# Patient Record
Sex: MALE | Race: WHITE | NOT HISPANIC OR LATINO | Employment: FULL TIME | URBAN - METROPOLITAN AREA
[De-identification: names, ages, dates, MRNs, and addresses within clinical notes are randomized per-mention and may not be internally consistent; named-entity substitution may affect disease eponyms.]

---

## 2018-04-25 NOTE — PATIENT INSTRUCTIONS
Preventive Health Recommendations  Male Ages 50   64    Yearly exam:             See your health care provider every year in order to  o   Review health changes.   o   Discuss preventive care.    o   Review your medicines if your doctor has prescribed any.     Have a cholesterol test every 5 years, or more frequently if you are at risk for high cholesterol/heart disease.     Have a diabetes test (fasting glucose) every three years. If you are at risk for diabetes, you should have this test more often.     Have a colonoscopy at age 50, or have a yearly FIT test (stool test). These exams will check for colon cancer.      Talk with your health care provider about whether or not a prostate cancer screening test (PSA) is right for you.    You should be tested each year for STDs (sexually transmitted diseases), if you re at risk.     Shots: Get a flu shot each year. Get a tetanus shot every 10 years.     Nutrition:    Eat at least 5 servings of fruits and vegetables daily.     Eat whole-grain bread, whole-wheat pasta and brown rice instead of white grains and rice.     Talk to your provider about Calcium and Vitamin D.     Lifestyle    Exercise for at least 150 minutes a week (30 minutes a day, 5 days a week). This will help you control your weight and prevent disease.     Limit alcohol to one drink per day.     No smoking.     Wear sunscreen to prevent skin cancer.     See your dentist every six months for an exam and cleaning.     See your eye doctor every 1 to 2 years.              Heartburn/GERD   What is heartburn?   Heartburn refers to the symptoms you feel when acids in your stomach flow back into the esophagus. (The esophagus is the tube that carries food from your throat to your stomach.) This backward movement of stomach acid is called reflux. The acid can burn and irritate the esophagus, throat, and vocal cords.   Heartburn is a common problem. Despite its name, it has nothing to do with the heart.   When you  have heartburn often, you may have a condition called gastroesophageal reflux disease, or GERD.   How does it occur?   At the bottom of the esophagus there is a ring of muscle called a sphincter. It acts like a valve. When you swallow food, the sphincter opens to let the food pass into the stomach. The ring then closes to keep the stomach contents from going back into the esophagus. If the sphincter is weak or too relaxed, stomach acid and food flow backward into the esophagus. Because the esophagus does not have the protective lining that the stomach has, the acid causes pain.   The sphincter muscle sometimes does not work properly if:   You are overweight.   You are pregnant.   You have a hiatal hernia (a condition in which part of the stomach protrudes through the diaphragm into the chest).   You eat too much.   You lie down soon after eating.   You wear tight clothes that push on your stomach.   Foods that may make heartburn worse are:   foods high in fat   sugar   chocolate   peppermint   onions   citrus foods such as orange juice   tomato-based foods   spicy foods   coffee and other drinks with caffeine, such as tea and samuel   alcohol.   Heartburn can also be made worse by:   taking certain medicines, such as aspirin   smoking cigarettes.   Anyone can have an attack of heartburn from overeating or eating foods that are high in acid. Most of the time heartburn is mild and lasts for a short time. There is usually not a problem when heartburn occurs just once in a while. You should see your healthcare provider if:   You have heartburn nearly every day for 2 weeks.   The heartburn comes back when the antacid wears off.   Heartburn wakes you up at night.   What are the symptoms?   The main symptom of heartburn is a burning pain in the lower chest, usually close to the bottom of the breastbone. Other symptoms you may have are:   acid or sour taste in your mouth   belching   a feeling of bloating or fullness in the  stomach.   These symptoms tend to happen after very large meals and especially with activity such as bending or lifting after meals. The symptoms may be made worse by lying down or by wearing tight clothing.   Heartburn is very common during the last few months of pregnancy. The weight of the baby pushes on the stomach and can cause the sphincter to relax and let acid to flow back into the esophagus.   How is it diagnosed?   Usually heartburn can be diagnosed from your medical history.   If there is any question about the diagnosis, you may have the following tests to check for ulcers or other problems that might cause your symptoms:   barium swallow X-ray study of the esophagus   complete upper GI (gastrointestinal) barium X-ray study of the esophagus, stomach, and upper intestine   endoscopy, a procedure in which a thin flexible tube with a tiny camera is placed in your mouth and down into your stomach so your provider can see your esophagus and stomach.   How is it treated?   To help reduce the symptoms of heartburn you can:   Try not to put a lot of pressure on the sphincter muscle. Eating light meals and wearing loose clothing will help.   Lose weight if you are overweight.   Take nonprescription antacids (tablets or liquid) after meals and at bedtime.   Raise the head of your bed or use more than one pillow so your head is higher than your stomach. This may allow gravity to help keep food from backing up.   If you find that certain foods or drinks seem to cause your symptoms or make them worse, avoid those foods.   If the simple measures described above do not relieve the symptoms, your healthcare provider may prescribe medicine. The prescription medicines help reduce stomach acid. They also help stomach emptying. A very few people who are not helped with medicines may need surgery.   Get emergency care if the following symptoms occur with the heartburn and do not go away within 15 minutes of treatment for  heartburn: shortness of breath; sweating; light-headedness, weakness; or jaw, arm, back, or chest pain.   How long will the effects last?   Heartburn symptoms are usually relieved by treatment in just a few hours or less. If you are having heartburn every day, starting treatment will usually relieve the symptoms in a few days. However, the symptoms may come back from time to time, especially if you gain weight.   Heartburn can sometimes make asthma worse. If you have asthma, preventing or controlling heartburn may help control your asthma symptoms.   How can I help prevent heartburn?   The best prevention is to:   Keep a healthy weight. Lose weight if you are overweight.   Sleep with your head elevated at least 4 to 6 inches. (It's usually most comfortable to put the head of your bed on blocks.)   It may also help if you:   Wait an hour or longer after eating before you lie down. If you have to lie down after a meal, lie on your left side. Keep your head and shoulders slightly higher than the rest of your body. It's best to not eat for 2 to 3 hours before you go to bed.   Eat smaller, more frequent meals.   Avoid wearing tight clothing or belts.   Don't smoke. Smoking relaxes the sphincter leading to your stomach.   Avoid foods and other things that seem to cause heartburn or make it worse.   Developed by [employer].   Published by [employer].   Last modified: 2009-01-14   Last reviewed: 2008-12-02

## 2018-04-25 NOTE — PROGRESS NOTES
"  SUBJECTIVE:   CC: Per Etienne is an 52 year old male who presents for preventative health visit.     Healthy Habits:    Do you get at least three servings of calcium containing foods daily (dairy, green leafy vegetables, etc.)? {YES/NO, DAIRY INTAKE:033341::\"yes\"}    Amount of exercise or daily activities, outside of work: {AMOUNT EXERCISE:794022}    Problems taking medications regularly {Yes /No default:279022::\"No\"}    Medication side effects: {Yes /No default.:228529::\"No\"}    Have you had an eye exam in the past two years? {YESNOBLANK:296348}    Do you see a dentist twice per year? {YESNOBLANK:997443}    Do you have sleep apnea, excessive snoring or daytime drowsiness?{YESNOBLANK:022849}  {Outside tests to abstract? :478263}     {additional problems to add (Optional):536961}    Today's PHQ-2 Score:   PHQ-2 ( 1999 [employer]) 2/11/2016 2/26/2015   Q1: Little interest or pleasure in doing things 0 0   Q2: Feeling down, depressed or hopeless 0 0   PHQ-2 Score 0 0     {PHQ-2 LOOK IN ASSESSMENTS :028377}  Abuse: Current or Past(Physical, Sexual or Emotional)- {YES/NO/NA:031149}  Do you feel safe in your environment - {YES/NO/NA:100848}    Social History   Substance Use Topics     Smoking status: Former Smoker     Quit date: 1/2/2012     Smokeless tobacco: Never Used     Alcohol use Yes      If you drink alcohol do you typically have >3 drinks per day or >7 drinks per week? {ETOH :128232}                      Last PSA: No results found for: PSA    Reviewed orders with patient. Reviewed health maintenance and updated orders accordingly - {Yes/No:393421::\"Yes\"}  {Chronicprobdata (Optional):013420}    Reviewed and updated as needed this visit by clinical staff         Reviewed and updated as needed this visit by Provider        {HISTORY OPTIONS (Optional):834199}    ROS:  {MALE ROS-adult preventive care package:136936::\"C: NEGATIVE for fever, chills, change in weight\",\"I: NEGATIVE for worrisome rashes, moles or " "lesions\",\"E: NEGATIVE for vision changes or irritation\",\"ENT: NEGATIVE for ear, mouth and throat problems\",\"R: NEGATIVE for significant cough or SOB\",\"CV: NEGATIVE for chest pain, palpitations or peripheral edema\",\"GI: NEGATIVE for nausea, abdominal pain, heartburn, or change in bowel habits\",\" male: negative for dysuria, hematuria, decreased urinary stream, erectile dysfunction, urethral discharge\",\"M: NEGATIVE for significant arthralgias or myalgia\",\"N: NEGATIVE for weakness, dizziness or paresthesias\",\"P: NEGATIVE for changes in mood or affect\"}    OBJECTIVE:   There were no vitals taken for this visit.  EXAM:  {Exam Choices:901504}    ASSESSMENT/PLAN:   {Diag Picklist:318646}    COUNSELING:  {MALE COUNSELING MESSAGES:279780::\"Reviewed preventive health counseling, as reflected in patient instructions\"}    {BP Counseling- Complete if BP >= 120/80  (Optional):807550}   reports that he quit smoking about 6 years ago. He has never used smokeless tobacco.  {Tobacco Cessation -- Complete if patient is a smoker (Optional):737559}  Estimated body mass index is 27.62 kg/(m^2) as calculated from the following:    Height as of 2/11/16: 5' 9\" (1.753 m).    Weight as of 2/11/16: 187 lb (84.8 kg).   {Weight Management Plan (ACO) Complete if BMI is abnormal-  Ages 18-64  BMI >24.9.  Age 65+ with BMI <23 or >30 (Optional):409655}    Counseling Resources:  ATP IV Guidelines  Pooled Cohorts Equation Calculator  FRAX Risk Assessment  ICSI Preventive Guidelines  Dietary Guidelines for Americans, 2010  USDA's MyPlate  ASA Prophylaxis  Lung CA Screening    Kwaku Sanchez PA-C  [facility]  "

## 2018-04-26 ENCOUNTER — OFFICE VISIT (OUTPATIENT)
Dept: FAMILY MEDICINE | Facility: CLINIC | Age: 53
End: 2018-04-26
Payer: COMMERCIAL

## 2018-04-26 VITALS
WEIGHT: 187 LBS | RESPIRATION RATE: 16 BRPM | HEART RATE: 86 BPM | OXYGEN SATURATION: 97 % | TEMPERATURE: 97.6 F | SYSTOLIC BLOOD PRESSURE: 134 MMHG | HEIGHT: 69 IN | DIASTOLIC BLOOD PRESSURE: 85 MMHG | BODY MASS INDEX: 27.7 KG/M2

## 2018-04-26 DIAGNOSIS — K21.9 GASTROESOPHAGEAL REFLUX DISEASE, ESOPHAGITIS PRESENCE NOT SPECIFIED: ICD-10-CM

## 2018-04-26 DIAGNOSIS — R07.89 ATYPICAL CHEST PAIN: ICD-10-CM

## 2018-04-26 DIAGNOSIS — Z00.00 ROUTINE GENERAL MEDICAL EXAMINATION AT A HEALTH CARE FACILITY: Primary | ICD-10-CM

## 2018-04-26 DIAGNOSIS — Z11.59 NEED FOR HEPATITIS C SCREENING TEST: ICD-10-CM

## 2018-04-26 DIAGNOSIS — Z12.11 SCREEN FOR COLON CANCER: ICD-10-CM

## 2018-04-26 PROCEDURE — 93000 ELECTROCARDIOGRAM COMPLETE: CPT | Performed by: PHYSICIAN ASSISTANT

## 2018-04-26 PROCEDURE — 99213 OFFICE O/P EST LOW 20 MIN: CPT | Mod: 25 | Performed by: PHYSICIAN ASSISTANT

## 2018-04-26 PROCEDURE — 99396 PREV VISIT EST AGE 40-64: CPT | Performed by: PHYSICIAN ASSISTANT

## 2018-04-26 NOTE — MR AVS SNAPSHOT
After Visit Summary   4/26/2018    Per Etienne    MRN: [id number]           Patient Information     Date Of Birth          1965        Visit Information        Provider Department      4/26/2018 3:10 PM Kwaku Sanchez PA-C [facility]        Today's Diagnoses     Routine general medical examination at a health care facility    -  1    Screen for colon cancer        Need for hepatitis C screening test        Gastroesophageal reflux disease, esophagitis presence not specified        Atypical chest pain          Care Instructions      Preventive Health Recommendations  Male Ages 50 - 64    Yearly exam:             See your health care provider every year in order to  o   Review health changes.   o   Discuss preventive care.    o   Review your medicines if your doctor has prescribed any.     Have a cholesterol test every 5 years, or more frequently if you are at risk for high cholesterol/heart disease.     Have a diabetes test (fasting glucose) every three years. If you are at risk for diabetes, you should have this test more often.     Have a colonoscopy at age 50, or have a yearly FIT test (stool test). These exams will check for colon cancer.      Talk with your health care provider about whether or not a prostate cancer screening test (PSA) is right for you.    You should be tested each year for STDs (sexually transmitted diseases), if you re at risk.     Shots: Get a flu shot each year. Get a tetanus shot every 10 years.     Nutrition:    Eat at least 5 servings of fruits and vegetables daily.     Eat whole-grain bread, whole-wheat pasta and brown rice instead of white grains and rice.     Talk to your provider about Calcium and Vitamin D.     Lifestyle    Exercise for at least 150 minutes a week (30 minutes a day, 5 days a week). This will help you control your weight and prevent disease.     Limit alcohol to one drink per day.     No smoking.     Wear sunscreen to  prevent skin cancer.     See your dentist every six months for an exam and cleaning.     See your eye doctor every 1 to 2 years.              Heartburn/GERD   What is heartburn?   Heartburn refers to the symptoms you feel when acids in your stomach flow back into the esophagus. (The esophagus is the tube that carries food from your throat to your stomach.) This backward movement of stomach acid is called reflux. The acid can burn and irritate the esophagus, throat, and vocal cords.   Heartburn is a common problem. Despite its name, it has nothing to do with the heart.   When you have heartburn often, you may have a condition called gastroesophageal reflux disease, or GERD.   How does it occur?   At the bottom of the esophagus there is a ring of muscle called a sphincter. It acts like a valve. When you swallow food, the sphincter opens to let the food pass into the stomach. The ring then closes to keep the stomach contents from going back into the esophagus. If the sphincter is weak or too relaxed, stomach acid and food flow backward into the esophagus. Because the esophagus does not have the protective lining that the stomach has, the acid causes pain.   The sphincter muscle sometimes does not work properly if:   You are overweight.   You are pregnant.   You have a hiatal hernia (a condition in which part of the stomach protrudes through the diaphragm into the chest).   You eat too much.   You lie down soon after eating.   You wear tight clothes that push on your stomach.   Foods that may make heartburn worse are:   foods high in fat   sugar   chocolate   peppermint   onions   citrus foods such as orange juice   tomato-based foods   spicy foods   coffee and other drinks with caffeine, such as tea and samuel   alcohol.   Heartburn can also be made worse by:   taking certain medicines, such as aspirin   smoking cigarettes.   Anyone can have an attack of heartburn from overeating or eating foods that are high in acid.  Most of the time heartburn is mild and lasts for a short time. There is usually not a problem when heartburn occurs just once in a while. You should see your healthcare provider if:   You have heartburn nearly every day for 2 weeks.   The heartburn comes back when the antacid wears off.   Heartburn wakes you up at night.   What are the symptoms?   The main symptom of heartburn is a burning pain in the lower chest, usually close to the bottom of the breastbone. Other symptoms you may have are:   acid or sour taste in your mouth   belching   a feeling of bloating or fullness in the stomach.   These symptoms tend to happen after very large meals and especially with activity such as bending or lifting after meals. The symptoms may be made worse by lying down or by wearing tight clothing.   Heartburn is very common during the last few months of pregnancy. The weight of the baby pushes on the stomach and can cause the sphincter to relax and let acid to flow back into the esophagus.   How is it diagnosed?   Usually heartburn can be diagnosed from your medical history.   If there is any question about the diagnosis, you may have the following tests to check for ulcers or other problems that might cause your symptoms:   barium swallow X-ray study of the esophagus   complete upper GI (gastrointestinal) barium X-ray study of the esophagus, stomach, and upper intestine   endoscopy, a procedure in which a thin flexible tube with a tiny camera is placed in your mouth and down into your stomach so your provider can see your esophagus and stomach.   How is it treated?   To help reduce the symptoms of heartburn you can:   Try not to put a lot of pressure on the sphincter muscle. Eating light meals and wearing loose clothing will help.   Lose weight if you are overweight.   Take nonprescription antacids (tablets or liquid) after meals and at bedtime.   Raise the head of your bed or use more than one pillow so your head is higher than  your stomach. This may allow gravity to help keep food from backing up.   If you find that certain foods or drinks seem to cause your symptoms or make them worse, avoid those foods.   If the simple measures described above do not relieve the symptoms, your healthcare provider may prescribe medicine. The prescription medicines help reduce stomach acid. They also help stomach emptying. A very few people who are not helped with medicines may need surgery.   Get emergency care if the following symptoms occur with the heartburn and do not go away within 15 minutes of treatment for heartburn: shortness of breath; sweating; light-headedness, weakness; or jaw, arm, back, or chest pain.   How long will the effects last?   Heartburn symptoms are usually relieved by treatment in just a few hours or less. If you are having heartburn every day, starting treatment will usually relieve the symptoms in a few days. However, the symptoms may come back from time to time, especially if you gain weight.   Heartburn can sometimes make asthma worse. If you have asthma, preventing or controlling heartburn may help control your asthma symptoms.   How can I help prevent heartburn?   The best prevention is to:   Keep a healthy weight. Lose weight if you are overweight.   Sleep with your head elevated at least 4 to 6 inches. (It's usually most comfortable to put the head of your bed on blocks.)   It may also help if you:   Wait an hour or longer after eating before you lie down. If you have to lie down after a meal, lie on your left side. Keep your head and shoulders slightly higher than the rest of your body. It's best to not eat for 2 to 3 hours before you go to bed.   Eat smaller, more frequent meals.   Avoid wearing tight clothing or belts.   Don't smoke. Smoking relaxes the sphincter leading to your stomach.   Avoid foods and other things that seem to cause heartburn or make it worse.   Developed by [employer].   Published by  "[employer].   Last modified: 2009-01-14   Last reviewed: 2008-12-02             Follow-ups after your visit        Future tests that were ordered for you today     Open Future Orders        Priority Expected Expires Ordered    Hepatitis C Screen Reflex to HCV RNA Quant and Genotype Routine  7/26/2018 4/26/2018    Lipid panel reflex to direct LDL Fasting Routine  7/26/2018 4/26/2018    Comprehensive metabolic panel Routine  7/26/2018 4/26/2018    EKG 12-lead complete w/read - Clinics Routine  7/26/2018 4/26/2018    Exercise Stress Echocardiogram Routine  7/26/2018 4/26/2018    CBC with platelets differential Routine  7/26/2018 4/26/2018    TSH with free T4 reflex Routine  7/26/2018 4/26/2018            Who to contact     If you have questions or need follow up information about today's clinic visit or your schedule please contact [facility] directly at [phone].  Normal or non-critical lab and imaging results will be communicated to you by MyChart, letter or phone within 4 business days after the clinic has received the results. If you do not hear from us within 7 days, please contact the clinic through erentohart or phone. If you have a critical or abnormal lab result, we will notify you by phone as soon as possible.  Submit refill requests through Co[employer] or call your pharmacy and they will forward the refill request to us. Please allow 3 business days for your refill to be completed.          Additional Information About Your Visit        CoSchedule Information     CoSchedule lets you send messages to your doctor, view your test results, renew your prescriptions, schedule appointments and more. To sign up, go to [email or web address] . Click on \"Log in\" on the left side of the screen, which will take you to the Welcome page. Then click on \"Sign up Now\" on the right side of the page.     You will be asked to enter the access code listed below, as well as some personal information. Please follow the " "directions to create your username and password.     Your access code is: [email or web address]  Expires: 2018  3:44 PM     Your access code will  in 90 days. If you need help or a new code, please call your [facility] clinic or [phone].        Care EveryWhere ID     This is your Care EveryWhere ID. This could be used by other organizations to access your [facility] medical records  [id number]        Your Vitals Were     Pulse Temperature Respirations Height Pulse Oximetry BMI (Body Mass Index)    86 97.6  F (36.4  C) (Oral) 16 5' 8.5\" (1.74 m) 97% 28.02 kg/m2       Blood Pressure from Last 3 Encounters:   18 134/85   16 (!) 133/92   05/06/15 (!) 148/101    Weight from Last 3 Encounters:   18 187 lb (84.8 kg)   16 187 lb (84.8 kg)   05/11/15 189 lb 3.2 oz (85.8 kg)               Primary Care Provider Office Phone # Fax #    Per Chen [phone] [address] [facility] [address]        Equal Access to Services     ZAID COX : Hadii doug ku hadasho Soomaali, waaxda luqadaha, qaybta kaalmada adeegyada, waxay ap posey . So [facility] [phone].    ATENCIÓN: Si habla español, tiene a pennington disposición servicios gratuitos de asistencia lingüística. Llchristiano al [phone].    We comply with applicable federal civil rights laws and [place] laws. We do not discriminate on the basis of race, color, national origin, age, disability, sex, sexual orientation, or gender identity.            Thank you!     Thank you for choosing [facility]  for your care. Our goal is always to provide you with excellent care. Hearing back from our patients is one way we can continue to improve our services. Please take a few minutes to complete the written survey that you may receive in the mail after your visit with us. Thank you!             Your Updated Medication List - Protect others around you: Learn how to safely use, store and throw away " your medicines at www.disposemymeds.org.      Notice  As of 4/26/2018  3:44 PM    You have not been prescribed any medications.

## 2018-04-26 NOTE — PROGRESS NOTES
SUBJECTIVE:   CC: Per Etienne is an 52 year old male who presents for preventative health visit.     Physical   Annual:     Getting at least 3 servings of Calcium per day::  NO    Bi-annual eye exam::  Yes    Dental care twice a year::  Yes    Sleep apnea or symptoms of sleep apnea::  None    Diet::  Regular (no restrictions)    Frequency of exercise::  None    Taking medications regularly::  Yes    Medication side effects::  None    Additional concerns today::  YES            Mid chest pain, gotten worse recently. Takes otc product from [facility] (unsure of name) for heartburn that does work for him but if he does not take it sx's return and are strong .  History of GERD symptoms and antacids does help but cont chest pain.   No exertional symptoms. Active at work with no symptoms.   Never had colonoscopy or endoscopy done in the past.   History of chest pain 2012 with neg cardiac workup. See EPIC.    Today's PHQ-2 Score:   PHQ-2 ( 1999 [employer]) 4/26/2018   Q1: Little interest or pleasure in doing things 0   Q2: Feeling down, depressed or hopeless 0   PHQ-2 Score 0   Q1: Little interest or pleasure in doing things Not at all   Q2: Feeling down, depressed or hopeless Not at all   PHQ-2 Score 0       Abuse: Current or Past(Physical, Sexual or Emotional)- No  Do you feel safe in your environment - Yes    Social History   Substance Use Topics     Smoking status: Former Smoker     Quit date: 1/2/2012     Smokeless tobacco: Never Used     Alcohol use Yes     Alcohol Use 4/26/2018   If you drink alcohol do you typically have greater than 3 drinks per day OR greater than 7 drinks per week? Yes   AUDIT SCORE  8     Last PSA: No results found for: PSA    Reviewed orders with patient. Reviewed health maintenance and updated orders accordingly - Yes  Labs reviewed in EPIC  BP Readings from Last 3 Encounters:   04/26/18 134/85   02/11/16 (!) 133/92   05/06/15 (!) 148/101    Wt Readings from Last 3 Encounters:   04/26/18 187  "lb (84.8 kg)   02/11/16 187 lb (84.8 kg)   05/11/15 189 lb 3.2 oz (85.8 kg)                  Patient Active Problem List   Diagnosis     CARDIOVASCULAR SCREENING; LDL GOAL LESS THAN 160     Left varicocele     Vitamin D deficiency     Kidney stone     Past Surgical History:   Procedure Laterality Date     ORTHOPEDIC SURGERY  1985    femur newton placed       Social History   Substance Use Topics     Smoking status: Former Smoker     Quit date: 1/2/2012     Smokeless tobacco: Never Used     Alcohol use Yes     Family History   Problem Relation Age of Onset     Asthma Father      CANCER Maternal Grandmother 92     throat      Asthma Sister          No current outpatient prescriptions on file.     No Known Allergies    Reviewed and updated as needed this visit by clinical staff  Tobacco  Allergies  Meds  Med Hx  Surg Hx  Fam Hx  Soc Hx        Reviewed and updated as needed this visit by Provider          Past Medical History:   Diagnosis Date     NO ACTIVE PROBLEMS       Past Surgical History:   Procedure Laterality Date     ORTHOPEDIC SURGERY  1985    femur newton placed       Review of Systems  C: NEGATIVE for fever, chills, change in weight  I: NEGATIVE for worrisome rashes, moles or lesions  E: NEGATIVE for vision changes or irritation  ENT: NEGATIVE for ear, mouth and throat problems  R: NEGATIVE for significant cough or SOB  CV: NEGATIVE for chest pain, palpitations or peripheral edema  GI: NEGATIVE for nausea, abdominal pain, heartburn, or change in bowel habits   male: negative for dysuria, hematuria, decreased urinary stream, erectile dysfunction, urethral discharge  M: NEGATIVE for significant arthralgias or myalgia  N: NEGATIVE for weakness, dizziness or paresthesias  P: NEGATIVE for changes in mood or affect    OBJECTIVE:   /85  Pulse 86  Temp 97.6  F (36.4  C) (Oral)  Resp 16  Ht 5' 8.5\" (1.74 m)  Wt 187 lb (84.8 kg)  SpO2 97%  BMI 28.02 kg/m2    Physical Exam  GENERAL: healthy, alert and no " distress  EYES: Eyes grossly normal to inspection, PERRL and conjunctivae and sclerae normal  HENT: ear canals and TM's normal, nose and mouth without ulcers or lesions  NECK: no adenopathy, no asymmetry, masses, or scars and thyroid normal to palpation  RESP: lungs clear to auscultation - no rales, rhonchi or wheezes  CV: regular rate and rhythm, normal S1 S2, no S3 or S4, no murmur, click or rub, no peripheral edema and peripheral pulses strong  ABDOMEN: soft, nontender, no hepatosplenomegaly, no masses and bowel sounds normal   (male): normal male genitalia without lesions or urethral discharge, no hernia  MS: no gross musculoskeletal defects noted, no edema  SKIN: no suspicious lesions or rashes  NEURO: Normal strength and tone, mentation intact and speech normal  PSYCH: mentation appears normal, affect normal/bright    EKG: NRS.   Future fasting labs pending.   ASSESSMENT/PLAN:       ICD-10-CM    1. Routine general medical examination at a health care facility Z00.00 Hepatitis C Screen Reflex to HCV RNA Quant and Genotype     Lipid panel reflex to direct LDL Fasting     Comprehensive metabolic panel   2. Atypical chest pain R07.89 EKG 12-lead complete w/read - Clinics     Exercise Stress Echocardiogram     CBC with platelets differential     TSH with free T4 reflex     EKG 12-lead complete w/read - Clinics     GASTROENTEROLOGY ADULT REF PROCEDURE ONLY     OFFICE/OUTPT VISIT,EST,LEVL III   3. Gastroesophageal reflux disease, esophagitis presence not specified K21.9 GASTROENTEROLOGY ADULT REF PROCEDURE ONLY     OFFICE/OUTPT VISIT,EST,LEVL III   4. Screen for colon cancer Z12.11 GASTROENTEROLOGY ADULT REF PROCEDURE ONLY   5. Need for hepatitis C screening test Z11.59      1. Work on Healthy diet and exercise. Getting heart rate elevated for 30 mins most days of week.  2. Stress echo pending. warning signs discussed. Follow up  With ED if increase symptoms.   3. Get endoscopy and colonoscopy  Follow up  Dependant  "on results and labs.     COUNSELING:   Reviewed preventive health counseling, as reflected in patient instructions       Regular exercise       Healthy diet/nutrition    BP Screening:   Last 3 BP Readings:    BP Readings from Last 3 Encounters:   04/26/18 134/85   02/11/16 (!) 133/92   05/06/15 (!) 148/101       The following was recommended to the patient:  Re-screen BP within a year and recommended lifestyle modifications     reports that he quit smoking about 6 years ago. He has never used smokeless tobacco.    Estimated body mass index is 28.02 kg/(m^2) as calculated from the following:    Height as of this encounter: 5' 8.5\" (1.74 m).    Weight as of this encounter: 187 lb (84.8 kg).   Weight management plan: Discussed healthy diet and exercise guidelines and patient will follow up in 12 months in clinic to re-evaluate.    Counseling Resources:  ATP IV Guidelines  Pooled Cohorts Equation Calculator  FRAX Risk Assessment  ICSI Preventive Guidelines  Dietary Guidelines for Americans, 2010  USDA's MyPlate  ASA Prophylaxis  Lung CA Screening    Kwaku Sanchez PA-C  [facility]  "

## 2018-04-26 NOTE — NURSING NOTE
"Chief Complaint   Patient presents with     Physical     Health Maintenance     PHQ2       Initial /85  Pulse 86  Temp 97.6  F (36.4  C) (Oral)  Resp 16  Ht 5' 8.5\" (1.74 m)  Wt 187 lb (84.8 kg)  SpO2 97%  BMI 28.02 kg/m2 Estimated body mass index is 28.02 kg/(m^2) as calculated from the following:    Height as of this encounter: 5' 8.5\" (1.74 m).    Weight as of this encounter: 187 lb (84.8 kg).  Medication Reconciliation: complete  Lisseth Ruiz CMA    "

## 2018-05-05 DIAGNOSIS — Z00.00 ROUTINE GENERAL MEDICAL EXAMINATION AT A HEALTH CARE FACILITY: ICD-10-CM

## 2018-05-05 DIAGNOSIS — R73.01 IMPAIRED FASTING GLUCOSE: Primary | ICD-10-CM

## 2018-05-05 DIAGNOSIS — R07.89 ATYPICAL CHEST PAIN: ICD-10-CM

## 2018-05-05 LAB
BASOPHILS # BLD AUTO: 0.1 10E9/L (ref 0–0.2)
BASOPHILS NFR BLD AUTO: 0.7 %
DIFFERENTIAL METHOD BLD: NORMAL
EOSINOPHIL # BLD AUTO: 0.3 10E9/L (ref 0–0.7)
EOSINOPHIL NFR BLD AUTO: 3.5 %
ERYTHROCYTE [DISTWIDTH] IN BLOOD BY AUTOMATED COUNT: 12.2 % (ref 10–15)
HCT VFR BLD AUTO: 46.3 % (ref 40–53)
HGB BLD-MCNC: 15.9 G/DL (ref 13.3–17.7)
LYMPHOCYTES # BLD AUTO: 1.9 10E9/L (ref 0.8–5.3)
LYMPHOCYTES NFR BLD AUTO: 26 %
MCH RBC QN AUTO: 31.1 PG (ref 26.5–33)
MCHC RBC AUTO-ENTMCNC: 34.3 G/DL (ref 31.5–36.5)
MCV RBC AUTO: 91 FL (ref 78–100)
MONOCYTES # BLD AUTO: 0.8 10E9/L (ref 0–1.3)
MONOCYTES NFR BLD AUTO: 11 %
NEUTROPHILS # BLD AUTO: 4.3 10E9/L (ref 1.6–8.3)
NEUTROPHILS NFR BLD AUTO: 58.8 %
PLATELET # BLD AUTO: 346 10E9/L (ref 150–450)
RBC # BLD AUTO: 5.11 10E12/L (ref 4.4–5.9)
WBC # BLD AUTO: 7.4 10E9/L (ref 4–11)

## 2018-05-05 PROCEDURE — 80061 LIPID PANEL: CPT | Performed by: PHYSICIAN ASSISTANT

## 2018-05-05 PROCEDURE — 36415 COLL VENOUS BLD VENIPUNCTURE: CPT | Performed by: PHYSICIAN ASSISTANT

## 2018-05-05 PROCEDURE — 86803 HEPATITIS C AB TEST: CPT | Performed by: PHYSICIAN ASSISTANT

## 2018-05-05 PROCEDURE — 84443 ASSAY THYROID STIM HORMONE: CPT | Performed by: PHYSICIAN ASSISTANT

## 2018-05-05 PROCEDURE — 85025 COMPLETE CBC W/AUTO DIFF WBC: CPT | Performed by: PHYSICIAN ASSISTANT

## 2018-05-05 PROCEDURE — 80053 COMPREHEN METABOLIC PANEL: CPT | Performed by: PHYSICIAN ASSISTANT

## 2018-05-06 LAB — HCV AB SERPL QL IA: NONREACTIVE

## 2018-05-07 LAB
ALBUMIN SERPL-MCNC: 3.4 G/DL (ref 3.4–5)
ALP SERPL-CCNC: 82 U/L (ref 40–150)
ALT SERPL W P-5'-P-CCNC: 30 U/L (ref 0–70)
ANION GAP SERPL CALCULATED.3IONS-SCNC: 9 MMOL/L (ref 3–14)
AST SERPL W P-5'-P-CCNC: 18 U/L (ref 0–45)
BILIRUB SERPL-MCNC: 0.5 MG/DL (ref 0.2–1.3)
BUN SERPL-MCNC: 20 MG/DL (ref 7–30)
CALCIUM SERPL-MCNC: 8.7 MG/DL (ref 8.5–10.1)
CHLORIDE SERPL-SCNC: 101 MMOL/L (ref 94–109)
CHOLEST SERPL-MCNC: 174 MG/DL
CO2 SERPL-SCNC: 27 MMOL/L (ref 20–32)
CREAT SERPL-MCNC: 1.05 MG/DL (ref 0.66–1.25)
GFR SERPL CREATININE-BSD FRML MDRD: 74 ML/MIN/1.7M2
GLUCOSE SERPL-MCNC: 121 MG/DL (ref 70–99)
HDLC SERPL-MCNC: 52 MG/DL
LDLC SERPL CALC-MCNC: 110 MG/DL
NONHDLC SERPL-MCNC: 122 MG/DL
POTASSIUM SERPL-SCNC: 4.6 MMOL/L (ref 3.4–5.3)
PROT SERPL-MCNC: 7.2 G/DL (ref 6.8–8.8)
SODIUM SERPL-SCNC: 137 MMOL/L (ref 133–144)
TRIGL SERPL-MCNC: 62 MG/DL
TSH SERPL DL<=0.005 MIU/L-ACNC: 1.75 MU/L (ref 0.4–4)

## 2018-05-09 ENCOUNTER — TELEPHONE (OUTPATIENT)
Dept: FAMILY MEDICINE | Facility: CLINIC | Age: 53
End: 2018-05-09

## 2018-05-09 NOTE — TELEPHONE ENCOUNTER
Notes Recorded by Rosa Hung RN on 5/9/2018 at 1:43 PM  Left message on answering machine for patient to call back. Jocelynn ACOSTA, [phone]    ------    Notes Recorded by Kwaku Sanchez PA-C on 5/8/2018 at 7:21 AM  RN please call patient with abnormal results.  Elevated blood sugers.   Need fasting labs again.     Kwaku Sanchez PA-C

## 2018-05-10 ENCOUNTER — RADIANT APPOINTMENT (OUTPATIENT)
Dept: CARDIOLOGY | Facility: CLINIC | Age: 53
End: 2018-05-10
Attending: PHYSICIAN ASSISTANT
Payer: COMMERCIAL

## 2018-05-10 DIAGNOSIS — R73.01 IMPAIRED FASTING GLUCOSE: ICD-10-CM

## 2018-05-10 DIAGNOSIS — R07.89 ATYPICAL CHEST PAIN: ICD-10-CM

## 2018-05-10 LAB
GLUCOSE BLD-MCNC: 108 MG/DL (ref 70–99)
HBA1C MFR BLD: 5.7 % (ref 0–5.6)

## 2018-05-10 PROCEDURE — 93018 CV STRESS TEST I&R ONLY: CPT | Performed by: INTERNAL MEDICINE

## 2018-05-10 PROCEDURE — 83036 HEMOGLOBIN GLYCOSYLATED A1C: CPT | Performed by: PHYSICIAN ASSISTANT

## 2018-05-10 PROCEDURE — 93350 STRESS TTE ONLY: CPT | Mod: TC | Performed by: INTERNAL MEDICINE

## 2018-05-10 PROCEDURE — 93350 STRESS TTE ONLY: CPT | Mod: 26 | Performed by: INTERNAL MEDICINE

## 2018-05-10 PROCEDURE — 82947 ASSAY GLUCOSE BLOOD QUANT: CPT | Performed by: PHYSICIAN ASSISTANT

## 2018-05-10 PROCEDURE — 40000264 ECHO STRESS TEST WITH DEFINITY: Performed by: INTERNAL MEDICINE

## 2018-05-10 PROCEDURE — 93321 DOPPLER ECHO F-UP/LMTD STD: CPT | Mod: 26 | Performed by: INTERNAL MEDICINE

## 2018-05-10 PROCEDURE — 36415 COLL VENOUS BLD VENIPUNCTURE: CPT | Performed by: PHYSICIAN ASSISTANT

## 2018-05-10 PROCEDURE — 93325 DOPPLER ECHO COLOR FLOW MAPG: CPT | Mod: TC | Performed by: INTERNAL MEDICINE

## 2018-05-10 PROCEDURE — 93321 DOPPLER ECHO F-UP/LMTD STD: CPT | Mod: TC | Performed by: INTERNAL MEDICINE

## 2018-05-10 PROCEDURE — 93325 DOPPLER ECHO COLOR FLOW MAPG: CPT | Mod: 26 | Performed by: INTERNAL MEDICINE

## 2018-05-10 PROCEDURE — 93016 CV STRESS TEST SUPVJ ONLY: CPT | Performed by: INTERNAL MEDICINE

## 2018-05-10 PROCEDURE — 93017 CV STRESS TEST TRACING ONLY: CPT | Performed by: INTERNAL MEDICINE

## 2018-05-10 PROCEDURE — 93352 ADMIN ECG CONTRAST AGENT: CPT | Performed by: INTERNAL MEDICINE

## 2018-05-10 RX ADMIN — Medication 5 ML: at 15:15

## 2018-06-19 ENCOUNTER — OFFICE VISIT (OUTPATIENT)
Dept: URGENT CARE | Facility: URGENT CARE | Age: 53
End: 2018-06-19
Payer: COMMERCIAL

## 2018-06-19 VITALS
TEMPERATURE: 97.3 F | DIASTOLIC BLOOD PRESSURE: 91 MMHG | OXYGEN SATURATION: 99 % | BODY MASS INDEX: 27.84 KG/M2 | SYSTOLIC BLOOD PRESSURE: 147 MMHG | HEART RATE: 55 BPM | WEIGHT: 185.8 LBS

## 2018-06-19 DIAGNOSIS — M67.972 ACHILLES TENDON DISORDER, LEFT: ICD-10-CM

## 2018-06-19 DIAGNOSIS — M79.89 PAIN AND SWELLING OF LEFT LOWER EXTREMITY: Primary | ICD-10-CM

## 2018-06-19 DIAGNOSIS — M79.605 PAIN AND SWELLING OF LEFT LOWER EXTREMITY: Primary | ICD-10-CM

## 2018-06-19 PROCEDURE — 99214 OFFICE O/P EST MOD 30 MIN: CPT | Performed by: PHYSICIAN ASSISTANT

## 2018-06-19 NOTE — PROGRESS NOTES
S: 51 yo male here for left leg injury. 1 week ago on Tuesday, June 12 he was helping to push his son's car up a ramp so they could take it to the shop when he felt an acute pop and pain in the left calf muscle.  It remains swollen and painful.  He now notes swelling and ecchymosis in the foot.  He has also noted some tenderness over the insertion site of the Achilles tendon. No fever. No paresthesias.     No SHORTNESS OF BREATH or cough.    No CP    Past Medical History:   Diagnosis Date     NO ACTIVE PROBLEMS      History   Smoking Status     Former Smoker     Quit date: 1/2/2012   Smokeless Tobacco     Never Used       ROS:  GEN no fevers  SKIN no erythema  Musculoskeletal:  See HPI.  Resp-  No SHORTNESS OF BREATH  ENT: no cough  Cardiac- no chest pain    OBJECTIVE:  Blood pressure (!) 147/91, pulse 55, temperature 97.3  F (36.3  C), temperature source Tympanic, weight 185 lb 12.8 oz (84.3 kg), SpO2 99 %.  Patient is alert and NAD.  EYES: conjunctiva clear  Calf Exam (left):  Inspection:significant swelling and bruising of the entire calf. Left foot also swollen and bruised.  Palpation: Calf is tender with extreme tautness with fluid of the entire lower leg circumferentially.  Foot is NT. Likely foot is bruised and swollen due to gravity of injury higher up. Tender over the achilles insertion site at the ankle. In prone position I squeeze each calf muscle and there is only minimal toe down pointing on the left compared to the right.  Cap refill intact.    Palpable posterior tibialis pulse  Neurovascularly Intact Distally.       ASSESSMENT:    ICD-10-CM    1. Pain and swelling of left lower extremity M79.605     M79.89    2. Achilles tendon disorder, left M67.972            PLAN:I worry about blood clot with the amount of swelling and firmness. Also worry about possibility of compartment syndrome and achilles tendon rupture. To ER for further evaluation. ? US, ? MRI      Rosa Barrios PA-C

## 2018-06-19 NOTE — MR AVS SNAPSHOT
After Visit Summary   6/19/2018    Per Etienne    MRN: [id number]           Patient Information     Date Of Birth          1965        Visit Information        Provider Department      6/19/2018 6:00 PM Rosa Barrios PA-C [facility]        Today's Diagnoses     Pain and swelling of left lower extremity    -  1    Achilles tendon disorder, left           Follow-ups after your visit        Who to contact     If you have questions or need follow up information about today's clinic visit or your schedule please contact [facility] directly at [phone].  Normal or non-critical lab and imaging results will be communicated to you by MyChart, letter or phone within 4 business days after the clinic has received the results. If you do not hear from us within 7 days, please contact the clinic through MyChart or phone. If you have a critical or abnormal lab result, we will notify you by phone as soon as possible.  Submit refill requests through [employer] or call your pharmacy and they will forward the refill request to us. Please allow 3 business days for your refill to be completed.          Additional Information About Your Visit        Care EveryWhere ID     This is your Care EveryWhere ID. This could be used by other organizations to access your [facility] medical records  [id number]        Your Vitals Were     Pulse Temperature Pulse Oximetry BMI (Body Mass Index)          55 97.3  F (36.3  C) (Tympanic) 99% 27.84 kg/m2         Blood Pressure from Last 3 Encounters:   06/19/18 (!) 147/91   04/26/18 134/85   02/11/16 (!) 133/92    Weight from Last 3 Encounters:   06/19/18 185 lb 12.8 oz (84.3 kg)   04/26/18 187 lb (84.8 kg)   02/11/16 187 lb (84.8 kg)              Today, you had the following     No orders found for display       Primary Care Provider Office Phone # Fax #    Per Chen [phone] [address] [facility]  [id number]        Equal Access to Services     [facility] : Hadii doug Zuniga, kenna perdomo, louis celeste. So [facility] [phone].    ATENCIÓN: Si habla español, tiene a pennington disposición servicios gratuitos de asistencia lingüística. Llame al [phone].    We comply with applicable federal civil rights laws and [place] laws. We do not discriminate on the basis of race, color, national origin, age, disability, sex, sexual orientation, or gender identity.            Thank you!     Thank you for choosing [facility]  [facility]. Our goal is always to provide you with excellent care. Hearing back from our patients is one way we can continue to improve our services. Please take a few minutes to complete the written survey that you may receive in the mail after your visit with us. Thank you!             Your Updated Medication List - Protect others around you: Learn how to safely use, store and throw away your medicines at www.disposemymeds.org.      Notice  As of 6/19/2018  6:33 PM    You have not been prescribed any medications.

## 2018-06-20 ENCOUNTER — TELEPHONE (OUTPATIENT)
Dept: FAMILY MEDICINE | Facility: CLINIC | Age: 53
End: 2018-06-20

## 2018-06-20 NOTE — TELEPHONE ENCOUNTER
Per Kwaku Sanchez PA-C:  Will need to be seen.  Jocelynn Hung RN    I called and spoke with patient.  He states that he didn't go to the ED because he believes it's the same thing he did a couple years ago.  ( saw Dr. Per Chen 2/11/16) He states is able to walk on the leg.  Reports that he was told at that time to ice, elevate, and wrap.  He does not believe he has a DVT or a ruptured achilles tendon.  He was made aware per Kwaku Sanchez PA-C he will need to be seen.  Patient states is a  for [place].  Can't clear his schedule for appointment this week.  He will be seen next week.  States awareness that if is DVT can be life threatening.  He states will go to ED if symptoms worsen at all be otherwise will continue icing, wrapping, elevating when able.  Will keep appointment Kwaku Sanchez PA-C next week.  He expects it to be better by then and not needing appointment.  Kwaku Sanchez PA-C aware.  Jocelynn Hung RN

## 2018-06-20 NOTE — TELEPHONE ENCOUNTER
Patient states that he was in urgent care last night and it was suggested that he go to the ER and have an MRI for a possible blood clot but he did not go and wonders if you will put an order in for an MRI and he will have it done at [place].  Please call if done to schedule.    Thank you.

## 2018-06-21 ENCOUNTER — OFFICE VISIT (OUTPATIENT)
Dept: FAMILY MEDICINE | Facility: CLINIC | Age: 53
End: 2018-06-21
Payer: COMMERCIAL

## 2018-06-21 VITALS
WEIGHT: 185 LBS | TEMPERATURE: 97.1 F | DIASTOLIC BLOOD PRESSURE: 81 MMHG | OXYGEN SATURATION: 98 % | SYSTOLIC BLOOD PRESSURE: 139 MMHG | RESPIRATION RATE: 16 BRPM | BODY MASS INDEX: 27.72 KG/M2 | HEART RATE: 89 BPM

## 2018-06-21 DIAGNOSIS — M79.662 PAIN OF LEFT CALF: Primary | ICD-10-CM

## 2018-06-21 PROCEDURE — 99213 OFFICE O/P EST LOW 20 MIN: CPT | Performed by: PHYSICIAN ASSISTANT

## 2018-06-21 ASSESSMENT — PAIN SCALES - GENERAL: PAINLEVEL: SEVERE PAIN (6)

## 2018-06-21 NOTE — NURSING NOTE
"Chief Complaint   Patient presents with     Musculoskeletal Problem       Initial /81  Pulse 89  Temp 97.1  F (36.2  C) (Oral)  Resp 16  Wt 185 lb (83.9 kg)  SpO2 98%  BMI 27.72 kg/m2 Estimated body mass index is 27.72 kg/(m^2) as calculated from the following:    Height as of 4/26/18: 5' 8.5\" (1.74 m).    Weight as of this encounter: 185 lb (83.9 kg).  Medication Reconciliation: complete  Lisseth Ruiz CMA    "

## 2018-06-21 NOTE — PATIENT INSTRUCTIONS
"               Calf Strain            What is a calf strain?   A strain is a stretch or tear of a muscle or tendon. People commonly call such an injury a \"pulled\" muscle. A calf strain is an injury to the muscles and tendons in the back of your leg below your knee.  How does it occur?   A strain of your calf muscles can occur during a physical activity where you push off forcefully from your toes. It may occur in running, jumping, or lunging.  What are the symptoms?   A calf muscle strain may cause immediate pain in the back of your lower leg. You may hear or feel a pop or a snap.  You may get the feeling that someone has hit you in the back of the leg. It is hard to rise up on your toes. Your calf may be swollen and bruised.  How is it diagnosed?   Your healthcare provider will examine your lower leg. Your calf muscles will be tender.  How is it treated?   To treat this condition:  Put an ice pack, gel pack, or package of frozen vegetables, wrapped in a cloth on the area every 3 to 4 hours, for up to 20 minutes at a time.   You could also do ice massage. To do this, first freeze water in a Styrofoam cup, then peel the top of the cup away to expose the ice. Hold the bottom of the cup and rub the ice over the calf for 5 to 10 minutes. Do this 3 to 5 times a day for the first 2 days.   Raise your leg on a pillow when you sit or lie down.   Use an elastic bandage around your calf as directed by your provider.   Use crutches, if it is too painful to walk.   Take an anti-inflammatory medicine such as ibuprofen, or other medicine as directed by your provider. Nonsteroidal anti-inflammatory medicines (NSAIDs) may cause stomach bleeding and other problems. These risks increase with age. Read the label and take as directed. Unless recommended by your healthcare provider, do not take for more than 10 days.   You may have physical therapy, which may include treatment of the muscle tissue by a therapist using ultrasound or " muscle stimulation.   Your healthcare provider or therapist may tape the injured muscles while they are healing to help you to return to athletic activities.   Follow your provider s instructions for doing exercises to help you recover.   After you recover from your acute injury, use moist heat for 10 to 15 minutes at a time before you do warm-up and stretching exercises. Do not use heat if you have swelling.   While you are recovering from your injury, you will need to change your sport or activity to one that does not make your condition worse. For example, you may need to swim instead of run.  How long will the effects last?  The length of recovery depends on many factors such as your age, health, and if you have had a previous calf injury. Recovery time also depends on the severity of the injury. A mild calf strain may recover within a few weeks, whereas a severe injury may take 6 weeks or longer to recover. You need to stop doing the activities that cause pain until the muscle has healed. If you continue doing activities that cause pain, your symptoms will return and it will take longer to recover.  When can I return to my normal activities?  Everyone recovers from an injury at a different rate. Return to your activities depends on how soon your calf recovers, not by how many days or weeks it has been since your injury has occurred. In general, the longer you have symptoms before you start treatment, the longer it will take to get better. The goal of rehabilitation is to return you to your normal activities as soon as is safely possible. If you return too soon you may worsen your injury.  You may safely return to your activities when, starting from the top of the list and progressing to the end, each of the following is true:  You have full range of motion in the injured leg compared to the uninjured leg.   You have full strength of the injured leg compared to the uninjured leg.   You can walk straight ahead  without pain or limping.  How can calf strains be prevented?   Calf strains are best prevented by warming up properly and doing calf-stretching exercises before your activity. This is especially important if you are doing jumping or sprinting sports.  Calf Strain Exercises           You can begin gently stretching your calf muscle using the towel stretch right away. Make sure you feel only a gentle pull and not a sharp pain in your calf while you are doing this stretch.  Towel stretch: Sit on a hard surface with your injured leg stretched out in front of you. Loop a towel around your toes and the ball of your foot and pull the towel toward your body keeping your leg straight. Hold this position for 15 to 30 seconds and then relax. Repeat 3 times.  After you can do the towel stretch easily, you can start the standing calf stretch.  Standing calf stretch: Stand facing a wall with your hands on the wall at about eye level. Keep your injured leg back with your heel on the floor. Keep the other leg forward with the knee bent. Turn your back foot slightly inward (as if you were pigeon-toed). Slowly lean into the wall until you feel a stretch in the back of your calf. Hold the stretch for 15 to 30 seconds. Return to the starting position. Repeat 3 times. Do this exercise several times each day.  After a couple days of stretching, you can begin strengthening your calf and lower leg muscles using elastic tubing as described in the next exercise.  Resisted ankle plantar flexion: Sit with your injured leg stretched out in front of you. Loop the tubing around the ball of your foot. Hold the ends of the tubing with both hands. Gently press the ball of your foot down and point your toes, stretching the tubing. Return to the starting position. Do 2 sets of 15.  You may do the last 4 exercises when you can stand on your toes without pain.  Heel raise: Balance yourself while standing behind a chair or counter. Using the chair or  "counter as a support to help you, raise your body up onto your toes and hold for 5 seconds. Then slowly lower yourself down without holding onto the support. (It's OK to keep holding onto the support if you need to.) When this exercise becomes less painful, try lowering yourself down on the injured leg only. Repeat 10 times. Do 2 sets of 15. Rest 30 seconds between sets.You can challenge yourself by standing on just your injured leg as you do this exercise.   Single leg balance: Stand without any support and try to balance on your injured leg. Keep standing on the one leg for 30 seconds. Repeat 3 times. Begin with your eyes open and then try to do the exercise with your eyes closed. When you have mastered this, try doing the exercise standing on a pillow.   Nose touch: Stand on one leg facing a wall. Stand 4 inches from the wall. Keep your body and leg straight. Slowly lean forward, trying to touch your nose to the wall. Make sure you do not bend forward at your waist. Do 2 sets of 15.   Wall jump: Face a wall and place a piece of masking tape about 2 feet above your head. Jump up with your arms above your head and try to touch the piece of tape. Make sure you do a \"spring\" type of motion and try to land softly on your feet. As the exercise gets easier, jump on just your injured leg. Do 2 sets of 15.Another good exercise is hopping. You can start at one end of the room and try to hop as high as you can across the room on one foot. Jumping rope is also a good exercise.  Published by [employer].  This content is reviewed periodically and is subject to change as new health information becomes available. The information is intended to inform and educate and is not a replacement for medical evaluation, advice, diagnosis or treatment by a healthcare professional.  Written by Zoila Alvarez, MS, PT, and Leida Lo PT, [facility], [facility], for [employer].    2011 [employer] and/or its affiliates. All rights reserved.             "          Published by [employer].  This content is reviewed periodically and is subject to change as new health information becomes available. The information is intended to inform and educate and is not a replacement for medical evaluation, advice, diagnosis or treatment by a healthcare professional.  Written by Mario Cowan MD for [employer].    2011 [facility] and/or its affiliates. All rights reserved.

## 2018-06-21 NOTE — PROGRESS NOTES
SUBJECTIVE:   Per Etienne is a 52 year old male seen here today for his left lower leg   What happened: pt was pushing a car and felt a pop      Onset: 9 days ago    Description:   Character: aching, was hard to walk on leg this morning - gets better through the day     Intensity: moderate/severe    Progression of Symptoms: same    Accompanying Signs & Symptoms:  Other symptoms: swelling, bruising   History:   Previous similar pain: YES      Precipitating factors:   Trauma or overuse: YES    Alleviating factors:  Improved by: advil       Therapies Tried and outcome: advil helps some   Pt was seen in UC and was recommended to go to ER but pt left and didn't feel it was necessary     States over all it is looking and feeling much better.     ROS:  Constitutional, eye, ENT, skin, respiratory, cardiac, and GI are normal except as otherwise noted.    PFSH:  Past Medical, social, family histories, medications, and allergies were reviewed and updated.     OBJECTIVE:  /81  Pulse 89  Temp 97.1  F (36.2  C) (Oral)  Resp 16  Wt 185 lb (83.9 kg)  SpO2 98%  BMI 27.72 kg/m2  General:  Per is awake, alert, and cooperative.  NAD.  Lower Leg Exam: Inspection; swelling, ecchymosis, no deformity  Palpation: Tender: gastroc soleus muscle  Strength: gastrocsoleus: 5/5  Neg treasure test , positive jason test of calf.   Achilles is intact.   Neurovascularly Intact Distally.       ASSESSMENT:     ICD-10-CM    1. Pain of left calf M79.662 US Lower Extremity Venous Duplex Left       PLAN:   Suspect calf strain  Will get U.S for clearance of any DVT   ice or cold packs 20 minutes every 2-3 hrs as needed to relieve pain and swelling, for the first 2 days. Then can apply heat 20 minutes every 2-3 hrs (avoid sleeping on heating pad) there after as needed.   If you can tolerate, start non-steroidal anti-inflammatory medication like: Ibuprofen 600-800 mg three times daily or Aleve 2 tablets of over the counter strength  twice a day as needed.   Tylenol can help with pain also.    Active range of motion exercises encouraged  Activity modification trying to avoid activities that cause you pain.   Follow up depend on U.S of calf.     Kwaku Sanchez PA-C

## 2018-06-21 NOTE — MR AVS SNAPSHOT
"              After Visit Summary   6/21/2018    Per Etienne    MRN: [id number]           Patient Information     Date Of Birth          1965        Visit Information        Provider Department      6/21/2018 4:10 PM Kwaku Sanchez PA-C [facility]        Today's Diagnoses     Pain of left calf    -  1      Care Instructions                   Calf Strain            What is a calf strain?   A strain is a stretch or tear of a muscle or tendon. People commonly call such an injury a \"pulled\" muscle. A calf strain is an injury to the muscles and tendons in the back of your leg below your knee.  How does it occur?   A strain of your calf muscles can occur during a physical activity where you push off forcefully from your toes. It may occur in running, jumping, or lunging.  What are the symptoms?   A calf muscle strain may cause immediate pain in the back of your lower leg. You may hear or feel a pop or a snap.  You may get the feeling that someone has hit you in the back of the leg. It is hard to rise up on your toes. Your calf may be swollen and bruised.  How is it diagnosed?   Your healthcare provider will examine your lower leg. Your calf muscles will be tender.  How is it treated?   To treat this condition:  Put an ice pack, gel pack, or package of frozen vegetables, wrapped in a cloth on the area every 3 to 4 hours, for up to 20 minutes at a time.   You could also do ice massage. To do this, first freeze water in a Styrofoam cup, then peel the top of the cup away to expose the ice. Hold the bottom of the cup and rub the ice over the calf for 5 to 10 minutes. Do this 3 to 5 times a day for the first 2 days.   Raise your leg on a pillow when you sit or lie down.   Use an elastic bandage around your calf as directed by your provider.   Use crutches, if it is too painful to walk.   Take an anti-inflammatory medicine such as ibuprofen, or other medicine as directed by your provider. " Nonsteroidal anti-inflammatory medicines (NSAIDs) may cause stomach bleeding and other problems. These risks increase with age. Read the label and take as directed. Unless recommended by your healthcare provider, do not take for more than 10 days.   You may have physical therapy, which may include treatment of the muscle tissue by a therapist using ultrasound or muscle stimulation.   Your healthcare provider or therapist may tape the injured muscles while they are healing to help you to return to athletic activities.   Follow your provider s instructions for doing exercises to help you recover.   After you recover from your acute injury, use moist heat for 10 to 15 minutes at a time before you do warm-up and stretching exercises. Do not use heat if you have swelling.   While you are recovering from your injury, you will need to change your sport or activity to one that does not make your condition worse. For example, you may need to swim instead of run.  How long will the effects last?  The length of recovery depends on many factors such as your age, health, and if you have had a previous calf injury. Recovery time also depends on the severity of the injury. A mild calf strain may recover within a few weeks, whereas a severe injury may take 6 weeks or longer to recover. You need to stop doing the activities that cause pain until the muscle has healed. If you continue doing activities that cause pain, your symptoms will return and it will take longer to recover.  When can I return to my normal activities?  Everyone recovers from an injury at a different rate. Return to your activities depends on how soon your calf recovers, not by how many days or weeks it has been since your injury has occurred. In general, the longer you have symptoms before you start treatment, the longer it will take to get better. The goal of rehabilitation is to return you to your normal activities as soon as is safely possible. If you return  too soon you may worsen your injury.  You may safely return to your activities when, starting from the top of the list and progressing to the end, each of the following is true:  You have full range of motion in the injured leg compared to the uninjured leg.   You have full strength of the injured leg compared to the uninjured leg.   You can walk straight ahead without pain or limping.  How can calf strains be prevented?   Calf strains are best prevented by warming up properly and doing calf-stretching exercises before your activity. This is especially important if you are doing jumping or sprinting sports.  Calf Strain Exercises           You can begin gently stretching your calf muscle using the towel stretch right away. Make sure you feel only a gentle pull and not a sharp pain in your calf while you are doing this stretch.  Towel stretch: Sit on a hard surface with your injured leg stretched out in front of you. Loop a towel around your toes and the ball of your foot and pull the towel toward your body keeping your leg straight. Hold this position for 15 to 30 seconds and then relax. Repeat 3 times.  After you can do the towel stretch easily, you can start the standing calf stretch.  Standing calf stretch: Stand facing a wall with your hands on the wall at about eye level. Keep your injured leg back with your heel on the floor. Keep the other leg forward with the knee bent. Turn your back foot slightly inward (as if you were pigeon-toed). Slowly lean into the wall until you feel a stretch in the back of your calf. Hold the stretch for 15 to 30 seconds. Return to the starting position. Repeat 3 times. Do this exercise several times each day.  After a couple days of stretching, you can begin strengthening your calf and lower leg muscles using elastic tubing as described in the next exercise.  Resisted ankle plantar flexion: Sit with your injured leg stretched out in front of you. Loop the tubing around the ball of  "your foot. Hold the ends of the tubing with both hands. Gently press the ball of your foot down and point your toes, stretching the tubing. Return to the starting position. Do 2 sets of 15.  You may do the last 4 exercises when you can stand on your toes without pain.  Heel raise: Balance yourself while standing behind a chair or counter. Using the chair or counter as a support to help you, raise your body up onto your toes and hold for 5 seconds. Then slowly lower yourself down without holding onto the support. (It's OK to keep holding onto the support if you need to.) When this exercise becomes less painful, try lowering yourself down on the injured leg only. Repeat 10 times. Do 2 sets of 15. Rest 30 seconds between sets.You can challenge yourself by standing on just your injured leg as you do this exercise.   Single leg balance: Stand without any support and try to balance on your injured leg. Keep standing on the one leg for 30 seconds. Repeat 3 times. Begin with your eyes open and then try to do the exercise with your eyes closed. When you have mastered this, try doing the exercise standing on a pillow.   Nose touch: Stand on one leg facing a wall. Stand 4 inches from the wall. Keep your body and leg straight. Slowly lean forward, trying to touch your nose to the wall. Make sure you do not bend forward at your waist. Do 2 sets of 15.   Wall jump: Face a wall and place a piece of masking tape about 2 feet above your head. Jump up with your arms above your head and try to touch the piece of tape. Make sure you do a \"spring\" type of motion and try to land softly on your feet. As the exercise gets easier, jump on just your injured leg. Do 2 sets of 15.Another good exercise is hopping. You can start at one end of the room and try to hop as high as you can across the room on one foot. Jumping rope is also a good exercise.  Published by [employer].  This content is reviewed periodically and is subject to change as " new health information becomes available. The information is intended to inform and educate and is not a replacement for medical evaluation, advice, diagnosis or treatment by a healthcare professional.  Written by Zoila Alvarez, MS, PT, and Leida Lo PT, [facility], [facility], for [employer].    2011 [facility] and/or its affiliates. All rights reserved.                      Published by [employer].  This content is reviewed periodically and is subject to change as new health information becomes available. The information is intended to inform and educate and is not a replacement for medical evaluation, advice, diagnosis or treatment by a healthcare professional.  Written by Mario Cowan MD for [employer].    2011 [facility] and/or its affiliates. All rights reserved.                       Follow-ups after your visit        Future tests that were ordered for you today     Open Future Orders        Priority Expected Expires Ordered    US Lower Extremity Venous Duplex Left Routine  6/21/2019 6/21/2018            Who to contact     If you have questions or need follow up information about today's clinic visit or your schedule please contact [facility] directly at [phone].  Normal or non-critical lab and imaging results will be communicated to you by MyChart, letter or phone within 4 business days after the clinic has received the results. If you do not hear from us within 7 days, please contact the clinic through MyChart or phone. If you have a critical or abnormal lab result, we will notify you by phone as soon as possible.  Submit refill requests through [employer] or call your pharmacy and they will forward the refill request to us. Please allow 3 business days for your refill to be completed.          Additional Information About Your Visit        Care EveryWhere ID     This is your Care EveryWhere ID. This could be used by other organizations to access your [place] medical records  [id number]         Your Vitals Were     Pulse Temperature Respirations Pulse Oximetry BMI (Body Mass Index)       89 97.1  F (36.2  C) (Oral) 16 98% 27.72 kg/m2        Blood Pressure from Last 3 Encounters:   06/21/18 139/81   06/19/18 (!) 147/91   04/26/18 134/85    Weight from Last 3 Encounters:   06/21/18 185 lb (83.9 kg)   06/19/18 185 lb 12.8 oz (84.3 kg)   04/26/18 187 lb (84.8 kg)               Primary Care Provider Office Phone # Fax #    Per Chen [phone]       [address] [facility] [address]        Equal Access to Services     ZAID COX : Ezequiel hillo Somarina, waaxda luqadaha, qaybta kaalmada adeegyada, louis posey . [facility] [phone].    ATENCIÓN: Si habla español, tiene a pennington disposición servicios gratuitos de asistencia lingüística. Llame al [phone].    We comply with applicable federal civil rights laws and [place] laws. We do not discriminate on the basis of race, color, national origin, age, disability, sex, sexual orientation, or gender identity.            Thank you!     Thank you for choosing [facility]  for your care. Our goal is always to provide you with excellent care. Hearing back from our patients is one way we can continue to improve our services. Please take a few minutes to complete the written survey that you may receive in the mail after your visit with us. Thank you!             Your Updated Medication List - Protect others around you: Learn how to safely use, store and throw away your medicines at www.disposemymeds.org.      Notice  As of 6/21/2018  4:21 PM    You have not been prescribed any medications.

## 2018-08-06 ENCOUNTER — TELEPHONE (OUTPATIENT)
Dept: FAMILY MEDICINE | Facility: CLINIC | Age: 53
End: 2018-08-06

## 2018-08-06 NOTE — LETTER
Per Etienne  [address] [facility] [address]          August 6, 2018          Dear Per Etienne      Our records indicate that you have not scheduled for a(n)Colonoscopy which is recommended by your health care team. Monitoring and managing your preventative and chronic health conditions are very important to us.       If you have received your health care elsewhere, please provide us with that information so it can be documented in your chart.    Please call [phone] or message us through your [employer] account to schedule an appointment or provide information for your chart.     We look forward to seeing you and working with you on your health care needs.     Sincerely,   Kwaku Sanchez PA-C/DEWAYNE CMA          *If you have already scheduled an appointment, please disregard this reminder

## 2018-08-06 NOTE — TELEPHONE ENCOUNTER
Panel Management Review      Patient has the following on his problem list: None      Composite cancer screening  Chart review shows that this patient is due/due soon for the following Colonoscopy  Summary:    Patient is due/failing the following:   COLONOSCOPY    Action needed:   Patient needs referral/order: colonoscopy    Type of outreach:    Sent letter.    Questions for provider review:    None                                                                                                                                    NAKUL Burden       Chart routed to none .

## 2018-12-28 ENCOUNTER — OFFICE VISIT (OUTPATIENT)
Dept: FAMILY MEDICINE | Facility: CLINIC | Age: 53
End: 2018-12-28
Payer: COMMERCIAL

## 2018-12-28 VITALS
DIASTOLIC BLOOD PRESSURE: 80 MMHG | TEMPERATURE: 98.3 F | SYSTOLIC BLOOD PRESSURE: 134 MMHG | HEART RATE: 70 BPM | OXYGEN SATURATION: 96 % | HEIGHT: 69 IN | RESPIRATION RATE: 17 BRPM | BODY MASS INDEX: 27.4 KG/M2 | WEIGHT: 185 LBS

## 2018-12-28 DIAGNOSIS — N52.9 ERECTILE DYSFUNCTION, UNSPECIFIED ERECTILE DYSFUNCTION TYPE: Primary | ICD-10-CM

## 2018-12-28 DIAGNOSIS — L30.9 ECZEMA, UNSPECIFIED TYPE: ICD-10-CM

## 2018-12-28 PROCEDURE — 99214 OFFICE O/P EST MOD 30 MIN: CPT | Performed by: PHYSICIAN ASSISTANT

## 2018-12-28 RX ORDER — SILDENAFIL CITRATE 20 MG/1
20-100 TABLET ORAL DAILY PRN
Qty: 90 TABLET | Refills: 0 | Status: SHIPPED | OUTPATIENT
Start: 2018-12-28 | End: 2019-10-10

## 2018-12-28 RX ORDER — TRIAMCINOLONE ACETONIDE 1 MG/G
CREAM TOPICAL 2 TIMES DAILY
Qty: 60 G | Refills: 1 | Status: SHIPPED | OUTPATIENT
Start: 2018-12-28 | End: 2019-10-10

## 2018-12-28 ASSESSMENT — MIFFLIN-ST. JEOR: SCORE: 1669.53

## 2018-12-28 NOTE — PATIENT INSTRUCTIONS
Atopic Dermatitis (Eczema)   Description  Eczema is a general term for a group of long-term skin disorders. Eczema can affect the whole body but is most commonly found in specific areas such as the hands, feet, elbows, and knees. Another name for eczema is dermatitis.   Symptoms  Symptoms of eczema may include: dry, itchy, flaky skin and rashes on the face, inside the elbows, behind the knees, and on the hands and feet.   Scratching the skin can cause:  redness   swelling   cracking   clear fluid leaking from the skin   crusting   thick skin   Causes  The most common type of eczema is called atopic dermatitis. It can run in families or occur for no known reason. Eczema can also be caused by an allergic reaction to certain foods, clothing, lotions, soaps, plants, or even sweat. People with atopic dermatitis seem to have very sensitive immune systems that are more likely to react to irritants and allergens. If you have asthma or hay fever, you may get eczema more often. Eczema is not contagious.  What You Should Do At Home (Follow-up Care)   Put cream or ointment on your skin. Use fragrance-free moisturizing cream or ointment, rather than water-based lotion, after bathing and many times during the day.   Do not bathe too often. If you can get by with bathing every other day it may help. Use a mild cleansing bar such as Dove , Oil of Olay , or Basis . Do not use hot water, and do not soak in the tub. Both can dry your skin, making it itch more.   Use antihistamines. Antihistamine pills like diphenhydramine (Benadryl ) may help you itch less.   Use steroid creams. Steroid creams or ointments that contain 1% hydrocortisone can help your rash and itching. You should not use the cream more often than directed by your healthcare provider.   If you were given a prescription, take or apply the medicine exactly as prescribed. If you do not think it is helping, call your healthcare provider. Do not increase  how much or how often you use or take it without talking to your healthcare provider first.   Reduce dust mites by dusting and vacuuming often. House dust, house dust mites, molds, pollen, and animal dander from pets are all known to aggravate eczema. Vacuum carpets, curtains, and bedding at least once per week. Wash your bedding at least once per week at a high temperature with mild detergent.   Use high efficiency air filters. You can buy filters for your furnace that help decrease dust and allergens in the air.   Avoid bubble bath products, scented or deodorant soaps, and scented shower gels because they can cause skin sensitization and excessive drying of the skin.   If the air in your home is dry, a cool-mist humidifier can moisten the air and help prevent dry skin. Be sure to clean your humidifier often so that bacteria and mold can t grow.   Please keep all medicines out of the reach of children.   What You Can Do To Stay Healthy   Keep your skin well moisturized.   Avoid irritating substances.   Try to avoid or limit stressful situations.   Care Alerts  Call Your Healthcare Provider Right Away Or Return To The Emergency Department If:  The area that has been itching has open areas that are red and warm to touch and have drainage coming from them.   You start to have pus or other fluid coming from the irritated area.   You have a fever higher than 101.5  F (38.6  C) orally.   You start to have chills, nausea, vomiting, or muscle aches.   You have a question about whether your eczema needs to be treated.   You have any symptoms that worry you.   Ok for over the counter : Eucerin cream, Lubriderm, Aveeno, Cetaphil lotions

## 2018-12-28 NOTE — PROGRESS NOTES
SUBJECTIVE:   Per Etienne is a 53 year old male who presents to clinic today for the following health issues:      Rash       Duration: months     Description (location/character/radiation): red raised and patchy on right leg    Intensity:  mild    Accompanying signs and symptoms: itches all the time     History (similar episodes/previous evaluation): None    Precipitating or alleviating factors[place]     Therapies tried and outcome: cream from the neighbor   No new contacts.     Also months of ED. Getting worse. Loss of function note desire.     Problem list and histories reviewed & adjusted, as indicated.  Additional history: as documented    Patient Active Problem List   Diagnosis     CARDIOVASCULAR SCREENING; LDL GOAL LESS THAN 160     Left varicocele     Vitamin D deficiency     Kidney stone     Past Surgical History:   Procedure Laterality Date     ORTHOPEDIC SURGERY      femur newton placed       Social History     Tobacco Use     Smoking status: Former Smoker     Last attempt to quit: 2012     Years since quittin.9     Smokeless tobacco: Never Used   Substance Use Topics     Alcohol use: Yes     Family History   Problem Relation Age of Onset     Asthma Father      Prostate Cancer Father      Cancer Maternal Grandmother 92        throat      Asthma Sister          Current Outpatient Medications   Medication Sig Dispense Refill     sildenafil (REVATIO) 20 MG tablet Take 1-5 tablets ( mg) by mouth daily as needed (.) Take 1 hr prior to sexual activity. 90 tablet 0     triamcinolone (KENALOG) 0.1 % external cream Apply topically 2 times daily 60 g 1     No Known Allergies    Reviewed and updated as needed this visit by clinical staff  Tobacco  Allergies  Meds  Med Hx  Surg Hx  Fam Hx  Soc Hx      Reviewed and updated as needed this visit by Provider         ROS:  Constitutional, HEENT, cardiovascular, pulmonary, gi and gu systems are negative, except as otherwise  "noted.    OBJECTIVE:     /80   Pulse 70   Temp 98.3  F (36.8  C) (Oral)   Resp 17   Ht 1.753 m (5' 9\")   Wt 83.9 kg (185 lb)   SpO2 96%   BMI 27.32 kg/m    Body mass index is 27.32 kg/m .  GENERAL: healthy, alert and no distress  SKIN: right lateral lower leg with about 7cm dry erythmatous patch. No discharge. No signs of infection.  PSYCH: mentation appears normal, affect normal/bright    Diagnostic Test Results:  none     ASSESSMENT/PLAN:         ICD-10-CM    1. Erectile dysfunction, unspecified erectile dysfunction type N52.9 sildenafil (REVATIO) 20 MG tablet   2. Eczema, unspecified type L30.9 triamcinolone (KENALOG) 0.1 % external cream   1. Ok for sildenafil. warning signs discussed. side effects discussed   2. Lotion twice a day. Start triamciniolone twice a day. warning signs discussed. side effects discussed  Recheck 1-2 months as needed .    Kwaku Sanchez PA-C  [facility]    "

## 2019-09-23 ENCOUNTER — TELEPHONE (OUTPATIENT)
Dept: FAMILY MEDICINE | Facility: CLINIC | Age: 54
End: 2019-09-23

## 2019-10-09 NOTE — PROGRESS NOTES
Subjective   CC: Right Flank Pain    HPI: Per Etienne is a 54 year old male who presents to clinic today for right flank pain that onset two months ago. He describes it as a sharp pain that radiates into his lower stomach and groin. He rates it as a 5 on good days and 7-8 on bad days. He also states that he has experienced several painful ejaculations. The color of his urine has been dark and malodorous but without pain. He has not tried any therapy for relief. He was seen May of 2018, where he was found to have slightly high sugars. He does not take any medications currently.     Patient Active Problem List   Diagnosis     CARDIOVASCULAR SCREENING; LDL GOAL LESS THAN 160     Left varicocele     Vitamin D deficiency     Kidney stone     Past Surgical History:   Procedure Laterality Date     ORTHOPEDIC SURGERY  1985    femur newton placed       Social History     Tobacco Use     Smoking status: Former Smoker     Last attempt to quit: 2012     Years since quittin.7     Smokeless tobacco: Never Used   Substance Use Topics     Alcohol use: Yes     Family History   Problem Relation Age of Onset     Asthma Father      Prostate Cancer Father 74     Cancer Maternal Grandmother 92        throat      Asthma Sister          Reviewed and updated as needed this visit by Provider  Tobacco  Allergies  Meds  Problems  Med Hx  Surg Hx  Fam Hx         Review of Systems   ROS COMP:   CONSTITUTIONAL: NEGATIVE for fever, chills, change in weight  INTEGUMENTARY/SKIN: NEGATIVE for worrisome rashes, moles or lesions  EYES: NEGATIVE for vision changes or irritation  ENT/MOUTH: NEGATIVE for ear, mouth and throat problems  RESP: NEGATIVE for significant cough or SOB  CV: NEGATIVE for chest pain, palpitations or peripheral edema  GI: POSITIVE for abdomen/flank pain. NEGATIVE for nausea, abdominal pain, heartburn, or change in bowel habits  : POSITIVE for painful ejaculation  MUSCULOSKELETAL: NEGATIVE for significant  "arthralgias or myalgia  NEURO: NEGATIVE for weakness, dizziness or paresthesias  PSYCHIATRIC: NEGATIVE for changes in mood or affect      Objective    BP (!) 143/88   Pulse 76   Temp 98  F (36.7  C) (Oral)   Resp 18   Ht 1.753 m (5' 9\")   Wt 84.4 kg (186 lb)   SpO2 98%   BMI 27.47 kg/m    Body mass index is 27.47 kg/m .  Physical Exam   GENERAL: healthy, alert and no distress  EYES: Eyes grossly normal to inspection, PERRL and conjunctivae and sclerae normal  HENT: ear canals and TM's normal, nose and mouth without ulcers or lesions  NECK: no adenopathy, no asymmetry, masses, or scars and thyroid normal to palpation  RESP: lungs clear to auscultation - no rales, rhonchi or wheezes  CV: regular rate and rhythm, normal S1 S2, no S3 or S4, no murmur, click or rub, no peripheral edema and peripheral pulses strong  ABDOMEN: RLQ tenderness with palpation and pain with palpation of right flank area, no hepatosplenomegaly, no masses and bowel sounds normal  MS: no gross musculoskeletal defects noted, no edema  : Normal male genitlia with no lesions or discharge. No inguinal hernias upon exam.   SKIN: no suspicious lesions or rashes  NEURO: Normal strength and tone, mentation intact and speech normal  PSYCH: mentation appears normal, affect normal/bright    Labs:   UA reviewed with patient - essentially normal with no signs of UTI  CBC shows elevated WBCs and neutrophils, indicative of an infection. The CBC and other labs ordered will be communicated to the patient.           ICD-10-CM    1. Prostatitis, unspecified prostatitis type N41.9 sulfamethoxazole-trimethoprim (BACTRIM DS/SEPTRA DS) 800-160 MG tablet     DISCONTINUED: sulfamethoxazole-trimethoprim (BACTRIM DS/SEPTRA DS) 800-160 MG tablet     DISCONTINUED: sulfamethoxazole-trimethoprim (BACTRIM DS/SEPTRA DS) 800-160 MG tablet     DISCONTINUED: sulfamethoxazole-trimethoprim (BACTRIM DS/SEPTRA DS) 800-160 MG tablet   2. Flank pain R10.9 *UA reflex to " Microscopic and Culture ([place] and [facility] (except [place] and [place])     CBC with platelets and differential     PSA, total and free     Basic metabolic panel     UROLOGY ADULT REFERRAL     sulfamethoxazole-trimethoprim (BACTRIM DS/SEPTRA DS) 800-160 MG tablet     DISCONTINUED: sulfamethoxazole-trimethoprim (BACTRIM DS/SEPTRA DS) 800-160 MG tablet     DISCONTINUED: sulfamethoxazole-trimethoprim (BACTRIM DS/SEPTRA DS) 800-160 MG tablet     DISCONTINUED: sulfamethoxazole-trimethoprim (BACTRIM DS/SEPTRA DS) 800-160 MG tablet   3. Hyperglycemia R73.9 Hemoglobin A1c     1-2. UA Negative. CBC showed increased WBCs and neutrophils. Due to CBC results and symptoms of testicular, groin, and flank pain along with painful ejaculations. Likely prostatitis. Bactrim prescribed BID x 30 days. Patient to follow-up if symptoms do not resolve upon completion of antibiotics.     3. Past A1C 5.7% so rechecked due to diabetes concern. New A1C is 5.4% - no concern for diabetes. Results will be communicated to patient by provider or nurse.     DAYA Salamanca PA-C

## 2019-10-10 ENCOUNTER — OFFICE VISIT (OUTPATIENT)
Dept: FAMILY MEDICINE | Facility: CLINIC | Age: 54
End: 2019-10-10
Payer: COMMERCIAL

## 2019-10-10 VITALS
WEIGHT: 186 LBS | HEIGHT: 69 IN | DIASTOLIC BLOOD PRESSURE: 88 MMHG | TEMPERATURE: 98 F | RESPIRATION RATE: 18 BRPM | HEART RATE: 76 BPM | SYSTOLIC BLOOD PRESSURE: 143 MMHG | OXYGEN SATURATION: 98 % | BODY MASS INDEX: 27.55 KG/M2

## 2019-10-10 DIAGNOSIS — R10.9 FLANK PAIN: ICD-10-CM

## 2019-10-10 DIAGNOSIS — N41.9 PROSTATITIS, UNSPECIFIED PROSTATITIS TYPE: Primary | ICD-10-CM

## 2019-10-10 DIAGNOSIS — R73.9 HYPERGLYCEMIA: ICD-10-CM

## 2019-10-10 LAB
ALBUMIN UR-MCNC: NEGATIVE MG/DL
ANION GAP SERPL CALCULATED.3IONS-SCNC: 6 MMOL/L (ref 3–14)
APPEARANCE UR: CLEAR
BASOPHILS # BLD AUTO: 0.1 10E9/L (ref 0–0.2)
BASOPHILS NFR BLD AUTO: 0.5 %
BILIRUB UR QL STRIP: NEGATIVE
BUN SERPL-MCNC: 18 MG/DL (ref 7–30)
CALCIUM SERPL-MCNC: 9.4 MG/DL (ref 8.5–10.1)
CHLORIDE SERPL-SCNC: 105 MMOL/L (ref 94–109)
CO2 SERPL-SCNC: 29 MMOL/L (ref 20–32)
COLOR UR AUTO: YELLOW
CREAT SERPL-MCNC: 1.12 MG/DL (ref 0.66–1.25)
DIFFERENTIAL METHOD BLD: ABNORMAL
EOSINOPHIL # BLD AUTO: 0.2 10E9/L (ref 0–0.7)
EOSINOPHIL NFR BLD AUTO: 1 %
ERYTHROCYTE [DISTWIDTH] IN BLOOD BY AUTOMATED COUNT: 12.7 % (ref 10–15)
GFR SERPL CREATININE-BSD FRML MDRD: 73 ML/MIN/{1.73_M2}
GLUCOSE SERPL-MCNC: 114 MG/DL (ref 70–99)
GLUCOSE UR STRIP-MCNC: NEGATIVE MG/DL
HBA1C MFR BLD: 5.4 % (ref 0–5.6)
HCT VFR BLD AUTO: 46.6 % (ref 40–53)
HGB BLD-MCNC: 15.8 G/DL (ref 13.3–17.7)
HGB UR QL STRIP: NEGATIVE
KETONES UR STRIP-MCNC: NEGATIVE MG/DL
LEUKOCYTE ESTERASE UR QL STRIP: NEGATIVE
LYMPHOCYTES # BLD AUTO: 2.1 10E9/L (ref 0.8–5.3)
LYMPHOCYTES NFR BLD AUTO: 13.7 %
MCH RBC QN AUTO: 30.9 PG (ref 26.5–33)
MCHC RBC AUTO-ENTMCNC: 33.9 G/DL (ref 31.5–36.5)
MCV RBC AUTO: 91 FL (ref 78–100)
MONOCYTES # BLD AUTO: 1.2 10E9/L (ref 0–1.3)
MONOCYTES NFR BLD AUTO: 7.5 %
NEUTROPHILS # BLD AUTO: 12 10E9/L (ref 1.6–8.3)
NEUTROPHILS NFR BLD AUTO: 77.3 %
NITRATE UR QL: NEGATIVE
PH UR STRIP: 7.5 PH (ref 5–7)
PLATELET # BLD AUTO: 265 10E9/L (ref 150–450)
POTASSIUM SERPL-SCNC: 4.2 MMOL/L (ref 3.4–5.3)
RBC # BLD AUTO: 5.12 10E12/L (ref 4.4–5.9)
SODIUM SERPL-SCNC: 140 MMOL/L (ref 133–144)
SOURCE: ABNORMAL
SP GR UR STRIP: 1.01 (ref 1–1.03)
UROBILINOGEN UR STRIP-ACNC: 2 EU/DL (ref 0.2–1)
WBC # BLD AUTO: 15.5 10E9/L (ref 4–11)

## 2019-10-10 PROCEDURE — 80048 BASIC METABOLIC PNL TOTAL CA: CPT | Performed by: PHYSICIAN ASSISTANT

## 2019-10-10 PROCEDURE — 99213 OFFICE O/P EST LOW 20 MIN: CPT | Performed by: PHYSICIAN ASSISTANT

## 2019-10-10 PROCEDURE — 84153 ASSAY OF PSA TOTAL: CPT | Mod: 90 | Performed by: PHYSICIAN ASSISTANT

## 2019-10-10 PROCEDURE — 85025 COMPLETE CBC W/AUTO DIFF WBC: CPT | Performed by: PHYSICIAN ASSISTANT

## 2019-10-10 PROCEDURE — 99000 SPECIMEN HANDLING OFFICE-LAB: CPT | Performed by: PHYSICIAN ASSISTANT

## 2019-10-10 PROCEDURE — 81003 URINALYSIS AUTO W/O SCOPE: CPT | Performed by: PHYSICIAN ASSISTANT

## 2019-10-10 PROCEDURE — 36415 COLL VENOUS BLD VENIPUNCTURE: CPT | Performed by: PHYSICIAN ASSISTANT

## 2019-10-10 PROCEDURE — 83036 HEMOGLOBIN GLYCOSYLATED A1C: CPT | Performed by: PHYSICIAN ASSISTANT

## 2019-10-10 PROCEDURE — 84154 ASSAY OF PSA FREE: CPT | Mod: 90 | Performed by: PHYSICIAN ASSISTANT

## 2019-10-10 RX ORDER — SULFAMETHOXAZOLE/TRIMETHOPRIM 800-160 MG
1 TABLET ORAL 2 TIMES DAILY
Qty: 60 TABLET | Refills: 0 | Status: SHIPPED | OUTPATIENT
Start: 2019-10-10 | End: 2020-01-20

## 2019-10-10 RX ORDER — SULFAMETHOXAZOLE/TRIMETHOPRIM 800-160 MG
1 TABLET ORAL 2 TIMES DAILY
Qty: 60 TABLET | Refills: 0 | Status: SHIPPED | OUTPATIENT
Start: 2019-10-10 | End: 2019-10-10

## 2019-10-10 ASSESSMENT — MIFFLIN-ST. JEOR: SCORE: 1669.07

## 2019-10-10 ASSESSMENT — PAIN SCALES - GENERAL: PAINLEVEL: NO PAIN (0)

## 2019-10-10 NOTE — NURSING NOTE
"Chief Complaint   Patient presents with     Abdominal Pain     right sided kidney pain x 2 months - discolored urine - ordor        Initial BP (!) 143/88   Pulse 76   Temp 98  F (36.7  C) (Oral)   Resp 18   Ht 1.753 m (5' 9\")   Wt 84.4 kg (186 lb)   SpO2 98%   BMI 27.47 kg/m   Estimated body mass index is 27.47 kg/m  as calculated from the following:    Height as of this encounter: 1.753 m (5' 9\").    Weight as of this encounter: 84.4 kg (186 lb).  Medication Reconciliation: complete  Haven Manzanares M.A.    "

## 2019-10-12 LAB
PSA FREE MFR SERPL: 7 %
PSA FREE SERPL-MCNC: 8.5 NG/ML
PSA SERPL-MCNC: 124.3 NG/ML (ref 0–4)

## 2019-11-11 ENCOUNTER — OFFICE VISIT (OUTPATIENT)
Dept: FAMILY MEDICINE | Facility: CLINIC | Age: 54
End: 2019-11-11
Payer: COMMERCIAL

## 2019-11-11 VITALS
HEART RATE: 70 BPM | DIASTOLIC BLOOD PRESSURE: 81 MMHG | WEIGHT: 184.8 LBS | SYSTOLIC BLOOD PRESSURE: 132 MMHG | TEMPERATURE: 98.1 F | BODY MASS INDEX: 27.29 KG/M2

## 2019-11-11 DIAGNOSIS — N41.9 PROSTATITIS, UNSPECIFIED PROSTATITIS TYPE: Primary | ICD-10-CM

## 2019-11-11 PROCEDURE — 99213 OFFICE O/P EST LOW 20 MIN: CPT | Performed by: FAMILY MEDICINE

## 2019-11-11 RX ORDER — LEVOFLOXACIN 500 MG/1
500 TABLET, FILM COATED ORAL DAILY
Qty: 14 TABLET | Refills: 0 | Status: SHIPPED | OUTPATIENT
Start: 2019-11-11 | End: 2020-01-20

## 2019-11-11 ASSESSMENT — ANXIETY QUESTIONNAIRES

## 2019-11-11 ASSESSMENT — PATIENT HEALTH QUESTIONNAIRE - PHQ9
SUM OF ALL RESPONSES TO PHQ QUESTIONS 1-9: 0
5. POOR APPETITE OR OVEREATING: NOT AT ALL

## 2019-11-11 NOTE — PROGRESS NOTES
Subjective:  Per is a 54 year old male who presents today for follow-up on a recent clinic visit  at [place]. The patient went to clinic for symptoms of flank pain and dysuria.   He was diagnosed with prostatitis. The patient was treated with TMP/SMX. He was asked to follow up today specifically to check up on his symptom(s). . At this time the patient reports a little improvement of the original symptoms.    He reports that he is still not urinating right  He still has the pain in the side/right flank area.     He still has painful ejactulation   The smell of the urine is better.   The flow is still slow.   He reports that his urine is a deep yellow color         He started having chest pain a few weeks ago. This was in the central chest  It comes and goes.   He was sitting and watching TV last night and the symptom(s) came on.     He did vomit on Sunday am.     Occasionally during the daytime he experiences heart burn sensation in the central chest.     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   He denies any exertional chest pain       He denies ever being told that he had an enlarged prostate      Current Outpatient Medications:      sulfamethoxazole-trimethoprim (BACTRIM DS/SEPTRA DS) 800-160 MG tablet, Take 1 tablet by mouth 2 times daily (Patient not taking: Reported on 11/11/2019), Disp: 60 tablet, Rfl: 0    Past Medical History:   Diagnosis Date     NO ACTIVE PROBLEMS        OBJECTIVE:  /81   Pulse 70   Temp 98.1  F (36.7  C) (Oral)   Wt 83.8 kg (184 lb 12.8 oz)   BMI 27.29 kg/m    GENERAL APPEARANCE: healthy, alert and no distress  EYES: EOMI,  PERRL  HENT: ear canals and TM's normal and nose and mouth without ulcers or lesions  NECK: no adenopathy, no asymmetry, masses, or scars and thyroid normal to palpation  RESP: lungs clear to auscultation - no rales, rhonchi or wheezes  CV: regular rates and rhythm, normal S1 S2, no S3 or S4 and no murmur, click or rub -  ABDOMEN:   soft, nontender, no HSM or masses and bowel sounds normal  ABDOMEN: tender suprapubic area  Rectal exam: prostate symmetric w/o nodularity, no masses palpated and prostate 3+ and tender  GU_male: testicles normal without atrophy or masses or tenderness    No results found for any visits on 11/11/19.     No results found for any visits on 11/11/19.        ASSESSMENT:54 year old  54 year old male who recently was had a clinic visit for prostatitis which appear to have improved only minimally      Plan: At this time we will start the patient on levaquin. I do want him to follow up with urology in 2 weeks.     Per  voiced understanding to informations discussed today.

## 2019-11-11 NOTE — PATIENT INSTRUCTIONS
Your provider has referred you to: [place]: [facility] [phone]   [email or web address]

## 2019-11-12 ASSESSMENT — ANXIETY QUESTIONNAIRES: GAD7 TOTAL SCORE: 0

## 2019-12-09 NOTE — LETTER
September 23, 2019      Per Etienne  [address] [address] [address]        Dear [facility] Patient,    Your provider reviewed your medical record and found that you are due for colorectal cancer screening. Having regular screenings helps detect cancer early.  In the past, you have completed a FIT test (Fecal Immunochemical Test) for your colorectal cancer screening. This test looks for blood in your stool and is done once a year.   You have a few options available, the FIT testing you have done in the past or the following below:  A Cologuard test may be completed at home. Cologuard uses a DNA marker in your stool to detect colon cancer and some precancerous polyps. This test is to be completed every three years.    Another option for colorectal cancer screening is a colonoscopy test. A colonoscopy is a procedure that is performed to see the inside of the and rectum by using a long, thin, and flexible tube with a tiny video camera and light at the end. This test is done every 10 years if it is normal.    Please choose one of these options. If you would like to have one of these tests ordered, please call us at [phone] or send us a message via [employer].    If you have had a colorectal cancer screening done outside of [place] please let us know so we can update your medical record.    Please let us know if you have any questions.    Sincerely,  [facility], [place]                  
WDL

## 2019-12-13 ENCOUNTER — OFFICE VISIT (OUTPATIENT)
Dept: UROLOGY | Facility: CLINIC | Age: 54
End: 2019-12-13
Payer: COMMERCIAL

## 2019-12-13 VITALS — DIASTOLIC BLOOD PRESSURE: 74 MMHG | SYSTOLIC BLOOD PRESSURE: 134 MMHG

## 2019-12-13 DIAGNOSIS — M54.50 LOW BACK PAIN, UNSPECIFIED BACK PAIN LATERALITY, UNSPECIFIED CHRONICITY, UNSPECIFIED WHETHER SCIATICA PRESENT: ICD-10-CM

## 2019-12-13 DIAGNOSIS — R97.20 ELEVATED PSA: Primary | ICD-10-CM

## 2019-12-13 LAB
ALBUMIN UR-MCNC: NEGATIVE MG/DL
APPEARANCE UR: CLEAR
BILIRUB UR QL STRIP: NEGATIVE
COLOR UR AUTO: YELLOW
GLUCOSE UR STRIP-MCNC: NEGATIVE MG/DL
HGB UR QL STRIP: NEGATIVE
KETONES UR STRIP-MCNC: NEGATIVE MG/DL
LEUKOCYTE ESTERASE UR QL STRIP: NEGATIVE
NITRATE UR QL: NEGATIVE
PH UR STRIP: 5.5 PH (ref 5–7)
SOURCE: NORMAL
SP GR UR STRIP: >1.03 (ref 1–1.03)
UROBILINOGEN UR STRIP-ACNC: 0.2 EU/DL (ref 0.2–1)

## 2019-12-13 PROCEDURE — 81003 URINALYSIS AUTO W/O SCOPE: CPT | Performed by: UROLOGY

## 2019-12-13 PROCEDURE — 51798 US URINE CAPACITY MEASURE: CPT | Performed by: UROLOGY

## 2019-12-13 PROCEDURE — 87077 CULTURE AEROBIC IDENTIFY: CPT | Performed by: UROLOGY

## 2019-12-13 PROCEDURE — 87070 CULTURE OTHR SPECIMN AEROBIC: CPT | Performed by: UROLOGY

## 2019-12-13 PROCEDURE — 99203 OFFICE O/P NEW LOW 30 MIN: CPT | Mod: 25 | Performed by: UROLOGY

## 2019-12-13 PROCEDURE — 87186 SC STD MICRODIL/AGAR DIL: CPT | Performed by: UROLOGY

## 2019-12-13 NOTE — PATIENT INSTRUCTIONS
Please call [facility] to schedule a CT scan and bone scan. They can be done at the same appointment for your convenience. [phone].   You may call our office to arrange follow up once your tests are scheduled/completed, [phone].     Please be well hydrated for the bone scan. Do not eat anything 3 hours prior to the CT scan.

## 2019-12-13 NOTE — PROGRESS NOTES
S: Per Etienne is a pleasant  54 year old male who was requested to be seen by  Per Chen for a consult with regard to patient's elevated PSA.  His recent PSA was found to be 124.  His previous PSA was never done previously.  Patient complains of slow urinary stream.    His AUA Symptom Score:  8.  He was placed on antibiotics recently for possible prostatitis.  His main complaint is some back pain with pressure in the suprapubic area.  He has no dysuria or hematuria.  He has no weight loss.  Current Outpatient Medications   Medication Sig Dispense Refill     levofloxacin (LEVAQUIN) 500 MG tablet Take 1 tablet (500 mg) by mouth daily 14 tablet 0     sulfamethoxazole-trimethoprim (BACTRIM DS/SEPTRA DS) 800-160 MG tablet Take 1 tablet by mouth 2 times daily (Patient not taking: Reported on 2019) 60 tablet 0      No Known Allergies   Past Medical History:   Diagnosis Date     NO ACTIVE PROBLEMS      Past Surgical History:   Procedure Laterality Date     ORTHOPEDIC SURGERY  1985    femur newton placed      Family History   Problem Relation Age of Onset     Asthma Father      Prostate Cancer Father 74     Cancer Maternal Grandmother 92        throat      Asthma Sister      He does have a family history of prostate cancer.  Social History     Socioeconomic History     Marital status:      Spouse name: Not on file     Number of children: Not on file     Years of education: Not on file     Highest education level: Not on file   Occupational History     Not on file   Social Needs     Financial resource strain: Not on file     Food insecurity:     Worry: Not on file     Inability: Not on file     Transportation needs:     Medical: Not on file     Non-medical: Not on file   Tobacco Use     Smoking status: Former Smoker     Last attempt to quit: 2012     Years since quittin.9     Smokeless tobacco: Never Used   Substance and Sexual Activity     Alcohol use: Yes     Drug use: No     Sexual  activity: Yes     Partners: Female   Lifestyle     Physical activity:     Days per week: Not on file     Minutes per session: Not on file     Stress: Not on file   Relationships     Social connections:     Talks on phone: Not on file     Gets together: Not on file     Attends Zoroastrianism service: Not on file     Active member of club or organization: Not on file     Attends meetings of clubs or organizations: Not on file     Relationship status: Not on file     Intimate partner violence:     Fear of current or ex partner: Not on file     Emotionally abused: Not on file     Physically abused: Not on file     Forced sexual activity: Not on file   Other Topics Concern     Parent/sibling w/ CABG, MI or angioplasty before 65F 55M? Not Asked   Social History Narrative     Not on file        REVIEW OF SYSTEMS  =================  C: NEGATIVE for fever, chills, change in weight  I: NEGATIVE for worrisome rashes, moles or lesions  E/M: NEGATIVE for ear, mouth and throat problems  R: NEGATIVE for significant cough or SHORTNESS OF BREATH  CV:  NEGATIVE for chest pain, palpitations or peripheral edema  GI: NEGATIVE for nausea, abdominal pain, heartburn, or change in bowel habits  NEURO: NEGATIVE  PSYCH: NEGATIVE    Physical Exam:  HR 80, RR 16.   Patient is pleasant, in no acute distress, good general condition.  Lung: no evidence of respiratory distress    Abdomen: Soft, nondistended, non tender. No masses. No rebound or guarding.   Exam: Penis no discharge.  Testes without any masses.  No scrotal skin lesion.  Prostate very large abnormal nodule.  Skin: Warm and dry.  No redness.  Musculaskeletal: moving all extremities.  No weakness.  Neuro non focal  Psych normal mood and affect    Assessment/Plan:   (R97.20) Elevated PSA  (primary encounter diagnosis)  Comment: Bladder scan with minimal residual urine.  UA neg  Plan: 54-year-old gentleman with elevated PSA of 124.  Patient has prostate cancer most likely.  I will schedule  patient for a transrectal ultrasound biopsy of his prostate.  Risks of bleeding infection discussed at length.  Since his PSA is quite high, I will schedule patient for early metastatic work-up.  His back pain may be due to cancer spreading to the bony area.

## 2019-12-13 NOTE — PROGRESS NOTES
Bladder Scan performed. 21 maximum residual urine detected after 3 scans. MD hyacinth Hernandez RN

## 2019-12-16 LAB
BACTERIA SPEC CULT: ABNORMAL
BACTERIA SPEC CULT: ABNORMAL
Lab: ABNORMAL
SPECIMEN SOURCE: ABNORMAL

## 2019-12-16 RX ORDER — CIPROFLOXACIN 500 MG/1
500 TABLET, FILM COATED ORAL 2 TIMES DAILY
Qty: 6 TABLET | Refills: 0 | Status: SHIPPED | OUTPATIENT
Start: 2019-12-16 | End: 2020-01-20

## 2019-12-16 RX ORDER — CEPHALEXIN 500 MG/1
500 CAPSULE ORAL 3 TIMES DAILY
Qty: 9 CAPSULE | Refills: 0 | Status: SHIPPED | OUTPATIENT
Start: 2019-12-16 | End: 2020-01-20

## 2020-01-03 ENCOUNTER — TELEPHONE (OUTPATIENT)
Dept: UROLOGY | Facility: CLINIC | Age: 55
End: 2020-01-03

## 2020-01-03 NOTE — TELEPHONE ENCOUNTER
Called and spoke to patient.   Patient states that he is nervous and is requesting a medication to calm him down.   Dr. Colmenares states that these mediations can actually make you feel worse.  Patient agrees and will keep scheduled appointment.   Loida Hernandez RN

## 2020-01-03 NOTE — TELEPHONE ENCOUNTER
Reason for call:  Medication   If this is a refill request, has the caller requested the refill from the pharmacy already? No  Will the patient be using a [facility]? No  Name of the pharmacy and phone number for the current request: [facility] [id number] [place], [place] - [address]  [phone]    Name of the medication requested: unknown    Other request: Patient states he is having a biopsy done on 1- and would like a call back to discuss getting a medication for his anxiety prior to the procedure. Thank you.    Phone number to reach patient:  Cell number on file:    Telephone Information:   Mobile [phone]       Best Time:  any    Can we leave a detailed message on this number?  YES

## 2020-01-08 ENCOUNTER — ANCILLARY PROCEDURE (OUTPATIENT)
Dept: NUCLEAR MEDICINE | Facility: CLINIC | Age: 55
End: 2020-01-08
Attending: UROLOGY
Payer: COMMERCIAL

## 2020-01-08 ENCOUNTER — ANCILLARY PROCEDURE (OUTPATIENT)
Dept: CT IMAGING | Facility: CLINIC | Age: 55
End: 2020-01-08
Attending: UROLOGY
Payer: COMMERCIAL

## 2020-01-08 DIAGNOSIS — R97.20 ELEVATED PSA: ICD-10-CM

## 2020-01-08 PROCEDURE — A9503 TC99M MEDRONATE: HCPCS | Performed by: UROLOGY

## 2020-01-08 PROCEDURE — 78306 BONE IMAGING WHOLE BODY: CPT

## 2020-01-08 PROCEDURE — 74177 CT ABD & PELVIS W/CONTRAST: CPT | Performed by: RADIOLOGY

## 2020-01-08 RX ORDER — TC 99M MEDRONATE 20 MG/10ML
20-30 INJECTION, POWDER, LYOPHILIZED, FOR SOLUTION INTRAVENOUS ONCE
Status: COMPLETED | OUTPATIENT
Start: 2020-01-08 | End: 2020-01-08

## 2020-01-08 RX ORDER — IOPAMIDOL 755 MG/ML
112 INJECTION, SOLUTION INTRAVASCULAR ONCE
Status: COMPLETED | OUTPATIENT
Start: 2020-01-08 | End: 2020-01-08

## 2020-01-08 RX ADMIN — TC 99M MEDRONATE 22.7 MCI.: 20 INJECTION, POWDER, LYOPHILIZED, FOR SOLUTION INTRAVENOUS at 11:00

## 2020-01-08 RX ADMIN — IOPAMIDOL 112 ML: 755 INJECTION, SOLUTION INTRAVASCULAR at 11:45

## 2020-01-10 ENCOUNTER — OFFICE VISIT (OUTPATIENT)
Dept: UROLOGY | Facility: CLINIC | Age: 55
End: 2020-01-10
Payer: COMMERCIAL

## 2020-01-10 ENCOUNTER — ANCILLARY PROCEDURE (OUTPATIENT)
Dept: ULTRASOUND IMAGING | Facility: CLINIC | Age: 55
End: 2020-01-10
Payer: COMMERCIAL

## 2020-01-10 DIAGNOSIS — R97.20 ELEVATED PSA: Primary | ICD-10-CM

## 2020-01-10 DIAGNOSIS — R97.20 ELEVATED PSA: ICD-10-CM

## 2020-01-10 PROCEDURE — 55700 ZZHC BIOPSY PROSTATE NEEDLE/PUNCH: CPT | Performed by: UROLOGY

## 2020-01-10 PROCEDURE — 88344 IMHCHEM/IMCYTCHM EA MLT ANTB: CPT | Performed by: UROLOGY

## 2020-01-10 PROCEDURE — 76942 ECHO GUIDE FOR BIOPSY: CPT | Performed by: UROLOGY

## 2020-01-10 PROCEDURE — 96372 THER/PROPH/DIAG INJ SC/IM: CPT | Mod: 59 | Performed by: UROLOGY

## 2020-01-10 PROCEDURE — 76872 US TRANSRECTAL: CPT | Performed by: UROLOGY

## 2020-01-10 PROCEDURE — 88305 TISSUE EXAM BY PATHOLOGIST: CPT | Performed by: UROLOGY

## 2020-01-10 RX ORDER — GENTAMICIN 40 MG/ML
80 INJECTION, SOLUTION INTRAMUSCULAR; INTRAVENOUS ONCE
Status: COMPLETED | OUTPATIENT
Start: 2020-01-10 | End: 2020-01-10

## 2020-01-10 RX ADMIN — GENTAMICIN 80 MG: 40 INJECTION, SOLUTION INTRAMUSCULAR; INTRAVENOUS at 08:37

## 2020-01-10 NOTE — PROGRESS NOTES
The following medication was given:     MEDICATION: gentamicin 80mg/2ml  ROUTE: IM  SITE: Ventrogluteal - Right  DOSE: 80mg  LOT #: [id number]  :  [employer]ogo  EXPIRATION DATE:  09/01/20  NDC#: 51905-234-31  Clinic Administered Medication Documentation    MEDICATION LIST:   Injectable Medication Documentation    Patient was given Gentamicin Sulfate . Prior to medication administration, verified patients identity using patient s name and date of birth. Please see MAR and medication order for additional information. Patient instructed to remain in clinic for 15 minutes and report any adverse reaction to staff immediately .      Was entire vial of medication used? Yes  Vial/Syringe: Single dose vial  Expiration Date:  09/01/20  Was this medication supplied by the patient? No     Margie Camagro RN Specialty Triage 1/10/2020 8:39 AM

## 2020-01-14 LAB — COPATH REPORT: NORMAL

## 2020-01-20 ENCOUNTER — OFFICE VISIT (OUTPATIENT)
Dept: UROLOGY | Facility: CLINIC | Age: 55
End: 2020-01-20
Payer: COMMERCIAL

## 2020-01-20 ENCOUNTER — TELEPHONE (OUTPATIENT)
Dept: UROLOGY | Facility: CLINIC | Age: 55
End: 2020-01-20

## 2020-01-20 VITALS — DIASTOLIC BLOOD PRESSURE: 103 MMHG | SYSTOLIC BLOOD PRESSURE: 160 MMHG | HEART RATE: 92 BPM

## 2020-01-20 DIAGNOSIS — R03.0 ELEVATED BP WITHOUT DIAGNOSIS OF HYPERTENSION: ICD-10-CM

## 2020-01-20 DIAGNOSIS — C61 PROSTATE CANCER (H): Primary | ICD-10-CM

## 2020-01-20 PROCEDURE — 99214 OFFICE O/P EST MOD 30 MIN: CPT | Performed by: UROLOGY

## 2020-01-20 NOTE — PROGRESS NOTES
Chief Complaint   Patient presents with     KOBI Albarado Jr HORTENCIA Etienne is a 54 year old male who presents today for follow up of   Chief Complaint   Patient presents with     RECHECK    f/u after recent biopsy for elevated psa.  He is without any complaints.    No current outpatient medications on file.     No Known Allergies   Past Medical History:   Diagnosis Date     NO ACTIVE PROBLEMS      Past Surgical History:   Procedure Laterality Date     ORTHOPEDIC SURGERY  1985    femur newton placed     Family History   Problem Relation Age of Onset     Asthma Father      Prostate Cancer Father 74     Cancer Maternal Grandmother 92        throat      Asthma Sister      Social History     Socioeconomic History     Marital status:      Spouse name: None     Number of children: None     Years of education: None     Highest education level: None   Occupational History     None   Social Needs     Financial resource strain: None     Food insecurity:     Worry: None     Inability: None     Transportation needs:     Medical: None     Non-medical: None   Tobacco Use     Smoking status: Former Smoker     Last attempt to quit: 2012     Years since quittin.0     Smokeless tobacco: Never Used   Substance and Sexual Activity     Alcohol use: Yes     Drug use: No     Sexual activity: Yes     Partners: Female   Lifestyle     Physical activity:     Days per week: None     Minutes per session: None     Stress: None   Relationships     Social connections:     Talks on phone: None     Gets together: None     Attends [employer] service: None     Active member of club or organization: None     Attends meetings of clubs or organizations: None     Relationship status: None     Intimate partner violence:     Fear of current or ex partner: None     Emotionally abused: None     Physically abused: None     Forced sexual activity: None   Other Topics Concern     Parent/sibling w/ CABG, MI or angioplasty before 65F 55M? Not Asked    Social History Narrative     None       REVIEW OF SYSTEMS  =================  C: NEGATIVE for fever, chills, change in weight  I: NEGATIVE for worrisome rashes, moles or lesions  E/M: NEGATIVE for ear, mouth and throat problems  R: NEGATIVE for significant cough or SHORTNESS OF BREATH  CV:  NEGATIVE for chest pain, palpitations or peripheral edema  GI: NEGATIVE for nausea, abdominal pain, heartburn, or change in bowel habits  NEURO: NEGATIVE numbness/weakness  : see HPI  PSYCH: NEGATIVE depression/anxiety  LYmph: no new enlarged lymph nodes  Ortho: no new trauma/movements    Physical Exam:  BP (!) 160/103   Pulse 92    Patient is pleasant, in no acute distress, good general condition.  Lung: no evidence of respiratory distress    Abdomen: Soft, nondistended, non tender. No masses. No rebound or guarding.   Exam: normal male  Skin: Warm and dry.  No redness.  Psych: normal mood and affect  Neuro: alert and oriented  Musculaskeletal: moving all extremities    CT ABDOMEN AND PELVIS WITH CONTRAST   1/8/2020 11:55 AM      HISTORY: Elevated PSA greater than 100. Elevated PSA.     TECHNIQUE:  CT abdomen and pelvis with 112 mL Isovue 370 IV. Radiation  dose for this scan was reduced using automated exposure control,  adjustment of the mA and/or kV according to patient size, or iterative  reconstruction technique.     COMPARISON: None.     FINDINGS:   Dependent subsegmental atelectasis seen in the lung bases. Ill-defined  groundglass opacity in the left lower lobe measuring 1.2 cm (series 2,  image 5).     Mild, diffuse fatty infiltration seen. Subcentimeter hypodensity in  the right hepatic lobe is too small to characterize. No intrahepatic  or extrahepatic biliary duct dilatation is present. Gallbladder is  unremarkable.     The spleen, adrenal glands and pancreas are unremarkable. Kidneys  enhance symmetrically. No hydronephrosis is seen.     Stomach is moderately distended with ingested contents and air.  Small  and large bowel loops are nondilated. Scattered colonic diverticulosis  without convincing evidence of diverticulitis. Appendix is normal.     Scattered vascular calcifications seen in the abdominal aorta and  iliac branches. IVC is of normal course and caliber. No  retroperitoneal, mesenteric or pelvic lymphadenopathy seen.     Urinary bladder is moderately distended. Prostate gland measures 4.0  cm in transverse diameter with dystrophic calcifications.     No suspicious osseous lesions are seen. Mild multilevel degenerative  changes seen in the spine. Intramedullary newton seen within the left  femur.                                                                      IMPRESSION:  1.  Ill-defined groundglass opacity in the left lower lobe measuring  1.2 cm. Findings are nonspecific and may represent focal  inflammation/infection. Alternatively findings could also represent  focal area of subsegmental atelectasis. Per Fleischner criteria,  follow-up CT in 6-12 months is recommended to confirm persistence.  2.  Mild diffuse fatty infiltration of the liver.  3.  Dystrophic calcifications in the prostate gland.     SOY COUCH MD  Copath Report Patient Name: DAGOBERTO MIRANDA JR   MR#: [id number]   Specimen #:    Collected: 1/10/2020   Received: 1/13/2020   Reported: 1/14/2020 13:13   Ordering Phy(s): NAN LAUREN     For improved result formatting, select 'View Enhanced Report Format' under    Linked Documents section.     SPECIMEN(S):   A: Prostate needle biopsy, tissues left   B: Prostate needle biopsy, tisues right     FINAL DIAGNOSIS:   A. Prostate, left, ultrasound-guided needle core biopsy   - Prostatic adenocarcinoma        - Robyn score: 7 (4+3); Grade group 3 (% of pattern 4: 80%)        - Number of cores involved: all seven tissue fragments/cores        - Total surface area involved: 95%        - Perineural invasion: Not definitely identified in the planes   examined        -  Intraductal carcinoma: Not identified.        - Angiolymphatic invasion: Not identified in the planes examined.     B. Prostate, right, ultrasound-guided needle core biopsy   - Prostatic adenocarcinoma        - [place] score: 7 (3+4); Grade group 2 (% of pattern 4: 30%)        - Number of cores involved: all six fragments/cores        - Total surface area involved: 95%        - Perineural invasion: Present        - Intraductal carcinoma: Not identified.        - Angiolymphatic invasion: Not identified.     Electronically signed out by:        Assessment/Plan:   (C61) Prostate cancer (H)  (primary encounter diagnosis)  Comment:   Plan:    Patient is a 54  year old MALE with history of newly diagnosed moderately risk prostate cancer (group 2-3 stage T2-3) with psa of 124. This is a patient with newly diagnosed prostate cancer.    Surprisingly his metastatic work-up are negative.      I discussed several management options for prostate cancer with the patient.  The approach is discussed with her active surveillance, radical prostatectomy, radiation therapy in the form of external beam.  The risks and benefits of each of these approaches were explained in detail.  The risks discussed included risk of incontinence and impotence with surgical therapy.  There is also the immediate perioperative risk of wound infection blood transfusion and rectal injury.  With radiation therapy was discussed where cystitis, proctitis, hematuria, hematochezia.  There is also similar risks of erectile dysfunction with radiation therapy which reduce risk of incontinence.    I also discussed hormonal therapy with him.  With regard to surgical options, I discussed robotic versus open prostatectomy.  Pros and cons discussed.    I will schedule patient for radical prostatectomy.  25 minutes spent with patient today face-to-face consultation with treatments.    Elevated bp:  Per to follow up with Primary Care provider regarding elevated blood  pressure.

## 2020-01-20 NOTE — PATIENT INSTRUCTIONS
Surgery Preparation    You will receive a phone call from the [facility] within 1-2 days. If you do not receive a call within 2 days, contact [phone] to schedule the procedure. You can write the location/date/time of surgery in the space provided below for your records.    Location of Surgery:                                          Date of Surgery:                                                 Time of Arrival:                                                   Time of Surgery:                                                   Please arrange an appointment with a primary care provider for a pre-op physical. This is a mandatory appointment that needs to be completed within the two weeks prior to your surgery date. This gives clearance for you to receive anesthesia during your procedure. If you have had a pre-op physical within 30 days of your surgery date, you may not need another one. Check with your provider.    If you are having a Same Day Surgery, you must have a  to and from the procedure. Someone needs to stay with you post-operatively for 12 hours.     Do not eat or drink any solids, milk products, or pulpy juices after midnight the night before your surgery. You may have clear liquids up to 8 hours prior to the surgery. This would include water, soda, coffee (without cream), tea, broth, and jello.    Please stop all over the counter blood thinners such as Aspirin, Advil, Aleve, Ibuprofen, Motrin, and Excedrin one week prior to surgery. Please discontinue prescription blood thinners 5-7 days prior to surgery, per your primary physician's orders.     Please check with your insurance company to confirm that your procedure is covered, and that the facility is in-network.     Call the Urology Department @ [phone] if you have questions about your surgery, or if you need to reschedule your procedure.     Patient Education     Prostate Cancer: Radical Prostatectomy     The prostate,  seminal vesicles, and a portion of the urethra are removed.   Radical prostatectomy is surgery to remove the entire prostate. It may be done if tests show that the cancer is confined to the prostate. Your surgeon will give you detailed instructions on getting ready for surgery. After surgery, you ll be told how to care for yourself at home as you recover. Be sure to ask any questions you have about the procedure and recovery.  Risks and possible complications  All surgeries have risks. The risks of this surgery include:    Blood clots    Excess bleeding    Hole (perforation) in the bowel    Infection    Loss of bladder control (incontinence)    Lung infection (pneumonia)    Trouble getting or keeping an erection (erectile dysfunction)    Trouble urinating  Getting ready for your surgery     The urethra is reattached to the bladder. A catheter is inserted to drain urine while you heal. A balloon holds the catheter in place.     Follow all instructions from your healthcare team. In addition:    Tell your healthcare provider about all medicines you take. This includes herbs and other supplements. It also includes any blood thinners, such as warfarin, clopidogrel, or daily aspirin. You may need to stop taking some or all of them before the surgery.    You may be told to use a laxative, enemas, or both before the surgery. This is to empty the colon and rectum of stool. Follow the instructions you are given.    Don t eat or drink after midnight the night before surgery.  How the surgery is done  The surgery may be done through several small incisions in the abdomen. This is called laparoscopy.  In some cases, the surgery may be performed through a larger incision in the abdomen. This is called the retropubic approach. Or surgery may be done through an incision behind the scrotum. This is called the perineal approach.  During the surgery:    The surgeon may remove and check the lymph nodes near the prostate. This is to see  if cancer has spread. If the cancer has spread, the surgeon may decide not to remove the prostate.    The prostate, seminal vesicles, and a portion of urethra will then be removed.    Nerve-sparing methods may be used to try to preserve erectile function.  After surgery  You will have a catheter in place to drain urine from your bladder. Urine will flow through the catheter into a sterile bag. The urine may be bloody or cloudy at first. You may go home in 1 to 3 days.  Recovering at home  You ll be given medicines to control pain. The catheter will be left in place when you go home. You ll be given instructions on how to manage it.  Follow-up care  The catheter and stitches will be removed at a follow-up visit. This is often 1 to 2 weeks after surgery. Bladder control often takes a few weeks to several months to return. Improvement can continue for up to a year.  Call your healthcare provider right away if you have any of the following:    Chills    Fever of 100.4 F (38 C) or higher, or as directed by your healthcare provider    Fluid leaking from your incision    Redness or pain in the incision that gets worse    Swelling of your leg or ankle    Trouble urinating after the catheter has been removed    Urine not draining from the catheter  Date Last Reviewed: 5/1/2017 2000-2019 [employer]. [address] [place] [address], [place] [address]. All rights reserved. This information is not intended as a substitute for professional medical care. Always follow your healthcare professional's instructions.

## 2020-01-20 NOTE — TELEPHONE ENCOUNTER
Dr Colmenares requesting surgical assistance from Dr Owusu or Dr Allred. Waiting for response from staff message sent 1-20 3:00pm to both doctors. No call to patient at this time.

## 2020-01-21 NOTE — TELEPHONE ENCOUNTER
Type of surgery: radical retropubic prostatectomy and bilateral pelvic lymph node dissection  CPT 37320, 77045  Prostate cancer (H) [C61]  - Primary   Location of surgery: [facility]  Date and time of surgery: 2-24-20  12:30PM  Surgeon: Dr Colmenares  Pre-Op Appt Date: 2-13-20  Post-Op Appt Date: 3-9-20   Packet sent out: Yes  Pre-cert/Authorization completed: Per [employer]:   Transaction Type  Inpatient Authorization    Certification/Reference Number  [id number]    Status  Approved    Date: 01/21/2020    Thank you,   Natalia Villa   Referral Department  [phone]

## 2020-01-30 ENCOUNTER — TELEPHONE (OUTPATIENT)
Dept: UROLOGY | Facility: CLINIC | Age: 55
End: 2020-01-30

## 2020-01-30 NOTE — TELEPHONE ENCOUNTER
Reason for call:  Form   Our goal is to have forms completed within 72 hours, however some forms may require a visit or additional information.     Who is the form from? Patient  Where did the form come from? form was faxed in  What clinic location was the form placed at? [facility]   Where was the form placed? seen if recieved   What number is listed as a contact on the form? [phone]    Phone call message - patient request for a letter, form or note: [facility]     Date needed: as soon as possible  Please call the patient when completed  Has the patient signed a consent form for release of information? Not Applicable    Additional comments: Patient calling to see if received [facility] papers, please call to advise.    Type of letter, form or note: [facility]    Phone number to reach patient:  Home number on file [phone] (home)    Best Time:  Any     Can we leave a detailed message on this number?  YES

## 2020-01-30 NOTE — TELEPHONE ENCOUNTER
Called and spoke to patient to confirm received paperwork.   [facility] paperwork complete will fax as requested 1/31/2020.  Patient agrees with this plan.   Loida Hernandez RN

## 2020-02-11 NOTE — PROGRESS NOTES
[facility]  [address] [facility] [id number] [phone]  Dept: [phone]    PRE-OP EVALUATION:  Today's date: 2020    Per BURNETT Lowell (: 1965) presents for pre-operative evaluation assessment as requested by Dr. Colmenares.  He requires evaluation and anesthesia risk assessment prior to undergoing surgery/procedure for treatment of Prostate cancer .    Fax number for surgical facility:   Primary Physician: Per Chen  Type of Anesthesia Anticipated: to be determined    Patient has a Health Care Directive or Living Will:  NO    Preop Questions 2020   Who is doing your surgery? Dr Colmenares   What are you having done? Prostate cancer/Prostate removal   Date of Surgery/Procedure: 2020   Facility or Hospital where procedure/surgery will be performed: [facility]   1.  Do you have a history of Heart attack, stroke, stent, coronary bypass surgery, or other heart surgery? No   2.  Do you ever have any pain or discomfort in your chest? No   3.  Do you have a history of  Heart Failure? No   4.   Are you troubled by shortness of breath when:  walking on a level surface, or up a slight hill, or at night? No   5.  Do you currently have a cold, bronchitis or other respiratory infection? No   6.  Do you have a cough, shortness of breath, or wheezing? No   7.  Do you sometimes get pains in the calves of your legs when you walk? No   8. Do you or anyone in your family have previous history of blood clots? No   9.  Do you or does anyone in your family have a serious bleeding problem such as prolonged bleeding following surgeries or cuts? No   10. Have you ever had problems with anemia or been told to take iron pills? No   11. Have you had any abnormal blood loss such as black, tarry or bloody stools? No   12. Have you ever had a blood transfusion? No   13. Have you or any of your relatives ever had problems with anesthesia? No   14. Do you have sleep apnea, excessive  snoring or daytime drowsiness? No   15. Do you have any prosthetic heart valves? No   16. Do you have prosthetic joints? No         HPI:     HPI related to upcoming procedure: the patient had an abnormal PSA and had a prostate biopsy   He has had multiple scans   There is no cancer outside of the prostate          MEDICAL HISTORY:     Patient Active Problem List    Diagnosis Date Noted     Kidney stone 2013     Priority: Medium     Vitamin D deficiency 05/15/2013     Priority: Medium     Problem list name updated by automated process. Provider to review       CARDIOVASCULAR SCREENING; LDL GOAL LESS THAN 160 2012     Priority: Medium     Left varicocele 2012     Priority: Medium      Past Medical History:   Diagnosis Date     NO ACTIVE PROBLEMS      Past Surgical History:   Procedure Laterality Date     ORTHOPEDIC SURGERY  1985    femur newton placed     No current outpatient medications on file.     OTC products: Tylenol    No Known Allergies   Latex Allergy: NO    Social History     Tobacco Use     Smoking status: Former Smoker     Last attempt to quit: 2012     Years since quittin.1     Smokeless tobacco: Never Used   Substance Use Topics     Alcohol use: Yes     History   Drug Use No       REVIEW OF SYSTEMS:   CONSTITUTIONAL: NEGATIVE for fever, chills, change in weight  INTEGUMENTARY/SKIN: NEGATIVE for worrisome rashes, moles or lesions  EYES: NEGATIVE for vision changes or irritation  ENT/MOUTH: NEGATIVE for ear, mouth and throat problems  RESP: NEGATIVE for significant cough or SOB  BREAST: NEGATIVE for masses, tenderness or discharge  CV: NEGATIVE for chest pain, palpitations or peripheral edema  GI: NEGATIVE for nausea, abdominal pain, heartburn, or change in bowel habits  : NEGATIVE for frequency, dysuria, or hematuria  MUSCULOSKELETAL: NEGATIVE for significant arthralgias or myalgia  NEURO: NEGATIVE for weakness, dizziness or paresthesias  ENDOCRINE: NEGATIVE for temperature  "intolerance, skin/hair changes  HEME: NEGATIVE for bleeding problems  PSYCHIATRIC: NEGATIVE for changes in mood or affect    EXAM:   /76   Pulse 73   Temp 97.7  F (36.5  C) (Oral)   Resp 20   Ht 1.765 m (5' 9.5\")   Wt 83.9 kg (185 lb)   SpO2 96%   BMI 26.93 kg/m      GENERAL APPEARANCE: healthy, alert and no distress     EYES: EOMI,  PERRL     HENT: ear canals and TM's normal and nose and mouth without ulcers or lesions     NECK: no adenopathy, no asymmetry, masses, or scars and thyroid normal to palpation     RESP: lungs clear to auscultation - no rales, rhonchi or wheezes     CV: regular rates and rhythm, normal S1 S2, no S3 or S4 and no murmur, click or rub     ABDOMEN:  soft, nontender, no HSM or masses and bowel sounds normal     MS: extremities normal- no gross deformities noted, no evidence of inflammation in joints, FROM in all extremities.     SKIN: no suspicious lesions or rashes     NEURO: Normal strength and tone, sensory exam grossly normal, mentation intact and speech normal     PSYCH: mentation appears normal. and affect normal/bright     LYMPHATICS: No cervical adenopathy    DIAGNOSTICS:   EKG: sinus bradycardia, normal axis, normal intervals, no acute ST/T changes c/w ischemia, no LVH by voltage criteria, unchanged from previous tracings    Recent Labs   Lab Test 10/10/19  1717 05/10/18  1106 05/05/18  0949   HGB 15.8  --  15.9     --  346     --  137   POTASSIUM 4.2  --  4.6   CR 1.12  --  1.05   A1C 5.4 5.7*  --         IMPRESSION:   Reason for surgery/procedure: prostate cancer    The proposed surgical procedure is considered INTERMEDIATE risk.    REVISED CARDIAC RISK INDEX  The patient has the following serious cardiovascular risks for perioperative complications such as (MI, PE, VFib and 3  AV Block):  No serious cardiac risks  INTERPRETATION: 0 risks: Class I (very low risk - 0.4% complication rate)    The patient has the following additional risks for perioperative " complications:  No identified additional risks      ICD-10-CM    1. Preop general physical exam Z01.818 EKG 12-lead complete w/read - Clinics       RECOMMENDATIONS:         --Patient is on no chronic medications    APPROVAL GIVEN to proceed with proposed procedure, without further diagnostic evaluation       Signed Electronically by: Per Chen MD    Copy of this evaluation report is provided to requesting physician.    [facility] Preop Guidelines    Revised Cardiac Risk Index

## 2020-02-13 ENCOUNTER — OFFICE VISIT (OUTPATIENT)
Dept: FAMILY MEDICINE | Facility: CLINIC | Age: 55
End: 2020-02-13
Payer: COMMERCIAL

## 2020-02-13 VITALS
HEART RATE: 73 BPM | SYSTOLIC BLOOD PRESSURE: 127 MMHG | RESPIRATION RATE: 20 BRPM | WEIGHT: 185 LBS | HEIGHT: 70 IN | BODY MASS INDEX: 26.48 KG/M2 | TEMPERATURE: 97.7 F | DIASTOLIC BLOOD PRESSURE: 76 MMHG | OXYGEN SATURATION: 96 %

## 2020-02-13 DIAGNOSIS — Z01.818 PREOP GENERAL PHYSICAL EXAM: Primary | ICD-10-CM

## 2020-02-13 PROCEDURE — 93000 ELECTROCARDIOGRAM COMPLETE: CPT | Performed by: FAMILY MEDICINE

## 2020-02-13 PROCEDURE — 99214 OFFICE O/P EST MOD 30 MIN: CPT | Performed by: FAMILY MEDICINE

## 2020-02-13 ASSESSMENT — PAIN SCALES - GENERAL: PAINLEVEL: NO PAIN (0)

## 2020-02-13 ASSESSMENT — MIFFLIN-ST. JEOR: SCORE: 1672.46

## 2020-02-13 NOTE — NURSING NOTE
"Chief Complaint   Patient presents with     Pre-Op Exam     Health Maintenance     order pended, ADV Dir, PHQ2, Physical       Initial BP (!) 157/84   Pulse 73   Temp 97.7  F (36.5  C) (Oral)   Resp 20   Ht 1.765 m (5' 9.5\")   Wt 83.9 kg (185 lb)   SpO2 96%   BMI 26.93 kg/m   Estimated body mass index is 26.93 kg/m  as calculated from the following:    Height as of this encounter: 1.765 m (5' 9.5\").    Weight as of this encounter: 83.9 kg (185 lb).  Medication Reconciliation: complete  Kianna Briggs, NAKUL  "

## 2020-02-19 ENCOUNTER — TELEPHONE (OUTPATIENT)
Dept: UROLOGY | Facility: CLINIC | Age: 55
End: 2020-02-19

## 2020-02-19 DIAGNOSIS — C61 PROSTATE CANCER (H): Primary | ICD-10-CM

## 2020-02-19 NOTE — TELEPHONE ENCOUNTER
Reason for call:  Other   Patient called regarding (reason for call): prescription  Additional comments:  Patient calling to find out what medication he needs prior to his surgery on Monday. 2-    Phone number to reach patient:  Home number on file [phone] (home)    Best Time:  Any     Can we leave a detailed message on this number?  YES    Travel screening: Not Applicable

## 2020-02-26 ENCOUNTER — TRANSFERRED RECORDS (OUTPATIENT)
Dept: HEALTH INFORMATION MANAGEMENT | Facility: CLINIC | Age: 55
End: 2020-02-26

## 2020-02-26 LAB
CREAT SERPL-MCNC: 1.13 MG/DL (ref 0.7–1.3)
GFR SERPL CREATININE-BSD FRML MDRD: >60 ML/MIN
GLUCOSE SERPL-MCNC: 137 MG/DL (ref 70–110)
POTASSIUM SERPL-SCNC: 4.1 MMOL/L (ref 3.5–5.1)

## 2020-02-28 ENCOUNTER — OFFICE VISIT (OUTPATIENT)
Dept: UROLOGY | Facility: CLINIC | Age: 55
End: 2020-02-28
Payer: COMMERCIAL

## 2020-02-28 VITALS — HEART RATE: 56 BPM | DIASTOLIC BLOOD PRESSURE: 85 MMHG | OXYGEN SATURATION: 97 % | SYSTOLIC BLOOD PRESSURE: 136 MMHG

## 2020-02-28 DIAGNOSIS — C61 PROSTATE CANCER (H): Primary | ICD-10-CM

## 2020-02-28 PROCEDURE — 99024 POSTOP FOLLOW-UP VISIT: CPT | Performed by: UROLOGY

## 2020-02-28 RX ORDER — DOCUSATE SODIUM 100 MG/1
100 CAPSULE, LIQUID FILLED ORAL
COMMUNITY
Start: 2020-02-26 | End: 2020-05-11

## 2020-02-28 RX ORDER — CIPROFLOXACIN 250 MG/1
250 TABLET, FILM COATED ORAL
COMMUNITY
Start: 2020-02-26 | End: 2020-05-11

## 2020-02-28 RX ORDER — OXYCODONE HYDROCHLORIDE 5 MG/1
5 TABLET ORAL
COMMUNITY
Start: 2020-02-26 | End: 2020-05-11

## 2020-02-28 NOTE — PROGRESS NOTES
No chief complaint on file.      Per Etienne is a 54 year old male who presents today for follow up of No chief complaint on file.   f/u post rrp.  He has minimal ENRIKE output.    Current Outpatient Medications   Medication Sig Dispense Refill     ciprofloxacin (CIPRO) 250 MG tablet Take 250 mg by mouth       docusate sodium (COLACE) 100 MG capsule Take 100 mg by mouth       oxyCODONE (ROXICODONE) 5 MG tablet Take 5 mg by mouth       No Known Allergies   Past Medical History:   Diagnosis Date     NO ACTIVE PROBLEMS      Past Surgical History:   Procedure Laterality Date     ORTHOPEDIC SURGERY  1985    femur newton placed     Family History   Problem Relation Age of Onset     Asthma Father      Prostate Cancer Father 74     Cancer Maternal Grandmother 92        throat      Asthma Sister      Social History     Socioeconomic History     Marital status:      Spouse name: None     Number of children: None     Years of education: None     Highest education level: None   Occupational History     None   Social Needs     Financial resource strain: None     Food insecurity:     Worry: None     Inability: None     Transportation needs:     Medical: None     Non-medical: None   Tobacco Use     Smoking status: Former Smoker     Last attempt to quit: 2012     Years since quittin.1     Smokeless tobacco: Never Used   Substance and Sexual Activity     Alcohol use: Yes     Drug use: No     Sexual activity: Yes     Partners: Female   Lifestyle     Physical activity:     Days per week: None     Minutes per session: None     Stress: None   Relationships     Social connections:     Talks on phone: None     Gets together: None     Attends [employer] service: None     Active member of club or organization: None     Attends meetings of clubs or organizations: None     Relationship status: None     Intimate partner violence:     Fear of current or ex partner: None     Emotionally abused: None     Physically abused: None      Forced sexual activity: None   Other Topics Concern     Parent/sibling w/ CABG, MI or angioplasty before 65F 55M? Not Asked   Social History Narrative     None       REVIEW OF SYSTEMS  =================  C: NEGATIVE for fever, chills, change in weight  I: NEGATIVE for worrisome rashes, moles or lesions  E/M: NEGATIVE for ear, mouth and throat problems  R: NEGATIVE for significant cough or SHORTNESS OF BREATH  CV:  NEGATIVE for chest pain, palpitations or peripheral edema  GI: NEGATIVE for nausea, abdominal pain, heartburn, or change in bowel habits  NEURO: NEGATIVE numbness/weakness  : see HPI  PSYCH: NEGATIVE depression/anxiety  LYmph: no new enlarged lymph nodes  Ortho: no new trauma/movements    Physical Exam:  /85 (BP Location: Right arm, Patient Position: Chair, Cuff Size: Adult Large)   Pulse 56   SpO2 97%    Patient is pleasant, in no acute distress, good general condition.  Lung: no evidence of respiratory distress    Abdomen: Soft, nondistended, non tender. No masses. No rebound or guarding.   Exam: incision c/d/i  Skin: Warm and dry.  No redness.  Psych: normal mood and affect  Neuro: alert and oriented  Musculaskeletal: moving all extremities  Case Report Surgical Pathology                                Case: [id number]                                   Authorizing Provider:  Angel Colmenares MD       Collected:           02/24/2020 01:03 PM           Ordering Location:     [facility]       Received:            02/24/2020 05:08 PM                                  Operating Room                                                               Pathologist:           Anthony Balion MD                                                         Specimens:   A) - Prostate, Radical Resection                                                                    B) - Lymph Nodes, Right Pelvic Dissection                                                            C) - Lymph Nodes, Left Pelvic  Dissection                                                Final Diagnosis    A. Prostate, radical prostatectomy-Prostatic adenocarcinoma  B. Lymph nodes, right pelvic, dissection-Multiple lymph nodes negative for metastatic carcinoma (0/2)  C. Lymph nodes, left pelvic, dissection-Lymph node negative for metastatic carcinoma (0/1)     Procedure:  Radical prostatectomy  Prostate Size:  33 g, 4.2 x 3.9 x 3.0 cm  Histologic Type:  Acinar adenocarcinoma  Histologic Grade          Grade group and Trion score:  Grade group 3 (Trion score 4+3=7)         Percentage of pattern 4 in Trion Score 7:  70%  Intraductal Carcinoma:  not identified  Tumor Quantitation:  estimated 99% of prostate involved     Extraprostatic Extension: present, focal; location(s) Left superior posterior  Urinary Bladder Neck Invasion:  Involves bladder neck base, suspicious for bladder neck invasion  Seminal Vesicle Invasion:  present, bilaterally  Lymphovascular Invasion:  not identified  Perineural Invasion:  present  Margins:  involved by invasive carcinoma, non-limited (>10 mm); location(s) left and right apex, left and right bladder base, left superior posterior prostate margin, left seminal vesicle margin   Treatment Effect:  no known presurgical therapy     Regional Lymph Nodes:  0/3  (number involved/examined)  Tumor block(s) for molecular/send out tests:  A5  Pathologic Stage Classification (AJCC 8th ed):  pT3b  N0            Clinical Information    prostate cancer   Gross Description    A. Prostate: Is a 33 g, radical prostatectomy specimen with the following dimensions 3.0 cm (superior to inferior) 3.9 cm (right-left) 4.2 cm (anterior to posterior). The specimen is inked as follows: right-yellow, left-blue, posterior-black. The apex is markedly roughened, the posterior margin has a free flap on the left inferior aspect. The apex and base margin are shaved and sectioned perpendicularly. The specimen is serially sectioned from the  posterior aspect from inferior to superior to reveal a diffusely firm pink-tan cut surface from apex to base and the bilateral seminal vesicles.  Representative sections are submitted as follows:  A1 - right apex margin  A2 - left apex margin  A3 - right bladder base margin  A4 - left bladder base margin  A5 - right inferior prostate (anterior and posterior)  A6 - left inferior prostate (anterior and posterior)  A7 - right mid posterior prostate  A8 - left mid posterior prostate  A9 - right superior posterior prostate  A10 - left superior posterior prostate  A11 - right prostatic gland to seminal vesicles  A12 - left prostatic gland to seminal vesicles     B. Right pelvic lymph nodes: Is a 4.3 x 1.7 x 0.8 cm aggregate of fibroadipose tissue with two lymph nodes which are entirely submitted as follows:  B1-one lymph node  B2-one lymph node bisected     C. Left pelvic lymph nodes: Is a 2.3 x 1.5 x 0.6 cm aggregate of adipose tissue with three lymph nodes which are entirely submitted in cassette C1.    (CM)   Microscopic Description    Sections show prostatic adenocarcinoma extensively involving the prostate, seminal vesicles and multiple margins.      The bilateral apex margin is extensively involved. Given the roughened apex grossly, this raises the possibility that the prostate was partially transected. Clinical correlation is required.    [facility]       Assessment/Plan:   (C61) Prostate cancer (H)  (primary encounter diagnosis)  Comment: racheal removed today.  Path discussed  Plan: see in one week for catheter removal

## 2020-03-02 ENCOUNTER — TELEPHONE (OUTPATIENT)
Dept: UROLOGY | Facility: CLINIC | Age: 55
End: 2020-03-02

## 2020-03-02 ENCOUNTER — OFFICE VISIT (OUTPATIENT)
Dept: UROLOGY | Facility: CLINIC | Age: 55
End: 2020-03-02
Payer: COMMERCIAL

## 2020-03-02 ENCOUNTER — OFFICE VISIT (OUTPATIENT)
Dept: FAMILY MEDICINE | Facility: CLINIC | Age: 55
End: 2020-03-02
Payer: COMMERCIAL

## 2020-03-02 VITALS
OXYGEN SATURATION: 96 % | DIASTOLIC BLOOD PRESSURE: 83 MMHG | HEART RATE: 100 BPM | TEMPERATURE: 97 F | SYSTOLIC BLOOD PRESSURE: 144 MMHG

## 2020-03-02 VITALS
RESPIRATION RATE: 16 BRPM | DIASTOLIC BLOOD PRESSURE: 68 MMHG | WEIGHT: 181.2 LBS | HEART RATE: 108 BPM | TEMPERATURE: 98.2 F | BODY MASS INDEX: 26.37 KG/M2 | SYSTOLIC BLOOD PRESSURE: 138 MMHG

## 2020-03-02 DIAGNOSIS — C61 PROSTATE CANCER (H): Primary | ICD-10-CM

## 2020-03-02 DIAGNOSIS — B99.9 INFECTION: ICD-10-CM

## 2020-03-02 DIAGNOSIS — M10.9 ACUTE GOUT INVOLVING TOE OF LEFT FOOT, UNSPECIFIED CAUSE: Primary | ICD-10-CM

## 2020-03-02 DIAGNOSIS — M10.9 ACUTE GOUT INVOLVING TOE OF LEFT FOOT, UNSPECIFIED CAUSE: ICD-10-CM

## 2020-03-02 DIAGNOSIS — L08.9 LOCAL INFECTION OF SKIN AND SUBCUTANEOUS TISSUE: ICD-10-CM

## 2020-03-02 DIAGNOSIS — R03.0 ELEVATED BP WITHOUT DIAGNOSIS OF HYPERTENSION: ICD-10-CM

## 2020-03-02 LAB
BASOPHILS # BLD AUTO: 0.1 10E9/L (ref 0–0.2)
BASOPHILS NFR BLD AUTO: 0.5 %
DIFFERENTIAL METHOD BLD: ABNORMAL
EOSINOPHIL # BLD AUTO: 0.3 10E9/L (ref 0–0.7)
EOSINOPHIL NFR BLD AUTO: 2.6 %
ERYTHROCYTE [DISTWIDTH] IN BLOOD BY AUTOMATED COUNT: 12.3 % (ref 10–15)
HCT VFR BLD AUTO: 33.8 % (ref 40–53)
HGB BLD-MCNC: 11.2 G/DL (ref 13.3–17.7)
LYMPHOCYTES # BLD AUTO: 1.4 10E9/L (ref 0.8–5.3)
LYMPHOCYTES NFR BLD AUTO: 14.8 %
MCH RBC QN AUTO: 30.6 PG (ref 26.5–33)
MCHC RBC AUTO-ENTMCNC: 33.1 G/DL (ref 31.5–36.5)
MCV RBC AUTO: 92 FL (ref 78–100)
MONOCYTES # BLD AUTO: 1 10E9/L (ref 0–1.3)
MONOCYTES NFR BLD AUTO: 10.9 %
NEUTROPHILS # BLD AUTO: 6.8 10E9/L (ref 1.6–8.3)
NEUTROPHILS NFR BLD AUTO: 71.2 %
PLATELET # BLD AUTO: 467 10E9/L (ref 150–450)
RBC # BLD AUTO: 3.66 10E12/L (ref 4.4–5.9)
WBC # BLD AUTO: 9.5 10E9/L (ref 4–11)

## 2020-03-02 PROCEDURE — 85025 COMPLETE CBC W/AUTO DIFF WBC: CPT | Performed by: UROLOGY

## 2020-03-02 PROCEDURE — 36415 COLL VENOUS BLD VENIPUNCTURE: CPT | Performed by: UROLOGY

## 2020-03-02 PROCEDURE — 99213 OFFICE O/P EST LOW 20 MIN: CPT | Mod: 24 | Performed by: UROLOGY

## 2020-03-02 PROCEDURE — 99214 OFFICE O/P EST MOD 30 MIN: CPT | Performed by: PHYSICIAN ASSISTANT

## 2020-03-02 RX ORDER — CEPHALEXIN 500 MG/1
500 CAPSULE ORAL 2 TIMES DAILY
Qty: 20 CAPSULE | Refills: 0 | Status: SHIPPED | OUTPATIENT
Start: 2020-03-02 | End: 2020-05-11

## 2020-03-02 RX ORDER — HYDROCODONE BITARTRATE AND ACETAMINOPHEN 5; 300 MG/1; MG/1
1 TABLET ORAL EVERY 4 HOURS PRN
Qty: 10 TABLET | Refills: 0 | Status: SHIPPED | OUTPATIENT
Start: 2020-03-02 | End: 2020-05-11

## 2020-03-02 RX ORDER — INDOMETHACIN 50 MG/1
50 CAPSULE ORAL 2 TIMES DAILY WITH MEALS
Qty: 30 CAPSULE | Refills: 0 | Status: SHIPPED | OUTPATIENT
Start: 2020-03-02 | End: 2020-05-11

## 2020-03-02 RX ORDER — COLCHICINE 0.6 MG/1
TABLET ORAL
Qty: 3 TABLET | Refills: 0 | Status: SHIPPED | OUTPATIENT
Start: 2020-03-02 | End: 2020-05-11

## 2020-03-02 NOTE — PROGRESS NOTES
Chief Complaint   Patient presents with     JASWANTECK       Per [place] HORTENCIA Etienne is a 54 year old male who presents today for follow up of   Chief Complaint   Patient presents with     RECHECK   Patient complains of left foot pain for a couple of days without any fever nausea or vomiting.    Current Outpatient Medications   Medication Sig Dispense Refill     HYDROcodone-Acetaminophen (VICODIN) 5-300 MG TABS Take 1 tablet by mouth every 4 hours as needed (pain) 10 tablet 0     ciprofloxacin (CIPRO) 250 MG tablet Take 250 mg by mouth       docusate sodium (COLACE) 100 MG capsule Take 100 mg by mouth       oxyCODONE (ROXICODONE) 5 MG tablet Take 5 mg by mouth       No Known Allergies   Past Medical History:   Diagnosis Date     NO ACTIVE PROBLEMS      Past Surgical History:   Procedure Laterality Date     ORTHOPEDIC SURGERY      femur newton placed     Family History   Problem Relation Age of Onset     Asthma Father      Prostate Cancer Father 74     Cancer Maternal Grandmother 92        throat      Asthma Sister      Social History     Socioeconomic History     Marital status:      Spouse name: None     Number of children: None     Years of education: None     Highest education level: None   Occupational History     None   Social Needs     Financial resource strain: None     Food insecurity:     Worry: None     Inability: None     Transportation needs:     Medical: None     Non-medical: None   Tobacco Use     Smoking status: Former Smoker     Last attempt to quit: 2012     Years since quittin.1     Smokeless tobacco: Never Used   Substance and Sexual Activity     Alcohol use: Yes     Drug use: No     Sexual activity: Yes     Partners: Female   Lifestyle     Physical activity:     Days per week: None     Minutes per session: None     Stress: None   Relationships     Social connections:     Talks on phone: None     Gets together: None     Attends [employer] service: None     Active member of club or  organization: None     Attends meetings of clubs or organizations: None     Relationship status: None     Intimate partner violence:     Fear of current or ex partner: None     Emotionally abused: None     Physically abused: None     Forced sexual activity: None   Other Topics Concern     Parent/sibling w/ CABG, MI or angioplasty before 65F 55M? Not Asked   Social History Narrative     None       REVIEW OF SYSTEMS  =================  C: NEGATIVE for fever, chills, change in weight  I: NEGATIVE for worrisome rashes, moles or lesions  E/M: NEGATIVE for ear, mouth and throat problems  R: NEGATIVE for significant cough or SHORTNESS OF BREATH  CV:  NEGATIVE for chest pain, palpitations or peripheral edema  GI: NEGATIVE for nausea, abdominal pain, heartburn, or change in bowel habits  NEURO: NEGATIVE numbness/weakness  : see HPI  PSYCH: NEGATIVE depression/anxiety  LYmph: no new enlarged lymph nodes  Ortho: no new trauma/movements    Physical Exam:  BP (!) 144/83 (BP Location: Right arm, Patient Position: Chair, Cuff Size: Adult Regular)   Pulse 100   Temp 97  F (36.1  C)   SpO2 96%    Patient is pleasant, in no acute distress, good general condition.  Lung: no evidence of respiratory distress    Abdomen: Soft, nondistended, non tender. No masses. No rebound or guarding.   Exam: Left toe with gout finding i.e. tender left big toe.  No obvious evidence of cellulitis.  Skin: Warm and dry.  No redness.  Psych: normal mood and affect  Neuro: alert and oriented  Musculaskeletal: moving all extremities    Assessment/Plan:   (C61) Prostate cancer (H)  (primary encounter diagnosis)  Comment:    Plan: CBC with platelets differential,         HYDROcodone-Acetaminophen (VICODIN) 5-300 MG         TABS             (M10.9) Acute gout involving toe of left foot, unspecified cause  Comment: CBC normal  Plan: See primary care MD for evaluation and treatment.    Elevated bp: Per to follow up with Primary Care provider regarding  elevated blood pressure.

## 2020-03-02 NOTE — TELEPHONE ENCOUNTER
Patient called reporting swelling and pain in left  foot.   Patient states that pain started yesterday and has gotten progressively worse. Patient describes as constant.   Area is swollen and hot to the touch.   Huddled with Dr. Colmenares, patient to come to clinic asap.   Patient agrees.   Loida Hernandez RN

## 2020-03-02 NOTE — PROGRESS NOTES
Subjective     Per Etienne is a 54 year old male who presents to clinic today for the following health issues:    HPI   Gout/ single inflamed joint   Onset: 19 night woke up with pain    Description:   Location: foot - left  Joint Swelling: YES  Redness: YES  Pain: YES- 10/10 at night, 6/10 during the day    Intensity: severe    Progression of Symptoms:  intermittent    Accompanying Signs & Symptoms:  Fevers: no - waking up sweating    History:   Trauma to the area: no   Previous history of gout: no    Recent illness:  YES- recent surgery 1 week ago    Precipitating factors:   Diet-rich in purine: no  Alcohol use: no   Diuretic use: no   Therapies Tried and outcome: taking oxycodone from surgery and that is not helping    Patient coming from urology f/u. Urologist did cbc which had normal wbc. They were concerned for ? Cellulitis versus out.   Had prostate removed a week ago due to cancer. Patient still recovering from that.   Pain medications arent' even helping the toe pain.   No fever. Toe feels hot to touch.   Wife is with today.   No known history of gout. Had kidney stone years ago but none since then.             Patient Active Problem List   Diagnosis     CARDIOVASCULAR SCREENING; LDL GOAL LESS THAN 160     Left varicocele     Vitamin D deficiency     Kidney stone     Past Surgical History:   Procedure Laterality Date     ORTHOPEDIC SURGERY      femur newton placed       Social History     Tobacco Use     Smoking status: Former Smoker     Last attempt to quit: 2012     Years since quittin.1     Smokeless tobacco: Never Used   Substance Use Topics     Alcohol use: Yes     Family History   Problem Relation Age of Onset     Asthma Father      Prostate Cancer Father 74     Cancer Maternal Grandmother 92        throat      Asthma Sister          Current Outpatient Medications   Medication Sig Dispense Refill     cephALEXin (KEFLEX) 500 MG capsule Take 1 capsule (500 mg) by mouth 2  times daily for 10 days With food. 20 capsule 0     ciprofloxacin (CIPRO) 250 MG tablet Take 250 mg by mouth       colchicine (COLCYRS) 0.6 MG tablet Take 1 tablet today. Take 1 tablet tomorrow am and 1 tablet in the pm then stop. 3 tablet 0     docusate sodium (COLACE) 100 MG capsule Take 100 mg by mouth       indomethacin (INDOCIN) 50 MG capsule Take 1 capsule (50 mg) by mouth 2 times daily (with meals) Take until pain is gone in foot 30 capsule 0     oxyCODONE (ROXICODONE) 5 MG tablet Take 5 mg by mouth       HYDROcodone-Acetaminophen (VICODIN) 5-300 MG TABS Take 1 tablet by mouth every 4 hours as needed (pain) 10 tablet 0     No Known Allergies      Reviewed and updated as needed this visit by Provider         Review of Systems   ROS COMP: Constitutional, HEENT, cardiovascular, pulmonary, GI, , musculoskeletal, neuro, skin, endocrine and psych systems are negative, except as otherwise noted.      Objective    /68   Pulse 108   Temp 98.2  F (36.8  C) (Oral)   Resp 16   Wt 82.2 kg (181 lb 3.2 oz)   BMI 26.37 kg/m    Body mass index is 26.37 kg/m .  Physical Exam   GENERAL: alert and no distress  RESP: lungs clear to auscultation - no rales, rhonchi or wheezes  CV: regular rate and rhythm, normal S1 S2, no S3 or S4, no murmur, click or rub, no peripheral edema and peripheral pulses strong  MS: {:left great toe swollen and very tender. Erythematous and warm to touch. The erythema goes all the way towards his other metacarpal-phalangeal joint areas and top of foot, he is not  Tender here. Urologist already christin a line where the erythema ends in a straight line/well defined border.   NEURO: Normal strength and tone, mentation intact and speech normal  PSYCH: mentation appears normal, affect normal/bright    Lab Results   Component Value Date    WBC 9.5 03/02/2020     Lab Results   Component Value Date    RBC 3.66 03/02/2020     Lab Results   Component Value Date    HGB 11.2 03/02/2020     Lab Results    Component Value Date    HCT 33.8 03/02/2020     No components found for: MCT  Lab Results   Component Value Date    MCV 92 03/02/2020     Lab Results   Component Value Date    MCH 30.6 03/02/2020     Lab Results   Component Value Date    MCHC 33.1 03/02/2020     Lab Results   Component Value Date    RDW 12.3 03/02/2020     Lab Results   Component Value Date     03/02/2020             Assessment & Plan     1. Acute gout involving toe of left foot, unspecified cause  Even with normal WBC with the redness location I still have some concern about ? Cellulitis  I will try to add on uric acid level to cbc lab in [facility], if can't be done he should have this done in a few weeks with his PCP per wife they are going to schedule this anyway due to ? Blood sugars in the past  Would use prednisone but he is ALREADY ON CIPROFLOXACIN for s/p prostate surgery. Too risky with tenonitis/rupture. Therefore used keflex and indocin and colchicine. Instructions given.   See below    - indomethacin (INDOCIN) 50 MG capsule; Take 1 capsule (50 mg) by mouth 2 times daily (with meals) Take until pain is gone in foot  Dispense: 30 capsule; Refill: 0  - colchicine (COLCYRS) 0.6 MG tablet; Take 1 tablet today. Take 1 tablet tomorrow am and 1 tablet in the pm then stop.  Dispense: 3 tablet; Refill: 0  - Uric acid  - cephALEXin (KEFLEX) 500 MG capsule; Take 1 capsule (500 mg) by mouth 2 times daily for 10 days With food.  Dispense: 20 capsule; Refill: 0    2. Infection      3. Local infection of skin and subcutaneous tissue  See below  - cephALEXin (KEFLEX) 500 MG capsule; Take 1 capsule (500 mg) by mouth 2 times daily for 10 days With food.  Dispense: 20 capsule; Refill: 0      Patient Instructions   To emergency room with fevers or worsening symptoms or if redness continues to spread    Take gout medications as prescribed    Drink plenty of fluids    Follow up with your primary care provider in 2-3 weeks sooner if  possible            Return in about 3 days (around 3/5/2020) for if not improving.    Natalia Preciado PA-C  [facility]

## 2020-03-02 NOTE — PATIENT INSTRUCTIONS
To emergency room with fevers or worsening symptoms or if redness continues to spread    Take gout medications as prescribed    Drink plenty of fluids    Follow up with your primary care provider in 2-3 weeks sooner if possible

## 2020-03-09 ENCOUNTER — OFFICE VISIT (OUTPATIENT)
Dept: UROLOGY | Facility: CLINIC | Age: 55
End: 2020-03-09
Payer: COMMERCIAL

## 2020-03-09 DIAGNOSIS — C61 PROSTATE CANCER (H): Primary | ICD-10-CM

## 2020-03-09 PROCEDURE — 99024 POSTOP FOLLOW-UP VISIT: CPT | Performed by: UROLOGY

## 2020-03-09 NOTE — PATIENT INSTRUCTIONS
Patient Education     Kegel Exercises  Kegel exercises don t need special clothing or equipment. They re easy to learn and simple to do. And if you do them right, no one can tell you re doing them, so they can be done almost anywhere. Your healthcare provider, nurse, or physical therapist can answer any questions you have and help you get started.    A weak pelvic floor  The pelvic floor muscles may weaken due to aging, pregnancy and vaginal childbirth, injury, surgery, chronic cough, or lack of exercise. If the pelvic floor is weak, your bladder and other pelvic organs may sag out of place. The urethra may also open too easily and allow urine to leak out. Kegel exercises can help you strengthen your pelvic floor muscles. Then they can better support the pelvic organs and control urine flow.  How Kegel exercises are done  Try each of the Kegel exercises described below. When you re doing them, try not to move your leg, buttock, or stomach muscles:    Contract as if you were stopping your urine stream. But do it when you re not urinating.    Tighten your rectum as if trying not to pass gas. Contract your anus, but don t move your buttocks.    You may place a finger or 2 in the vagina and squeeze your finger with your vagina to learn which muscles to tighten.  Try to hold each Kegel for a slow count to 5. You probably won t be able to hold them for that long at first. But keep practicing. It will get easier as your pelvic floor gets stronger. Eventually, special weights that you place in your vagina may be recommended to help make your Kegels even more effective. Visit your healthcare provider if you have difficulties doing Kegel exercises.  Helpful hints  Here are some tips to follow:    Do your Kegels as often as you can. The more you do them, the faster you ll feel the results.    Pick an activity you do often as a reminder. For instance, do your Kegels every time you sit down.    Tighten your pelvic floor before  you sneeze, get up from a chair, cough, laugh, or lift. This protects your pelvic floor from injury and can help prevent urine leakage.   Date Last Reviewed: 10/1/2017    7724-5893 The [employer]. [address] [place] [address], [place] [address]. All rights reserved. This information is not intended as a substitute for professional medical care. Always follow your healthcare professional's instructions.  Please call our office if you have questions or need to report any information, [phone].  Please follow up with Dr. Colmenares in  1 month. You may stop at the  to arrange a lab appointment and follow up appointment one week later for results.           Patient Education     Gout Diet  Gout is a painful condition caused by an excess of uric acid, a waste product made by the body. Uric acid forms crystals that collect in the joints. The immune response to these crystals brings on symptoms of joint pain and swelling. This is called a gout attack. Often, medications and diet changes are combined to manage gout. Below are some guidelines for changing your diet to help you manage gout and prevent attacks. Your healthcare provider will help you determine the best eating plan for you.  Eating to manage gout  Weight loss for those who are overweight may help reduce gout attacks.  Eat less of these foods  Eating too many foods containing purines may raise the levels of uric acid in your body. This raises your risk for a gout attack. Try to limit these foods and drinks:    Alcohol, such as beer and red wine. You may be told to avoid alcohol completely.    Soft drinks that contain sugar or high fructose corn syrup    Certain fish, including anchovies, sardines, fish eggs, and herring    Shellfish    Certain meats, such as red meat, hot dogs, luncheon meats, and turkey    Organ meats, such as liver, kidneys, and sweetbreads    Legumes, such as dried beans and peas    Other high fat foods such as gravy, whole milk,  and high fat cheeses    Vegetables such as asparagus, cauliflower, spinach, and mushrooms used to be thought to contribute to an increased risk for a gout attack, but recent studies show that high purine vegetables don't increase the risk for a gout attack.  Eat more of these foods  Other foods may be helpful for people with gout. Add some of these foods to your diet:    Cherries contain chemicals that may lower uric acid.    Omega fatty acids. These are found in some fatty fish such as salmon, certain oils (flax, olive, or nut), and nuts themselves. Omega fatty acids may help prevent inflammation due to gout.    Dairy products that are low-fat or fat-free, such as cheese and yogurt    Complex carbohydrate foods, including whole grains, brown rice, oats, and beans    Coffee, in moderation    Water, approximately 64 ounces per day  Follow-up care  Follow up with your healthcare provider as advised.  When to seek medical advice  Call your healthcare provider right away if any of these occur:    Return of gout symptoms, usually at night:    Severe pain, swelling, and heat in a joint, especially the base of the big toe    Affected joint is hard to move    Skin of the affected joint is purple or red    Fever of 100.4 F (38 C) or higher    Pain that doesn't get better even with prescribed medicine   Date Last Reviewed: 6/1/2018 2000-2019 [email or web address]. [address] [place] [address], [place] [address]. All rights reserved. This information is not intended as a substitute for professional medical care. Always follow your healthcare professional's instructions.

## 2020-03-09 NOTE — PROGRESS NOTES
No chief complaint on file.      Per Etienne is a 54 year old male who presents today for follow up of No chief complaint on file.   f/u post RRP.  He is doing well without any complaints.  Recently treated for gout which is also better.    Current Outpatient Medications   Medication Sig Dispense Refill     cephALEXin (KEFLEX) 500 MG capsule Take 1 capsule (500 mg) by mouth 2 times daily for 10 days With food. 20 capsule 0     ciprofloxacin (CIPRO) 250 MG tablet Take 250 mg by mouth       colchicine (COLCYRS) 0.6 MG tablet Take 1 tablet today. Take 1 tablet tomorrow am and 1 tablet in the pm then stop. 3 tablet 0     docusate sodium (COLACE) 100 MG capsule Take 100 mg by mouth       HYDROcodone-Acetaminophen (VICODIN) 5-300 MG TABS Take 1 tablet by mouth every 4 hours as needed (pain) 10 tablet 0     indomethacin (INDOCIN) 50 MG capsule Take 1 capsule (50 mg) by mouth 2 times daily (with meals) Take until pain is gone in foot 30 capsule 0     oxyCODONE (ROXICODONE) 5 MG tablet Take 5 mg by mouth       No Known Allergies   Past Medical History:   Diagnosis Date     NO ACTIVE PROBLEMS      Past Surgical History:   Procedure Laterality Date     ORTHOPEDIC SURGERY  1985    femur newton placed     Family History   Problem Relation Age of Onset     Asthma Father      Prostate Cancer Father 74     Cancer Maternal Grandmother 92        throat      Asthma Sister      Social History     Socioeconomic History     Marital status:      Spouse name: Not on file     Number of children: Not on file     Years of education: Not on file     Highest education level: Not on file   Occupational History     Not on file   Social Needs     Financial resource strain: Not on file     Food insecurity     Worry: Not on file     Inability: Not on file     Transportation needs     Medical: Not on file     Non-medical: Not on file   Tobacco Use     Smoking status: Former Smoker     Last attempt to quit: 1/2/2012     Years since  quittin.1     Smokeless tobacco: Never Used   Substance and Sexual Activity     Alcohol use: Yes     Drug use: No     Sexual activity: Yes     Partners: Female   Lifestyle     Physical activity     Days per week: Not on file     Minutes per session: Not on file     Stress: Not on file   Relationships     Social connections     Talks on phone: Not on file     Gets together: Not on file     Attends Sikh service: Not on file     Active member of club or organization: Not on file     Attends meetings of clubs or organizations: Not on file     Relationship status: Not on file     Intimate partner violence     Fear of current or ex partner: Not on file     Emotionally abused: Not on file     Physically abused: Not on file     Forced sexual activity: Not on file   Other Topics Concern     Parent/sibling w/ CABG, MI or angioplasty before 65F 55M? Not Asked   Social History Narrative     Not on file       REVIEW OF SYSTEMS  =================  C: NEGATIVE for fever, chills, change in weight  I: NEGATIVE for worrisome rashes, moles or lesions  E/M: NEGATIVE for ear, mouth and throat problems  R: NEGATIVE for significant cough or SHORTNESS OF BREATH  CV:  NEGATIVE for chest pain, palpitations or peripheral edema  GI: NEGATIVE for nausea, abdominal pain, heartburn, or change in bowel habits  NEURO: NEGATIVE numbness/weakness  : see HPI  PSYCH: NEGATIVE depression/anxiety  LYmph: no new enlarged lymph nodes  Ortho: no new trauma/movements    Physical Exam:  There were no vitals taken for this visit.   Patient is pleasant, in no acute distress, good general condition.  Lung: no evidence of respiratory distress    Abdomen: Soft, nondistended, non tender. No masses. No rebound or guarding.   Exam: incision c/d/i  Skin: Warm and dry.  No redness.  Psych: normal mood and affect  Neuro: alert and oriented  Musculaskeletal: moving all extremities    Assessment/Plan:   (C61) Prostate cancer (H)  (primary encounter  diagnosis)  Comment: gallo out today  Plan: see in one month with psa.

## 2020-03-15 ENCOUNTER — HEALTH MAINTENANCE LETTER (OUTPATIENT)
Age: 55
End: 2020-03-15

## 2020-04-06 ENCOUNTER — TELEPHONE (OUTPATIENT)
Dept: FAMILY MEDICINE | Facility: CLINIC | Age: 55
End: 2020-04-06

## 2020-04-06 NOTE — TELEPHONE ENCOUNTER
Patient has history of gout, seen 3/2 for gout, meds did help  Flared up again over this past weekend, hurts to walk  Left foot, big toes and bottom of foot as well  No redness, pain only.   Hurt to put shoe on this morning    To provider to advise    Gardenia GALON, RN, CPN

## 2020-04-06 NOTE — TELEPHONE ENCOUNTER
Reason for call:  Patient reporting a symptom    Symptom or request: gout    Duration (how long have symptoms been present): 2 days    Have you been treated for this before? Yes    Additional comments: patient is wondering if pcp could send in a script for this to pharmacy    Phone Number patient can be reached at:  Home number on file [phone] (home)    Best Time:  any    Can we leave a detailed message on this number:  YES    Call taken on 4/6/2020 at 8:10 AM by Zenaida Toney

## 2020-04-10 ENCOUNTER — OFFICE VISIT (OUTPATIENT)
Dept: FAMILY MEDICINE | Facility: CLINIC | Age: 55
End: 2020-04-10
Payer: COMMERCIAL

## 2020-04-10 ENCOUNTER — TELEPHONE (OUTPATIENT)
Dept: UROLOGY | Facility: CLINIC | Age: 55
End: 2020-04-10

## 2020-04-10 VITALS
SYSTOLIC BLOOD PRESSURE: 150 MMHG | OXYGEN SATURATION: 99 % | DIASTOLIC BLOOD PRESSURE: 88 MMHG | RESPIRATION RATE: 18 BRPM | HEART RATE: 78 BPM | TEMPERATURE: 97.3 F | BODY MASS INDEX: 27.36 KG/M2 | WEIGHT: 188 LBS

## 2020-04-10 DIAGNOSIS — M10.9 ACUTE GOUT INVOLVING TOE OF LEFT FOOT, UNSPECIFIED CAUSE: Primary | ICD-10-CM

## 2020-04-10 DIAGNOSIS — C61 PROSTATE CANCER (H): ICD-10-CM

## 2020-04-10 LAB
PSA SERPL-MCNC: 27.7 UG/L (ref 0–4)
URATE SERPL-MCNC: 6.3 MG/DL (ref 3.5–7.2)

## 2020-04-10 PROCEDURE — 84153 ASSAY OF PSA TOTAL: CPT | Performed by: UROLOGY

## 2020-04-10 PROCEDURE — 84550 ASSAY OF BLOOD/URIC ACID: CPT | Performed by: FAMILY MEDICINE

## 2020-04-10 PROCEDURE — 36415 COLL VENOUS BLD VENIPUNCTURE: CPT | Performed by: FAMILY MEDICINE

## 2020-04-10 PROCEDURE — 99213 OFFICE O/P EST LOW 20 MIN: CPT | Performed by: FAMILY MEDICINE

## 2020-04-10 RX ORDER — PREDNISONE 10 MG/1
TABLET ORAL
Qty: 24 TABLET | Refills: 0 | Status: SHIPPED | OUTPATIENT
Start: 2020-04-10 | End: 2020-06-02

## 2020-04-10 NOTE — PROGRESS NOTES
SUBJECTIVE:  Per Etienne is a 54 year old male who complain(s) of pain in his left foot.   He reports that it started to bother him 5-6 day(s) ago.   He denies any injury  It first occurred 5 days after his prostate cancer surgery     He saw our PA and she said it was gout and he was treated with indomethacin and colchicine   It took about 1 weeks and it was better and then was gone    Last weekend it started up again.  The left  big toe became swollen and red     He denies denies any fevers, chills, nausea or vomiting       Past Medical, social, family histories, medications, and allergies reviewed and updated    OBJECTIVE:  Blood pressure (!) 150/88, pulse 78, temperature 97.3  F (36.3  C), temperature source Tympanic, resp. rate 18, weight 85.3 kg (188 lb), SpO2 99 %.  Appearance: in no apparent distress and well developed and well nourished.  Foot/ankle exam: soft tissue swelling and tenderness at the 1st MTP.  Moderate warmth  No lymphangitis no lymphadenopathy in the knee or groin      X-ray: not indicated.    ASSESSMENT:  Gout of the MTP    PLAN:  Prednisone taper    Uric acid level today as he was getting blood for Dr Colmenares anyway

## 2020-04-10 NOTE — TELEPHONE ENCOUNTER
Left message for patient to call [phone] to convert 4/17 to VIDEO or PHONE  Cheyenne Soliz, CMA

## 2020-04-13 DIAGNOSIS — M10.9 ACUTE GOUT OF FOOT, UNSPECIFIED CAUSE, UNSPECIFIED LATERALITY: Primary | ICD-10-CM

## 2020-04-17 ENCOUNTER — VIRTUAL VISIT (OUTPATIENT)
Dept: UROLOGY | Facility: CLINIC | Age: 55
End: 2020-04-17
Payer: COMMERCIAL

## 2020-04-17 DIAGNOSIS — C61 PROSTATE CANCER (H): Primary | ICD-10-CM

## 2020-04-17 PROCEDURE — 99024 POSTOP FOLLOW-UP VISIT: CPT | Performed by: UROLOGY

## 2020-04-17 RX ORDER — ACETAMINOPHEN 325 MG/1
325-650 TABLET ORAL EVERY 6 HOURS PRN
COMMUNITY
End: 2020-07-23

## 2020-04-17 NOTE — PROGRESS NOTES
"Per Etienne is a 54 year old male who is being evaluated via a billable telephone visit.      The patient has been notified of following:     \"This telephone visit will be conducted via a call between you and your physician/provider. We have found that certain health care needs can be provided without the need for a physical exam.  This service lets us provide the care you need with a short phone conversation.  If a prescription is necessary we can send it directly to your pharmacy.  If lab work is needed we can place an order for that and you can then stop by our lab to have the test done at a later time.    Telephone visits are billed at different rates depending on your insurance coverage. During this emergency period, for some insurers they may be billed the same as an in-person visit.  Please reach out to your insurance provider with any questions.    If during the course of the call the physician/provider feels a telephone visit is not appropriate, you will not be charged for this service.\"    Patient has given verbal consent for Telephone visit?  Yes    How would you like to obtain your AVS? Evelio Watkins     Per Etienne is a 54 year old male who presents to clinic today for the following health issues:    Recent RRP for T3b group 3 prostate cancer with high psa.  No mets seen on LNs.  He is doing well since surgery except for gout.  Recent psa has gone down from   124.3 to 27.7.            Reviewed and updated as needed this visit by Provider         Review of Systems   ROS COMP: gout       Objective   Reported vitals:  There were no vitals taken for this visit.   healthy, alert and no distress  PSYCH: Alert and oriented times 3; coherent speech, normal   rate and volume, able to articulate logical thoughts, able   to abstract reason, no tangential thoughts, no hallucinations   or delusions  His affect is normal  RESP: No cough, no audible wheezing, able to talk in full " sentences  Remainder of exam unable to be completed due to telephone visits    Diagnostic Test Results:  Labs reviewed in Epic        Assessment/Plan:  1. Prostate cancer (H)      Will plan to start on hormonal therapy next week and salvage radiation in a couple of months.            Phone call duration:  5 minutes    Angel Colmenares MD

## 2020-04-20 ENCOUNTER — TELEPHONE (OUTPATIENT)
Dept: UROLOGY | Facility: CLINIC | Age: 55
End: 2020-04-20

## 2020-04-20 NOTE — TELEPHONE ENCOUNTER
Reason for call:  Other   Patient called regarding (reason for call): call back  Additional comments: patient is calling because he has some questions about the injection he is going to be getting. Please call back     Phone number to reach patient:  Home number on file [phone] (home)    Best Time:  any    Can we leave a detailed message on this number?  YES    Travel screening: Not Applicable

## 2020-04-21 ENCOUNTER — ALLIED HEALTH/NURSE VISIT (OUTPATIENT)
Dept: UROLOGY | Facility: CLINIC | Age: 55
End: 2020-04-21
Payer: COMMERCIAL

## 2020-04-21 DIAGNOSIS — C61 PROSTATE CANCER (H): Primary | ICD-10-CM

## 2020-04-21 PROCEDURE — 99207 ZZC NO CHARGE NURSE ONLY: CPT

## 2020-04-21 PROCEDURE — 96402 CHEMO HORMON ANTINEOPL SQ/IM: CPT | Performed by: UROLOGY

## 2020-04-21 NOTE — PROGRESS NOTES
Clinic Administered Medication Documentation      Injectable Medication Documentation    Patient was given Eligard 45mg . Prior to medication administration, verified patients identity using patient s name and date of birth. Please see MAR and medication order for additional information. Patient instructed to remain in clinic for 15 minutes, report any adverse reaction to staff immediately  and stay in clinic after the injection but patient declined.      Was entire vial of medication used? Yes  Vial/Syringe: Syringe  Expiration Date:  11/2021  Was this medication supplied by the patient? No      The following medication was given:     MEDICATION: Eligard 45 mg  ROUTE: SQ  SITE: RLQ - Abd.  DOSE: 45 mg   LOT #: [id number]  :  [place]  EXPIRATION DATE:  11/2021  NDC#: 48272-705-25    Loida Hernandez RN

## 2020-04-21 NOTE — PATIENT INSTRUCTIONS
Patient Education     Leuprolide depot injection  Brand Names: Eligard, Lupron Depot, Lupron Depot-Ped  What is this medicine?  LEUPROLIDE (loo PROE lide) is a man-made protein that acts like a natural hormone in the body. It decreases testosterone in men and decreases estrogen in women. In men, this medicine is used to treat advanced prostate cancer. In women, some forms of this medicine may be used to treat endometriosis, uterine fibroids, or other female hormone-related problems.  How should I use this medicine?  This medicine is for injection into a muscle or for injection under the skin. It is given by a health care professional in a hospital or clinic setting. The specific product will determine how it will be given to you. Make sure you understand which product you receive and how often you will receive it.  Talk to your pediatrician regarding the use of this medicine in children. Special care may be needed.  What side effects may I notice from receiving this medicine?  Side effects that you should report to your doctor or health care professional as soon as possible:    allergic reactions like skin rash, itching or hives, swelling of the face, lips, or tongue    breathing problems    chest pain    depression or memory disorders    pain in your legs or groin    pain at site where injected or implanted    seizures    severe headache    swelling of the feet and legs    suicidal thoughts or other mood changes    visual changes    vomiting  Side effects that usually do not require medical attention (report to your doctor or health care professional if they continue or are bothersome):    breast swelling or tenderness    decrease in sex drive or performance    diarrhea    hot flashes    loss of appetite    muscle, joint, or bone pains    nausea    redness or irritation at site where injected or implanted    skin problems or acne  What may interact with this medicine?  Do not take this medicine with any of the  following medications:    raza  This medicine may also interact with the following medications:    herbal or dietary supplements, like black cohosh or DHEA    female hormones, like estrogens or progestins and birth control pills, patches, rings, or injections    male hormones, like testosterone  What if I miss a dose?  It is important not to miss a dose. Call your doctor or health care professional if you are unable to keep an appointment.  Depot injections: Depot injections are given either once-monthly, every 12 weeks, every 16 weeks, or every 24 weeks depending on the product you are prescribed. The product you are prescribed will be based on if you are male or female, and your condition. Make sure you understand your product and dosing.  Where should I keep my medicine?  This drug is given in a hospital or clinic and will not be stored at home.  What should I tell my health care provider before I take this medicine?  They need to know if you have any of these conditions:    diabetes    heart disease or previous heart attack    high blood pressure    high cholesterol    mental illness    osteoporosis    pain or difficulty passing urine    seizures    spinal cord metastasis    stroke    suicidal thoughts, plans, or attempt; a previous suicide attempt by you or a family member    tobacco smoker    unusual vaginal bleeding (women)    an unusual or allergic reaction to leuprolide, benzyl alcohol, other medicines, foods, dyes, or preservatives    pregnant or trying to get pregnant    breast-feeding  What should I watch for while using this medicine?  Visit your doctor or health care professional for regular checks on your progress. During the first weeks of treatment, your symptoms may get worse, but then will improve as you continue your treatment. You may get hot flashes, increased bone pain, increased difficulty passing urine, or an aggravation of nerve symptoms. Discuss these effects with your doctor or  health care professional, some of them may improve with continued use of this medicine.  Female patients may experience a menstrual cycle or spotting during the first months of therapy with this medicine. If this continues, contact your doctor or health care professional.  NOTE:This sheet is a summary. It may not cover all possible information. If you have questions about this medicine, talk to your doctor, pharmacist, or health care provider. Copyright  2019 [employer]

## 2020-05-05 ENCOUNTER — TELEPHONE (OUTPATIENT)
Dept: FAMILY MEDICINE | Facility: CLINIC | Age: 55
End: 2020-05-05

## 2020-05-05 DIAGNOSIS — C61 PROSTATE CANCER (H): Primary | ICD-10-CM

## 2020-05-05 NOTE — TELEPHONE ENCOUNTER
Reason for call:  Other   Patient called regarding (reason for call): Question on radiation procedure  Additional comments: Patient would like more information on upcoming radiation treatments. Where can this be scheduled? He would like it close to his home if possible. Please call.     Phone number to reach patient:  Home number on file [phone] (home)    Best Time:  any    Can we leave a detailed message on this number?  YES    Travel screening: Not Applicable

## 2020-05-06 NOTE — TELEPHONE ENCOUNTER
Called and spoke to patient.   Patient given information referral for radiation oncology.   Loida Hernandez RN

## 2020-05-11 ENCOUNTER — PATIENT OUTREACH (OUTPATIENT)
Dept: RADIATION ONCOLOGY | Facility: CLINIC | Age: 55
End: 2020-05-11

## 2020-05-11 ENCOUNTER — VIRTUAL VISIT (OUTPATIENT)
Dept: RADIATION ONCOLOGY | Facility: CLINIC | Age: 55
End: 2020-05-11
Attending: UROLOGY
Payer: COMMERCIAL

## 2020-05-11 DIAGNOSIS — C61 PROSTATE CANCER (H): Primary | ICD-10-CM

## 2020-05-11 PROCEDURE — 99205 OFFICE O/P NEW HI 60 MIN: CPT | Mod: 95 | Performed by: RADIOLOGY

## 2020-05-11 ASSESSMENT — PAIN SCALES - GENERAL: PAINLEVEL: MILD PAIN (3)

## 2020-05-11 NOTE — PROGRESS NOTES
"Per Etienne is a 54 year old is being evaluated via a billable telephone visit. The patient has been notified of the following:      \"We have found that certain health care needs can be provided without the need for a face to face visit.  This service lets us provide the care you need with a phone conversation.  I will have full access to your [place] medical record during this entire phone call.   I will be taking notes for your medical record. Since this is like an office visit, we will bill your insurance company for this service.  There are potential benefits and risks of telephone visits (e.g. limits to patient confidentiality) that differ from in-person visits.?  Confidentiality still applies for telephone services, and nobody will record the visit.  It is important to be in a quiet, private space that is free of distractions (including cell phone or other devices) during the visit.??If during the course of the call I believe a telephone visit is not appropriate, you will not be charged for this service\"    The patient's Past Medical History, Social History, Family History and Medication List has been reviewed and is on file.     Consent has been obtained for this service by care team member: Yes       ADDITIONAL PHYSICIAN NOTES: : Please see below     IDENTIFICATION: Mr. Etienne is a 54 year old gentleman diagnosed with prostate cancer status post RRP and lymph node sampling in Feburary 2020 revealing with [id number] with + margins/ROBERTO/PNI/SVI now referred for salvage radiotherapy.       HISTORY OF PRESENT ILLNESS:  Per Etienne was originally referred to Dr. Colmenares in December 2019 with elevated PSA of 124.  He had not had a PSA completed previously.  Per the notes AUA symptom score was 8 at the time.  Review of systems was otherwise notable for for back pain with pressure in the suprapubic area.  He was placed on antibiotics for possible prostatitis. On January 8, 2020 abdomen pelvis CT showed " groundglass opacity in the left lower lobe,  dystrophic calcification of the prostate gland, and a fatty liver.  Same-day bone scan was negative. On January 10, 2010 prostate biopsy showed on the left Garrison score 4+3 involving 7 cores and on the right Robyn score 3+4 involving 6 cores positive PNI.    On February 24, 2020, Dr. Colmenares took him to the OR for RRP and lymph node sampling.  Pathology revealed acinar adenocarcinoma Robyn 4+3 with focal ROBERTO, bladder neck base invasion, positive SVI, positive PNI, and multiple positive margins.  There was no LVSI and 0 out of 3 lymph nodes removed were involved with tumor.  This gave him a pathologic stage of pT3bN0.    Subsequently on April 21, 2020 he received an Eligard injection and then was referred to me for salvage radiotherapy.  Currently he has minimal incontinence that does not require a pad.  He was doing some heavy lifting over the past weekend and developed a little bit of tolerable abdominal pain near prior incision.  Denies any skin changes associated with the site. Otherwise he has significant erectile dysfunction since the surgery and but denies any other GI or  complaints.     His most relevant PSA values are the following:    PSA   Date Value Ref Range Status   04/10/2020 27.70 (H) 0 - 4 ug/L Final     Comment:     Assay Method:  Chemiluminescence using Siemens Vista analyzer        REVIEW OF SYSTEMS: A 10-point review of systems was obtained. Pertinent findings are noted in the HPI and are otherwise unremarkable.      RADIATION THERAPY HISTORY:  none     PACEMAKER/CTD/IBD: Denies    Past Medical History:   Diagnosis Date     NO ACTIVE PROBLEMS        Past Surgical History:   Procedure Laterality Date     ORTHOPEDIC SURGERY  1985    femur newton placed       Family History   Problem Relation Age of Onset     Asthma Father      Prostate Cancer Father 74     Cancer Maternal Grandmother 92        throat      Asthma Sister        Social History     Tobacco  Use     Smoking status: Former Smoker     Last attempt to quit: 2012     Years since quittin.3     Smokeless tobacco: Never Used   Substance Use Topics     Alcohol use: Yes       Current Outpatient Medications   Medication     acetaminophen (TYLENOL) 325 MG tablet     ciprofloxacin (CIPRO) 250 MG tablet     colchicine (COLCYRS) 0.6 MG tablet     docusate sodium (COLACE) 100 MG capsule     HYDROcodone-Acetaminophen (VICODIN) 5-300 MG TABS     indomethacin (INDOCIN) 50 MG capsule     leuprolide (ELIGARD) 45 MG kit     oxyCODONE (ROXICODONE) 5 MG tablet     predniSONE (DELTASONE) 10 MG tablet     No current facility-administered medications for this visit.        No Known Allergies    PHYSICAL EXAM:  deferred     ECOG PERFORMANCE STATUS: 1     All pertinent laboratory, imaging, and pathology findings have been reviewed.      IMPRESSION AND RECOMMENDATIONS: Mr. Etienne is a 54 year old gentleman diagnosed with prostate cancer status post RRP and lymph node sampling in 2020 revealing with [id number] with + margins/ROBERTO/PNI/SVI now referred for salvage radiotherapy.  Prior bone scan and abdomen/pelvis CT were negative.    His current symptoms include subtle incontinence and complete erectile dysfunction.  Otherwise no other GI or  symptoms. He was recently started on Eligard. I agree with the recommendation to completed a course of adjuvant RT for optimal local control given his high risk features seen on path. The treatment will be delivered over 7-8 weeks targeting area of recurrence.      I briefly discussed the rationale, logistics, and side effects associated with radiotherapy.  Ultimately I think the risks are outweighed by the benefit of treatment. He was given the opportunity to ask questions, which were answered to his satisfaction. I plan to have him back in clinic after completing a prostate MRI.  We can re-review the information above, have him sign consent and complete a CT simulation at that  time.     Problem list to be addressed in the following manner:     1. Complete prostate MRI now  2. ADT: Started on 4/21/2020 through Dr. Trans office.  3. Return in 6 weeks for CT simulation/treatment planning scan.     There was ample time for questions and all were answered to the patient and wife's satisfaction.  Thank you for allowing me to participate in the care of this pleasant patient.  If you have any questions please do not hesitate to contact our office.    Phone call duration: 21 minutes     Sincerely,  Krista Norwood MD

## 2020-05-11 NOTE — PROGRESS NOTES
"Per Etienne is a 54 year old male who is being evaluated via a billable telephone visit.      The patient has been notified of following:     \"This telephone visit will be conducted via a call between you and your physician/provider. We have found that certain health care needs can be provided without the need for a physical exam.  This service lets us provide the care you need with a short phone conversation.  If a prescription is necessary we can send it directly to your pharmacy.  If lab work is needed we can place an order for that and you can then stop by our lab to have the test done at a later time.    Telephone visits are billed at different rates depending on your insurance coverage. During this emergency period, for some insurers they may be billed the same as an in-person visit.  Please reach out to your insurance provider with any questions.    If during the course of the call the physician/provider feels a telephone visit is not appropriate, you will not be charged for this service.\"    Patient has given verbal consent for Telephone visit?  Yes    What phone number would you like to be contacted at? [phone]    How would you like to obtain your AVS? Evelio      INITIAL PATIENT ASSESSMENT      Diagnosis: Prostate cancer    Prior radiation therapy: None    Prior chemotherapy: None    Prior hormonal therapy: Patient received Eligard 45 mg injection on 4/21/2020 as ordered by Dr. Colmenares.    Pain Eval:  Current history of pain associated with this visit:   Intensity: 3/10  Current: dull and aching  Location: right shoulder, abdomen and low back.  Treatment: Tylenol po as needed    Psychosocial  Living arrangements: Lives at home in [place]  Fall Risk: independent  [facility] Falls Risk Screening Completed: Yes Result: Negative   referral needs: Not needed    Advanced Directive: No  Implantable Cardiac Device: No  Authorization To Share Protected Health Information: NO      Reproductive " note: Not recorded for male patient.      Review of Systems     Constitutional: Positive for chills. Negative for diaphoresis, fever, malaise/fatigue and weight loss.        Patient reports intermittent chills, denies hot flashes.   HENT: Negative.    Eyes: Negative.    Respiratory: Positive for cough. Negative for hemoptysis, sputum production, shortness of breath and wheezing.         Patient reports intermittent cough at baseline.   Cardiovascular: Negative.    Gastrointestinal: Positive for abdominal pain. Negative for blood in stool, constipation, diarrhea, heartburn, melena, nausea and vomiting.        Patient reports recent intermittent abdominal pain.   Genitourinary: Positive for frequency. Negative for dysuria, flank pain, hematuria and urgency.   Musculoskeletal: Positive for back pain and joint pain. Negative for falls, myalgias and neck pain.   Skin: Negative.    Neurological: Positive for tingling and headaches. Negative for dizziness, tremors, sensory change, speech change, focal weakness, seizures, loss of consciousness and weakness.        Patient reports frequent headaches, relief with Tylenol po as needed.  Patient reports numbness of left foot due to gout.   Endo/Heme/Allergies: Negative.    Psychiatric/Behavioral: Negative.      Nurse telephone time: Level 5:  over 15 min nurse telephone time.    Reema Powell RN BSN OCN

## 2020-05-11 NOTE — PATIENT INSTRUCTIONS
Patrick Thompson,    It was nice to talk with you today during your telephone visit.  Dr. Norwood has recommended a MRI pelvis to be done at the [facility].  The scheduling number for this appointment is [phone].    Following completion of the MRI, Dr. Norwood will contact you with results and next steps in treatment.    Please feel free to contact me with any questions or concerns.    Thank you!    Reema REN  [phone]

## 2020-05-11 NOTE — TELEPHONE ENCOUNTER
Dr. Norwood has ordered a MRI Prostate and Axumin PET scan for patient.  This RN contacted patient and reviewed that both of these imaging exams will need to be completed at the [facility].  Patient reports hesitancy with traveling to this location, however, verbalized understanding and agreeable with recommendations.  Informed patient that this RN would contact PET scheduling to start authorization process, once approved, imaging department will contact patient to schedule.  Patient confirms he will then contact this RN to assist in hopeful coordination of Axumin PET and MRI prostate same day.  Patient had no questions at this time.    This RN contact [facility] at [phone] to start authorization process for Axumin PET scan.  No answer, voice message left with patient information and order information.  Request to return call to this RN at [phone] to confirm.    Reema Powell, RN BSN OCN

## 2020-05-13 ENCOUNTER — PATIENT OUTREACH (OUTPATIENT)
Dept: RADIATION ONCOLOGY | Facility: CLINIC | Age: 55
End: 2020-05-13

## 2020-05-13 NOTE — TELEPHONE ENCOUNTER
This RN contacted patient to review upcoming imaging appointments that have been scheduled.  Patient scheduled for MRI prostate on Wednesday, 5/20/2020 at 0800 (0730 arrival) and Axumin PET scan on Wednesday, 5/20/2020 at 1000 (arrival 0930).  Patient verbalized understanding that these are appointments are at two separate locations and addresses reviewed with patient.  [facility] and will confirm appointment details on [employer].  Patient also scheduled for new patient consultation with Dr. Norwood on Monday, 6/1/2020 at 0730 with CT simulation following at 0845.  Bladder prep instructions for CT Sim will be sent to patient through [employer].  Patient verbalized understanding of all information and had no questions at this time.  Patient reports he will review appointment information in [employer] and contact this RN with questions or concerns.    Dr. Norwood updated regarding scheduled appointments via [employer] in-basket.    Reema Powell RN BSN OCN

## 2020-05-20 ENCOUNTER — HOSPITAL ENCOUNTER (OUTPATIENT)
Dept: MRI IMAGING | Facility: CLINIC | Age: 55
Discharge: HOME | End: 2020-05-20
Attending: RADIOLOGY | Admitting: RADIOLOGY
Payer: COMMERCIAL

## 2020-05-20 ENCOUNTER — ANCILLARY PROCEDURE (OUTPATIENT)
Dept: PET IMAGING | Facility: CLINIC | Age: 55
End: 2020-05-20
Attending: RADIOLOGY
Payer: COMMERCIAL

## 2020-05-20 DIAGNOSIS — C61 PROSTATE CANCER (H): ICD-10-CM

## 2020-05-20 PROCEDURE — A9585 GADOBUTROL INJECTION: HCPCS | Performed by: RADIOLOGY

## 2020-05-20 PROCEDURE — 25500064 ZZH RX 255 OP 636: Performed by: RADIOLOGY

## 2020-05-20 PROCEDURE — 72197 MRI PELVIS W/O & W/DYE: CPT

## 2020-05-20 RX ORDER — GADOBUTROL 604.72 MG/ML
10 INJECTION INTRAVENOUS ONCE
Status: COMPLETED | OUTPATIENT
Start: 2020-05-20 | End: 2020-05-20

## 2020-05-20 RX ADMIN — GADOBUTROL 9 ML: 604.72 INJECTION INTRAVENOUS at 08:03

## 2020-05-21 ENCOUNTER — TELEPHONE (OUTPATIENT)
Dept: RADIATION ONCOLOGY | Facility: CLINIC | Age: 55
End: 2020-05-21

## 2020-05-21 DIAGNOSIS — C61 PROSTATE CANCER (H): Primary | ICD-10-CM

## 2020-05-21 NOTE — TELEPHONE ENCOUNTER
I spoke with Mr. Etienne today and relayed his most recent imaging results which show residual disease within the prostatic tumor bed as well as metastatic disease within the lymph nodes and bones.  I recommend that we cancel his salvage radiotherapy and that he be seen by medical oncology to discuss systemic treatment.  I have already discussed his case with Dr. Garcia who is happy to see the patient.  Patient is aware that he will be referred to Dr. Garcia and he will be scheduled to see him shortly.  In the interim we have canceled his CT simulation scan that was scheduled for our department.  I do recommend that he continue to have his colonoscopy which is scheduled for tomorrow given his age and risk factors.  Patient verbalized understanding.  There was ample time for questions and all answered to the patient's satisfaction.  If you have any questions please not hesitate to contact our office.    Krista Norwood MD

## 2020-05-22 ENCOUNTER — TRANSFERRED RECORDS (OUTPATIENT)
Dept: HEALTH INFORMATION MANAGEMENT | Facility: CLINIC | Age: 55
End: 2020-05-22

## 2020-05-31 NOTE — PROGRESS NOTES
"Per Etienne is a 54 year old male who is being evaluated via a billable video visit.      The patient has been notified of following:     \"This video visit will be conducted via a call between you and your physician/provider. We have found that certain health care needs can be provided without the need for an in-person physical exam.  This service lets us provide the care you need with a video conversation.  If a prescription is necessary we can send it directly to your pharmacy.  If lab work is needed we can place an order for that and you can then stop by our lab to have the test done at a later time.    Video visits are billed at different rates depending on your insurance coverage.  Please reach out to your insurance provider with any questions.    If during the course of the call the physician/provider feels a video visit is not appropriate, you will not be charged for this service.\"    Patient has given verbal consent for Video visit? Yes      Video-Visit Details    Type of service:  Video Visit    Video visit time: 23 min  Originating Location (pt. Location):home    Distant Location (provider location):  [facility]     Platform used for Video Visit:Started with Portable Internet, not connecting for the patient, switched to Myla  Na Astudillo MD, MD        Oncology Consultation:  Date on this visit: 6/2/2020    Per Etienne  is referred by Dr.Brenda Krista Norwodo for an oncology consultation. He requires evaluation for new diagnosis of metastatic hormone sensitive prostate cancer.    Primary care physician: Per Chen   Urologist: Dr. Angel Colmenares    History Of Present Illness:  Mr. Etienne is a 54 year old male who presents with new diagnosis of metastatic hormone sensitive prostate cancer.  He presented to his PCP in December 2019 with slow urinary stream.  PSA was checked and was found to be 124.  He had no previous PSA available for comparison. On January 8, 2020 abdomen " pelvis CT showed groundglass opacity in the left lower lobe,  dystrophic calcification of the prostate gland, and a fatty liver.  Same-day bone scan was negative. On January 10, 2010 prostate biopsy showed on the left Tylerton score 4+3 involving 7 cores and on the right Robyn score 3+4 involving 6 cores, with positive perineural invasion.  He proceeded to undergo RRP by Dr. Colmenares on February 24, 2020,   Pathology revealed acinar adenocarcinoma Tylerton 4+3 with focal ROBERTO, bladder neck base invasion, positive SVI, positive PNI, and multiple positive margins.  There was no LVSI and 0 out of 3 lymph nodes removed were involved with tumor; pT3bN0.  He received Eligard on 4/20/2020.    He met with Dr. Norwood and she ordered MRI prostate on 5/21/2020 which showed:  1. There is 14 x 9 mm T2 intermediate structure in the prostatectomy  bed at the left aspect of urinary bladder neck with restricted  diffusion in the DWI images and early enhancement in the contrast  enhanced images. This is concerning for locally recurrent/residual  prostatic cancer.     2. There is a T2 hypointense structure in the in the area of removed  right seminal vesicle measuring 13 x 11 mm  demonstrating restricted  diffusion in the DWI images. This may represent a recurrent/residual  tumor.     3. There are 2 bone metastases in the left pubic inferior ramus and  inferior aspect of left pubic body as described above.     4. There is a 7 mm suspicious lymph nodes in the right obturator area  corresponding to to FACBC avid lymph node on the same date PET/CT  F-18 fluciclovine PET/CT on 5/20/2020 showed:  1.  2 foci of increased uptake in the left ischium, with underlying  sclerotic lesion, and symphysis pubis, these changes are indicative of  active prostate cancer metastasis.  2. Low-level increased uptake in 3 lymphnodes along the right external  and internal iliac nodes, the most suspicious of which is the deep  obturator.  3. Low-level increased  uptake in the prostatectomy bed slightly to the  left side of the urethra, if clinically necessary, endoscopy would  would be necessary for confirmation of this being metastasis.  We have discussed MRI findings and PET/CT findings in detail. I have reviewed PET/CT images.   PSA was down to 27.7 on 4/21/2020. On April 21, 2020 he received an Eligard injection.  Currently he has minimal incontinence that does not require a pad. His new complaint is lower back pain radiating down his left leg which he has been experiencing for the last couple of days after he chopped some wood. He is seeing Dr. Chen tomorrow for further evaluation of this pain. He has minimal hot flashes at night. His father was diagnosed with prostate cancer, at the age of 75. Paternal GM had throat cancer at the age of 94, nonsmoker.    In addition, a complete 12 point  review of systems is negative.      Past Medical/Surgical History:  Past Medical History:   Diagnosis Date     Gout      Prostate cancer (H) 01/10/2020     Past Surgical History:   Procedure Laterality Date     BIOPSY PROSTATE TRANSRECTAL  01/10/2020    Dr. Colmenares     LITHOTRIPSY  age 30s     MICROSCOPIC KNEE SURGERY Right age 30s     ORTHOPEDIC SURGERY Left 1985    Alan placed in Left Femur     PROSTATECTOMY RETROPUBIC RADICAL  02/24/2020    Dr. Colmenares     Allergies:  Allergies as of 06/02/2020     (No Known Allergies)     Current Medications:  Current Outpatient Medications   Medication Sig Dispense Refill     acetaminophen (TYLENOL) 325 MG tablet Take 325-650 mg by mouth every 6 hours as needed for mild pain       leuprolide (ELIGARD) 45 MG kit Inject 45 mg Subcutaneous every 6 months               Family History:  Family History   Problem Relation Age of Onset     Asthma Father      Prostate Cancer Father 73     Cancer Maternal Grandmother 92        throat      Asthma Sister      Social History:  Social History     Socioeconomic History     Marital status:    Occupational  History     Not on file   Social Needs     Food insecurity     Transportation needs   Tobacco Use     Smoking status: Former Smoker     Packs/day: 1.00     Years: 30.00     Pack years: 30.00     Types: Cigarettes, Cigars     Last attempt to quit: 2012     Years since quittin.4     Smokeless tobacco: Never Used   Substance and Sexual Activity     Alcohol use: Yes     Comment: 4-6 drinks per day - patient reports beer or vodka drinks     Drug use: No     Sexual activity: Yes     Partners: Female   Lifestyle     Physical Exam:  Wt Readings from Last 5 Encounters:   04/10/20 85.3 kg (188 lb)   20 82.2 kg (181 lb 3.2 oz)   20 83.9 kg (185 lb)   19 83.8 kg (184 lb 12.8 oz)   10/10/19 84.4 kg (186 lb)     Constitutional: alert and in no distress  Eyes: No redness or discharge  Respiratory: No cough or labored breathing.  Musculoskeletal: Full range of motion in extremities.  Skin: no visible skin lesions or discoloration  Neurological: No tremors.  Psychiatric: Mentation appears normal and affect is normal as well.  Alert and oriented x3.  The rest the comprehensive physical examination is deferred due to public health emergency video visit restrictions.  Laboratory/Imaging Studies  Labs reviewed and documented in the EMR.    ASSESSMENT/PLAN:  Jay is a very pleasant 54 year old man with metastatic (to the bones) prostate cancer. He is s/p Eligard on 2020. Jay has low volume metastatic disease, with residual disease locoregionally and bone metastasis (two lesions- in left ischium and symphysis pubis), asymptomatic.  We have discussed diagnosis, prognosis and treatment options for metastatic prostate cancer. Unfortunately, his disease is not curable and all treatment is palliative and I have relayed that to the patient.   I have emailed patient information on treatment of metastatic prostate cancer as well.   Since it has been <6 months, actually only 6 weeks since the patient started on  Eligard, we can treat his disease as hormone sensitive metastatic disease. Given recent data from the TITAN RCT, and given apalutamide's low toxicity profile, I favor adding Apalutamide 240 mg PO daily to the androgen deprivation regimen. In the TITAN trial, 525 patients were assigned to receive apalutamide plus ADT and 527 to receive placebo plus ADT.  A total of 16.4% of the patients had undergone prostatectomy; 62.7% had high-volume disease, and 37.3% had low-volume disease.With a median of 22.7 months of follow-up, the percentage of patients with radiographic progression-free survival at 24 months was 68.2% in the apalutamide group and 47.5% in the placebo group (hazard ratio for radiographic progression or death, 0.48; 95% confidence interval [CI], 0.39 to 0.60; P<0.001). Overall survival at 24 months was also greater with apalutamide than with placebo (82.4% in the apalutamide group vs. 73.5% in the placebo group; hazard ratio for death, 0.67; 95% CI, 0.51 to 0.89; P?=?0.005). The frequency of grade 3 or 4 adverse events was 42.2% in the apalutamide group and 40.8% in the placebo group; rash was more common in the apalutamide group.  We have discussed the potential side effects of apalutamide and I emailed information about the drug to the patient. He is agreeable to proceed. We'll obtain baseline serum total testosterone level, repeat PSA, obtain baseline CMP, CBCd, TSH and lipid profile before he starts on Apalutamide.   I have also communicated with Dr. Garcia regarding patient's situation.  We'll plan to see the patient back one month after starting on Apalutamide.  At that time we'll also plan to initiate Xgeva q 3 months for prevention of skeletal related events given low volume metastatic disease to the bones.  It was my pleasure to meet Bill.  At the end of our visit patient verbalized understanding and concurred with the plan.

## 2020-06-02 ENCOUNTER — VIRTUAL VISIT (OUTPATIENT)
Dept: ONCOLOGY | Facility: CLINIC | Age: 55
End: 2020-06-02
Attending: RADIOLOGY
Payer: COMMERCIAL

## 2020-06-02 DIAGNOSIS — Z79.899 HIGH RISK MEDICATION USE: ICD-10-CM

## 2020-06-02 DIAGNOSIS — C79.51 PROSTATE CANCER METASTATIC TO BONE (H): ICD-10-CM

## 2020-06-02 DIAGNOSIS — C61 PROSTATE CANCER (H): Primary | ICD-10-CM

## 2020-06-02 DIAGNOSIS — Z51.81 ENCOUNTER FOR THERAPEUTIC DRUG MONITORING: ICD-10-CM

## 2020-06-02 DIAGNOSIS — C79.51 BONE METASTASIS: ICD-10-CM

## 2020-06-02 DIAGNOSIS — C61 PROSTATE CANCER METASTATIC TO BONE (H): ICD-10-CM

## 2020-06-02 PROCEDURE — 99204 OFFICE O/P NEW MOD 45 MIN: CPT | Mod: 95 | Performed by: INTERNAL MEDICINE

## 2020-06-02 NOTE — LETTER
"    6/2/2020         RE: Per Etienne  [address] [address] [facility] [address]        Dear Colleague,    Thank you for referring your patient, Per Etienne, to the [facility]. Please see a copy of my visit note below.    Per Etienne is a 54 year old male who is being evaluated via a billable video visit.      The patient has been notified of following:     \"This video visit will be conducted via a call between you and your physician/provider. We have found that certain health care needs can be provided without the need for an in-person physical exam.  This service lets us provide the care you need with a video conversation.  If a prescription is necessary we can send it directly to your pharmacy.  If lab work is needed we can place an order for that and you can then stop by our lab to have the test done at a later time.    Video visits are billed at different rates depending on your insurance coverage.  Please reach out to your insurance provider with any questions.    If during the course of the call the physician/provider feels a video visit is not appropriate, you will not be charged for this service.\"    Patient has given verbal consent for Video visit? Yes      Video-Visit Details    Type of service:  Video Visit    Video visit time: 23 min  Originating Location (pt. Location):home    Distant Location (provider location):  [facility]     Platform used for Video Visit:Started with Lively, not connecting for the patient, switched to ONTRAPORT  Na Astudillo MD, MD        Oncology Consultation:  Date on this visit: 6/2/2020    Per Etienne  is referred by Dr.Brenda Krista Norwood for an oncology consultation. He requires evaluation for new diagnosis of metastatic hormone sensitive prostate cancer.    Primary care physician: Per Chen   Urologist: Dr. Angel Colmenares    History Of Present Illness:  Mr. Etienne is a 54 year old male who presents with " new diagnosis of metastatic hormone sensitive prostate cancer.  He presented to his PCP in December 2019 with slow urinary stream.  PSA was checked and was found to be 124.  He had no previous PSA available for comparison. On January 8, 2020 abdomen pelvis CT showed groundglass opacity in the left lower lobe,  dystrophic calcification of the prostate gland, and a fatty liver.  Same-day bone scan was negative. On January 10, 2010 prostate biopsy showed on the left Holstein score 4+3 involving 7 cores and on the right Holstein score 3+4 involving 6 cores, with positive perineural invasion.  He proceeded to undergo RRP by Dr. Colmenares on February 24, 2020,   Pathology revealed acinar adenocarcinoma Holstein 4+3 with focal ROBERTO, bladder neck base invasion, positive SVI, positive PNI, and multiple positive margins.  There was no LVSI and 0 out of 3 lymph nodes removed were involved with tumor; pT3bN0.  He received Eligard on 4/20/2020.    He met with Dr. Norwood and she ordered MRI prostate on 5/21/2020 which showed:  1. There is 14 x 9 mm T2 intermediate structure in the prostatectomy  bed at the left aspect of urinary bladder neck with restricted  diffusion in the DWI images and early enhancement in the contrast  enhanced images. This is concerning for locally recurrent/residual  prostatic cancer.     2. There is a T2 hypointense structure in the in the area of removed  right seminal vesicle measuring 13 x 11 mm  demonstrating restricted  diffusion in the DWI images. This may represent a recurrent/residual  tumor.     3. There are 2 bone metastases in the left pubic inferior ramus and  inferior aspect of left pubic body as described above.     4. There is a 7 mm suspicious lymph nodes in the right obturator area  corresponding to to FACBC avid lymph node on the same date PET/CT  F-18 fluciclovine PET/CT on 5/20/2020 showed:  1.  2 foci of increased uptake in the left ischium, with underlying  sclerotic lesion, and symphysis  pubis, these changes are indicative of  active prostate cancer metastasis.  2. Low-level increased uptake in 3 lymphnodes along the right external  and internal iliac nodes, the most suspicious of which is the deep  obturator.  3. Low-level increased uptake in the prostatectomy bed slightly to the  left side of the urethra, if clinically necessary, endoscopy would  would be necessary for confirmation of this being metastasis.  We have discussed MRI findings and PET/CT findings in detail. I have reviewed PET/CT images.   PSA was down to 27.7 on 4/21/2020. On April 21, 2020 he received an Eligard injection.  Currently he has minimal incontinence that does not require a pad. His new complaint is lower back pain radiating down his left leg which he has been experiencing for the last couple of days after he chopped some wood. He is seeing Dr. Chen tomorrow for further evaluation of this pain. He has minimal hot flashes at night. His father was diagnosed with prostate cancer, at the age of 75. Paternal GM had throat cancer at the age of 94, nonsmoker.    In addition, a complete 12 point  review of systems is negative.      Past Medical/Surgical History:  Past Medical History:   Diagnosis Date     Gout      Prostate cancer (H) 01/10/2020     Past Surgical History:   Procedure Laterality Date     BIOPSY PROSTATE TRANSRECTAL  01/10/2020    Dr. Colmenares     LITHOTRIPSY  age 30s     MICROSCOPIC KNEE SURGERY Right age 30s     ORTHOPEDIC SURGERY Left 1985    Alan placed in Left Femur     PROSTATECTOMY RETROPUBIC RADICAL  02/24/2020    Dr. Colmenares     Allergies:  Allergies as of 06/02/2020     (No Known Allergies)     Current Medications:  Current Outpatient Medications   Medication Sig Dispense Refill     acetaminophen (TYLENOL) 325 MG tablet Take 325-650 mg by mouth every 6 hours as needed for mild pain       leuprolide (ELIGARD) 45 MG kit Inject 45 mg Subcutaneous every 6 months               Family History:  Family History    Problem Relation Age of Onset     Asthma Father      Prostate Cancer Father 73     Cancer Maternal Grandmother 92        throat      Asthma Sister      Social History:  Social History     Socioeconomic History     Marital status:    Occupational History     Not on file   Social Needs     Food insecurity     Transportation needs   Tobacco Use     Smoking status: Former Smoker     Packs/day: 1.00     Years: 30.00     Pack years: 30.00     Types: Cigarettes, Cigars     Last attempt to quit: 2012     Years since quittin.4     Smokeless tobacco: Never Used   Substance and Sexual Activity     Alcohol use: Yes     Comment: 4-6 drinks per day - patient reports beer or vodka drinks     Drug use: No     Sexual activity: Yes     Partners: Female   Lifestyle     Physical Exam:  Wt Readings from Last 5 Encounters:   04/10/20 85.3 kg (188 lb)   20 82.2 kg (181 lb 3.2 oz)   20 83.9 kg (185 lb)   19 83.8 kg (184 lb 12.8 oz)   10/10/19 84.4 kg (186 lb)     Constitutional: alert and in no distress  Eyes: No redness or discharge  Respiratory: No cough or labored breathing.  Musculoskeletal: Full range of motion in extremities.  Skin: no visible skin lesions or discoloration  Neurological: No tremors.  Psychiatric: Mentation appears normal and affect is normal as well.  Alert and oriented x3.  The rest the comprehensive physical examination is deferred due to public health emergency video visit restrictions.  Laboratory/Imaging Studies  Labs reviewed and documented in the EMR.    ASSESSMENT/PLAN:  Jay is a very pleasant 54 year old man with metastatic (to the bones) prostate cancer. He is s/p Eligard on 2020. Jay has low volume metastatic disease, with residual disease locoregionally and bone metastasis (two lesions- in left ischium and symphysis pubis), asymptomatic.  We have discussed diagnosis, prognosis and treatment options for metastatic prostate cancer. Unfortunately, his disease is not  curable and all treatment is palliative and I have relayed that to the patient.   I have emailed patient information on treatment of metastatic prostate cancer as well.   Since it has been <6 months, actually only 6 weeks since the patient started on Eligard, we can treat his disease as hormone sensitive metastatic disease. Given recent data from the TITAN RCT, and given apalutamide's low toxicity profile, I favor adding Apalutamide 240 mg PO daily to the androgen deprivation regimen. In the TITAN trial, 525 patients were assigned to receive apalutamide plus ADT and 527 to receive placebo plus ADT.  A total of 16.4% of the patients had undergone prostatectomy; 62.7% had high-volume disease, and 37.3% had low-volume disease.With a median of 22.7 months of follow-up, the percentage of patients with radiographic progression-free survival at 24 months was 68.2% in the apalutamide group and 47.5% in the placebo group (hazard ratio for radiographic progression or death, 0.48; 95% confidence interval [CI], 0.39 to 0.60; P<0.001). Overall survival at 24 months was also greater with apalutamide than with placebo (82.4% in the apalutamide group vs. 73.5% in the placebo group; hazard ratio for death, 0.67; 95% CI, 0.51 to 0.89; P?=?0.005). The frequency of grade 3 or 4 adverse events was 42.2% in the apalutamide group and 40.8% in the placebo group; rash was more common in the apalutamide group.  We have discussed the potential side effects of apalutamide and I emailed information about the drug to the patient. He is agreeable to proceed. We'll obtain baseline serum total testosterone level, repeat PSA, obtain baseline CMP, CBCd, TSH and lipid profile before he starts on Apalutamide.   I have also communicated with Dr. Garcia regarding patient's situation.  We'll plan to see the patient back one month after starting on Apalutamide.  At that time we'll also plan to initiate Xgeva q 3 months for prevention of skeletal related  events given low volume metastatic disease to the bones.  It was my pleasure to meet Bill.  At the end of our visit patient verbalized understanding and concurred with the plan.                Again, thank you for allowing me to participate in the care of your patient.        Sincerely,        Na Astudillo MD, MD

## 2020-06-02 NOTE — LETTER
"    6/2/2020         RE: Per Etienne  [address] [address]        Dear Colleague,    Thank you for referring your patient, Per Etienne, to the [facility]. Please see a copy of my visit note below.    Per Etienne is a 54 year old male who is being evaluated via a billable video visit.      The patient has been notified of following:     \"This video visit will be conducted via a call between you and your physician/provider. We have found that certain health care needs can be provided without the need for an in-person physical exam.  This service lets us provide the care you need with a video conversation.  If a prescription is necessary we can send it directly to your pharmacy.  If lab work is needed we can place an order for that and you can then stop by our lab to have the test done at a later time.    Video visits are billed at different rates depending on your insurance coverage.  Please reach out to your insurance provider with any questions.    If during the course of the call the physician/provider feels a video visit is not appropriate, you will not be charged for this service.\"    Patient has given verbal consent for Video visit? Yes      Video-Visit Details    Type of service:  Video Visit    Video visit time: 23 min  Originating Location (pt. Location):home    Distant Location (provider location):  [facility]     Platform used for Video Visit:Started with CertusNet, not connecting for the patient, switched to Bookmycab  Na Astudillo MD, MD        Oncology Consultation:  Date on this visit: 6/2/2020    Per Etienne  is referred by Dr.Brenda Krista Norwood for an oncology consultation. He requires evaluation for new diagnosis of metastatic hormone sensitive prostate cancer.    Primary care physician: Per Chen   Urologist: Dr. Angel Colmenares    History Of Present Illness:  Mr. Etienne is a 54 year old male who presents with " new diagnosis of metastatic hormone sensitive prostate cancer.  He presented to his PCP in December 2019 with slow urinary stream.  PSA was checked and was found to be 124.  He had no previous PSA available for comparison. On January 8, 2020 abdomen pelvis CT showed groundglass opacity in the left lower lobe,  dystrophic calcification of the prostate gland, and a fatty liver.  Same-day bone scan was negative. On January 10, 2010 prostate biopsy showed on the left [place] score 4+3 involving 7 cores and on the right [place] score 3+4 involving 6 cores, with positive perineural invasion.  He proceeded to undergo RRP by Dr. Colmenares on February 24, 2020,   Pathology revealed acinar adenocarcinoma [place] 4+3 with focal ROBERTO, bladder neck base invasion, positive SVI, positive PNI, and multiple positive margins.  There was no LVSI and 0 out of 3 lymph nodes removed were involved with tumor; pT3bN0.  He received Eligard on 4/20/2020.    He met with Dr. Norwood and she ordered MRI prostate on 5/21/2020 which showed:  1. There is 14 x 9 mm T2 intermediate structure in the prostatectomy  bed at the left aspect of urinary bladder neck with restricted  diffusion in the DWI images and early enhancement in the contrast  enhanced images. This is concerning for locally recurrent/residual  prostatic cancer.     2. There is a T2 hypointense structure in the in the area of removed  right seminal vesicle measuring 13 x 11 mm  demonstrating restricted  diffusion in the DWI images. This may represent a recurrent/residual  tumor.     3. There are 2 bone metastases in the left pubic inferior ramus and  inferior aspect of left pubic body as described above.     4. There is a 7 mm suspicious lymph nodes in the right obturator area  corresponding to to FACBC avid lymph node on the same date PET/CT  F-18 fluciclovine PET/CT on 5/20/2020 showed:  1.  2 foci of increased uptake in the left ischium, with underlying  sclerotic lesion, and symphysis  pubis, these changes are indicative of  active prostate cancer metastasis.  2. Low-level increased uptake in 3 lymphnodes along the right external  and internal iliac nodes, the most suspicious of which is the deep  obturator.  3. Low-level increased uptake in the prostatectomy bed slightly to the  left side of the urethra, if clinically necessary, endoscopy would  would be necessary for confirmation of this being metastasis.  We have discussed MRI findings and PET/CT findings in detail. I have reviewed PET/CT images.   PSA was down to 27.7 on 4/21/2020. On April 21, 2020 he received an Eligard injection.  Currently he has minimal incontinence that does not require a pad. His new complaint is lower back pain radiating down his left leg which he has been experiencing for the last couple of days after he chopped some wood. He is seeing Dr. Chen tomorrow for further evaluation of this pain. He has minimal hot flashes at night. His father was diagnosed with prostate cancer, at the age of 75. Paternal GM had throat cancer at the age of 94, nonsmoker.    In addition, a complete 12 point  review of systems is negative.      Past Medical/Surgical History:  Past Medical History:   Diagnosis Date     Gout      Prostate cancer (H) 01/10/2020     Past Surgical History:   Procedure Laterality Date     BIOPSY PROSTATE TRANSRECTAL  01/10/2020    Dr. Colmenares     LITHOTRIPSY  age 30s     MICROSCOPIC KNEE SURGERY Right age 30s     ORTHOPEDIC SURGERY Left 1985    Alan placed in Left Femur     PROSTATECTOMY RETROPUBIC RADICAL  02/24/2020    Dr. Colmenares     Cancer History:   ***  Allergies:  Allergies as of 06/02/2020     (No Known Allergies)     Current Medications:  Current Outpatient Medications   Medication Sig Dispense Refill     acetaminophen (TYLENOL) 325 MG tablet Take 325-650 mg by mouth every 6 hours as needed for mild pain       leuprolide (ELIGARD) 45 MG kit Inject 45 mg Subcutaneous every 6 months               Family  History:  Family History   Problem Relation Age of Onset     Asthma Father      Prostate Cancer Father 73     Cancer Maternal Grandmother 92        throat      Asthma Sister      Social History:  Social History     Socioeconomic History     Marital status:    Occupational History     Not on file   Social Needs     Food insecurity     Transportation needs   Tobacco Use     Smoking status: Former Smoker     Packs/day: 1.00     Years: 30.00     Pack years: 30.00     Types: Cigarettes, Cigars     Last attempt to quit: 2012     Years since quittin.4     Smokeless tobacco: Never Used   Substance and Sexual Activity     Alcohol use: Yes     Comment: 4-6 drinks per day - patient reports beer or vodka drinks     Drug use: No     Sexual activity: Yes     Partners: Female   Lifestyle     Physical Exam:  Wt Readings from Last 5 Encounters:   04/10/20 85.3 kg (188 lb)   20 82.2 kg (181 lb 3.2 oz)   20 83.9 kg (185 lb)   19 83.8 kg (184 lb 12.8 oz)   10/10/19 84.4 kg (186 lb)     Constitutional: alert and in no distress  Eyes: No redness or discharge  Respiratory: No cough or labored breathing.  Musculoskeletal: Full range of motion in extremities.  Skin: no visible skin lesions or discoloration  Neurological: No tremors.  Psychiatric: Mentation appears normal and affect is normal as well.  Alert and oriented x3.  The rest the comprehensive physical examination is deferred due to public health emergency video visit restrictions.  Laboratory/Imaging Studies  Labs reviewed and documented in the EMR.    ASSESSMENT/PLAN:  Jay is a very pleasant 54 year old man with metastatic (to the bones) prostate cancer. He is s/p Eligard on 2020. aJy has low volume metastatic disease, with residual disease locoregionally and bone metastasis (two lesions- in left ischium and symphysis pubis), asymptomatic.  We have discussed diagnosis, prognosis and treatment options for metastatic prostate cancer.  Unfortunately, his disease is not curable and all treatment is palliative and I have relayed that to the patient.   I have emailed patient information on treatment of metastatic prostate cancer as well.   Since it has been <6 months, actually only 6 weeks since the patient started on Eligard, we can treat his disease as hormone sensitive metastatic disease. Given recent data from the TITAN RCT, and given apalutamide's low toxicity profile, I favor adding Apalutamide 240 mg PO daily to the androgen deprivation regimen. In the TITAN trial, 525 patients were assigned to receive apalutamide plus ADT and 527 to receive placebo plus ADT.  A total of 16.4% of the patients had undergone prostatectomy; 62.7% had high-volume disease, and 37.3% had low-volume disease.With a median of 22.7 months of follow-up, the percentage of patients with radiographic progression-free survival at 24 months was 68.2% in the apalutamide group and 47.5% in the placebo group (hazard ratio for radiographic progression or death, 0.48; 95% confidence interval [CI], 0.39 to 0.60; P<0.001). Overall survival at 24 months was also greater with apalutamide than with placebo (82.4% in the apalutamide group vs. 73.5% in the placebo group; hazard ratio for death, 0.67; 95% CI, 0.51 to 0.89; P?=?0.005). The frequency of grade 3 or 4 adverse events was 42.2% in the apalutamide group and 40.8% in the placebo group; rash was more common in the apalutamide group.  We have discussed the potential side effects of apalutamide and I emailed information about the drug to the patient. He is agreeable to proceed. We'll obtain baseline serum total testosterone level, repeat PSA, obtain baseline CMP, CBCd, TSH and lipid profile before he starts on Apalutamide.   I have also communicated with Dr. Garcia regarding patient's situation.  We'll plan to see the patient back one month after starting on Apalutamide.  At that time we'll also plan to initiate Xgeva q 3 months for  prevention of skeletal related events given low volume metastatic disease to the bones.  It was my pleasure to meet Bill.  At the end of our visit patient verbalized understanding and concurred with the plan.                Again, thank you for allowing me to participate in the care of your patient.        Sincerely,        Na Astudillo MD, MD

## 2020-06-02 NOTE — NURSING NOTE
"Per Etienne is a 54 year old male who is being evaluated via a billable video visit.      The patient has been notified of following:     \"This video visit will be conducted via a call between you and your physician/provider. We have found that certain health care needs can be provided without the need for an in-person physical exam.  This service lets us provide the care you need with a video conversation.  If a prescription is necessary we can send it directly to your pharmacy.  If lab work is needed we can place an order for that and you can then stop by our lab to have the test done at a later time.    Video visits are billed at different rates depending on your insurance coverage.  Please reach out to your insurance provider with any questions.    If during the course of the call the physician/provider feels a video visit is not appropriate, you will not be charged for this service.\"    Patient has given verbal consent for Video visit? Yes    How would you like to obtain your AVS? Evelio    Patient would like the video invitation sent by: Text to cell phone: [phone]    Will anyone else be joining your video visit? No        Video-Visit Details    Type of service:  Video Visit    Originating Location (pt. Location): Home    Distant Location (provider location):  [facility]     Platform used for Video Visit: Lorena     Checklist Positives   Pain - back, left leg and abdomen - rated as an 8 -   takes advil or tylenol - it takes all his    strength to walk around a job site   Nausea and vomiting - occasionally - has gotten   better since he had his prostate removed   Fevers and chills - he thinks he gets fevers at night   but does not take his temperature.  He   stated that he he will wake up and be   freezing and then his wife will put a bunch of   blankets on him to warm him up and then a   little while later will wake up drenched in   sweat - has gotten better since prostate "   Removal   Slight weakness   Numbness in balls of feet and toes - has had gout   Small red spots on left forearm    He states that he hopes Dr. Astudillo is the doctor to help him get healthy again.  He has to much life left.    Viky Miller, CMA

## 2020-06-03 ENCOUNTER — DOCUMENTATION ONLY (OUTPATIENT)
Dept: PHARMACY | Facility: CLINIC | Age: 55
End: 2020-06-03

## 2020-06-03 ENCOUNTER — OFFICE VISIT (OUTPATIENT)
Dept: FAMILY MEDICINE | Facility: CLINIC | Age: 55
End: 2020-06-03
Payer: COMMERCIAL

## 2020-06-03 VITALS
SYSTOLIC BLOOD PRESSURE: 160 MMHG | HEART RATE: 76 BPM | BODY MASS INDEX: 27.07 KG/M2 | WEIGHT: 186 LBS | OXYGEN SATURATION: 99 % | TEMPERATURE: 98.1 F | DIASTOLIC BLOOD PRESSURE: 90 MMHG

## 2020-06-03 DIAGNOSIS — C61 PROSTATE CANCER METASTATIC TO BONE (H): ICD-10-CM

## 2020-06-03 DIAGNOSIS — C79.51 PROSTATE CANCER METASTATIC TO BONE (H): ICD-10-CM

## 2020-06-03 DIAGNOSIS — Z79.899 HIGH RISK MEDICATION USE: ICD-10-CM

## 2020-06-03 DIAGNOSIS — C61 PROSTATE CANCER (H): ICD-10-CM

## 2020-06-03 DIAGNOSIS — M54.42 ACUTE LEFT-SIDED LOW BACK PAIN WITH LEFT-SIDED SCIATICA: Primary | ICD-10-CM

## 2020-06-03 DIAGNOSIS — Z51.81 ENCOUNTER FOR THERAPEUTIC DRUG MONITORING: ICD-10-CM

## 2020-06-03 PROCEDURE — G0103 PSA SCREENING: HCPCS | Performed by: INTERNAL MEDICINE

## 2020-06-03 PROCEDURE — 84443 ASSAY THYROID STIM HORMONE: CPT | Performed by: INTERNAL MEDICINE

## 2020-06-03 PROCEDURE — 36415 COLL VENOUS BLD VENIPUNCTURE: CPT | Performed by: INTERNAL MEDICINE

## 2020-06-03 PROCEDURE — 80061 LIPID PANEL: CPT | Performed by: INTERNAL MEDICINE

## 2020-06-03 PROCEDURE — 80053 COMPREHEN METABOLIC PANEL: CPT | Performed by: INTERNAL MEDICINE

## 2020-06-03 PROCEDURE — 99214 OFFICE O/P EST MOD 30 MIN: CPT | Performed by: FAMILY MEDICINE

## 2020-06-03 RX ORDER — HYDROCODONE BITARTRATE AND ACETAMINOPHEN 5; 325 MG/1; MG/1
1 TABLET ORAL EVERY 6 HOURS PRN
Qty: 18 TABLET | Refills: 0 | Status: SHIPPED | OUTPATIENT
Start: 2020-06-03 | End: 2020-06-11

## 2020-06-03 ASSESSMENT — PAIN SCALES - GENERAL: PAINLEVEL: EXTREME PAIN (8)

## 2020-06-03 NOTE — PROGRESS NOTES
"Oral Chemotherapy Monitoring Program  [place]    Primary Oncologist: Dr. FIELD  Primary Oncology Clinic:   Cancer Diagnosis: Prostate CA    Drug: Apalutamide 240mg daily  Start Date: ASAP  Expected duration of therapy: Until disease progression or unacceptable toxicity    Drug Interaction Assessment: None    Subjective/Objective:  Per Etienne is a 54 year old male contacted by phone for an initial visit for oral chemotherapy education.      No flowsheet data found.    Vitals:  BP:   BP Readings from Last 1 Encounters:   04/10/20 (!) 150/88     Wt Readings from Last 1 Encounters:   04/10/20 85.3 kg (188 lb)     Estimated body surface area is 2.05 meters squared as calculated from the following:    Height as of 2/13/20: 1.765 m (5' 9.5\").    Weight as of 4/10/20: 85.3 kg (188 lb).      Labs:  Lab Results   Component Value Date     10/10/2019      Lab Results   Component Value Date    POTASSIUM 4.1 02/26/2020     Lab Results   Component Value Date    NATALIYA 9.4 10/10/2019     No results found for: MAG  No results found for: PHOS  Lab Results   Component Value Date    ALBUMIN 3.4 05/05/2018     Lab Results   Component Value Date    BUN 18 10/10/2019     Lab Results   Component Value Date    CR 1.13 02/26/2020       Lab Results   Component Value Date    AST 18 05/05/2018     Lab Results   Component Value Date    ALT 30 05/05/2018     Lab Results   Component Value Date    BILITOTAL 0.5 05/05/2018       Lab Results   Component Value Date    WBC 9.5 03/02/2020     Lab Results   Component Value Date    HGB 11.2 03/02/2020     Lab Results   Component Value Date     03/02/2020     Lab Results   Component Value Date    ANEU 6.8 03/02/2020         Assessment/Plan:  Patient is appropriate to start therapy.    Basic chemotherapy teaching was reviewed with the patient including indication, start date of therapy, dose, administration, adverse effects, missed doses, food and drug interactions, monitoring, side effect " management, office contact information, and safe handling. Written materials were provided and all questions answered.    Follow-Up:  1 week from start     Allen Crouch PharmD, BCPS, BCOP

## 2020-06-03 NOTE — PROGRESS NOTES
SUBJECTIVE:  Per Etienne is a 54 year old male who is seen for left back, hip and leg  pain.   Attributed to specific activity.  Mechanism of injury: he was chopping wood last week an he felt the pain while doing that   The onset of the pain was sudden.    The patient reports that these symptoms have been gradually worsening since that time. He reports that the pain is significant(ly) and he is not able to do much of anything physical.   Palliative factors for the pain include:  Nothing   Provacative factors include: any movement    The patient reports that the pain radiates into the left leg.  It radiates to the anterior thigh.    It radiates to the proximal posterior shin.    The patient is being treated for metastatic prostate cancer.   He is on a every 6 month(s) injection. He just had a PET scan showing metastatic disease.     The patient denies incontinence of urine or stool.    The patient reports no prior history of problems with his back.  The patients past medical, surgical, social and family histories were reviewed.  Social History     Socioeconomic History     Marital status:      Spouse name: None     Number of children: None     Years of education: None     Highest education level: None   Occupational History     None   Social Needs     Financial resource strain: None     Food insecurity     Worry: None     Inability: None     Transportation needs     Medical: None     Non-medical: None   Tobacco Use     Smoking status: Former Smoker     Packs/day: 1.00     Years: 30.00     Pack years: 30.00     Types: Cigarettes, Cigars     Last attempt to quit: 2012     Years since quittin.4     Smokeless tobacco: Never Used   Substance and Sexual Activity     Alcohol use: Yes     Comment: 4-6 drinks per day - patient reports beer or vodka drinks     Drug use: No     Sexual activity: Yes     Partners: Female   Lifestyle     Physical activity     Days per week: None     Minutes per session:  None     Stress: None   Relationships     Social connections     Talks on phone: None     Gets together: None     Attends Taoist service: None     Active member of club or organization: None     Attends meetings of clubs or organizations: None     Relationship status: None     Intimate partner violence     Fear of current or ex partner: None     Emotionally abused: None     Physically abused: None     Forced sexual activity: None   Other Topics Concern     Parent/sibling w/ CABG, MI or angioplasty before 65F 55M? Not Asked   Social History Narrative     None     Past Medical History:   Diagnosis Date     Gout      Prostate cancer (H) 01/10/2020     Current Outpatient Medications   Medication Sig Dispense Refill     acetaminophen (TYLENOL) 325 MG tablet Take 325-650 mg by mouth every 6 hours as needed for mild pain       leuprolide (ELIGARD) 45 MG kit Inject 45 mg Subcutaneous every 6 months                 Allergies as of 06/03/2020     (No Known Allergies)     Current Outpatient Medications   Medication     acetaminophen (TYLENOL) 325 MG tablet     leuprolide (ELIGARD) 45 MG kit     No current facility-administered medications for this visit.          Examination:  BP (!) 160/90   Pulse 76   Temp 98.1  F (36.7  C) (Tympanic)   Wt 84.4 kg (186 lb)   SpO2 99%   BMI 27.07 kg/m    General: healthy, alert and no distress.  Neuro exam of the lower extremities.   DTR's  Right knee 2+  Right ankle Abscent  Left knee 1+  Left ankleAbscent  Strength was +5 globally in the lower extremities.  Back examination: There was no tenderness to palpation of the upper lumbar vertebrae, lower lumbar vertabrae, right paraspinal muscles in the upper lumbar region, right paraspinal muscles in the lower lumbar region, left paraspinal muscles in the upper lumbar region, left paraspinal muscles in the lower lumbar region, right sacroiliac joint and left sacroliliac joint.  There was not CVA tenderness bilaterally.  Straight leg raise  test was negative on the right andequivocal on the left. It did induce some pain into the left anterior thigh but nothing past the knee.   There was decreased active range of motion in hip flexion bilaterally      [place] test: negative  (a positive test suggests piriformis syndrome, it is performed by placing the hip in extension and internal rotation, and then resisting external rotation. Pain or sciatic symptoms denotes a positive test [26].   Pace sign: negative    (Having the patient seated, the examiner applies resistance against abduction and external rotation. Pain and reproduction of symptoms marks a positive test)     Th pain radiated down the from of the thigh and tho the posterior upper calf      Fabers Test:  Negative bilateral(ly)         Orders Only on 04/10/2020   Component Date Value Ref Range Status     PSA 04/10/2020 27.70* 0 - 4 ug/L Final    Assay Method:  Chemiluminescence using Siemens Vista analyzer   Office Visit on 04/10/2020   Component Date Value Ref Range Status     Uric Acid 04/10/2020 6.3  3.5 - 7.2 mg/dL Final       Combined Report of: PET and CT on 5/20/2020 10:44 AM :     1. PET of the neck, chest, abdomen, and pelvis.  2. PET CT Fusion for Attenuation Correction and Anatomical  Localization:  Images were evaluated in axial, coronal, and sagittal  planes in bone, soft tissue, and lung windows.   3. 3D MIP and PET-CT fused images were processed on an independent  workstation and archived to PACS and reviewed by a radiologist.     Technique:     1. PET: The patient received 9.4 mCi of F-18-fluciclovine, body weight  was 85.3 kg. Images were evaluated in the axial, sagittal, and coronal  planes as well as the rotational whole body MIP. Images were acquired  from the Vertex to the Feet.     2. CT: Volumetric acquisition for attenuation correction and  anatomical localization.     INDICATION: 55yo male with prostate cancer s/p RRP in 2/2020 excised  with multiple positive margins.   Postop PSA is 27.  Please assess for  distant mets.; Prostate cancer (H)     ADDITIONAL INFORMATION OBTAINED FROM EMR: none     COMPARISON: Same day MRI     FINDINGS:      Bone marrow background activity:  6.08     HEAD/NECK:  There is no suspicious uptake in the neck.     CHEST:  There is no suspicious F-18-fluciclovine uptake in the chest.      ABDOMEN AND PELVIS:  Increased uptake in 3 lymphnodes along the right external and internal  iliac nodes with SUV max up to 3.62  Increased uptake in the prostatectomy bed, slightly on the left with  SUV max of 5.17.     BONES:   2 areas of increased activity in the left ischium with underlying  sclerotic lesion SUV max up to 6.44.                                                                      IMPRESSION: In this patient with history of prostatectomy status post  resection:  1.  2 foci of increased uptake in the left ischium, with underlying  sclerotic lesion, and symphysis pubis, these changes are indicative of  active prostate cancer metastasis.  2. Low-level increased uptake in 3 lymphnodes along the right external  and internal iliac nodes, the most suspicious of which is the deep  obturator.  3. Low-level increased uptake in the prostatectomy bed slightly to the  left side of the urethra, if clinically necessary, endoscopy would  would be necessary for confirmation of this being metastasis.     =====================================  Axumen's PETs ability to detect prostate cancer increases with PSA.    Axumen sensitivity for prostate-specific antigen values:   PSA <1.0, 38%  PSA 1-2, 65%  PSA >2, 78%  Source: Gabo et al. Prostate cancer?specific PET radiotracers: A  review on the clinical utility in recurrent disease.  Practical  Radiation Oncology 1/2018.       I have personally reviewed the examination and initial interpretation  and I agree with the findings.       ASSESSMENT/IMPRESSION:  DIFFERENTIAL DIAGNOSIS:  musculoskeletal low back pain with radiation    low back pain with L4 radicular symptoms  Back pain secondary to metastatic prostate cancer    PLAN:      We will get a lumbar MRI to better evaluate the patient's spine.      Pain medication was offered and the patient will take to help with his sleep

## 2020-06-04 ENCOUNTER — TELEPHONE (OUTPATIENT)
Dept: ONCOLOGY | Facility: CLINIC | Age: 55
End: 2020-06-04

## 2020-06-04 DIAGNOSIS — C61 PROSTATE CANCER (H): Primary | ICD-10-CM

## 2020-06-04 DIAGNOSIS — Z79.899 HIGH RISK MEDICATION USE: ICD-10-CM

## 2020-06-04 DIAGNOSIS — Z51.81 ENCOUNTER FOR THERAPEUTIC DRUG MONITORING: ICD-10-CM

## 2020-06-04 DIAGNOSIS — C61 PROSTATE CANCER (H): ICD-10-CM

## 2020-06-04 LAB
ALBUMIN SERPL-MCNC: 3.7 G/DL (ref 3.4–5)
ALP SERPL-CCNC: 86 U/L (ref 40–150)
ALT SERPL W P-5'-P-CCNC: 29 U/L (ref 0–70)
ANION GAP SERPL CALCULATED.3IONS-SCNC: 7 MMOL/L (ref 3–14)
AST SERPL W P-5'-P-CCNC: 18 U/L (ref 0–45)
BILIRUB SERPL-MCNC: 0.4 MG/DL (ref 0.2–1.3)
BUN SERPL-MCNC: 20 MG/DL (ref 7–30)
CALCIUM SERPL-MCNC: 9.2 MG/DL (ref 8.5–10.1)
CHLORIDE SERPL-SCNC: 106 MMOL/L (ref 94–109)
CHOLEST SERPL-MCNC: 200 MG/DL
CO2 SERPL-SCNC: 27 MMOL/L (ref 20–32)
CREAT SERPL-MCNC: 0.89 MG/DL (ref 0.66–1.25)
GFR SERPL CREATININE-BSD FRML MDRD: >90 ML/MIN/{1.73_M2}
GLUCOSE SERPL-MCNC: 104 MG/DL (ref 70–99)
HDLC SERPL-MCNC: 78 MG/DL
LDLC SERPL CALC-MCNC: 109 MG/DL
NONHDLC SERPL-MCNC: 122 MG/DL
POTASSIUM SERPL-SCNC: 4.5 MMOL/L (ref 3.4–5.3)
PROT SERPL-MCNC: 6.9 G/DL (ref 6.8–8.8)
PSA SERPL-ACNC: 12.9 UG/L (ref 0–4)
SODIUM SERPL-SCNC: 140 MMOL/L (ref 133–144)
TRIGL SERPL-MCNC: 67 MG/DL
TSH SERPL DL<=0.005 MIU/L-ACNC: 0.97 MU/L (ref 0.4–4)

## 2020-06-04 RX ORDER — PROCHLORPERAZINE MALEATE 10 MG
10 TABLET ORAL EVERY 6 HOURS PRN
Qty: 30 TABLET | Refills: 2 | Status: SHIPPED | OUTPATIENT
Start: 2020-06-04 | End: 2020-10-14

## 2020-06-04 NOTE — TELEPHONE ENCOUNTER
Prior Authorization Approval    Authorization Effective Date: 6/2/2020  Authorization Expiration Date: 6/2/2021  Medication: Erleada-APPROVED  Approved Dose/Quantity: 60mg  Reference #:     Insurance Company: [employer] - Phone [phone] Fax [phone]  Expected CoPay:       CoPay Card Available:      Foundation Assistance Needed:    Which Pharmacy is filling the prescription (Not needed for infusion/clinic administered): [facility] - [place], [place] - [address] [place] [address]  Pharmacy Notified:    Patient Notified:

## 2020-06-11 ENCOUNTER — TELEPHONE (OUTPATIENT)
Dept: FAMILY MEDICINE | Facility: CLINIC | Age: 55
End: 2020-06-11

## 2020-06-11 ENCOUNTER — ANCILLARY PROCEDURE (OUTPATIENT)
Dept: MRI IMAGING | Facility: CLINIC | Age: 55
End: 2020-06-11
Attending: FAMILY MEDICINE
Payer: COMMERCIAL

## 2020-06-11 DIAGNOSIS — C61 PROSTATE CANCER METASTATIC TO BONE (H): ICD-10-CM

## 2020-06-11 DIAGNOSIS — M54.42 ACUTE LEFT-SIDED LOW BACK PAIN WITH LEFT-SIDED SCIATICA: ICD-10-CM

## 2020-06-11 DIAGNOSIS — C79.51 PROSTATE CANCER METASTATIC TO BONE (H): ICD-10-CM

## 2020-06-11 DIAGNOSIS — M54.17 LUMBOSACRAL RADICULOPATHY AT L5: Primary | ICD-10-CM

## 2020-06-11 PROCEDURE — 72148 MRI LUMBAR SPINE W/O DYE: CPT | Mod: TC

## 2020-06-11 RX ORDER — HYDROCODONE BITARTRATE AND ACETAMINOPHEN 5; 325 MG/1; MG/1
1 TABLET ORAL EVERY 6 HOURS PRN
Qty: 18 TABLET | Refills: 0 | Status: SHIPPED | OUTPATIENT
Start: 2020-06-11 | End: 2020-06-24

## 2020-06-11 NOTE — TELEPHONE ENCOUNTER
Reason for Call:  Other prescription    Detailed comments: needs refill of hydrocodone.  [facility] [address] [facility] [place]    Phone Number Patient can be reached at: Cell number on file:    Telephone Information:   Mobile [phone]       Best Time: all    Can we leave a detailed message on this number? YES    Call taken on 6/11/2020 at 9:04 AM by Lisset Quinteros

## 2020-06-11 NOTE — TELEPHONE ENCOUNTER
Controlled Substance Refill Request for norco  Problem List Complete:  No     PROVIDER TO CONSIDER COMPLETION OF PROBLEM LIST AND OVERVIEW/CONTROLLED SUBSTANCE AGREEMENT    Last Written Prescription Date:  6/3/20  Last Fill Quantity: 18,   # refills: 0    THE MOST RECENT OFFICE VISIT MUST BE WITHIN THE PAST 3 MONTHS. AT LEAST ONE FACE TO FACE VISIT MUST OCCUR EVERY 6 MONTHS. ADDITIONAL VISITS CAN BE VIRTUAL.  (THIS STATEMENT SHOULD BE DELETED.)    Last Office Visit with [facility] primary care provider: 6/3/20    Future Office visit:   Next 5 appointments (look out 90 days)    Jul 07, 2020  2:00 PM CDT  [facility] Visit with Na [facility]studillo MD  [facility] [address] [place] [id number] [phone]          Controlled substance agreement:   Encounter-Level CSA:    There are no encounter-level csa.     Patient-Level CSA:    There are no patient-level csa.         Last Urine Drug Screen: No results found for: CDAUT, No results found for: COMDAT, No results found for: THC13, PCP13, COC13, MAMP13, OPI13, AMP13, BZO13, TCA13, MTD13, BAR13, OXY13, PPX13, BUP13     [email or web address]       checked in past 3 months?  Unable to access    Karen STACK, RN

## 2020-06-12 ENCOUNTER — TELEPHONE (OUTPATIENT)
Dept: PALLIATIVE MEDICINE | Facility: CLINIC | Age: 55
End: 2020-06-12

## 2020-06-12 NOTE — TELEPHONE ENCOUNTER
"Screening Questions for Radiology Injections:    Injection to be done at which interventional clinic site? [facility]    Instruct patient to arrive as directed prior to the scheduled appointment time:    [place] minutes before      Bre: 30 minutes before; if IV needed 1 hour before     Dr. Sun-no IV needed for Cervical LUIS; please instruct to arrive 30\" early    Procedure ordered by Per Chen MD     Procedure ordered? LESI     As a reminder, receiving steroids can decrease your body's ability to fight infection.   Would you still like to move forward with scheduling the injection?  Yes      Transforaminal Cervical LUIS - no pain provider currently performing    What insurance would patient like us to bill for this procedure? BCBS       Worker's comp or MVA (motor vehicle accident) -Any injection DO NOT SCHEDULE and route to Mary Padilla.      [employer] insurance - For SI joint injections, DO NOT SCHEDULE and route Mary Padilla.       [employer] - Any injection besides hip/shoulder/knee joint DO NOT SCHEDULE and route to Mayr Padilla. She will obtain PA and call pt back to schedule procedure or notify pt of denial.       [employer]-Route to [place] for review      **BCBS- ALL need to be routed to [place] for review if a PA is needed**      IF SCHEDULING IN [place] AND NEEDS A PA, IT IS OKAY TO SCHEDULE. [place] HANDLES THEIR OWN PA'S AFTER THE PATIENT IS SCHEDULED. PLEASE SCHEDULE AT LEAST 1 WEEK OUT SO A PA CAN BE OBTAINED.    Any chance of pregnancy? Not Applicable   If YES, do NOT schedule and route to RN Dalton    Is an  needed? No     Patient has a drive home? (mandatory) YES: informed     Is patient taking any blood thinners (i.e. plavix, coumadin, jantoven, warfarin, heparin, pradaxa or dabigatran, etc)? No   If hold needed, do NOT schedule, route to RN pool     Is patient taking any aspirin products (includes Excedrin and Fiorinal)? No     If more than " 325mg/day, OK to schedule; Instruct pt to decrease to less than 325 mg for 7 days AND route to RN pool    For CERVICAL procedures, hold all aspirin products for 6 days.     Tell pt that if aspirin product is not held for 6 days, the procedure WILL BE cancelled.      Does the patient have a bleeding or clotting disorder? No     If YES, okay to schedule AND route to RN nurse pool    For any patients with platelet count <100, must be forwarded to provider    Is patient diabetic?  No  If YES, instruct them to bring their glucometer.    Does patient have an active infection or treated for one within the past week? No     Is patient currently taking any antibiotics?  No     For patients on chronic, preventative, or prophylactic antibiotics, procedures may be scheduled.     For patients on antibiotics for active or recent infection:antibiotic course must have been completed for 4 days    Is patient currently taking any steroid medications? (i.e. Prednisone, Medrol)  No     For patients on steroid medications, course must have been completed for 4 days    Is patient actively being treated for cancer or immunocompromised? Yes - Prostate cancer   If YES, do NOT schedule and route to RN pool     Are you able to get on and off an exam table with minimal or no assistance? Yes  If NO, do NOT schedule and route to RN pool    Are you able to roll over and lay on your stomach with minimal or no assistance? Yes  If NO, do NOT schedule and route to RN pool     Any allergies to contrast dye, iodine, shellfish, or numbing and steroid medications? No  If YES, route to RN pool AND add allergy information to appointment notes    Allergies: Patient has no known allergies.      Has the patient had a flu shot or any other vaccinations within 7 days before or after the procedure.  No     Does patient have an MRI/CT?  YES: 2020  Check Procedure Scheduling Grid to see if required.      Was the MRI done within the last 3 years?  Yes    If yes,  where was the MRI done i.e.[facility], [facility], [facility], [facility] etc?       If no, do not schedule and route to RN pool    If MRI was not done at [facility], [facility] or [facility] do NOT schedule and route to RN pool.      If pt has an imaging disc, the injection MAY be scheduled but pt has to bring disc to appt.     If they show up without the disc the injection cannot be done    Procedure Specific Instructions:      If celiac plexus block, informed patient NPO for 6 hours and that it is okay to take medications with sips of water, especially blood pressure medications  Not Applicable         If this is for a cervical procedure, informed patient that aspirin needs to be held for 6 days.   Not Applicable      If IV needed:    Do not schedule procedures requiring IV placement in the first appointment of the day or first appointment after lunch. Do NOT schedule at 0745, 0815 or 1245.     Instructed pt to arrive 30 minutes early for IV start if required. (Check Procedure Scheduling Grid)  Not Applicable    Reminders:      If you are started on any steroids or antibiotics between now and your appointment, you must contact us because the procedure may need to be cancelled.  No      For all procedures except radiofrequency ablations (RFAs) and spinal cord stimulator (SCS) trials, informed patient:    IV sedation is not provided for this procedure.  If you feel that an oral anti-anxiety medication is needed, you can discuss this further with your referring provider or primary care provider.  The Pain Clinic provider will discuss specifics of what the procedure includes at your appointment.  Most procedures last 10-20 minutes.  We use numbing medications to help with any discomfort during the procedure.  Not Applicable      For patients 85 or older we recommend having an adult stay w/ them for the remainder of the day.       Does the patient have any questions?  NO  Marilynn Nguyen  [facility]

## 2020-06-15 ENCOUNTER — TELEPHONE (OUTPATIENT)
Dept: FAMILY MEDICINE | Facility: CLINIC | Age: 55
End: 2020-06-15

## 2020-06-15 DIAGNOSIS — M10.9 ACUTE GOUT INVOLVING TOE OF LEFT FOOT, UNSPECIFIED CAUSE: ICD-10-CM

## 2020-06-15 RX ORDER — COLCHICINE 0.6 MG/1
TABLET ORAL
Qty: 3 TABLET | Refills: 0 | Status: SHIPPED | OUTPATIENT
Start: 2020-06-15 | End: 2020-07-23

## 2020-06-15 NOTE — TELEPHONE ENCOUNTER
Routed to pt's oncologist, Na Astudillo MD. Is it safe for pt to have a lumbar epidural steroid injection?    Shefali Álvarez, RN-BSN  [facility]

## 2020-06-15 NOTE — TELEPHONE ENCOUNTER
Pt notified of provider message as written.  Pt verbalized good understanding.  Susana Jackson BSN, RN

## 2020-06-15 NOTE — TELEPHONE ENCOUNTER
I called in a prescription(s) for the colchicine. If he is not better with that he should be seen.  Per Chen MD

## 2020-06-15 NOTE — TELEPHONE ENCOUNTER
Patient has been on colchicine and indomethacin in the past but they are no longer on his medication list.     Routing to provider to advise.  Karen Marc BSN, RN

## 2020-06-15 NOTE — TELEPHONE ENCOUNTER
Reason for Call:  Medication or medication refill:    Do you use a [facility]?  Name of the pharmacy and phone number for the current request:  [facility] [id number] [place] [facility] - [place], [place] - [address]  [phone]    Name of the medication requested: For Gout flare up per pt, did not know name of medication.    Other request:     Can we leave a detailed message on this number? YES    Phone number patient can be reached at: Home number on file [phone] (home)    Best Time:     Call taken on 6/15/2020 at 8:31 AM by Madina Mccarthy

## 2020-06-15 NOTE — TELEPHONE ENCOUNTER
Left message on answering machine for patient to call back to [phone].  Susana Jackson BSN, RN

## 2020-06-17 NOTE — TELEPHONE ENCOUNTER
Routed to scheduling coordinators to call pt to schedule a lumbar epidural steroid injection:    Na Astudillo MD  You; Pain Nurse 32 minutes ago (10:40 AM)       It looks like he does not have metastatic disease to lumbar spine on his PET or MRI, so from oncology perspective no objection to LUIS to lumbar spine for back pain. Thank you, Dr. Astudillo    Message text

## 2020-06-23 ENCOUNTER — ANCILLARY PROCEDURE (OUTPATIENT)
Dept: RADIOLOGY | Facility: CLINIC | Age: 55
End: 2020-06-23
Attending: PAIN MEDICINE
Payer: COMMERCIAL

## 2020-06-23 ENCOUNTER — E-VISIT (OUTPATIENT)
Dept: FAMILY MEDICINE | Facility: CLINIC | Age: 55
End: 2020-06-23
Payer: COMMERCIAL

## 2020-06-23 ENCOUNTER — RADIOLOGY INJECTION OFFICE VISIT (OUTPATIENT)
Dept: PALLIATIVE MEDICINE | Facility: CLINIC | Age: 55
End: 2020-06-23
Payer: COMMERCIAL

## 2020-06-23 VITALS
DIASTOLIC BLOOD PRESSURE: 114 MMHG | RESPIRATION RATE: 16 BRPM | HEART RATE: 79 BPM | SYSTOLIC BLOOD PRESSURE: 173 MMHG | OXYGEN SATURATION: 99 %

## 2020-06-23 DIAGNOSIS — C79.51 PROSTATE CANCER METASTATIC TO BONE (H): ICD-10-CM

## 2020-06-23 DIAGNOSIS — M54.42 ACUTE LEFT-SIDED LOW BACK PAIN WITH LEFT-SIDED SCIATICA: ICD-10-CM

## 2020-06-23 DIAGNOSIS — M54.16 LUMBAR RADICULOPATHY: ICD-10-CM

## 2020-06-23 DIAGNOSIS — C61 PROSTATE CANCER METASTATIC TO BONE (H): ICD-10-CM

## 2020-06-23 DIAGNOSIS — M54.16 LUMBAR RADICULOPATHY: Primary | ICD-10-CM

## 2020-06-23 PROCEDURE — 99421 OL DIG E/M SVC 5-10 MIN: CPT | Performed by: FAMILY MEDICINE

## 2020-06-23 PROCEDURE — 64484 NJX AA&/STRD TFRM EPI L/S EA: CPT | Mod: LT | Performed by: PAIN MEDICINE

## 2020-06-23 PROCEDURE — 64483 NJX AA&/STRD TFRM EPI L/S 1: CPT | Mod: LT | Performed by: PAIN MEDICINE

## 2020-06-23 ASSESSMENT — PAIN SCALES - GENERAL: PAINLEVEL: SEVERE PAIN (6)

## 2020-06-23 NOTE — NURSING NOTE
Discharge Information    IV Discontiued Time:  NA    Amount of Fluid Infused:  NA    Discharge Criteria = When patient returns to baseline or as per MD order    Consciousness:  Pt is fully awake    Circulation:  BP +/- 20% of pre-procedure level    Respiration:  Patient is able to breathe deeply    O2 Sat:  Patient is able to maintain O2 Sat >92% on room air    Activity:  Moves 4 extremities on command    Ambulation:  Patient is able to stand and walk or stand and pivot into wheelchair    Dressing:  Clean/dry or No Dressing    Notes:   Discharge instructions and AVS given to patient    Patient meets criteria for discharge?  YES    Admitted to PCU?  No    Responsible adult present to accompany patient home?  Yes    Signature/Title:    Trav Guerra RN  RN Care Coordinator  [facility]

## 2020-06-23 NOTE — NURSING NOTE
Pre-procedure Intake    Have you been fasting? NA    If yes, for how long? NA    Are you taking a prescribed blood thinner such as coumadin, Plavix, Xarelto?    No    If yes, when did you take your last dose? NA    Do you take aspirin?  No    If cervical procedure, have you held aspirin for 6 days?   NA    Do you have any allergies to contrast dye, iodine, steroid and/or numbing medications?  NO    Are you currently taking antibiotics or have an active infection?  NO    Have you had a fever/elevated temperature within the past week? NO    Are you currently taking oral steroids? NO    Do you have a ? Yes       Are you pregnant or breastfeeding?  Not Applicable    Are the vital signs normal?  Yes      Stephania Orozco CMA ([facility])

## 2020-06-23 NOTE — PROGRESS NOTES
Pre procedure Diagnosis: lumbar radiculopathy, lumbar degenerative disc disease   Post procedure Diagnosis: Same  Procedure performed: lumbar transforaminal epidural steroid injection at left l4-5, L5-S1, fluoroscopically guided, contrast controlled  Anesthesia: none  Complications: none  Operators: Issac Tapia MD and Dr Christin MD (fellow)    Indications:   Per Etienne is a 54 year old male.  They have a history of left-sided low back pain radiating to his lower extremity.  Exam shows negative slump and they have tried conservative treatment including meds.    MRI   L1-L2: Mild disc height loss. Disc bulge, asymmetric to the left.  Normal facet joints. No right foraminal stenosis. Mild left foraminal  stenosis. No spinal canal stenosis.       L2-L3: Mild disc height loss. Disc bulge, asymmetric to the left.  Normal facet joints. No right foraminal stenosis. Mild left foraminal  stenosis. No spinal canal stenosis.       L3-L4: Moderate disc height loss involving the right aspect of the  disc. Disc bulge with bilateral lateral/foraminal components. Mild  bilateral facet arthropathy. Mild right foraminal stenosis. No left  foraminal stenosis. No spinal canal stenosis.     L4-L5: Mild disc height loss. Disc bulge with superimposed left  central disc extrusion extending superiorly within the slightly  widened space. The base of the extrusion contacts and slightly  displaces the traversing left L5 nerve root within the lateral recess  (series 5 image 23). Mild bilateral facet arthropathy. No right  foraminal stenosis. Severe left foraminal stenosis. Mild spinal canal  stenosis.       L5-S1: Mild disc height loss. Small central annular fissure. Mild  bilateral facet arthropathy. Mild bilateral foraminal stenosis. No  spinal canal stenosis.     Options/alternatives, benefits and risks were discussed with the patient including bleeding, infection, tissue trauma, numbness, weakness, paralysis, spinal cord  injury, radiation exposure, headache and reaction to medications. Questions were answered to his satisfaction and he agrees to proceed. Voluntary informed consent was obtained and signed.     Vitals were reviewed: Yes  BP (!) 154/98   Pulse 80   Allergies were reviewed:  Yes   Medications were reviewed:  Yes   Pre-procedure pain score: 8/10    Procedure:  After getting informed consent, patient was brought into the procedure suite and was placed in a prone position on the procedure table.   A Pause for the Cause was performed.  Patient was prepped and draped in sterile fashion.     After identifying the left L4-5, L5-S1 neuroforamen, the C-arm was rotated to a left lateral oblique angle.  A total of 4ml of Lidocaine 1% was used to anesthetize the skin and the needle track at a skin entry site coaxial with the fluoroscopy beam, and overriding the superior aspect of the neuroforamen.  A 22 gauge 3.5 inch spinal needle was advanced under intermittent fluoroscopy until it entered the foramen superiorly.    The position was then inspected from anteroposterior and lateral views, and the needle adjusted appropriately.  A total of 2ml of Omnipaque-300 was injected, confirming appropriate position, with spread into the nerve root sheath and the epidural space, with no intravascular uptake. 8ml was wasted    Then, after repeated negative aspiration, a combination of Decadron 10 mg, 0.25% bupivacaine 2 ml, diluted with 3ml of normal saline was injected.     Hemostasis was achieved, the area was cleaned, and bandaids were placed when appropriate.  The patient tolerated the procedure well, and was taken to the recovery room.    Images were saved to PACS.    Post-procedure pain score: 4/10  Follow-up includes:   -f/u phone call in one week  -f/u with referring provider    Issac Tapia MD  [facility]

## 2020-06-23 NOTE — PATIENT INSTRUCTIONS
[facility]   Procedure Discharge Instructions    Today you saw:    Dr. Issac Tapia      You had an:  Lumbar Epidural steroid injection       Medications used:  Lidocaine   Bupivacaine   Dexamethasone Omnipaque             If you were holding your blood thinning medication, please restart taking it: N/A    Be cautious when walking. Numbness and/or weakness in the lower extremities may occur for up to 6-8 hours after the procedure due to effect of the local anesthetic    Do not drive for 6 hours. The effect of the local anesthetic could slow your reflexes.     You may resume your regular activities after 24 hours    Avoid strenuous activity for the first 24 hours    You may shower, however avoid swimming, tub baths or hot tubs for 24 hours following your procedure    You may have a mild to moderate increase in pain for several days following the injection.    It may take up to 14 days for the steroid medication to start working although you may feel the effect as early as a few days after the procedure.       You may use ice packs for 10-15 minutes, 3 to 4 times a day at the injection site for comfort    Do not use heat to painful areas for 6 to 8 hours. This will give the local anesthetic time to wear off and prevent you from accidentally burning your skin.     Unless you have been directed to avoid the use of anti-inflammatory medications (NSAIDS), you may use medications such as ibuprofen, Aleve or Tylenol for pain control if needed.     Possible side effects of steroids that you may experience include flushing, elevated blood pressure, increased appetite, mild headaches and restlessness.  All of these symptoms will get better with time.    If you experience any of the following, call the Pain Clinic during work hours (Mon-Friday 8-4:30 pm) at [phone] or the Provider Line after hours at [phone]:  -Fever over 100 degree F  -Swelling, bleeding, redness, drainage, warmth at  the injection site  -Progressive weakness or numbness in your legs  -Loss of bowel or bladder function  -Unusual new onset of pain that is not improving

## 2020-06-23 NOTE — ORAL ONC MGMT
"Oral Chemotherapy Monitoring Program  Patient Follow Up    Primary Oncologist: Dr. FIELD  Primary Oncology Clinic:   Cancer Diagnosis: Prostate CA    Therapy History:  Erleade 240mg daily Started 6/12/20    Subjective/Objective:  Per Etienne is a 54 year old male contacted by phone for a follow-up visit for oral chemotherapy.    ORAL CHEMOTHERAPY 6/23/2020   Drug Name (No Data)   Current Dosage (No Data)   Current Schedule Daily   Cycle Details Continuous   Start Date of Last Cycle 6/12/2020   Planned next cycle start date 7/12/2020   Doses missed in last 2 weeks 0   Adherence Assessment Adherent   Adverse Effects No AE identified during assessment   Home BPs all BPs<140/90   Any new drug interactions? No   Is the dose as ordered appropriate for the patient? Yes   Is the patient currently in pain? No   Has the patient been assessed within the past 6 months for depression? Yes   Has the patient missed any days of school, work, or other routine activity? No       Last PHQ-2 Score on record:   PHQ-2 ( 1999 [employer]) 4/10/2020 10/10/2019   Q1: Little interest or pleasure in doing things 0 0   Q2: Feeling down, depressed or hopeless 0 0   PHQ-2 Score 0 0   Q1: Little interest or pleasure in doing things - -   Q2: Feeling down, depressed or hopeless - -   PHQ-2 Score - -       Patient does not report depression symptoms.      Vitals:  BP:   BP Readings from Last 1 Encounters:   06/23/20 (!) 173/114     Wt Readings from Last 1 Encounters:   06/03/20 84.4 kg (186 lb)     Estimated body surface area is 2.03 meters squared as calculated from the following:    Height as of 2/13/20: 1.765 m (5' 9.5\").    Weight as of 6/3/20: 84.4 kg (186 lb).    Labs:  Lab Results   Component Value Date     06/03/2020      Lab Results   Component Value Date    POTASSIUM 4.5 06/03/2020     Lab Results   Component Value Date    NATALIYA 9.2 06/03/2020     Lab Results   Component Value Date    ALBUMIN 3.7 06/03/2020     No results found for: " MAG  No results found for: PHOS  Lab Results   Component Value Date    BUN 20 06/03/2020     Lab Results   Component Value Date    CR 0.89 06/03/2020       Lab Results   Component Value Date    AST 18 06/03/2020     Lab Results   Component Value Date    ALT 29 06/03/2020     Lab Results   Component Value Date    BILITOTAL 0.4 06/03/2020       Lab Results   Component Value Date    WBC 9.5 03/02/2020     Lab Results   Component Value Date    HGB 11.2 03/02/2020     Lab Results   Component Value Date     03/02/2020     Lab Results   Component Value Date    ANEU 6.8 03/02/2020         Assessment & Plan:  I spoke with patient today. He reports doing well on therapy. No issues or concerns. No missed doses. He is compliant with therapy. He will be in clinic 7/17 for labs and follow up     Follow-Up:  In 4 weeks    Refill Due:  By 7/12    Allen Crouch, PharmD, BCPS, BCOP

## 2020-06-24 RX ORDER — HYDROCODONE BITARTRATE AND ACETAMINOPHEN 5; 325 MG/1; MG/1
1 TABLET ORAL EVERY 6 HOURS PRN
Qty: 30 TABLET | Refills: 0 | Status: SHIPPED | OUTPATIENT
Start: 2020-06-24 | End: 2020-07-23

## 2020-06-29 DIAGNOSIS — C61 PROSTATE CANCER (H): Primary | ICD-10-CM

## 2020-07-07 NOTE — PROGRESS NOTES
Oncology Follow-up:  Date on this visit: Jul 17, 2020    Primary care physician: Per Chen   Urologist: Dr. Angel Colmenares     Metastatic hormone sensitive prostate cancer    Oncologic History:  Metastatic hormone sensitive prostate cancer  He presented to his PCP in December 2019 with slow urinary stream.  PSA was checked and was found to be 124.  He had no previous PSA available for comparison. On January 8, 2020 abdomen pelvis CT showed groundglass opacity in the left lower lobe,  dystrophic calcification of the prostate gland, and a fatty liver.  Same-day bone scan was negative. On January 10, 2010 prostate biopsy showed on the left Robyn score 4+3 involving 7 cores and on the right Robyn score 3+4 involving 6 cores, with positive perineural invasion.  He proceeded to undergo RRP by Dr. Colmenares on February 24, 2020.  Pathology revealed acinar adenocarcinoma Robyn 4+3 with focal ROBERTO, bladder neck base invasion, positive SVI, positive PNI, and multiple positive margins.  There was no LVSI and 0 out of 3 lymph nodes removed were involved with tumor; pT3bN0.  He received Eligard on 4/20/2020.  MRI prostate on 5/21/2020 which showed a 14 x 9 mm T2 intermediate structure in the prostatectomy bed at the left aspect of urinary bladder neck with restricted diffusion in the DWI images and early enhancement in the contrast  enhanced images. This was concerning for locally recurrent/residual  prostatic cancer.  There was a T2 hypointense structure in the in the area of removed  right seminal vesicle measuring 13 x 11 mm  demonstrating restricted  diffusion in the DWI images. This may represent a recurrent/residual  tumor. There were 2 bone metastases in the left pubic inferior ramus and  inferior aspect of left pubic body.   There is a 7 mm suspicious lymph nodes in the right obturator area  corresponding to to FACBC avid lymph node on the same date PET/CT  F-18 fluciclovine PET/CT on 5/20/2020 showed:  1.  2 foci  of increased uptake in the left ischium, with underlying  sclerotic lesion, and symphysis pubis, these changes are indicative of  active prostate cancer metastasis.  2. Low-level increased uptake in 3 lymphnodes along the right external  and internal iliac nodes, the most suspicious of which is the deep  obturator.  3. Low-level increased uptake in the prostatectomy bed slightly to the  left side of the urethra, if clinically necessary, endoscopy would  would be necessary for confirmation of this being metastasis.  We have discussed MRI findings and PET/CT findings in detail. I have reviewed PET/CT images.   PSA was down to 27.7 on 4/21/2020. On April 21, 2020 he received an Eligard injection.    He started Apalutamide on 6/12/2020.    History Of Present Illness:  Mr. Etienne is a 54 year old male who presents with new diagnosis of metastatic hormone sensitive prostate cancer.  On April 21, 2020 he received an Eligard injection.    He started Apalutamide on 6/12/2020.  He has been feeling well overall except he feels fatigued about an hour or 2 after he takes apalutamide.  He also states he has some blurry vision right after he takes apalutamide daily.  He is going to be seen at an eye clinic next week.  Vision changes are not common side effect of apalutamide.  He is present with his wife today.   He has minimal hot flashes at night.  He has no other health related complaints.  Weight has been stable in the last 6 months at around 184-186 pounds.   In addition, a complete 12 point  review of systems is negative.      Past Medical/Surgical History:  Past Medical History:   Diagnosis Date     Gout      Prostate cancer (H) 01/10/2020     Past Surgical History:   Procedure Laterality Date     BIOPSY PROSTATE TRANSRECTAL  01/10/2020    Dr. Colmenares     LITHOTRIPSY  age 30s     MICROSCOPIC KNEE SURGERY Right age 30s     ORTHOPEDIC SURGERY Left 1985    Alan placed in Left Femur     PROSTATECTOMY RETROPUBIC RADICAL  02/24/2020     Dr. Colmenares     Allergies:  Allergies as of 2020     (No Known Allergies)     Current Medications:  Current Outpatient Medications   Medication Sig Dispense Refill     acetaminophen (TYLENOL) 325 MG tablet Take 325-650 mg by mouth every 6 hours as needed for mild pain       leuprolide (ELIGARD) 45 MG kit Inject 45 mg Subcutaneous every 6 months              His father was diagnosed with prostate cancer, at the age of 75. Paternal GM had throat cancer at the age of 94, nonsmoker.     Family History:  Family History   Problem Relation Age of Onset     Asthma Father      Prostate Cancer Father 73     Cancer Maternal Grandmother 92        throat      Asthma Sister      Social History:  Social History     Socioeconomic History     Marital status:    Occupational History     Not on file   Social Needs     Food insecurity     Transportation needs   Tobacco Use     Smoking status: Former Smoker     Packs/day: 1.00     Years: 30.00     Pack years: 30.00     Types: Cigarettes, Cigars     Last attempt to quit: 2012     Years since quittin.4     Smokeless tobacco: Never Used   Substance and Sexual Activity     Alcohol use: Yes     Comment: 4-6 drinks per day - patient reports beer or vodka drinks     Drug use: No     Sexual activity: Yes     Partners: Female   Lifestyle     Physical Exam:  BP (!) 154/92 (BP Location: Right arm)   Pulse 67   Temp 97.3  F (36.3  C) (Oral)   Resp 16   Wt 82.5 kg (181 lb 12.8 oz)   SpO2 99%   BMI 26.46 kg/m      Wt Readings from Last 5 Encounters:   20 84.4 kg (186 lb)   04/10/20 85.3 kg (188 lb)   20 82.2 kg (181 lb 3.2 oz)   20 83.9 kg (185 lb)   19 83.8 kg (184 lb 12.8 oz)     Constitutional: Alert, healthy, and in no distress.   ENT: Eyes bright , No mouth sores.   Neck: Supple, No adenopathy.  Cardiac: Heart rate and rhythm is regular today and strong without murmur  Respiratory: Breathing easy. Lung sounds clear to auscultation  Abdomen: Soft,  non-tender, BS normal. No masses or organomegaly.   MS: Muscle tone normal, extremities normal with no edema.   Skin: No suspicious lesions or rashes  Neuro: Sensory grossly WNL, gait normal.   Lymph: Normal ant/post cervical, axillary, supraclavicular nodes  Psych: Mentation appears normal and affect normal          Laboratory/Imaging Studies  Orders Only on 07/17/2020   Component Date Value Ref Range Status     WBC 07/17/2020 5.6  4.0 - 11.0 10e9/L Final     RBC Count 07/17/2020 4.91  4.4 - 5.9 10e12/L Final     Hemoglobin 07/17/2020 14.2  13.3 - 17.7 g/dL Final     Hematocrit 07/17/2020 41.9  40.0 - 53.0 % Final     MCV 07/17/2020 85  78 - 100 fl Final     MCH 07/17/2020 28.9  26.5 - 33.0 pg Final     MCHC 07/17/2020 33.9  31.5 - 36.5 g/dL Final     RDW 07/17/2020 14.6  10.0 - 15.0 % Final     Platelet Count 07/17/2020 228  150 - 450 10e9/L Final     Diff Method 07/17/2020 Automated Method   Final     % Neutrophils 07/17/2020 66.8  % Final     % Lymphocytes 07/17/2020 20.1  % Final     % Monocytes 07/17/2020 9.1  % Final     % Eosinophils 07/17/2020 2.7  % Final     % Basophils 07/17/2020 0.9  % Final     % Immature Granulocytes 07/17/2020 0.4  % Final     Absolute Neutrophil 07/17/2020 3.7  1.6 - 8.3 10e9/L Final     Absolute Lymphocytes 07/17/2020 1.1  0.8 - 5.3 10e9/L Final     Absolute Monocytes 07/17/2020 0.5  0.0 - 1.3 10e9/L Final     Absolute Eosinophils 07/17/2020 0.2  0.0 - 0.7 10e9/L Final     Absolute Basophils 07/17/2020 0.1  0.0 - 0.2 10e9/L Final     Abs Immature Granulocytes 07/17/2020 0.0  0 - 0.4 10e9/L Final     Sodium 07/17/2020 138  133 - 144 mmol/L Final     Potassium 07/17/2020 4.8  3.4 - 5.3 mmol/L Final     Chloride 07/17/2020 105  94 - 109 mmol/L Final     Carbon Dioxide 07/17/2020 29  20 - 32 mmol/L Final     Anion Gap 07/17/2020 4  3 - 14 mmol/L Final     Glucose 07/17/2020 110* 70 - 99 mg/dL Final    Non Fasting     Urea Nitrogen 07/17/2020 16  7 - 30 mg/dL Final     Creatinine  07/17/2020 1.10  0.66 - 1.25 mg/dL Final     GFR Estimate 07/17/2020 75  >60 mL/min/[1.73_m2] Final    Comment: Non  GFR Calc  Starting 12/18/2018, serum creatinine based estimated GFR (eGFR) will be   calculated using the Chronic Kidney Disease Epidemiology Collaboration   (CKD-EPI) equation.       GFR Estimate If Black 07/17/2020 87  >60 mL/min/[1.73_m2] Final    Comment:  GFR Calc  Starting 12/18/2018, serum creatinine based estimated GFR (eGFR) will be   calculated using the Chronic Kidney Disease Epidemiology Collaboration   (CKD-EPI) equation.       Calcium 07/17/2020 9.2  8.5 - 10.1 mg/dL Final     Bilirubin Total 07/17/2020 0.4  0.2 - 1.3 mg/dL Final     Albumin 07/17/2020 3.8  3.4 - 5.0 g/dL Final     Protein Total 07/17/2020 7.1  6.8 - 8.8 g/dL Final     Alkaline Phosphatase 07/17/2020 80  40 - 150 U/L Final     ALT 07/17/2020 27  0 - 70 U/L Final     AST 07/17/2020 14  0 - 45 U/L Final     Uric Acid 07/17/2020 5.9  3.5 - 7.2 mg/dL Final   Results for LAURA MIRANDA JR (MRN [id number]) as of 7/17/2020 10:36   Ref. Range 4/10/2020 15:32 6/3/2020 11:49   PSA Latest Ref Range: 0 - 4 ug/L 27.70 (H) 12.90 (H)         ASSESSMENT/PLAN:  Laura is a very pleasant 54 year old man with metastatic (to the bones) prostate cancer. He is s/p Eligard on 4/21/2020. Laura has low volume hormone sensitive metastatic disease, with residual disease locoregionally and bone metastasis (two lesions- in left ischium and symphysis pubis), asymptomatic.  He received Eligard on 4/21/2020 and started on Apalutamide on 6/12/2020.  Fatigue 26-39%,  1. Metastatic prostate cancer- f/up on PSA from today.  Continue apalutamide and will switch over to Lupron, next dose due with next visit in October, every 3 months.  We will follow CBC differential, CMP, total testosterone level and PSA every 3 months.    2. Bone metastasis- Start  Xgeva q 3 months for prevention of skeletal related events given low volume  metastatic disease to the bones. First dose today.    3. Lung cancer screening- 30 pack year smoker quit in 2012. Last PET/CT in May 2020. We'll paln annual low dose chest CT in May 2021.    4. Drug monitoring- TSH monitoring q 3-4 months. TSH normal at baseline of 0.97. Next with next visit.     All of the patient's and his wife's questions were answered.  At the end of our visit patient verbalized understanding and concurred with the plan.          Addendum:  PSA 2.0 on 7/17/2020.  Component      Latest Ref Rng & Units 7/17/2020   Testosterone Total      240 - 950 ng/dL 444   Testosterone level still elevated after receiving Eligard in April. D/w pharmD- we'll proceed with additional dose of Lupron at this time if covered by insurance.

## 2020-07-17 ENCOUNTER — ONCOLOGY VISIT (OUTPATIENT)
Dept: ONCOLOGY | Facility: CLINIC | Age: 55
End: 2020-07-17
Attending: INTERNAL MEDICINE
Payer: COMMERCIAL

## 2020-07-17 ENCOUNTER — DOCUMENTATION ONLY (OUTPATIENT)
Dept: ONCOLOGY | Facility: CLINIC | Age: 55
End: 2020-07-17

## 2020-07-17 ENCOUNTER — INFUSION THERAPY VISIT (OUTPATIENT)
Dept: INFUSION THERAPY | Facility: CLINIC | Age: 55
End: 2020-07-17
Attending: INTERNAL MEDICINE
Payer: COMMERCIAL

## 2020-07-17 VITALS
TEMPERATURE: 97.3 F | SYSTOLIC BLOOD PRESSURE: 154 MMHG | WEIGHT: 181.88 LBS | DIASTOLIC BLOOD PRESSURE: 92 MMHG | BODY MASS INDEX: 26.47 KG/M2 | OXYGEN SATURATION: 99 % | HEART RATE: 67 BPM | RESPIRATION RATE: 16 BRPM

## 2020-07-17 VITALS
HEART RATE: 67 BPM | DIASTOLIC BLOOD PRESSURE: 92 MMHG | WEIGHT: 181.8 LBS | TEMPERATURE: 97.3 F | SYSTOLIC BLOOD PRESSURE: 154 MMHG | BODY MASS INDEX: 26.46 KG/M2 | OXYGEN SATURATION: 99 % | RESPIRATION RATE: 16 BRPM

## 2020-07-17 DIAGNOSIS — C61 PROSTATE CANCER (H): ICD-10-CM

## 2020-07-17 DIAGNOSIS — C61 PROSTATE CANCER METASTATIC TO BONE (H): ICD-10-CM

## 2020-07-17 DIAGNOSIS — C79.51 BONE METASTASIS: ICD-10-CM

## 2020-07-17 DIAGNOSIS — C79.51 BONE METASTASIS: Primary | ICD-10-CM

## 2020-07-17 DIAGNOSIS — C61 PROSTATE CANCER (H): Primary | ICD-10-CM

## 2020-07-17 DIAGNOSIS — Z51.81 ENCOUNTER FOR THERAPEUTIC DRUG MONITORING: ICD-10-CM

## 2020-07-17 DIAGNOSIS — C79.51 PROSTATE CANCER METASTATIC TO BONE (H): ICD-10-CM

## 2020-07-17 DIAGNOSIS — M10.9 ACUTE GOUT OF FOOT, UNSPECIFIED CAUSE, UNSPECIFIED LATERALITY: ICD-10-CM

## 2020-07-17 DIAGNOSIS — Z79.899 HIGH RISK MEDICATION USE: ICD-10-CM

## 2020-07-17 LAB
ALBUMIN SERPL-MCNC: 3.8 G/DL (ref 3.4–5)
ALP SERPL-CCNC: 80 U/L (ref 40–150)
ALT SERPL W P-5'-P-CCNC: 27 U/L (ref 0–70)
ANION GAP SERPL CALCULATED.3IONS-SCNC: 4 MMOL/L (ref 3–14)
AST SERPL W P-5'-P-CCNC: 14 U/L (ref 0–45)
BASOPHILS # BLD AUTO: 0.1 10E9/L (ref 0–0.2)
BASOPHILS NFR BLD AUTO: 0.9 %
BILIRUB SERPL-MCNC: 0.4 MG/DL (ref 0.2–1.3)
BUN SERPL-MCNC: 16 MG/DL (ref 7–30)
CALCIUM SERPL-MCNC: 9.2 MG/DL (ref 8.5–10.1)
CHLORIDE SERPL-SCNC: 105 MMOL/L (ref 94–109)
CO2 SERPL-SCNC: 29 MMOL/L (ref 20–32)
CREAT SERPL-MCNC: 1.1 MG/DL (ref 0.66–1.25)
DIFFERENTIAL METHOD BLD: NORMAL
EOSINOPHIL # BLD AUTO: 0.2 10E9/L (ref 0–0.7)
EOSINOPHIL NFR BLD AUTO: 2.7 %
ERYTHROCYTE [DISTWIDTH] IN BLOOD BY AUTOMATED COUNT: 14.6 % (ref 10–15)
GFR SERPL CREATININE-BSD FRML MDRD: 75 ML/MIN/{1.73_M2}
GLUCOSE SERPL-MCNC: 110 MG/DL (ref 70–99)
HCT VFR BLD AUTO: 41.9 % (ref 40–53)
HGB BLD-MCNC: 14.2 G/DL (ref 13.3–17.7)
IMM GRANULOCYTES # BLD: 0 10E9/L (ref 0–0.4)
IMM GRANULOCYTES NFR BLD: 0.4 %
LYMPHOCYTES # BLD AUTO: 1.1 10E9/L (ref 0.8–5.3)
LYMPHOCYTES NFR BLD AUTO: 20.1 %
MCH RBC QN AUTO: 28.9 PG (ref 26.5–33)
MCHC RBC AUTO-ENTMCNC: 33.9 G/DL (ref 31.5–36.5)
MCV RBC AUTO: 85 FL (ref 78–100)
MONOCYTES # BLD AUTO: 0.5 10E9/L (ref 0–1.3)
MONOCYTES NFR BLD AUTO: 9.1 %
NEUTROPHILS # BLD AUTO: 3.7 10E9/L (ref 1.6–8.3)
NEUTROPHILS NFR BLD AUTO: 66.8 %
PLATELET # BLD AUTO: 228 10E9/L (ref 150–450)
POTASSIUM SERPL-SCNC: 4.8 MMOL/L (ref 3.4–5.3)
PROT SERPL-MCNC: 7.1 G/DL (ref 6.8–8.8)
PSA SERPL-MCNC: 2 UG/L (ref 0–4)
RBC # BLD AUTO: 4.91 10E12/L (ref 4.4–5.9)
SODIUM SERPL-SCNC: 138 MMOL/L (ref 133–144)
URATE SERPL-MCNC: 5.9 MG/DL (ref 3.5–7.2)
WBC # BLD AUTO: 5.6 10E9/L (ref 4–11)

## 2020-07-17 PROCEDURE — 96372 THER/PROPH/DIAG INJ SC/IM: CPT | Performed by: INTERNAL MEDICINE

## 2020-07-17 PROCEDURE — 84403 ASSAY OF TOTAL TESTOSTERONE: CPT | Performed by: INTERNAL MEDICINE

## 2020-07-17 PROCEDURE — 80053 COMPREHEN METABOLIC PANEL: CPT | Performed by: INTERNAL MEDICINE

## 2020-07-17 PROCEDURE — 99207 ZZC NO CHARGE NURSE ONLY: CPT

## 2020-07-17 PROCEDURE — 84550 ASSAY OF BLOOD/URIC ACID: CPT | Performed by: FAMILY MEDICINE

## 2020-07-17 PROCEDURE — 99214 OFFICE O/P EST MOD 30 MIN: CPT | Mod: 25 | Performed by: INTERNAL MEDICINE

## 2020-07-17 PROCEDURE — 85025 COMPLETE CBC W/AUTO DIFF WBC: CPT | Performed by: INTERNAL MEDICINE

## 2020-07-17 PROCEDURE — 36415 COLL VENOUS BLD VENIPUNCTURE: CPT | Performed by: INTERNAL MEDICINE

## 2020-07-17 PROCEDURE — 84153 ASSAY OF PSA TOTAL: CPT | Performed by: INTERNAL MEDICINE

## 2020-07-17 ASSESSMENT — PAIN SCALES - GENERAL
PAINLEVEL: NO PAIN (0)
PAINLEVEL: NO PAIN (0)

## 2020-07-17 NOTE — LETTER
7/17/2020         RE: Per Etienne  [address] [facility] [id number]        Dear Colleague,    Thank you for referring your patient, Per Etienne, to the [facility]. Please see a copy of my visit note below.    Oncology Follow-up:  Date on this visit: Jul 17, 2020    Primary care physician: Per hCen   Urologist: Dr. Anegl Colmenares     Metastatic hormone sensitive prostate cancer    Oncologic History:  Metastatic hormone sensitive prostate cancer  He presented to his PCP in December 2019 with slow urinary stream.  PSA was checked and was found to be 124.  He had no previous PSA available for comparison. On January 8, 2020 abdomen pelvis CT showed groundglass opacity in the left lower lobe,  dystrophic calcification of the prostate gland, and a fatty liver.  Same-day bone scan was negative. On January 10, 2010 prostate biopsy showed on the left [place] score 4+3 involving 7 cores and on the right [place] score 3+4 involving 6 cores, with positive perineural invasion.  He proceeded to undergo RRP by Dr. Colmenares on February 24, 2020.  Pathology revealed acinar adenocarcinoma Robyn 4+3 with focal ROBERTO, bladder neck base invasion, positive SVI, positive PNI, and multiple positive margins.  There was no LVSI and 0 out of 3 lymph nodes removed were involved with tumor; pT3bN0.  He received Eligard on 4/20/2020.  MRI prostate on 5/21/2020 which showed a 14 x 9 mm T2 intermediate structure in the prostatectomy bed at the left aspect of urinary bladder neck with restricted diffusion in the DWI images and early enhancement in the contrast  enhanced images. This was concerning for locally recurrent/residual  prostatic cancer.  There was a T2 hypointense structure in the in the area of removed  right seminal vesicle measuring 13 x 11 mm  demonstrating restricted  diffusion in the DWI images. This may represent a recurrent/residual  tumor. There were 2 bone metastases in the left  pubic inferior ramus and  inferior aspect of left pubic body.   There is a 7 mm suspicious lymph nodes in the right obturator area  corresponding to to FACBC avid lymph node on the same date PET/CT  F-18 fluciclovine PET/CT on 5/20/2020 showed:  1.  2 foci of increased uptake in the left ischium, with underlying  sclerotic lesion, and symphysis pubis, these changes are indicative of  active prostate cancer metastasis.  2. Low-level increased uptake in 3 lymphnodes along the right external  and internal iliac nodes, the most suspicious of which is the deep  obturator.  3. Low-level increased uptake in the prostatectomy bed slightly to the  left side of the urethra, if clinically necessary, endoscopy would  would be necessary for confirmation of this being metastasis.  We have discussed MRI findings and PET/CT findings in detail. I have reviewed PET/CT images.   PSA was down to 27.7 on 4/21/2020. On April 21, 2020 he received an Eligard injection.    He started Apalutamide on 6/12/2020.    History Of Present Illness:  Mr. Etienne is a 54 year old male who presents with new diagnosis of metastatic hormone sensitive prostate cancer.  On April 21, 2020 he received an Eligard injection.    He started Apalutamide on 6/12/2020.  He has been feeling well overall except he feels fatigued about an hour or 2 after he takes apalutamide.  He also states he has some blurry vision right after he takes apalutamide daily.  He is going to be seen at an eye clinic next week.  Vision changes are not common side effect of apalutamide.  He is present with his wife today.   He has minimal hot flashes at night.  He has no other health related complaints.  Weight has been stable in the last 6 months at around 184-186 pounds.   In addition, a complete 12 point  review of systems is negative.      Past Medical/Surgical History:  Past Medical History:   Diagnosis Date     Gout      Prostate cancer (H) 01/10/2020     Past Surgical History:    Procedure Laterality Date     BIOPSY PROSTATE TRANSRECTAL  01/10/2020    Dr. Colmenares     LITHOTRIPSY  age 30s     MICROSCOPIC KNEE SURGERY Right age 30s     ORTHOPEDIC SURGERY Left 1985    Alan placed in Left Femur     PROSTATECTOMY RETROPUBIC RADICAL  2020    Dr. Colmenares     Allergies:  Allergies as of 2020     (No Known Allergies)     Current Medications:  Current Outpatient Medications   Medication Sig Dispense Refill     acetaminophen (TYLENOL) 325 MG tablet Take 325-650 mg by mouth every 6 hours as needed for mild pain       leuprolide (ELIGARD) 45 MG kit Inject 45 mg Subcutaneous every 6 months              His father was diagnosed with prostate cancer, at the age of 75. Paternal GM had throat cancer at the age of 94, nonsmoker.     Family History:  Family History   Problem Relation Age of Onset     Asthma Father      Prostate Cancer Father 73     Cancer Maternal Grandmother 92        throat      Asthma Sister      Social History:  Social History     Socioeconomic History     Marital status:    Occupational History     Not on file   Social Needs     Food insecurity     Transportation needs   Tobacco Use     Smoking status: Former Smoker     Packs/day: 1.00     Years: 30.00     Pack years: 30.00     Types: Cigarettes, Cigars     Last attempt to quit: 2012     Years since quittin.4     Smokeless tobacco: Never Used   Substance and Sexual Activity     Alcohol use: Yes     Comment: 4-6 drinks per day - patient reports beer or vodka drinks     Drug use: No     Sexual activity: Yes     Partners: Female   Lifestyle     Physical Exam:  BP (!) 154/92 (BP Location: Right arm)   Pulse 67   Temp 97.3  F (36.3  C) (Oral)   Resp 16   Wt 82.5 kg (181 lb 12.8 oz)   SpO2 99%   BMI 26.46 kg/m      Wt Readings from Last 5 Encounters:   20 84.4 kg (186 lb)   04/10/20 85.3 kg (188 lb)   20 82.2 kg (181 lb 3.2 oz)   20 83.9 kg (185 lb)   19 83.8 kg (184 lb 12.8 oz)      Constitutional: Alert, healthy, and in no distress.   ENT: Eyes bright , No mouth sores.   Neck: Supple, No adenopathy.  Cardiac: Heart rate and rhythm is regular today and strong without murmur  Respiratory: Breathing easy. Lung sounds clear to auscultation  Abdomen: Soft, non-tender, BS normal. No masses or organomegaly.   MS: Muscle tone normal, extremities normal with no edema.   Skin: No suspicious lesions or rashes  Neuro: Sensory grossly WNL, gait normal.   Lymph: Normal ant/post cervical, axillary, supraclavicular nodes  Psych: Mentation appears normal and affect normal          Laboratory/Imaging Studies  Orders Only on 07/17/2020   Component Date Value Ref Range Status     WBC 07/17/2020 5.6  4.0 - 11.0 10e9/L Final     RBC Count 07/17/2020 4.91  4.4 - 5.9 10e12/L Final     Hemoglobin 07/17/2020 14.2  13.3 - 17.7 g/dL Final     Hematocrit 07/17/2020 41.9  40.0 - 53.0 % Final     MCV 07/17/2020 85  78 - 100 fl Final     MCH 07/17/2020 28.9  26.5 - 33.0 pg Final     MCHC 07/17/2020 33.9  31.5 - 36.5 g/dL Final     RDW 07/17/2020 14.6  10.0 - 15.0 % Final     Platelet Count 07/17/2020 228  150 - 450 10e9/L Final     Diff Method 07/17/2020 Automated Method   Final     % Neutrophils 07/17/2020 66.8  % Final     % Lymphocytes 07/17/2020 20.1  % Final     % Monocytes 07/17/2020 9.1  % Final     % Eosinophils 07/17/2020 2.7  % Final     % Basophils 07/17/2020 0.9  % Final     % Immature Granulocytes 07/17/2020 0.4  % Final     Absolute Neutrophil 07/17/2020 3.7  1.6 - 8.3 10e9/L Final     Absolute Lymphocytes 07/17/2020 1.1  0.8 - 5.3 10e9/L Final     Absolute Monocytes 07/17/2020 0.5  0.0 - 1.3 10e9/L Final     Absolute Eosinophils 07/17/2020 0.2  0.0 - 0.7 10e9/L Final     Absolute Basophils 07/17/2020 0.1  0.0 - 0.2 10e9/L Final     Abs Immature Granulocytes 07/17/2020 0.0  0 - 0.4 10e9/L Final     Sodium 07/17/2020 138  133 - 144 mmol/L Final     Potassium 07/17/2020 4.8  3.4 - 5.3 mmol/L Final      Chloride 07/17/2020 105  94 - 109 mmol/L Final     Carbon Dioxide 07/17/2020 29  20 - 32 mmol/L Final     Anion Gap 07/17/2020 4  3 - 14 mmol/L Final     Glucose 07/17/2020 110* 70 - 99 mg/dL Final    Non Fasting     Urea Nitrogen 07/17/2020 16  7 - 30 mg/dL Final     Creatinine 07/17/2020 1.10  0.66 - 1.25 mg/dL Final     GFR Estimate 07/17/2020 75  >60 mL/min/[1.73_m2] Final    Comment: Non  GFR Calc  Starting 12/18/2018, serum creatinine based estimated GFR (eGFR) will be   calculated using the Chronic Kidney Disease Epidemiology Collaboration   (CKD-EPI) equation.       GFR Estimate If Black 07/17/2020 87  >60 mL/min/[1.73_m2] Final    Comment:  GFR Calc  Starting 12/18/2018, serum creatinine based estimated GFR (eGFR) will be   calculated using the Chronic Kidney Disease Epidemiology Collaboration   (CKD-EPI) equation.       Calcium 07/17/2020 9.2  8.5 - 10.1 mg/dL Final     Bilirubin Total 07/17/2020 0.4  0.2 - 1.3 mg/dL Final     Albumin 07/17/2020 3.8  3.4 - 5.0 g/dL Final     Protein Total 07/17/2020 7.1  6.8 - 8.8 g/dL Final     Alkaline Phosphatase 07/17/2020 80  40 - 150 U/L Final     ALT 07/17/2020 27  0 - 70 U/L Final     AST 07/17/2020 14  0 - 45 U/L Final     Uric Acid 07/17/2020 5.9  3.5 - 7.2 mg/dL Final   Results for LAURA MIRANDA JR BURNETT (MRN [id number]) as of 7/17/2020 10:36   Ref. Range 4/10/2020 15:32 6/3/2020 11:49   PSA Latest Ref Range: 0 - 4 ug/L 27.70 (H) 12.90 (H)         ASSESSMENT/PLAN:  Laura is a very pleasant 54 year old man with metastatic (to the bones) prostate cancer. He is s/p Eligard on 4/21/2020. Laura has low volume hormone sensitive metastatic disease, with residual disease locoregionally and bone metastasis (two lesions- in left ischium and symphysis pubis), asymptomatic.  He received Eligard on 4/21/2020 and started on Apalutamide on 6/12/2020.  Fatigue 26-39%,  1. Metastatic prostate cancer- f/up on PSA from today.  Continue apalutamide and will  switch over to Lupron, next dose due with next visit in October, every 3 months.  We will follow CBC differential, CMP, total testosterone level and PSA every 3 months.    2. Bone metastasis- Start  Xgeva q 3 months for prevention of skeletal related events given low volume metastatic disease to the bones. First dose today.    3. Lung cancer screening- 30 pack year smoker quit in 2012. Last PET/CT in May 2020. We'll paln annual low dose chest CT in May 2021.    4. Drug monitoring- TSH monitoring q 3-4 months. TSH normal at baseline of 0.97. Next with next visit.     All of the patient's and his wife's questions were answered.  At the end of our visit patient verbalized understanding and concurred with the plan.          Addendum:  PSA 2.0 on 7/17/2020.      Again, thank you for allowing me to participate in the care of your patient.        Sincerely,        Na Astudillo MD, MD

## 2020-07-17 NOTE — PROGRESS NOTES
Infusion Nursing Note:  Pre Etienne presents today for Xgeva injection.    Patient seen by provider today: Yes:    present during visit today: Not Applicable.    Note: Xgeva information printed and given to patient today.    Intravenous Access:  No Intravenous access/labs at this visit.    Treatment Conditions:  Lab Results   Component Value Date     07/17/2020                   Lab Results   Component Value Date    POTASSIUM 4.8 07/17/2020           No results found for: MAG         Lab Results   Component Value Date    CR 1.10 07/17/2020                   Lab Results   Component Value Date    NATALIYA 9.2 07/17/2020                Lab Results   Component Value Date    BILITOTAL 0.4 07/17/2020           Lab Results   Component Value Date    ALBUMIN 3.8 07/17/2020                    Lab Results   Component Value Date    ALT 27 07/17/2020           Lab Results   Component Value Date    AST 14 07/17/2020       Results reviewed, labs MET treatment parameters, ok to proceed with treatment.      Post Infusion Assessment:  Patient tolerated injection without incident.  Site patent and intact, free from redness, edema or discomfort.       Discharge Plan:   Patient discharged in stable condition accompanied by: wife.  Departure Mode: Ambulatory.    Brittany Brown LPN

## 2020-07-17 NOTE — PROGRESS NOTES
Oral Chemotherapy Monitoring Program  Lab Follow Up    Patient currently on apalutamide therapy for prostate cancer.    Reviewed lab results from 7/17/20.    No concerning abnormalities.    Assessment & Plan:  Continue current therapy.     Questions answered to patient's satisfaction.    Follow-Up:  One month with labs    Gareth Hanna PharmD  July 17, 2020

## 2020-07-17 NOTE — NURSING NOTE
"Oncology Rooming Note    July 17, 2020 10:29 AM   Per Etienne is a 54 year old male who presents for:    Chief Complaint   Patient presents with     Oncology Clinic Visit     Follow up     Initial Vitals: BP (!) 154/92 (BP Location: Right arm)   Pulse 67   Temp 97.3  F (36.3  C) (Oral)   Resp 16   Wt 82.5 kg (181 lb 12.8 oz)   SpO2 99%   BMI 26.46 kg/m   Estimated body mass index is 26.46 kg/m  as calculated from the following:    Height as of 2/13/20: 1.765 m (5' 9.5\").    Weight as of this encounter: 82.5 kg (181 lb 12.8 oz). Body surface area is 2.01 meters squared.  No Pain (0) Comment: Data Unavailable   No LMP for male patient.  Allergies reviewed: Yes  Medications reviewed: Yes    Medications: Medication refills not needed today.  Pharmacy name entered into [employer]: [id number] [place], [place] - [address]      April PEDRO Brown              "

## 2020-07-21 LAB — TESTOST SERPL-MCNC: 444 NG/DL (ref 240–950)

## 2020-07-23 ENCOUNTER — PATIENT OUTREACH (OUTPATIENT)
Dept: ONCOLOGY | Facility: CLINIC | Age: 55
End: 2020-07-23

## 2020-07-23 ENCOUNTER — INFUSION THERAPY VISIT (OUTPATIENT)
Dept: INFUSION THERAPY | Facility: CLINIC | Age: 55
End: 2020-07-23
Payer: COMMERCIAL

## 2020-07-23 ENCOUNTER — OFFICE VISIT (OUTPATIENT)
Dept: FAMILY MEDICINE | Facility: CLINIC | Age: 55
End: 2020-07-23
Payer: COMMERCIAL

## 2020-07-23 VITALS
TEMPERATURE: 98 F | HEART RATE: 80 BPM | SYSTOLIC BLOOD PRESSURE: 142 MMHG | OXYGEN SATURATION: 98 % | WEIGHT: 182 LBS | BODY MASS INDEX: 26.49 KG/M2 | DIASTOLIC BLOOD PRESSURE: 88 MMHG

## 2020-07-23 VITALS — SYSTOLIC BLOOD PRESSURE: 132 MMHG | HEART RATE: 90 BPM | OXYGEN SATURATION: 98 % | DIASTOLIC BLOOD PRESSURE: 91 MMHG

## 2020-07-23 DIAGNOSIS — C79.51 PROSTATE CANCER METASTATIC TO BONE (H): Primary | ICD-10-CM

## 2020-07-23 DIAGNOSIS — M54.42 ACUTE LEFT-SIDED LOW BACK PAIN WITH LEFT-SIDED SCIATICA: ICD-10-CM

## 2020-07-23 DIAGNOSIS — C61 PROSTATE CANCER METASTATIC TO BONE (H): ICD-10-CM

## 2020-07-23 DIAGNOSIS — C61 PROSTATE CANCER METASTATIC TO BONE (H): Primary | ICD-10-CM

## 2020-07-23 DIAGNOSIS — C79.51 BONE METASTASIS: ICD-10-CM

## 2020-07-23 DIAGNOSIS — C79.51 PROSTATE CANCER METASTATIC TO BONE (H): ICD-10-CM

## 2020-07-23 PROCEDURE — 99214 OFFICE O/P EST MOD 30 MIN: CPT | Performed by: FAMILY MEDICINE

## 2020-07-23 PROCEDURE — 99207 ZZC NO CHARGE NURSE ONLY: CPT

## 2020-07-23 PROCEDURE — 96402 CHEMO HORMON ANTINEOPL SQ/IM: CPT | Performed by: NURSE PRACTITIONER

## 2020-07-23 RX ORDER — HYDROCODONE BITARTRATE AND ACETAMINOPHEN 5; 325 MG/1; MG/1
TABLET ORAL
Qty: 45 TABLET | Refills: 0 | Status: SHIPPED | OUTPATIENT
Start: 2020-07-23 | End: 2020-08-25

## 2020-07-23 ASSESSMENT — PAIN SCALES - GENERAL: PAINLEVEL: MILD PAIN (3)

## 2020-07-23 NOTE — PROGRESS NOTES
SUBJECTIVE  HPI:Per Etienne is a 55 year old male who presents for follow up evaluation of his  back pain.The patient was last seen for this problem 6 weeks ago by myself.    . The patient was diagnosed with    IMPRESSION:    1. Left central disc extrusion at L4-L5.  2. Severe foraminal stenosis on the left and L4-L5.       He had an epidural steroid injection on 6-20  He noticed some pain reduction in 4-5 day(s) and it slowly got better from there.  He reports that the pain in the back is down 70% .   The pain down the leg is almost totally gone.     He reports that he usually takes 1/2 a tablet of pain medication in the am, another half at lunch and another half at bedtime       Allergies as of 07/23/2020     (No Known Allergies)     Current Outpatient Medications   Medication     apalutamide (ERLEADA) 60 MG tablet     HYDROcodone-acetaminophen (NORCO) 5-325 MG tablet     leuprolide (ELIGARD) 45 MG kit     prochlorperazine (COMPAZINE) 10 MG tablet     No current facility-administered medications for this visit.          Examination:  BP (!) 142/88   Pulse 80   Temp 98  F (36.7  C) (Tympanic)   Wt 82.6 kg (182 lb)   SpO2 98%   BMI 26.49 kg/m    General: healthy, alert and no distress.  Neuro exam of the lower extremities.         MRI LUMBAR SPINE WITHOUT CONTRAST June 11, 2020 1:36 PM      HISTORY: Back pain, cancer or infection suspected. Acute left-sided  low back pain with left-sided sciatica. Prostate cancer metastatic to  bone (H).      TECHNIQUE: Multiplanar multisequence MRI of the lumbar spine without  contrast.     COMPARISON: None.      FINDINGS: Alignment is significant for levoconvex curvature. Bone  marrow demonstrates mild edema and fatty endplate change surrounding  the right aspect of L3-L4 disc. Conus medullaris and cauda equina are  unremarkable. Conus medullaris terminates at the level of the T12-L1  disc. Paraspinal soft tissues demonstrate T2 hyperintense right-sided  renal lesions  which are incompletely characterized, statistically  likely benign.     Segmental Analysis:      T12-L1: Disc height maintained. No herniation. Normal facet joints. No  foraminal or spinal canal stenosis.      L1-L2: Mild disc height loss. Disc bulge, asymmetric to the left.  Normal facet joints. No right foraminal stenosis. Mild left foraminal  stenosis. No spinal canal stenosis.       L2-L3: Mild disc height loss. Disc bulge, asymmetric to the left.  Normal facet joints. No right foraminal stenosis. Mild left foraminal  stenosis. No spinal canal stenosis.       L3-L4: Moderate disc height loss involving the right aspect of the  disc. Disc bulge with bilateral lateral/foraminal components. Mild  bilateral facet arthropathy. Mild right foraminal stenosis. No left  foraminal stenosis. No spinal canal stenosis.     L4-L5: Mild disc height loss. Disc bulge with superimposed left  central disc extrusion extending superiorly within the slightly  widened space. The base of the extrusion contacts and slightly  displaces the traversing left L5 nerve root within the lateral recess  (series 5 image 23). Mild bilateral facet arthropathy. No right  foraminal stenosis. Severe left foraminal stenosis. Mild spinal canal  stenosis.       L5-S1: Mild disc height loss. Small central annular fissure. Mild  bilateral facet arthropathy. Mild bilateral foraminal stenosis. No  spinal canal stenosis.                                                                      IMPRESSION:    1. Left central disc extrusion at L4-L5.  2. Severe foraminal stenosis on the left and L4-L5.     CAROLINA ALLEN MD    ASSESSMENT/IMPRESSION:  L5 radiculopathy secondary to L4-5   He is s/p epidural steroid injection     PLAN:    I explained the pathophysiology of the foraminal stenosis and the therapeutic effect of the epidural steroid injection using a spine mode;  The patient and I discussed that if the pain in manageable at this point we can continue(s) to  use the hydrocodone in small doses as needed and try to wean down over time.  He is dealing with metastatic prostate cancer at this time and has a lot to deal with there.   We discussed that if needed the epidural steroid injection can be repeated and I will leave it up to him to contact me.   He was given 45 tabs of the hydrocodone which should last at least a month(s).   He can call for a refill(s) if needed.    I recommended that he return to clinic for an appointment in the next few month(s)  for his yearly complete physical exam and we will get all of his preventative medical needs taken care of at that time. I also recommended that he have a laboratory appointment 1 week prior to that to do his fasting laboratory work.

## 2020-07-23 NOTE — PATIENT INSTRUCTIONS
I recommended that the patient obtain over the counter nasal saline gel. Put a small amount on a Q-tip and gently apply to the nares every night before sleeping and during the day 3 times a day and as needed when feeling dry.

## 2020-07-23 NOTE — PROGRESS NOTES
Call placed to patient to communicate Dr. Astudillo is changing his prostate cancer treatment to Lupron as his testosterone level is still high. Patient would like to come in today for the Lupron injection.

## 2020-07-23 NOTE — PROGRESS NOTES
Infusion Nursing Note:  Per Etienne presents today for   Chief Complaint   Patient presents with     Allied Health Visit     Grant     .    Patient seen by provider today: No   present during visit today: Not Applicable.    Note: Patient assessed by Chanel Puentes RN prior to tx.    Intravenous Access:  No Intravenous access/labs at this visit.    Treatment Conditions:  Not Applicable.      Post Infusion Assessment:  Patient tolerated injection without incident.  Site patent and intact, free from redness, edema or discomfort.       Discharge Plan:   Patient will call for next appointment.   Patient discharged in stable condition accompanied by: self.    Viky Miller CMA

## 2020-07-30 DIAGNOSIS — C61 PROSTATE CANCER (H): Primary | ICD-10-CM

## 2020-08-25 ENCOUNTER — MYC REFILL (OUTPATIENT)
Dept: FAMILY MEDICINE | Facility: CLINIC | Age: 55
End: 2020-08-25

## 2020-08-25 DIAGNOSIS — C79.51 PROSTATE CANCER METASTATIC TO BONE (H): ICD-10-CM

## 2020-08-25 DIAGNOSIS — M54.42 ACUTE LEFT-SIDED LOW BACK PAIN WITH LEFT-SIDED SCIATICA: ICD-10-CM

## 2020-08-25 DIAGNOSIS — C61 PROSTATE CANCER METASTATIC TO BONE (H): ICD-10-CM

## 2020-08-26 DIAGNOSIS — C61 PROSTATE CANCER (H): Primary | ICD-10-CM

## 2020-08-26 RX ORDER — HYDROCODONE BITARTRATE AND ACETAMINOPHEN 5; 325 MG/1; MG/1
TABLET ORAL
Qty: 45 TABLET | Refills: 0 | Status: SHIPPED | OUTPATIENT
Start: 2020-08-26 | End: 2020-09-22

## 2020-08-26 NOTE — TELEPHONE ENCOUNTER
Controlled Substance Refill Request for norco  Problem List Complete:  No     Last Written Prescription Date:  7/23/20  Last Fill Quantity: 45,   # refills: 0    Last Office Visit with [facility] primary care provider: 7/23/20    Future Office visit:   Next 5 appointments (look out 90 days)    Oct 30, 2020 11:00 AM CDT  Return Visit with Na Astudillo MD  [facility] [address] [place] [id number] [phone]          Controlled substance agreement:   Encounter-Level CSA:    There are no encounter-level csa.     Patient-Level CSA:    There are no patient-level csa.         Last Urine Drug Screen: No results found for: CDAUT, No results found for: COMDAT, No results found for: THC13, PCP13, COC13, MAMP13, OPI13, AMP13, BZO13, TCA13, MTD13, BAR13, OXY13, PPX13, BUP13     Processing:  Rx to be electronically transmitted to pharmacy by provider      [email or web address]       checked in past 3 months?  Yes 8/26/20   Susana GALON, RN

## 2020-09-15 ENCOUNTER — TELEPHONE (OUTPATIENT)
Dept: FAMILY MEDICINE | Facility: CLINIC | Age: 55
End: 2020-09-15

## 2020-09-15 DIAGNOSIS — E55.9 VITAMIN D DEFICIENCY: ICD-10-CM

## 2020-09-15 DIAGNOSIS — Z13.6 CARDIOVASCULAR SCREENING; LDL GOAL LESS THAN 160: ICD-10-CM

## 2020-09-15 DIAGNOSIS — C61 PROSTATE CANCER (H): ICD-10-CM

## 2020-09-15 DIAGNOSIS — Z00.00 ROUTINE GENERAL MEDICAL EXAMINATION AT A HEALTH CARE FACILITY: Primary | ICD-10-CM

## 2020-09-15 NOTE — TELEPHONE ENCOUNTER
Patient scheduled ERYN for 10/14/20. Would like his annual labs ordered to be done at his oncology visit on 10/30/20.     Please review lab orders, sign and close encounter. Justa Arce TC/Pt Rep

## 2020-09-22 ENCOUNTER — MYC REFILL (OUTPATIENT)
Dept: FAMILY MEDICINE | Facility: CLINIC | Age: 55
End: 2020-09-22

## 2020-09-22 ENCOUNTER — MYC MEDICAL ADVICE (OUTPATIENT)
Dept: FAMILY MEDICINE | Facility: CLINIC | Age: 55
End: 2020-09-22

## 2020-09-22 DIAGNOSIS — M54.42 ACUTE LEFT-SIDED LOW BACK PAIN WITH LEFT-SIDED SCIATICA: ICD-10-CM

## 2020-09-22 DIAGNOSIS — C61 PROSTATE CANCER METASTATIC TO BONE (H): ICD-10-CM

## 2020-09-22 DIAGNOSIS — C79.51 PROSTATE CANCER METASTATIC TO BONE (H): ICD-10-CM

## 2020-09-22 RX ORDER — HYDROCODONE BITARTRATE AND ACETAMINOPHEN 5; 325 MG/1; MG/1
TABLET ORAL
Qty: 45 TABLET | Refills: 0 | Status: SHIPPED | OUTPATIENT
Start: 2020-09-22 | End: 2020-10-26

## 2020-09-22 RX ORDER — HYDROCODONE BITARTRATE AND ACETAMINOPHEN 5; 325 MG/1; MG/1
TABLET ORAL
Qty: 45 TABLET | Refills: 0 | Status: CANCELLED | OUTPATIENT
Start: 2020-09-22

## 2020-09-24 DIAGNOSIS — C61 PROSTATE CANCER (H): Primary | ICD-10-CM

## 2020-10-01 ENCOUNTER — OFFICE VISIT (OUTPATIENT)
Dept: FAMILY MEDICINE | Facility: CLINIC | Age: 55
End: 2020-10-01
Payer: COMMERCIAL

## 2020-10-01 VITALS
WEIGHT: 186 LBS | HEART RATE: 66 BPM | TEMPERATURE: 96.4 F | BODY MASS INDEX: 27.07 KG/M2 | OXYGEN SATURATION: 100 % | SYSTOLIC BLOOD PRESSURE: 136 MMHG | DIASTOLIC BLOOD PRESSURE: 78 MMHG

## 2020-10-01 DIAGNOSIS — M54.50 ACUTE BILATERAL LOW BACK PAIN WITHOUT SCIATICA: Primary | ICD-10-CM

## 2020-10-01 PROCEDURE — 99213 OFFICE O/P EST LOW 20 MIN: CPT | Performed by: FAMILY MEDICINE

## 2020-10-01 ASSESSMENT — PAIN SCALES - GENERAL: PAINLEVEL: SEVERE PAIN (6)

## 2020-10-01 NOTE — PROGRESS NOTES
SUBJECTIVE  HPI:Per Etienne is a 55 year old male who presents for follow up evaluation of his  back pain.  He is still being treated for the prostate cancer.    He has low back pain in the am   He usually takes 1/2 of a pain pill in the am.     He had the epidural steroid injection on 6-20.    The leg pain is gone    Last Sunday when he was gardening he on his knees for a while  When he stood up and could not straighten up because of low back pain   It is somewhat better now    It is sore and tight especially in the am     He denies side effects from the pain medication     Erleda gives him diarrhea and the pain med brings him back to normal         From his 7-23-20 note  . The patient was diagnosed with    IMPRESSION:    1. Left central disc extrusion at L4-L5.  2. Severe foraminal stenosis on the left and L4-L5.  He had an epidural steroid injection on 6-20  He noticed some pain reduction in 4-5 day(s) and it slowly got better from there.  He reports that the pain in the back is down 70% .   The pain down the leg is almost totally gone.   He reports that he usually takes 1/2 a tablet of pain medication in the am, another half at lunch and another half at bedtime       Allergies as of 10/01/2020     (No Known Allergies)     Current Outpatient Medications   Medication     apalutamide (ERLEADA) 60 MG tablet     HYDROcodone-acetaminophen (NORCO) 5-325 MG tablet     leuprolide (ELIGARD) 45 MG kit     prochlorperazine (COMPAZINE) 10 MG tablet     No current facility-administered medications for this visit.          Examination:  /78   Pulse 66   Temp 96.4  F (35.8  C) (Tympanic)   Wt 84.4 kg (186 lb)   SpO2 100%   BMI 27.07 kg/m    General: healthy, alert and no distress.  Neuro exam of the lower extremities.   DTR's  Right knee 1+  Right ankle 1+  Left knee 1+  Left ankle1+  Strength was +5 globally in the lower extremities.  Back examination: There was tenderness to palpation of the right paraspinal  muscles in the lower lumbar region and left paraspinal muscles in the lower lumbar region.  There was not CVA tenderness bilaterally.  Straight leg raise test was negative bilaterally.  There was decreased active range of motion in hip flexion bilaterally      ASSESSMENT/IMPRESSION:  musculoskeletal low back pain    PLAN:    I discussed that an epidural steroid injection will likely not be helpful at this point  The patient and I discussed the absolute importance of continuing to do range of motion exercises and strengthening exercises despite the immediate discomfort that it may cause.We discussed the use of non steroidal anti-inflammatory drugs and muscle relaxants to help them in this goal. Heat therapy in the morning to improve range of motion was emphasized. The patient was advised only to use ice at the end of the day for pain relief if needed.  The patient was referred to physical therapy for evaluation and treatment.  We will continue with the current medication(s).   He has no history of overuse or abuse.        follow up as needed

## 2020-10-14 ENCOUNTER — OFFICE VISIT (OUTPATIENT)
Dept: FAMILY MEDICINE | Facility: CLINIC | Age: 55
End: 2020-10-14
Payer: COMMERCIAL

## 2020-10-14 VITALS
OXYGEN SATURATION: 99 % | TEMPERATURE: 97.2 F | HEIGHT: 70 IN | SYSTOLIC BLOOD PRESSURE: 135 MMHG | HEART RATE: 92 BPM | WEIGHT: 188 LBS | DIASTOLIC BLOOD PRESSURE: 84 MMHG | BODY MASS INDEX: 26.92 KG/M2

## 2020-10-14 DIAGNOSIS — Z23 NEED FOR PROPHYLACTIC VACCINATION AND INOCULATION AGAINST INFLUENZA: ICD-10-CM

## 2020-10-14 DIAGNOSIS — Z29.9 PREVENTIVE MEASURE: ICD-10-CM

## 2020-10-14 DIAGNOSIS — Z00.00 ROUTINE GENERAL MEDICAL EXAMINATION AT A HEALTH CARE FACILITY: Primary | ICD-10-CM

## 2020-10-14 PROCEDURE — 99396 PREV VISIT EST AGE 40-64: CPT | Mod: 25 | Performed by: FAMILY MEDICINE

## 2020-10-14 PROCEDURE — 90682 RIV4 VACC RECOMBINANT DNA IM: CPT | Performed by: FAMILY MEDICINE

## 2020-10-14 PROCEDURE — 90471 IMMUNIZATION ADMIN: CPT | Performed by: FAMILY MEDICINE

## 2020-10-14 RX ORDER — ASPIRIN 81 MG/1
81 TABLET, CHEWABLE ORAL DAILY
COMMUNITY
Start: 2020-10-14 | End: 2024-09-05

## 2020-10-14 ASSESSMENT — ENCOUNTER SYMPTOMS
HEMATURIA: 0
NERVOUS/ANXIOUS: 0
CONSTIPATION: 0
MYALGIAS: 0
ABDOMINAL PAIN: 0
CHILLS: 0
COUGH: 0
WEAKNESS: 0
SHORTNESS OF BREATH: 0
HEARTBURN: 1
DIZZINESS: 0
HEMATOCHEZIA: 0
FREQUENCY: 0
DIARRHEA: 0
ARTHRALGIAS: 1
DYSURIA: 0
FEVER: 0
HEADACHES: 1
SORE THROAT: 0
NAUSEA: 0
PALPITATIONS: 0
EYE PAIN: 0
JOINT SWELLING: 0
PARESTHESIAS: 0

## 2020-10-14 ASSESSMENT — MIFFLIN-ST. JEOR: SCORE: 1681.07

## 2020-10-14 NOTE — PROGRESS NOTES
SUBJECTIVE:   CC: Per Etienne is an 55 year old male who presents for preventative health visit.     {Split Bill scripting  The purpose of this visit is to discuss your medical history and prevent health problems before you are sick. You may be responsible for a co-pay, coinsurance, or deductible if your visit today includes services such as checking on a sore throat, having an x-ray or lab test, or treating and evaluating a new or existing condition :970229}  Patient has been advised of split billing requirements and indicates understanding: {Yes and No:536988}  Healthy Habits:     Getting at least 3 servings of Calcium per day:  Yes    Bi-annual eye exam:  Yes    Dental care twice a year:  Yes    Sleep apnea or symptoms of sleep apnea:  None    Diet:  Regular (no restrictions)    Frequency of exercise:  None    Taking medications regularly:  Yes    PHQ-2 Total Score: 0    Additional concerns today:  No    {Add if <65 person on Medicare  - Required Questions (Optional):458389}  {Outside tests to abstract? :306208}    {additional problems to add (Optional):072712}    Today's PHQ-2 Score:   PHQ-2 (  [employer]) 10/14/2020   Q1: Little interest or pleasure in doing things 0   Q2: Feeling down, depressed or hopeless 0   PHQ-2 Score 0   Q1: Little interest or pleasure in doing things Not at all   Q2: Feeling down, depressed or hopeless Not at all   PHQ-2 Score 0       Abuse: Current or Past(Physical, Sexual or Emotional)- { :932258}  Do you feel safe in your environment? { :831654}        Social History     Tobacco Use     Smoking status: Former Smoker     Packs/day: 1.00     Years: 30.00     Pack years: 30.00     Types: Cigarettes, Cigars     Quit date: 2012     Years since quittin.7     Smokeless tobacco: Never Used   Substance Use Topics     Alcohol use: Yes     Comment: 4-6 drinks per day - patient reports beer or vodka drinks     {Rooming Staff- Complete this question if Prescreen response is not  "shown below for today's visit. If you drink alcohol do you typically have >3 drinks per day or >7 drinks per week? (Optional):895475}    Alcohol Use 10/14/2020   Prescreen: >3 drinks/day or >7 drinks/week? Yes   AUDIT SCORE  8   {add AUDIT responses (Optional) (A score of 7 for adult men is an indication of hazardous drinking; a score of 8 or more is an indication of an alcohol use disorder.  A score of 7 or more for adult women is an indication of hazardous drinking or an alchohol use disorder):954423}    Last PSA:   PSA   Date Value Ref Range Status   07/17/2020 2.00 0 - 4 ug/L Final     Comment:     Assay Method:  Chemiluminescence using Siemens Vista analyzer       Reviewed orders with patient. Reviewed health maintenance and updated orders accordingly - { :215994::\"Yes\"}  {Chronicprobdata (optional):307330}    Reviewed and updated as needed this visit by clinical staff                 Reviewed and updated as needed this visit by Provider                {HISTORY OPTIONS (Optional):788483}    Review of Systems   Constitutional: Negative for chills and fever.   HENT: Negative for congestion, ear pain, hearing loss and sore throat.    Eyes: Negative for pain and visual disturbance.   Respiratory: Negative for cough and shortness of breath.    Cardiovascular: Negative for chest pain, palpitations and peripheral edema.   Gastrointestinal: Positive for heartburn. Negative for abdominal pain, constipation, diarrhea, hematochezia and nausea.   Genitourinary: Positive for impotence. Negative for discharge, dysuria, frequency, genital sores, hematuria and urgency.   Musculoskeletal: Positive for arthralgias. Negative for joint swelling and myalgias.   Skin: Negative for rash.   Neurological: Positive for headaches. Negative for dizziness, weakness and paresthesias.   Psychiatric/Behavioral: Negative for mood changes. The patient is not nervous/anxious.      {MALE ROS (Optional):266630::\"CONSTITUTIONAL: NEGATIVE for fever, " "chills, change in weight\",\"INTEGUMENTARY/SKIN: NEGATIVE for worrisome rashes, moles or lesions\",\"EYES: NEGATIVE for vision changes or irritation\",\"ENT: NEGATIVE for ear, mouth and throat problems\",\"RESP: NEGATIVE for significant cough or SOB\",\"CV: NEGATIVE for chest pain, palpitations or peripheral edema\",\"GI: NEGATIVE for nausea, abdominal pain, heartburn, or change in bowel habits\",\" male: negative for dysuria, hematuria, decreased urinary stream, erectile dysfunction, urethral discharge\",\"MUSCULOSKELETAL: NEGATIVE for significant arthralgias or myalgia\",\"NEURO: NEGATIVE for weakness, dizziness or paresthesias\",\"PSYCHIATRIC: NEGATIVE for changes in mood or affect\"}    OBJECTIVE:   There were no vitals taken for this visit.    Physical Exam  {Exam Choices (Optional):704400}    {Diagnostic Test Results (Optional):854108::\"Diagnostic Test Results:\",\"Labs reviewed in Epic\"}    ASSESSMENT/PLAN:   {Diag Picklist:773655}    Patient has been advised of split billing requirements and indicates understanding: {YES / NO:631958::\"Yes\"}  COUNSELING:   {MALE COUNSELING MESSAGES:851839::\"Reviewed preventive health counseling, as reflected in patient instructions\"}    Estimated body mass index is 27.07 kg/m  as calculated from the following:    Height as of 2/13/20: 1.765 m (5' 9.5\").    Weight as of 10/1/20: 84.4 kg (186 lb).     {Weight Management Plan (ACO) Complete if BMI is abnormal-  Ages 18-64  BMI >24.9.  Age 65+ with BMI <23 or >30 (Optional):725721}    He reports that he quit smoking about 8 years ago. His smoking use included cigarettes and cigars. He has a 30.00 pack-year smoking history. He has never used smokeless tobacco.      Counseling Resources:  ATP IV Guidelines  Pooled Cohorts Equation Calculator  FRAX Risk Assessment  ICSI Preventive Guidelines  Dietary Guidelines for Americans, 2010  USDA's MyPlate  ASA Prophylaxis  Lung CA Screening    Per Chen MD  [facility]  "

## 2020-10-14 NOTE — PATIENT INSTRUCTIONS
Preventive Health Recommendations  Male Ages 50 - 64    Yearly exam:             See your health care provider every year in order to  o   Review health changes.   o   Discuss preventive care.    o   Review your medicines if your doctor has prescribed any.     Have a cholesterol test every 5 years, or more frequently if you are at risk for high cholesterol/heart disease.     Have a diabetes test (fasting glucose) every three years. If you are at risk for diabetes, you should have this test more often.     Have a colonoscopy at age 50, or have a yearly FIT test (stool test). These exams will check for colon cancer.      Talk with your health care provider about whether or not a prostate cancer screening test (PSA) is right for you.    You should be tested each year for STDs (sexually transmitted diseases), if you re at risk.     Shots: Get a flu shot each year. Get a tetanus shot every 10 years.     Nutrition:    Eat at least 5 servings of fruits and vegetables daily.     Eat whole-grain bread, whole-wheat pasta and brown rice instead of white grains and rice.     Get adequate Calcium and Vitamin D.     Lifestyle    Exercise for at least 150 minutes a week (30 minutes a day, 5 days a week). This will help you control your weight and prevent disease.     Limit alcohol to one drink per day.     No smoking.     Wear sunscreen to prevent skin cancer.     See your dentist every six months for an exam and cleaning.     See your eye doctor every 1 to 2 years.      Patient Education     Preventing Osteoporosis: Meeting Your Calcium Needs    Your body needs calcium to build and repair bones. But it can't make calcium on its own. That's why it's important to eat calcium-rich foods. Some foods are naturally rich in calcium. Others have calcium added (fortified). It's best to get calcium from the foods you eat. But if you can't get enough, you may want to take calcium supplements. To meet your daily calcium needs, try the  foods listed below.  Dairy Fish & beans Other sources   Source   Calcium (mg) per serving   Source   Calcium (mg) per serving   Source   Calcium (mg) per serving   Low-fat yogurt, plain   415 mg/8 oz.   Sardines, Atlantic, canned, with bones   351 mg/3 oz.   Oatmeal, instant, fortified   215 mg/1 cup   Nonfat milk   302 mg/1 cup   [place], sockeye, canned, with bones   239 mg/3 oz.   Tofu made with calcium sulfate   204 mg/3 oz.   Low-fat milk   297 mg/1 cup   Soybeans, fresh, boiled   131 mg/1/2 cup   Collards   179 mg/1/2 cup   Swiss cheese   272 mg/1 oz.   White beans, cooked   81 mg/1/2 cup   English muffin, whole wheat   175 mg/1 muffin   Cheddar cheese   205 mg/1 oz.   Navy beans, cooked   79 mg/1/2 cup   Kale   90 mg/1/2 cup   Ice cream strawberry   79 mg/1/2 cup           Orange, navel   56 mg/1 medium   Note: Calcium levels may vary depending on brand and size.  Daily calcium needs  14 to 18 years old: 1,300 mg  19 to 30 years old: 1,000 mg  31 to 50 years old: 1,000 mg  51 to 70 years old, women: 1,200 mg  51 to 70 years old, men: 1,000 mg  Pregnant or nursin to 18 years old: 1,300 mg, 19 to 50 years old: 1,000 mg  Older than 70 (women and men): 1,200 mg   Date Last Reviewed: 2018-2019 [employer]. [address] [place] [address]. All rights reserved. This information is not intended as a substitute for professional medical care. Always follow your healthcare professional's instructions.           Patient Education     Controlling High Blood Pressure  High blood pressure (hypertension) is often called the silent killer. This is because many people who have it, don t know it. High blood pressure can raise your risk of heart attack, stroke, heart disease, and heart failure. Controlling your blood pressure can decrease your risk of these problems. Know your blood pressure and remember to check it regularly. Doing so can save your life.  Blood pressure measurements are  given as 2 numbers. Systolic blood pressure is the upper number. This is the pressure when the heart contracts. Diastolic blood pressure is the lower number. This is the pressure when the heart relaxes between beats.  Blood pressure is categorized as normal, elevated, or stage 1 or stage 2 high blood pressure:    Normal blood pressure is systolic of less than 120 and diastolic of less than 80 (120/80)    Elevated blood pressure is systolic of 120 to 129 and diastolic less than 80    Stage 1 high blood pressure is systolic is 130 to 139 or diastolic between 80 to 89    Stage 2 high blood pressure is when systolic is 140 or higher or the diastolic is 90 or higher  Here are some things you can do to help control your blood pressure.    Choose heart-healthy foods    Select low-salt, low-fat foods. Limit sodium intake to 2,400 mg per day or the amount suggested by your healthcare provider.    Limit canned, dried, cured, packaged, and fast foods. These can contain a lot of salt.    Eat 8 to 10 servings of fruits and vegetables every day.    Choose lean meats, fish, or chicken.    Eat whole-grain pasta, brown rice, and beans.    Eat 2 to 3 servings of low-fat or fat-free dairy products.    Ask your doctor about the DASH eating plan. This plan helps reduce blood pressure.    When you go to a restaurant, ask that your meal be prepared with no added salt.    Maintain a healthy weight    Ask your healthcare provider how many calories to eat a day. Then stick to that number.    Ask your healthcare provider what weight range is healthiest for you. If you are overweight, a weight loss of only 3% to 5% of your body weight can help lower blood pressure. Generally, a good weight loss goal is to lose 10% of your body weight in a year.    Limit snacks and sweets.    Get regular exercise.    Get up and get active    Choose activities you enjoy. Find ones you can do with friends or family. This includes bicycling, dancing, walking, and  jogging.    Park farther away from building entrances.    Use stairs instead of the elevator.    When you can, walk or bike instead of driving.    [place]on leaves, garden, or do household repairs.    Be active at a moderate to vigorous level of physical activity for at least 40 minutes for a minimum of 3 to 4 days a week.     Manage stress    Make time to relax and enjoy life. Find time to laugh.    Communicate your concerns with your loved ones and your healthcare provider.    Visit with family and friends, and keep up with hobbies.    Limit alcohol and quit smoking    Men should have no more than 2 drinks per day.    Women should have no more than 1 drink per day.    Talk with your healthcare provider about quitting smoking. Smoking significantly increases your risk for heart disease and stroke. Ask your healthcare provider about community smoking cessation programs and other options.    Medicines  If lifestyle changes aren t enough, your healthcare provider may prescribe high blood pressure medicine. Take all medicines as prescribed. If you have any questions about your medicines, ask your healthcare provider before stopping or changing them.  Date Last Reviewed: 4/27/2016 2000-2019 The [employer]. [address] [place] [address], [place] [address]. All rights reserved. This information is not intended as a substitute for professional medical care. Always follow your healthcare professional's instructions.

## 2020-10-14 NOTE — PROGRESS NOTES
SUBJECTIVE:   CC: Per Etienne is an 55 year old male who presents for preventative health visit.       Patient has been advised of split billing requirements and indicates understanding: Yes  Healthy Habits:     Getting at least 3 servings of Calcium per day:  Yes    Bi-annual eye exam:  Yes    Dental care twice a year:  Yes    Sleep apnea or symptoms of sleep apnea:  None    Diet:  Regular (no restrictions)    Frequency of exercise:  None    Taking medications regularly:  Yes    PHQ-2 Total Score: 0    Additional concerns today:  No  Imm/Inj  Associated symptoms include arthralgias and headaches. Pertinent negatives include no abdominal pain, chest pain, chills, congestion, coughing, fever, joint swelling, myalgias, nausea, rash, sore throat or weakness.               Today's PHQ-2 Score:   PHQ-2 (  [employer]) 10/14/2020   Q1: Little interest or pleasure in doing things 0   Q2: Feeling down, depressed or hopeless 0   PHQ-2 Score 0   Q1: Little interest or pleasure in doing things Not at all   Q2: Feeling down, depressed or hopeless Not at all   PHQ-2 Score 0       Abuse: Current or Past(Physical, Sexual or Emotional)- No  Do you feel safe in your environment? Yes        Social History     Tobacco Use     Smoking status: Former Smoker     Packs/day: 1.00     Years: 30.00     Pack years: 30.00     Types: Cigarettes, Cigars     Quit date: 2012     Years since quittin.7     Smokeless tobacco: Never Used   Substance Use Topics     Alcohol use: Yes     Comment: 4-6 drinks per day - patient reports beer or vodka drinks         Alcohol Use 10/14/2020   Prescreen: >3 drinks/day or >7 drinks/week? Yes   AUDIT SCORE  8       Last PSA:   PSA   Date Value Ref Range Status   2020 2.00 0 - 4 ug/L Final     Comment:     Assay Method:  Chemiluminescence using Siemens Vista analyzer       Reviewed orders with patient. Reviewed health maintenance and updated orders accordingly -       Reviewed and updated as  "needed this visit by clinical staff  Tobacco  Allergies  Meds   Med Hx  Surg Hx  Fam Hx  Soc Hx        Reviewed and updated as needed this visit by Provider  Tobacco                   Review of Systems   Constitutional: Negative for chills and fever.   HENT: Negative for congestion, ear pain, hearing loss and sore throat.    Eyes: Negative for pain and visual disturbance.   Respiratory: Negative for cough and shortness of breath.    Cardiovascular: Negative for chest pain, palpitations and peripheral edema.   Gastrointestinal: Positive for heartburn. Negative for abdominal pain, constipation, diarrhea, hematochezia and nausea.   Genitourinary: Positive for impotence. Negative for discharge, dysuria, frequency, genital sores, hematuria and urgency.   Musculoskeletal: Positive for arthralgias. Negative for joint swelling and myalgias.   Skin: Negative for rash.   Neurological: Positive for headaches. Negative for dizziness, weakness and paresthesias.   Psychiatric/Behavioral: Negative for mood changes. The patient is not nervous/anxious.          OBJECTIVE:   /84   Pulse 92   Temp 97.2  F (36.2  C) (Tympanic)   Ht 1.765 m (5' 9.5\")   Wt 85.3 kg (188 lb)   SpO2 99%   BMI 27.36 kg/m      Physical Exam          ASSESSMENT/PLAN:       ICD-10-CM    1. Routine general medical examination at a health care facility  Z00.00    2. Need for prophylactic vaccination and inoculation against influenza  Z23    3. Preventive measure  Z29.9 aspirin (ASA) 81 MG chewable tablet       Patient has been advised of split billing requirements and indicates understanding: Yes  COUNSELING:   Reviewed preventive health counseling, as reflected in patient instructions       Regular exercise       Healthy diet/nutrition       Vision screening       Immunizations    Vaccinated for: Influenza             Aspirin Prophylaxsis       Family planning       Consider Hep C screening for patients born between 1945 and 1965       HIV " "screeninx in teen years, 1x in adult years, and at intervals if high risk       Colon cancer screening       Osteoporosis Prevention/Bone Health    Estimated body mass index is 27.36 kg/m  as calculated from the following:    Height as of this encounter: 1.765 m (5' 9.5\").    Weight as of this encounter: 85.3 kg (188 lb).     Weight management plan: Discussed healthy diet and exercise guidelines    He reports that he quit smoking about 8 years ago. His smoking use included cigarettes and cigars. He has a 30.00 pack-year smoking history. He has never used smokeless tobacco.      Counseling Resources:  ATP IV Guidelines  Pooled Cohorts Equation Calculator  FRAX Risk Assessment  ICSI Preventive Guidelines  Dietary Guidelines for Americans,   [employer]'s MyPlate  ASA Prophylaxis  Lung CA Screening    Per Chen MD  [facility]  --------------------------------------------------------------------------------------------------------------------------------------  SUBJECTIVE:  Per Etienne is a 55 year old male who presents to the clinic today for a routine physical exam.    The patient's last physical was many  years ago.     Cholesterol   Date Value Ref Range Status   2020 200 (H) <200 mg/dL Final     Comment:     Desirable:       <200 mg/dl   2018 174 <200 mg/dL Final     HDL Cholesterol   Date Value Ref Range Status   2020 78 >39 mg/dL Final   2018 52 >39 mg/dL Final     LDL Cholesterol Calculated   Date Value Ref Range Status   2020 109 (H) <100 mg/dL Final     Comment:     Above desirable:  100-129 mg/dl  Borderline High:  130-159 mg/dL  High:             160-189 mg/dL  Very high:       >189 mg/dl     2018 110 (H) <100 mg/dL Final     Comment:     Above desirable:  100-129 mg/dl  Borderline High:  130-159 mg/dL  High:             160-189 mg/dL  Very high:       >189 mg/dl       Triglycerides   Date Value Ref Range Status   2020 67 <150 " mg/dL Final   05/05/2018 62 <150 mg/dL Final     Comment:     Fasting specimen     Cholesterol/HDL Ratio   Date Value Ref Range Status   08/30/2012 4.1 0.0 - 5.0 Final     The patient's last fasting lipid panel was done 3 months ago and the results are listed above.        The 10-year ASCVD risk score (Laura PINZON Jr., et al., 2013) is: 4.6%    Values used to calculate the score:      Age: 55 years      Sex: Male      Is Non- : No      Diabetic: No      Tobacco smoker: No      Systolic Blood Pressure: 135 mmHg      Is BP treated: No      HDL Cholesterol: 78 mg/dL      Total Cholesterol: 200 mg/dL            The patient reports that he has never been treated for high blood pressure.    The patient reports that he does not take a daily aspirin.    Lab Results   Component Value Date    HCVAB Nonreactive 05/05/2018     The patient reports that he has been screened for Hepatitis C    (Screen all baby boomers once per CDC-- the generation born from 1945 through 1965 and per USPTF screen age 19 to 79 especially younger people who have used IV drugs)  He would not like to have an Hepatitis C test today    No results found for: HIAGAB  The patient reports that he has not been screened for HIV   (Screen all 15 to 64 years old)  He would not like to have an HIV test today      Immunization History   Administered Date(s) Administered     TDAP Vaccine (Adacel) 09/05/2012     The patient's believes that his last tetanus shot was given 8 year(s) ago.   The patient believes that he has not had a Shingrix in the past  The patient believes that he has not had a PPSV23 in the past.  The patient believes that he has not had a PCV13 in the past.  The patient believes that he has not had a seasonal flu vaccination this fall or winter.  The patient would like to have a Influenza      No results found for this or any previous visit.]   The patient reports a family history of colon cancer.  The patient reports that he  has had a colonoscopy. His  last colonoscopy was in 2020 and he  report that is was abnormal. The patient was told to have this repeated in 5 years.    He had a vasectomy in the near future.  The patient denies a family history of diabetes.  The patient reports a personal history of prostate cancer.   The patient reports that he eats or drinks 2-3 servings of dairy products per day. He does not take a calcium supplement at all.  The patient reports that he has dental appointments approximately every 4 months.  The patient reports that he  has an eye examination approximately every 2 year(s).    Do you currently smoke? No, quit 9 years ago  How many years have you smoked? 37    How many packs per day did you smoke on average? 1 ppd  (if more than 30 pack year history and the patient is age 55-80 consider ordering an annual low dose radiation lung CT to screen for cancer)  (Do not order if patient has quit more than 15 years ago or has a health condition that limits life expectancy or could not tolerate curative lung surgery)  Are you interested having a lung CT to screen for lung cancer? No: he had a PET scan     If the patient has smoked more that 100 cigarettes, has the patient had an imaging study (US or CT) for an AAA between the ages of 65 and 75? N/A            Patient Active Problem List   Diagnosis     CARDIOVASCULAR SCREENING; LDL GOAL LESS THAN 160     Left varicocele     Vitamin D deficiency     Kidney stone     Prostate cancer (H)     High risk medication use     Prostate cancer metastatic to bone (H)     Bone metastasis (H)       Past Surgical History:   Procedure Laterality Date     BIOPSY PROSTATE TRANSRECTAL  01/10/2020    Dr. Colmenares     LITHOTRIPSY  age 30s     MICROSCOPIC KNEE SURGERY Right age 30s     ORTHOPEDIC SURGERY Left 1985    Alan placed in Left Femur     PROSTATECTOMY RETROPUBIC RADICAL  02/24/2020    Dr. Colmenares       Family History   Problem Relation Age of Onset     Asthma Father      Prostate  Cancer Father 73     Cancer Maternal Grandmother 92        throat      Asthma Sister        Social History     Socioeconomic History     Marital status:      Spouse name: Not on file     Number of children: Not on file     Years of education: Not on file     Highest education level: Not on file   Occupational History     Not on file   Social Needs     Financial resource strain: Not on file     Food insecurity     Worry: Not on file     Inability: Not on file     Transportation needs     Medical: Not on file     Non-medical: Not on file   Tobacco Use     Smoking status: Former Smoker     Packs/day: 1.00     Years: 30.00     Pack years: 30.00     Types: Cigarettes, Cigars     Quit date: 2012     Years since quittin.7     Smokeless tobacco: Never Used   Substance and Sexual Activity     Alcohol use: Yes     Comment: 4-6 drinks per day - patient reports beer or vodka drinks     Drug use: No     Sexual activity: Yes     Partners: Female   Lifestyle     Physical activity     Days per week: Not on file     Minutes per session: Not on file     Stress: Not on file   Relationships     Social connections     Talks on phone: Not on file     Gets together: Not on file     Attends Christianity service: Not on file     Active member of club or organization: Not on file     Attends meetings of clubs or organizations: Not on file     Relationship status: Not on file     Intimate partner violence     Fear of current or ex partner: Not on file     Emotionally abused: Not on file     Physically abused: Not on file     Forced sexual activity: Not on file   Other Topics Concern     Parent/sibling w/ CABG, MI or angioplasty before 65F 55M? Not Asked   Social History Narrative     Not on file       Current Outpatient Medications   Medication Sig Dispense Refill     apalutamide (ERLEADA) 60 MG tablet Take 4 tablets (240 mg) by mouth daily 120 tablet 0     HYDROcodone-acetaminophen (NORCO) 5-325 MG tablet Take 1/2 a tablet three  "times a day as needed 45 tablet 0     leuprolide (ELIGARD) 45 MG kit Inject 45 mg Subcutaneous every 6 months           PHYSICAL EXAMINATION:  Blood pressure 135/84, pulse 92, temperature 97.2  F (36.2  C), temperature source Tympanic, height 1.765 m (5' 9.5\"), weight 85.3 kg (188 lb), SpO2 99 %.  General appearance - healthy, alert and no distress  Skin - Skin color, texture, turgor normal. No rashes or lesions.  Head - Normocephalic. No masses, lesions, tenderness or abnormalities  Eyes - conjunctivae/corneas clear. PERRL, EOM's intact. Fundi benign  Ears - External ears normal. Canals clear. TM's normal.  Nose/Sinuses - Nares normal. Septum midline. Mucosa normal. No drainage or sinus tenderness.  Oropharynx - Lips, mucosa, and tongue normal. Teeth and gums normal.  Neck - Neck supple. No adenopathy. Thyroid symmetric, normal size,  Lungs - Percussion normal. Good diaphragmatic excursion. Lungs clear  Heart - PMI normal. No lifts, heaves, or thrills. RRR. No murmurs, clicks gallops or rub  Abdomen - Abdomen soft, non-tender. BS normal. No masses, organomegaly  Extremities - Extremities normal. No deformities, edema, or skin discoloration.  Musculoskeletal - Spine ROM normal. Muscular strength intact.  Peripheral pulses - radial=4/4, femoral=4/4, popliteal=4/4, dorsalis pedis=4/4,  Neuro - Gait normal. Reflexes normal and symmetric. Sensation grossly WNL.  Genitalia - Penis normal. No urethral discharge. Scrotum normal to palpation. No hernia.  Rectal - Rectal  Deferred as he recently had a total prostatectomy    Comprehensive metabolic panel  Order: [id number]  Status:  Final result   Visible to patient:  Yes (MyChart) Dx:  Prostate cancer (H)    Ref Range & Units 2mo ago 4mo ago    Sodium 133 - 144 mmol/L 138  140     Potassium 3.4 - 5.3 mmol/L 4.8  4.5     Chloride 94 - 109 mmol/L 105  106     Carbon Dioxide 20 - 32 mmol/L 29  27 VC, CM     Anion Gap 3 - 14 mmol/L 4  7     Glucose 70 - 99 mg/dL 110High   104High "     Comment: Non Fasting    Urea Nitrogen 7 - 30 mg/dL 16  20     Creatinine 0.66 - 1.25 mg/dL 1.10  0.89     GFR Estimate >60 mL/min/ 75  >90 CM    Comment: Non  GFR Calc   Starting 12/18/2018, serum creatinine based estimated GFR (eGFR) will be   calculated using the Chronic Kidney Disease Epidemiology Collaboration   (CKD-EPI) equation.     GFR Estimate If Black >60 mL/min/ 87  >90 CM    Comment:  GFR Calc   Starting 12/18/2018, serum creatinine based estimated GFR (eGFR) will be   calculated using the Chronic Kidney Disease Epidemiology Collaboration   (CKD-EPI) equation.     Calcium 8.5 - 10.1 mg/dL 9.2  9.2     Bilirubin Total 0.2 - 1.3 mg/dL 0.4  0.4     Albumin 3.4 - 5.0 g/dL 3.8  3.7     Protein Total 6.8 - 8.8 g/dL 7.1  6.9     Alkaline Phosphatase 40 - 150 U/L 80  86     ALT 0 - 70 U/L 27  29     AST 0 - 45 U/L 14  18    Resulting Agency  [facility]                 ASSESSMENT:    ICD-10-CM    1. Routine general medical examination at a health care facility  Z00.00    2. Need for prophylactic vaccination and inoculation against influenza  Z23 INFLUENZA QUAD, RECOMBINANT, P-FREE (RIV4) (FLUBLOCK) [71034]       Well-Adult Physical Exam.  Health Maintenance Due   Topic Date Due     Pneumococcal Vaccine: Pediatrics (0 to 5 Years) and At-Risk Patients (6 to 64 Years) (1 of 3 - PCV13) 07/19/1970     ZOSTER IMMUNIZATION (1 of 2) 07/19/2014     INFLUENZA VACCINE (1) 09/01/2020     Health Maintenance   Topic Date Due     Pneumococcal Vaccine: Pediatrics (0 to 5 Years) and At-Risk Patients (6 to 64 Years) (1 of 3 - PCV13) 07/19/1970     ZOSTER IMMUNIZATION (1 of 2) 07/19/2014     INFLUENZA VACCINE (1) 09/01/2020     PREVENTIVE CARE VISIT  10/14/2021     DTAP/TDAP/TD IMMUNIZATION (2 - Td) 09/05/2022     COLORECTAL CANCER SCREENING  05/22/2025     ADVANCE CARE PLANNING  10/01/2025     HEPATITIS C SCREENING  Completed     HIV SCREENING  Completed     PHQ-2  Completed     IPV IMMUNIZATION   Aged Out     MENINGITIS IMMUNIZATION  Aged Out     HEPATITIS B IMMUNIZATION  Aged Out         HEALTH CARE MAINTENENCE: The recommended screening tests and vaccinatons for this patient have been discussed as above.  The appropriate tests and vaccinations  have been ordered or declined by the patient. Please see the orders in EPIC.The patient specifically declines: Shingrix and PPSV23    Immunization Status:  up to date and documented except for influenza and Shingrix and PPSV23    Patient Active Problem List   Diagnosis     CARDIOVASCULAR SCREENING; LDL GOAL LESS THAN 160     Left varicocele     Vitamin D deficiency     Kidney stone     Prostate cancer (H)     High risk medication use     Prostate cancer metastatic to bone (H)     Bone metastasis (H)        ATP III Guidelines  ICSI Preventive Guidelines    PLAN:   I recommended to take a daily aspirin (81 to 325 mg)  HIV testing was discussed but the pt declined  Shingrix recommended  Flu shot recommended  Discussed calcium intake, vitamins and supplements. Recommended 1000 mg of calcium daily  Sunscreen use was recommended especially in the area of tatoos  Refills on chronic medication given  Recommended dental exams every 6 months  Recommended eye exam every 1-2 years  Follow up in 1 year for the next preventative medical visit      Body mass index is 27.36 kg/m .

## 2020-10-22 DIAGNOSIS — C61 PROSTATE CANCER (H): Primary | ICD-10-CM

## 2020-10-26 ENCOUNTER — MYC REFILL (OUTPATIENT)
Dept: FAMILY MEDICINE | Facility: CLINIC | Age: 55
End: 2020-10-26

## 2020-10-26 ENCOUNTER — MYC MEDICAL ADVICE (OUTPATIENT)
Dept: FAMILY MEDICINE | Facility: CLINIC | Age: 55
End: 2020-10-26

## 2020-10-26 DIAGNOSIS — C61 PROSTATE CANCER METASTATIC TO BONE (H): ICD-10-CM

## 2020-10-26 DIAGNOSIS — M54.42 ACUTE LEFT-SIDED LOW BACK PAIN WITH LEFT-SIDED SCIATICA: ICD-10-CM

## 2020-10-26 DIAGNOSIS — C79.51 PROSTATE CANCER METASTATIC TO BONE (H): ICD-10-CM

## 2020-10-26 RX ORDER — HYDROCODONE BITARTRATE AND ACETAMINOPHEN 5; 325 MG/1; MG/1
TABLET ORAL
Qty: 45 TABLET | Refills: 0 | Status: SHIPPED | OUTPATIENT
Start: 2020-10-26 | End: 2020-11-23

## 2020-10-26 RX ORDER — HYDROCODONE BITARTRATE AND ACETAMINOPHEN 5; 325 MG/1; MG/1
TABLET ORAL
Qty: 45 TABLET | Refills: 0 | Status: CANCELLED | OUTPATIENT
Start: 2020-10-26

## 2020-10-26 NOTE — TELEPHONE ENCOUNTER
Controlled Substance Refill Request for norco  Problem List Complete:  No     PROVIDER TO CONSIDER COMPLETION OF PROBLEM LIST AND OVERVIEW/CONTROLLED SUBSTANCE AGREEMENT    Last Written Prescription Date:  9/22/20  Last Fill Quantity: 45,   # refills: 0    THE MOST RECENT OFFICE VISIT MUST BE WITHIN THE PAST 3 MONTHS. AT LEAST ONE FACE TO FACE VISIT MUST OCCUR EVERY 6 MONTHS. ADDITIONAL VISITS CAN BE VIRTUAL.  (THIS STATEMENT SHOULD BE DELETED.)    Last Office Visit with [facility] primary care provider: 10/14/20    Future Office visit:   Next 5 appointments (look out 90 days)    Oct 30, 2020 11:00 AM  Return Visit with Na Astudillo MD  [facility] ([facility]) [address] [place] [id number] [phone]          Controlled substance agreement:   Encounter-Level CSA:    There are no encounter-level csa.     Patient-Level CSA:    There are no patient-level csa.         Last Urine Drug Screen: No results found for: CDAUT, No results found for: COMDAT, No results found for: THC13, PCP13, COC13, MAMP13, OPI13, AMP13, BZO13, TCA13, MTD13, BAR13, OXY13, PPX13, BUP13        [email or web address]       checked in past 3 months?  Yes 10/26/20 no concerns     Karen GALON, RN

## 2020-10-30 ENCOUNTER — ANCILLARY PROCEDURE (OUTPATIENT)
Dept: GENERAL RADIOLOGY | Facility: CLINIC | Age: 55
End: 2020-10-30
Attending: INTERNAL MEDICINE
Payer: COMMERCIAL

## 2020-10-30 ENCOUNTER — INFUSION THERAPY VISIT (OUTPATIENT)
Dept: INFUSION THERAPY | Facility: CLINIC | Age: 55
End: 2020-10-30
Payer: COMMERCIAL

## 2020-10-30 ENCOUNTER — ONCOLOGY VISIT (OUTPATIENT)
Dept: ONCOLOGY | Facility: CLINIC | Age: 55
End: 2020-10-30
Payer: COMMERCIAL

## 2020-10-30 VITALS
RESPIRATION RATE: 16 BRPM | BODY MASS INDEX: 27.85 KG/M2 | OXYGEN SATURATION: 99 % | TEMPERATURE: 98 F | SYSTOLIC BLOOD PRESSURE: 142 MMHG | DIASTOLIC BLOOD PRESSURE: 82 MMHG | HEART RATE: 79 BPM | HEIGHT: 69 IN | WEIGHT: 188.05 LBS

## 2020-10-30 VITALS
RESPIRATION RATE: 16 BRPM | WEIGHT: 188.1 LBS | DIASTOLIC BLOOD PRESSURE: 82 MMHG | HEART RATE: 79 BPM | OXYGEN SATURATION: 99 % | BODY MASS INDEX: 27.86 KG/M2 | HEIGHT: 69 IN | TEMPERATURE: 98 F | SYSTOLIC BLOOD PRESSURE: 142 MMHG

## 2020-10-30 DIAGNOSIS — C79.51 PROSTATE CANCER METASTATIC TO BONE (H): ICD-10-CM

## 2020-10-30 DIAGNOSIS — C79.51 BONE METASTASIS: ICD-10-CM

## 2020-10-30 DIAGNOSIS — R05.9 COUGH: Primary | ICD-10-CM

## 2020-10-30 DIAGNOSIS — C61 PROSTATE CANCER (H): ICD-10-CM

## 2020-10-30 DIAGNOSIS — C61 PROSTATE CANCER METASTATIC TO BONE (H): ICD-10-CM

## 2020-10-30 DIAGNOSIS — R05.9 COUGH: ICD-10-CM

## 2020-10-30 DIAGNOSIS — C61 PROSTATE CANCER METASTATIC TO BONE (H): Primary | ICD-10-CM

## 2020-10-30 DIAGNOSIS — C79.51 PROSTATE CANCER METASTATIC TO BONE (H): Primary | ICD-10-CM

## 2020-10-30 LAB
ALBUMIN SERPL-MCNC: 3.3 G/DL (ref 3.4–5)
ALP SERPL-CCNC: 71 U/L (ref 40–150)
ALT SERPL W P-5'-P-CCNC: 25 U/L (ref 0–70)
ANION GAP SERPL CALCULATED.3IONS-SCNC: 3 MMOL/L (ref 3–14)
AST SERPL W P-5'-P-CCNC: 18 U/L (ref 0–45)
BASOPHILS # BLD AUTO: 0.1 10E9/L (ref 0–0.2)
BASOPHILS NFR BLD AUTO: 0.5 %
BILIRUB SERPL-MCNC: 0.4 MG/DL (ref 0.2–1.3)
BUN SERPL-MCNC: 18 MG/DL (ref 7–30)
CALCIUM SERPL-MCNC: 8.7 MG/DL (ref 8.5–10.1)
CHLORIDE SERPL-SCNC: 107 MMOL/L (ref 94–109)
CO2 SERPL-SCNC: 29 MMOL/L (ref 20–32)
CREAT SERPL-MCNC: 0.94 MG/DL (ref 0.66–1.25)
DIFFERENTIAL METHOD BLD: ABNORMAL
EOSINOPHIL # BLD AUTO: 0.2 10E9/L (ref 0–0.7)
EOSINOPHIL NFR BLD AUTO: 1.3 %
ERYTHROCYTE [DISTWIDTH] IN BLOOD BY AUTOMATED COUNT: 11.5 % (ref 10–15)
GFR SERPL CREATININE-BSD FRML MDRD: >90 ML/MIN/{1.73_M2}
GLUCOSE SERPL-MCNC: 115 MG/DL (ref 70–99)
HCT VFR BLD AUTO: 39.4 % (ref 40–53)
HGB BLD-MCNC: 13.5 G/DL (ref 13.3–17.7)
IMM GRANULOCYTES # BLD: 0 10E9/L (ref 0–0.4)
IMM GRANULOCYTES NFR BLD: 0.4 %
LABORATORY COMMENT REPORT: NORMAL
LYMPHOCYTES # BLD AUTO: 1.2 10E9/L (ref 0.8–5.3)
LYMPHOCYTES NFR BLD AUTO: 10.3 %
MCH RBC QN AUTO: 30.8 PG (ref 26.5–33)
MCHC RBC AUTO-ENTMCNC: 34.3 G/DL (ref 31.5–36.5)
MCV RBC AUTO: 90 FL (ref 78–100)
MONOCYTES # BLD AUTO: 0.8 10E9/L (ref 0–1.3)
MONOCYTES NFR BLD AUTO: 6.7 %
NEUTROPHILS # BLD AUTO: 9.1 10E9/L (ref 1.6–8.3)
NEUTROPHILS NFR BLD AUTO: 80.8 %
PLATELET # BLD AUTO: 254 10E9/L (ref 150–450)
POTASSIUM SERPL-SCNC: 4.8 MMOL/L (ref 3.4–5.3)
PROT SERPL-MCNC: 6.8 G/DL (ref 6.8–8.8)
PSA SERPL-MCNC: 0.13 UG/L (ref 0–4)
RBC # BLD AUTO: 4.38 10E12/L (ref 4.4–5.9)
SARS-COV-2 RNA SPEC QL NAA+PROBE: NEGATIVE
SARS-COV-2 RNA SPEC QL NAA+PROBE: NORMAL
SODIUM SERPL-SCNC: 139 MMOL/L (ref 133–144)
SPECIMEN SOURCE: NORMAL
SPECIMEN SOURCE: NORMAL
TSH SERPL DL<=0.005 MIU/L-ACNC: 1.97 MU/L (ref 0.4–4)
WBC # BLD AUTO: 11.3 10E9/L (ref 4–11)

## 2020-10-30 PROCEDURE — 96402 CHEMO HORMON ANTINEOPL SQ/IM: CPT | Performed by: INTERNAL MEDICINE

## 2020-10-30 PROCEDURE — 36415 COLL VENOUS BLD VENIPUNCTURE: CPT | Performed by: INTERNAL MEDICINE

## 2020-10-30 PROCEDURE — 84403 ASSAY OF TOTAL TESTOSTERONE: CPT | Mod: 90 | Performed by: INTERNAL MEDICINE

## 2020-10-30 PROCEDURE — 99000 SPECIMEN HANDLING OFFICE-LAB: CPT | Performed by: INTERNAL MEDICINE

## 2020-10-30 PROCEDURE — 84153 ASSAY OF PSA TOTAL: CPT | Performed by: INTERNAL MEDICINE

## 2020-10-30 PROCEDURE — 71046 X-RAY EXAM CHEST 2 VIEWS: CPT | Performed by: RADIOLOGY

## 2020-10-30 PROCEDURE — U0003 INFECTIOUS AGENT DETECTION BY NUCLEIC ACID (DNA OR RNA); SEVERE ACUTE RESPIRATORY SYNDROME CORONAVIRUS 2 (SARS-COV-2) (CORONAVIRUS DISEASE [COVID-19]), AMPLIFIED PROBE TECHNIQUE, MAKING USE OF HIGH THROUGHPUT TECHNOLOGIES AS DESCRIBED BY CMS-2020-01-R: HCPCS | Performed by: INTERNAL MEDICINE

## 2020-10-30 PROCEDURE — 80050 GENERAL HEALTH PANEL: CPT | Performed by: INTERNAL MEDICINE

## 2020-10-30 PROCEDURE — 99215 OFFICE O/P EST HI 40 MIN: CPT | Mod: 25 | Performed by: INTERNAL MEDICINE

## 2020-10-30 PROCEDURE — 99207 PR NO CHARGE NURSE ONLY: CPT

## 2020-10-30 PROCEDURE — 96372 THER/PROPH/DIAG INJ SC/IM: CPT | Mod: 59 | Performed by: INTERNAL MEDICINE

## 2020-10-30 RX ORDER — DENOSUMAB 120 MG/1.7ML
120 INJECTION SUBCUTANEOUS
Qty: 1.7 ML | Refills: 0 | COMMUNITY
Start: 2020-10-30

## 2020-10-30 ASSESSMENT — PAIN SCALES - GENERAL
PAINLEVEL: NO PAIN (0)
PAINLEVEL: NO PAIN (0)

## 2020-10-30 ASSESSMENT — MIFFLIN-ST. JEOR
SCORE: 1681.12
SCORE: 1681.34

## 2020-10-30 NOTE — PROGRESS NOTES
Infusion Nursing Note:  Per Etienne presents today for Leuprolide & Xgeva injections.    Patient seen by provider today: Yes: Dr. Astudillo   present during visit today: Not Applicable.    Note: N/A.    Intravenous Access:  No Intravenous access/labs at this visit.    Treatment Conditions:  Lab Results   Component Value Date     10/30/2020                   Lab Results   Component Value Date    POTASSIUM 4.8 10/30/2020           No results found for: MAG         Lab Results   Component Value Date    CR 0.94 10/30/2020                   Lab Results   Component Value Date    NATALIYA 8.7 10/30/2020                Lab Results   Component Value Date    BILITOTAL 0.4 10/30/2020           Lab Results   Component Value Date    ALBUMIN 3.3 10/30/2020                    Lab Results   Component Value Date    ALT 25 10/30/2020           Lab Results   Component Value Date    AST 18 10/30/2020       Results reviewed, labs MET treatment parameters, ok to proceed with treatment.      Post Infusion Assessment:  Patient tolerated injection without incident.  Site patent and intact, free from redness, edema or discomfort.       Discharge Plan:   Patient discharged in stable condition accompanied by: wife.  Departure Mode: Ambulatory.    Brittany Brown LPN

## 2020-10-30 NOTE — NURSING NOTE
"Oncology Rooming Note    October 30, 2020 10:59 AM   Per Etienne is a 55 year old male who presents for:    Chief Complaint   Patient presents with     Oncology Clinic Visit     3 month follow up     Initial Vitals: BP (!) 142/82 (BP Location: Right arm)   Pulse 79   Temp 98  F (36.7  C) (Oral)   Resp 16   Ht 1.765 m (5' 9.49\")   Wt 85.3 kg (188 lb 1.6 oz)   SpO2 99%   BMI 27.39 kg/m   Estimated body mass index is 27.39 kg/m  as calculated from the following:    Height as of this encounter: 1.765 m (5' 9.49\").    Weight as of this encounter: 85.3 kg (188 lb 1.6 oz). Body surface area is 2.05 meters squared.  No Pain (0) Comment: Data Unavailable   No LMP for male patient.  Allergies reviewed: Yes  Medications reviewed: Yes    Medications: Medication refills not needed today.  Pharmacy name entered into [employer]: [id number] [place], [place] - [address]      April PEDRO Brown              "

## 2020-10-30 NOTE — PROGRESS NOTES
Oncology Follow-up:  Date on this visit: Oct 30, 2020    Primary care physician: Per Chen   Urologist: Dr. Angel Colmenares     Metastatic hormone sensitive prostate cancer    Oncologic History:  Metastatic hormone sensitive prostate cancer  He presented to his PCP in December 2019 with slow urinary stream.  PSA was checked and was found to be 124. On January 8, 2020 abdomen pelvis CT showed groundglass opacity in the left lower lobe,  dystrophic calcification of the prostate gland, and a fatty liver.  Same-day bone scan was negative. On January 10, 2010 prostate biopsy showed on the left Robyn score 4+3 involving 7 cores and on the right Robyn score 3+4 involving 6 cores, with positive perineural invasion.  He proceeded to undergo RRP by Dr. Colmenares on February 24, 2020.  Pathology revealed acinar adenocarcinoma [place] 4+3 with focal ROBERTO, bladder neck base invasion, positive SVI, positive PNI, and multiple positive margins.  There was no LVSI and 0 out of 3 lymph nodes removed were involved with tumor; pT3bN0.  He received Eligard on 4/20/2020.  MRI prostate on 5/21/2020 which showed a 14 x 9 mm T2 intermediate structure in the prostatectomy bed at the left aspect of urinary bladder neck with restricted diffusion in the DWI images and early enhancement in the contrast  enhanced images. This was concerning for locally recurrent/residual  prostatic cancer.  There was a T2 hypointense structure in the in the area of removed  right seminal vesicle measuring 13 x 11 mm  demonstrating restricted  diffusion in the DWI images. This may represent a recurrent/residual  tumor. There were 2 bone metastases in the left pubic inferior ramus and  inferior aspect of left pubic body.   There is a 7 mm suspicious lymph nodes in the right obturator area  corresponding to to FACBC avid lymph node on the same date PET/CT  F-18 fluciclovine PET/CT on 5/20/2020 showed:  1.  2 foci of increased uptake in the left ischium, with  underlying  sclerotic lesion, and symphysis pubis, these changes are indicative of  active prostate cancer metastasis.  2. Low-level increased uptake in 3 lymphnodes along the right external  and internal iliac nodes, the most suspicious of which is the deep  obturator.  3. Low-level increased uptake in the prostatectomy bed slightly to the  left side of the urethra, if clinically necessary, endoscopy would  would be necessary for confirmation of this being metastasis.  PSA was down to 27.7 on 4/21/2020. On April 21, 2020 he received an Eligard injection.    He started Apalutamide on 6/12/2020.    History Of Present Illness:  Mr. Etienne is a 55 year old male who presents with  metastatic hormone sensitive prostate cancer diagnosed in 01/2020, s/p RRP at that time but later found to have residual/recurrent disease in the pelvis and L inferior pubic body and pubic ramus metastasis.  On April 21, 2020 he received an Eligard injection.    However, by July 2020 his testosterone level was not suppressed sufficiently and he received a Lupron injection at that time.  He started Apalutamide on 6/12/2020 and has been tolerating it well..  Last week, about 10 days ago he had an episode of shaking chills that lasted for couple of hours and 1 day ago he had an episode of nonproductive cough.  His cough has resolved since.  He has had no fevers.  He denies any diarrhea.  He denies any problems with urination.  He denies any exposure to sick contacts. He is present with his wife today.  He is a  and goes into work during  COVID-19 outbreak, but there are no people there essentially when he goes in.  He has no other health related complaints.  He has gained about 4 pounds in the last 3 months.  He has had chronic lower back issues.  He has had a recent epidural injection and is on Norco as needed.  In addition, a complete 12 point  review of systems is negative.      Past Medical/Surgical History:  Past Medical  "History:   Diagnosis Date     Gout      Prostate cancer (H) 01/10/2020     Past Surgical History:   Procedure Laterality Date     BIOPSY PROSTATE TRANSRECTAL  01/10/2020    Dr. Colmenares     LITHOTRIPSY  age 30s     MICROSCOPIC KNEE SURGERY Right age 30s     ORTHOPEDIC SURGERY Left 1985    Alan placed in Left Femur     PROSTATECTOMY RETROPUBIC RADICAL  2020    Dr. Colmenares     Allergies:  Allergies as of 10/30/2020     (No Known Allergies)     Current Medications:  Current Outpatient Medications   Medication Sig Dispense Refill     acetaminophen (TYLENOL) 325 MG tablet Take 325-650 mg by mouth every 6 hours as needed for mild pain       leuprolide (ELIGARD) 45 MG kit Inject 45 mg Subcutaneous every 6 months                Family History:    His father was diagnosed with prostate cancer, at the age of 75. Paternal GM had throat cancer at the age of 94, nonsmoker.     Social History:  Social History     Socioeconomic History     Marital status:    Occupational History     Not on file   Social Needs     Food insecurity     Transportation needs   Tobacco Use     Smoking status: Former Smoker     Packs/day: 1.00     Years: 30.00     Pack years: 30.00     Types: Cigarettes, Cigars     Last attempt to quit: 2012     Years since quittin.4     Smokeless tobacco: Never Used   Substance and Sexual Activity     Alcohol use: Yes     Comment: 4-6 drinks per day - patient reports beer or vodka drinks     Physical Exam:    BP (!) 142/82 (BP Location: Right arm)   Pulse 79   Temp 98  F (36.7  C) (Oral)   Resp 16   Ht 1.765 m (5' 9.49\")   Wt 85.3 kg (188 lb 1.6 oz)   SpO2 99%   BMI 27.39 kg/m      Wt Readings from Last 5 Encounters:   10/14/20 85.3 kg (188 lb)   10/01/20 84.4 kg (186 lb)   20 82.6 kg (182 lb)   20 82.5 kg (181 lb 14.1 oz)   20 82.5 kg (181 lb 12.8 oz)       GENERAL APPEARANCE: healthy, alert and no distress  HEENT: PEERLA, no neck asymmetry or masses  Lymphatics: No cervical, " supraclavicular, axillary lymphadenopathy bilaterally    CV: S1S2 reg no murmur  Respiratory: CTA b/l  GI: soft,  BS+, NT, ND  Extremities: no edema.    SKIN: no suspicious lesions or rashes    PSYCHIATRIC: mentation appears normal and affect normal  Neuro: gait intact. axox3            Laboratory/Imaging Studies  Component      Latest Ref Rng & Units 10/30/2020   WBC      4.0 - 11.0 10e9/L 11.3 (H)   RBC Count      4.4 - 5.9 10e12/L 4.38 (L)   Hemoglobin      13.3 - 17.7 g/dL 13.5   Hematocrit      40.0 - 53.0 % 39.4 (L)   MCV      78 - 100 fl 90   MCH      26.5 - 33.0 pg 30.8   MCHC      31.5 - 36.5 g/dL 34.3   RDW      10.0 - 15.0 % 11.5   Platelet Count      150 - 450 10e9/L 254   Diff Method       Automated Method   % Neutrophils      % 80.8   % Lymphocytes      % 10.3   % Monocytes      % 6.7   % Eosinophils      % 1.3   % Basophils      % 0.5   % Immature Granulocytes      % 0.4   Absolute Neutrophil      1.6 - 8.3 10e9/L 9.1 (H)   Absolute Lymphocytes      0.8 - 5.3 10e9/L 1.2   Absolute Monocytes      0.0 - 1.3 10e9/L 0.8   Absolute Eosinophils      0.0 - 0.7 10e9/L 0.2   Absolute Basophils      0.0 - 0.2 10e9/L 0.1   Abs Immature Granulocytes      0 - 0.4 10e9/L 0.0   Sodium      133 - 144 mmol/L 139   Potassium      3.4 - 5.3 mmol/L 4.8   Chloride      94 - 109 mmol/L 107   Carbon Dioxide      20 - 32 mmol/L 29   Anion Gap      3 - 14 mmol/L 3   Glucose      70 - 99 mg/dL 115 (H)   Urea Nitrogen      7 - 30 mg/dL 18   Creatinine      0.66 - 1.25 mg/dL 0.94   GFR Estimate      >60 mL/min/1.73:m2 >90   GFR Estimate If Black      >60 mL/min/1.73:m2 >90   Calcium      8.5 - 10.1 mg/dL 8.7   Bilirubin Total      0.2 - 1.3 mg/dL 0.4   Albumin      3.4 - 5.0 g/dL 3.3 (L)   Protein Total      6.8 - 8.8 g/dL 6.8   Alkaline Phosphatase      40 - 150 U/L 71   ALT      0 - 70 U/L 25   AST      0 - 45 U/L 18   PSA      0 - 4 ug/L 0.13   TSH      0.40 - 4.00 mU/L 1.97         ASSESSMENT/PLAN:  Jay is a very pleasant  55 year old man with metastatic (to the bones) prostate cancer. He is s/p Eligard on 4/21/2020. Jay has low volume hormone sensitive metastatic disease, with residual disease locoregionally and bone metastasis (two lesions- in left ischium and symphysis pubis), asymptomatic.  He received Eligard on 4/21/2020 and started on Apalutamide on 6/12/2020.  Fatigue 26-39%,  1. Metastatic prostate cancer-PSA decreased to 0.13.  Follow-up on serum testosterone level from today.  Continue apalutamide and Lupron every 3 months.  We will follow CBC differential, CMP, total testosterone level and PSA every 3 months.    2. Bone metastasis-continue Xgeva q 3 months for prevention of skeletal related events given low volume metastatic disease to the bones.     3.  1 episode of cough 1 day ago and chills 10 days ago, afebrile but with slightly elevated white count secondary to mild neutrophilia.  My clinical suspicion for COVID-19 infection is low but we will proceed with coronavirus 19 PCR testing.  Also obtain a chest x-ray.We'll inform the patient of the results and recommendations and  f/up plan based on the results.     4. Lung cancer screening- 30 pack year smoker quit in 2012. Last PET/CT in May 2020.  Chest x-ray today as above.  We'll paln annual low dose chest CT in May 2021.    5. Drug monitoring- TSH monitoring q 3-4 months. TSH normal at baseline and today. Next with next visit.     All of the patient's and his wife's questions were answered.  At the end of our visit patient verbalized understanding and concurred with the plan.    Addendum:  CXR- no acute airspace disease. Images reviewed  COVID-19 PCR negative. Patient to report new s/o infection. F/up in 3 months as above.

## 2020-10-30 NOTE — LETTER
10/30/2020         RE: Per Etienne  [id number] [facility] [address]        Dear Colleague,    Thank you for referring your patient, Per Etienne, to the [facility]. Please see a copy of my visit note below.    Oncology Follow-up:  Date on this visit: Oct 30, 2020    Primary care physician: Per Chen   Urologist: Dr. Angel Colmenares     Metastatic hormone sensitive prostate cancer    Oncologic History:  Metastatic hormone sensitive prostate cancer  He presented to his PCP in December 2019 with slow urinary stream.  PSA was checked and was found to be 124. On January 8, 2020 abdomen pelvis CT showed groundglass opacity in the left lower lobe,  dystrophic calcification of the prostate gland, and a fatty liver.  Same-day bone scan was negative. On January 10, 2010 prostate biopsy showed on the left Robyn score 4+3 involving 7 cores and on the right Robyn score 3+4 involving 6 cores, with positive perineural invasion.  He proceeded to undergo RRP by Dr. Colmenares on February 24, 2020.  Pathology revealed acinar adenocarcinoma [place] 4+3 with focal ROBERTO, bladder neck base invasion, positive SVI, positive PNI, and multiple positive margins.  There was no LVSI and 0 out of 3 lymph nodes removed were involved with tumor; pT3bN0.  He received Eligard on 4/20/2020.  MRI prostate on 5/21/2020 which showed a 14 x 9 mm T2 intermediate structure in the prostatectomy bed at the left aspect of urinary bladder neck with restricted diffusion in the DWI images and early enhancement in the contrast  enhanced images. This was concerning for locally recurrent/residual  prostatic cancer.  There was a T2 hypointense structure in the in the area of removed  right seminal vesicle measuring 13 x 11 mm  demonstrating restricted  diffusion in the DWI images. This may represent a recurrent/residual  tumor. There were 2 bone metastases in the left pubic inferior ramus and  inferior  aspect of left pubic body.   There is a 7 mm suspicious lymph nodes in the right obturator area  corresponding to to FACBC avid lymph node on the same date PET/CT  F-18 fluciclovine PET/CT on 5/20/2020 showed:  1.  2 foci of increased uptake in the left ischium, with underlying  sclerotic lesion, and symphysis pubis, these changes are indicative of  active prostate cancer metastasis.  2. Low-level increased uptake in 3 lymphnodes along the right external  and internal iliac nodes, the most suspicious of which is the deep  obturator.  3. Low-level increased uptake in the prostatectomy bed slightly to the  left side of the urethra, if clinically necessary, endoscopy would  would be necessary for confirmation of this being metastasis.  PSA was down to 27.7 on 4/21/2020. On April 21, 2020 he received an Eligard injection.    He started Apalutamide on 6/12/2020.    History Of Present Illness:  Mr. Etienne is a 55 year old male who presents with  metastatic hormone sensitive prostate cancer diagnosed in 01/2020, s/p RRP at that time but later found to have residual/recurrent disease in the pelvis and L inferior pubic body and pubic ramus metastasis.  On April 21, 2020 he received an Eligard injection.    However, by July 2020 his testosterone level was not suppressed sufficiently and he received a Lupron injection at that time.  He started Apalutamide on 6/12/2020 and has been tolerating it well..  Last week, about 10 days ago he had an episode of shaking chills that lasted for couple of hours and 1 day ago he had an episode of nonproductive cough.  His cough has resolved since.  He has had no fevers.  He denies any diarrhea.  He denies any problems with urination.  He denies any exposure to sick contacts. He is present with his wife today.  He is a  and goes into work during  COVID-19 outbreak, but there are no people there essentially when he goes in.  He has no other health related complaints.  He has  "gained about 4 pounds in the last 3 months.  He has had chronic lower back issues.  He has had a recent epidural injection and is on Norco as needed.  In addition, a complete 12 point  review of systems is negative.      Past Medical/Surgical History:  Past Medical History:   Diagnosis Date     Gout      Prostate cancer (H) 01/10/2020     Past Surgical History:   Procedure Laterality Date     BIOPSY PROSTATE TRANSRECTAL  01/10/2020    Dr. Colmenares     LITHOTRIPSY  age 30s     MICROSCOPIC KNEE SURGERY Right age 30s     ORTHOPEDIC SURGERY Left 1985    Alan placed in Left Femur     PROSTATECTOMY RETROPUBIC RADICAL  2020    Dr. Colmenares     Allergies:  Allergies as of 10/30/2020     (No Known Allergies)     Current Medications:  Current Outpatient Medications   Medication Sig Dispense Refill     acetaminophen (TYLENOL) 325 MG tablet Take 325-650 mg by mouth every 6 hours as needed for mild pain       leuprolide (ELIGARD) 45 MG kit Inject 45 mg Subcutaneous every 6 months                Family History:    His father was diagnosed with prostate cancer, at the age of 75. Paternal GM had throat cancer at the age of 94, nonsmoker.     Social History:  Social History     Socioeconomic History     Marital status:    Occupational History     Not on file   Social Needs     Food insecurity     Transportation needs   Tobacco Use     Smoking status: Former Smoker     Packs/day: 1.00     Years: 30.00     Pack years: 30.00     Types: Cigarettes, Cigars     Last attempt to quit: 2012     Years since quittin.4     Smokeless tobacco: Never Used   Substance and Sexual Activity     Alcohol use: Yes     Comment: 4-6 drinks per day - patient reports beer or vodka drinks     Physical Exam:    BP (!) 142/82 (BP Location: Right arm)   Pulse 79   Temp 98  F (36.7  C) (Oral)   Resp 16   Ht 1.765 m (5' 9.49\")   Wt 85.3 kg (188 lb 1.6 oz)   SpO2 99%   BMI 27.39 kg/m      Wt Readings from Last 5 Encounters:   10/14/20 85.3 kg " (188 lb)   10/01/20 84.4 kg (186 lb)   07/23/20 82.6 kg (182 lb)   07/17/20 82.5 kg (181 lb 14.1 oz)   07/17/20 82.5 kg (181 lb 12.8 oz)       GENERAL APPEARANCE: healthy, alert and no distress  HEENT: PEERLA, no neck asymmetry or masses  Lymphatics: No cervical, supraclavicular, axillary lymphadenopathy bilaterally    CV: S1S2 reg no murmur  Respiratory: CTA b/l  GI: soft,  BS+, NT, ND  Extremities: no edema.    SKIN: no suspicious lesions or rashes    PSYCHIATRIC: mentation appears normal and affect normal  Neuro: gait intact. axox3            Laboratory/Imaging Studies  Component      Latest Ref Rng & Units 10/30/2020   WBC      4.0 - 11.0 10e9/L 11.3 (H)   RBC Count      4.4 - 5.9 10e12/L 4.38 (L)   Hemoglobin      13.3 - 17.7 g/dL 13.5   Hematocrit      40.0 - 53.0 % 39.4 (L)   MCV      78 - 100 fl 90   MCH      26.5 - 33.0 pg 30.8   MCHC      31.5 - 36.5 g/dL 34.3   RDW      10.0 - 15.0 % 11.5   Platelet Count      150 - 450 10e9/L 254   Diff Method       Automated Method   % Neutrophils      % 80.8   % Lymphocytes      % 10.3   % Monocytes      % 6.7   % Eosinophils      % 1.3   % Basophils      % 0.5   % Immature Granulocytes      % 0.4   Absolute Neutrophil      1.6 - 8.3 10e9/L 9.1 (H)   Absolute Lymphocytes      0.8 - 5.3 10e9/L 1.2   Absolute Monocytes      0.0 - 1.3 10e9/L 0.8   Absolute Eosinophils      0.0 - 0.7 10e9/L 0.2   Absolute Basophils      0.0 - 0.2 10e9/L 0.1   Abs Immature Granulocytes      0 - 0.4 10e9/L 0.0   Sodium      133 - 144 mmol/L 139   Potassium      3.4 - 5.3 mmol/L 4.8   Chloride      94 - 109 mmol/L 107   Carbon Dioxide      20 - 32 mmol/L 29   Anion Gap      3 - 14 mmol/L 3   Glucose      70 - 99 mg/dL 115 (H)   Urea Nitrogen      7 - 30 mg/dL 18   Creatinine      0.66 - 1.25 mg/dL 0.94   GFR Estimate      >60 mL/min/1.73:m2 >90   GFR Estimate If Black      >60 mL/min/1.73:m2 >90   Calcium      8.5 - 10.1 mg/dL 8.7   Bilirubin Total      0.2 - 1.3 mg/dL 0.4   Albumin      3.4  - 5.0 g/dL 3.3 (L)   Protein Total      6.8 - 8.8 g/dL 6.8   Alkaline Phosphatase      40 - 150 U/L 71   ALT      0 - 70 U/L 25   AST      0 - 45 U/L 18   PSA      0 - 4 ug/L 0.13   TSH      0.40 - 4.00 mU/L 1.97         ASSESSMENT/PLAN:  Jay is a very pleasant 55 year old man with metastatic (to the bones) prostate cancer. He is s/p Eligard on 4/21/2020. Jay has low volume hormone sensitive metastatic disease, with residual disease locoregionally and bone metastasis (two lesions- in left ischium and symphysis pubis), asymptomatic.  He received Eligard on 4/21/2020 and started on Apalutamide on 6/12/2020.  Fatigue 26-39%,  1. Metastatic prostate cancer-PSA decreased to 0.13.  Follow-up on serum testosterone level from today.  Continue apalutamide and Lupron every 3 months.  We will follow CBC differential, CMP, total testosterone level and PSA every 3 months.    2. Bone metastasis-continue Xgeva q 3 months for prevention of skeletal related events given low volume metastatic disease to the bones.     3.  1 episode of cough 1 day ago and chills 10 days ago, afebrile but with slightly elevated white count secondary to mild neutrophilia.  My clinical suspicion for COVID-19 infection is low but we will proceed with coronavirus 19 PCR testing.  Also obtain a chest x-ray.We'll inform the patient of the results and recommendations and  f/up plan based on the results.     4. Lung cancer screening- 30 pack year smoker quit in 2012. Last PET/CT in May 2020.  Chest x-ray today as above.  We'll paln annual low dose chest CT in May 2021.    5. Drug monitoring- TSH monitoring q 3-4 months. TSH normal at baseline and today. Next with next visit.     All of the patient's and his wife's questions were answered.  At the end of our visit patient verbalized understanding and concurred with the plan.            Again, thank you for allowing me to participate in the care of your patient.        Sincerely,        Na Astudillo MD,  MD

## 2020-11-03 LAB — TESTOST SERPL-MCNC: 11 NG/DL (ref 240–950)

## 2020-11-20 DIAGNOSIS — C61 PROSTATE CANCER (H): Primary | ICD-10-CM

## 2020-11-23 ENCOUNTER — MYC REFILL (OUTPATIENT)
Dept: FAMILY MEDICINE | Facility: CLINIC | Age: 55
End: 2020-11-23

## 2020-11-23 DIAGNOSIS — C61 PROSTATE CANCER METASTATIC TO BONE (H): ICD-10-CM

## 2020-11-23 DIAGNOSIS — C79.51 PROSTATE CANCER METASTATIC TO BONE (H): ICD-10-CM

## 2020-11-23 DIAGNOSIS — M54.42 ACUTE LEFT-SIDED LOW BACK PAIN WITH LEFT-SIDED SCIATICA: ICD-10-CM

## 2020-11-23 RX ORDER — HYDROCODONE BITARTRATE AND ACETAMINOPHEN 5; 325 MG/1; MG/1
TABLET ORAL
Qty: 45 TABLET | Refills: 0 | Status: SHIPPED | OUTPATIENT
Start: 2020-11-23 | End: 2020-12-24

## 2020-11-23 NOTE — TELEPHONE ENCOUNTER
Controlled Substance Refill Request for norco  Problem List Complete:  No      PROVIDER TO CONSIDER COMPLETION OF PROBLEM LIST AND OVERVIEW/CONTROLLED SUBSTANCE AGREEMENT     Last Written Prescription Date:  10/26/20  Last Fill Quantity: 45,   # refills: 0     THE MOST RECENT OFFICE VISIT MUST BE WITHIN THE PAST 3 MONTHS. AT LEAST ONE FACE TO FACE VISIT MUST OCCUR EVERY 6 MONTHS. ADDITIONAL VISITS CAN BE VIRTUAL.  (THIS STATEMENT SHOULD BE DELETED.)     [place] Office Visit with [facility] [place]: 10/14/20     Future Office visit:       Next 5 appointments (look out 90 days)    Oct 30, 2020 11:00 AM  Return Visit with Na Astudillo MD  [facility] ([facility]) [address] [place] [id number] [phone]             Controlled substance agreement:   Encounter-Level CSA:    There are no encounter-level csa.      Patient-Level CSA:    There are no patient-level csa.      Last Urine Drug Screen: No results found for: CDAUT, No results found for: COMDAT, No results found for: THC13, PCP13, COC13, MAMP13, OPI13, AMP13, BZO13, TCA13, MTD13, BAR13, OXY13, PPX13, BUP13      [email or web address]      checked in past 3 months?  Yes 10/26/20 no concerns      Karen GALON, RN

## 2020-12-17 ENCOUNTER — OFFICE VISIT (OUTPATIENT)
Dept: FAMILY MEDICINE | Facility: CLINIC | Age: 55
End: 2020-12-17
Payer: COMMERCIAL

## 2020-12-17 VITALS
BODY MASS INDEX: 27.67 KG/M2 | OXYGEN SATURATION: 100 % | HEART RATE: 83 BPM | DIASTOLIC BLOOD PRESSURE: 87 MMHG | SYSTOLIC BLOOD PRESSURE: 150 MMHG | WEIGHT: 190 LBS | TEMPERATURE: 97.5 F

## 2020-12-17 DIAGNOSIS — H93.13 TINNITUS, BILATERAL: Primary | ICD-10-CM

## 2020-12-17 DIAGNOSIS — H90.3 BILATERAL SENSORINEURAL HEARING LOSS: ICD-10-CM

## 2020-12-17 PROCEDURE — 99213 OFFICE O/P EST LOW 20 MIN: CPT | Performed by: FAMILY MEDICINE

## 2020-12-17 ASSESSMENT — PAIN SCALES - GENERAL: PAINLEVEL: NO PAIN (0)

## 2020-12-17 NOTE — Clinical Note
Hi, Jay has had some rather acute onset of tinnitus and hearing loss. Do we need to worry about his medication being a factor here? The patient does have an appointment(s) with you on January 29th,

## 2020-12-17 NOTE — PROGRESS NOTES
SUBJECTIVE:  Per Etienne is a 55 year old male who presents because he hears a high pitch buzzing in both ears for about a month(s) now.  He describes it like he is sitting below power lines and hears the constant buzzing.     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      Past Medical History:   Diagnosis Date     Gout      Prostate cancer (H) 01/10/2020     Current Outpatient Medications   Medication Sig Dispense Refill     apalutamide (ERLEADA) 60 MG tablet Take 4 tablets (240 mg) by mouth daily 120 tablet 0     aspirin (ASA) 81 MG chewable tablet Take 1 tablet (81 mg) by mouth daily       denosumab (XGEVA) 120 MG/1.7ML SOLN injection Inject 1.7 mLs (120 mg) Subcutaneous every 3 months 1.7 mL 0     HYDROcodone-acetaminophen (NORCO) 5-325 MG tablet Take 1/2 a tablet three times a day as needed 45 tablet 0     leuprolide (ELIGARD) 45 MG kit Inject 45 mg Subcutaneous every 6 months       Social History     Tobacco Use     Smoking status: Former Smoker     Packs/day: 1.00     Years: 30.00     Pack years: 30.00     Types: Cigarettes, Cigars     Quit date: 2012     Years since quittin.9     Smokeless tobacco: Never Used   Substance Use Topics     Alcohol use: Yes     Comment: 4-6 drinks per day - patient reports beer or vodka drinks       OBJECTIVE:  BP (!) 150/87   Pulse 83   Temp 97.5  F (36.4  C) (Tympanic)   Wt 86.2 kg (190 lb)   SpO2 100%   BMI 27.67 kg/m     EXAM:  The right TM is normal: no effusions, no erythema, and normal landmarks     The right auditory canal is normal and without drainage, edema or erythema    The left TM is normal: no effusions, no erythema, and normal landmarks  The left auditory canal is normal and without drainage, edema or  erythema    Oropharynx exam is normal: no lesions, erythema, adenopathy or exudate.  GENERAL: no acute distress  EYES: EOMI,  PERRL, conjunctiva clear  NECK: supple, non-tender to palpation, no adenopathy noted  RESP: lungs clear to auscultation - no rales, rhonchi or wheezes  CV: regular rates and rhythm, normal S1 S2, no murmur noted  SKIN: no suspicious lesions or rashes           HEARING FREQUENCY    Right Ear:      1000 Hz RESPONSE- on Level: 40 db (Conditioning sound)   1000 Hz: RESPONSE- on Level: tone not heard   2000 Hz: RESPONSE- on Level: tone not heard   4000 Hz: RESPONSE- on Level: tone not heard    Left Ear:      4000 Hz: RESPONSE- on Level: tone not heard   2000 Hz: RESPONSE- on Level: tone not heard   1000 Hz: RESPONSE- on Level: tone not heard  1000HZ: Response on level 40db tone heard conditioning sound    500 Hz: RESPONSE- on Level: tone not heard    Right Ear:    500 Hz: RESPONSE- on Level: tone not heard        ASSESSMENT:  Bilateral(ly) tinnitus secondary to hearing loss.   This is rather acute and makes me concerned that it might be an effect of his chemotherapy.     PLAN:  The patient was   An educational reference regarding this subject was printed from its[employer]apper and given to the patient.    He is not interested in hearing aides at this time.    I am going to contact his oncology doctor to see if his medication may be a factor her. The patient has an appointment(s) with her in January.

## 2020-12-17 NOTE — NURSING NOTE
"Chief Complaint   Patient presents with     Ear Problem     ringing in the ears x 1 month, both     Health Maintenance       Initial BP (!) 150/87   Pulse 83   Temp 97.5  F (36.4  C) (Tympanic)   Wt 86.2 kg (190 lb)   SpO2 100%   BMI 27.67 kg/m   Estimated body mass index is 27.67 kg/m  as calculated from the following:    Height as of 10/30/20: 1.765 m (5' 9.49\").    Weight as of this encounter: 86.2 kg (190 lb).  Medication Reconciliation: complete  Kianna Briggs CMA  "

## 2020-12-17 NOTE — PATIENT INSTRUCTIONS
"  Patient Education     Tinnitus Management Program  Tinnitus is a ringing, buzzing, or other sound in your head. About 50 million people in the [place] have tinnitus at some point in their life.   The Tinnitus Management Program offers testing, teaching, counseling and sound therapy for tinnitus. Our goal is to help you better understand your tinnitus and manage its effects.  Who is this program for?  The Tinnitus Management Program is for people whose tinnitus bothers them and lasts 3 to 6 months or longer.  How does it work?  We will set up a meeting for you with a specialist who treats hearing problems, called an audiologist (mb-zzk-LMD-tete-ji). This meeting is called an \"initial consultation.\"  Before this meeting you need:    The results of a recent hearing test    An OK called a \"medical clearance\" from an Ear, Nose and Throat doctor (ENT)  You will also need to fill in these 2 forms:  1. Tinnitus History Questionnaire  2. Tinnitus Handicap Inventory (THI)  What happens at the initial consultation?  Your first meeting will be a 90-minute appointment. Please feel free to bring a family member or friend. Many insurance plans cover this appointment, including Medicare.  The audiologist will:    Do more tinnitus testing, if needed.    Review your tinnitus history and questionnaire.    Talk with you about testing and treatment options.    Set up a tinnitus management program for you.    Answer your questions.  What happens in the program?  We will create a program just for you. Tinnitus management programs may include:    Tinnitus Retraining Therapy    Sound Therapy    Cognitive Behavioral Therapy    Health Psychology  Tinnitus retraining therapy  Retraining is detailed teaching to help you better understand where tinnitus comes from and how your body may react. We train you to hear your tinnitus as neutral, or \"white\" noise.  Sound therapy  Sound therapy may include an iPod, table-top sound device, Sound Pillow, or " even using different hearing aids together.  Cognitive behavioral therapy  We will talk about ways to help you notice and change how you think about your tinnitus.   Health psychology  This is a specialty that helps people cope with the stress and worry of health problems.  Is there any follow-up?  Yes. You will have at least 1 follow up session. That way we can track your progress, give you more training, and even change your plan if needed.  Managing your tinnitus won't happen overnight. But we will strive to give you training and skills to help you cope. That way, you can focus on the important things in your life.  Will my insurance pay for the program?  Check with your insurance company. The charge code for the Tinnitus Program is 82391.  How do I get started?  To set up your first meeting with audiologist Anna Solo, please call:  [facility] [phone]  For informational purposes only. Not to replace the advice of your health care provider. Copyright   2016 [employer]. All rights reserved. getbetter! 712331 - 09/16.  For informational purposes only. Not to replace the advice of your health care provider.  Copyright   2018 [employer]. All rights reserved.           Patient Education     Tinnitus Management Program  Tinnitus is a ringing, buzzing, or other sound in your head. About 50 million people in the [place] have tinnitus at some point in their life.   The Tinnitus Management Program offers testing, teaching, counseling and sound therapy for tinnitus. Our goal is to help you better understand your tinnitus and manage its effects.  Who is this program for?  The Tinnitus Management Program is for people whose tinnitus bothers them and lasts 3 to 6 months or longer.  How does it work?  We will set up a meeting for you with a specialist who treats hearing problems, called an audiologist (dw-hdp-EDN-tete-jist). This meeting is called an  "\"initial consultation.\"  Before this meeting you need:    The results of a recent hearing test    An OK called a \"medical clearance\" from an Ear, Nose and Throat doctor (ENT)  You will also need to fill in these 2 forms:  3. Tinnitus History Questionnaire  4. Tinnitus Handicap Inventory (THI)  What happens at the initial consultation?  Your first meeting will be a 90-minute appointment. Please feel free to bring a family member or friend. Many insurance plans cover this appointment, including Medicare.  The audiologist will:    Do more tinnitus testing, if needed.    Review your tinnitus history and questionnaire.    Talk with you about testing and treatment options.    Set up a tinnitus management program for you.    Answer your questions.  What happens in the program?  We will create a program just for you. Tinnitus management programs may include:    Tinnitus Retraining Therapy    Sound Therapy    Cognitive Behavioral Therapy    Health Psychology  Tinnitus retraining therapy  Retraining is detailed teaching to help you better understand where tinnitus comes from and how your body may react. We train you to hear your tinnitus as neutral, or \"white\" noise.  Sound therapy  Sound therapy may include an iPod, table-top sound device, Sound Pillow, or even using different hearing aids together.  Cognitive behavioral therapy  We will talk about ways to help you notice and change how you think about your tinnitus.   Health psychology  This is a specialty that helps people cope with the stress and worry of health problems.  Is there any follow-up?  Yes. You will have at least 1 follow up session. That way we can track your progress, give you more training, and even change your plan if needed.  Managing your tinnitus won't happen overnight. But we will strive to give you training and skills to help you cope. That way, you can focus on the important things in your life.  Will my insurance pay for the program?  Check with " your insurance company. The charge code for the Tinnitus Program is 47195.  How do I get started?  To set up your first meeting with audiologist Anna Solo, please call:  [facility] [phone]  For informational purposes only. Not to replace the advice of your health care provider. Copyright   2016 [employer]. All rights reserved. [employer] [id number] - 09/16.  For informational purposes only. Not to replace the advice of your health care provider.  Copyright   2018 [employer]. All rights reserved.           Patient Education     Tinnitus (Ringing in the Ears)     Treatment may include maskers and hearing aids.   Tinnitus is the term for a noise in your ear not caused by an outside sound. The noise might be a ringing, clicking, hiss, or roar. It can vary in pitch. It may be soft or very loud. For some people, this is a minor problem. But for others, the noise can make it hard to hear, work, and even sleep. When tinnitus can't be cured, treatments may help.   What causes tinnitus?  Loud noises, hearing loss, and earwax can cause tinnitus. So can certain medicines. Large amounts of aspirin or caffeine are sometimes to blame. In many cases, the exact cause of tinnitus is not known.   How is tinnitus treated?  Finding and removing the cause is the best way to treat tinnitus. So your healthcare provider may refer you to an ear, nose, and throat doctor (ENT or otolaryngologist). Your hearing may also be checked by a hearing specialist (audiologist). If you have hearing loss, wearing a hearing aid may help your tinnitus. When the cause can't be found, the tinnitus itself may be treated. Some of the treatments are listed below. Your healthcare provider can tell you more about them:     Maskers. These are small devices that look like hearing aids. They have a pleasant sound that helps cover up the ringing in your ears. Hearing aids and maskers are  sometimes used together.    Medicines that treat anxiety and depression. These may ease tinnitus in some people.    Hypnosis or relaxation therapy. This may help head noise seem less severe.    Tinnitus retraining therapy. This combines counseling and maskers. Both can help take your mind off the tinnitus.  To learn more    American Speech-Hearing-Language Association 006-024-3499 www.rufino.org    American Tinnitus Association 722-572-4499 www.jazzmine.org    National [place] on Deafness and other Communication Disorders 800-344-3558 www.nidcd.nih.gov/    Ricardo last reviewed this educational content on 9/1/2019 2000-2020 [employer]. [address] [place] [address], [place] [address]. All rights reserved. This information is not intended as a substitute for professional medical care. Always follow your healthcare professional's instructions.

## 2020-12-20 DIAGNOSIS — H93.13 TINNITUS, BILATERAL: ICD-10-CM

## 2020-12-20 DIAGNOSIS — H90.3 BILATERAL SENSORINEURAL HEARING LOSS: Primary | ICD-10-CM

## 2020-12-21 DIAGNOSIS — C61 PROSTATE CANCER (H): Primary | ICD-10-CM

## 2020-12-24 ENCOUNTER — MYC REFILL (OUTPATIENT)
Dept: FAMILY MEDICINE | Facility: CLINIC | Age: 55
End: 2020-12-24

## 2020-12-24 DIAGNOSIS — C79.51 PROSTATE CANCER METASTATIC TO BONE (H): ICD-10-CM

## 2020-12-24 DIAGNOSIS — C61 PROSTATE CANCER METASTATIC TO BONE (H): ICD-10-CM

## 2020-12-24 DIAGNOSIS — M54.42 ACUTE LEFT-SIDED LOW BACK PAIN WITH LEFT-SIDED SCIATICA: ICD-10-CM

## 2020-12-24 RX ORDER — HYDROCODONE BITARTRATE AND ACETAMINOPHEN 5; 325 MG/1; MG/1
TABLET ORAL
Qty: 45 TABLET | Refills: 0 | Status: SHIPPED | OUTPATIENT
Start: 2020-12-24 | End: 2021-01-28

## 2020-12-24 NOTE — TELEPHONE ENCOUNTER
Controlled Substance Refill Request for Norco  Problem List Complete:  No     PROVIDER TO CONSIDER COMPLETION OF PROBLEM LIST AND OVERVIEW/CONTROLLED SUBSTANCE AGREEMENT    Last Written Prescription Date:  11/23/20  Last Fill Quantity: 45,   # refills: 0    Last Office Visit with [place] primary care provider: 12/17/20    Future Office visit:   Next 5 appointments (look out 90 days)    Jan 05, 2021  9:00 AM  Return Visit with TEREZA Jin CNP  [facility]) [address] [place] [id number]  [phone]   Jan 29, 2021 11:30 AM  Return Visit with Na Astudillo MD  [facility]) [address] [place] [id number] [phone]          Controlled substance agreement:   Encounter-Level CSA:    There are no encounter-level csa.     Patient-Level CSA:    There are no patient-level csa.         Last Urine Drug Screen: No results found for: CDAUT, No results found for: COMDAT, No results found for: THC13, PCP13, COC13, MAMP13, OPI13, AMP13, BZO13, TCA13, MTD13, BAR13, OXY13, PPX13, BUP13        [email or web address]       checked in past 3 months?  Yes 12/24/20, no concerns       Gardenia GALON, RN, CPN

## 2020-12-31 ENCOUNTER — OFFICE VISIT (OUTPATIENT)
Dept: OTOLARYNGOLOGY | Facility: CLINIC | Age: 55
End: 2020-12-31
Payer: COMMERCIAL

## 2020-12-31 ENCOUNTER — OFFICE VISIT (OUTPATIENT)
Dept: AUDIOLOGY | Facility: CLINIC | Age: 55
End: 2020-12-31
Payer: COMMERCIAL

## 2020-12-31 VITALS
BODY MASS INDEX: 27.4 KG/M2 | HEART RATE: 74 BPM | OXYGEN SATURATION: 99 % | WEIGHT: 185 LBS | DIASTOLIC BLOOD PRESSURE: 86 MMHG | SYSTOLIC BLOOD PRESSURE: 162 MMHG | HEIGHT: 69 IN

## 2020-12-31 DIAGNOSIS — H90.3 SENSORINEURAL HEARING LOSS (SNHL) OF BOTH EARS: ICD-10-CM

## 2020-12-31 DIAGNOSIS — H93.13 TINNITUS OF BOTH EARS: ICD-10-CM

## 2020-12-31 DIAGNOSIS — H90.3 SENSORINEURAL HEARING LOSS, BILATERAL: Primary | ICD-10-CM

## 2020-12-31 DIAGNOSIS — H93.13 TINNITUS, BILATERAL: Primary | ICD-10-CM

## 2020-12-31 PROCEDURE — 99203 OFFICE O/P NEW LOW 30 MIN: CPT | Performed by: OTOLARYNGOLOGY

## 2020-12-31 PROCEDURE — 92550 TYMPANOMETRY & REFLEX THRESH: CPT | Performed by: AUDIOLOGIST

## 2020-12-31 PROCEDURE — 92557 COMPREHENSIVE HEARING TEST: CPT | Performed by: AUDIOLOGIST

## 2020-12-31 PROCEDURE — 99207 PR NO CHARGE LOS: CPT | Performed by: AUDIOLOGIST

## 2020-12-31 ASSESSMENT — MIFFLIN-ST. JEOR: SCORE: 1659.53

## 2020-12-31 NOTE — PROGRESS NOTES
AUDIOLOGY REPORT:    Patient was referred from ENT by Dr. Shields for audiology evaluation. The patient reports constant bilateral tinnitus that started around two months ago and is bothersome and distracting. He is unsure if he has had any changes in hearing, but notes that the tinnitus seems to interfere with understanding conversations. 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 The patient reports that he does not have ear pain, pressure, or any history of ear problems or ear surgery.    Testing:    Otoscopy:   Otoscopic exam indicates ears are clear of cerumen bilaterally     Tympanograms:    RIGHT: normal eardrum mobility     LEFT:   normal eardrum mobility    Reflexes (reported by stimulus ear):  RIGHT: Ipsilateral is present at normal levels  RIGHT: Contralateral is present at normal levels  LEFT:   Ipsilateral is present at normal levels  LEFT:   Contralateral is present at normal levels    Thresholds:   Pure Tone Thresholds assessed using conventional audiometry with good reliability from 250-8000 Hz bilaterally using insert earphones and circumaural headphones     RIGHT:  normal hearing sensitivity through 2000 Hz sloping to mild to moderately severe sensorineural hearing loss    LEFT:    normal hearing sensitivity through 1000 Hz sloping to mild to moderately severe sensorineural hearing loss    Speech Reception Threshold:    RIGHT: 20 dB HL    LEFT:   20 dB HL  Results are in agreement with pure tone average.     Word Recognition Score:     RIGHT: 96% at 65 dB HL using NU-6 recorded word list.    LEFT:   100% at 65 dB HL using NU-6 recorded word list.    Discussed results with the patient.     Patient was returned to ENT for follow up.     Skylar Gonzalez, CCC-A  Licensed Audiologist #[id number]  12/31/2020

## 2020-12-31 NOTE — PATIENT INSTRUCTIONS
General Scheduling Information  To schedule your CT/MRI scan, please contact [place] at [phone]   [address] [place] [address]    To schedule your Surgery, please contact our Specialty Schedulers at [phone]    ENT Clinic Locations Clinic Hours Telephone Number     [facility]  [address] [place] [address]   Tuesday:       8:00am -- 4:00pm    Wednesday:  8:00am - 4:00pm   To schedule an appointment with   Dr. Shields,   please contact our   Specialty Scheduling Department at:     [phone]       [facility]  [address] [place] [address]   Friday:          8:00am - 4:00pm         Urgent Care Locations Clinic Hours Telephone Numbers     [facility]  [address] [place] [address]     Monday-Friday:     11:00pm - 9:00pm    Saturday-Sunday:  9:00am - 5:00pm   [phone]     [facility]  [address] [place] [address]     Monday-Friday:      5:00pm - 9:00pm     Saturday-Sunday:  9:00am - 5:00pm   [phone]

## 2020-12-31 NOTE — LETTER
12/31/2020         RE: Per Etienne  [address] [facility] [id number]        Dear Colleague,    Thank you for referring your patient, Per Etienne, to the [facility]. Please see a copy of my visit note below.    Chief Complaint - tinnitus    History of Present Illness - Per Etienne is a 55 year old male who presents to me today with tinnitus in both ears.  It has been present and noticeable for approximately 2 months.  It was sudden in onset.  The patient has noticed increased difficulty hearing. There is no history of chronic ear disease or ear surgery. With regards to recreational, , and work-related noise exposure he has a lot in the past, not as much now. No family history of hearing loss, except dad. The patient denies otorrhea and otalgia.    Past Medical History -   Patient Active Problem List   Diagnosis     CARDIOVASCULAR SCREENING; LDL GOAL LESS THAN 160     Left varicocele     Vitamin D deficiency     Kidney stone     Prostate cancer (H)     High risk medication use     Prostate cancer metastatic to bone (H)     Bone metastasis (H)       Current Medications -   Current Outpatient Medications:      apalutamide (ERLEADA) 60 MG tablet, Take 4 tablets (240 mg) by mouth daily, Disp: 120 tablet, Rfl: 0     aspirin (ASA) 81 MG chewable tablet, Take 1 tablet (81 mg) by mouth daily, Disp: , Rfl:      denosumab (XGEVA) 120 MG/1.7ML SOLN injection, Inject 1.7 mLs (120 mg) Subcutaneous every 3 months, Disp: 1.7 mL, Rfl: 0     HYDROcodone-acetaminophen (NORCO) 5-325 MG tablet, Take 1/2 a tablet three times a day as needed, Disp: 45 tablet, Rfl: 0     leuprolide (ELIGARD) 45 MG kit, Inject 45 mg Subcutaneous every 6 months, Disp: , Rfl:     Allergies - No Known Allergies    Social History -   Social History     Socioeconomic History     Marital status:      Spouse name: Not on file     Number of children: Not on file     Years of education: Not on  "file     Highest education level: Not on file   Occupational History     Not on file   Social Needs     Financial resource strain: Not on file     Food insecurity     Worry: Not on file     Inability: Not on file     Transportation needs     Medical: Not on file     Non-medical: Not on file   Tobacco Use     Smoking status: Former Smoker     Packs/day: 1.00     Years: 30.00     Pack years: 30.00     Types: Cigarettes, Cigars     Quit date: 2012     Years since quittin.0     Smokeless tobacco: Never Used   Substance and Sexual Activity     Alcohol use: Yes     Comment: 4-6 drinks per day - patient reports beer or vodka drinks     Drug use: No     Sexual activity: Yes     Partners: Female   Lifestyle     Physical activity     Days per week: Not on file     Minutes per session: Not on file     Stress: Not on file   Relationships     Social connections     Talks on phone: Not on file     Gets together: Not on file     Attends Bahai service: Not on file     Active member of club or organization: Not on file     Attends meetings of clubs or organizations: Not on file     Relationship status: Not on file     Intimate partner violence     Fear of current or ex partner: Not on file     Emotionally abused: Not on file     Physically abused: Not on file     Forced sexual activity: Not on file   Other Topics Concern     Parent/sibling w/ CABG, MI or angioplasty before 65F 55M? Not Asked   Social History Narrative     Not on file       Family History -   Family History   Problem Relation Age of Onset     Asthma Father      Prostate Cancer Father 73     Cancer Maternal Grandmother 92        throat      Asthma Sister        Review of Systems - As per HPI and PMHx, otherwise 7 system review is negative.    Physical Exam  BP (!) 162/86   Pulse 74   Ht 1.753 m (5' 9\")   Wt 83.9 kg (185 lb)   SpO2 99%   BMI 27.32 kg/m    General - The patient is in no distress.  Alert and oriented to person and place, answers " questions and cooperates with examination appropriately.   Voice and Breathing - The patient was breathing comfortably without the use of accessory muscles. There was no wheezing, stridor, or stertor.  The patients voice was clear and strong.  Ears - The auricles are normal. The tympanic membranes are normal in appearance, bony landmarks are intact.  No retraction, perforation, or masses.  No fluid or purulence was seen in the external canal or the middle ear. No evidence of infection of the middle ear or external canal, cerumen was normal in appearance.  Eyes - Extraocular movements intact.  Sclera were not icteric or injected.  Neck - Soft, non-tender. Palpation of the occipital, submental, submandibular, internal jugular chain, and supraclavicular nodes did not demonstrate any abnormal lymph nodes or masses. Parotid glands had no masses. Palpation of the thyroid was soft and smooth, with no nodules or goiter appreciated.  The trachea was mobile and midline.  Neurological - Cranial nerves 2 through 12 were grossly intact. House-Brackmann grade 1 out of 6 bilaterally.       Audiologic Studies - An audiogram and tympanogram were performed today as part of the evaluation and personally reviewed. The tympanogram shows a normal Type A curve, with normal canal volume and middle ear pressure.  There is no sign of eustachian tube dysfunction or middle ear effusion.  The audiogram showed a significant down-sloping mid to high frequency sensorineural hearing loss. There was no evidence of conductive hearing loss or significant asymmetry.      Assessment and Plan - Per Etienne is a 55 year old male who presents to me today with tinnitus and hearing loss. This is most consistent with early onset presbycusis. I can find no evidence of serious CNS disorders or other complicating factors that could be causing this. We spent the remainder of today's visit on education. We discussed hearing protection in noisy environments.  He is significantly distressed by this. Therefore I recommend tinnitus retraining therapy. Referrals given.       Adolph Shields MD  Otolaryngology  [facility]          Again, thank you for allowing me to participate in the care of your patient.        Sincerely,        Adolph Shields MD

## 2020-12-31 NOTE — PROGRESS NOTES
Chief Complaint - tinnitus    History of Present Illness - Per Etienne is a 55 year old male who presents to me today with tinnitus in both ears.  It has been present and noticeable for approximately 2 months.  It was sudden in onset.  The patient has noticed increased difficulty hearing. There is no history of chronic ear disease or ear surgery. With regards to recreational, , and work-related noise exposure he has a lot in the past, not as much now. No family history of hearing loss, except dad. The patient denies otorrhea and otalgia.    Past Medical History -   Patient Active Problem List   Diagnosis     CARDIOVASCULAR SCREENING; LDL GOAL LESS THAN 160     Left varicocele     Vitamin D deficiency     Kidney stone     Prostate cancer (H)     High risk medication use     Prostate cancer metastatic to bone (H)     Bone metastasis (H)       Current Medications -   Current Outpatient Medications:      apalutamide (ERLEADA) 60 MG tablet, Take 4 tablets (240 mg) by mouth daily, Disp: 120 tablet, Rfl: 0     aspirin (ASA) 81 MG chewable tablet, Take 1 tablet (81 mg) by mouth daily, Disp: , Rfl:      denosumab (XGEVA) 120 MG/1.7ML SOLN injection, Inject 1.7 mLs (120 mg) Subcutaneous every 3 months, Disp: 1.7 mL, Rfl: 0     HYDROcodone-acetaminophen (NORCO) 5-325 MG tablet, Take 1/2 a tablet three times a day as needed, Disp: 45 tablet, Rfl: 0     leuprolide (ELIGARD) 45 MG kit, Inject 45 mg Subcutaneous every 6 months, Disp: , Rfl:     Allergies - No Known Allergies    Social History -   Social History     Socioeconomic History     Marital status:      Spouse name: Not on file     Number of children: Not on file     Years of education: Not on file     Highest education level: Not on file   Occupational History     Not on file   Social Needs     Financial resource strain: Not on file     Food insecurity     Worry: Not on file     Inability: Not on file     Transportation needs     Medical: Not on file  "    Non-medical: Not on file   Tobacco Use     Smoking status: Former Smoker     Packs/day: 1.00     Years: 30.00     Pack years: 30.00     Types: Cigarettes, Cigars     Quit date: 2012     Years since quittin.0     Smokeless tobacco: Never Used   Substance and Sexual Activity     Alcohol use: Yes     Comment: 4-6 drinks per day - patient reports beer or vodka drinks     Drug use: No     Sexual activity: Yes     Partners: Female   Lifestyle     Physical activity     Days per week: Not on file     Minutes per session: Not on file     Stress: Not on file   Relationships     Social connections     Talks on phone: Not on file     Gets together: Not on file     Attends Congregation service: Not on file     Active member of club or organization: Not on file     Attends meetings of clubs or organizations: Not on file     Relationship status: Not on file     Intimate partner violence     Fear of current or ex partner: Not on file     Emotionally abused: Not on file     Physically abused: Not on file     Forced sexual activity: Not on file   Other Topics Concern     Parent/sibling w/ CABG, MI or angioplasty before 65F 55M? Not Asked   Social History Narrative     Not on file       Family History -   Family History   Problem Relation Age of Onset     Asthma Father      Prostate Cancer Father 73     Cancer Maternal Grandmother 92        throat      Asthma Sister        Review of Systems - As per HPI and PMHx, otherwise 7 system review is negative.    Physical Exam  BP (!) 162/86   Pulse 74   Ht 1.753 m (5' 9\")   Wt 83.9 kg (185 lb)   SpO2 99%   BMI 27.32 kg/m    General - The patient is in no distress.  Alert and oriented to person and place, answers questions and cooperates with examination appropriately.   Voice and Breathing - The patient was breathing comfortably without the use of accessory muscles. There was no wheezing, stridor, or stertor.  The patients voice was clear and strong.  Ears - The auricles are " normal. The tympanic membranes are normal in appearance, bony landmarks are intact.  No retraction, perforation, or masses.  No fluid or purulence was seen in the external canal or the middle ear. No evidence of infection of the middle ear or external canal, cerumen was normal in appearance.  Eyes - Extraocular movements intact.  Sclera were not icteric or injected.  Neck - Soft, non-tender. Palpation of the occipital, submental, submandibular, internal jugular chain, and supraclavicular nodes did not demonstrate any abnormal lymph nodes or masses. Parotid glands had no masses. Palpation of the thyroid was soft and smooth, with no nodules or goiter appreciated.  The trachea was mobile and midline.  Neurological - Cranial nerves 2 through 12 were grossly intact. House-Brackmann grade 1 out of 6 bilaterally.       Audiologic Studies - An audiogram and tympanogram were performed today as part of the evaluation and personally reviewed. The tympanogram shows a normal Type A curve, with normal canal volume and middle ear pressure.  There is no sign of eustachian tube dysfunction or middle ear effusion.  The audiogram showed a significant down-sloping mid to high frequency sensorineural hearing loss. There was no evidence of conductive hearing loss or significant asymmetry.      Assessment and Plan - Per Etienne is a 55 year old male who presents to me today with tinnitus and hearing loss. This is most consistent with early onset presbycusis. I can find no evidence of serious CNS disorders or other complicating factors that could be causing this. We spent the remainder of today's visit on education. We discussed hearing protection in noisy environments. He is significantly distressed by this. Therefore I recommend tinnitus retraining therapy. Referrals given.       Adolph Shields MD  Otolaryngology  [facility]

## 2021-01-04 NOTE — PROGRESS NOTES
Oncology Follow Up Visit: January 5, 2021    Oncologist: Dr Na Astudillo  PCP: Per Chen    Diagnosis: Metastatic hormone sensitive prostate cancer  Per Etienne is a 56 yo male who presented to his PCP in December 2019 with slow urinary stream.  PSA = 124. On 1/8/2020 CT abdomen pelvis showed groundglass opacity in the left lower lobe,  dystrophic calcification of the prostate gland, and a fatty liver.  Same-day bone scan was negative. On January 10, 2010 prostate biopsy showed on the left Carbon score 4+3 involving 7 cores and on the right Carbon score 3+4 involving 6 cores, with positive perineural invasion.  He proceeded to undergo RRP by Dr. Colmenares on 2/24/2020.  Pathology revealed acinar adenocarcinoma Carbon 4+3 with focal ROBERTO, bladder neck base invasion, positive SVI, positive PNI, and multiple positive margins.  There was no LVSI and 0 out of 3 lymph nodes removed were involved with tumor; pT3bN0.  Treatment:   4/20/2020 received Eligard.     5/21/2020 MRI of the Prostate= 14 x 9 mm T2 intermediate structure in the prostatectomy bed at the left aspect of urinary bladder neck with restricted diffusion in the DWI images and early enhancement in the contrast  enhanced images. This was concerning for locally recurrent/residual prostatic cancer.  There was a T2 hypointense structure in the in the area of removed right seminal vesicle measuring 13 x 11 mm  demonstrating restricted  diffusion in the DWI images. This may represent a recurrent/residual tumor. There were 2 bone metastases in the left pubic inferior ramus and inferior aspect of left pubic body.There is a 7 mm suspicious lymph nodes in the right obturator area  corresponding to to FACBC avid lymph node on the same date PET/CT  5/20/2020 PET/CT showed:  1.  2 foci of increased uptake in the left ischium, with underlying  sclerotic lesion, and symphysis pubis, these changes are indicative of  active prostate cancer metastasis.  2.  "Low-level increased uptake in 3 lymphnodes along the right external  and internal iliac nodes, the most suspicious of which is the deep  obturator.  3. Low-level increased uptake in the prostatectomy bed slightly to the  left side of the urethra, if clinically necessary, endoscopy would  would be necessary for confirmation of this being metastasis.  PSA was down to 27.7 on 4/21/2020.   4/20/2020 he received an Eligard injection.    6/12/2020 started Apalutamide on 6/12/2020.    Interval History: Mr. Etienne is seen today in follow-up to his use of apalutamide and need for Eligard injection.  Patient reports he is diligently taking his apalutamide and is seeing increased hot flashes and some occasional red lesions on his arms as his only side effects to the plan.  He denies any pain nausea shortness of breath fevers or illness weight loss or weakness.  Continues as his job as a  with good energy.  Has some occasional tingling to his toes but this is not new.  He also reports some new pain to the right lateral to posterior area suggestive of the pelvic rim.  Rest of comprehensive and complete ROS is reviewed and is negative.   Past Medical History:   Diagnosis Date     Gout      Prostate cancer (H) 01/10/2020     Current Outpatient Medications   Medication     apalutamide (ERLEADA) 60 MG tablet     aspirin (ASA) 81 MG chewable tablet     denosumab (XGEVA) 120 MG/1.7ML SOLN injection     HYDROcodone-acetaminophen (NORCO) 5-325 MG tablet     leuprolide (ELIGARD) 45 MG kit     No current facility-administered medications for this visit.      No Known Allergies    Physical Exam:BP (!) 153/90 (BP Location: Right arm, Patient Position: Chair, Cuff Size: Adult Large)   Pulse 87   Temp 98.4  F (36.9  C) (Oral)   Ht 1.765 m (5' 9.49\")   Wt 84.8 kg (187 lb)   SpO2 98%   BMI 27.23 kg/m    Constitutional: Alert, healthy, and in no distress.   ENT: Eyes bright , No mouth sores  Neck: Supple, No " adenopathy.  Cardiac: Heart rate and rhythm is regular and strong without murmur  Respiratory: Breathing easy. Lung sounds clear to auscultation  GI: Abdomen is soft, non-tender, BS normal. No masses or organomegaly  MS: Muscle tone normal, extremities normal with no edema.   Skin: No suspicious lesions or rashes  Neuro: Sensory grossly WNL, gait normal.   Lymph: Normal ant/post cervical, axillary, supraclavicular nodes  Psych: Mentation appears normal and affect normal/bright and with good conversation    Laboratory Results: No labs today    Assessment and Plan:   Metastatic hormone sensitive prostate cancer with bone metastasis-patient to continue with apalutamide 240 mg daily p.o. and should receive Lupron and xgeva today-Next to be in 3 months.  He appears to be tolerating all of this well with known hot flashes as a side effect to the drugs which he is tolerating for now.  PSA and testosterone were normal last visit in October.  He will return in 1 month for review of apalutamide use with labs and bone scan.  Right lateral intermittent ache-may be related to the right pelvic limb and suggest that he has not had a bone scan now at a years time so we will repeat the bone scan prior to his  next visit  Lung cancer screening- has 30 pack year smoking history-  quit in 2012. Last PET/CT in May 2020.    We'll paln annual low dose chest CT in May 2021.  COVID-19 precautions-Reviewed precautions for prevention of transmission with washing hands often for 20 seconds with soap and water or hand , avoiding anyone appearing ill,  crowds or common public places, prevent touching face and help with prevention of transmission.   If developing symptoms of concern they should call to clinic before coming to clinic for directions for evaluation and treatment.   This was a 25 min visit with > 50% in counseling and coordinating care including education and management of concerns.    Ania Jensen,CNP

## 2021-01-05 ENCOUNTER — ONCOLOGY VISIT (OUTPATIENT)
Dept: ONCOLOGY | Facility: CLINIC | Age: 56
End: 2021-01-05
Payer: COMMERCIAL

## 2021-01-05 ENCOUNTER — TELEPHONE (OUTPATIENT)
Dept: OTOLARYNGOLOGY | Facility: CLINIC | Age: 56
End: 2021-01-05

## 2021-01-05 VITALS
HEIGHT: 69 IN | BODY MASS INDEX: 27.7 KG/M2 | OXYGEN SATURATION: 98 % | DIASTOLIC BLOOD PRESSURE: 90 MMHG | HEART RATE: 87 BPM | SYSTOLIC BLOOD PRESSURE: 153 MMHG | WEIGHT: 187 LBS | TEMPERATURE: 98.4 F

## 2021-01-05 DIAGNOSIS — Z79.899 HIGH RISK MEDICATION USE: ICD-10-CM

## 2021-01-05 DIAGNOSIS — C79.51 PROSTATE CANCER METASTATIC TO BONE (H): Primary | ICD-10-CM

## 2021-01-05 DIAGNOSIS — C79.51 BONE METASTASIS: ICD-10-CM

## 2021-01-05 DIAGNOSIS — C61 PROSTATE CANCER METASTATIC TO BONE (H): Primary | ICD-10-CM

## 2021-01-05 PROCEDURE — 99214 OFFICE O/P EST MOD 30 MIN: CPT | Performed by: NURSE PRACTITIONER

## 2021-01-05 ASSESSMENT — MIFFLIN-ST. JEOR: SCORE: 1676.38

## 2021-01-05 ASSESSMENT — PAIN SCALES - GENERAL: PAINLEVEL: NO PAIN (0)

## 2021-01-05 NOTE — TELEPHONE ENCOUNTER
LVM for pt to schedule next available in clinic tinnitus evaluation (TIN) per referral from Dr. Shields.    Left call center number for scheduling.

## 2021-01-05 NOTE — LETTER
1/5/2021         RE: Per Etienne  [address] [address] [id number]        Dear Colleague,    Thank you for referring your patient, Per Etienne, to the [facility]. Please see a copy of my visit note below.    Oncology Follow Up Visit: January 5, 2021    Oncologist: Dr Na Astudillo  PCP: Per Chen    Diagnosis: Metastatic hormone sensitive prostate cancer  Per Etienne is a 54 yo male who presented to his PCP in December 2019 with slow urinary stream.  PSA = 124. On 1/8/2020 CT abdomen pelvis showed groundglass opacity in the left lower lobe,  dystrophic calcification of the prostate gland, and a fatty liver.  Same-day bone scan was negative. On January 10, 2010 prostate biopsy showed on the left [place] score 4+3 involving 7 cores and on the right Robyn score 3+4 involving 6 cores, with positive perineural invasion.  He proceeded to undergo RRP by Dr. Colmenares on 2/24/2020.  Pathology revealed acinar adenocarcinoma [place] 4+3 with focal ROBERTO, bladder neck base invasion, positive SVI, positive PNI, and multiple positive margins.  There was no LVSI and 0 out of 3 lymph nodes removed were involved with tumor; pT3bN0.  Treatment:   4/20/2020 received Eligard.     5/21/2020 MRI of the Prostate= 14 x 9 mm T2 intermediate structure in the prostatectomy bed at the left aspect of urinary bladder neck with restricted diffusion in the DWI images and early enhancement in the contrast  enhanced images. This was concerning for locally recurrent/residual prostatic cancer.  There was a T2 hypointense structure in the in the area of removed right seminal vesicle measuring 13 x 11 mm  demonstrating restricted  diffusion in the DWI images. This may represent a recurrent/residual tumor. There were 2 bone metastases in the left pubic inferior ramus and inferior aspect of left pubic body.There is a 7 mm suspicious lymph nodes in the right obturator  area  corresponding to to FACBC avid lymph node on the same date PET/CT  5/20/2020 PET/CT showed:  1.  2 foci of increased uptake in the left ischium, with underlying  sclerotic lesion, and symphysis pubis, these changes are indicative of  active prostate cancer metastasis.  2. Low-level increased uptake in 3 lymphnodes along the right external  and internal iliac nodes, the most suspicious of which is the deep  obturator.  3. Low-level increased uptake in the prostatectomy bed slightly to the  left side of the urethra, if clinically necessary, endoscopy would  would be necessary for confirmation of this being metastasis.  PSA was down to 27.7 on 4/21/2020.   4/20/2020 he received an Eligard injection.    6/12/2020 started Apalutamide on 6/12/2020.    Interval History: Mr. Etienne is seen today in follow-up to his use of apalutamide and need for Eligard injection.  Patient reports he is diligently taking his apalutamide and is seeing increased hot flashes and some occasional red lesions on his arms as his only side effects to the plan.  He denies any pain nausea shortness of breath fevers or illness weight loss or weakness.  Continues as his job as a  with good energy.  Has some occasional tingling to his toes but this is not new.  He also reports some new pain to the right lateral to posterior area suggestive of the pelvic rim.  Rest of comprehensive and complete ROS is reviewed and is negative.   Past Medical History:   Diagnosis Date     Gout      Prostate cancer (H) 01/10/2020     Current Outpatient Medications   Medication     apalutamide (ERLEADA) 60 MG tablet     aspirin (ASA) 81 MG chewable tablet     denosumab (XGEVA) 120 MG/1.7ML SOLN injection     HYDROcodone-acetaminophen (NORCO) 5-325 MG tablet     leuprolide (ELIGARD) 45 MG kit     No current facility-administered medications for this visit.      No Known Allergies    Physical Exam:BP (!) 153/90 (BP Location: Right arm, Patient  "Position: Chair, Cuff Size: Adult Large)   Pulse 87   Temp 98.4  F (36.9  C) (Oral)   Ht 1.765 m (5' 9.49\")   Wt 84.8 kg (187 lb)   SpO2 98%   BMI 27.23 kg/m    Constitutional: Alert, healthy, and in no distress.   ENT: Eyes bright , No mouth sores  Neck: Supple, No adenopathy.  Cardiac: Heart rate and rhythm is regular and strong without murmur  Respiratory: Breathing easy. Lung sounds clear to auscultation  GI: Abdomen is soft, non-tender, BS normal. No masses or organomegaly  MS: Muscle tone normal, extremities normal with no edema.   Skin: No suspicious lesions or rashes  Neuro: Sensory grossly WNL, gait normal.   Lymph: Normal ant/post cervical, axillary, supraclavicular nodes  Psych: Mentation appears normal and affect normal/bright and with good conversation    Laboratory Results: No labs today    Assessment and Plan:   Metastatic hormone sensitive prostate cancer with bone metastasis-patient to continue with apalutamide 240 mg daily p.o. and should receive Lupron and xgeva today-Next to be in 3 months.  He appears to be tolerating all of this well with known hot flashes as a side effect to the drugs which he is tolerating for now.  PSA and testosterone were normal last visit in October.  He will return in 1 month for review of apalutamide use with labs and bone scan.  Right lateral intermittent ache-may be related to the right pelvic limb and suggest that he has not had a bone scan now at a years time so we will repeat the bone scan prior to his  next visit  Lung cancer screening- has 30 pack year smoking history-  quit in 2012. Last PET/CT in May 2020.    We'll paln annual low dose chest CT in May 2021.  COVID-19 precautions-Reviewed precautions for prevention of transmission with washing hands often for 20 seconds with soap and water or hand , avoiding anyone appearing ill,  crowds or common public places, prevent touching face and help with prevention of transmission.   If developing " symptoms of concern they should call to clinic before coming to clinic for directions for evaluation and treatment.   This was a 25 min visit with > 50% in counseling and coordinating care including education and management of concerns.    Ania Jensen CNP        Again, thank you for allowing me to participate in the care of your patient.        Sincerely,        Ania Jensen, FRAN, APRN CNP

## 2021-01-05 NOTE — NURSING NOTE
"Oncology Rooming Note    January 5, 2021 8:59 AM   Per Etienne is a 55 year old male who presents for:    Chief Complaint   Patient presents with     Oncology Clinic Visit     follow up     Initial Vitals: BP (!) 153/90 (BP Location: Right arm, Patient Position: Chair, Cuff Size: Adult Large)   Pulse 87   Temp 98.4  F (36.9  C) (Oral)   Ht 1.765 m (5' 9.49\")   Wt 84.8 kg (187 lb)   SpO2 98%   BMI 27.23 kg/m   Estimated body mass index is 27.23 kg/m  as calculated from the following:    Height as of this encounter: 1.765 m (5' 9.49\").    Weight as of this encounter: 84.8 kg (187 lb). Body surface area is 2.04 meters squared.  No Pain (0) Comment: Data Unavailable   No LMP for male patient.  Allergies reviewed: Yes  Medications reviewed: Yes    Medications: Medication refills not needed today.  Pharmacy name entered into [facility]: [id number] [place], [place] - [address] [facility]    Clinical concerns: NO Ania was notified.      Viky Miller CMA              "

## 2021-01-19 DIAGNOSIS — C61 PROSTATE CANCER (H): Primary | ICD-10-CM

## 2021-01-22 ENCOUNTER — ANCILLARY PROCEDURE (OUTPATIENT)
Dept: NUCLEAR MEDICINE | Facility: CLINIC | Age: 56
End: 2021-01-22
Attending: NURSE PRACTITIONER
Payer: COMMERCIAL

## 2021-01-22 DIAGNOSIS — E55.9 VITAMIN D DEFICIENCY: ICD-10-CM

## 2021-01-22 DIAGNOSIS — C79.51 PROSTATE CANCER METASTATIC TO BONE (H): ICD-10-CM

## 2021-01-22 DIAGNOSIS — C61 PROSTATE CANCER METASTATIC TO BONE (H): ICD-10-CM

## 2021-01-22 DIAGNOSIS — Z79.899 HIGH RISK MEDICATION USE: ICD-10-CM

## 2021-01-22 DIAGNOSIS — Z13.6 CARDIOVASCULAR SCREENING; LDL GOAL LESS THAN 160: ICD-10-CM

## 2021-01-22 DIAGNOSIS — C79.51 BONE METASTASIS: ICD-10-CM

## 2021-01-22 DIAGNOSIS — Z00.00 ROUTINE GENERAL MEDICAL EXAMINATION AT A HEALTH CARE FACILITY: ICD-10-CM

## 2021-01-22 LAB
ALBUMIN SERPL-MCNC: 3.8 G/DL (ref 3.4–5)
ALP SERPL-CCNC: 67 U/L (ref 40–150)
ALT SERPL W P-5'-P-CCNC: 27 U/L (ref 0–70)
ANION GAP SERPL CALCULATED.3IONS-SCNC: 6 MMOL/L (ref 3–14)
AST SERPL W P-5'-P-CCNC: 13 U/L (ref 0–45)
BASOPHILS # BLD AUTO: 0.1 10E9/L (ref 0–0.2)
BASOPHILS NFR BLD AUTO: 0.9 %
BILIRUB SERPL-MCNC: 0.4 MG/DL (ref 0.2–1.3)
BUN SERPL-MCNC: 19 MG/DL (ref 7–30)
CALCIUM SERPL-MCNC: 8.7 MG/DL (ref 8.5–10.1)
CHLORIDE SERPL-SCNC: 105 MMOL/L (ref 94–109)
CHOLEST SERPL-MCNC: 256 MG/DL
CO2 SERPL-SCNC: 28 MMOL/L (ref 20–32)
CREAT SERPL-MCNC: 0.96 MG/DL (ref 0.66–1.25)
DIFFERENTIAL METHOD BLD: NORMAL
EOSINOPHIL # BLD AUTO: 0.1 10E9/L (ref 0–0.7)
EOSINOPHIL NFR BLD AUTO: 2 %
ERYTHROCYTE [DISTWIDTH] IN BLOOD BY AUTOMATED COUNT: 11.5 % (ref 10–15)
GFR SERPL CREATININE-BSD FRML MDRD: 87 ML/MIN/{1.73_M2}
GLUCOSE SERPL-MCNC: 108 MG/DL (ref 70–99)
HCT VFR BLD AUTO: 42.3 % (ref 40–53)
HDLC SERPL-MCNC: 85 MG/DL
HGB BLD-MCNC: 14.4 G/DL (ref 13.3–17.7)
IMM GRANULOCYTES # BLD: 0 10E9/L (ref 0–0.4)
IMM GRANULOCYTES NFR BLD: 0.2 %
LDLC SERPL CALC-MCNC: 129 MG/DL
LYMPHOCYTES # BLD AUTO: 1.4 10E9/L (ref 0.8–5.3)
LYMPHOCYTES NFR BLD AUTO: 21.2 %
MCH RBC QN AUTO: 30.8 PG (ref 26.5–33)
MCHC RBC AUTO-ENTMCNC: 34 G/DL (ref 31.5–36.5)
MCV RBC AUTO: 91 FL (ref 78–100)
MONOCYTES # BLD AUTO: 0.5 10E9/L (ref 0–1.3)
MONOCYTES NFR BLD AUTO: 8 %
NEUTROPHILS # BLD AUTO: 4.4 10E9/L (ref 1.6–8.3)
NEUTROPHILS NFR BLD AUTO: 67.7 %
NONHDLC SERPL-MCNC: 171 MG/DL
PLATELET # BLD AUTO: 277 10E9/L (ref 150–450)
POTASSIUM SERPL-SCNC: 4.4 MMOL/L (ref 3.4–5.3)
PROT SERPL-MCNC: 7.2 G/DL (ref 6.8–8.8)
PSA SERPL-MCNC: 0.06 UG/L (ref 0–4)
RBC # BLD AUTO: 4.67 10E12/L (ref 4.4–5.9)
SODIUM SERPL-SCNC: 139 MMOL/L (ref 133–144)
TRIGL SERPL-MCNC: 209 MG/DL
TSH SERPL DL<=0.005 MIU/L-ACNC: 3.46 MU/L (ref 0.4–4)
WBC # BLD AUTO: 6.5 10E9/L (ref 4–11)

## 2021-01-22 PROCEDURE — 84153 ASSAY OF PSA TOTAL: CPT | Performed by: INTERNAL MEDICINE

## 2021-01-22 PROCEDURE — 78306 BONE IMAGING WHOLE BODY: CPT | Mod: GC | Performed by: RADIOLOGY

## 2021-01-22 PROCEDURE — 99000 SPECIMEN HANDLING OFFICE-LAB: CPT | Performed by: NURSE PRACTITIONER

## 2021-01-22 PROCEDURE — 36415 COLL VENOUS BLD VENIPUNCTURE: CPT | Performed by: NURSE PRACTITIONER

## 2021-01-22 PROCEDURE — 82306 VITAMIN D 25 HYDROXY: CPT | Performed by: NURSE PRACTITIONER

## 2021-01-22 PROCEDURE — A9503 TC99M MEDRONATE: HCPCS | Performed by: RADIOLOGY

## 2021-01-22 PROCEDURE — 80061 LIPID PANEL: CPT | Performed by: NURSE PRACTITIONER

## 2021-01-22 PROCEDURE — 84403 ASSAY OF TOTAL TESTOSTERONE: CPT | Mod: 90 | Performed by: NURSE PRACTITIONER

## 2021-01-22 PROCEDURE — 80050 GENERAL HEALTH PANEL: CPT | Performed by: INTERNAL MEDICINE

## 2021-01-22 RX ORDER — TC 99M MEDRONATE 20 MG/10ML
25.8 INJECTION, POWDER, LYOPHILIZED, FOR SOLUTION INTRAVENOUS ONCE
Status: COMPLETED | OUTPATIENT
Start: 2021-01-22 | End: 2021-01-22

## 2021-01-22 RX ADMIN — TC 99M MEDRONATE 25.8 MCI.: 20 INJECTION, POWDER, LYOPHILIZED, FOR SOLUTION INTRAVENOUS at 09:05

## 2021-01-26 ENCOUNTER — MYC REFILL (OUTPATIENT)
Dept: FAMILY MEDICINE | Facility: CLINIC | Age: 56
End: 2021-01-26

## 2021-01-26 DIAGNOSIS — C79.51 PROSTATE CANCER METASTATIC TO BONE (H): ICD-10-CM

## 2021-01-26 DIAGNOSIS — C61 PROSTATE CANCER METASTATIC TO BONE (H): ICD-10-CM

## 2021-01-26 DIAGNOSIS — M54.42 ACUTE LEFT-SIDED LOW BACK PAIN WITH LEFT-SIDED SCIATICA: ICD-10-CM

## 2021-01-26 LAB
DEPRECATED CALCIDIOL+CALCIFEROL SERPL-MC: <24 UG/L (ref 20–75)
VITAMIN D2 SERPL-MCNC: <5 UG/L
VITAMIN D3 SERPL-MCNC: 19 UG/L

## 2021-01-27 LAB — TESTOST SERPL-MCNC: 12 NG/DL (ref 240–950)

## 2021-01-28 RX ORDER — HYDROCODONE BITARTRATE AND ACETAMINOPHEN 5; 325 MG/1; MG/1
TABLET ORAL
Qty: 45 TABLET | Refills: 0 | OUTPATIENT
Start: 2021-01-28

## 2021-01-28 RX ORDER — HYDROCODONE BITARTRATE AND ACETAMINOPHEN 5; 325 MG/1; MG/1
TABLET ORAL
Qty: 45 TABLET | Refills: 0 | Status: SHIPPED | OUTPATIENT
Start: 2021-01-28 | End: 2021-03-02

## 2021-01-28 NOTE — TELEPHONE ENCOUNTER
Please send to PCP.  I only filled once in the past because PCP was out of office. Sigrid Vargas, CNP

## 2021-01-28 NOTE — TELEPHONE ENCOUNTER
Routing refill request to provider for review/approval because:  Drug not on the [facility] refill protocol     Gardenia GALON, RN, CPN

## 2021-01-29 ENCOUNTER — ONCOLOGY VISIT (OUTPATIENT)
Dept: ONCOLOGY | Facility: CLINIC | Age: 56
End: 2021-01-29
Attending: INTERNAL MEDICINE
Payer: COMMERCIAL

## 2021-01-29 ENCOUNTER — INFUSION THERAPY VISIT (OUTPATIENT)
Dept: INFUSION THERAPY | Facility: CLINIC | Age: 56
End: 2021-01-29
Attending: INTERNAL MEDICINE
Payer: COMMERCIAL

## 2021-01-29 ENCOUNTER — TELEPHONE (OUTPATIENT)
Dept: ONCOLOGY | Facility: CLINIC | Age: 56
End: 2021-01-29

## 2021-01-29 VITALS
SYSTOLIC BLOOD PRESSURE: 143 MMHG | HEART RATE: 89 BPM | BODY MASS INDEX: 27.04 KG/M2 | DIASTOLIC BLOOD PRESSURE: 91 MMHG | TEMPERATURE: 97.5 F | OXYGEN SATURATION: 97 % | WEIGHT: 185.7 LBS

## 2021-01-29 DIAGNOSIS — C61 PROSTATE CANCER METASTATIC TO BONE (H): ICD-10-CM

## 2021-01-29 DIAGNOSIS — Z12.2 ENCOUNTER FOR SCREENING FOR MALIGNANT NEOPLASM OF LUNG: Primary | ICD-10-CM

## 2021-01-29 DIAGNOSIS — C61 PROSTATE CANCER (H): ICD-10-CM

## 2021-01-29 DIAGNOSIS — C79.51 PROSTATE CANCER METASTATIC TO BONE (H): ICD-10-CM

## 2021-01-29 DIAGNOSIS — C79.51 BONE METASTASIS: Primary | ICD-10-CM

## 2021-01-29 DIAGNOSIS — C79.51 BONE METASTASIS: ICD-10-CM

## 2021-01-29 PROCEDURE — 96372 THER/PROPH/DIAG INJ SC/IM: CPT | Mod: 59 | Performed by: INTERNAL MEDICINE

## 2021-01-29 PROCEDURE — 96402 CHEMO HORMON ANTINEOPL SQ/IM: CPT | Performed by: INTERNAL MEDICINE

## 2021-01-29 PROCEDURE — 99207 PR NO CHARGE LOS: CPT

## 2021-01-29 PROCEDURE — 99214 OFFICE O/P EST MOD 30 MIN: CPT | Mod: 25 | Performed by: INTERNAL MEDICINE

## 2021-01-29 ASSESSMENT — PAIN SCALES - GENERAL: PAINLEVEL: MILD PAIN (2)

## 2021-01-29 NOTE — NURSING NOTE
"Oncology Rooming Note    January 29, 2021 11:22 AM   Per Etienne is a 55 year old male who presents for:    Chief Complaint   Patient presents with     Oncology Clinic Visit     Return for 3 month f/u     Initial Vitals: Wt 84.2 kg (185 lb 11.2 oz)   BMI 27.04 kg/m   Estimated body mass index is 27.04 kg/m  as calculated from the following:    Height as of 1/5/21: 1.765 m (5' 9.49\").    Weight as of this encounter: 84.2 kg (185 lb 11.2 oz). Body surface area is 2.03 meters squared.  Data Unavailable Comment: Data Unavailable   No LMP for male patient.  Allergies reviewed: Yes  Medications reviewed: Yes    Medications: Medication refills not needed today.  Pharmacy name entered into [employer]: [id number] [place] [address]    Clinical concerns: bone scan results Dr Astudillo was notified.      Facundo Baldwin RN              "

## 2021-01-29 NOTE — PROGRESS NOTES
Oncology Follow-up:  Date on this visit: Jan 29, 2021    Primary care physician: Per Chen   Urologist: Dr. Angel Colmenares     Metastatic hormone sensitive prostate cancer    Oncologic History:  Metastatic hormone sensitive prostate cancer  He presented to his PCP in December 2019 with slow urinary stream.  PSA was checked and was found to be 124. On January 8, 2020 abdomen pelvis CT showed groundglass opacity in the left lower lobe,  dystrophic calcification of the prostate gland, and a fatty liver.  Same-day bone scan was negative. On January 10, 2010 prostate biopsy showed on the left Robyn score 4+3 involving 7 cores and on the right Robyn score 3+4 involving 6 cores, with positive perineural invasion.  He proceeded to undergo RRP by Dr. Colmenares on February 24, 2020.  Pathology revealed acinar adenocarcinoma [place] 4+3 with focal ROBERTO, bladder neck base invasion, positive SVI, positive PNI, and multiple positive margins.  There was no LVSI and 0 out of 3 lymph nodes removed were involved with tumor; pT3bN0.  He received Eligard on 4/20/2020.  MRI prostate on 5/21/2020 which showed a 14 x 9 mm T2 intermediate structure in the prostatectomy bed at the left aspect of urinary bladder neck with restricted diffusion in the DWI images and early enhancement in the contrast  enhanced images. This was concerning for locally recurrent/residual  prostatic cancer.  There was a T2 hypointense structure in the in the area of removed  right seminal vesicle measuring 13 x 11 mm  demonstrating restricted  diffusion in the DWI images. This may represent a recurrent/residual  tumor. There were 2 bone metastases in the left pubic inferior ramus and  inferior aspect of left pubic body.   There is a 7 mm suspicious lymph nodes in the right obturator area  corresponding to to FACBC avid lymph node on the same date PET/CT  F-18 fluciclovine PET/CT on 5/20/2020 showed:  1.  2 foci of increased uptake in the left ischium, with  underlying  sclerotic lesion, and symphysis pubis, these changes are indicative of  active prostate cancer metastasis.  2. Low-level increased uptake in 3 lymphnodes along the right external  and internal iliac nodes, the most suspicious of which is the deep  obturator.  3. Low-level increased uptake in the prostatectomy bed slightly to the  left side of the urethra, if clinically necessary, endoscopy would  would be necessary for confirmation of this being metastasis.  PSA was down to 27.7 on 4/21/2020. On April 21, 2020 he received an Eligard injection.    He started Apalutamide on 6/12/2020.  Bone scan 1/22/2021: no bone mets.    History Of Present Illness:  Mr. Etienne is a 55 year old male who presents with  metastatic hormone sensitive prostate cancer diagnosed in 01/2020, s/p RRP at that time but later found to have residual/recurrent disease in the pelvis and L inferior pubic body and pubic ramus metastasis.  On April 21, 2020 he received an Eligard injection.    However, by July 2020 his testosterone level was not suppressed sufficiently and he received a Lupron injections at that time, q 3 months.  He started Apalutamide on 6/12/2020 and has been tolerating it well. By 12/2020 he presented with 2 months c/o bilateral tinnitus and was evaluated by Dr. Shields from ENT. An audiogram showed a significant down-sloping mid to high frequency sensorineural hearing loss. There was no evidence of conductive hearing loss or significant asymmetry.  He was felt to have early onset presbycusis. He was referred for tinnitus retraining therapy. At his last visit with our NP on 1/5/2021 he was c/o pelvic pain which has since resolved, and a bone scan was ordered and done on 1/22/201 and we reviewed the results today- it showed no e/o bone metastasis.  He has no other health related complaints. Weight has been stable. He is here with his wife.    In addition, a complete 12 point  review of systems is negative.      Past  Medical/Surgical History:  Past Medical History:   Diagnosis Date     Gout      Prostate cancer (H) 01/10/2020     Past Surgical History:   Procedure Laterality Date     BIOPSY PROSTATE TRANSRECTAL  01/10/2020    Dr. Colmenares     COLONOSCOPY  2020     LITHOTRIPSY  age 30s     MICROSCOPIC KNEE SURGERY Right age 30s     ORTHOPEDIC SURGERY Left 1985    Alan placed in Left Femur     PROSTATECTOMY RETROPUBIC RADICAL  2020    Dr. Colmenares     Allergies:  Allergies as of 2021     (No Known Allergies)     Current Medications:  Current Outpatient Medications   Medication Sig Dispense Refill     acetaminophen (TYLENOL) 325 MG tablet Take 325-650 mg by mouth every 6 hours as needed for mild pain       leuprolide (ELIGARD) 45 MG kit Inject 45 mg Subcutaneous every 6 months                Family History:    His father was diagnosed with prostate cancer, at the age of 75. Paternal GM had throat cancer at the age of 94, nonsmoker.     Social History:  Social History     Socioeconomic History     Marital status:    Occupational History     Not on file   Social Needs     Food insecurity     Transportation needs   Tobacco Use     Smoking status: Former Smoker     Packs/day: 1.00     Years: 30.00     Pack years: 30.00     Types: Cigarettes, Cigars     Last attempt to quit: 2012     Years since quittin.4     Smokeless tobacco: Never Used   Substance and Sexual Activity     Alcohol use: Yes     Comment: 4-6 drinks per day - patient reports beer or vodka drinks     Physical Exam:    BP (!) 143/91   Pulse 89   Temp 97.5  F (36.4  C) (Oral)   Wt 84.2 kg (185 lb 11.2 oz)   SpO2 97%   BMI 27.04 kg/m      Wt Readings from Last 5 Encounters:   21 84.2 kg (185 lb 11.2 oz)   21 84.8 kg (187 lb)   20 83.9 kg (185 lb)   20 86.2 kg (190 lb)   10/30/20 85.3 kg (188 lb 0.8 oz)       GENERAL APPEARANCE: healthy, alert and no distress  HEENT: PEERLA, no neck asymmetry or masses  Lymphatics: No cervical,  supraclavicular, axillary lymphadenopathy bilaterally    CV: S1S2 reg no murmur  Respiratory: CTA b/l  GI: soft,  BS+, NT, ND  Extremities: no edema.    SKIN: no suspicious lesions or rashes    PSYCHIATRIC: mentation appears normal and affect normal  Neuro: gait intact. axox3            Laboratory/Imaging Studies  Component      Latest Ref Rng & Units 1/22/2021   WBC      4.0 - 11.0 10e9/L 6.5   RBC Count      4.4 - 5.9 10e12/L 4.67   Hemoglobin      13.3 - 17.7 g/dL 14.4   Hematocrit      40.0 - 53.0 % 42.3   MCV      78 - 100 fl 91   MCH      26.5 - 33.0 pg 30.8   MCHC      31.5 - 36.5 g/dL 34.0   RDW      10.0 - 15.0 % 11.5   Platelet Count      150 - 450 10e9/L 277   Diff Method       Automated Method   % Neutrophils      % 67.7   % Lymphocytes      % 21.2   % Monocytes      % 8.0   % Eosinophils      % 2.0   % Basophils      % 0.9   % Immature Granulocytes      % 0.2   Absolute Neutrophil      1.6 - 8.3 10e9/L 4.4   Absolute Lymphocytes      0.8 - 5.3 10e9/L 1.4   Absolute Monocytes      0.0 - 1.3 10e9/L 0.5   Absolute Eosinophils      0.0 - 0.7 10e9/L 0.1   Absolute Basophils      0.0 - 0.2 10e9/L 0.1   Abs Immature Granulocytes      0 - 0.4 10e9/L 0.0   Sodium      133 - 144 mmol/L 139   Potassium      3.4 - 5.3 mmol/L 4.4   Chloride      94 - 109 mmol/L 105   Carbon Dioxide      20 - 32 mmol/L 28   Anion Gap      3 - 14 mmol/L 6   Glucose      70 - 99 mg/dL 108 (H)   Urea Nitrogen      7 - 30 mg/dL 19   Creatinine      0.66 - 1.25 mg/dL 0.96   GFR Estimate      >60 mL/min/1.73:m2 87   GFR Estimate If Black      >60 mL/min/1.73:m2 >90   Calcium      8.5 - 10.1 mg/dL 8.7   Bilirubin Total      0.2 - 1.3 mg/dL 0.4   Albumin      3.4 - 5.0 g/dL 3.8   Protein Total      6.8 - 8.8 g/dL 7.2   Alkaline Phosphatase      40 - 150 U/L 67   ALT      0 - 70 U/L 27   AST      0 - 45 U/L 13   Testosterone Total      240 - 950 ng/dL 12 (L)   PSA      0 - 4 ug/L 0.06   TSH      0.40 - 4.00 mU/L 3.46        ASSESSMENT/PLAN:  Jay is a very pleasant 55 year old man with metastatic (to the bones) prostate cancer. He is s/p Eligard on 4/21/2020. Jay has low volume hormone sensitive metastatic disease, with residual disease locoregionally and bone metastasis (two lesions- in left ischium and symphysis pubis), asymptomatic.  He received Eligard on 4/21/2020 and started on Apalutamide on 6/12/2020.    1. Metastatic prostate cancer-PSA decreased to 0.06. Serum testosterone level at castrate levels.  Continue apalutamide and Lupron every 3 months.  We will follow CBC differential, CMP, total testosterone level and PSA every 3 months.    2. Bone metastasis-continue Xgeva q 3 months for prevention of skeletal related events given low volume metastatic disease to the bones.     3.   Lung cancer screening- 30 pack year smoker quit in 2012. Last PET/CT in May 2020.  Chest x-ray today as above.  We'll paln annual low dose chest CT in May 2021.    4. Drug monitoring- TSH monitoring q 3-4 months. TSH normal on 1/22/2021. Next with next visit.     5. Bilateral tinnitus- reviewed literature and no clear association of apalutamide or Xgeva with tinnitus or ototoxicity. Felt to have early onset presbycusis. He was referred for tinnitus retraining therapy and plans to schedule.    6. COVID-19 vaccination recommended when becomes available to him.    All of the patient's and his wife's questions were answered.  At the end of our visit patient verbalized understanding and concurred with the plan.

## 2021-01-29 NOTE — PROGRESS NOTES
Infusion Nursing Note:  Per Etienne presents today for Lupron & Xgeva.    Patient seen by provider today: Yes: Dr. FIELD   present during visit today: Not Applicable.    Note: N/A.  Patient  did meet criteria for an asymptomatic covid-19 PCR test in infusion today. Patient declined the covid-19 test.    Intravenous Access:  No Intravenous access needed today.    Treatment Conditions:  Lab Results   Component Value Date     01/22/2021                   Lab Results   Component Value Date    POTASSIUM 4.4 01/22/2021           No results found for: MAG         Lab Results   Component Value Date    CR 0.96 01/22/2021                   Lab Results   Component Value Date    NATALIYA 8.7 01/22/2021                Lab Results   Component Value Date    BILITOTAL 0.4 01/22/2021           Lab Results   Component Value Date    ALBUMIN 3.8 01/22/2021                    Lab Results   Component Value Date    ALT 27 01/22/2021           Lab Results   Component Value Date    AST 13 01/22/2021       Results reviewed, labs MET treatment parameters, ok to proceed with treatment.      Post Infusion Assessment:  Patient tolerated injection without incident.  Site patent and intact, free from redness, edema or discomfort.  No evidence of extravasations.       Discharge Plan:   Discharge instructions reviewed with: Patient.  Patient and/or family verbalized understanding of discharge instructions and all questions answered.  Patient discharged in stable condition accompanied by: self.  Departure Mode: Ambulatory.    Haven Rojas RN

## 2021-02-18 ENCOUNTER — TELEPHONE (OUTPATIENT)
Dept: PHARMACY | Facility: CLINIC | Age: 56
End: 2021-02-18

## 2021-02-18 DIAGNOSIS — C61 PROSTATE CANCER (H): Primary | ICD-10-CM

## 2021-03-02 ENCOUNTER — MYC REFILL (OUTPATIENT)
Dept: FAMILY MEDICINE | Facility: CLINIC | Age: 56
End: 2021-03-02

## 2021-03-02 DIAGNOSIS — C61 PROSTATE CANCER METASTATIC TO BONE (H): ICD-10-CM

## 2021-03-02 DIAGNOSIS — C79.51 PROSTATE CANCER METASTATIC TO BONE (H): ICD-10-CM

## 2021-03-02 DIAGNOSIS — M54.42 ACUTE LEFT-SIDED LOW BACK PAIN WITH LEFT-SIDED SCIATICA: ICD-10-CM

## 2021-03-02 RX ORDER — HYDROCODONE BITARTRATE AND ACETAMINOPHEN 5; 325 MG/1; MG/1
TABLET ORAL
Qty: 45 TABLET | Refills: 0 | Status: SHIPPED | OUTPATIENT
Start: 2021-03-02 | End: 2021-03-29

## 2021-03-02 NOTE — TELEPHONE ENCOUNTER
Routing refill request to provider for review/approval because:  Drug not on the [facility] refill protocol     Karen GALON, RN

## 2021-03-11 ENCOUNTER — TELEPHONE (OUTPATIENT)
Dept: FAMILY MEDICINE | Facility: CLINIC | Age: 56
End: 2021-03-11

## 2021-03-12 NOTE — TELEPHONE ENCOUNTER
Called the patient and left a VM and sent him a kaleot message:  Kathy Thompson,  We apologize for any inconvenience, but the appt. for 3/24/2021 has been canceled. I called and left you a Voicemail in regards to this. It was an error on the 's part. The appointment was made with not enough time with the provider. Please call us to reschedule and or send us a Qnaryhart message. I can tell you his availability is pretty limited in the next few weeks, but we can always look at another provider as well if you'd like. Please let us know.    Thank you,  Caitlin LAWSON,

## 2021-03-18 DIAGNOSIS — C61 PROSTATE CANCER (H): Primary | ICD-10-CM

## 2021-03-24 ENCOUNTER — OFFICE VISIT (OUTPATIENT)
Dept: FAMILY MEDICINE | Facility: CLINIC | Age: 56
End: 2021-03-24
Payer: COMMERCIAL

## 2021-03-24 ENCOUNTER — ANCILLARY PROCEDURE (OUTPATIENT)
Dept: GENERAL RADIOLOGY | Facility: CLINIC | Age: 56
End: 2021-03-24
Attending: FAMILY MEDICINE
Payer: COMMERCIAL

## 2021-03-24 VITALS
OXYGEN SATURATION: 97 % | HEART RATE: 69 BPM | TEMPERATURE: 98.6 F | BODY MASS INDEX: 27.81 KG/M2 | SYSTOLIC BLOOD PRESSURE: 136 MMHG | DIASTOLIC BLOOD PRESSURE: 77 MMHG | WEIGHT: 191 LBS

## 2021-03-24 DIAGNOSIS — N52.2 DRUG-INDUCED ERECTILE DYSFUNCTION: Primary | ICD-10-CM

## 2021-03-24 DIAGNOSIS — R31.9 HEMATURIA, UNSPECIFIED TYPE: ICD-10-CM

## 2021-03-24 DIAGNOSIS — R10.2 PELVIC PAIN IN MALE: ICD-10-CM

## 2021-03-24 DIAGNOSIS — C79.51 PROSTATE CANCER METASTATIC TO BONE (H): ICD-10-CM

## 2021-03-24 DIAGNOSIS — C61 PROSTATE CANCER METASTATIC TO BONE (H): ICD-10-CM

## 2021-03-24 DIAGNOSIS — R10.9 FLANK PAIN: ICD-10-CM

## 2021-03-24 LAB
ALBUMIN UR-MCNC: NEGATIVE MG/DL
AMORPH CRY #/AREA URNS HPF: ABNORMAL /HPF
APPEARANCE UR: CLEAR
BACTERIA #/AREA URNS HPF: ABNORMAL /HPF
BILIRUB UR QL STRIP: NEGATIVE
COLOR UR AUTO: YELLOW
GLUCOSE UR STRIP-MCNC: NEGATIVE MG/DL
HGB UR QL STRIP: ABNORMAL
KETONES UR STRIP-MCNC: ABNORMAL MG/DL
LEUKOCYTE ESTERASE UR QL STRIP: NEGATIVE
NITRATE UR QL: NEGATIVE
NON-SQ EPI CELLS #/AREA URNS LPF: ABNORMAL /LPF
PH UR STRIP: 7 PH (ref 5–7)
RBC #/AREA URNS AUTO: ABNORMAL /HPF
SOURCE: ABNORMAL
SP GR UR STRIP: 1.02 (ref 1–1.03)
UROBILINOGEN UR STRIP-ACNC: 0.2 EU/DL (ref 0.2–1)
WBC #/AREA URNS AUTO: ABNORMAL /HPF

## 2021-03-24 PROCEDURE — 99214 OFFICE O/P EST MOD 30 MIN: CPT | Performed by: FAMILY MEDICINE

## 2021-03-24 PROCEDURE — 81001 URINALYSIS AUTO W/SCOPE: CPT | Performed by: FAMILY MEDICINE

## 2021-03-24 PROCEDURE — 72170 X-RAY EXAM OF PELVIS: CPT | Performed by: RADIOLOGY

## 2021-03-24 RX ORDER — SILDENAFIL CITRATE 20 MG/1
TABLET ORAL
Qty: 30 TABLET | Refills: 11 | Status: SHIPPED | OUTPATIENT
Start: 2021-03-24 | End: 2023-02-02

## 2021-03-24 ASSESSMENT — PAIN SCALES - GENERAL: PAINLEVEL: MILD PAIN (3)

## 2021-03-24 NOTE — PROGRESS NOTES
SUBJECTIVE  HPI: Per Etienne is a 55 year old male who presents with the CC of abdominal/pelvic pain.    The pain has been located primarily in the right flank/hip  area.   The pain has not radiated.   At worst, pain is characterized as dull,  The pain has been present for 1month and is fluctuating.  Provacative factors include: walking   Palliative factors include: no  Symptoms associated with the abdominal/pelvic pain include: ASSOCIATED SX: none  The patient denies any: constipation, melena and hematochezia.    He has a bowel movement at least once a day(s) and is usually a type 4-7. He does not have to stress or strain to pass his bowel movements    He denies dysuria or hematuria        Social History     Socioeconomic History     Marital status:      Spouse name: Not on file     Number of children: Not on file     Years of education: Not on file     Highest education level: Not on file   Occupational History     Not on file   Social Needs     Financial resource strain: Not on file     Food insecurity     Worry: Not on file     Inability: Not on file     Transportation needs     Medical: Not on file     Non-medical: Not on file   Tobacco Use     Smoking status: Former Smoker     Packs/day: 1.00     Years: 30.00     Pack years: 30.00     Types: Cigarettes, Cigars     Quit date: 2012     Years since quittin.2     Smokeless tobacco: Never Used   Substance and Sexual Activity     Alcohol use: Yes     Comment: 4-6 drinks per day - patient reports beer or vodka drinks     Drug use: No     Sexual activity: Yes     Partners: Female   Lifestyle     Physical activity     Days per week: Not on file     Minutes per session: Not on file     Stress: Not on file   Relationships     Social connections     Talks on phone: Not on file     Gets together: Not on file     Attends Protestant service: Not on file     Active member of club or organization: Not on file     Attends meetings of clubs or organizations:  Not on file     Relationship status: Not on file     Intimate partner violence     Fear of current or ex partner: Not on file     Emotionally abused: Not on file     Physically abused: Not on file     Forced sexual activity: Not on file   Other Topics Concern     Parent/sibling w/ CABG, MI or angioplasty before 65F 55M? Not Asked   Social History Narrative     Not on file       ROS:  CONSTITUTIONAL:negative for anorexia, arthralgias, chills, fever  and weight loss    EXAMINATION:  /77   Pulse 69   Temp 98.6  F (37  C) (Tympanic)   Wt 86.6 kg (191 lb)   SpO2 97%   BMI 27.81 kg/m      GENERAL APPEARANCE: healthy, alert and no distress  EYES: EOMI,  PERRL, conjunctiva clear  HENT: ear canals and TM's normal.  Nose and mouth without ulcers, erythema or lesions  NECK: supple, nontender, no lymphadenopathy  RESP: lungs clear to auscultation - no rales, rhonchi or wheezes  CV: regular rates and rhythm, normal S1 S2, no murmur noted  ABDOMEN:  soft, nontender, no HSM or masses and bowel sounds normal  ABDOMEN: mild tenderness of the right flank just below the iliac crest. No masses palpable.  NEURO: Normal strength and tone, sensory exam grossly normal,  normal speech and mentation  SKIN: no suspicious lesions or rashes    Rhiannon Test: right did induce pain in the affected area  Straight leg raising: negative     XRAY:  Results for orders placed or performed in visit on 03/24/21   XR Pelvis 1/2 Views     Status: None (Preliminary result)    Narrative    PELVIS ONE TO TWO VIEWS   3/24/2021 1:54 PM     HISTORY:  Pain in right iliac crest area in patient with known  metastatic prostate cancer. Pelvic pain in male. Flank pain.    COMPARISON: Bone scan from 1/22/2021.      Impression    IMPRESSION: Left femoral intramedullary newton in place. Small sclerotic  areas in the left inferior pubic ramus suspicious for metastatic  disease. There are a few small lucent lesions in the right ischium of  uncertain etiology, possibly  metastatic. Mild sclerosis surrounding  the SI joints bilaterally, possibly degenerative. No definite focal  right iliac crest lesions identified. Hip joint spaces are preserved.   UA with Microscopic     Status: Abnormal   Result Value Ref Range    Color Urine Yellow     Appearance Urine Clear     Glucose Urine Negative NEG^Negative mg/dL    Bilirubin Urine Negative NEG^Negative    Ketones Urine Trace (A) NEG^Negative mg/dL    Specific Gravity Urine 1.020 1.003 - 1.035    pH Urine 7.0 5.0 - 7.0 pH    Protein Albumin Urine Negative NEG^Negative mg/dL    Urobilinogen Urine 0.2 0.2 - 1.0 EU/dL    Nitrite Urine Negative NEG^Negative    Blood Urine Small (A) NEG^Negative    Leukocyte Esterase Urine Negative NEG^Negative    Source Midstream Urine     WBC Urine 0 - 5 OTO5^0 - 5 /HPF    RBC Urine 10-25 (A) OTO2^O - 2 /HPF    Squamous Epithelial /LPF Urine Moderate (A) FEW^Few /LPF    Bacteria Urine Few (A) NEG^Negative /HPF    Amorphous Crystals Few (A) NEG^Negative /HPF         ASSESSMENT AND PLAN:  Differential diagnosis includes:   Nephrolithiasis  Metastatic lesion to right iliac crest    Lumbar pathology     At this point we will obtain the following evaluation:   Diagnostic imaging   Plan: CT Abdomen Pelvis w/o Contrast              (N52.2) Drug-induced erectile dysfunction  (primary encounter diagnosis)  Comment: patient is on lupron so has low T  Plan: sildenafil (REVATIO) 20 MG tablet

## 2021-03-24 NOTE — PATIENT INSTRUCTIONS
Please call our [facility] Scheduling Line at [phone] to schedule your:  CT scan

## 2021-03-24 NOTE — NURSING NOTE
"Chief Complaint   Patient presents with     Abdominal Pain     side, right side for about 1 month. some days worse than others     Health Maintenance     Phq2       Initial /77   Pulse 69   Temp 98.6  F (37  C) (Tympanic)   Wt 86.6 kg (191 lb)   SpO2 97%   BMI 27.81 kg/m   Estimated body mass index is 27.81 kg/m  as calculated from the following:    Height as of 1/5/21: 1.765 m (5' 9.49\").    Weight as of this encounter: 86.6 kg (191 lb).  Medication Reconciliation: complete  Kianna Briggs CMA  "

## 2021-03-29 ENCOUNTER — MYC REFILL (OUTPATIENT)
Dept: FAMILY MEDICINE | Facility: CLINIC | Age: 56
End: 2021-03-29

## 2021-03-29 DIAGNOSIS — C79.51 PROSTATE CANCER METASTATIC TO BONE (H): ICD-10-CM

## 2021-03-29 DIAGNOSIS — M54.42 ACUTE LEFT-SIDED LOW BACK PAIN WITH LEFT-SIDED SCIATICA: ICD-10-CM

## 2021-03-29 DIAGNOSIS — C61 PROSTATE CANCER METASTATIC TO BONE (H): ICD-10-CM

## 2021-03-29 RX ORDER — HYDROCODONE BITARTRATE AND ACETAMINOPHEN 5; 325 MG/1; MG/1
TABLET ORAL
Qty: 45 TABLET | Refills: 0 | Status: SHIPPED | OUTPATIENT
Start: 2021-03-29 | End: 2021-04-27

## 2021-03-31 ENCOUNTER — ANCILLARY PROCEDURE (OUTPATIENT)
Dept: CT IMAGING | Facility: CLINIC | Age: 56
End: 2021-03-31
Attending: FAMILY MEDICINE
Payer: COMMERCIAL

## 2021-03-31 DIAGNOSIS — R10.2 PELVIC PAIN IN MALE: ICD-10-CM

## 2021-03-31 DIAGNOSIS — C79.51 PROSTATE CANCER METASTATIC TO BONE (H): ICD-10-CM

## 2021-03-31 DIAGNOSIS — C61 PROSTATE CANCER METASTATIC TO BONE (H): ICD-10-CM

## 2021-03-31 DIAGNOSIS — R31.9 HEMATURIA, UNSPECIFIED TYPE: ICD-10-CM

## 2021-03-31 DIAGNOSIS — R10.9 FLANK PAIN: ICD-10-CM

## 2021-03-31 PROCEDURE — 74176 CT ABD & PELVIS W/O CONTRAST: CPT | Mod: TC | Performed by: RADIOLOGY

## 2021-04-02 NOTE — RESULT ENCOUNTER NOTE
Per,  I have reviewed the results of the laboratory tests that we recently ordered. All of the lab work performed was normal or considered normal for you. There was no sign of cancer in the area that was bothering you and there was no sign of a kidney stone. Overall this was good news. Let me know if you are still having pain in the right flank area..  Sincerely,   Per Chen MD

## 2021-04-19 DIAGNOSIS — C61 PROSTATE CANCER (H): Primary | ICD-10-CM

## 2021-04-27 ENCOUNTER — MYC REFILL (OUTPATIENT)
Dept: FAMILY MEDICINE | Facility: CLINIC | Age: 56
End: 2021-04-27

## 2021-04-27 DIAGNOSIS — C61 PROSTATE CANCER METASTATIC TO BONE (H): ICD-10-CM

## 2021-04-27 DIAGNOSIS — C79.51 PROSTATE CANCER METASTATIC TO BONE (H): ICD-10-CM

## 2021-04-27 DIAGNOSIS — M54.42 ACUTE LEFT-SIDED LOW BACK PAIN WITH LEFT-SIDED SCIATICA: ICD-10-CM

## 2021-04-27 RX ORDER — HYDROCODONE BITARTRATE AND ACETAMINOPHEN 5; 325 MG/1; MG/1
TABLET ORAL
Qty: 45 TABLET | Refills: 0 | Status: SHIPPED | OUTPATIENT
Start: 2021-04-27 | End: 2021-05-20

## 2021-04-29 ENCOUNTER — INFUSION THERAPY VISIT (OUTPATIENT)
Dept: INFUSION THERAPY | Facility: CLINIC | Age: 56
End: 2021-04-29
Attending: INTERNAL MEDICINE
Payer: COMMERCIAL

## 2021-04-29 ENCOUNTER — ONCOLOGY VISIT (OUTPATIENT)
Dept: ONCOLOGY | Facility: CLINIC | Age: 56
End: 2021-04-29
Payer: COMMERCIAL

## 2021-04-29 VITALS
SYSTOLIC BLOOD PRESSURE: 137 MMHG | OXYGEN SATURATION: 97 % | WEIGHT: 187.2 LBS | RESPIRATION RATE: 18 BRPM | HEART RATE: 97 BPM | TEMPERATURE: 97.3 F | BODY MASS INDEX: 26.8 KG/M2 | DIASTOLIC BLOOD PRESSURE: 87 MMHG | HEIGHT: 70 IN

## 2021-04-29 DIAGNOSIS — C61 PROSTATE CANCER METASTATIC TO BONE (H): Primary | ICD-10-CM

## 2021-04-29 DIAGNOSIS — C79.51 PROSTATE CANCER METASTATIC TO BONE (H): ICD-10-CM

## 2021-04-29 DIAGNOSIS — M25.559 PAIN IN JOINT, PELVIC REGION AND THIGH, UNSPECIFIED LATERALITY: ICD-10-CM

## 2021-04-29 DIAGNOSIS — C61 PROSTATE CANCER METASTATIC TO BONE (H): ICD-10-CM

## 2021-04-29 DIAGNOSIS — R31.9 HEMATURIA, UNSPECIFIED TYPE: ICD-10-CM

## 2021-04-29 DIAGNOSIS — Z87.891 HISTORY OF TOBACCO USE: ICD-10-CM

## 2021-04-29 DIAGNOSIS — C79.51 BONE METASTASIS: ICD-10-CM

## 2021-04-29 DIAGNOSIS — C61 PROSTATE CANCER (H): ICD-10-CM

## 2021-04-29 DIAGNOSIS — C79.51 PROSTATE CANCER METASTATIC TO BONE (H): Primary | ICD-10-CM

## 2021-04-29 DIAGNOSIS — C79.51 BONE METASTASIS: Primary | ICD-10-CM

## 2021-04-29 LAB
ALBUMIN SERPL-MCNC: 3.5 G/DL (ref 3.4–5)
ALBUMIN UR-MCNC: 10 MG/DL
ALP SERPL-CCNC: 69 U/L (ref 40–150)
ALT SERPL W P-5'-P-CCNC: 22 U/L (ref 0–70)
ANION GAP SERPL CALCULATED.3IONS-SCNC: 6 MMOL/L (ref 3–14)
APPEARANCE UR: CLEAR
AST SERPL W P-5'-P-CCNC: 7 U/L (ref 0–45)
BASOPHILS # BLD AUTO: 0.1 10E9/L (ref 0–0.2)
BASOPHILS NFR BLD AUTO: 0.5 %
BILIRUB SERPL-MCNC: 0.4 MG/DL (ref 0.2–1.3)
BILIRUB UR QL STRIP: NEGATIVE
BUN SERPL-MCNC: 20 MG/DL (ref 7–30)
CALCIUM SERPL-MCNC: 9.4 MG/DL (ref 8.5–10.1)
CHLORIDE SERPL-SCNC: 106 MMOL/L (ref 94–109)
CO2 SERPL-SCNC: 30 MMOL/L (ref 20–32)
COLOR UR AUTO: YELLOW
CREAT SERPL-MCNC: 0.95 MG/DL (ref 0.66–1.25)
DIFFERENTIAL METHOD BLD: ABNORMAL
EOSINOPHIL # BLD AUTO: 0.1 10E9/L (ref 0–0.7)
EOSINOPHIL NFR BLD AUTO: 0.4 %
ERYTHROCYTE [DISTWIDTH] IN BLOOD BY AUTOMATED COUNT: 11.6 % (ref 10–15)
GFR SERPL CREATININE-BSD FRML MDRD: 88 ML/MIN/{1.73_M2}
GLUCOSE SERPL-MCNC: 103 MG/DL (ref 70–99)
GLUCOSE UR STRIP-MCNC: NEGATIVE MG/DL
HCT VFR BLD AUTO: 39.3 % (ref 40–53)
HGB BLD-MCNC: 13.2 G/DL (ref 13.3–17.7)
HGB UR QL STRIP: NEGATIVE
IMM GRANULOCYTES # BLD: 0.1 10E9/L (ref 0–0.4)
IMM GRANULOCYTES NFR BLD: 0.4 %
KETONES UR STRIP-MCNC: NEGATIVE MG/DL
LEUKOCYTE ESTERASE UR QL STRIP: NEGATIVE
LYMPHOCYTES # BLD AUTO: 1.4 10E9/L (ref 0.8–5.3)
LYMPHOCYTES NFR BLD AUTO: 9.8 %
MCH RBC QN AUTO: 30.7 PG (ref 26.5–33)
MCHC RBC AUTO-ENTMCNC: 33.6 G/DL (ref 31.5–36.5)
MCV RBC AUTO: 91 FL (ref 78–100)
MONOCYTES # BLD AUTO: 0.8 10E9/L (ref 0–1.3)
MONOCYTES NFR BLD AUTO: 5.6 %
MUCOUS THREADS #/AREA URNS LPF: PRESENT /LPF
NEUTROPHILS # BLD AUTO: 11.6 10E9/L (ref 1.6–8.3)
NEUTROPHILS NFR BLD AUTO: 83.3 %
NITRATE UR QL: NEGATIVE
PH UR STRIP: 5.5 PH (ref 5–7)
PLATELET # BLD AUTO: 260 10E9/L (ref 150–450)
POTASSIUM SERPL-SCNC: 3.9 MMOL/L (ref 3.4–5.3)
PROT SERPL-MCNC: 6.8 G/DL (ref 6.8–8.8)
PSA SERPL-MCNC: 0.04 UG/L (ref 0–4)
RBC # BLD AUTO: 4.3 10E12/L (ref 4.4–5.9)
RBC #/AREA URNS AUTO: ABNORMAL /HPF
SODIUM SERPL-SCNC: 142 MMOL/L (ref 133–144)
SOURCE: ABNORMAL
SP GR UR STRIP: 1.03 (ref 1–1.03)
TSH SERPL DL<=0.005 MIU/L-ACNC: 2.57 MU/L (ref 0.4–4)
UROBILINOGEN UR STRIP-MCNC: NORMAL MG/DL (ref 0–2)
WBC # BLD AUTO: 13.9 10E9/L (ref 4–11)
WBC #/AREA URNS AUTO: ABNORMAL /HPF

## 2021-04-29 PROCEDURE — 84403 ASSAY OF TOTAL TESTOSTERONE: CPT | Mod: 90 | Performed by: INTERNAL MEDICINE

## 2021-04-29 PROCEDURE — 36415 COLL VENOUS BLD VENIPUNCTURE: CPT | Performed by: INTERNAL MEDICINE

## 2021-04-29 PROCEDURE — 99214 OFFICE O/P EST MOD 30 MIN: CPT | Mod: 25 | Performed by: NURSE PRACTITIONER

## 2021-04-29 PROCEDURE — 81001 URINALYSIS AUTO W/SCOPE: CPT | Performed by: NURSE PRACTITIONER

## 2021-04-29 PROCEDURE — 84153 ASSAY OF PSA TOTAL: CPT | Performed by: INTERNAL MEDICINE

## 2021-04-29 PROCEDURE — 80050 GENERAL HEALTH PANEL: CPT | Performed by: INTERNAL MEDICINE

## 2021-04-29 PROCEDURE — 99207 PR NO CHARGE LOS: CPT

## 2021-04-29 PROCEDURE — 99000 SPECIMEN HANDLING OFFICE-LAB: CPT | Performed by: INTERNAL MEDICINE

## 2021-04-29 PROCEDURE — 96372 THER/PROPH/DIAG INJ SC/IM: CPT | Mod: 59 | Performed by: NURSE PRACTITIONER

## 2021-04-29 PROCEDURE — 96402 CHEMO HORMON ANTINEOPL SQ/IM: CPT | Performed by: NURSE PRACTITIONER

## 2021-04-29 ASSESSMENT — PAIN SCALES - GENERAL: PAINLEVEL: EXTREME PAIN (8)

## 2021-04-29 ASSESSMENT — MIFFLIN-ST. JEOR: SCORE: 1677.44

## 2021-04-29 NOTE — LETTER
4/29/2021         RE: Per Etienne  [id number] [facility] [id number]        Dear Colleague,    Thank you for referring your patient, Per Etienne, to the [facility]. Please see a copy of my visit note below.    Oncology Follow Up Visit: April 29, 2021     Oncologist: Dr Na Astudillo  PCP: Per Chen    Diagnosis: Metastatic hormone sensitive prostate cancer  Per Etienne is a 54 yo male who presented to his PCP in December 2019 with slow urinary stream.  PSA = 124. On 1/8/2020 CT abdomen pelvis showed groundglass opacity in the left lower lobe,  dystrophic calcification of the prostate gland, and a fatty liver.  Same-day bone scan was negative. On January 10, 2010 prostate biopsy showed on the left Robyn score 4+3 involving 7 cores and on the right Robyn score 3+4 involving 6 cores, with positive perineural invasion.  He proceeded to undergo RRP by Dr. Colmenares on 2/24/2020.  Pathology revealed acinar adenocarcinoma Robyn 4+3 with focal ROBERTO, bladder neck base invasion, positive SVI, positive PNI, and multiple positive margins.  There was no LVSI and 0 out of 3 lymph nodes removed were involved with tumor; pT3bN0.  Treatment:   4/20/2020 received Eligard.     5/21/2020 MRI of the Prostate= 14 x 9 mm T2 intermediate structure in the prostatectomy bed at the left aspect of urinary bladder neck with restricted diffusion in the DWI images and early enhancement in the contrast  enhanced images. This was concerning for locally recurrent/residual prostatic cancer.  There was a T2 hypointense structure in the in the area of removed right seminal vesicle measuring 13 x 11 mm  demonstrating restricted  diffusion in the DWI images. This may represent a recurrent/residual tumor. There were 2 bone metastases in the left pubic inferior ramus and inferior aspect of left pubic body.There is a 7 mm suspicious lymph nodes in the right obturator  area  corresponding to to FACBC avid lymph node on the same date PET/CT  5/20/2020 PET/CT showed:  1.  2 foci of increased uptake in the left ischium, with underlying  sclerotic lesion, and symphysis pubis, these changes are indicative of  active prostate cancer metastasis.  2. Low-level increased uptake in 3 lymphnodes along the right external  and internal iliac nodes, the most suspicious of which is the deep  obturator.  3. Low-level increased uptake in the prostatectomy bed slightly to the  left side of the urethra, if clinically necessary, endoscopy would  would be necessary for confirmation of this being metastasis.  PSA was down to 27.7 on 4/21/2020.   4/20/2020 he received an Eligard injection.    6/12/2020 started Apalutamide on 6/12/2020.    Interval History: Mr. Etienne is seen today with wife in follow-up to his use of apalutamide and need for Eligard injection.  Patient reports he is continuing to take his apalutamide.  He reports 4 weeks ago he had episode of severe pain to bilateral back , pelvis and then noted more pain to all joints with feeling of chest pressure noted but no SOB or weakness. He was seen by family practice and diagnosed with hematuria and had CT CAP completed without source of pain and changes noted. Walking causes more pain in joint and even to balls of feet and worse in morning. Also noting some increase in headaches but no vision changes. Has treated pain with Norco but is not getting better.   He is eating with one episode of vomiting 2 weeks ago. - no weight loss. 2.5 weeks ago episode of chills but no fever, loose stools. covid 19 testing was negative with PCP visit.   Rest of comprehensive and complete ROS is reviewed and is negative.   Past Medical History:   Diagnosis Date     Gout      Prostate cancer (H) 01/10/2020     Current Outpatient Medications   Medication     apalutamide (ERLEADA) 60 MG tablet     aspirin (ASA) 81 MG chewable tablet     denosumab (XGEVA) 120 MG/1.7ML  "SOLN injection     HYDROcodone-acetaminophen (NORCO) 5-325 MG tablet     leuprolide (ELIGARD) 45 MG kit     sildenafil (REVATIO) 20 MG tablet     No current facility-administered medications for this visit.      No Known Allergies    Physical Exam:/87 (BP Location: Right arm)   Pulse 97   Temp 97.3  F (36.3  C) (Oral)   Resp 18   Ht 1.765 m (5' 9.5\")   Wt 84.9 kg (187 lb 3.2 oz)   SpO2 97%   BMI 27.25 kg/m    Constitutional: Alert, healthy, and in no distress as seated but some struggle appearance of discomfort with getting out of chair.  ENT: Eyes bright, No mouth sores  Neck: Supple, No adenopathy.  Cardiac: Heart rate and rhythm is regular and strong without murmur  Respiratory: Breathing easy. Lung sounds clear to auscultation  GI: Abdomen is soft, non-tender, BS normal. No masses or organomegaly  MS: Muscle tone normal, extremities normal with no edema. Some pain with pressure to posterior rim of pelvis and swaying of the pelvis and possibly to low back.No swelling or trigger areas to joints.  Skin: No suspicious lesions or rashes  Neuro: Sensory grossly WNL, gait normal.   Lymph: Normal ant/post cervical, axillary, supraclavicular nodes  Psych: Mentation appears normal and affect normal/bright and with good conversation    Laboratory Results:   Results for orders placed or performed in visit on 04/29/21   UA reflex to Microscopic and Culture     Status: Abnormal    Specimen: Urine   Result Value Ref Range    Color Urine Yellow     Appearance Urine Clear     Glucose Urine Negative NEG^Negative mg/dL    Bilirubin Urine Negative NEG^Negative    Ketones Urine Negative NEG^Negative mg/dL    Specific Gravity Urine 1.029 1.003 - 1.035    Blood Urine Negative NEG^Negative    pH Urine 5.5 5.0 - 7.0 pH    Protein Albumin Urine 10 (A) NEG^Negative mg/dL    Urobilinogen mg/dL Normal 0.0 - 2.0 mg/dL    Nitrite Urine Negative NEG^Negative    Leukocyte Esterase Urine Negative NEG^Negative    Source Urine  "   Urine Microscopic     Status: Abnormal   Result Value Ref Range    WBC Urine 0 - 5 OTO5^0 - 5 /HPF    RBC Urine O - 2 OTO2^O - 2 /HPF    Mucous Urine Present (A) NEG^Negative /LPF   Results for orders placed or performed in visit on 04/29/21   TSH with free T4 reflex     Status: None   Result Value Ref Range    TSH 2.57 0.40 - 4.00 mU/L   CBC with platelets differential     Status: Abnormal   Result Value Ref Range    WBC 13.9 (H) 4.0 - 11.0 10e9/L    RBC Count 4.30 (L) 4.4 - 5.9 10e12/L    Hemoglobin 13.2 (L) 13.3 - 17.7 g/dL    Hematocrit 39.3 (L) 40.0 - 53.0 %    MCV 91 78 - 100 fl    MCH 30.7 26.5 - 33.0 pg    MCHC 33.6 31.5 - 36.5 g/dL    RDW 11.6 10.0 - 15.0 %    Platelet Count 260 150 - 450 10e9/L    % Neutrophils 83.3 %    % Lymphocytes 9.8 %    % Monocytes 5.6 %    % Eosinophils 0.4 %    % Basophils 0.5 %    % Immature Granulocytes 0.4 %    Absolute Neutrophil 11.6 (H) 1.6 - 8.3 10e9/L    Absolute Lymphocytes 1.4 0.8 - 5.3 10e9/L    Absolute Monocytes 0.8 0.0 - 1.3 10e9/L    Absolute Eosinophils 0.1 0.0 - 0.7 10e9/L    Absolute Basophils 0.1 0.0 - 0.2 10e9/L    Abs Immature Granulocytes 0.1 0 - 0.4 10e9/L    Diff Method Automated Method    Comprehensive metabolic panel     Status: Abnormal   Result Value Ref Range    Sodium 142 133 - 144 mmol/L    Potassium 3.9 3.4 - 5.3 mmol/L    Chloride 106 94 - 109 mmol/L    Carbon Dioxide 30 20 - 32 mmol/L    Anion Gap 6 3 - 14 mmol/L    Glucose 103 (H) 70 - 99 mg/dL    Urea Nitrogen 20 7 - 30 mg/dL    Creatinine 0.95 0.66 - 1.25 mg/dL    GFR Estimate 88 >60 mL/min/[1.73_m2]    GFR Estimate If Black >90 >60 mL/min/[1.73_m2]    Calcium 9.4 8.5 - 10.1 mg/dL    Bilirubin Total 0.4 0.2 - 1.3 mg/dL    Albumin 3.5 3.4 - 5.0 g/dL    Protein Total 6.8 6.8 - 8.8 g/dL    Alkaline Phosphatase 69 40 - 150 U/L    ALT 22 0 - 70 U/L    AST 7 0 - 45 U/L   PSA tumor marker     Status: None   Result Value Ref Range    PSA 0.04 0 - 4 ug/L     Assessment and Plan:   Recent history of  right lateral intermittent ache but Progression of pelvis and low back plus large joint aches for over 1 month-  PCP visit on 3/24 found hematuria and CT CAP was completed with no cause found. Reassurance given today that the PSA is low and with 1/2021 bone scan negative, recent CT without findings that it is much less likely to be cancer related. UA completed today showed resolution of hematuria and no infection. Since it was not a contrast scan completed 1 month previous and with continued pain, will order CT abd/pelvis with contrast to rule out any further issues.   The joint concerns are suspicious for rheumatological issues and may need to be referred if no cause found from imaging.   Metastatic hormone sensitive prostate cancer with bone metastasis-patient appears to be tolerating plan so will continue with apalutamide 240 mg daily p.o. and should receive Lupron and xgeva today-To be repeated every 3 months.   PSA =0.04 today.  He will return in 3 months with labs, visit and Lupron/xgeva administration.   Lung cancer screening- has 30 pack year smoking history-  quit in 2012. Last PET/CT in May 2020.    We'll plan annual low dose chest CT in july 2021 prior to next clinic visit.   COVID-19 precautions-  This was a 25 min visit with > 50% in counseling and coordinating care including education and management of concerns.    Ania Jensen CNP        Again, thank you for allowing me to participate in the care of your patient.        Sincerely,        Ania Jensen, FRAN, APRN CNP

## 2021-04-29 NOTE — PROGRESS NOTES
Infusion Nursing Note:  Per Etienne presents today for Lupron  & Xgeva.    Patient seen by provider today: Yes:  Ania Jensen NP   present during visit today: Not Applicable.    Note:     Patient declined the covid-19 test.    Intravenous Access:  No Intravenous access.    Treatment Conditions:  Lab Results   Component Value Date    HGB 13.2 04/29/2021     Lab Results   Component Value Date    WBC 13.9 04/29/2021      Lab Results   Component Value Date    ANEU 11.6 04/29/2021     Lab Results   Component Value Date     04/29/2021      Lab Results   Component Value Date     04/29/2021                   Lab Results   Component Value Date    POTASSIUM 3.9 04/29/2021           No results found for: MAG         Lab Results   Component Value Date    CR 0.95 04/29/2021                   Lab Results   Component Value Date    NATALIYA 9.4 04/29/2021                Lab Results   Component Value Date    BILITOTAL 0.4 04/29/2021           Lab Results   Component Value Date    ALBUMIN 3.5 04/29/2021                    Lab Results   Component Value Date    ALT 22 04/29/2021           Lab Results   Component Value Date    AST 7 04/29/2021       Results reviewed, labs MET treatment parameters, ok to proceed with treatment.      Post Infusion Assessment:  Patient tolerated injection without incident.  Site patent and intact, free from redness, edema or discomfort.  No evidence of extravasations.       Discharge Plan:   Discharge instructions reviewed with: Patient.  Patient and/or family verbalized understanding of discharge instructions and all questions answered.  Patient discharged in stable condition accompanied by: self  Departure Mode: Ambulatory.    Haven Rojas RN

## 2021-04-29 NOTE — NURSING NOTE
"Oncology Rooming Note    April 29, 2021 2:46 PM   Per Etienne is a 55 year old male who presents for:    Chief Complaint   Patient presents with     Oncology Clinic Visit     Follow up     Initial Vitals: /87 (BP Location: Right arm)   Pulse 97   Temp 97.3  F (36.3  C) (Oral)   Resp 18   Ht 1.765 m (5' 9.5\")   Wt 84.9 kg (187 lb 3.2 oz)   SpO2 97%   BMI 27.25 kg/m   Estimated body mass index is 27.25 kg/m  as calculated from the following:    Height as of this encounter: 1.765 m (5' 9.5\").    Weight as of this encounter: 84.9 kg (187 lb 3.2 oz). Body surface area is 2.04 meters squared.  Extreme Pain (8) Comment: Data Unavailable   No LMP for male patient.  Allergies reviewed: Yes  Medications reviewed: Yes    Medications: Medication refills not needed today.  Pharmacy name entered into [facility]: [id number] [place] [address]    Clinical concerns: Increased headaches, pain in the balls of his feet, cough, feels like there is pressure on his chest, purple markings on his legs, bilateral side pain, 2.5 weeks ago had multiple illness symptoms, COVID19 test negative.  Patient states he had these same symptoms prior to having his prostate surgery. Ania Jensen was notified.      Patsy Umanzor, [facility]            "

## 2021-04-29 NOTE — PROGRESS NOTES
Oncology Follow Up Visit: April 29, 2021     Oncologist: Dr Na Astudillo  PCP: Per Chen    Diagnosis: Metastatic hormone sensitive prostate cancer  Per Etienne is a 54 yo male who presented to his PCP in December 2019 with slow urinary stream.  PSA = 124. On 1/8/2020 CT abdomen pelvis showed groundglass opacity in the left lower lobe,  dystrophic calcification of the prostate gland, and a fatty liver.  Same-day bone scan was negative. On January 10, 2010 prostate biopsy showed on the left Gause score 4+3 involving 7 cores and on the right Gause score 3+4 involving 6 cores, with positive perineural invasion.  He proceeded to undergo RRP by Dr. Colmenares on 2/24/2020.  Pathology revealed acinar adenocarcinoma Gause 4+3 with focal ROBERTO, bladder neck base invasion, positive SVI, positive PNI, and multiple positive margins.  There was no LVSI and 0 out of 3 lymph nodes removed were involved with tumor; pT3bN0.  Treatment:   4/20/2020 received Eligard.     5/21/2020 MRI of the Prostate= 14 x 9 mm T2 intermediate structure in the prostatectomy bed at the left aspect of urinary bladder neck with restricted diffusion in the DWI images and early enhancement in the contrast  enhanced images. This was concerning for locally recurrent/residual prostatic cancer.  There was a T2 hypointense structure in the in the area of removed right seminal vesicle measuring 13 x 11 mm  demonstrating restricted  diffusion in the DWI images. This may represent a recurrent/residual tumor. There were 2 bone metastases in the left pubic inferior ramus and inferior aspect of left pubic body.There is a 7 mm suspicious lymph nodes in the right obturator area  corresponding to to FACBC avid lymph node on the same date PET/CT  5/20/2020 PET/CT showed:  1.  2 foci of increased uptake in the left ischium, with underlying  sclerotic lesion, and symphysis pubis, these changes are indicative of  active prostate cancer metastasis.  2.  Low-level increased uptake in 3 lymphnodes along the right external  and internal iliac nodes, the most suspicious of which is the deep  obturator.  3. Low-level increased uptake in the prostatectomy bed slightly to the  left side of the urethra, if clinically necessary, endoscopy would  would be necessary for confirmation of this being metastasis.  PSA was down to 27.7 on 4/21/2020.   4/20/2020 he received an Eligard injection.    6/12/2020 started Apalutamide on 6/12/2020.    Interval History: Mr. Etienne is seen today with wife in follow-up to his use of apalutamide and need for Eligard injection.  Patient reports he is continuing to take his apalutamide.  He reports 4 weeks ago he had episode of severe pain to bilateral back , pelvis and then noted more pain to all joints with feeling of chest pressure noted but no SOB or weakness. He was seen by family practice and diagnosed with hematuria and had CT CAP completed without source of pain and changes noted. Walking causes more pain in joint and even to balls of feet and worse in morning. Also noting some increase in headaches but no vision changes. Has treated pain with Norco but is not getting better.   He is eating with one episode of vomiting 2 weeks ago. - no weight loss. 2.5 weeks ago episode of chills but no fever, loose stools. covid 19 testing was negative with PCP visit.   Rest of comprehensive and complete ROS is reviewed and is negative.   Past Medical History:   Diagnosis Date     Gout      Prostate cancer (H) 01/10/2020     Current Outpatient Medications   Medication     apalutamide (ERLEADA) 60 MG tablet     aspirin (ASA) 81 MG chewable tablet     denosumab (XGEVA) 120 MG/1.7ML SOLN injection     HYDROcodone-acetaminophen (NORCO) 5-325 MG tablet     leuprolide (ELIGARD) 45 MG kit     sildenafil (REVATIO) 20 MG tablet     No current facility-administered medications for this visit.      No Known Allergies    Physical Exam:/87 (BP Location: Right  "arm)   Pulse 97   Temp 97.3  F (36.3  C) (Oral)   Resp 18   Ht 1.765 m (5' 9.5\")   Wt 84.9 kg (187 lb 3.2 oz)   SpO2 97%   BMI 27.25 kg/m    Constitutional: Alert, healthy, and in no distress as seated but some struggle appearance of discomfort with getting out of chair.  ENT: Eyes bright, No mouth sores  Neck: Supple, No adenopathy.  Cardiac: Heart rate and rhythm is regular and strong without murmur  Respiratory: Breathing easy. Lung sounds clear to auscultation  GI: Abdomen is soft, non-tender, BS normal. No masses or organomegaly  MS: Muscle tone normal, extremities normal with no edema. Some pain with pressure to posterior rim of pelvis and swaying of the pelvis and possibly to low back.No swelling or trigger areas to joints.  Skin: No suspicious lesions or rashes  Neuro: Sensory grossly WNL, gait normal.   Lymph: Normal ant/post cervical, axillary, supraclavicular nodes  Psych: Mentation appears normal and affect normal/bright and with good conversation    Laboratory Results:   Results for orders placed or performed in visit on 04/29/21   UA reflex to Microscopic and Culture     Status: Abnormal    Specimen: Urine   Result Value Ref Range    Color Urine Yellow     Appearance Urine Clear     Glucose Urine Negative NEG^Negative mg/dL    Bilirubin Urine Negative NEG^Negative    Ketones Urine Negative NEG^Negative mg/dL    Specific Gravity Urine 1.029 1.003 - 1.035    Blood Urine Negative NEG^Negative    pH Urine 5.5 5.0 - 7.0 pH    Protein Albumin Urine 10 (A) NEG^Negative mg/dL    Urobilinogen mg/dL Normal 0.0 - 2.0 mg/dL    Nitrite Urine Negative NEG^Negative    Leukocyte Esterase Urine Negative NEG^Negative    Source Urine    Urine Microscopic     Status: Abnormal   Result Value Ref Range    WBC Urine 0 - 5 OTO5^0 - 5 /HPF    RBC Urine O - 2 OTO2^O - 2 /HPF    Mucous Urine Present (A) NEG^Negative /LPF   Results for orders placed or performed in visit on 04/29/21   TSH with free T4 reflex     Status: " None   Result Value Ref Range    TSH 2.57 0.40 - 4.00 mU/L   CBC with platelets differential     Status: Abnormal   Result Value Ref Range    WBC 13.9 (H) 4.0 - 11.0 10e9/L    RBC Count 4.30 (L) 4.4 - 5.9 10e12/L    Hemoglobin 13.2 (L) 13.3 - 17.7 g/dL    Hematocrit 39.3 (L) 40.0 - 53.0 %    MCV 91 78 - 100 fl    MCH 30.7 26.5 - 33.0 pg    MCHC 33.6 31.5 - 36.5 g/dL    RDW 11.6 10.0 - 15.0 %    Platelet Count 260 150 - 450 10e9/L    % Neutrophils 83.3 %    % Lymphocytes 9.8 %    % Monocytes 5.6 %    % Eosinophils 0.4 %    % Basophils 0.5 %    % Immature Granulocytes 0.4 %    Absolute Neutrophil 11.6 (H) 1.6 - 8.3 10e9/L    Absolute Lymphocytes 1.4 0.8 - 5.3 10e9/L    Absolute Monocytes 0.8 0.0 - 1.3 10e9/L    Absolute Eosinophils 0.1 0.0 - 0.7 10e9/L    Absolute Basophils 0.1 0.0 - 0.2 10e9/L    Abs Immature Granulocytes 0.1 0 - 0.4 10e9/L    Diff Method Automated Method    Comprehensive metabolic panel     Status: Abnormal   Result Value Ref Range    Sodium 142 133 - 144 mmol/L    Potassium 3.9 3.4 - 5.3 mmol/L    Chloride 106 94 - 109 mmol/L    Carbon Dioxide 30 20 - 32 mmol/L    Anion Gap 6 3 - 14 mmol/L    Glucose 103 (H) 70 - 99 mg/dL    Urea Nitrogen 20 7 - 30 mg/dL    Creatinine 0.95 0.66 - 1.25 mg/dL    GFR Estimate 88 >60 mL/min/[1.73_m2]    GFR Estimate If Black >90 >60 mL/min/[1.73_m2]    Calcium 9.4 8.5 - 10.1 mg/dL    Bilirubin Total 0.4 0.2 - 1.3 mg/dL    Albumin 3.5 3.4 - 5.0 g/dL    Protein Total 6.8 6.8 - 8.8 g/dL    Alkaline Phosphatase 69 40 - 150 U/L    ALT 22 0 - 70 U/L    AST 7 0 - 45 U/L   PSA tumor marker     Status: None   Result Value Ref Range    PSA 0.04 0 - 4 ug/L     Assessment and Plan:   Recent history of right lateral intermittent ache but Progression of pelvis and low back plus large joint aches for over 1 month-  PCP visit on 3/24 found hematuria and CT CAP was completed with no cause found. Reassurance given today that the PSA is low and with 1/2021 bone scan negative, recent CT  without findings that it is much less likely to be cancer related. UA completed today showed resolution of hematuria and no infection. Since it was not a contrast scan completed 1 month previous and with continued pain, will order CT abd/pelvis with contrast to rule out any further issues.   The joint concerns are suspicious for rheumatological issues and may need to be referred if no cause found from imaging.   Metastatic hormone sensitive prostate cancer with bone metastasis-patient appears to be tolerating plan so will continue with apalutamide 240 mg daily p.o. and should receive Lupron and xgeva today-To be repeated every 3 months.   PSA =0.04 today.  He will return in 3 months with labs, visit and Lupron/xgeva administration.   Lung cancer screening- has 30 pack year smoking history-  quit in 2012. Last PET/CT in May 2020.    We'll plan annual low dose chest CT in july 2021 prior to next clinic visit.   COVID-19 precautions-  This was a 25 min visit with > 50% in counseling and coordinating care including education and management of concerns.    Ania Jensen,CNP

## 2021-05-03 LAB — TESTOST SERPL-MCNC: 6 NG/DL (ref 240–950)

## 2021-05-05 ENCOUNTER — TELEPHONE (OUTPATIENT)
Dept: ONCOLOGY | Facility: CLINIC | Age: 56
End: 2021-05-05

## 2021-05-05 NOTE — TELEPHONE ENCOUNTER
PA Initiation    Medication: Erleada 60mg- PA submitted  Insurance Company: [facility] [employer] - Phone [phone] Fax [phone]  Pharmacy Filling the Rx: [facility] - [place], [place] - [address]  Filling Pharmacy Phone:    Filling Pharmacy Fax:    Start Date: 5/5/2021

## 2021-05-07 NOTE — TELEPHONE ENCOUNTER
Prior Authorization Approval    Authorization Effective Date:  05/05/2021  Authorization Expiration Date:  05/05/2022  Medication: Erleada 60mg- PA submitted  Approved Dose/Quantity: 120/30  Reference #:     Insurance Company: [facility] [employer] - Phone [phone] Fax [phone]  Expected CoPay:  Should be $5 per month    CoPay Card Available:    yes  Foundation Assistance Needed:  Not needed  Which Pharmacy is filling the prescription (Not needed for infusion/clinic administered): [facility] - [place], [place] - [address]  Pharmacy Notified:  No rerewal  Patient Notified:  no

## 2021-05-10 ENCOUNTER — ANCILLARY PROCEDURE (OUTPATIENT)
Dept: CT IMAGING | Facility: CLINIC | Age: 56
End: 2021-05-10
Attending: NURSE PRACTITIONER
Payer: COMMERCIAL

## 2021-05-10 DIAGNOSIS — R31.9 HEMATURIA, UNSPECIFIED TYPE: ICD-10-CM

## 2021-05-10 DIAGNOSIS — M25.559 PAIN IN JOINT, PELVIC REGION AND THIGH, UNSPECIFIED LATERALITY: ICD-10-CM

## 2021-05-10 PROCEDURE — 74177 CT ABD & PELVIS W/CONTRAST: CPT | Mod: TC | Performed by: RADIOLOGY

## 2021-05-10 RX ORDER — IOPAMIDOL 755 MG/ML
500 INJECTION, SOLUTION INTRAVASCULAR ONCE
Status: COMPLETED | OUTPATIENT
Start: 2021-05-10 | End: 2021-05-10

## 2021-05-10 RX ADMIN — Medication 60 ML: at 15:36

## 2021-05-10 RX ADMIN — IOPAMIDOL 100 ML: 755 INJECTION, SOLUTION INTRAVASCULAR at 15:36

## 2021-05-19 ENCOUNTER — TELEPHONE (OUTPATIENT)
Dept: PHARMACY | Facility: CLINIC | Age: 56
End: 2021-05-19

## 2021-05-19 DIAGNOSIS — C61 PROSTATE CANCER (H): Primary | ICD-10-CM

## 2021-05-20 ENCOUNTER — MYC REFILL (OUTPATIENT)
Dept: FAMILY MEDICINE | Facility: CLINIC | Age: 56
End: 2021-05-20

## 2021-05-20 DIAGNOSIS — C61 PROSTATE CANCER METASTATIC TO BONE (H): ICD-10-CM

## 2021-05-20 DIAGNOSIS — C79.51 PROSTATE CANCER METASTATIC TO BONE (H): ICD-10-CM

## 2021-05-20 DIAGNOSIS — M54.42 ACUTE LEFT-SIDED LOW BACK PAIN WITH LEFT-SIDED SCIATICA: ICD-10-CM

## 2021-05-20 RX ORDER — HYDROCODONE BITARTRATE AND ACETAMINOPHEN 5; 325 MG/1; MG/1
TABLET ORAL
Qty: 45 TABLET | Refills: 0 | Status: SHIPPED | OUTPATIENT
Start: 2021-05-20 | End: 2021-05-26

## 2021-05-20 NOTE — TELEPHONE ENCOUNTER
Routing refill request to provider for review/approval because:  Drug not on the [facility] refill protocol     Gardenia GALON, RN

## 2021-05-25 ENCOUNTER — MYC MEDICAL ADVICE (OUTPATIENT)
Dept: FAMILY MEDICINE | Facility: CLINIC | Age: 56
End: 2021-05-25

## 2021-05-25 ENCOUNTER — MYC MEDICAL ADVICE (OUTPATIENT)
Dept: ONCOLOGY | Facility: CLINIC | Age: 56
End: 2021-05-25

## 2021-05-25 DIAGNOSIS — M54.42 ACUTE LEFT-SIDED LOW BACK PAIN WITH LEFT-SIDED SCIATICA: ICD-10-CM

## 2021-05-25 DIAGNOSIS — C61 PROSTATE CANCER METASTATIC TO BONE (H): ICD-10-CM

## 2021-05-25 DIAGNOSIS — C79.51 PROSTATE CANCER METASTATIC TO BONE (H): ICD-10-CM

## 2021-05-26 RX ORDER — HYDROCODONE BITARTRATE AND ACETAMINOPHEN 5; 325 MG/1; MG/1
TABLET ORAL
Qty: 60 TABLET | Refills: 0 | Status: SHIPPED | OUTPATIENT
Start: 2021-05-26 | End: 2021-06-24

## 2021-05-26 NOTE — TELEPHONE ENCOUNTER
TC to pt - left message about his ct scan. Directed him to look at result noted noted 5/23. Did mention that if he is still having chills or cough or any SOB we could treat for infection but reassured that there is no new disease on imaging. To call back if symptoms of concern for infection. SGlayneschpayal, CNP

## 2021-06-09 ENCOUNTER — TELEPHONE (OUTPATIENT)
Dept: PHARMACY | Facility: OTHER | Age: 56
End: 2021-06-09

## 2021-06-09 NOTE — TELEPHONE ENCOUNTER
PA Initiation    Medication: Erleada- PA pending   Insurance Company: [employer] - Phone [phone] Fax [phone]  Ref.# [id number]  Pharmacy Filling the Rx:    Filling Pharmacy Phone:    Filling Pharmacy Fax:    Start Date: 6/9/2021

## 2021-06-10 NOTE — TELEPHONE ENCOUNTER
Prior Authorization Not Needed per Insurance    Medication: Erleada- PA not needed  Insurance Company: [employer] - Phone [phone] Fax [phone]  Expected CoPay:      Pharmacy Filling the Rx:  Must fill with [facility] speciality pharmacy  Pharmacy Notified:    Patient Notified:

## 2021-06-15 ENCOUNTER — TELEPHONE (OUTPATIENT)
Dept: PHARMACY | Facility: CLINIC | Age: 56
End: 2021-06-15

## 2021-06-15 DIAGNOSIS — C61 PROSTATE CANCER (H): Primary | ICD-10-CM

## 2021-06-24 ENCOUNTER — MYC REFILL (OUTPATIENT)
Dept: FAMILY MEDICINE | Facility: CLINIC | Age: 56
End: 2021-06-24

## 2021-06-24 DIAGNOSIS — M54.42 ACUTE LEFT-SIDED LOW BACK PAIN WITH LEFT-SIDED SCIATICA: ICD-10-CM

## 2021-06-24 DIAGNOSIS — C61 PROSTATE CANCER METASTATIC TO BONE (H): ICD-10-CM

## 2021-06-24 DIAGNOSIS — C79.51 PROSTATE CANCER METASTATIC TO BONE (H): ICD-10-CM

## 2021-06-24 RX ORDER — HYDROCODONE BITARTRATE AND ACETAMINOPHEN 5; 325 MG/1; MG/1
TABLET ORAL
Qty: 60 TABLET | Refills: 0 | Status: SHIPPED | OUTPATIENT
Start: 2021-06-24 | End: 2021-07-21

## 2021-07-14 DIAGNOSIS — C61 PROSTATE CANCER (H): Primary | ICD-10-CM

## 2021-07-19 ENCOUNTER — ANCILLARY PROCEDURE (OUTPATIENT)
Dept: CT IMAGING | Facility: CLINIC | Age: 56
End: 2021-07-19
Attending: INTERNAL MEDICINE
Payer: COMMERCIAL

## 2021-07-19 DIAGNOSIS — Z12.2 ENCOUNTER FOR SCREENING FOR MALIGNANT NEOPLASM OF LUNG: ICD-10-CM

## 2021-07-19 PROCEDURE — 71250 CT THORAX DX C-: CPT | Mod: GC | Performed by: RADIOLOGY

## 2021-07-20 ENCOUNTER — VIRTUAL VISIT (OUTPATIENT)
Dept: ONCOLOGY | Facility: CLINIC | Age: 56
End: 2021-07-20
Attending: INTERNAL MEDICINE
Payer: COMMERCIAL

## 2021-07-20 VITALS — WEIGHT: 187 LBS | BODY MASS INDEX: 26.77 KG/M2 | HEIGHT: 70 IN

## 2021-07-20 DIAGNOSIS — J22 LOWER RESPIRATORY INFECTION: Primary | ICD-10-CM

## 2021-07-20 DIAGNOSIS — Z12.2 ENCOUNTER FOR SCREENING FOR MALIGNANT NEOPLASM OF LUNG: ICD-10-CM

## 2021-07-20 PROCEDURE — 99213 OFFICE O/P EST LOW 20 MIN: CPT | Mod: TEL | Performed by: INTERNAL MEDICINE

## 2021-07-20 RX ORDER — LEVOFLOXACIN 750 MG/1
750 TABLET, FILM COATED ORAL DAILY
Qty: 10 TABLET | Refills: 0 | Status: SHIPPED | OUTPATIENT
Start: 2021-07-20 | End: 2021-10-26

## 2021-07-20 ASSESSMENT — MIFFLIN-ST. JEOR: SCORE: 1671.54

## 2021-07-20 NOTE — NURSING NOTE
Jay is a 56 year old who is being evaluated via a billable video visit.      How would you like to obtain your AVS? MyChart  If the video visit is dropped, the invitation should be resent by: Text to cell phone: [phone]  Will anyone else be joining your video visit? Yes: Sophia. How would they like to receive their invitation? Text to cell phone: [phone]      Video-Visit Details    Type of service:  Video Visit      Originating Location (pt. Location): Home [place]  Distant Location (provider location):  [facility]     Platform used for Video Visit: Lorena     Concerns:  Review CT scan  Pain in his chest that wakes him up at night.  Feels like really bad heartburn.  Having chills at night along with vomiting.      Patsy Umanzor CMA

## 2021-07-20 NOTE — LETTER
"    7/20/2021         RE: Per Etienne  [address] [address] [facility] [address]        Dear Colleague,    Thank you for referring your patient, Per Etienne, to the [facility]. Please see a copy of my visit note below.    This patient  is being evaluated via a billable telephone visit.      The patient has been notified of following:     \"This telephone visit will be conducted via a call between you and your physician/provider. We have found that certain health care needs can be provided without the need for an in-person physical exam.  This service lets us provide the care you need with a video conversation.  If a prescription is necessary we can send it directly to your pharmacy.  If lab work is needed we can place an order for that and you can then stop by our lab to have the test done at a later time.    Telephone visits are billed at different rates depending on your insurance coverage.  Please reach out to your insurance provider with any questions.    If during the course of the call the physician/provider feels a video visit is not appropriate, you will not be charged for this service.\"    Patient has given verbal consent for Video visit? yes  Telephone Visit Details    Type of service:  telephone visit    Telephone visit duration: 14 min  Originating Location (pt. Location): home    Distant Location (provider location):  [facility]     Na Astudillo MD      Oncology Follow-up:  Date on this visit: Jul 20, 2021    Primary care physician: Per Chen   Urologist: Dr. Angel Colmenares     Metastatic hormone sensitive prostate cancer    Oncologic History:  Metastatic hormone sensitive prostate cancer  He presented to his PCP in December 2019 with slow urinary stream.  PSA was checked and was found to be 124. On January 8, 2020 abdomen pelvis CT showed groundglass opacity in the left lower lobe,  dystrophic calcification of the " prostate gland, and a fatty liver.  Same-day bone scan was negative. On January 10, 2010 prostate biopsy showed on the left Robyn score 4+3 involving 7 cores and on the right [place] score 3+4 involving 6 cores, with positive perineural invasion.  He proceeded to undergo RRP by Dr. Colmenares on February 24, 2020.  Pathology revealed acinar adenocarcinoma [place] 4+3 with focal ROBERTO, bladder neck base invasion, positive SVI, positive PNI, and multiple positive margins.  There was no LVSI and 0 out of 3 lymph nodes removed were involved with tumor; pT3bN0.  He received Eligard on 4/20/2020.  MRI prostate on 5/21/2020 which showed a 14 x 9 mm T2 intermediate structure in the prostatectomy bed at the left aspect of urinary bladder neck with restricted diffusion in the DWI images and early enhancement in the contrast  enhanced images. This was concerning for locally recurrent/residual  prostatic cancer.  There was a T2 hypointense structure in the in the area of removed  right seminal vesicle measuring 13 x 11 mm  demonstrating restricted  diffusion in the DWI images. This may represent a recurrent/residual  tumor. There were 2 bone metastases in the left pubic inferior ramus and  inferior aspect of left pubic body.   There is a 7 mm suspicious lymph nodes in the right obturator area  corresponding to to FACBC avid lymph node on the same date PET/CT  F-18 fluciclovine PET/CT on 5/20/2020 showed:  1.  2 foci of increased uptake in the left ischium, with underlying  sclerotic lesion, and symphysis pubis, these changes are indicative of  active prostate cancer metastasis.  2. Low-level increased uptake in 3 lymphnodes along the right external  and internal iliac nodes, the most suspicious of which is the deep  obturator.  3. Low-level increased uptake in the prostatectomy bed slightly to the  left side of the urethra, if clinically necessary, endoscopy would  would be necessary for confirmation of this being metastasis.  PSA  was down to 27.7 on 4/21/2020. On April 21, 2020 he received an Eligard injection.    He started Apalutamide on 6/12/2020.  Bone scan 1/22/2021: no bone mets.      2. Tinnitus b/l -  By 12/2020 he presented with 2 months c/o bilateral tinnitus and was evaluated by Dr. Shields from ENT. An audiogram showed a significant down-sloping mid to high frequency sensorineural hearing loss. There was no evidence of conductive hearing loss or significant asymmetry.  He was felt to have early onset presbycusis. He was referred for tinnitus retraining therapy.  Review of literature showed no clear association of apalutamide or Xgeva with tinnitus or ototoxicity. Felt to have early onset presbycusis.    History Of Present Illness:  Mr. Etienne is a 56 year old male who presents with  metastatic hormone sensitive prostate cancer diagnosed in 01/2020, s/p RRP at that time but later found to have residual/recurrent disease in the pelvis and L inferior pubic body and pubic ramus metastasis.  He is here for follow-up of abnormal findings on CT chest which she had on 7/19/2021.    I have discussed his CT chest findings with the radiologist and also I have reviewed his CT chest images.  He is noted to have patchy groundglass opacities in the left lower lobe and right middle lobe that are suspicious for inflammation/infection.  In the background of these patchy opacity that is a 13 mm solid nodule likely also infectious, follow-up was recommended.  There is a 3 mm solid nodule in the lateral right middle lobe.  Follow-up was recommended.  He has been waking up at night coughing.  He has not had fevers.  He has also had symptoms of heartburn with chest pain at night when he lies down which is relieved by taking Rolaids  He has been on androgen deprivation therapy since April 2020, currently on Lupron every 3 months.  He started Apalutamide on 6/12/2020 and has been tolerating it well.  He continues to have bilateral tinnitus in his ear  "sometimes worse sometimes better.  He has no other health related complaints.     In addition, a complete 12 point  review of systems is negative.      Past Medical/Surgical History:  Past Medical History:   Diagnosis Date     Gout      Prostate cancer (H) 01/10/2020     Past Surgical History:   Procedure Laterality Date     BIOPSY PROSTATE TRANSRECTAL  01/10/2020    Dr. Colmenares     COLONOSCOPY  2020     LITHOTRIPSY  age 30s     MICROSCOPIC KNEE SURGERY Right age 30s     ORTHOPEDIC SURGERY Left 1985    Alan placed in Left Femur     PROSTATECTOMY RETROPUBIC RADICAL  2020    Dr. Colmenares     Allergies:  Allergies as of 2021     (No Known Allergies)     Current Medications:  Current Outpatient Medications   Medication Sig Dispense Refill     acetaminophen (TYLENOL) 325 MG tablet Take 325-650 mg by mouth every 6 hours as needed for mild pain       leuprolide (ELIGARD) 45 MG kit Inject 45 mg Subcutaneous every 6 months                Family History:    His father was diagnosed with prostate cancer, at the age of 75. Paternal GM had throat cancer at the age of 94, nonsmoker.     Social History:  Social History     Socioeconomic History     Marital status:    Occupational History     Not on file   Social Needs     Food insecurity     Transportation needs   Tobacco Use     Smoking status: Former Smoker     Packs/day: 1.00     Years: 30.00     Pack years: 30.00     Types: Cigarettes, Cigars     Last attempt to quit: 2012     Years since quittin.4     Smokeless tobacco: Never Used   Substance and Sexual Activity     Alcohol use: Yes     Comment: 4-6 drinks per day - patient reports beer or vodka drinks     Physical Exam:    Ht 1.765 m (5' 9.5\")   Wt 84.8 kg (187 lb)   BMI 27.22 kg/m      Wt Readings from Last 5 Encounters:   21 84.8 kg (187 lb)   21 84.9 kg (187 lb 3.2 oz)   21 86.6 kg (191 lb)   21 84.2 kg (185 lb 11.2 oz)   21 84.8 kg (187 lb)     Physical " Exam:  Constitutional: alert and in no apparent distress  PSYCH: Alert and oriented times 3; coherent speech, normal   rate and volume, able to articulate logical thoughts, able   to abstract reason, no tangential thoughts, no hallucinations   or delusions  His affect is normal  RESP: No cough, no audible wheezing, able to talk in full sentences  Remainder of exam unable to be completed due to telephone visits  The rest the comprehensive physical examination is deferred due to public health emergency           Laboratory/Imaging Studies  CT chest reviewed as above.    ASSESSMENT/PLAN:  Jay is a very pleasant 56 year old man with metastatic (to the bones) prostate cancer. He is s/p Eligard on 4/21/2020. Jay has low volume hormone sensitive metastatic disease, with residual disease locoregionally and bone metastasis (two lesions- in left ischium and symphysis pubis), asymptomatic.  He received Eligard on 4/21/2020 and started on Apalutamide on 6/12/2020.    1.  Cough, CT chest findings of patchy groundglass opacities in the left lower lobe and right middle lobe and a solid 13 mm nodule-these have probably infectious changes since he is symptomatic and we will treat with levofloxacin 750 mg p.o. x10 days.  He is not allergic to any antibiotics.  Prescription was sent to his pharmacy.  There is a small chance of apalutamide being associated with interstitial lung disease and interstitial pneumonitis has been described in a few cases in postmarketing analysis of apalutamide.  I will discuss with Pharm.D.  If patient's cough resolves on levofloxacin, we will continue on apalutamide and repeat CT chest in 4 weeks.  If CT chest findings have worsened in 4 weeks, will have to hold apalutamide and have him see pulmonary team.      2. Metastatic prostate cancer-PSA decreased to 0.06. Serum testosterone level at castrate levels.  Continue apalutamide and Lupron every 3 months.  We will follow CBC differential, CMP, total  testosterone level and PSA every 3 months -I will plan to see him back as previously scheduled later this week for reevaluation of prostate cancer progression.    2. Bone metastasis-continue Xgeva q 3 months for prevention of skeletal related events given low volume metastatic disease to the bones.  He is due for injection on July 22.    3.   Lung cancer screening- 30 pack year smoker quit in 2012.  CT chest findings as above.  He will need short interval follow-up CT chest as above.    4. Drug monitoring- TSH monitoring q 3-4 months. TSH normal on 1/22/2021. Next with next visit.     All of the patient's and his wife's questions were answered.  At the end of our visit patient verbalized understanding and concurred with the plan.    P.s outside pharmacy called with warning about interaction about apalutamide and Levaquin with potential QT prolongation.  We will have the patient hold apalutamide for now while he takes Levaquin.  Will discuss with our Pharm.D. as well.      Again, thank you for allowing me to participate in the care of your patient.        Sincerely,        Na Astudillo MD, MD

## 2021-07-20 NOTE — PROGRESS NOTES
"This patient  is being evaluated via a billable telephone visit.      The patient has been notified of following:     \"This telephone visit will be conducted via a call between you and your physician/provider. We have found that certain health care needs can be provided without the need for an in-person physical exam.  This service lets us provide the care you need with a video conversation.  If a prescription is necessary we can send it directly to your pharmacy.  If lab work is needed we can place an order for that and you can then stop by our lab to have the test done at a later time.    Telephone visits are billed at different rates depending on your insurance coverage.  Please reach out to your insurance provider with any questions.    If during the course of the call the physician/provider feels a video visit is not appropriate, you will not be charged for this service.\"    Patient has given verbal consent for Video visit? yes  Telephone Visit Details    Type of service:  telephone visit    Telephone visit duration: 14 min  Originating Location (pt. Location): home    Distant Location (provider location):  [facility]     Na Astudillo MD      Oncology Follow-up:  Date on this visit: Jul 20, 2021    Primary care physician: Per Chen   Urologist: Dr. Angel Colmenares     Metastatic hormone sensitive prostate cancer    Oncologic History:  Metastatic hormone sensitive prostate cancer  He presented to his PCP in December 2019 with slow urinary stream.  PSA was checked and was found to be 124. On January 8, 2020 abdomen pelvis CT showed groundglass opacity in the left lower lobe,  dystrophic calcification of the prostate gland, and a fatty liver.  Same-day bone scan was negative. On January 10, 2010 prostate biopsy showed on the left Robyn score 4+3 involving 7 cores and on the right [place] score 3+4 involving 6 cores, with positive perineural invasion.  He proceeded to " undergo RRP by Dr. Colmenares on February 24, 2020.  Pathology revealed acinar adenocarcinoma [place] 4+3 with focal ROBERTO, bladder neck base invasion, positive SVI, positive PNI, and multiple positive margins.  There was no LVSI and 0 out of 3 lymph nodes removed were involved with tumor; pT3bN0.  He received Eligard on 4/20/2020.  MRI prostate on 5/21/2020 which showed a 14 x 9 mm T2 intermediate structure in the prostatectomy bed at the left aspect of urinary bladder neck with restricted diffusion in the DWI images and early enhancement in the contrast  enhanced images. This was concerning for locally recurrent/residual  prostatic cancer.  There was a T2 hypointense structure in the in the area of removed  right seminal vesicle measuring 13 x 11 mm  demonstrating restricted  diffusion in the DWI images. This may represent a recurrent/residual  tumor. There were 2 bone metastases in the left pubic inferior ramus and  inferior aspect of left pubic body.   There is a 7 mm suspicious lymph nodes in the right obturator area  corresponding to to FACBC avid lymph node on the same date PET/CT  F-18 fluciclovine PET/CT on 5/20/2020 showed:  1.  2 foci of increased uptake in the left ischium, with underlying  sclerotic lesion, and symphysis pubis, these changes are indicative of  active prostate cancer metastasis.  2. Low-level increased uptake in 3 lymphnodes along the right external  and internal iliac nodes, the most suspicious of which is the deep  obturator.  3. Low-level increased uptake in the prostatectomy bed slightly to the  left side of the urethra, if clinically necessary, endoscopy would  would be necessary for confirmation of this being metastasis.  PSA was down to 27.7 on 4/21/2020. On April 21, 2020 he received an Eligard injection.    He started Apalutamide on 6/12/2020.  Bone scan 1/22/2021: no bone mets.      2. Tinnitus b/l -  By 12/2020 he presented with 2 months c/o bilateral tinnitus and was evaluated by   Cornelius from ENT. An audiogram showed a significant down-sloping mid to high frequency sensorineural hearing loss. There was no evidence of conductive hearing loss or significant asymmetry.  He was felt to have early onset presbycusis. He was referred for tinnitus retraining therapy.  Review of literature showed no clear association of apalutamide or Xgeva with tinnitus or ototoxicity. Felt to have early onset presbycusis.    History Of Present Illness:  Mr. Etienne is a 56 year old male who presents with  metastatic hormone sensitive prostate cancer diagnosed in 01/2020, s/p RRP at that time but later found to have residual/recurrent disease in the pelvis and L inferior pubic body and pubic ramus metastasis.  He is here for follow-up of abnormal findings on CT chest which she had on 7/19/2021.    I have discussed his CT chest findings with the radiologist and also I have reviewed his CT chest images.  He is noted to have patchy groundglass opacities in the left lower lobe and right middle lobe that are suspicious for inflammation/infection.  In the background of these patchy opacity that is a 13 mm solid nodule likely also infectious, follow-up was recommended.  There is a 3 mm solid nodule in the lateral right middle lobe.  Follow-up was recommended.  He has been waking up at night coughing.  He has not had fevers.  He has also had symptoms of heartburn with chest pain at night when he lies down which is relieved by taking Rolaids  He has been on androgen deprivation therapy since April 2020, currently on Lupron every 3 months.  He started Apalutamide on 6/12/2020 and has been tolerating it well.  He continues to have bilateral tinnitus in his ear sometimes worse sometimes better.  He has no other health related complaints.     In addition, a complete 12 point  review of systems is negative.      Past Medical/Surgical History:  Past Medical History:   Diagnosis Date     Gout      Prostate cancer (H) 01/10/2020     Past  "Surgical History:   Procedure Laterality Date     BIOPSY PROSTATE TRANSRECTAL  01/10/2020    Dr. Colmenares     COLONOSCOPY  2020     LITHOTRIPSY  age 30s     MICROSCOPIC KNEE SURGERY Right age 30s     ORTHOPEDIC SURGERY Left 1985    Alan placed in Left Femur     PROSTATECTOMY RETROPUBIC RADICAL  2020    Dr. Colmenares     Allergies:  Allergies as of 2021     (No Known Allergies)     Current Medications:  Current Outpatient Medications   Medication Sig Dispense Refill     acetaminophen (TYLENOL) 325 MG tablet Take 325-650 mg by mouth every 6 hours as needed for mild pain       leuprolide (ELIGARD) 45 MG kit Inject 45 mg Subcutaneous every 6 months                Family History:    His father was diagnosed with prostate cancer, at the age of 75. Paternal GM had throat cancer at the age of 94, nonsmoker.     Social History:  Social History     Socioeconomic History     Marital status:    Occupational History     Not on file   Social Needs     Food insecurity     Transportation needs   Tobacco Use     Smoking status: Former Smoker     Packs/day: 1.00     Years: 30.00     Pack years: 30.00     Types: Cigarettes, Cigars     Last attempt to quit: 2012     Years since quittin.4     Smokeless tobacco: Never Used   Substance and Sexual Activity     Alcohol use: Yes     Comment: 4-6 drinks per day - patient reports beer or vodka drinks     Physical Exam:    Ht 1.765 m (5' 9.5\")   Wt 84.8 kg (187 lb)   BMI 27.22 kg/m      Wt Readings from Last 5 Encounters:   21 84.8 kg (187 lb)   21 84.9 kg (187 lb 3.2 oz)   21 86.6 kg (191 lb)   21 84.2 kg (185 lb 11.2 oz)   21 84.8 kg (187 lb)     Physical Exam:  Constitutional: alert and in no apparent distress  PSYCH: Alert and oriented times 3; coherent speech, normal   rate and volume, able to articulate logical thoughts, able   to abstract reason, no tangential thoughts, no hallucinations   or delusions  His affect is normal  RESP: No " cough, no audible wheezing, able to talk in full sentences  Remainder of exam unable to be completed due to telephone visits  The rest the comprehensive physical examination is deferred due to public health emergency           Laboratory/Imaging Studies  CT chest reviewed as above.    ASSESSMENT/PLAN:  Jay is a very pleasant 56 year old man with metastatic (to the bones) prostate cancer. He is s/p Eligard on 4/21/2020. Jay has low volume hormone sensitive metastatic disease, with residual disease locoregionally and bone metastasis (two lesions- in left ischium and symphysis pubis), asymptomatic.  He received Eligard on 4/21/2020 and started on Apalutamide on 6/12/2020.    1.  Cough, CT chest findings of patchy groundglass opacities in the left lower lobe and right middle lobe and a solid 13 mm nodule-these have probably infectious changes since he is symptomatic and we will treat with levofloxacin 750 mg p.o. x10 days.  He is not allergic to any antibiotics.  Prescription was sent to his pharmacy.  There is a small chance of apalutamide being associated with interstitial lung disease and interstitial pneumonitis has been described in a few cases in postmarketing analysis of apalutamide.  I will discuss with Pharm.D.  If patient's cough resolves on levofloxacin, we will continue on apalutamide and repeat CT chest in 4 weeks.  If CT chest findings have worsened in 4 weeks, will have to hold apalutamide and have him see pulmonary team.      2. Metastatic prostate cancer-PSA decreased to 0.06. Serum testosterone level at castrate levels.  Continue apalutamide and Lupron every 3 months.  We will follow CBC differential, CMP, total testosterone level and PSA every 3 months -I will plan to see him back as previously scheduled later this week for reevaluation of prostate cancer progression.    2. Bone metastasis-continue Xgeva q 3 months for prevention of skeletal related events given low volume metastatic disease to the  bones.  He is due for injection on July 22.    3.   Lung cancer screening- 30 pack year smoker quit in 2012.  CT chest findings as above.  He will need short interval follow-up CT chest as above.    4. Drug monitoring- TSH monitoring q 3-4 months. TSH normal on 1/22/2021. Next with next visit.     All of the patient's and his wife's questions were answered.  At the end of our visit patient verbalized understanding and concurred with the plan.    P.s outside pharmacy called with warning about interaction about apalutamide and Levaquin with potential QT prolongation.  We will have the patient hold apalutamide for now while he takes Levaquin.  Will discuss with our Pharm.D. as well.

## 2021-07-21 ENCOUNTER — MYC REFILL (OUTPATIENT)
Dept: FAMILY MEDICINE | Facility: CLINIC | Age: 56
End: 2021-07-21

## 2021-07-21 DIAGNOSIS — C61 PROSTATE CANCER METASTATIC TO BONE (H): ICD-10-CM

## 2021-07-21 DIAGNOSIS — C79.51 PROSTATE CANCER METASTATIC TO BONE (H): ICD-10-CM

## 2021-07-21 DIAGNOSIS — M54.42 ACUTE LEFT-SIDED LOW BACK PAIN WITH LEFT-SIDED SCIATICA: ICD-10-CM

## 2021-07-22 ENCOUNTER — ONCOLOGY VISIT (OUTPATIENT)
Dept: ONCOLOGY | Facility: CLINIC | Age: 56
End: 2021-07-22
Payer: COMMERCIAL

## 2021-07-22 ENCOUNTER — TELEPHONE (OUTPATIENT)
Dept: PHARMACY | Facility: CLINIC | Age: 56
End: 2021-07-22

## 2021-07-22 ENCOUNTER — INFUSION THERAPY VISIT (OUTPATIENT)
Dept: INFUSION THERAPY | Facility: CLINIC | Age: 56
End: 2021-07-22
Attending: INTERNAL MEDICINE
Payer: COMMERCIAL

## 2021-07-22 ENCOUNTER — LAB (OUTPATIENT)
Dept: LAB | Facility: CLINIC | Age: 56
End: 2021-07-22
Attending: INTERNAL MEDICINE
Payer: COMMERCIAL

## 2021-07-22 VITALS
HEART RATE: 65 BPM | TEMPERATURE: 97.9 F | OXYGEN SATURATION: 100 % | DIASTOLIC BLOOD PRESSURE: 87 MMHG | BODY MASS INDEX: 27.25 KG/M2 | WEIGHT: 184 LBS | SYSTOLIC BLOOD PRESSURE: 155 MMHG | HEIGHT: 69 IN

## 2021-07-22 DIAGNOSIS — Z79.899 HIGH RISK MEDICATION USE: Primary | ICD-10-CM

## 2021-07-22 DIAGNOSIS — G89.29 CHRONIC LOW BACK PAIN WITHOUT SCIATICA, UNSPECIFIED BACK PAIN LATERALITY: ICD-10-CM

## 2021-07-22 DIAGNOSIS — C79.51 PROSTATE CANCER METASTATIC TO BONE (H): ICD-10-CM

## 2021-07-22 DIAGNOSIS — M54.50 CHRONIC LOW BACK PAIN WITHOUT SCIATICA, UNSPECIFIED BACK PAIN LATERALITY: ICD-10-CM

## 2021-07-22 DIAGNOSIS — C61 PROSTATE CANCER METASTATIC TO BONE (H): ICD-10-CM

## 2021-07-22 DIAGNOSIS — R73.9 HYPERGLYCEMIA: Primary | ICD-10-CM

## 2021-07-22 DIAGNOSIS — C61 PROSTATE CANCER (H): ICD-10-CM

## 2021-07-22 DIAGNOSIS — C79.51 BONE METASTASIS: Primary | ICD-10-CM

## 2021-07-22 DIAGNOSIS — C79.51 BONE METASTASIS: ICD-10-CM

## 2021-07-22 DIAGNOSIS — R91.8 PULMONARY INFILTRATES: ICD-10-CM

## 2021-07-22 LAB
ALBUMIN SERPL-MCNC: 3.7 G/DL (ref 3.4–5)
ALP SERPL-CCNC: 69 U/L (ref 40–150)
ALT SERPL W P-5'-P-CCNC: 25 U/L (ref 0–70)
ANION GAP SERPL CALCULATED.3IONS-SCNC: 3 MMOL/L (ref 3–14)
AST SERPL W P-5'-P-CCNC: 8 U/L (ref 0–45)
BASOPHILS # BLD AUTO: 0.1 10E3/UL (ref 0–0.2)
BASOPHILS NFR BLD AUTO: 1 %
BILIRUB SERPL-MCNC: 0.4 MG/DL (ref 0.2–1.3)
BUN SERPL-MCNC: 20 MG/DL (ref 7–30)
CALCIUM SERPL-MCNC: 8.6 MG/DL (ref 8.5–10.1)
CHLORIDE BLD-SCNC: 107 MMOL/L (ref 94–109)
CO2 SERPL-SCNC: 28 MMOL/L (ref 20–32)
CREAT SERPL-MCNC: 0.9 MG/DL (ref 0.66–1.25)
EOSINOPHIL # BLD AUTO: 0.1 10E3/UL (ref 0–0.7)
EOSINOPHIL NFR BLD AUTO: 2 %
ERYTHROCYTE [DISTWIDTH] IN BLOOD BY AUTOMATED COUNT: 11.7 % (ref 10–15)
GFR SERPL CREATININE-BSD FRML MDRD: >90 ML/MIN/1.73M2
GLUCOSE BLD-MCNC: 118 MG/DL (ref 70–99)
HBA1C MFR BLD: 5.5 % (ref 0–5.6)
HCT VFR BLD AUTO: 41.9 % (ref 40–53)
HGB BLD-MCNC: 13.9 G/DL (ref 13.3–17.7)
IMM GRANULOCYTES # BLD: 0 10E3/UL
IMM GRANULOCYTES NFR BLD: 1 %
LYMPHOCYTES # BLD AUTO: 1.5 10E3/UL (ref 0.8–5.3)
LYMPHOCYTES NFR BLD AUTO: 23 %
MCH RBC QN AUTO: 30.7 PG (ref 26.5–33)
MCHC RBC AUTO-ENTMCNC: 33.2 G/DL (ref 31.5–36.5)
MCV RBC AUTO: 93 FL (ref 78–100)
MONOCYTES # BLD AUTO: 0.6 10E3/UL (ref 0–1.3)
MONOCYTES NFR BLD AUTO: 9 %
NEUTROPHILS # BLD AUTO: 4.1 10E3/UL (ref 1.6–8.3)
NEUTROPHILS NFR BLD AUTO: 64 %
NRBC # BLD AUTO: 0 10E3/UL
NRBC BLD AUTO-RTO: 0 /100
PLATELET # BLD AUTO: 270 10E3/UL (ref 150–450)
POTASSIUM BLD-SCNC: 4.7 MMOL/L (ref 3.4–5.3)
PROT SERPL-MCNC: 7.3 G/DL (ref 6.8–8.8)
PSA SERPL-MCNC: 0.03 UG/L (ref 0–4)
RBC # BLD AUTO: 4.53 10E6/UL (ref 4.4–5.9)
SODIUM SERPL-SCNC: 138 MMOL/L (ref 133–144)
TSH SERPL DL<=0.005 MIU/L-ACNC: 2.02 MU/L (ref 0.4–4)
WBC # BLD AUTO: 6.4 10E3/UL (ref 4–11)

## 2021-07-22 PROCEDURE — 84153 ASSAY OF PSA TOTAL: CPT

## 2021-07-22 PROCEDURE — 36415 COLL VENOUS BLD VENIPUNCTURE: CPT

## 2021-07-22 PROCEDURE — 99214 OFFICE O/P EST MOD 30 MIN: CPT | Mod: 25 | Performed by: INTERNAL MEDICINE

## 2021-07-22 PROCEDURE — 96372 THER/PROPH/DIAG INJ SC/IM: CPT | Mod: 59 | Performed by: NURSE PRACTITIONER

## 2021-07-22 PROCEDURE — 99207 PR NO CHARGE LOS: CPT

## 2021-07-22 PROCEDURE — 83036 HEMOGLOBIN GLYCOSYLATED A1C: CPT

## 2021-07-22 PROCEDURE — 80050 GENERAL HEALTH PANEL: CPT

## 2021-07-22 PROCEDURE — 84403 ASSAY OF TOTAL TESTOSTERONE: CPT

## 2021-07-22 PROCEDURE — 96402 CHEMO HORMON ANTINEOPL SQ/IM: CPT | Performed by: NURSE PRACTITIONER

## 2021-07-22 RX ORDER — HYDROCODONE BITARTRATE AND ACETAMINOPHEN 5; 325 MG/1; MG/1
TABLET ORAL
Qty: 60 TABLET | Refills: 0 | Status: SHIPPED | OUTPATIENT
Start: 2021-07-22 | End: 2021-08-21

## 2021-07-22 ASSESSMENT — PAIN SCALES - GENERAL: PAINLEVEL: SEVERE PAIN (7)

## 2021-07-22 ASSESSMENT — MIFFLIN-ST. JEOR: SCORE: 1650

## 2021-07-22 NOTE — PROGRESS NOTES
Oncology Follow-up:  Date on this visit: Jul 22, 2021    Primary care physician: Per Chen   Urologist: Dr. Angel Colmenares     Metastatic hormone sensitive prostate cancer    Oncologic History:  Metastatic hormone sensitive prostate cancer  He presented to his PCP in December 2019 with slow urinary stream.  PSA was checked and was found to be 124. On January 8, 2020 abdomen pelvis CT showed groundglass opacity in the left lower lobe,  dystrophic calcification of the prostate gland, and a fatty liver.  Same-day bone scan was negative. On January 10, 2010 prostate biopsy showed on the left Robyn score 4+3 involving 7 cores and on the right Robyn score 3+4 involving 6 cores, with positive perineural invasion.  He proceeded to undergo RRP by Dr. Colmenares on February 24, 2020.  Pathology revealed acinar adenocarcinoma [place] 4+3 with focal ROBERTO, bladder neck base invasion, positive SVI, positive PNI, and multiple positive margins.  There was no LVSI and 0 out of 3 lymph nodes removed were involved with tumor; pT3bN0.  He received Eligard on 4/20/2020.  MRI prostate on 5/21/2020 which showed a 14 x 9 mm T2 intermediate structure in the prostatectomy bed at the left aspect of urinary bladder neck with restricted diffusion in the DWI images and early enhancement in the contrast  enhanced images. This was concerning for locally recurrent/residual  prostatic cancer.  There was a T2 hypointense structure in the in the area of removed  right seminal vesicle measuring 13 x 11 mm  demonstrating restricted  diffusion in the DWI images. This may represent a recurrent/residual  tumor. There were 2 bone metastases in the left pubic inferior ramus and  inferior aspect of left pubic body.   There is a 7 mm suspicious lymph nodes in the right obturator area  corresponding to to FACBC avid lymph node on the same date PET/CT  F-18 fluciclovine PET/CT on 5/20/2020 showed:  1.  2 foci of increased uptake in the left ischium, with  underlying  sclerotic lesion, and symphysis pubis, these changes are indicative of  active prostate cancer metastasis.  2. Low-level increased uptake in 3 lymphnodes along the right external  and internal iliac nodes, the most suspicious of which is the deep  obturator.  3. Low-level increased uptake in the prostatectomy bed slightly to the  left side of the urethra, if clinically necessary, endoscopy would  would be necessary for confirmation of this being metastasis.  PSA was down to 27.7 on 4/21/2020. On April 21, 2020 he received an Eligard injection.    He started Apalutamide on 6/12/2020.  Bone scan 1/22/2021: no bone mets.      2. Tinnitus b/l -  By 12/2020 he presented with 2 months c/o bilateral tinnitus and was evaluated by Dr. Shields from ENT. An audiogram showed a significant down-sloping mid to high frequency sensorineural hearing loss. There was no evidence of conductive hearing loss or significant asymmetry.  He was felt to have early onset presbycusis. He was referred for tinnitus retraining therapy.  Review of literature showed no clear association of apalutamide or Xgeva with tinnitus or ototoxicity. Felt to have early onset presbycusis.    History Of Present Illness:  Mr. Etienne is a 56 year old male who presents with  metastatic hormone sensitive prostate cancer diagnosed in 01/2020, s/p RRP at that time but later found to have residual/recurrent disease in the pelvis and L inferior pubic body and pubic ramus metastasis.  He is here for follow-up of metastatic prostate cancer.   He has been on androgen deprivation therapy since April 2020, currently on Lupron every 3 months.  He started Apalutamide on 6/12/2020. He has remained on apalutamide.  He has not had any side effects attributable to apalutamide.  He has had night sweats for years which he experiences about once to twice per months which wake him up, and this has been the case for years, prior to him starting on apalutamide.  He has also had  some chest heaviness at night when he sleeps it wakes him up from sleep occasionally, and this has also been chronic.  He has just started on omeprazole a couple days ago and feels somewhat better and will see if omeprazole improves the symptoms.  He has had cough for the last several weeks, also at night, nonproductive.  He had a routine annual CT chest for purposes of lung cancer screening on July 19, 2021.  This study demonstrated patchy groundglass opacities in the left lower lobe and right middle lobe that were suspicious for inflammation/infection, and he was prescribed Levaquin and he is on his third day.  His cough has improved.He continues to have bilateral tinnitus in his ear sometimes worse sometimes better.  He also complains of chronic lower back pain and also right posterior iliac crest pain.  This has been present for several months.  He uses oxycodone as needed to control the pain.  He had a negative bone scan (for metastasis) in January 2021.  I have reviewed images from the bone scan today from January 2021 and I do not see any abnormality in the right iliac bone that would correlate the patient's symptoms.  I have also reviewed PET/CT from May 2020.  And he did not have metastatic disease to the right pelvis at that time.  He had a CT abdomen pelvis with contrast in May 2021 which showed no obvious pathology to account for his symptoms.  He also had negative CT abdomen and pelvis, ordered for the same reason by his PCP in March 2021.  He has no other health related complaints.  He is here with his wife.    In addition, a complete 12 point  review of systems is negative.      Past Medical/Surgical History:  Past Medical History:   Diagnosis Date     Gout      Prostate cancer (H) 01/10/2020     Past Surgical History:   Procedure Laterality Date     BIOPSY PROSTATE TRANSRECTAL  01/10/2020    Dr. Colmenares     COLONOSCOPY  5/2020     LITHOTRIPSY  age 30s     MICROSCOPIC KNEE SURGERY Right age 30s      "ORTHOPEDIC SURGERY Left 1985    Alan placed in Left Femur     PROSTATECTOMY RETROPUBIC RADICAL  2020    Dr. Colmenares     Allergies:  Allergies as of 2021     (No Known Allergies)     Current Medications:  Current Outpatient Medications   Medication Sig Dispense Refill     acetaminophen (TYLENOL) 325 MG tablet Take 325-650 mg by mouth every 6 hours as needed for mild pain       leuprolide (ELIGARD) 45 MG kit Inject 45 mg Subcutaneous every 6 months                Family History:    His father was diagnosed with prostate cancer, at the age of 75. Paternal GM had throat cancer at the age of 94, nonsmoker.     Social History:  Social History     Socioeconomic History     Marital status:    Occupational History     Not on file   Social Needs     Food insecurity     Transportation needs   Tobacco Use     Smoking status: Former Smoker     Packs/day: 1.00     Years: 30.00     Pack years: 30.00     Types: Cigarettes, Cigars     Last attempt to quit: 2012     Years since quittin.4     Smokeless tobacco: Never Used   Substance and Sexual Activity     Alcohol use: Yes     Comment: 4-6 drinks per day - patient reports beer or vodka drinks     Physical Exam:    BP (!) 155/87 (BP Location: Right arm, Patient Position: Chair, Cuff Size: Adult Regular)   Pulse 65   Temp 97.9  F (36.6  C) (Oral)   Ht 1.753 m (5' 9\")   Wt 83.5 kg (184 lb)   SpO2 100%   BMI 27.17 kg/m      Wt Readings from Last 5 Encounters:   21 83.5 kg (184 lb)   21 84.8 kg (187 lb)   21 84.9 kg (187 lb 3.2 oz)   21 86.6 kg (191 lb)   21 84.2 kg (185 lb 11.2 oz)         GENERAL APPEARANCE: healthy, alert and no distress  HEENT: PEERLA, no neck asymmetry or masses    CV: S1S2 reg no murmur  Respiratory: CTA b/l  Extremities: no edema.    SKIN: no suspicious lesions or rashes    PSYCHIATRIC: mentation appears normal and affect normal  Neuro: gait intact. axox3          Laboratory/Imaging Studies  PSA 0.03.  " Hemoglobin A1c 5.5%.  TSH within normal limits.  CBC differential and CMP within normal limits except fasting blood glucose elevated at 118 mg/dL.    ASSESSMENT/PLAN:  Jay is a very pleasant 56 year old man with metastatic (to the bones) prostate cancer. He is s/p Eligard on 4/21/2020. Jay has low volume hormone sensitive metastatic disease, with residual disease locoregionally and bone metastasis (two lesions- in left ischium and symphysis pubis), asymptomatic.  He received Eligard on 4/21/2020 and started on Apalutamide on 6/12/2020.    1.  Metastatic prostate cancer-PSA decreased to 0.03.  Follow-up on serum total testosterone levels..  Continue apalutamide and Lupron every 3 months.  We will continue to intensely monitor the patient per treatment protocol.  We will follow CBC differential, CMP, total testosterone level and PSA every 3 months.    2. Bone metastasis-continue Xgeva q 3 months for prevention of skeletal related events given low volume metastatic disease to the bones.  Next dose today.    3.  Pulmonary infiltrates as above-complete course of Levaquin for 10 days.  Okay to take apalutamide with Levaquin.  Discussed with Pharm.D.  Also, the risk of pneumonitis/ILD with apalutamide is less than 1% and it is unlikely that his CT chest findings are associated with apalutamide.  Follow-up on CT chest in 1 month. We'll inform the patient of the results and recommendations and  f/up plan based on the results.    If pulmonary infiltrates are resolved, plan to continue annual CT chest for lung cancer screening purposes.    4. Drug monitoring- TSH monitoring q 3-4 months. TSH normal today. Next with next visit.   5.  Hyperglycemia, fasting on labs today-hemoglobin A1c checked and normal.  6.  Lower back pain and right posterior iliac crest pain-will refer to sports medicine for further evaluation.  7.  Chest pain at night after he lies down that wakes him up from sleep and throughout the day but milder during  the day -continue Prilosec which he just started a couple days ago.  Patient to let us know if the symptoms do not improve in the next couple of weeks, as they sound like they are constant and GERD related, and at that time will refer to cardiology for further evaluation.  He declined cardiology consultation today.  All of the patient's and his wife's questions were answered.  At the end of our visit patient verbalized understanding and concurred with the plan.

## 2021-07-22 NOTE — PROGRESS NOTES
Infusion Nursing Note:  Per Etienne presents today for Lupron & Xgeva.    Patient seen by provider today: Yes: Dr FIELD   present during visit today: Not Applicable.     Note:N/A.      Intravenous Access:  No Intravenous access needed today.    Treatment Conditions:  Lab Results   Component Value Date    HGB 13.9 07/22/2021    HGB 13.2 04/29/2021     Lab Results   Component Value Date    WBC 6.4 07/22/2021    WBC 13.9 04/29/2021      Lab Results   Component Value Date    ANEU 11.6 04/29/2021     Lab Results   Component Value Date     07/22/2021     04/29/2021      Lab Results   Component Value Date     07/22/2021     04/29/2021                   Lab Results   Component Value Date    POTASSIUM 4.7 07/22/2021    POTASSIUM 3.9 04/29/2021           No results found for: MAG         Lab Results   Component Value Date    CR 0.90 07/22/2021    CR 0.95 04/29/2021                   Lab Results   Component Value Date    NATALIYA 8.6 07/22/2021    NATALIYA 9.4 04/29/2021                Lab Results   Component Value Date    BILITOTAL 0.4 07/22/2021    BILITOTAL 0.4 04/29/2021           Lab Results   Component Value Date    ALBUMIN 3.7 07/22/2021    ALBUMIN 3.5 04/29/2021                    Lab Results   Component Value Date    ALT 25 07/22/2021    ALT 22 04/29/2021           Lab Results   Component Value Date    AST 8 07/22/2021    AST 7 04/29/2021       Results reviewed, labs MET treatment parameters, ok to proceed with treatment.      Post Infusion Assessment:  Patient tolerated injection without incident.  Site patent and intact, free from redness, edema or discomfort.  No evidence of extravasations.       Discharge Plan:   Discharge instructions reviewed with: Patient.  Patient and/or family verbalized understanding of discharge instructions and all questions answered.  Patient discharged in stable condition accompanied by: self.  Departure Mode: Ambulatory.      Haven Rojas,  RN

## 2021-07-22 NOTE — NURSING NOTE
"Oncology Rooming Note    July 22, 2021 11:34 AM   Per Etienne is a 56 year old male who presents for:    Chief Complaint   Patient presents with     Oncology Clinic Visit     Prior to tx     Initial Vitals: BP (!) 155/87 (BP Location: Right arm, Patient Position: Chair, Cuff Size: Adult Regular)   Pulse 65   Temp 97.9  F (36.6  C) (Oral)   Ht 1.753 m (5' 9\")   Wt 83.5 kg (184 lb)   SpO2 100%   BMI 27.17 kg/m   Estimated body mass index is 27.17 kg/m  as calculated from the following:    Height as of this encounter: 1.753 m (5' 9\").    Weight as of this encounter: 83.5 kg (184 lb). Body surface area is 2.02 meters squared.  Severe Pain (7) Comment: Data Unavailable   No LMP for male patient.  Allergies reviewed: Yes  Medications reviewed: Yes    Medications: Medication refills not needed today.  Pharmacy name entered into [employer]: [id number] [place], [place] - [address]    Clinical concerns: Yes Dr. Astudillo was notified.      Viky Miller CMA              "

## 2021-07-22 NOTE — TELEPHONE ENCOUNTER
Routing refill request to provider for review/approval because:  Drug not on the [facility] refill protocol   Susana Jackson BSN, RN

## 2021-07-22 NOTE — LETTER
7/22/2021         RE: Per Etienne  [id number] [id number]        Dear Colleague,    Thank you for referring your patient, Per Etienne, to the [facility]. Please see a copy of my visit note below.    Oncology Follow-up:  Date on this visit: Jul 22, 2021    Primary care physician: Per Chen   Urologist: Dr. Angel Colmenares     Metastatic hormone sensitive prostate cancer    Oncologic History:  Metastatic hormone sensitive prostate cancer  He presented to his PCP in December 2019 with slow urinary stream.  PSA was checked and was found to be 124. On January 8, 2020 abdomen pelvis CT showed groundglass opacity in the left lower lobe,  dystrophic calcification of the prostate gland, and a fatty liver.  Same-day bone scan was negative. On January 10, 2010 prostate biopsy showed on the left Robyn score 4+3 involving 7 cores and on the right Robyn score 3+4 involving 6 cores, with positive perineural invasion.  He proceeded to undergo RRP by Dr. Colmenares on February 24, 2020.  Pathology revealed acinar adenocarcinoma [place] 4+3 with focal ROBERTO, bladder neck base invasion, positive SVI, positive PNI, and multiple positive margins.  There was no LVSI and 0 out of 3 lymph nodes removed were involved with tumor; pT3bN0.  He received Eligard on 4/20/2020.  MRI prostate on 5/21/2020 which showed a 14 x 9 mm T2 intermediate structure in the prostatectomy bed at the left aspect of urinary bladder neck with restricted diffusion in the DWI images and early enhancement in the contrast  enhanced images. This was concerning for locally recurrent/residual  prostatic cancer.  There was a T2 hypointense structure in the in the area of removed  right seminal vesicle measuring 13 x 11 mm  demonstrating restricted  diffusion in the DWI images. This may represent a recurrent/residual  tumor. There were 2 bone metastases in the left pubic inferior ramus and  inferior  aspect of left pubic body.   There is a 7 mm suspicious lymph nodes in the right obturator area  corresponding to to FACBC avid lymph node on the same date PET/CT  F-18 fluciclovine PET/CT on 5/20/2020 showed:  1.  2 foci of increased uptake in the left ischium, with underlying  sclerotic lesion, and symphysis pubis, these changes are indicative of  active prostate cancer metastasis.  2. Low-level increased uptake in 3 lymphnodes along the right external  and internal iliac nodes, the most suspicious of which is the deep  obturator.  3. Low-level increased uptake in the prostatectomy bed slightly to the  left side of the urethra, if clinically necessary, endoscopy would  would be necessary for confirmation of this being metastasis.  PSA was down to 27.7 on 4/21/2020. On April 21, 2020 he received an Eligard injection.    He started Apalutamide on 6/12/2020.  Bone scan 1/22/2021: no bone mets.      2. Tinnitus b/l -  By 12/2020 he presented with 2 months c/o bilateral tinnitus and was evaluated by Dr. Shields from ENT. An audiogram showed a significant down-sloping mid to high frequency sensorineural hearing loss. There was no evidence of conductive hearing loss or significant asymmetry.  He was felt to have early onset presbycusis. He was referred for tinnitus retraining therapy.  Review of literature showed no clear association of apalutamide or Xgeva with tinnitus or ototoxicity. Felt to have early onset presbycusis.    History Of Present Illness:  Mr. Etienne is a 56 year old male who presents with  metastatic hormone sensitive prostate cancer diagnosed in 01/2020, s/p RRP at that time but later found to have residual/recurrent disease in the pelvis and L inferior pubic body and pubic ramus metastasis.  He is here for follow-up of metastatic prostate cancer.   He has been on androgen deprivation therapy since April 2020, currently on Lupron every 3 months.  He started Apalutamide on 6/12/2020. He has remained on  apalutamide.  He has not had any side effects attributable to apalutamide.  He has had night sweats for years which he experiences about once to twice per months which wake him up, and this has been the case for years, prior to him starting on apalutamide.  He has also had some chest heaviness at night when he sleeps it wakes him up from sleep occasionally, and this has also been chronic.  He has just started on omeprazole a couple days ago and feels somewhat better and will see if omeprazole improves the symptoms.  He has had cough for the last several weeks, also at night, nonproductive.  He had a routine annual CT chest for purposes of lung cancer screening on July 19, 2021.  This study demonstrated patchy groundglass opacities in the left lower lobe and right middle lobe that were suspicious for inflammation/infection, and he was prescribed Levaquin and he is on his third day.  His cough has improved.He continues to have bilateral tinnitus in his ear sometimes worse sometimes better.  He also complains of chronic lower back pain and also right posterior iliac crest pain.  This has been present for several months.  He uses oxycodone as needed to control the pain.  He had a negative bone scan (for metastasis) in January 2021.  I have reviewed images from the bone scan today from January 2021 and I do not see any abnormality in the right iliac bone that would correlate the patient's symptoms.  I have also reviewed PET/CT from May 2020.  And he did not have metastatic disease to the right pelvis at that time.  He had a CT abdomen pelvis with contrast in May 2021 which showed no obvious pathology to account for his symptoms.  He also had negative CT abdomen and pelvis, ordered for the same reason by his PCP in March 2021.  He has no other health related complaints.  He is here with his wife.    In addition, a complete 12 point  review of systems is negative.      Past Medical/Surgical History:  Past Medical History:  "  Diagnosis Date     Gout      Prostate cancer (H) 01/10/2020     Past Surgical History:   Procedure Laterality Date     BIOPSY PROSTATE TRANSRECTAL  01/10/2020    Dr. Colmenares     COLONOSCOPY  2020     LITHOTRIPSY  age 30s     MICROSCOPIC KNEE SURGERY Right age 30s     ORTHOPEDIC SURGERY Left 1985    Alan placed in Left Femur     PROSTATECTOMY RETROPUBIC RADICAL  2020    Dr. Colmenares     Allergies:  Allergies as of 2021     (No Known Allergies)     Current Medications:  Current Outpatient Medications   Medication Sig Dispense Refill     acetaminophen (TYLENOL) 325 MG tablet Take 325-650 mg by mouth every 6 hours as needed for mild pain       leuprolide (ELIGARD) 45 MG kit Inject 45 mg Subcutaneous every 6 months                Family History:    His father was diagnosed with prostate cancer, at the age of 75. Paternal GM had throat cancer at the age of 94, nonsmoker.     Social History:  Social History     Socioeconomic History     Marital status:    Occupational History     Not on file   Social Needs     Food insecurity     Transportation needs   Tobacco Use     Smoking status: Former Smoker     Packs/day: 1.00     Years: 30.00     Pack years: 30.00     Types: Cigarettes, Cigars     Last attempt to quit: 2012     Years since quittin.4     Smokeless tobacco: Never Used   Substance and Sexual Activity     Alcohol use: Yes     Comment: 4-6 drinks per day - patient reports beer or vodka drinks     Physical Exam:    BP (!) 155/87 (BP Location: Right arm, Patient Position: Chair, Cuff Size: Adult Regular)   Pulse 65   Temp 97.9  F (36.6  C) (Oral)   Ht 1.753 m (5' 9\")   Wt 83.5 kg (184 lb)   SpO2 100%   BMI 27.17 kg/m      Wt Readings from Last 5 Encounters:   21 83.5 kg (184 lb)   21 84.8 kg (187 lb)   21 84.9 kg (187 lb 3.2 oz)   21 86.6 kg (191 lb)   21 84.2 kg (185 lb 11.2 oz)         GENERAL APPEARANCE: healthy, alert and no distress  HEENT: PEERLA, no neck " asymmetry or masses    CV: S1S2 reg no murmur  Respiratory: CTA b/l  Extremities: no edema.    SKIN: no suspicious lesions or rashes    PSYCHIATRIC: mentation appears normal and affect normal  Neuro: gait intact. axox3          Laboratory/Imaging Studies  PSA 0.03.  Hemoglobin A1c 5.5%.  TSH within normal limits.  CBC differential and CMP within normal limits except fasting blood glucose elevated at 118 mg/dL.    ASSESSMENT/PLAN:  Jay is a very pleasant 56 year old man with metastatic (to the bones) prostate cancer. He is s/p Eligard on 4/21/2020. Jay has low volume hormone sensitive metastatic disease, with residual disease locoregionally and bone metastasis (two lesions- in left ischium and symphysis pubis), asymptomatic.  He received Eligard on 4/21/2020 and started on Apalutamide on 6/12/2020.    1.  Metastatic prostate cancer-PSA decreased to 0.03.  Follow-up on serum total testosterone levels..  Continue apalutamide and Lupron every 3 months.  We will continue to intensely monitor the patient per treatment protocol.  We will follow CBC differential, CMP, total testosterone level and PSA every 3 months.    2. Bone metastasis-continue Xgeva q 3 months for prevention of skeletal related events given low volume metastatic disease to the bones.  Next dose today.    3.  Pulmonary infiltrates as above-complete course of Levaquin for 10 days.  Okay to take apalutamide with Levaquin.  Discussed with Pharm.D.  Also, the risk of pneumonitis/ILD with apalutamide is less than 1% and it is unlikely that his CT chest findings are associated with apalutamide.  Follow-up on CT chest in 1 month. We'll inform the patient of the results and recommendations and  f/up plan based on the results.    If pulmonary infiltrates are resolved, plan to continue annual CT chest for lung cancer screening purposes.    4. Drug monitoring- TSH monitoring q 3-4 months. TSH normal today. Next with next visit.   5.  Hyperglycemia, fasting on labs  today-hemoglobin A1c checked and normal.  6.  Lower back pain and right posterior iliac crest pain-will refer to sports medicine for further evaluation.  7.  Chest pain at night after he lies down that wakes him up from sleep and throughout the day but milder during the day -continue Prilosec which he just started a couple days ago.  Patient to let us know if the symptoms do not improve in the next couple of weeks, as they sound like they are constant and GERD related, and at that time will refer to cardiology for further evaluation.  He declined cardiology consultation today.  All of the patient's and his wife's questions were answered.  At the end of our visit patient verbalized understanding and concurred with the plan.          Again, thank you for allowing me to participate in the care of your patient.        Sincerely,        Na Astudillo MD, MD

## 2021-07-25 LAB — TESTOST SERPL-MCNC: 10 NG/DL (ref 240–950)

## 2021-08-13 DIAGNOSIS — C61 PROSTATE CANCER (H): Primary | ICD-10-CM

## 2021-08-21 ENCOUNTER — MYC REFILL (OUTPATIENT)
Dept: FAMILY MEDICINE | Facility: CLINIC | Age: 56
End: 2021-08-21

## 2021-08-21 DIAGNOSIS — C79.51 PROSTATE CANCER METASTATIC TO BONE (H): ICD-10-CM

## 2021-08-21 DIAGNOSIS — C61 PROSTATE CANCER METASTATIC TO BONE (H): ICD-10-CM

## 2021-08-21 DIAGNOSIS — M54.42 ACUTE LEFT-SIDED LOW BACK PAIN WITH LEFT-SIDED SCIATICA: ICD-10-CM

## 2021-08-23 ENCOUNTER — ANCILLARY PROCEDURE (OUTPATIENT)
Dept: CT IMAGING | Facility: CLINIC | Age: 56
End: 2021-08-23
Attending: FAMILY MEDICINE
Payer: COMMERCIAL

## 2021-08-23 ENCOUNTER — LAB (OUTPATIENT)
Dept: LAB | Facility: CLINIC | Age: 56
End: 2021-08-23
Payer: COMMERCIAL

## 2021-08-23 ENCOUNTER — DOCUMENTATION ONLY (OUTPATIENT)
Dept: ONCOLOGY | Facility: CLINIC | Age: 56
End: 2021-08-23

## 2021-08-23 ENCOUNTER — ANCILLARY PROCEDURE (OUTPATIENT)
Dept: CT IMAGING | Facility: CLINIC | Age: 56
End: 2021-08-23
Attending: INTERNAL MEDICINE
Payer: COMMERCIAL

## 2021-08-23 ENCOUNTER — OFFICE VISIT (OUTPATIENT)
Dept: ORTHOPEDICS | Facility: CLINIC | Age: 56
End: 2021-08-23
Attending: INTERNAL MEDICINE
Payer: COMMERCIAL

## 2021-08-23 VITALS — WEIGHT: 184 LBS | BODY MASS INDEX: 27.25 KG/M2 | HEIGHT: 69 IN

## 2021-08-23 DIAGNOSIS — G89.29 CHRONIC MIDLINE THORACIC BACK PAIN: ICD-10-CM

## 2021-08-23 DIAGNOSIS — G89.29 CHRONIC LOW BACK PAIN WITHOUT SCIATICA, UNSPECIFIED BACK PAIN LATERALITY: ICD-10-CM

## 2021-08-23 DIAGNOSIS — C61 PROSTATE CANCER (H): ICD-10-CM

## 2021-08-23 DIAGNOSIS — C61 PROSTATE CANCER (H): Primary | ICD-10-CM

## 2021-08-23 DIAGNOSIS — M54.50 CHRONIC LOW BACK PAIN WITHOUT SCIATICA, UNSPECIFIED BACK PAIN LATERALITY: ICD-10-CM

## 2021-08-23 DIAGNOSIS — M54.6 CHRONIC MIDLINE THORACIC BACK PAIN: ICD-10-CM

## 2021-08-23 DIAGNOSIS — G89.29 CHRONIC MIDLINE THORACIC BACK PAIN: Primary | ICD-10-CM

## 2021-08-23 DIAGNOSIS — M54.6 CHRONIC MIDLINE THORACIC BACK PAIN: Primary | ICD-10-CM

## 2021-08-23 DIAGNOSIS — R91.8 PULMONARY INFILTRATES: ICD-10-CM

## 2021-08-23 LAB
ALBUMIN SERPL-MCNC: 3.8 G/DL (ref 3.4–5)
ALP SERPL-CCNC: 69 U/L (ref 40–150)
ALT SERPL W P-5'-P-CCNC: 26 U/L (ref 0–70)
ANION GAP SERPL CALCULATED.3IONS-SCNC: 5 MMOL/L (ref 3–14)
AST SERPL W P-5'-P-CCNC: 12 U/L (ref 0–45)
BASOPHILS # BLD AUTO: 0.1 10E3/UL (ref 0–0.2)
BASOPHILS NFR BLD AUTO: 1 %
BILIRUB SERPL-MCNC: 0.2 MG/DL (ref 0.2–1.3)
BUN SERPL-MCNC: 23 MG/DL (ref 7–30)
CALCIUM SERPL-MCNC: 9.1 MG/DL (ref 8.5–10.1)
CHLORIDE BLD-SCNC: 106 MMOL/L (ref 94–109)
CO2 SERPL-SCNC: 27 MMOL/L (ref 20–32)
CREAT SERPL-MCNC: 0.91 MG/DL (ref 0.66–1.25)
EOSINOPHIL # BLD AUTO: 0.1 10E3/UL (ref 0–0.7)
EOSINOPHIL NFR BLD AUTO: 2 %
ERYTHROCYTE [DISTWIDTH] IN BLOOD BY AUTOMATED COUNT: 11.8 % (ref 10–15)
GFR SERPL CREATININE-BSD FRML MDRD: >90 ML/MIN/1.73M2
GLUCOSE BLD-MCNC: 107 MG/DL (ref 70–99)
HCT VFR BLD AUTO: 41.1 % (ref 40–53)
HGB BLD-MCNC: 14.1 G/DL (ref 13.3–17.7)
IMM GRANULOCYTES # BLD: 0 10E3/UL
IMM GRANULOCYTES NFR BLD: 0 %
LYMPHOCYTES # BLD AUTO: 1.4 10E3/UL (ref 0.8–5.3)
LYMPHOCYTES NFR BLD AUTO: 19 %
MCH RBC QN AUTO: 30.6 PG (ref 26.5–33)
MCHC RBC AUTO-ENTMCNC: 34.3 G/DL (ref 31.5–36.5)
MCV RBC AUTO: 89 FL (ref 78–100)
MONOCYTES # BLD AUTO: 0.7 10E3/UL (ref 0–1.3)
MONOCYTES NFR BLD AUTO: 9 %
NEUTROPHILS # BLD AUTO: 5.1 10E3/UL (ref 1.6–8.3)
NEUTROPHILS NFR BLD AUTO: 69 %
NRBC # BLD AUTO: 0 10E3/UL
NRBC BLD AUTO-RTO: 0 /100
PLATELET # BLD AUTO: 255 10E3/UL (ref 150–450)
POTASSIUM BLD-SCNC: 4.7 MMOL/L (ref 3.4–5.3)
PROT SERPL-MCNC: 7.3 G/DL (ref 6.8–8.8)
PSA SERPL-MCNC: 0.02 UG/L (ref 0–4)
RBC # BLD AUTO: 4.61 10E6/UL (ref 4.4–5.9)
SODIUM SERPL-SCNC: 138 MMOL/L (ref 133–144)
TSH SERPL DL<=0.005 MIU/L-ACNC: 2.49 MU/L (ref 0.4–4)
WBC # BLD AUTO: 7.3 10E3/UL (ref 4–11)

## 2021-08-23 PROCEDURE — 72131 CT LUMBAR SPINE W/O DYE: CPT | Mod: GC | Performed by: RADIOLOGY

## 2021-08-23 PROCEDURE — 36415 COLL VENOUS BLD VENIPUNCTURE: CPT

## 2021-08-23 PROCEDURE — 84153 ASSAY OF PSA TOTAL: CPT

## 2021-08-23 PROCEDURE — 80050 GENERAL HEALTH PANEL: CPT

## 2021-08-23 PROCEDURE — 84403 ASSAY OF TOTAL TESTOSTERONE: CPT | Performed by: INTERNAL MEDICINE

## 2021-08-23 PROCEDURE — 71250 CT THORAX DX C-: CPT | Mod: GC | Performed by: RADIOLOGY

## 2021-08-23 PROCEDURE — 72128 CT CHEST SPINE W/O DYE: CPT | Mod: GC | Performed by: RADIOLOGY

## 2021-08-23 PROCEDURE — 99204 OFFICE O/P NEW MOD 45 MIN: CPT | Performed by: FAMILY MEDICINE

## 2021-08-23 RX ORDER — HYDROCODONE BITARTRATE AND ACETAMINOPHEN 5; 325 MG/1; MG/1
TABLET ORAL
Qty: 60 TABLET | Refills: 0 | Status: SHIPPED | OUTPATIENT
Start: 2021-08-23 | End: 2021-09-21

## 2021-08-23 ASSESSMENT — MIFFLIN-ST. JEOR: SCORE: 1650

## 2021-08-23 NOTE — TELEPHONE ENCOUNTER
Routing refill request to provider for review/approval because:  Drug not on the [facility] refill protocol   Sandra Grayson RN, BSN   [facility]

## 2021-08-23 NOTE — PROGRESS NOTES
CHIEF COMPLAINT:  Consult (low back pain )       HISTORY OF PRESENT ILLNESS  Mr. Etienne is a pleasant 56 year old male who presents to clinic today with pain in his low back region. Jay has had pain in his low back for the past 2 years or so, no inciting event that he can recall. He points to his thoracolumbar junction, just off to the midlines of his spine. Both sides are about equal, this pain is constant but intermittently relieved with hydrocodone. Jay does have an unfortunate history of prostate cancer, he is undergoing treatment. Notably, Jay did have an episode of sciatica last year, this was resolved after a lumbar spine injection, although his back pain persists.        Additional history: as documented    MEDICAL HISTORY  Patient Active Problem List   Diagnosis     CARDIOVASCULAR SCREENING; LDL GOAL LESS THAN 160     Left varicocele     Vitamin D deficiency     Kidney stone     Prostate cancer (H)     High risk medication use     Prostate cancer metastatic to bone (H)     Bone metastasis (H)       Current Outpatient Medications   Medication Sig Dispense Refill     apalutamide (ERLEADA) 60 MG tablet Take 4 tablets (240 mg) by mouth daily 120 tablet 0     aspirin (ASA) 81 MG chewable tablet Take 1 tablet (81 mg) by mouth daily       denosumab (XGEVA) 120 MG/1.7ML SOLN injection Inject 1.7 mLs (120 mg) Subcutaneous every 3 months 1.7 mL 0     HYDROcodone-acetaminophen (NORCO) 5-325 MG tablet Take 1/2 a tablet four times a day as needed 60 tablet 0     leuprolide (ELIGARD) 45 MG kit Inject 45 mg Subcutaneous every 6 months       levofloxacin (LEVAQUIN) 750 MG tablet Take 1 tablet (750 mg) by mouth daily 10 tablet 0     sildenafil (REVATIO) 20 MG tablet Take 1 to 5 tabs 30-60 minutes before intercourse.  Never use with nitroglycerin, terazosin or doxazosin. 30 tablet 11       No Known Allergies    Family History   Problem Relation Age of Onset     Asthma Father      Prostate Cancer Father 73     Cancer  "Maternal Grandmother 92        throat      Asthma Sister        Additional medical/Social/Surgical histories reviewed in [facility] and updated as appropriate.        PHYSICAL EXAM  Ht 1.753 m (5' 9\")   Wt 83.5 kg (184 lb)   BMI 27.17 kg/m    General  - normal appearance, in no obvious distress  HEENT  - conjunctivae not injected, moist mucous membranes  CV  - normal peripheral perfusion  Pulm  - normal respiratory pattern, non-labored  Musculoskeletal - thoracic and lumbar spine   - inspection: normal bone and joint alignment, no obvious scoliosis  - palpation: bilateral thoracolumbar paravertebral spasm  - ROM: no increase in pain with bilateral rotation, flexion past 30 deg, extension  - strength: lower extremities 5/5 in all planes  - special tests:  (-) slump test  Neuro  - patellar and Achilles DTRs 2+ bilaterally, no sensory or motor deficit, grossly normal coordination, normal muscle tone  Skin  - no ecchymosis, erythema, warmth, or induration, no obvious rash  Psych  - interactive, appropriate, normal mood and affect             ASSESSMENT & PLAN  Mr. Etienne is a 56 year old male who presents to clinic today with pain in his thoracolumbar region.    I reviewed his previous MRI in the room with him, this is an MRI of his lumbar spine that does show severe stenosis at L4-5, with a regional scoliosis throughout his lumbar spine.  There are no obvious findings at the L1 region.  Jay has also had CTs of his abdomen and chest recently that have not revealed the source of his thoracolumbar pain, although these have been done for follow-up regarding his chest.    I do have some suspicion that he may be experiencing arthritic pain.  I am ordering a CT imaging of his thoracic and lumbar spine.  I will get in touch with him with the results.    It was a pleasure seeing Jay today.    Quincy Denise DO, [facility]  Primary Care Sports Medicine      This note was constructed using Dragon dictation software, please excuse any minor " errors in spelling, grammar, or syntax.

## 2021-08-23 NOTE — PROGRESS NOTES
"      [facility] FOLLOW-UP VISIT 8/23/2021    Per Etienne's chief complaint for this visit includes:  Chief Complaint   Patient presents with     Consult     low back pain      PCP: Per Chen    Referring Provider:  Na Astudillo MD  [id number] [place] [address]    Ht 1.753 m (5' 9\")   Wt 83.5 kg (184 lb)   BMI 27.17 kg/m    Data Unavailable       Interval History:     Follow up reason: low back pain     Pain: Worsening    Function: Worsening    Medical History:    Any recent changes to your medical history? No    Any new medication prescribed since last visit? No    Review of Systems:    Do you have fever, chills, weight loss? No    Do you have any vision problems? No    Do you have any chest pain or edema? No    Do you have any shortness of breath or wheezing?  No    Do you have stomach problems? No    Do you have any urinary track issues? No    Do you have any numbness or focal weakness? No    Do you have diabetes? No    Do you have problems with bleeding or clotting? No    Do you have an rashes or other skin lesions? No    "

## 2021-08-23 NOTE — LETTER
8/23/2021         RE: Per Etienne  [address] [address] [id number]        Dear Colleague,    Thank you for referring your patient, Per Etienne, to the [facility] [place]. Please see a copy of my visit note below.    CHIEF COMPLAINT:  Consult (low back pain )       HISTORY OF PRESENT ILLNESS  Mr. Etienne is a pleasant 56 year old male who presents to clinic today with pain in his low back region. Jay has had pain in his low back for the past 2 years or so, no inciting event that he can recall. He points to his thoracolumbar junction, just off to the midlines of his spine. Both sides are about equal, this pain is constant but intermittently relieved with hydrocodone. Jay does have an unfortunate history of prostate cancer, he is undergoing treatment. Notably, Jay did have an episode of sciatica last year, this was resolved after a lumbar spine injection, although his back pain persists.        Additional history: as documented    MEDICAL HISTORY  Patient Active Problem List   Diagnosis     CARDIOVASCULAR SCREENING; LDL GOAL LESS THAN 160     Left varicocele     Vitamin D deficiency     Kidney stone     Prostate cancer (H)     High risk medication use     Prostate cancer metastatic to bone (H)     Bone metastasis (H)       Current Outpatient Medications   Medication Sig Dispense Refill     apalutamide (ERLEADA) 60 MG tablet Take 4 tablets (240 mg) by mouth daily 120 tablet 0     aspirin (ASA) 81 MG chewable tablet Take 1 tablet (81 mg) by mouth daily       denosumab (XGEVA) 120 MG/1.7ML SOLN injection Inject 1.7 mLs (120 mg) Subcutaneous every 3 months 1.7 mL 0     HYDROcodone-acetaminophen (NORCO) 5-325 MG tablet Take 1/2 a tablet four times a day as needed 60 tablet 0     leuprolide (ELIGARD) 45 MG kit Inject 45 mg Subcutaneous every 6 months       levofloxacin (LEVAQUIN) 750 MG tablet Take 1 tablet (750 mg) by mouth daily 10 tablet 0     sildenafil  "(REVATIO) 20 MG tablet Take 1 to 5 tabs 30-60 minutes before intercourse.  Never use with nitroglycerin, terazosin or doxazosin. 30 tablet 11       No Known Allergies    Family History   Problem Relation Age of Onset     Asthma Father      Prostate Cancer Father 73     Cancer Maternal Grandmother 92        throat      Asthma Sister        Additional medical/Social/Surgical histories reviewed in [facility] and updated as appropriate.        PHYSICAL EXAM  Ht 1.753 m (5' 9\")   Wt 83.5 kg (184 lb)   BMI 27.17 kg/m    General  - normal appearance, in no obvious distress  HEENT  - conjunctivae not injected, moist mucous membranes  CV  - normal peripheral perfusion  Pulm  - normal respiratory pattern, non-labored  Musculoskeletal - thoracic and lumbar spine   - inspection: normal bone and joint alignment, no obvious scoliosis  - palpation: bilateral thoracolumbar paravertebral spasm  - ROM: no increase in pain with bilateral rotation, flexion past 30 deg, extension  - strength: lower extremities 5/5 in all planes  - special tests:  (-) slump test  Neuro  - patellar and Achilles DTRs 2+ bilaterally, no sensory or motor deficit, grossly normal coordination, normal muscle tone  Skin  - no ecchymosis, erythema, warmth, or induration, no obvious rash  Psych  - interactive, appropriate, normal mood and affect             ASSESSMENT & PLAN  Mr. Etienne is a 56 year old male who presents to clinic today with pain in his thoracolumbar region.    I reviewed his previous MRI in the room with him, this is an MRI of his lumbar spine that does show severe stenosis at L4-5, with a regional scoliosis throughout his lumbar spine.  There are no obvious findings at the L1 region.  Jay has also had CTs of his abdomen and chest recently that have not revealed the source of his thoracolumbar pain, although these have been done for follow-up regarding his chest.    I do have some suspicion that he may be experiencing arthritic pain.  I am ordering a " "CT imaging of his thoracic and lumbar spine.  I will get in touch with him with the results.    It was a pleasure seeing Bill today.    Quincy Denise DO, [facility]  Primary Care Sports Medicine      This note was constructed using Dragon dictation software, please excuse any minor errors in spelling, grammar, or syntax.            [facility] FOLLOW-UP VISIT 8/23/2021    Per Etienne's chief complaint for this visit includes:  Chief Complaint   Patient presents with     Consult     low back pain      PCP: Per Chen    Referring Provider:  Na Astudillo MD  [id number] [place] [address]    Ht 1.753 m (5' 9\")   Wt 83.5 kg (184 lb)   BMI 27.17 kg/m    Data Unavailable       Interval History:     Follow up reason: low back pain     Pain: Worsening    Function: Worsening    Medical History:    Any recent changes to your medical history? No    Any new medication prescribed since last visit? No    Review of Systems:    Do you have fever, chills, weight loss? No    Do you have any vision problems? No    Do you have any chest pain or edema? No    Do you have any shortness of breath or wheezing?  No    Do you have stomach problems? No    Do you have any urinary track issues? No    Do you have any numbness or focal weakness? No    Do you have diabetes? No    Do you have problems with bleeding or clotting? No    Do you have an rashes or other skin lesions? No        Again, thank you for allowing me to participate in the care of your patient.        Sincerely,        Quincy Denise DO    "

## 2021-08-25 LAB — TESTOST SERPL-MCNC: 9 NG/DL (ref 240–950)

## 2021-09-13 DIAGNOSIS — Z11.59 ENCOUNTER FOR SCREENING FOR OTHER VIRAL DISEASES: ICD-10-CM

## 2021-09-13 DIAGNOSIS — C61 PROSTATE CANCER (H): Primary | ICD-10-CM

## 2021-09-21 ENCOUNTER — MYC REFILL (OUTPATIENT)
Dept: FAMILY MEDICINE | Facility: CLINIC | Age: 56
End: 2021-09-21

## 2021-09-21 DIAGNOSIS — M54.42 ACUTE LEFT-SIDED LOW BACK PAIN WITH LEFT-SIDED SCIATICA: ICD-10-CM

## 2021-09-21 DIAGNOSIS — C61 PROSTATE CANCER METASTATIC TO BONE (H): ICD-10-CM

## 2021-09-21 DIAGNOSIS — C79.51 PROSTATE CANCER METASTATIC TO BONE (H): ICD-10-CM

## 2021-09-21 RX ORDER — HYDROCODONE BITARTRATE AND ACETAMINOPHEN 5; 325 MG/1; MG/1
TABLET ORAL
Qty: 60 TABLET | Refills: 0 | Status: SHIPPED | OUTPATIENT
Start: 2021-09-21 | End: 2021-10-20

## 2021-09-21 NOTE — TELEPHONE ENCOUNTER
Routing refill request to provider for review/approval because:  Drug not on the [facility] refill protocol   Patient comment: Hi Dr. Chen can you up my prescription to 3 full pills per day my back pain is getting worse I have another injection coming October 22 thank you Jay Andrade RN  [facility]

## 2021-10-13 DIAGNOSIS — C61 PROSTATE CANCER (H): Primary | ICD-10-CM

## 2021-10-18 ENCOUNTER — LAB (OUTPATIENT)
Dept: LAB | Facility: CLINIC | Age: 56
End: 2021-10-18
Payer: COMMERCIAL

## 2021-10-18 DIAGNOSIS — Z11.59 ENCOUNTER FOR SCREENING FOR OTHER VIRAL DISEASES: ICD-10-CM

## 2021-10-18 PROCEDURE — U0003 INFECTIOUS AGENT DETECTION BY NUCLEIC ACID (DNA OR RNA); SEVERE ACUTE RESPIRATORY SYNDROME CORONAVIRUS 2 (SARS-COV-2) (CORONAVIRUS DISEASE [COVID-19]), AMPLIFIED PROBE TECHNIQUE, MAKING USE OF HIGH THROUGHPUT TECHNOLOGIES AS DESCRIBED BY CMS-2020-01-R: HCPCS

## 2021-10-18 PROCEDURE — U0005 INFEC AGEN DETEC AMPLI PROBE: HCPCS

## 2021-10-19 LAB — SARS-COV-2 RNA RESP QL NAA+PROBE: NEGATIVE

## 2021-10-20 ENCOUNTER — MYC MEDICAL ADVICE (OUTPATIENT)
Dept: FAMILY MEDICINE | Facility: CLINIC | Age: 56
End: 2021-10-20

## 2021-10-20 DIAGNOSIS — C79.51 PROSTATE CANCER METASTATIC TO BONE (H): ICD-10-CM

## 2021-10-20 DIAGNOSIS — M54.42 ACUTE LEFT-SIDED LOW BACK PAIN WITH LEFT-SIDED SCIATICA: ICD-10-CM

## 2021-10-20 DIAGNOSIS — C61 PROSTATE CANCER METASTATIC TO BONE (H): ICD-10-CM

## 2021-10-20 RX ORDER — HYDROCODONE BITARTRATE AND ACETAMINOPHEN 5; 325 MG/1; MG/1
TABLET ORAL
Qty: 90 TABLET | Refills: 0 | Status: SHIPPED | OUTPATIENT
Start: 2021-10-20 | End: 2021-11-22

## 2021-10-22 ENCOUNTER — ANCILLARY PROCEDURE (OUTPATIENT)
Dept: GENERAL RADIOLOGY | Facility: CLINIC | Age: 56
End: 2021-10-22
Attending: FAMILY MEDICINE
Payer: COMMERCIAL

## 2021-10-22 DIAGNOSIS — G89.29 CHRONIC LOW BACK PAIN WITHOUT SCIATICA, UNSPECIFIED BACK PAIN LATERALITY: ICD-10-CM

## 2021-10-22 DIAGNOSIS — M54.50 CHRONIC LOW BACK PAIN WITHOUT SCIATICA, UNSPECIFIED BACK PAIN LATERALITY: ICD-10-CM

## 2021-10-22 PROCEDURE — 62323 NJX INTERLAMINAR LMBR/SAC: CPT | Performed by: RADIOLOGY

## 2021-10-22 RX ORDER — METHYLPREDNISOLONE ACETATE 80 MG/ML
80 INJECTION, SUSPENSION INTRA-ARTICULAR; INTRALESIONAL; INTRAMUSCULAR; SOFT TISSUE ONCE
Status: COMPLETED | OUTPATIENT
Start: 2021-10-22 | End: 2021-10-22

## 2021-10-22 RX ORDER — IOPAMIDOL 408 MG/ML
2 INJECTION, SOLUTION INTRATHECAL ONCE
Status: COMPLETED | OUTPATIENT
Start: 2021-10-22 | End: 2021-10-22

## 2021-10-22 RX ORDER — LIDOCAINE HYDROCHLORIDE 10 MG/ML
5 INJECTION, SOLUTION EPIDURAL; INFILTRATION; INTRACAUDAL; PERINEURAL ONCE
Status: COMPLETED | OUTPATIENT
Start: 2021-10-22 | End: 2021-10-22

## 2021-10-22 RX ORDER — BUPIVACAINE HYDROCHLORIDE 5 MG/ML
2 INJECTION, SOLUTION PERINEURAL ONCE
Status: COMPLETED | OUTPATIENT
Start: 2021-10-22 | End: 2021-10-22

## 2021-10-22 RX ADMIN — BUPIVACAINE HYDROCHLORIDE 10 MG: 5 INJECTION, SOLUTION PERINEURAL at 15:29

## 2021-10-22 RX ADMIN — LIDOCAINE HYDROCHLORIDE 5 ML: 10 INJECTION, SOLUTION EPIDURAL; INFILTRATION; INTRACAUDAL; PERINEURAL at 15:29

## 2021-10-22 RX ADMIN — IOPAMIDOL 2 ML: 408 INJECTION, SOLUTION INTRATHECAL at 15:29

## 2021-10-22 RX ADMIN — METHYLPREDNISOLONE ACETATE 80 MG: 80 INJECTION, SUSPENSION INTRA-ARTICULAR; INTRALESIONAL; INTRAMUSCULAR; SOFT TISSUE at 15:29

## 2021-10-22 NOTE — DISCHARGE SUMMARY
Radiology Discharge Instructions Post Lumbar Puncture  AFTER YOU GO HOME  ? DO relax and take it easy for 24 hours; we suggest bed rest until the next morning, except for getting up to the bathroom  ? You may resume normal activity tomorrow  ? You may remove the bandage on your back in the evening or next morning  ? You may resume bathing the next day  ? Drink at least 4 glasses of extra fluid today if not on a fluid restriction.  ? DO NOT drive or operate machinery at home or at work for at least 24 hours.    CALL YOUR PRIMARY PHYSICIAN IF:  ? If you do start to leak a large amount of fluid from the puncture site, lie down flat on your back. Call your primary physician they will tell you if you need to return to the hospital.  ? You develop severe headache  ? You develop nausea or vomiting.  ? You develop a temperature of 101 degrees F or greater.    ADDITIONAL INSTRUCTIONS:   ? If you are taking a blood thinner, you may resume your medication at your regular dose today.  Follow up with your physician to have your INR rechecked if indicated.  ? You may use over the counter pain reliever, do not use aspirin for 24 hours.    Contacts:  During business hours from 8 to 5 pm, you may call [phone] to reach a nurse advisor.  After hours call [facility] [phone].  Ask for the Radiologist on call.  Someone is on call 24 hrs/day.  [facility] Toll Free Number   [phone]

## 2021-10-22 NOTE — PROGRESS NOTES
Jay was seen in X-ray today for a L3-4 epidural injection. Patient rated pain before procedure 5/10. After procedure patient rated pain 5/10.   This pain level is acceptable to patient. Patient discharged home with .

## 2021-10-23 ENCOUNTER — HEALTH MAINTENANCE LETTER (OUTPATIENT)
Age: 56
End: 2021-10-23

## 2021-10-23 NOTE — PROGRESS NOTES
"This patient  is being evaluated via a billable video visit.      The patient has been notified of following:     \"This video visit will be conducted via a call between you and your physician/provider. We have found that certain health care needs can be provided without the need for an in-person physical exam.  This service lets us provide the care you need with a video conversation.  If a prescription is necessary we can send it directly to your pharmacy.  If lab work is needed we can place an order for that and you can then stop by our lab to have the test done at a later time.    Video visits are billed at different rates depending on your insurance coverage.  Please reach out to your insurance provider with any questions.    If during the course of the call the physician/provider feels a video visit is not appropriate, you will not be charged for this service.\"    Patient has given verbal consent for Video visit? yes  Video-Visit Details    Type of service:  Video Visit    Video visit duration: 14   min  Originating Location (pt. Location): home    Distant Location (provider location):  [facility]     Platform used for Video Visit: Lynn Astudillo MD        Oncology Follow-up:  Date on this visit: Oct 26, 2021    Primary care physician: Per Chen   Urologist: Dr. Angel Colmenares     Metastatic hormone sensitive prostate cancer    Oncologic History:  Metastatic hormone sensitive prostate cancer  He presented to his PCP in December 2019 with slow urinary stream.  PSA was checked and was found to be 124. On January 8, 2020 abdomen pelvis CT showed groundglass opacity in the left lower lobe,  dystrophic calcification of the prostate gland, and a fatty liver.  Same-day bone scan was negative. On January 10, 2010 prostate biopsy showed on the left [place]ber[place] score 4+3 involving 7 cores and on the right [place] score 3+4 involving 6 cores, with positive perineural " invasion.  He proceeded to undergo RRP by Dr. Colmenares on February 24, 2020.  Pathology revealed acinar adenocarcinoma Robyn 4+3 with focal ROBERTO, bladder neck base invasion, positive SVI, positive PNI, and multiple positive margins.  There was no LVSI and 0 out of 3 lymph nodes removed were involved with tumor; pT3bN0.  He received Eligard on 4/20/2020.  MRI prostate on 5/21/2020 which showed a 14 x 9 mm T2 intermediate structure in the prostatectomy bed at the left aspect of urinary bladder neck with restricted diffusion in the DWI images and early enhancement in the contrast  enhanced images. This was concerning for locally recurrent/residual  prostatic cancer.  There was a T2 hypointense structure in the in the area of removed  right seminal vesicle measuring 13 x 11 mm  demonstrating restricted  diffusion in the DWI images. This may represent a recurrent/residual  tumor. There were 2 bone metastases in the left pubic inferior ramus and  inferior aspect of left pubic body.   There is a 7 mm suspicious lymph nodes in the right obturator area  corresponding to to FACBC avid lymph node on the same date PET/CT  F-18 fluciclovine PET/CT on 5/20/2020 showed:  1.  2 foci of increased uptake in the left ischium, with underlying  sclerotic lesion, and symphysis pubis, these changes are indicative of  active prostate cancer metastasis.  2. Low-level increased uptake in 3 lymphnodes along the right external  and internal iliac nodes, the most suspicious of which is the deep  obturator.  3. Low-level increased uptake in the prostatectomy bed slightly to the  left side of the urethra, if clinically necessary, endoscopy would  would be necessary for confirmation of this being metastasis.  PSA was down to 27.7 on 4/21/2020. On April 21, 2020 he received an Eligard injection.    He started Apalutamide on 6/12/2020.  Bone scan 1/22/2021: no bone mets.      2. Tinnitus b/l -  By 12/2020 he presented with 2 months c/o bilateral  tinnitus and was evaluated by Dr. Shields from ENT. An audiogram showed a significant down-sloping mid to high frequency sensorineural hearing loss. There was no evidence of conductive hearing loss or significant asymmetry.  He was felt to have early onset presbycusis. He was referred for tinnitus retraining therapy.  Review of literature showed no clear association of apalutamide or Xgeva with tinnitus or ototoxicity. Felt to have early onset presbycusis.    History Of Present Illness:  Mr. Etienne is a 56 year old male who presents with  metastatic hormone sensitive prostate cancer diagnosed in 01/2020, s/p RRP at that time but later found to have residual/recurrent disease in the pelvis and L inferior pubic body and pubic ramus metastasis.  He is here for follow-up of metastatic prostate cancer.   He has been on androgen deprivation therapy since April 2020, currently on Lupron every 3 months.  He started Apalutamide on 6/12/2020. He has remained on apalutamide.  He has not had any side effects attributable to apalutamide.  He has had night sweats for years which he experiences about once to twice per months which wake him up, and this has been the case for years, prior to him starting on apalutamide.  He has also had some chest heaviness at night when he sleeps it wakes him up from sleep occasionally, and this has also been chronic.  He has just started on omeprazole a couple days ago and feels somewhat better and will see if omeprazole improves the symptoms.  He has had cough for the last several weeks, also at night, nonproductive.  He had a routine annual CT chest for purposes of lung cancer screening on July 19, 2021.  This study demonstrated patchy groundglass opacities in the left lower lobe and right middle lobe that were suspicious for inflammation/infection, and he was prescribed Levaquin and he is on his third day.  His cough has improved.He continues to have bilateral tinnitus in his ear sometimes worse  sometimes better.  He also complains of chronic lower back pain and also right posterior iliac crest pain.  This has been present for several months.  He uses oxycodone as needed to control the pain.  He had a negative bone scan (for metastasis) in January 2021.  I have reviewed images from the bone scan today from January 2021 and I do not see any abnormality in the right iliac bone that would correlate the patient's symptoms.  I have also reviewed PET/CT from May 2020.  And he did not have metastatic disease to the right pelvis at that time.  He had a CT abdomen pelvis with contrast in May 2021 which showed no obvious pathology to account for his symptoms.  He also had negative CT abdomen and pelvis, ordered for the same reason by his PCP in March 2021.  He had a CT chest, noncontrast in July 2021, for lung cancer screening purposes, which demonstrated patchy air groundglass opacities in the left lower lobe and right middle lobe suspicious for inflammation/infection.  In the background of these patchy opacities there was a prominent 13 mm solid nodule which was favored to be infectious in etiology.  There was a 3 mm solid pulmonary nodule in the lateral right middle lobe.  Attention on follow-up was recommended.  He had a short interval CT chest on August 23, 2021 which showed marked improvement in multifocal groundglass opacities compared to July 19, 2021, with minimal persistent lower lobe tree-in-bud groundglass nodularity. I have reviewed CT images and lung parenchyma appears unremarkable. There was unchanged 3 mm nodule in the right middle lobe.  Hepatic steatosis was noted.  He was seen by Dr. Denise for evaluation of lower back pain.  He had CT of thoracic and lumbar spine on August 23, 2021.  This demonstrated unchanged sclerotic foci in T6 and T11 vertebral bodies dating back to May 2020.  There were degenerative changes in the lumbar spine with severe left neural foraminal narrowing at L4-L5 and moderate  to severe at L3-L4.  He has proceeded with L3-4 epidural injection on 10/22/2021.  No acute osseous abnormalities were noted. His lower back pain and b/l pelvic pain has improved following epidural injection. He still has CP if he does not take Prilosec. Prilosec has relieved the CP and he has remained on PPI qam. He does wake up with a cough. His other complaint is b/l feet soreness. He denies tingling or numbness. The feet pain is b/l in toes, balls of his feet and bottom of his feet.  This gets worse with standing or walking. He had an episode of gout in his left great toe in April 2020, treated with indomethacin. Norco prn relieves the lower back pin but not the foot pain.   He has no other health related complaints.  Patient was seen in video and his wife Sandra was on the phone for this visit.    In addition, a complete 12 point  review of systems is negative.      Past Medical/Surgical History:  Past Medical History:   Diagnosis Date     Gout      Prostate cancer (H) 01/10/2020     Past Surgical History:   Procedure Laterality Date     BIOPSY PROSTATE TRANSRECTAL  01/10/2020    Dr. Colmenares     COLONOSCOPY  5/2020     LITHOTRIPSY  age 30s     MICROSCOPIC KNEE SURGERY Right age 30s     ORTHOPEDIC SURGERY Left 1985    Alan placed in Left Femur     PROSTATECTOMY RETROPUBIC RADICAL  02/24/2020    Dr. Colmenares     Allergies:  Allergies as of 10/26/2021     (No Known Allergies)     Current Medications:  Current Outpatient Medications   Medication     apalutamide (ERLEADA) 60 MG tablet     aspirin (ASA) 81 MG chewable tablet     denosumab (XGEVA) 120 MG/1.7ML SOLN injection     HYDROcodone-acetaminophen (NORCO) 5-325 MG tablet     leuprolide (ELIGARD) 45 MG kit     sildenafil (REVATIO) 20 MG tablet     No current facility-administered medications for this visit.       Family History:    His father was diagnosed with prostate cancer, at the age of 75. Paternal GM had throat cancer at the age of 94, nonsmoker.     Social  History:  Social History     Socioeconomic History     Marital status:    Occupational History     Not on file   Social Needs     Food insecurity     Transportation needs   Tobacco Use     Smoking status: Former Smoker     Packs/day: 1.00     Years: 30.00     Pack years: 30.00     Types: Cigarettes, Cigars     Last attempt to quit: 2012     Years since quittin.4     Smokeless tobacco: Never Used   Substance and Sexual Activity     Alcohol use: Yes     Comment: 4-6 drinks per day - patient reports beer or vodka drinks     Physical Exam:      Wt Readings from Last 5 Encounters:   21 83.5 kg (184 lb)   21 83.5 kg (184 lb)   21 84.8 kg (187 lb)   21 84.9 kg (187 lb 3.2 oz)   21 86.6 kg (191 lb)     Constitutional: alert and in no distress  Eyes: No redness or discharge  Respiratory: No cough or labored breathing.  Musculoskeletal: Full range of motion in extremities.  Skin: no visible skin lesions or discoloration  Neurological: No tremors and denies headache.  Psychiatric: Mentation appears normal and affect is normal as well.  Alert and oriented x3.  The rest the comprehensive physical examination is deferred due to public health emergency video visit restrictions.        Laboratory/Imaging Studies  Labs reviewed and documented in the EMR.    PSA 0.02. CBCd within normal limits. TSH normal. CMP unremarkable.    ASSESSMENT/PLAN:  Jay is a very pleasant 56 year old man with metastatic (to the bones) prostate cancer. He is s/p Eligard on 2020. Jay has low volume hormone sensitive metastatic disease, with residual disease locoregionally and bone metastasis (two lesions- in left ischium and symphysis pubis), asymptomatic.  He received Eligard on 2020 and started on Apalutamide on 2020.    1.  Metastatic prostate cancer-PSA decreased to 0.02. Next set of labs on 10/29. We'll inform the patient of the results and recommendations and  f/up plan based on the  "results.   Continue apalutamide daily and Lupron every 3 months.  We will continue to intensely monitor the patient per treatment protocol.  We will follow CBC differential, CMP, total testosterone level and PSA every 3 months.    2. Bone metastasis-continue Xgeva q 3 months for prevention of skeletal related events given low volume metastatic disease to the bones.  Next dose 10/29    3.  Pulmonary infiltrates improved on CT chest with Levofloxacin. Plan to continue annual CT chest for lung cancer screening purposes. Next due 08/2022.    4. Drug monitoring- TSH monitoring q 3-4 months. TSH normal in 08/2021.   5. B/l foot pain- refer to podiatry. Apalutamide not reported to be associated with foot pain. Arthralgias are an association.Atypical for neuropathy and apalutamide not commonly associated with s/o neuropathy. Consider a trial of gabapentin in the future if foot pain persists.    6. GI- continues to have s/o \" heartburn\", relieved by PPI. Has cough in am- will try Omeprazole PO BID for a week to see if am cough resolved. Apalutamide associated with the following GI symptoms: Decreased appetite (12%), diarrhea (9% to 20%), nausea (18%) but not GERD s/p. Refer to GI due to persistent GERD symptoms for possible upper EGD for further evaluation. Referral placed.   All of the patient's and his wife's questions were answered.  At the end of our visit patient verbalized understanding and concurred with the plan.  Chart documentation completed in part with Dragon voice-recognition software.  Even though reviewed some grammatical, spelling, and word errors may remain.  >40 minutes spent on the date of the encounter doing chart review, review of test results, interpretation of tests, patient visit and documentation.      Addendum: Patient seen by podiatrist Dr. Evan Pacheco. From his note from 11/3/2021 visit:  \"X rays taken today of both feet.  Explained to patient that pain under the second third metatarsal head " "very common in people his age.  It seems as though he may have gotten some peripheral neuropathy and discussed this may make him more susceptible to discomfort here.   Discussed treatment options..  Ice bid.  Instructed to wear stiff soles shoes at all times and I made suggestions both inside and outside..  Dispensed metatarsal pads to offload this area..  Avoid activities that bother this and discussed which activities will aggravate.  Also discussed orthotics.  He will call me if he would like a pair.  RETURN TO CLINIC PRN.  \"     "

## 2021-10-26 ENCOUNTER — VIRTUAL VISIT (OUTPATIENT)
Dept: ONCOLOGY | Facility: CLINIC | Age: 56
End: 2021-10-26
Payer: COMMERCIAL

## 2021-10-26 DIAGNOSIS — C61 PROSTATE CANCER METASTATIC TO BONE (H): Primary | ICD-10-CM

## 2021-10-26 DIAGNOSIS — R12 HEARTBURN: ICD-10-CM

## 2021-10-26 DIAGNOSIS — M79.606 PAIN OF LOWER EXTREMITY, UNSPECIFIED LATERALITY: ICD-10-CM

## 2021-10-26 DIAGNOSIS — C79.51 PROSTATE CANCER METASTATIC TO BONE (H): Primary | ICD-10-CM

## 2021-10-26 DIAGNOSIS — C79.51 BONE METASTASIS: ICD-10-CM

## 2021-10-26 PROCEDURE — 99215 OFFICE O/P EST HI 40 MIN: CPT | Mod: GT | Performed by: INTERNAL MEDICINE

## 2021-10-26 NOTE — LETTER
"    10/26/2021         RE: Per Etienne  [id number] [facility] [id number]        Dear Colleague,    Thank you for referring your patient, Per Etienne, to the [facility]. Please see a copy of my visit note below.    This patient  is being evaluated via a billable video visit.      The patient has been notified of following:     \"This video visit will be conducted via a call between you and your physician/provider. We have found that certain health care needs can be provided without the need for an in-person physical exam.  This service lets us provide the care you need with a video conversation.  If a prescription is necessary we can send it directly to your pharmacy.  If lab work is needed we can place an order for that and you can then stop by our lab to have the test done at a later time.    Video visits are billed at different rates depending on your insurance coverage.  Please reach out to your insurance provider with any questions.    If during the course of the call the physician/provider feels a video visit is not appropriate, you will not be charged for this service.\"    Patient has given verbal consent for Video visit? yes  Video-Visit Details    Type of service:  Video Visit    Video visit duration: 14   min  Originating Location (pt. Location): home    Distant Location (provider location):  [facility]     Platform used for Video Visit: Lynn Astudillo MD        Oncology Follow-up:  Date on this visit: Oct 26, 2021    Primary care physician: Per Chen   Urologist: Dr. Angel Colmenares     Metastatic hormone sensitive prostate cancer    Oncologic History:  Metastatic hormone sensitive prostate cancer  He presented to his PCP in December 2019 with slow urinary stream.  PSA was checked and was found to be 124. On January 8, 2020 abdomen pelvis CT showed groundglass opacity in the left lower lobe,  " dystrophic calcification of the prostate gland, and a fatty liver.  Same-day bone scan was negative. On January 10, 2010 prostate biopsy showed on the left [place] score 4+3 involving 7 cores and on the right Robyn score 3+4 involving 6 cores, with positive perineural invasion.  He proceeded to undergo RRP by Dr. Colmenares on February 24, 2020.  Pathology revealed acinar adenocarcinoma [place] 4+3 with focal ROBERTO, bladder neck base invasion, positive SVI, positive PNI, and multiple positive margins.  There was no LVSI and 0 out of 3 lymph nodes removed were involved with tumor; pT3bN0.  He received Eligard on 4/20/2020.  MRI prostate on 5/21/2020 which showed a 14 x 9 mm T2 intermediate structure in the prostatectomy bed at the left aspect of urinary bladder neck with restricted diffusion in the DWI images and early enhancement in the contrast  enhanced images. This was concerning for locally recurrent/residual  prostatic cancer.  There was a T2 hypointense structure in the in the area of removed  right seminal vesicle measuring 13 x 11 mm  demonstrating restricted  diffusion in the DWI images. This may represent a recurrent/residual  tumor. There were 2 bone metastases in the left pubic inferior ramus and  inferior aspect of left pubic body.   There is a 7 mm suspicious lymph nodes in the right obturator area  corresponding to to FACBC avid lymph node on the same date PET/CT  F-18 fluciclovine PET/CT on 5/20/2020 showed:  1.  2 foci of increased uptake in the left ischium, with underlying  sclerotic lesion, and symphysis pubis, these changes are indicative of  active prostate cancer metastasis.  2. Low-level increased uptake in 3 lymphnodes along the right external  and internal iliac nodes, the most suspicious of which is the deep  obturator.  3. Low-level increased uptake in the prostatectomy bed slightly to the  left side of the urethra, if clinically necessary, endoscopy would  would be necessary for confirmation  of this being metastasis.  PSA was down to 27.7 on 4/21/2020. On April 21, 2020 he received an Eligard injection.    He started Apalutamide on 6/12/2020.  Bone scan 1/22/2021: no bone mets.      2. Tinnitus b/l -  By 12/2020 he presented with 2 months c/o bilateral tinnitus and was evaluated by Dr. Shields from ENT. An audiogram showed a significant down-sloping mid to high frequency sensorineural hearing loss. There was no evidence of conductive hearing loss or significant asymmetry.  He was felt to have early onset presbycusis. He was referred for tinnitus retraining therapy.  Review of literature showed no clear association of apalutamide or Xgeva with tinnitus or ototoxicity. Felt to have early onset presbycusis.    History Of Present Illness:  Mr. Etienne is a 56 year old male who presents with  metastatic hormone sensitive prostate cancer diagnosed in 01/2020, s/p RRP at that time but later found to have residual/recurrent disease in the pelvis and L inferior pubic body and pubic ramus metastasis.  He is here for follow-up of metastatic prostate cancer.   He has been on androgen deprivation therapy since April 2020, currently on Lupron every 3 months.  He started Apalutamide on 6/12/2020. He has remained on apalutamide.  He has not had any side effects attributable to apalutamide.  He has had night sweats for years which he experiences about once to twice per months which wake him up, and this has been the case for years, prior to him starting on apalutamide.  He has also had some chest heaviness at night when he sleeps it wakes him up from sleep occasionally, and this has also been chronic.  He has just started on omeprazole a couple days ago and feels somewhat better and will see if omeprazole improves the symptoms.  He has had cough for the last several weeks, also at night, nonproductive.  He had a routine annual CT chest for purposes of lung cancer screening on July 19, 2021.  This study demonstrated patchy  groundglass opacities in the left lower lobe and right middle lobe that were suspicious for inflammation/infection, and he was prescribed Levaquin and he is on his third day.  His cough has improved.He continues to have bilateral tinnitus in his ear sometimes worse sometimes better.  He also complains of chronic lower back pain and also right posterior iliac crest pain.  This has been present for several months.  He uses oxycodone as needed to control the pain.  He had a negative bone scan (for metastasis) in January 2021.  I have reviewed images from the bone scan today from January 2021 and I do not see any abnormality in the right iliac bone that would correlate the patient's symptoms.  I have also reviewed PET/CT from May 2020.  And he did not have metastatic disease to the right pelvis at that time.  He had a CT abdomen pelvis with contrast in May 2021 which showed no obvious pathology to account for his symptoms.  He also had negative CT abdomen and pelvis, ordered for the same reason by his PCP in March 2021.  He had a CT chest, noncontrast in July 2021, for lung cancer screening purposes, which demonstrated patchy air groundglass opacities in the left lower lobe and right middle lobe suspicious for inflammation/infection.  In the background of these patchy opacities there was a prominent 13 mm solid nodule which was favored to be infectious in etiology.  There was a 3 mm solid pulmonary nodule in the lateral right middle lobe.  Attention on follow-up was recommended.  He had a short interval CT chest on August 23, 2021 which showed marked improvement in multifocal groundglass opacities compared to July 19, 2021, with minimal persistent lower lobe tree-in-bud groundglass nodularity. I have reviewed CT images and lung parenchyma appears unremarkable. There was unchanged 3 mm nodule in the right middle lobe.  Hepatic steatosis was noted.  He was seen by Dr. Denise for evaluation of lower back pain.  He had CT of  thoracic and lumbar spine on August 23, 2021.  This demonstrated unchanged sclerotic foci in T6 and T11 vertebral bodies dating back to May 2020.  There were degenerative changes in the lumbar spine with severe left neural foraminal narrowing at L4-L5 and moderate to severe at L3-L4.  He has proceeded with L3-4 epidural injection on 10/22/2021.  No acute osseous abnormalities were noted. His lower back pain and b/l pelvic pain has improved following epidural injection. He still has CP if he does not take Prilosec. Prilosec has relieved the CP and he has remained on PPI qam. He does wake up with a cough. His other complaint is b/l feet soreness. He denies tingling or numbness. The feet pain is b/l in toes, balls of his feet and bottom of his feet.  This gets worse with standing or walking. He had an episode of gout in his left great toe in April 2020, treated with indomethacin. Norco prn relieves the lower back pin but not the foot pain.   He has no other health related complaints.  Patient was seen in video and his wife Sandra was on the phone for this visit.    In addition, a complete 12 point  review of systems is negative.      Past Medical/Surgical History:  Past Medical History:   Diagnosis Date     Gout      Prostate cancer (H) 01/10/2020     Past Surgical History:   Procedure Laterality Date     BIOPSY PROSTATE TRANSRECTAL  01/10/2020    Dr. Colmenares     COLONOSCOPY  5/2020     LITHOTRIPSY  age 30s     MICROSCOPIC KNEE SURGERY Right age 30s     ORTHOPEDIC SURGERY Left 1985    Alan placed in Left Femur     PROSTATECTOMY RETROPUBIC RADICAL  02/24/2020    Dr. Colmenares     Allergies:  Allergies as of 10/26/2021     (No Known Allergies)     Current Medications:  Current Outpatient Medications   Medication     apalutamide (ERLEADA) 60 MG tablet     aspirin (ASA) 81 MG chewable tablet     denosumab (XGEVA) 120 MG/1.7ML SOLN injection     HYDROcodone-acetaminophen (NORCO) 5-325 MG tablet     leuprolide (ELIGARD) 45 MG kit      sildenafil (REVATIO) 20 MG tablet     No current facility-administered medications for this visit.       Family History:    His father was diagnosed with prostate cancer, at the age of 75. Paternal GM had throat cancer at the age of 94, nonsmoker.     Social History:  Social History     Socioeconomic History     Marital status:    Occupational History     Not on file   Social Needs     Food insecurity     Transportation needs   Tobacco Use     Smoking status: Former Smoker     Packs/day: 1.00     Years: 30.00     Pack years: 30.00     Types: Cigarettes, Cigars     Last attempt to quit: 2012     Years since quittin.4     Smokeless tobacco: Never Used   Substance and Sexual Activity     Alcohol use: Yes     Comment: 4-6 drinks per day - patient reports beer or vodka drinks     Physical Exam:      Wt Readings from Last 5 Encounters:   21 83.5 kg (184 lb)   21 83.5 kg (184 lb)   21 84.8 kg (187 lb)   21 84.9 kg (187 lb 3.2 oz)   21 86.6 kg (191 lb)     Constitutional: alert and in no distress  Eyes: No redness or discharge  Respiratory: No cough or labored breathing.  Musculoskeletal: Full range of motion in extremities.  Skin: no visible skin lesions or discoloration  Neurological: No tremors and denies headache.  Psychiatric: Mentation appears normal and affect is normal as well.  Alert and oriented x3.  The rest the comprehensive physical examination is deferred due to public health emergency video visit restrictions.        Laboratory/Imaging Studies  Labs reviewed and documented in the EMR.    PSA 0.02. CBCd within normal limits. TSH normal. CMP unremarkable.    ASSESSMENT/PLAN:  Jay is a very pleasant 56 year old man with metastatic (to the bones) prostate cancer. He is s/p Eligard on 2020. Jay has low volume hormone sensitive metastatic disease, with residual disease locoregionally and bone metastasis (two lesions- in left ischium and symphysis pubis),  "asymptomatic.  He received Eligard on 4/21/2020 and started on Apalutamide on 6/12/2020.    1.  Metastatic prostate cancer-PSA decreased to 0.02. Next set of labs on 10/29. We'll inform the patient of the results and recommendations and  f/up plan based on the results.   Continue apalutamide daily and Lupron every 3 months.  We will continue to intensely monitor the patient per treatment protocol.  We will follow CBC differential, CMP, total testosterone level and PSA every 3 months.    2. Bone metastasis-continue Xgeva q 3 months for prevention of skeletal related events given low volume metastatic disease to the bones.  Next dose 10/29    3.  Pulmonary infiltrates improved on CT chest with Levofloxacin. Plan to continue annual CT chest for lung cancer screening purposes. Next due 08/2022.    4. Drug monitoring- TSH monitoring q 3-4 months. TSH normal in 08/2021.   5. B/l foot pain- refer to podiatry. Apalutamide not reported to be associated with foot pain. Arthralgias are an association.Atypical for neuropathy and apalutamide not commonly associated with s/o neuropathy. Consider a trial of gabapentin in the future if foot pain persists.    6. GI- continues to have s/o \" heartburn\", relieved by PPI. Has cough in am- will try Omeprazole PO BID for a week to see if am cough resolved. Apalutamide associated with the following GI symptoms: Decreased appetite (12%), diarrhea (9% to 20%), nausea (18%) but not GERD s/p. Refer to GI due to persistent GERD symptoms for possible upper EGD for further evaluation. Referral placed.   All of the patient's and his wife's questions were answered.  At the end of our visit patient verbalized understanding and concurred with the plan.  Chart documentation completed in part with Dragon voice-recognition software.  Even though reviewed some grammatical, spelling, and word errors may remain.  >40 minutes spent on the date of the encounter doing chart review, review of test results, " interpretation of tests, patient visit and documentation.      Please see note from 10/23/2021 visit      Again, thank you for allowing me to participate in the care of your patient.        Sincerely,        Na Astudillo MD, MD

## 2021-10-27 ENCOUNTER — PATIENT OUTREACH (OUTPATIENT)
Dept: ONCOLOGY | Facility: CLINIC | Age: 56
End: 2021-10-27

## 2021-10-27 ENCOUNTER — TELEPHONE (OUTPATIENT)
Dept: GASTROENTEROLOGY | Facility: CLINIC | Age: 56
End: 2021-10-27

## 2021-10-27 DIAGNOSIS — C79.51 BONE METASTASIS: ICD-10-CM

## 2021-10-27 DIAGNOSIS — C79.51 PROSTATE CANCER METASTATIC TO BONE (H): Primary | ICD-10-CM

## 2021-10-27 DIAGNOSIS — C61 PROSTATE CANCER METASTATIC TO BONE (H): Primary | ICD-10-CM

## 2021-10-27 NOTE — TELEPHONE ENCOUNTER
Received the following message:     From: See Ordaz RN   Sent: 10/27/2021  10:25 AM CDT   To: Chanel Puentes RN, *   Subject: GI Referral - sooner appointment?                 Good morning,     This is a patient of Dr. Astudillo that is being referred to GI for heartburn.  The patient is currently scheduled to see Dr. Anderson, but not until February 1st.  We received a voicemail message from the patient that came in yesterday afternoon, asking if there would be any possibility of getting him in with someone sooner - sounds like he is really having a difficult time with the heartburn symptoms.     Please advise.      - Per Brett Pretson PA-C, ok to reschedule patient to virtual return visit opening on 11/22/2021 at 8:30 AM if patient is agreeable.  Attempted to reach patient, message left requesting return call.       Call center:  If patient returns call, ok to reschedule into held appointment at date/time noted above.    Mishel Corey, RN

## 2021-10-27 NOTE — PROGRESS NOTES
[facility]:  Care Coordination Note    Received voicemail message from patient this afternoon, calling with an update that he was able to schedule a sooner appointment with the GI team.  Patient is scheduled with Brett Preston PA-C, on 11/11/21.  Patient verbalized appreciation for our assistance with getting him in for a sooner appointment.     See Ordaz RN, BSN, OCN  Oncology Care Coordinator  [facility]

## 2021-10-27 NOTE — PROGRESS NOTES
[facility]:  Care Coordination Note    Received voicemail message from patient regarding his GI referral which was placed by Dr. Astudillo.  Patient shares that his GI consult is not scheduled until February of 2022, and he has concerns about waiting this long to be evaluated for his heartburn symptoms.  Patient is asking for a sooner appointment if possible.    Writer sent message to the [facility], marked high priority, asking if there would be any potential for moving patient's consult appointment up to a sooner date.  A voicemail message was left on patient's phone this morning, letting him know that we are looking into this for him, and that someone will be in touch with him with further appointment updates.      Provided direct phone number for this RN in message to patient for any additional questions or concerns.     See Ordaz, RN, BSN, OCN  Oncology Care Coordinator  [facility]

## 2021-10-28 NOTE — TELEPHONE ENCOUNTER
Call center rescheduled patient to 11/11/2021.  Provider notified, released previously held appointment on 11/22/2021.      Mishel Corey RN

## 2021-10-29 ENCOUNTER — INFUSION THERAPY VISIT (OUTPATIENT)
Dept: INFUSION THERAPY | Facility: CLINIC | Age: 56
End: 2021-10-29
Payer: COMMERCIAL

## 2021-10-29 ENCOUNTER — LAB (OUTPATIENT)
Dept: LAB | Facility: CLINIC | Age: 56
End: 2021-10-29
Payer: COMMERCIAL

## 2021-10-29 ENCOUNTER — DOCUMENTATION ONLY (OUTPATIENT)
Dept: ONCOLOGY | Facility: CLINIC | Age: 56
End: 2021-10-29

## 2021-10-29 VITALS
RESPIRATION RATE: 18 BRPM | SYSTOLIC BLOOD PRESSURE: 132 MMHG | WEIGHT: 190 LBS | TEMPERATURE: 98.1 F | OXYGEN SATURATION: 95 % | HEART RATE: 99 BPM | BODY MASS INDEX: 28.06 KG/M2 | DIASTOLIC BLOOD PRESSURE: 82 MMHG

## 2021-10-29 DIAGNOSIS — C79.51 BONE METASTASIS: Primary | ICD-10-CM

## 2021-10-29 DIAGNOSIS — C61 PROSTATE CANCER (H): ICD-10-CM

## 2021-10-29 DIAGNOSIS — C61 PROSTATE CANCER METASTATIC TO BONE (H): ICD-10-CM

## 2021-10-29 DIAGNOSIS — C79.51 PROSTATE CANCER METASTATIC TO BONE (H): ICD-10-CM

## 2021-10-29 LAB
ALBUMIN SERPL-MCNC: 3.7 G/DL (ref 3.4–5)
ALP SERPL-CCNC: 81 U/L (ref 40–150)
ALT SERPL W P-5'-P-CCNC: 33 U/L (ref 0–70)
ANION GAP SERPL CALCULATED.3IONS-SCNC: 4 MMOL/L (ref 3–14)
AST SERPL W P-5'-P-CCNC: 14 U/L (ref 0–45)
BASOPHILS # BLD AUTO: 0.1 10E3/UL (ref 0–0.2)
BASOPHILS NFR BLD AUTO: 1 %
BILIRUB SERPL-MCNC: 0.3 MG/DL (ref 0.2–1.3)
BUN SERPL-MCNC: 16 MG/DL (ref 7–30)
CALCIUM SERPL-MCNC: 9.7 MG/DL (ref 8.5–10.1)
CHLORIDE BLD-SCNC: 106 MMOL/L (ref 94–109)
CO2 SERPL-SCNC: 28 MMOL/L (ref 20–32)
CREAT SERPL-MCNC: 1.04 MG/DL (ref 0.66–1.25)
EOSINOPHIL # BLD AUTO: 0.2 10E3/UL (ref 0–0.7)
EOSINOPHIL NFR BLD AUTO: 2 %
ERYTHROCYTE [DISTWIDTH] IN BLOOD BY AUTOMATED COUNT: 11.9 % (ref 10–15)
GFR SERPL CREATININE-BSD FRML MDRD: 79 ML/MIN/1.73M2
GLUCOSE BLD-MCNC: 138 MG/DL (ref 70–99)
HCT VFR BLD AUTO: 40.5 % (ref 40–53)
HGB BLD-MCNC: 13.9 G/DL (ref 13.3–17.7)
HOLD SPECIMEN: NORMAL
IMM GRANULOCYTES # BLD: 0.1 10E3/UL
IMM GRANULOCYTES NFR BLD: 1 %
LYMPHOCYTES # BLD AUTO: 0.9 10E3/UL (ref 0.8–5.3)
LYMPHOCYTES NFR BLD AUTO: 8 %
MCH RBC QN AUTO: 31.4 PG (ref 26.5–33)
MCHC RBC AUTO-ENTMCNC: 34.3 G/DL (ref 31.5–36.5)
MCV RBC AUTO: 91 FL (ref 78–100)
MONOCYTES # BLD AUTO: 0.9 10E3/UL (ref 0–1.3)
MONOCYTES NFR BLD AUTO: 8 %
NEUTROPHILS # BLD AUTO: 10 10E3/UL (ref 1.6–8.3)
NEUTROPHILS NFR BLD AUTO: 80 %
NRBC # BLD AUTO: 0 10E3/UL
NRBC BLD AUTO-RTO: 0 /100
PLATELET # BLD AUTO: 268 10E3/UL (ref 150–450)
POTASSIUM BLD-SCNC: 3.9 MMOL/L (ref 3.4–5.3)
PROT SERPL-MCNC: 7.2 G/DL (ref 6.8–8.8)
PSA SERPL-MCNC: 0.02 UG/L (ref 0–4)
RBC # BLD AUTO: 4.43 10E6/UL (ref 4.4–5.9)
SODIUM SERPL-SCNC: 138 MMOL/L (ref 133–144)
TSH SERPL DL<=0.005 MIU/L-ACNC: 1.91 MU/L (ref 0.4–4)
WBC # BLD AUTO: 12.2 10E3/UL (ref 4–11)

## 2021-10-29 PROCEDURE — 84403 ASSAY OF TOTAL TESTOSTERONE: CPT

## 2021-10-29 PROCEDURE — 99207 PR NO CHARGE LOS: CPT

## 2021-10-29 PROCEDURE — 84153 ASSAY OF PSA TOTAL: CPT

## 2021-10-29 PROCEDURE — 80050 GENERAL HEALTH PANEL: CPT

## 2021-10-29 PROCEDURE — 36415 COLL VENOUS BLD VENIPUNCTURE: CPT

## 2021-10-29 PROCEDURE — 96372 THER/PROPH/DIAG INJ SC/IM: CPT | Mod: 59 | Performed by: NURSE PRACTITIONER

## 2021-10-29 PROCEDURE — 96402 CHEMO HORMON ANTINEOPL SQ/IM: CPT | Performed by: NURSE PRACTITIONER

## 2021-10-29 ASSESSMENT — PAIN SCALES - GENERAL: PAINLEVEL: SEVERE PAIN (7)

## 2021-10-29 NOTE — PROGRESS NOTES
Infusion Nursing Note:  Per Etienne presents today for C6D1 Lupron/Xgeva.    Patient seen by provider today: No   present during visit today: Not Applicable.    Note: N/A.      Intravenous Access:  No Intravenous access at this visit.    Treatment Conditions:  Lab Results   Component Value Date    HGB 13.9 10/29/2021    WBC 12.2 (H) 10/29/2021    ANEU 11.6 (H) 04/29/2021    ANEUTAUTO 10.0 (H) 10/29/2021     10/29/2021      Lab Results   Component Value Date     10/29/2021    POTASSIUM 3.9 10/29/2021    CR 1.04 10/29/2021    NATALIYA 9.7 10/29/2021    BILITOTAL 0.3 10/29/2021    ALBUMIN 3.7 10/29/2021    ALT 33 10/29/2021    AST 14 10/29/2021     Results reviewed, labs MET treatment parameters, ok to proceed with treatment.      Post Infusion Assessment:  Patient tolerated injections without incident.       Discharge Plan:   [facility]  Patient will return 1/21/2022 for next appointment.   Patient discharged in stable condition accompanied by: self.      Lauren Merchant RN

## 2021-11-03 ENCOUNTER — ANCILLARY PROCEDURE (OUTPATIENT)
Dept: GENERAL RADIOLOGY | Facility: CLINIC | Age: 56
End: 2021-11-03
Attending: PODIATRIST
Payer: COMMERCIAL

## 2021-11-03 ENCOUNTER — OFFICE VISIT (OUTPATIENT)
Dept: PODIATRY | Facility: CLINIC | Age: 56
End: 2021-11-03
Attending: INTERNAL MEDICINE
Payer: COMMERCIAL

## 2021-11-03 VITALS — DIASTOLIC BLOOD PRESSURE: 80 MMHG | BODY MASS INDEX: 28.06 KG/M2 | WEIGHT: 190 LBS | SYSTOLIC BLOOD PRESSURE: 140 MMHG

## 2021-11-03 DIAGNOSIS — M77.8 CAPSULITIS OF FOOT, UNSPECIFIED LATERALITY: Primary | ICD-10-CM

## 2021-11-03 DIAGNOSIS — C61 PROSTATE CANCER (H): ICD-10-CM

## 2021-11-03 DIAGNOSIS — G62.9 PERIPHERAL POLYNEUROPATHY: ICD-10-CM

## 2021-11-03 DIAGNOSIS — M77.8 CAPSULITIS OF FOOT, UNSPECIFIED LATERALITY: ICD-10-CM

## 2021-11-03 PROCEDURE — 73630 X-RAY EXAM OF FOOT: CPT | Mod: LT | Performed by: RADIOLOGY

## 2021-11-03 PROCEDURE — 99203 OFFICE O/P NEW LOW 30 MIN: CPT | Performed by: PODIATRIST

## 2021-11-03 PROCEDURE — 73630 X-RAY EXAM OF FOOT: CPT | Mod: RT | Performed by: RADIOLOGY

## 2021-11-03 NOTE — PROGRESS NOTES
S:  Patient seen today and consult from Dr. Astudillo and complains of right and left foot pain.  Points to plantar second and third metatarsal head.  Describes as a burning pain.  aggravated by activity and relieved by rest.  Has had this for 1 years.  Patient states this started when he began chemotherapy for prostate cancer.  He does get some numbness in his feet now.  No pain anywhere else on feet.  Goes barefoot around house.  Works at standing job.  Denies erythema, edema, weakness, deformity.  Denies arthralgias anywhere else.      ROS:  See above     No Known Allergies    Current Outpatient Medications   Medication Sig Dispense Refill     apalutamide (ERLEADA) 60 MG tablet Take 4 tablets (240 mg) by mouth daily 120 tablet 0     aspirin (ASA) 81 MG chewable tablet Take 1 tablet (81 mg) by mouth daily       denosumab (XGEVA) 120 MG/1.7ML SOLN injection Inject 1.7 mLs (120 mg) Subcutaneous every 3 months 1.7 mL 0     HYDROcodone-acetaminophen (NORCO) 5-325 MG tablet Take 1/2 a tablet 6  times a day as needed 90 tablet 0     leuprolide (ELIGARD) 45 MG kit Inject 45 mg Subcutaneous every 6 months       sildenafil (REVATIO) 20 MG tablet Take 1 to 5 tabs 30-60 minutes before intercourse.  Never use with nitroglycerin, terazosin or doxazosin. 30 tablet 11     UNABLE TO FIND 1 tablet daily MEDICATION NAME: C, D, Zinc combination supplement         Patient Active Problem List   Diagnosis     CARDIOVASCULAR SCREENING; LDL GOAL LESS THAN 160     Left varicocele     Vitamin D deficiency     Kidney stone     Prostate cancer (H)     High risk medication use     Prostate cancer metastatic to bone (H)     Bone metastasis (H)       Past Medical History:   Diagnosis Date     Gout      Prostate cancer (H) 01/10/2020       Past Surgical History:   Procedure Laterality Date     BIOPSY PROSTATE TRANSRECTAL  01/10/2020    Dr. Colmenares     COLONOSCOPY  5/2020     LITHOTRIPSY  age 30s     MICROSCOPIC KNEE SURGERY Right age 30s      ORTHOPEDIC SURGERY Left 1985    Alan placed in Left Femur     PROSTATECTOMY RETROPUBIC RADICAL  2020    Dr. Colmenares       Family History   Problem Relation Age of Onset     Asthma Father      Prostate Cancer Father 73     Cancer Maternal Grandmother 92        throat      Asthma Sister        Social History     Tobacco Use     Smoking status: Former Smoker     Packs/day: 1.00     Years: 30.00     Pack years: 30.00     Types: Cigarettes, Cigars     Quit date: 2012     Years since quittin.8     Smokeless tobacco: Never Used   Substance Use Topics     Alcohol use: Yes     Comment: 4-6 drinks per day - patient reports beer or vodka drinks         O:   BP (!) 140/80   Wt 86.2 kg (190 lb)   BMI 28.06 kg/m  .      Constitutional/ general:  Pt is in no apparent distress, appears well-nourished.  Cooperative with history and physical exam.     Psych:  The patient answered questions appropriately.  Normal affect.  Seems to have reasonable expectations, in terms of treatment.     Lungs:  Non labored breathing, non labored speech. No cough.  No audible wheezing. Even, quiet breathing.       Vascular:  Pedal pulses are palpable bilaterally for both the DP and PT arteries.  CFT < 3 sec.  No edema.  Pedal hair growth noted.     Neuro:  Alert and oriented x 3. Coordinated gait.  Light touch sensation is intact     Derm: Normal texture and turgor.  No erythema, ecchymosis, or cyanosis.  No open lesions.     Musculoskeletal:    Lower extremity muscle strength is normal.  Patient is ambulatory without an assistive device or brace.  Normal arch with weightbearing.  No forefoot or rear foot deformities noted.  MS 5/5 all compartments.  Normal ROM all fore foot and rearfoot joints.  No equinus.  Pain under bilateral second, third metatarsal head plantar.  Negative Lachmans test.  Negative Mulders click.  No pain anywhere else.  No erythema, edema, ecchymosis, or subcutaneous masses noted.      Radiographic Exam:   Long second,  third metatarsal, but no other bone abnormalities.     A:  Subsecond, third capsulitis right and left foot        Peripheral neuropathy?    P:  X rays taken today of both feet.  Explained to patient that pain under the second third metatarsal head very common in people his age.  It seems as though he may have gotten some peripheral neuropathy and discussed this may make him more susceptible to discomfort here.   Discussed treatment options..  Ice bid.  Instructed to wear stiff soles shoes at all times and I made suggestions both inside and outside..  Dispensed metatarsal pads to offload this area..  Avoid activities that bother this and discussed which activities will aggravate.  Also discussed orthotics.  He will call me if he would like a pair.  RETURN TO CLINIC PRN.  Thank you for allowing me participate in the care of this patient.        Abundio Pacheco DPM, FACFAS

## 2021-11-03 NOTE — TELEPHONE ENCOUNTER
SUBJECTIVE/OBJECTIVE:                                                    Refill request receive for:  apalutamide (ERLEADA) 60 MG tablet    Last refill per fax: 10/23/21  Date of LOV r/t Medication: 10/26/21  Future appt scheduled? Yes: 01/20/22   Date faxed to clinic: 01/20/22    RECENT LABS/VITALS                                                      Recent Labs   Lab Test 01/22/21  0831 06/03/20  1149   CHOL 256* 200*   HDL 85 78   * 109*   TRIG 209* 67     Lab Results   Component Value Date    AST 14 10/29/2021    AST 7 04/29/2021     Lab Results   Component Value Date    ALT 33 10/29/2021    ALT 22 04/29/2021     Lab Results   Component Value Date    A1C 5.5 07/22/2021    A1C 5.4 10/10/2019    A1C 5.7 05/10/2018     Creatinine   Date Value Ref Range Status   10/29/2021 1.04 0.66 - 1.25 mg/dL Final   04/29/2021 0.95 0.66 - 1.25 mg/dL Final   ]  Potassium   Date Value Ref Range Status   10/29/2021 3.9 3.4 - 5.3 mmol/L Final   04/29/2021 3.9 3.4 - 5.3 mmol/L Final   ]  TSH   Date Value Ref Range Status   10/29/2021 1.91 0.40 - 4.00 mU/L Final   08/23/2021 2.49 0.40 - 4.00 mU/L Final   07/22/2021 2.02 0.40 - 4.00 mU/L Final   04/29/2021 2.57 0.40 - 4.00 mU/L Final   01/22/2021 3.46 0.40 - 4.00 mU/L Final   10/30/2020 1.97 0.40 - 4.00 mU/L Final   06/03/2020 0.97 0.40 - 4.00 mU/L Final     BP Readings from Last 4 Encounters:   11/03/21 (!) 140/80   10/29/21 132/82   07/22/21 (!) 155/87   04/29/21 137/87       PLAN:                                                      Sent to provider to advise.    Patsy Umanzor CMA

## 2021-11-03 NOTE — LETTER
11/3/2021         RE: Per Etienne  [id number] [facility] [id number]        Dear Colleague,    Thank you for referring your patient, Per Etienne, to the [facility]. Please see a copy of my visit note below.    S:  Patient seen today and consult from Dr. Astudillo and complains of right and left foot pain.  Points to plantar second and third metatarsal head.  Describes as a burning pain.  aggravated by activity and relieved by rest.  Has had this for 1 years.  Patient states this started when he began chemotherapy for prostate cancer.  He does get some numbness in his feet now.  No pain anywhere else on feet.  Goes barefoot around house.  Works at standing job.  Denies erythema, edema, weakness, deformity.  Denies arthralgias anywhere else.      ROS:  See above     No Known Allergies    Current Outpatient Medications   Medication Sig Dispense Refill     apalutamide (ERLEADA) 60 MG tablet Take 4 tablets (240 mg) by mouth daily 120 tablet 0     aspirin (ASA) 81 MG chewable tablet Take 1 tablet (81 mg) by mouth daily       denosumab (XGEVA) 120 MG/1.7ML SOLN injection Inject 1.7 mLs (120 mg) Subcutaneous every 3 months 1.7 mL 0     HYDROcodone-acetaminophen (NORCO) 5-325 MG tablet Take 1/2 a tablet 6  times a day as needed 90 tablet 0     leuprolide (ELIGARD) 45 MG kit Inject 45 mg Subcutaneous every 6 months       sildenafil (REVATIO) 20 MG tablet Take 1 to 5 tabs 30-60 minutes before intercourse.  Never use with nitroglycerin, terazosin or doxazosin. 30 tablet 11     UNABLE TO FIND 1 tablet daily MEDICATION NAME: C, D, Zinc combination supplement         Patient Active Problem List   Diagnosis     CARDIOVASCULAR SCREENING; LDL GOAL LESS THAN 160     Left varicocele     Vitamin D deficiency     Kidney stone     Prostate cancer (H)     High risk medication use     Prostate cancer metastatic to bone (H)     Bone metastasis (H)       Past Medical History:   Diagnosis  Date     Gout      Prostate cancer (H) 01/10/2020       Past Surgical History:   Procedure Laterality Date     BIOPSY PROSTATE TRANSRECTAL  01/10/2020    Dr. Colmenares     COLONOSCOPY  2020     LITHOTRIPSY  age 30s     MICROSCOPIC KNEE SURGERY Right age 30s     ORTHOPEDIC SURGERY Left 1985    Alan placed in Left Femur     PROSTATECTOMY RETROPUBIC RADICAL  2020    Dr. Colmenares       Family History   Problem Relation Age of Onset     Asthma Father      Prostate Cancer Father 73     Cancer Maternal Grandmother 92        throat      Asthma Sister        Social History     Tobacco Use     Smoking status: Former Smoker     Packs/day: 1.00     Years: 30.00     Pack years: 30.00     Types: Cigarettes, Cigars     Quit date: 2012     Years since quittin.8     Smokeless tobacco: Never Used   Substance Use Topics     Alcohol use: Yes     Comment: 4-6 drinks per day - patient reports beer or vodka drinks         O:   BP (!) 140/80   Wt 86.2 kg (190 lb)   BMI 28.06 kg/m  .      Constitutional/ general:  Pt is in no apparent distress, appears well-nourished.  Cooperative with history and physical exam.     Psych:  The patient answered questions appropriately.  Normal affect.  Seems to have reasonable expectations, in terms of treatment.     Lungs:  Non labored breathing, non labored speech. No cough.  No audible wheezing. Even, quiet breathing.       Vascular:  Pedal pulses are palpable bilaterally for both the DP and PT arteries.  CFT < 3 sec.  No edema.  Pedal hair growth noted.     Neuro:  Alert and oriented x 3. Coordinated gait.  Light touch sensation is intact     Derm: Normal texture and turgor.  No erythema, ecchymosis, or cyanosis.  No open lesions.     Musculoskeletal:    Lower extremity muscle strength is normal.  Patient is ambulatory without an assistive device or brace.  Normal arch with weightbearing.  No forefoot or rear foot deformities noted.  MS 5/5 all compartments.  Normal ROM all fore foot and  rearfoot joints.  No equinus.  Pain under bilateral second, third metatarsal head plantar.  Negative Lachmans test.  Negative Mulders click.  No pain anywhere else.  No erythema, edema, ecchymosis, or subcutaneous masses noted.      Radiographic Exam:   Long second, third metatarsal, but no other bone abnormalities.     A:  Subsecond, third capsulitis right and left foot        Peripheral neuropathy?    P:  X rays taken today of both feet.  Explained to patient that pain under the second third metatarsal head very common in people his age.  It seems as though he may have gotten some peripheral neuropathy and discussed this may make him more susceptible to discomfort here.   Discussed treatment options..  Ice bid.  Instructed to wear stiff soles shoes at all times and I made suggestions both inside and outside..  Dispensed metatarsal pads to offload this area..  Avoid activities that bother this and discussed which activities will aggravate.  Also discussed orthotics.  He will call me if he would like a pair.  RETURN TO CLINIC PRN.  Thank you for allowing me participate in the care of this patient.        Abundio Pacheco DPM, FACFAS         Again, thank you for allowing me to participate in the care of your patient.        Sincerely,        Abundio Pacheco DPM

## 2021-11-03 NOTE — TELEPHONE ENCOUNTER
Duplicate refill request. Please see refill encounter from earlier today.    Balbina Tiwari, RN, BSN, PHN  [facility]

## 2021-11-05 LAB — TESTOST SERPL-MCNC: 3 NG/DL (ref 240–950)

## 2021-11-10 ENCOUNTER — ALLIED HEALTH/NURSE VISIT (OUTPATIENT)
Dept: FAMILY MEDICINE | Facility: OTHER | Age: 56
End: 2021-11-10
Attending: FAMILY MEDICINE
Payer: COMMERCIAL

## 2021-11-10 DIAGNOSIS — Z20.822 EXPOSURE TO 2019 NOVEL CORONAVIRUS: Primary | ICD-10-CM

## 2021-11-10 DIAGNOSIS — C61 PROSTATE CANCER (H): ICD-10-CM

## 2021-11-10 DIAGNOSIS — Z20.822 COVID-19 RULED OUT: ICD-10-CM

## 2021-11-10 PROCEDURE — C9803 HOPD COVID-19 SPEC COLLECT: HCPCS

## 2021-11-10 PROCEDURE — U0005 INFEC AGEN DETEC AMPLI PROBE: HCPCS | Mod: ZL

## 2021-11-10 NOTE — TELEPHONE ENCOUNTER
SUBJECTIVE/OBJECTIVE:                                                    Refill request receive for:apalutamide (ERLEADA) 60 MG tablet      Last refill per fax: 10/23/21  Date of LOV r/t Medication: 10/26/21  Future appt scheduled? Yes: 01/20/22 Ania Causeyjulian   Date faxed to clinic: 11/10/21    RECENT LABS/VITALS                                                      Recent Labs   Lab Test 01/22/21  0831 06/03/20  1149   CHOL 256* 200*   HDL 85 78   * 109*   TRIG 209* 67     Lab Results   Component Value Date    AST 14 10/29/2021    AST 7 04/29/2021     Lab Results   Component Value Date    ALT 33 10/29/2021    ALT 22 04/29/2021     Lab Results   Component Value Date    A1C 5.5 07/22/2021    A1C 5.4 10/10/2019    A1C 5.7 05/10/2018     Creatinine   Date Value Ref Range Status   10/29/2021 1.04 0.66 - 1.25 mg/dL Final   04/29/2021 0.95 0.66 - 1.25 mg/dL Final   ]  Potassium   Date Value Ref Range Status   10/29/2021 3.9 3.4 - 5.3 mmol/L Final   04/29/2021 3.9 3.4 - 5.3 mmol/L Final   ]  TSH   Date Value Ref Range Status   10/29/2021 1.91 0.40 - 4.00 mU/L Final   08/23/2021 2.49 0.40 - 4.00 mU/L Final   07/22/2021 2.02 0.40 - 4.00 mU/L Final   04/29/2021 2.57 0.40 - 4.00 mU/L Final   01/22/2021 3.46 0.40 - 4.00 mU/L Final   10/30/2020 1.97 0.40 - 4.00 mU/L Final   06/03/2020 0.97 0.40 - 4.00 mU/L Final     BP Readings from Last 4 Encounters:   11/03/21 (!) 140/80   10/29/21 132/82   07/22/21 (!) 155/87   04/29/21 137/87       PLAN:                                                      Sent to provider to advise.    Patsy Umanzor CMA

## 2021-11-11 ENCOUNTER — VIRTUAL VISIT (OUTPATIENT)
Dept: GASTROENTEROLOGY | Facility: CLINIC | Age: 56
End: 2021-11-11
Attending: INTERNAL MEDICINE
Payer: COMMERCIAL

## 2021-11-11 DIAGNOSIS — R12 HEARTBURN: ICD-10-CM

## 2021-11-11 DIAGNOSIS — K21.9 GASTROESOPHAGEAL REFLUX DISEASE, UNSPECIFIED WHETHER ESOPHAGITIS PRESENT: Primary | ICD-10-CM

## 2021-11-11 LAB — SARS-COV-2 RNA RESP QL NAA+PROBE: NEGATIVE

## 2021-11-11 PROCEDURE — 99213 OFFICE O/P EST LOW 20 MIN: CPT | Mod: TEL | Performed by: PHYSICIAN ASSISTANT

## 2021-11-11 NOTE — PATIENT INSTRUCTIONS
It was a pleasure visiting with you today.     Continue taking omeprazole (prilosec) 20mg twice a daily.     Please schedule your upper endoscopy. If you have not heard from the scheduling office within 2 business days, please call [phone] to schedule.     Let's plan to follow up 2-3 weeks after your work up.     Brett Preston PA-C  Gastroenterology  [facility]

## 2021-11-11 NOTE — PROGRESS NOTES
Jay is a 56 year old who is being evaluated via a billable video visit.      How would you like to obtain your AVS? MyChart  If the video visit is dropped, the invitation should be resent by: Text to cell phone: [phone]  Will anyone else be joining your video visit? No      Keysha Linder LPN on 11/11/21 at 8:09 AM

## 2021-11-11 NOTE — PROGRESS NOTES
GASTROENTEROLOGY NEW PATIENT VIRTUAL VISIT     Attempted video visit however switched to telephone visit due to technical difficulties.     CC/REFERRING MD:    Per Chen    REASON FOR CONSULTATION:   Referred by Na Astudillo for Consult (Heartburn)      HISTORY OF PRESENT ILLNESS:    Per Etienne is 56 year old male with metastatic prostate cancer who presents for evaluation of heartburn.     He has had heartburn off and on in the past but notes worsening symptoms in the past 3 months where symptoms are occurring daily. Heartburn was also waking him up in the middle of the night. He was also waking up in the morning with coughing which was thought to be related to the reflux. He denies any NSAID use. No n/v. No dysphagia. No melena. No weight loss. Symptoms have resolved since starting prilosec 20mg twice daily over the counter. He however notes symptoms return if he stops the medication or only takes it once daily.     Jay  has a past medical history of Gout and Prostate cancer (H) (01/10/2020).    He  has a past surgical history that includes orthopedic surgery (Left, 1985); Biopsy prostate transrectal (01/10/2020); Prostatectomy retropubic radical (02/24/2020); MICROSCOPIC KNEE SURGERY (Right, age 30s); lithotripsy (age 30s); and colonoscopy (5/2020).    He  reports that he quit smoking about 9 years ago. His smoking use included cigarettes and cigars. He has a 30.00 pack-year smoking history. He has never used smokeless tobacco. He reports current alcohol use. He reports that he does not use drugs.    His family history includes Asthma in his father and sister; Cancer (age of onset: 92) in his maternal grandmother; Prostate Cancer (age of onset: 73) in his father.    ALLERGIES:  Patient has no known allergies.          PERTINENT MEDICATIONS:    Current Outpatient Medications:      apalutamide (ERLEADA) 60 MG tablet, Take 4 tablets (240 mg) by mouth daily, Disp: 120  tablet, Rfl: 0     aspirin (ASA) 81 MG chewable tablet, Take 1 tablet (81 mg) by mouth daily, Disp: , Rfl:      denosumab (XGEVA) 120 MG/1.7ML SOLN injection, Inject 1.7 mLs (120 mg) Subcutaneous every 3 months, Disp: 1.7 mL, Rfl: 0     HYDROcodone-acetaminophen (NORCO) 5-325 MG tablet, Take 1/2 a tablet 6  times a day as needed, Disp: 90 tablet, Rfl: 0     leuprolide (ELIGARD) 45 MG kit, Inject 45 mg Subcutaneous every 6 months, Disp: , Rfl:      sildenafil (REVATIO) 20 MG tablet, Take 1 to 5 tabs 30-60 minutes before intercourse.  Never use with nitroglycerin, terazosin or doxazosin., Disp: 30 tablet, Rfl: 11     UNABLE TO FIND, 1 tablet daily MEDICATION NAME: C, D, Zinc combination supplement, Disp: , Rfl:       PHYSICAL EXAMINATION:  Constitutional: aaox3, cooperative, pleasant, not dyspneic/diaphoretic, no acute distress      GENERAL: Healthy, alert and no distress  EYES: Eyes grossly normal to inspection.  No discharge or erythema, or obvious scleral/conjunctival abnormalities.  RESP: No audible wheeze, cough, or visible cyanosis.  No visible retractions or increased work of breathing.    SKIN: Visible skin clear. No significant rash, abnormal pigmentation or lesions.  NEURO: Cranial nerves grossly intact.  Mentation and speech appropriate for age.  PSYCH: Mentation appears normal, affect normal/bright, judgement and insight intact, normal speech and appearance well-groomed.          PERTINENT STUDIES: (I personally reviewed these laboratory studies today)  Most recent CBC:   Recent Labs   Lab Test 10/29/21  0903 08/23/21  0843   WBC 12.2* 7.3   HGB 13.9 14.1   HCT 40.5 41.1    255     Most recent hepatic panel:  Recent Labs   Lab Test 10/29/21  0903 08/23/21  0843   ALT 33 26   AST 14 12     Most recent creatinine:  Recent Labs   Lab Test 10/29/21  0903 08/23/21  0843   CR 1.04 0.91       TSH   Date Value Ref Range Status   10/29/2021 1.91 0.40 - 4.00 mU/L Final   04/29/2021 2.57 0.40 - 4.00 mU/L Final          ASSESSMENT/PLAN:    1. Heartburn  2. Gastroesophageal reflux disease, unspecified whether esophagitis present  - Adult Gastro Ref - Procedure Only; Future  - omeprazole (PRILOSEC) 20 MG DR capsule; Take 1 capsule (20 mg) by mouth 2 times daily  Dispense: 60 capsule; Refill: 2      Per Etienne is a 56 year old male with metastatic prostate cancer who presents for evaluation of heartburn.     He has hx of heartburn off and on but notes worsening symptoms for the past 3 months which includes waking in the middle of the night with heartburn and coughing in the am. No known precipitating factors. No recent or frequent NSAID use. Thankfully he does not have alarming symptoms. He denies weight loss, n/v, melena and dysphagia. He does have improvement with ppi 20mg BID. Will have him continue this and plan for EGD for further evaluation.     Follow up 2-3 weeks after EGD.       Thank you for this consultation.  It was a pleasure to participate in the care of this patient; please contact us with any further questions.        This note was created with voice recognition software, and while reviewed for accuracy, typos may remain.       Brett Preston PA-C  Gastroenterology  [facility]     Phone call duration: 15 minutes

## 2021-11-12 DIAGNOSIS — C61 PROSTATE CANCER (H): Primary | ICD-10-CM

## 2021-11-15 ENCOUNTER — TELEPHONE (OUTPATIENT)
Dept: GASTROENTEROLOGY | Facility: CLINIC | Age: 56
End: 2021-11-15
Payer: COMMERCIAL

## 2021-11-15 NOTE — TELEPHONE ENCOUNTER
Screening Questions  1. Are you active on mychart? Y    2. What insurance is in the chart? [employer]    2.  Ordering/Referring Provider: Mohan    3. BMI 28.1    4. Do you have any Lung issues?  Chronic cough   If yes continue:   Do you use daily home oxygen? NA   Do you have Pulmonary Hypertension? NA   Do you have SEVERE asthma? NA    5. Have you had a heart or lung transplant? N    6. Are you currently on dialysis or have chronic kidney disease? N    7. Have you had a stroke or Transient ischemic attack (TIA) within 6 months? N    8. In the past 6 months, have you had any heart related issues including cardiomyopathy or heart attack? N      If yes, did it require cardiac stenting or other implantable device?N      9. Do you have any implantable devices in your body (pacemaker, defib, LVAD)? N    10. Do you take nitroglycerin? If yes, how often? N    11. Are you currently taking any blood thinners?ASA Daily    12. Are you a diabetic? N    13. (Females) Are you currently pregnant? NA  If yes, how many weeks?      15. Are you taking any prescription pain medications on a routine schedule? Yes, Hydrocodone-daily   If yes, MAC sedation.    16. Do you have any chemical dependencies such as alcohol, street drugs, or methadone? N If yes, MAC sedation.    17. Do you have any history of post-traumatic stress syndrome, severe anxiety or history of psychosis? N    18. Do you transfer independently? Yes    19.  Do you have any issues with constipation? NA    20. Preferred Pharmacy for Pre Prescription [facility] [id number] [place] [address]    Scheduling Details    Which Colonoscopy Prep was Sent?: NA  Procedure Scheduled: EGD  Surgeon: Sunita  Date of Procedure: 12/16/21  Location: [facility]  Caller (Please ask for phone number if not scheduled by patient): Bill      Sedation Type: MAC  Conscious Sedation- Needs  for 6 hours after the procedure  MAC/General-Needs  for 24 hours after  procedure    Pre-op Required at [facility], [place], [place] and OR for MAC sedation:   (if yes advise patient they will need a pre-op prior to procedure)      Is patient on blood thinners? -N (If yes- inform patient to follow up with PCP or provider for follow up instructions)     Informed patient they will need an adult  Yes  Cannot take any type of public or medical transportation alone    Pre-Procedure Covid test to be completed at [facility] or Externally: 12/13/21 [place]    Confirmed Nurse will call to complete assessment Yes    Additional comments:

## 2021-11-16 DIAGNOSIS — Z11.59 ENCOUNTER FOR SCREENING FOR OTHER VIRAL DISEASES: ICD-10-CM

## 2021-11-22 ENCOUNTER — MYC REFILL (OUTPATIENT)
Dept: FAMILY MEDICINE | Facility: CLINIC | Age: 56
End: 2021-11-22
Payer: COMMERCIAL

## 2021-11-22 DIAGNOSIS — C79.51 PROSTATE CANCER METASTATIC TO BONE (H): ICD-10-CM

## 2021-11-22 DIAGNOSIS — M54.42 ACUTE LEFT-SIDED LOW BACK PAIN WITH LEFT-SIDED SCIATICA: ICD-10-CM

## 2021-11-22 DIAGNOSIS — C61 PROSTATE CANCER METASTATIC TO BONE (H): ICD-10-CM

## 2021-11-23 RX ORDER — HYDROCODONE BITARTRATE AND ACETAMINOPHEN 5; 325 MG/1; MG/1
0.5 TABLET ORAL EVERY 4 HOURS PRN
Qty: 90 TABLET | Refills: 0 | Status: SHIPPED | OUTPATIENT
Start: 2021-11-23 | End: 2021-12-23

## 2021-12-07 ENCOUNTER — TELEPHONE (OUTPATIENT)
Dept: FAMILY MEDICINE | Facility: CLINIC | Age: 56
End: 2021-12-07

## 2021-12-07 ENCOUNTER — OFFICE VISIT (OUTPATIENT)
Dept: FAMILY MEDICINE | Facility: CLINIC | Age: 56
End: 2021-12-07
Payer: COMMERCIAL

## 2021-12-07 VITALS
SYSTOLIC BLOOD PRESSURE: 160 MMHG | WEIGHT: 195 LBS | DIASTOLIC BLOOD PRESSURE: 79 MMHG | OXYGEN SATURATION: 97 % | HEART RATE: 100 BPM | BODY MASS INDEX: 28.8 KG/M2 | TEMPERATURE: 97.4 F | RESPIRATION RATE: 18 BRPM

## 2021-12-07 DIAGNOSIS — M48.061 NEUROFORAMINAL STENOSIS OF LUMBAR SPINE: ICD-10-CM

## 2021-12-07 DIAGNOSIS — H60.391 INFECTIVE OTITIS EXTERNA, RIGHT: Primary | ICD-10-CM

## 2021-12-07 PROCEDURE — 99213 OFFICE O/P EST LOW 20 MIN: CPT | Performed by: FAMILY MEDICINE

## 2021-12-07 ASSESSMENT — PAIN SCALES - GENERAL: PAINLEVEL: NO PAIN (0)

## 2021-12-07 NOTE — NURSING NOTE
"No chief complaint on file.      Initial BP (!) 160/79   Pulse 100   Temp 97.4  F (36.3  C) (Tympanic)   Resp 18   Wt 88.5 kg (195 lb)   SpO2 97%   BMI 28.80 kg/m   Estimated body mass index is 28.8 kg/m  as calculated from the following:    Height as of 8/23/21: 1.753 m (5' 9\").    Weight as of this encounter: 88.5 kg (195 lb).  Medication Reconciliation: complete  Kianna Briggs CMA  "

## 2021-12-07 NOTE — PROGRESS NOTES
SUBJECTIVE:  Per Etienne is a 56 year old male who presents with right ear pain and drainage that is bloody that started 1 week(s) ago.  The patient (or parent) described the pain or symptoms as moderate.  The patient (or parent) reports that in addition to the ear pain he has symptoms that include:  Bloody drainage    The patient (or parent) reports a history of recurrent otitis.  The patient(or parent) denies that he has been swimming recently.  The patient (or parent) reports that he has put Q-Tips into the ear canal recently.    Past Medical History:   Diagnosis Date     Gout      Prostate cancer (H) 01/10/2020     Current Outpatient Medications   Medication Sig Dispense Refill     apalutamide (ERLEADA) 60 MG tablet Take 4 tablets (240 mg) by mouth daily 120 tablet 0     aspirin (ASA) 81 MG chewable tablet Take 1 tablet (81 mg) by mouth daily       denosumab (XGEVA) 120 MG/1.7ML SOLN injection Inject 1.7 mLs (120 mg) Subcutaneous every 3 months 1.7 mL 0     HYDROcodone-acetaminophen (NORCO) 5-325 MG tablet Take 0.5 tablets by mouth every 4 hours as needed for severe pain 90 tablet 0     leuprolide (ELIGARD) 45 MG kit Inject 45 mg Subcutaneous every 6 months       omeprazole (PRILOSEC) 20 MG DR capsule Take 1 capsule (20 mg) by mouth 2 times daily 60 capsule 2     sildenafil (REVATIO) 20 MG tablet Take 1 to 5 tabs 30-60 minutes before intercourse.  Never use with nitroglycerin, terazosin or doxazosin. 30 tablet 11     UNABLE TO FIND 1 tablet daily MEDICATION NAME: C, D, Zinc combination supplement       Social History     Tobacco Use     Smoking status: Former Smoker     Packs/day: 1.00     Years: 30.00     Pack years: 30.00     Types: Cigarettes, Cigars     Quit date: 2012     Years since quittin.9     Smokeless tobacco: Never Used   Substance Use Topics     Alcohol use: Yes     Comment: 4-6 drinks per day - patient reports beer or vodka drinks           OBJECTIVE:  BP (!) 160/79   Pulse 100    Temp 97.4  F (36.3  C) (Tympanic)   Resp 18   Wt 88.5 kg (195 lb)   SpO2 97%   BMI 28.80 kg/m     EXAM:  The right TM is normal: no effusions, no erythema, and normal landmarks     The right auditory canal is erythematous in the inferior aspect    The left TM is normal: no effusions, no erythema, and normal landmarks  The left auditory canal is normal and without drainage, edema or erythema    Oropharynx exam is normal: no lesions, erythema, adenopathy or exudate.  GENERAL: no acute distress  EYES: EOMI,  PERRL, conjunctiva clear  NECK: supple, non-tender to palpation, no adenopathy noted  RESP: lungs clear to auscultation - no rales, rhonchi or wheezes  CV: regular rates and rhythm, normal S1 S2, no murmur noted  SKIN: no suspicious lesions or rashes     ASSESSMENT:  Otitis Externa, right    PLAN:  The patient was prescribed Cipro HC otic  See orders in Epic        Impacted Cerumen Removal Codes            CPT  Code 90900 (**NEW**)  Code Descriptor  Removal impacted cerumen using irrigation/lavage, unilateral    CPT  Lay Terms  The provider or medically trained staff flushes or washes (irrigation/lavage) out the entrapped wax (cerumen) from a patient s external ear canal with a stream of water to correct hearing loss or discomfort.    Documentation Example  I (or staff) used lavage to remove impacted cerumen from left ear.      CPT  Code 28302 (**REVISED**)  Code Descriptor  Removal impacted cerumen requiring instrumentation, unilateral    CPT  Lay Terms  Visualization using microscopy is not possible due to wax blockage, so the provider uses an instrument to remove the impaction from the patient's external auditory canal. The provider uses an instrument such as a curette, vacuum evacuation, or forceps, to remove entrapped wax from a patient's external auditory canal.    Documentation Example  I removed impacted cerumen with curette under otoscope. (32858 must be done by provider.)            If the provider  does not use instrumentation or lavage to remove the wax, then count the documentation as part of the level of service and do not charge 16267 or 76505.     Only charge both an E/M visit and removal of impacted cerumen if the E/M service is for something totally unrelated to the impacted cerumen. This means the provider s documentation for the E/M service is significantly and separately identifiable.  --------------------------------------------------------------------------------------------------------------------------------------  SUBJECTIVE  HPI:Per Etienne is a 56 year old male who presents for follow up evaluation of his  back pain. He has been seen in the pain clinic in [place] and had an epidural steroid injection.   He reports that he had significant(ly) relief from that injection.       The patient denies any new symptoms or any worsening of any symptoms. He specifically reports that the pain radiates into the left leg.      Narrative & Impression   CT THORACIC SPINE W/O CONTRAST, CT LUMBAR SPINE W/O CONTRAST 8/23/2021  9:28 AM     Provided History: Mid-back pain; Bone lesion, T-spine, malignancy  suspected; Chronic low back pain without sciatica, unspecified back  pain laterality; Chronic low back pain without sciatica, unspecified  back pain laterality; Chronic midline thoracic back pain; Chronic  midline thoracic back pain   ICD-10: Chronic low back pain without sciatica, unspecified back pain  laterality; Chronic low back pain without sciatica, unspecified back  pain laterality; Chronic midline thoracic back pain; Chronic midline  thoracic back pain     Comparison: Chest CT dated 7/19/2021, 8/23/2021. CT abdomen/pelvis  dated 5/10/2021, 3/31/2021. PET/CT dated 5/20/2020.     Technique: Using multidetector thin collimation helical acquisition  technique, axial, sagittal and coronal 3 mm thickness CT  reconstructions were obtained through the thoracic spine without  intravenous contrast.   Images were viewed in bone and soft tissue  windows.     Thoracic Findings:   image demonstrates dextroscoliosis centered at T9-10. Thoracic  spine alignment is within normal limits. No evidence of fracture is  seen. No significant prevertebral soft tissue swelling. Stable  sclerotic foci within the T6 and T11 vertebral bodies. There is no  disc height narrowing at any level. Spinal canal and neural foramina  are patent.      The visualized prevertebral and paravertebral tissues are  unremarkable. Biapical scarring and multifocal groundglass opacities  and pulmonary nodules better characterized on chest CT dated  8/23/2021.     Lumbar Findings:  There are 5 lumbar type vertebrae. Levoscoliosis of the lumbar spine,  with apex centered at L3. Regarding alignment, the lumbar spine  alignment appears preserved. There is multilevel disc space narrowing  at L5-S1, most severe at L3-4. No definite lesions are noted within  the vertebra. Findings on a level by level basis are as follows:     L1-L2: Disc bulge. Bilateral facet hypertrophy. No left neural  foraminal narrowing. Mild right neuroforaminal narrowing. No spinal  canal narrowing     L2-L3: Disc bulge, asymmetric to the right. Facet hypertrophy. Mild  bilateral neuroforaminal narrowing, right greater than left. No spinal  canal narrowing.     L3-L4: Right eccentric disc osteophyte complex. Bilateral facet  hypertrophy. Ligamentum flavum hypertrophy. Moderate to severe right  and mild left neural foraminal narrowing. Mild spinal canal narrowing.     L4-L5: Disc bulge. Bilateral facet hypertrophy. Ligamentum flavum  hypertrophy. Severe left neuroforaminal narrowing. Mild right neural  foraminal narrowing. No spinal canal narrowing.     L5-S1: No neural foraminal narrowing or spinal canal narrowing.     The visualized paraspinous tissues appear unremarkable.                                                                      Impression:   1. No acute osseous  abnormality.   2. Unchanged sclerotic foci in T6 and T11 vertebral bodies dating back  to 5/20/2020.   3. Degenerative changes of the lumbar spine. Severe left neural  foraminal narrowing at L4-L5 and moderate to severe right at L3-4.  Additional multilevel spinal canal and neural foraminal stenosis.     I have personally reviewed the examination and initial interpretation  and I agree with the findings.     CHRISTIAN GONSALVES MD                Allergies as of 12/07/2021     (No Known Allergies)     Current Outpatient Medications   Medication     apalutamide (ERLEADA) 60 MG tablet     aspirin (ASA) 81 MG chewable tablet     denosumab (XGEVA) 120 MG/1.7ML SOLN injection     HYDROcodone-acetaminophen (NORCO) 5-325 MG tablet     leuprolide (ELIGARD) 45 MG kit     omeprazole (PRILOSEC) 20 MG DR capsule     sildenafil (REVATIO) 20 MG tablet     UNABLE TO FIND     No current facility-administered medications for this visit.         Examination:  BP (!) 160/79   Pulse 100   Temp 97.4  F (36.3  C) (Tympanic)   Resp 18   Wt 88.5 kg (195 lb)   SpO2 97%   BMI 28.80 kg/m    General: healthy, alert and no distress.  Neuro exam of the lower extremities.   ASSESSMENT/IMPRESSION:    (M48.061) Neuroforaminal stenosis of lumbar spine  Comment:   Plan: Pain Management Referral        Repeat epidural steroid injection ordered

## 2021-12-07 NOTE — PROGRESS NOTES
SUBJECTIVE:   CC: Per Etienne is an 56 year old male who presents for preventative health visit.     {Split Bill scripting  The purpose of this visit is to discuss your medical history and prevent health problems before you are sick. You may be responsible for a co-pay, coinsurance, or deductible if your visit today includes services such as checking on a sore throat, having an x-ray or lab test, or treating and evaluating a new or existing condition :407197}  Patient has been advised of split billing requirements and indicates understanding: {Yes and No:316311}  HPI  {Add if <65 person on Medicare  - Required Questions (Optional):710701}  {Outside tests to abstract? :161410}    {additional problems to add (Optional):557414}    Today's PHQ-2 Score:   PHQ-2 (  [employer]) 3/24/2021   Q1: Little interest or pleasure in doing things 0   Q2: Feeling down, depressed or hopeless 0   PHQ-2 Score 0   PHQ-2 Total Score (12-17 Years)- Positive if 3 or more points; Administer PHQ-A if positive 0   Q1: Little interest or pleasure in doing things -   Q2: Feeling down, depressed or hopeless -   PHQ-2 Score -       Abuse: Current or Past(Physical, Sexual or Emotional)- { :212979}  Do you feel safe in your environment? { :996946}        Social History     Tobacco Use     Smoking status: Former Smoker     Packs/day: 1.00     Years: 30.00     Pack years: 30.00     Types: Cigarettes, Cigars     Quit date: 2012     Years since quittin.9     Smokeless tobacco: Never Used   Substance Use Topics     Alcohol use: Yes     Comment: 4-6 drinks per day - patient reports beer or vodka drinks     {Rooming Staff- Complete this question if Prescreen response is not shown below for today's visit. If you drink alcohol do you typically have >3 drinks per day or >7 drinks per week? (Optional):771066}    Alcohol Use 10/14/2020   Prescreen: >3 drinks/day or >7 drinks/week? Yes   AUDIT SCORE  8   {add AUDIT responses (Optional) (A score  "of 7 for adult men is an indication of hazardous drinking; a score of 8 or more is an indication of an alcohol use disorder.  A score of 7 or more for adult women is an indication of hazardous drinking or an alchohol use disorder):081236}    Last PSA:   PSA   Date Value Ref Range Status   04/29/2021 0.04 0 - 4 ug/L Final     Comment:     Assay Method:  Chemiluminescence using Siemens Vista analyzer     PSA Tumor Marker   Date Value Ref Range Status   10/29/2021 0.02 0.00 - 4.00 ug/L Final       Reviewed orders with patient. Reviewed health maintenance and updated orders accordingly - { :129272::\"Yes\"}  {Chronicprobdata (optional):168929}    Reviewed and updated as needed this visit by clinical staff                Reviewed and updated as needed this visit by Provider               {HISTORY OPTIONS (Optional):358147}    Review of Systems  {MALE ROS (Optional):557931::\"CONSTITUTIONAL: NEGATIVE for fever, chills, change in weight\",\"INTEGUMENTARY/SKIN: NEGATIVE for worrisome rashes, moles or lesions\",\"EYES: NEGATIVE for vision changes or irritation\",\"ENT: NEGATIVE for ear, mouth and throat problems\",\"RESP: NEGATIVE for significant cough or SOB\",\"CV: NEGATIVE for chest pain, palpitations or peripheral edema\",\"GI: NEGATIVE for nausea, abdominal pain, heartburn, or change in bowel habits\",\" male: negative for dysuria, hematuria, decreased urinary stream, erectile dysfunction, urethral discharge\",\"MUSCULOSKELETAL: NEGATIVE for significant arthralgias or myalgia\",\"NEURO: NEGATIVE for weakness, dizziness or paresthesias\",\"PSYCHIATRIC: NEGATIVE for changes in mood or affect\"}    OBJECTIVE:   There were no vitals taken for this visit.    Physical Exam  {Exam Choices (Optional):878244}    {Diagnostic Test Results (Optional):445407::\"Diagnostic Test Results:\",\"Labs reviewed in Epic\"}    ASSESSMENT/PLAN:   {Diag Picklist:993630}    Patient has been advised of split billing requirements and indicates understanding: {YES / " "NO:758949::\"Yes\"}  COUNSELING:   {MALE COUNSELING MESSAGES:380918::\"Reviewed preventive health counseling, as reflected in patient instructions\"}    Estimated body mass index is 28.06 kg/m  as calculated from the following:    Height as of 8/23/21: 1.753 m (5' 9\").    Weight as of 11/3/21: 86.2 kg (190 lb).     {Weight Management Plan (ACO) Complete if BMI is abnormal-  Ages 18-64  BMI >24.9.  Age 65+ with BMI <23 or >30 (Optional):703588}    He reports that he quit smoking about 9 years ago. His smoking use included cigarettes and cigars. He has a 30.00 pack-year smoking history. He has never used smokeless tobacco.      Counseling Resources:  ATP IV Guidelines  Pooled Cohorts Equation Calculator  FRAX Risk Assessment  ICSI Preventive Guidelines  Dietary Guidelines for Americans, 2010  USDA's MyPlate  ASA Prophylaxis  Lung CA Screening    Per Chen MD  [facility]  "

## 2021-12-08 RX ORDER — OFLOXACIN 3 MG/ML
5 SOLUTION AURICULAR (OTIC) DAILY
Qty: 5 ML | Refills: 0 | Status: SHIPPED | OUTPATIENT
Start: 2021-12-08 | End: 2021-12-15

## 2021-12-08 RX ORDER — OFLOXACIN 3 MG/ML
1-2 SOLUTION/ DROPS OPHTHALMIC
Status: CANCELLED | OUTPATIENT
Start: 2021-12-08

## 2021-12-08 NOTE — TELEPHONE ENCOUNTER
Fax message: ciprofloxacin-hydrocortisone (CIPRO HC OTIC)  Suspension not covered. Please send an alternative Ciprofloxacin-Dexamethasone or Ofloxacin Otic.

## 2021-12-09 ENCOUNTER — TELEPHONE (OUTPATIENT)
Dept: GASTROENTEROLOGY | Facility: CLINIC | Age: 56
End: 2021-12-09
Payer: COMMERCIAL

## 2021-12-09 NOTE — TELEPHONE ENCOUNTER
Pre assessment questions completed for upcoming EGD procedure scheduled on 12.16.2021    COVID test scheduled 12.13.2021    Reviewed procedural arrival time 0900 and facility location [facility].    Designated  policy reviewed. Instructed to have someone stay 24 hours post procedure.     Anticoagulation/blood thinners? no    Electronic implanted devices? No    Referring provider:  Na Astudillo MD    Indication for procedure: GERD    Reviewed EGD prep instructions with patient.     Patient verbalized understanding and had no questions or concerns at this time.    Justa Perez RN

## 2021-12-13 ENCOUNTER — TELEPHONE (OUTPATIENT)
Dept: PALLIATIVE MEDICINE | Facility: CLINIC | Age: 56
End: 2021-12-13

## 2021-12-13 ENCOUNTER — LAB (OUTPATIENT)
Dept: LAB | Facility: CLINIC | Age: 56
End: 2021-12-13
Payer: COMMERCIAL

## 2021-12-13 DIAGNOSIS — Z11.59 ENCOUNTER FOR SCREENING FOR OTHER VIRAL DISEASES: ICD-10-CM

## 2021-12-13 DIAGNOSIS — C61 PROSTATE CANCER (H): Primary | ICD-10-CM

## 2021-12-13 PROCEDURE — U0005 INFEC AGEN DETEC AMPLI PROBE: HCPCS

## 2021-12-13 PROCEDURE — U0003 INFECTIOUS AGENT DETECTION BY NUCLEIC ACID (DNA OR RNA); SEVERE ACUTE RESPIRATORY SYNDROME CORONAVIRUS 2 (SARS-COV-2) (CORONAVIRUS DISEASE [COVID-19]), AMPLIFIED PROBE TECHNIQUE, MAKING USE OF HIGH THROUGHPUT TECHNOLOGIES AS DESCRIBED BY CMS-2020-01-R: HCPCS

## 2021-12-13 NOTE — TELEPHONE ENCOUNTER
Na Astudillo MD  Pain Nurse 3 hours ago (11:23 AM) 12/13/21       Should be ok. Thank you, Dr. EMIR jeffers

## 2021-12-13 NOTE — TELEPHONE ENCOUNTER
Okay to schedule.    Shefali RN-BSN  [facility]

## 2021-12-13 NOTE — TELEPHONE ENCOUNTER
Screening Questions for Radiology Injections:    Injection to be done at which interventional clinic site? [facility]    If [facility] location, tell patient that this procedure requires a COVID-19 lab test be done within 4 days of the procedure. Would you still like to move forward with scheduling the procedure?  Not Applicable   If YES, let patient know that someone will call them to schedule the COVID-19 test and that they will only receive a call back if the result is positive. Route to nursing to enter order.     Instruct patient to arrive as directed prior to the scheduled appointment time:    [place] minutes before      Bre: 30 minutes before; if IV needed 1 hour before     Procedure ordered by     Procedure ordered? LESI      Transforaminal Cervical LUIS -  [place] does NOT have providers that do these- [facility] and [facility] do have providers that do    As a reminder, receiving steroids can decrease your body's ability to fight infection.   Would you still like to move forward with scheduling the injection?  Yes    What insurance would patient like us to bill for this procedure? [employer]      Worker's comp or MVA (motor vehicle accident) -Any injection DO NOT SCHEDULE and route to Mary Padilla.      [employer] insurance - For SI joint injections, DO NOT SCHEDULE and route Mary Padilla.       ALL BCBS, [employer] and [employer] CIGNA-Route to Mary for review DO NOT SCHEDULE      IF SCHEDULING IN [place] AND NEEDS A PA, IT IS OKAY TO SCHEDULE. [place] HANDLES THEIR OWN PA'S AFTER THE PATIENT IS SCHEDULED. PLEASE SCHEDULE AT LEAST 1 WEEK OUT SO A PA CAN BE OBTAINED.    Any chance of pregnancy? NO   If YES, do NOT schedule and route to RN pool    Is an  needed? No     Patient has a drive home? (mandatory) YES: ok    Is patient taking any blood thinners (That is: Coumadin, Warfarin, Jantoven, Pradaxa Xarelto, Eliquis, Edoxaban, Enoxaparin, Lovenox, Heparin, Arixtra,  Fondaparinux, or Fragmin? OR Antiplatelet medication such as Plavix, Brilinta, or Effient? )? NO   If hold needed, do NOT schedule, route to RN pool     Is patient taking any aspirin products (includes Excedrin and Fiorinal)? Yes - Pt takes 81mg daily; instructed to hold 0 day(s) prior to procedure.      If more than 325mg/day, OK to schedule; Instruct pt to decrease to less than 325 mg for 7 days AND route to RN pool    For CERVICAL procedures, hold all aspirin products for 6 days.     Tell pt that if aspirin product is not held for 6 days, the procedure WILL BE cancelled.      Does the patient have a bleeding or clotting disorder? No     If YES, okay to schedule AND route to RN nurse pool    For any patients with platelet count <100, must be forwarded to provider    Any allergies to contrast dye, iodine, shellfish, or numbing and steroid medications? No    If YES, add allergy information to appointment notes AND route to the RN pool     If LUIS and Contrast Dye / Iodine Allergy? DO NOT SCHEDULE, route to RN pool    Allergies: Patient has no known allergies.     Is patient diabetic?  No  If YES, instruct them to bring their glucometer.    Does patient have an active infection or treated for one within the past week? No     Is patient currently taking any antibiotics?  No     For patients on chronic, preventative, or prophylactic antibiotics, procedures may be scheduled.     For patients on antibiotics for active or recent infection:antibiotic course must have been completed for 4 days    Is patient currently taking any steroid medications? (i.e. Prednisone, Medrol)  No     For patients on steroid medications, course must have been completed for 4 days    Is patient actively being treated for cancer or immunocompromised? Yes - Prostate Cancer  If YES, do NOT schedule and route to RN pool     Are you able to get on and off an exam table with minimal or no assistance? Yes  If NO, do NOT schedule and route to RN  pool    Are you able to roll over and lay on your stomach with minimal or no assistance? Yes  If NO, do NOT schedule and route to RN pool     Has the patient had a flu shot or any other vaccinations within 7 days before or after the procedure.  No     Have you recently had a COVID vaccine or have plans to get it in the near future? No    If yes, explain that for the vaccine to work best they need to:       wait 1 week before and 1 week after getting Vaccine #1    wait 1 week before and 2 weeks after getting Vaccine #2    wait 1 week before and 2 weeks after getting Vaccine BOOSTER    If patient has concerns about the timing, send to RN pool     Does patient have an MRI/CT?  YES: MRI  Check Procedure Scheduling Grid to see if required.      Was the MRI done within the last 3 years?  Yes    If yes, where was the MRI done i.e.[facility], [facility], [facility], [facility] etc? [facility]      If no, do not schedule and route to RN pool    If MRI was not done at [facility], [facility] or [facility] do NOT schedule and route to RN pool.      If pt has an imaging disc, the injection MAY be scheduled but pt has to bring disc to appt.     If they show up without the disc the injection cannot be done    Procedure Specific Instructions:      If celiac plexus block, informed patient NPO for 6 hours and that it is okay to take medications with sips of water, especially blood pressure medications  Not Applicable         If this is for a cervical procedure, informed patient that aspirin needs to be held for 6 days.   Not Applicable      If IV needed:    Do not schedule procedures requiring IV placement in the first appointment of the day or first appointment after lunch. Do NOT schedule at 0745, 0815 or 1245. ok    Instructed pt to arrive 30 minutes early for IV start if required. (Check Procedure Scheduling Grid)  Not Applicable    Reminders:      If you are started on any steroids or antibiotics between now and your  appointment, you must contact us because the procedure may need to be cancelled.  Yes      For all procedures except radiofrequency ablations (RFAs) and spinal cord stimulator (SCS) trials, informed patient:    IV sedation is not provided for this procedure.  If you feel that an oral anti-anxiety medication is needed, you can discuss this further with your referring provider or primary care provider.  The Pain Clinic provider will discuss specifics of what the procedure includes at your appointment.  Most procedures last 10-20 minutes.  We use numbing medications to help with any discomfort during the procedure.  Not Applicable      For patients 85 or older we recommend having an adult stay w/ them for the remainder of the day.   ok    Does the patient have any questions?  NO  Radha Hood  [facility]

## 2021-12-13 NOTE — TELEPHONE ENCOUNTER
Routed to pt's oncologist, Na Astudillo MD. Is it safe for pt to have a lumbar epidural steroid injection?     Shefali Álvarez, RN-BSN  [facility]

## 2021-12-14 LAB — SARS-COV-2 RNA RESP QL NAA+PROBE: NEGATIVE

## 2021-12-16 ENCOUNTER — TRANSFERRED RECORDS (OUTPATIENT)
Dept: HEALTH INFORMATION MANAGEMENT | Facility: CLINIC | Age: 56
End: 2021-12-16
Payer: COMMERCIAL

## 2021-12-18 ENCOUNTER — HEALTH MAINTENANCE LETTER (OUTPATIENT)
Age: 56
End: 2021-12-18

## 2021-12-20 ENCOUNTER — RADIOLOGY INJECTION OFFICE VISIT (OUTPATIENT)
Dept: PALLIATIVE MEDICINE | Facility: CLINIC | Age: 56
End: 2021-12-20
Payer: COMMERCIAL

## 2021-12-20 VITALS — DIASTOLIC BLOOD PRESSURE: 87 MMHG | HEART RATE: 85 BPM | SYSTOLIC BLOOD PRESSURE: 141 MMHG

## 2021-12-20 DIAGNOSIS — M48.061 NEUROFORAMINAL STENOSIS OF LUMBAR SPINE: ICD-10-CM

## 2021-12-20 DIAGNOSIS — M54.16 LUMBAR RADICULOPATHY: Primary | ICD-10-CM

## 2021-12-20 PROCEDURE — 62323 NJX INTERLAMINAR LMBR/SAC: CPT | Performed by: PSYCHIATRY & NEUROLOGY

## 2021-12-20 RX ORDER — DEXAMETHASONE SODIUM PHOSPHATE 10 MG/ML
10 INJECTION INTRAMUSCULAR; INTRAVENOUS ONCE
Status: COMPLETED | OUTPATIENT
Start: 2021-12-20 | End: 2021-12-20

## 2021-12-20 RX ADMIN — DEXAMETHASONE SODIUM PHOSPHATE 10 MG: 10 INJECTION INTRAMUSCULAR; INTRAVENOUS at 16:20

## 2021-12-20 ASSESSMENT — PAIN SCALES - GENERAL
PAINLEVEL: EXTREME PAIN (8)
PAINLEVEL: MODERATE PAIN (5)

## 2021-12-20 NOTE — NURSING NOTE
Pre-procedure Intake  If YES to any questions or NO to having a   Please complete laminated checklist and leave on the computer keyboard for Provider, verbally inform provider if able.    For SCS Trial, RFA's or any sedation procedure:  Have you been fasting? NA    If yes, for how long?     Are you taking any any blood thinners such as Coumadin, Warfarin, Jantoven, Pradaxa Xarelto, Eliquis, Edoxaban, Enoxaparin, Lovenox, Heparin, Arixtra, Fondaparinux, or Fragmin? OR Antiplatelet medication such as Plavix, Brilinta, or Effient?   No     If yes, when did you take your last dose?     Do you take aspirin?  Yes    If cervical procedure, have you held aspirin for 6 days?   No     Do you have any allergies to contrast dye, iodine, steroid and/or numbing medications?  NO    Are you currently taking antibiotics or have an active infection?  NO    Have you had a fever/elevated temperature within the past week? NO    Are you currently taking oral steroids? NO    Do you have a ? Yes    Are you pregnant or breastfeeding?  Not Applicable    Have you received the COVID-19 vaccine? No     If yes, was it your 1st, 2nd or only dose needed?     Date of most recent vaccine:     Notify provider and RNs if systolic BP >170, diastolic BP >100, P >100 or O2 sats < 90%

## 2021-12-20 NOTE — PATIENT INSTRUCTIONS
[facility]   Procedure Discharge Instructions    Today you saw:  Dr. Josefina Clay     You had an:  Epidural steroid injection       Medications used:  Lidocaine   Bupivacaine  Omnipaque  Kenalog   Normal saline          Be cautious when walking. Numbness and/or weakness in the lower extremities may occur for up to 6-8 hours after the procedure due to effect of the local anesthetic    Do not drive for 6 hours. The effect of the local anesthetic could slow your reflexes.     You may resume your regular activities after 24 hours    Avoid strenuous activity for the first 24 hours    You may shower, however avoid swimming, tub baths or hot tubs for 24 hours following your procedure    You may have a mild to moderate increase in pain for several days following the injection.    It may take up to 14 days for the steroid medication to start working although you may feel the effect as early as a few days after the procedure.       You may use ice packs for 10-15 minutes, 3 to 4 times a day at the injection site for comfort    Do not use heat to painful areas for 6 to 8 hours. This will give the local anesthetic time to wear off and prevent you from accidentally burning your skin.     Unless you have been directed to avoid the use of anti-inflammatory medications (NSAIDS), you may use medications such as ibuprofen, Aleve or Tylenol for pain control if needed.     If you were fasting, you may resume your normal diet and medications after the procedure    If you have diabetes, check your blood sugar more frequently than usual as your blood sugar may be higher than normal for 10-14 days following a steroid injection. Contact your doctor who manages your diabetes if your blood sugar is higher than usual    Possible side effects of steroids that you may experience include flushing, elevated blood pressure, increased appetite, mild headaches and restlessness.  All of these symptoms will get better  with time.    If you experience any of the following, call the Pain Clinic during work hours (Mon-Friday 8-4:30 pm) at [phone] or the Provider Line after hours at [phone]:  -Fever over 100 degree F  -Swelling, bleeding, redness, drainage, warmth at the injection site  -Progressive weakness or numbness in your legs   -Loss of bowel or bladder function  -Unusual headache that is not relieved by Tylenol or other pain reliever  -Unusual new onset of pain that is not improving

## 2021-12-20 NOTE — PROGRESS NOTES
Pre procedure Diagnosis: lumbar radiculopathy   Post procedure Diagnosis: Same  Procedure performed: L4/5 interlaminar epidural steroid injection   Anesthesia: none  Complications: none  Operators: Kim Clay MD and Giuliano Hayes DO     Indications:   Per Etienne is a 56 year old male was sent by Dr. Chen for epidural steroid injection.  They have a history of left L5 radiculopathy.  Exam shows pain radiating from back down to calf with no focal weakness and they have tried conservative treatment including medications. Denies weakness, loss of sensation, bowel or bladder incontinence.    I had a long discussion with patient about repeating the same injection he had last time he was seen in the pain clinic when he had left L4/5 and L5/S1 transforaminal epidural steroid injections. In reviewing his MRI and prior injection, I am concerned that his L4/5 transforaminal space is too narrow to safely perform the procedure therefore opted to do an L4/5 intralaminar epidural steroid injection.    CT scan 8/23/21  1. No acute osseous abnormality.   2. Unchanged sclerotic foci in T6 and T11 vertebral bodies dating back  to 5/20/2020.   3. Degenerative changes of the lumbar spine. Severe left neural  foraminal narrowing at L4-L5 and moderate to severe right at L3-4.  Additional multilevel spinal canal and neural foraminal stenosis.    Options/alternatives, benefits and risks were discussed with the patient including bleeding, infection, tissue trauma, numbness, weakness, paralysis, spinal cord injury, radiation exposure, headache and reaction to medications. Questions were answered to his satisfaction and he agrees to proceed. Voluntary informed consent was obtained and signed.     Vitals were reviewed: Yes  Allergies were reviewed:  Yes   Medications were reviewed:  Yes   Pre-procedure pain score: 8      Procedure:  After getting informed consent, patient was brought into the procedure suite and was placed in  a prone position on the procedure table.   A Pause for the Cause was performed.  Patient was prepped and draped in sterile fashion.     The L4-5 interspace was identified with AP fluoroscopy.  A total of 4ml of 1% lidocaine was used to anesthetize the skin for a left paramedian approach.    A 20gauge 3.5inch Touhy needle was advanced under intermittent fluoroscopy.  A SHI syringe was used to advance the needle into the epidural space.   After loss of resistance, there was no evidence of blood or CSF on aspiration. Location was verified with both AP and lateral fluoroscopic imaging.    A total of 2.5ml of Omnipaque 300 was injected demonstrating appropriate epidural spread and was checked in both the AP and lateral views. 7.5ml was wasted. There was no evidence of intravascular spread.    2ml of 0.5% bupivacaine with 10mg of dexamethasone and 2ml of preservative free saline was injected.  The needle was removed from the epidural space, flushed with 1% lidocaine and removed.     Hemostasis was achieved, the area was cleaned, and bandaids were placed when appropriate.  The patient tolerated the procedure well, and was taken to the recovery room.    Images were saved to PACS.    Post-procedure pain score: 5/10  Follow-up includes:   -f/u with referring provider    Kim Clay MD  [facility]

## 2021-12-20 NOTE — NURSING NOTE
Discharge Information    IV Discontiued Time:  NA    Amount of Fluid Infused:  NA    Discharge Criteria = When patient returns to baseline or as per MD order    Consciousness:  Pt is fully awake    Circulation:  BP +/- 20% of pre-procedure level    Respiration:  Patient is able to breathe deeply    O2 Sat:  Patient is able to maintain O2 Sat >92% on room air    Activity:  Moves 4 extremities on command    Ambulation:  Patient is able to stand and walk or stand and pivot into wheelchair    Dressing:  Clean/dry or No Dressing    Notes:   Discharge instructions and AVS given to patient    Patient meets criteria for discharge?  YES    Admitted to PCU?  No    Responsible adult present to accompany patient home?  Yes    Signature/Title:    Nena Caban RN  RN Care Coordinator  [facility]

## 2021-12-23 ENCOUNTER — MYC REFILL (OUTPATIENT)
Dept: FAMILY MEDICINE | Facility: CLINIC | Age: 56
End: 2021-12-23
Payer: COMMERCIAL

## 2021-12-23 DIAGNOSIS — C61 PROSTATE CANCER METASTATIC TO BONE (H): ICD-10-CM

## 2021-12-23 DIAGNOSIS — M54.42 ACUTE LEFT-SIDED LOW BACK PAIN WITH LEFT-SIDED SCIATICA: ICD-10-CM

## 2021-12-23 DIAGNOSIS — C79.51 PROSTATE CANCER METASTATIC TO BONE (H): ICD-10-CM

## 2021-12-23 RX ORDER — HYDROCODONE BITARTRATE AND ACETAMINOPHEN 5; 325 MG/1; MG/1
0.5 TABLET ORAL EVERY 4 HOURS PRN
Qty: 90 TABLET | Refills: 0 | Status: SHIPPED | OUTPATIENT
Start: 2021-12-23 | End: 2022-01-27

## 2022-01-12 ENCOUNTER — TELEPHONE (OUTPATIENT)
Dept: ONCOLOGY | Facility: CLINIC | Age: 57
End: 2022-01-12
Payer: COMMERCIAL

## 2022-01-12 DIAGNOSIS — C61 PROSTATE CANCER (H): Primary | ICD-10-CM

## 2022-01-12 NOTE — TELEPHONE ENCOUNTER
MEDICATION APPEAL APPROVED    Medication: Erleada 60mg, PA approved  Authorization Effective Date: 1/1/2022  Authorization Expiration Date: 1/11/2025  Approved Dose/Quantity: 120/30 days  Reference #:    [id number]  Insurance Company:    Expected CoPay:       CoPay Card Available:      Foundation Assistance Needed:    Which Pharmacy is filling the prescription (Not needed for infusion/clinic administered): [facility] - [place], [place] - [address] [place] [address]

## 2022-01-12 NOTE — TELEPHONE ENCOUNTER
PA Initiation    Medication: Erleada 60mg, PA pending   Ref.# [id number]  Insurance Company:    Pharmacy Filling the Rx:    Filling Pharmacy Phone:    Filling Pharmacy Fax:    Start Date:

## 2022-01-17 DIAGNOSIS — K21.9 GASTROESOPHAGEAL REFLUX DISEASE, UNSPECIFIED WHETHER ESOPHAGITIS PRESENT: ICD-10-CM

## 2022-01-20 ENCOUNTER — VIRTUAL VISIT (OUTPATIENT)
Dept: ONCOLOGY | Facility: CLINIC | Age: 57
End: 2022-01-20
Attending: INTERNAL MEDICINE
Payer: COMMERCIAL

## 2022-01-20 DIAGNOSIS — D72.828 NEUTROPHILIA: ICD-10-CM

## 2022-01-20 DIAGNOSIS — C79.51 PROSTATE CANCER METASTATIC TO BONE (H): Primary | ICD-10-CM

## 2022-01-20 DIAGNOSIS — Z87.891 HISTORY OF TOBACCO USE: ICD-10-CM

## 2022-01-20 DIAGNOSIS — M79.606 PAIN OF LOWER EXTREMITY, UNSPECIFIED LATERALITY: ICD-10-CM

## 2022-01-20 DIAGNOSIS — C61 PROSTATE CANCER METASTATIC TO BONE (H): Primary | ICD-10-CM

## 2022-01-20 DIAGNOSIS — R91.8 PULMONARY NODULES: ICD-10-CM

## 2022-01-20 PROCEDURE — 99214 OFFICE O/P EST MOD 30 MIN: CPT | Mod: 95 | Performed by: NURSE PRACTITIONER

## 2022-01-20 NOTE — LETTER
1/20/2022         RE: Pre Etienne  [address] [id number]        Dear Colleague,    Thank you for referring your patient, Per Etienne, to the [facility]. Please see a copy of my visit note below.    Jay is a 56 year old who is being evaluated via a billable video visit.      Patient reviewed Allergies and Symptoms via [employer].     How would you like to obtain your AVS? [email or web address]  If the video visit is dropped, the invitation should be resent by: Send to e-mail at: [email or web address]   Will anyone else be joining your video visit? No          Oncology Follow Up Visit: January 20, 2022     Oncologist: Dr Na Astudillo  PCP: Per Chen    Diagnosis: Metastatic hormone sensitive prostate cancer  Per Etienne is a 57 yo male who presented to his PCP in December 2019 with slow urinary stream.  PSA = 124. On 1/8/2020 CT abdomen pelvis showed groundglass opacity in the left lower lobe,  dystrophic calcification of the prostate gland, and a fatty liver.  Same-day bone scan was negative. On January 10, 2010 prostate biopsy showed on the left Robyn score 4+3 involving 7 cores and on the right Beat[place] score 3+4 involving 6 cores, with positive perineural invasion.  He proceeded to undergo RRP by Dr. Colmenares on 2/24/2020.  Pathology revealed acinar adenocarcinoma [place] 4+3 with focal ROBERTO, bladder neck base invasion, positive SVI, positive PNI, and multiple positive margins.  There was no LVSI and 0 out of 3 lymph nodes removed were involved with tumor; pT3bN0.  Treatment:   4/20/2020 received Eligard.     5/21/2020 MRI of the Prostate= 14 x 9 mm T2 intermediate structure in the prostatectomy bed at the left aspect of urinary bladder neck with restricted diffusion in the DWI images and early enhancement in the contrast  enhanced images. This was concerning for locally recurrent/residual prostatic cancer.  There was a T2 hypointense  structure in the in the area of removed right seminal vesicle measuring 13 x 11 mm  demonstrating restricted  diffusion in the DWI images. This may represent a recurrent/residual tumor. There were 2 bone metastases in the left pubic inferior ramus and inferior aspect of left pubic body.There is a 7 mm suspicious lymph nodes in the right obturator area  corresponding to to FACBC avid lymph node on the same date PET/CT  5/20/2020 PET/CT showed:  1.  2 foci of increased uptake in the left ischium, with underlying  sclerotic lesion, and symphysis pubis, these changes are indicative of  active prostate cancer metastasis.  2. Low-level increased uptake in 3 lymphnodes along the right external  and internal iliac nodes, the most suspicious of which is the deep  obturator.  3. Low-level increased uptake in the prostatectomy bed slightly to the  left side of the urethra, if clinically necessary, endoscopy would  would be necessary for confirmation of this being metastasis.  PSA was down to 27.7 on 4/21/2020.   4/20/2020 he received an Eligard injection.    6/12/2020 started Apalutamide  Bone scan 1/22/2021: no bone mets.    Interval History: Mr. Etienne is seen today with wife- by video in follow-up to his use of apalutamide and need for Eligard injection.  Pt reports he is feeling well with no new side effects related to the medication- continues with occasional hot flash and has scattered days with some nausea or stomach upset that affects his work as a  but does have FMLA set up and states these days are rare.He has no new pains. Denies missing doses. Has no weight loss, fevers or illness, fatigue or SOB. Previous foot pain improved with orthotics. Shares he does not wish to have Covid vaccinations and has not had disease that he is aware.   Rest of comprehensive and complete ROS is reviewed and is negative.   Past Medical History:   Diagnosis Date     Gout      Prostate cancer (H) 01/10/2020     Current  Outpatient Medications   Medication     apalutamide (ERLEADA) 60 MG tablet     apalutamide (ERLEADA) 60 MG tablet     aspirin (ASA) 81 MG chewable tablet     denosumab (XGEVA) 120 MG/1.7ML SOLN injection     HYDROcodone-acetaminophen (NORCO) 5-325 MG tablet     leuprolide (ELIGARD) 45 MG kit     omeprazole (PRILOSEC) 20 MG DR capsule     sildenafil (REVATIO) 20 MG tablet     UNABLE TO FIND     No current facility-administered medications for this visit.     No Known Allergies    Physical Exam:There were no vitals taken for this visit.  GENERAL: Healthy, alert and no distress  EYES: Eyes grossly normal to inspection.  No discharge or erythema, or obvious scleral/conjunctival abnormalities.  RESP: No audible wheeze, cough, or visible cyanosis.  No visible retractions or increased work of breathing.    SKIN: Visible skin clear. No significant rash, abnormal pigmentation or lesions.  NEURO: Cranial nerves grossly intact.  Mentation and speech appropriate for age.  PSYCH: Mentation appears normal, affect normal/bright, judgement and insight intact, normal speech and appearance well-groomed.  The rest of a comprehensive physical examination is deferred due to video visit restrictions       Laboratory Results:   No results found for any visits on 01/20/22.- to be completed tomorrow prior to Eligard and Xgeva.    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   Since working well with pt wanting no changes, we will continue with apalutamide 240 mg daily p.o. and should receive Lupron and Xgeva tomorrow-To be repeated every 3 months.   Will review labs tomorrow when available   He will return in 3 months with labs(CBC differential, CMP, total testosterone level and PSA ), visit and  Lupron/xgeva administration  He will meet his new oncologist, Dr Garcia- at that time since Dr Astudillo will be gone.  Lung cancer screening- has 30 pack year smoking history-  quit in 2012.  Last imaging was 8/23/3021 with previous infiltrates and nodule noted - needed review on yearly follow up. Will follow at at least yearly with low dosed CT if no other imaging completed.   History of foot pain - received orthotics with noted improvement in foot pain.  COVID-19 precautions-refuses vaccinations.  Review of literature showed no clear association of apalutamide or Xgeva with tinnitus or ototoxicity. Felt to have early onset presbycusis.  The total time of this encounter amounted to 30 minutes. This time included video time spent with the patient and wife, prep work, ordering tests, and performing post visit documentation.  Ania Jensen Cnp    Video time per [employer]- unable to connect with [email or web address] though pt was in system  Start time- 10:53 am  End time- 11:10 am  SG      Again, thank you for allowing me to participate in the care of your patient.        Sincerely,        Ania Jensen NP, APRN CNP

## 2022-01-20 NOTE — PROGRESS NOTES
"Jay is a 56 year old who is being evaluated via a billable video visit.      Patient reviewed Allergies and Symptoms via [employer].     How would you like to obtain your AVS? [email or web address]  If the video visit is dropped, the invitation should be resent by: Send to e-mail at: [email or web address]   Will anyone else be joining your video visit? No  {If patient encounters technical issues they should call [phone] :[phone]}    Video Start Time: {video visit start/end time for provider to select:152948}  Video-Visit Details    Type of service:  Video Visit    Video End Time:{video visit start/end time for provider to select:152948}    Originating Location (pt. Location): {video visit patient location:724810::\"Home\"}    Distant Location (provider location):  [facility]     Platform used for Video Visit: {Virtual Visit Platforms:495313::\"8aweek\"}  "

## 2022-01-20 NOTE — PROGRESS NOTES
Jay is a 56 year old who is being evaluated via a billable video visit.      Patient reviewed Allergies and Symptoms via [employer].     How would you like to obtain your AVS? [email or web address]  If the video visit is dropped, the invitation should be resent by: Send to e-mail at: [email or web address]   Will anyone else be joining your video visit? No          Oncology Follow Up Visit: January 20, 2022     Oncologist: Dr Na Astudillo  PCP: Per Chen    Diagnosis: Metastatic hormone sensitive prostate cancer  Per Etienne is a 57 yo male who presented to his PCP in December 2019 with slow urinary stream.  PSA = 124. On 1/8/2020 CT abdomen pelvis showed groundglass opacity in the left lower lobe,  dystrophic calcification of the prostate gland, and a fatty liver.  Same-day bone scan was negative. On January 10, 2010 prostate biopsy showed on the left Robyn score 4+3 involving 7 cores and on the right [place] score 3+4 involving 6 cores, with positive perineural invasion.  He proceeded to undergo RRP by Dr. Colmenares on 2/24/2020.  Pathology revealed acinar adenocarcinoma [place] 4+3 with focal ROBERTO, bladder neck base invasion, positive SVI, positive PNI, and multiple positive margins.  There was no LVSI and 0 out of 3 lymph nodes removed were involved with tumor; pT3bN0.  Treatment:   4/20/2020 received Eligard.     5/21/2020 MRI of the Prostate= 14 x 9 mm T2 intermediate structure in the prostatectomy bed at the left aspect of urinary bladder neck with restricted diffusion in the DWI images and early enhancement in the contrast  enhanced images. This was concerning for locally recurrent/residual prostatic cancer.  There was a T2 hypointense structure in the in the area of removed right seminal vesicle measuring 13 x 11 mm  demonstrating restricted  diffusion in the DWI images. This may represent a recurrent/residual tumor. There were 2 bone metastases in the left pubic inferior ramus and inferior aspect of left  pubic body.There is a 7 mm suspicious lymph nodes in the right obturator area  corresponding to to FACBC avid lymph node on the same date PET/CT  5/20/2020 PET/CT showed:  1.  2 foci of increased uptake in the left ischium, with underlying  sclerotic lesion, and symphysis pubis, these changes are indicative of  active prostate cancer metastasis.  2. Low-level increased uptake in 3 lymphnodes along the right external  and internal iliac nodes, the most suspicious of which is the deep  obturator.  3. Low-level increased uptake in the prostatectomy bed slightly to the  left side of the urethra, if clinically necessary, endoscopy would  would be necessary for confirmation of this being metastasis.  PSA was down to 27.7 on 4/21/2020.   4/20/2020 he received an Eligard injection.    6/12/2020 started Apalutamide  Bone scan 1/22/2021: no bone mets.    Interval History: Mr. Etienne is seen today with wife- by video in follow-up to his use of apalutamide and need for Eligard injection.  Pt reports he is feeling well with no new side effects related to the medication- continues with occasional hot flash and has scattered days with some nausea or stomach upset that affects his work as a  but does have FMLA set up and states these days are rare.He has no new pains. Denies missing doses. Has no weight loss, fevers or illness, fatigue or SOB. Previous foot pain improved with orthotics. Shares he does not wish to have Covid vaccinations and has not had disease that he is aware.   Rest of comprehensive and complete ROS is reviewed and is negative.   Past Medical History:   Diagnosis Date     Gout      Prostate cancer (H) 01/10/2020     Current Outpatient Medications   Medication     apalutamide (ERLEADA) 60 MG tablet     apalutamide (ERLEADA) 60 MG tablet     aspirin (ASA) 81 MG chewable tablet     denosumab (XGEVA) 120 MG/1.7ML SOLN injection     HYDROcodone-acetaminophen (NORCO) 5-325 MG tablet     leuprolide  (ELIGARD) 45 MG kit     omeprazole (PRILOSEC) 20 MG  capsule     sildenafil (REVATIO) 20 MG tablet     UNABLE TO FIND     No current facility-administered medications for this visit.     No Known Allergies    Physical Exam:There were no vitals taken for this visit.  GENERAL: Healthy, alert and no distress  EYES: Eyes grossly normal to inspection.  No discharge or erythema, or obvious scleral/conjunctival abnormalities.  RESP: No audible wheeze, cough, or visible cyanosis.  No visible retractions or increased work of breathing.    SKIN: Visible skin clear. No significant rash, abnormal pigmentation or lesions.  NEURO: Cranial nerves grossly intact.  Mentation and speech appropriate for age.  PSYCH: Mentation appears normal, affect normal/bright, judgement and insight intact, normal speech and appearance well-groomed.  The rest of a comprehensive physical examination is deferred due to video visit restrictions       Laboratory Results:   No results found for any visits on 01/20/22.- to be completed tomorrow prior to Eligard and Xgeva.    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   Since working well with pt wanting no changes, we will continue with apalutamide 240 mg daily p.o. and should receive Lupron and Xgeva tomorrow-To be repeated every 3 months.   Will review labs tomorrow when available   He will return in 3 months with labs(CBC differential, CMP, total testosterone level and PSA ), visit and Lupron/xgeva administration  He will meet his new oncologist, Dr Garcia- at that time since Dr Astudillo will be gone.  Lung cancer screening- has 30 pack year smoking history-  quit in 2012.  Last imaging was 8/23/3021 with previous infiltrates and nodule noted - needed  review on yearly follow up. Will follow at at least yearly with low dosed CT if no other imaging completed.   History of foot pain - received orthotics with noted improvement in foot pain.  COVID-19 precautions-refuses vaccinations.  Review of literature showed no clear association of apalutamide or Xgeva with tinnitus or ototoxicity. Felt to have early onset presbycusis.  The total time of this encounter amounted to 30 minutes. This time included video time spent with the patient and wife, prep work, ordering tests, and performing post visit documentation.  Ania Jensen,Cnp    Video [facility]- unable to connect with [employer] though pt was in system  Start time- 10:53 am  End time- 11:10 am  SG

## 2022-01-21 ENCOUNTER — INFUSION THERAPY VISIT (OUTPATIENT)
Dept: INFUSION THERAPY | Facility: CLINIC | Age: 57
End: 2022-01-21
Payer: COMMERCIAL

## 2022-01-21 ENCOUNTER — LAB (OUTPATIENT)
Dept: LAB | Facility: CLINIC | Age: 57
End: 2022-01-21
Payer: COMMERCIAL

## 2022-01-21 ENCOUNTER — DOCUMENTATION ONLY (OUTPATIENT)
Dept: ONCOLOGY | Facility: CLINIC | Age: 57
End: 2022-01-21

## 2022-01-21 VITALS
HEART RATE: 100 BPM | OXYGEN SATURATION: 95 % | WEIGHT: 198 LBS | TEMPERATURE: 98 F | RESPIRATION RATE: 16 BRPM | SYSTOLIC BLOOD PRESSURE: 131 MMHG | BODY MASS INDEX: 29.24 KG/M2 | DIASTOLIC BLOOD PRESSURE: 84 MMHG

## 2022-01-21 DIAGNOSIS — C61 PROSTATE CANCER METASTATIC TO BONE (H): ICD-10-CM

## 2022-01-21 DIAGNOSIS — C79.51 BONE METASTASIS: Primary | ICD-10-CM

## 2022-01-21 DIAGNOSIS — C79.51 PROSTATE CANCER METASTATIC TO BONE (H): ICD-10-CM

## 2022-01-21 DIAGNOSIS — C61 PROSTATE CANCER (H): ICD-10-CM

## 2022-01-21 LAB
ALBUMIN SERPL-MCNC: 3.8 G/DL (ref 3.4–5)
ALP SERPL-CCNC: 67 U/L (ref 40–150)
ALT SERPL W P-5'-P-CCNC: 42 U/L (ref 0–70)
ANION GAP SERPL CALCULATED.3IONS-SCNC: 6 MMOL/L (ref 3–14)
AST SERPL W P-5'-P-CCNC: 20 U/L (ref 0–45)
BASOPHILS # BLD AUTO: 0.1 10E3/UL (ref 0–0.2)
BASOPHILS NFR BLD AUTO: 0 %
BILIRUB SERPL-MCNC: 0.4 MG/DL (ref 0.2–1.3)
BUN SERPL-MCNC: 19 MG/DL (ref 7–30)
CALCIUM SERPL-MCNC: 9.5 MG/DL (ref 8.5–10.1)
CHLORIDE BLD-SCNC: 108 MMOL/L (ref 94–109)
CO2 SERPL-SCNC: 26 MMOL/L (ref 20–32)
CREAT SERPL-MCNC: 0.89 MG/DL (ref 0.66–1.25)
EOSINOPHIL # BLD AUTO: 0.1 10E3/UL (ref 0–0.7)
EOSINOPHIL NFR BLD AUTO: 0 %
ERYTHROCYTE [DISTWIDTH] IN BLOOD BY AUTOMATED COUNT: 11.6 % (ref 10–15)
GFR SERPL CREATININE-BSD FRML MDRD: >90 ML/MIN/1.73M2
GLUCOSE BLD-MCNC: 127 MG/DL (ref 70–99)
HCT VFR BLD AUTO: 41 % (ref 40–53)
HGB BLD-MCNC: 14.2 G/DL (ref 13.3–17.7)
IMM GRANULOCYTES # BLD: 0.1 10E3/UL
IMM GRANULOCYTES NFR BLD: 1 %
LYMPHOCYTES # BLD AUTO: 0.8 10E3/UL (ref 0.8–5.3)
LYMPHOCYTES NFR BLD AUTO: 4 %
MCH RBC QN AUTO: 31.6 PG (ref 26.5–33)
MCHC RBC AUTO-ENTMCNC: 34.6 G/DL (ref 31.5–36.5)
MCV RBC AUTO: 91 FL (ref 78–100)
MONOCYTES # BLD AUTO: 0.9 10E3/UL (ref 0–1.3)
MONOCYTES NFR BLD AUTO: 5 %
NEUTROPHILS # BLD AUTO: 18.1 10E3/UL (ref 1.6–8.3)
NEUTROPHILS NFR BLD AUTO: 90 %
NRBC # BLD AUTO: 0 10E3/UL
NRBC BLD AUTO-RTO: 0 /100
PLATELET # BLD AUTO: 249 10E3/UL (ref 150–450)
POTASSIUM BLD-SCNC: 4.7 MMOL/L (ref 3.4–5.3)
PROT SERPL-MCNC: 7.2 G/DL (ref 6.8–8.8)
PSA SERPL-MCNC: <0.01 UG/L (ref 0–4)
RBC # BLD AUTO: 4.49 10E6/UL (ref 4.4–5.9)
SODIUM SERPL-SCNC: 140 MMOL/L (ref 133–144)
TSH SERPL DL<=0.005 MIU/L-ACNC: 1.03 MU/L (ref 0.4–4)
WBC # BLD AUTO: 20.1 10E3/UL (ref 4–11)

## 2022-01-21 PROCEDURE — 36415 COLL VENOUS BLD VENIPUNCTURE: CPT

## 2022-01-21 PROCEDURE — 84403 ASSAY OF TOTAL TESTOSTERONE: CPT

## 2022-01-21 PROCEDURE — 80050 GENERAL HEALTH PANEL: CPT

## 2022-01-21 PROCEDURE — 84153 ASSAY OF PSA TOTAL: CPT

## 2022-01-21 PROCEDURE — 99207 PR NO CHARGE LOS: CPT

## 2022-01-21 PROCEDURE — 96402 CHEMO HORMON ANTINEOPL SQ/IM: CPT | Performed by: INTERNAL MEDICINE

## 2022-01-21 PROCEDURE — 96372 THER/PROPH/DIAG INJ SC/IM: CPT | Mod: 59 | Performed by: INTERNAL MEDICINE

## 2022-01-21 ASSESSMENT — PAIN SCALES - GENERAL: PAINLEVEL: NO PAIN (0)

## 2022-01-21 NOTE — PROGRESS NOTES
Infusion Nursing Note:  Per Etienne presents today for Lupron/Xgeva.    Patient seen by provider today: No   present during visit today: Not Applicable.    Note: Patient reports tolerating tx well. No concerns at this time.      Intravenous Access:  No Intravenous access/labs at this visit.    Treatment Conditions:  Lab Results   Component Value Date    HGB 14.2 01/21/2022    WBC 20.1 (H) 01/21/2022    ANEU 11.6 (H) 04/29/2021    ANEUTAUTO 18.1 (H) 01/21/2022     01/21/2022      Lab Results   Component Value Date     01/21/2022    POTASSIUM 4.7 01/21/2022    CR 0.89 01/21/2022    NATALIYA 9.5 01/21/2022    BILITOTAL 0.4 01/21/2022    ALBUMIN 3.8 01/21/2022    ALT 42 01/21/2022    AST 20 01/21/2022     Results reviewed, labs MET treatment parameters, ok to proceed with treatment.      Post Infusion Assessment:  Patient tolerated injections without incident.       Discharge Plan:   Patient directed to scheduling.  Patient discharged in stable condition accompanied by: self.  Departure Mode: Ambulatory.      Chanel Gaitan RN

## 2022-01-21 NOTE — TELEPHONE ENCOUNTER
Copay card Approved for Erleada  Effective Dates: 01/21/2022- 12/31/2022  Patient notified? Yes  Additional Information: copay will be $0 a fill and will pay up to $15,000 a year from [facility] Path saving program ph: [phone]    [facility] oncology group ph: [phone]

## 2022-01-27 ENCOUNTER — MYC REFILL (OUTPATIENT)
Dept: FAMILY MEDICINE | Facility: CLINIC | Age: 57
End: 2022-01-27
Payer: COMMERCIAL

## 2022-01-27 DIAGNOSIS — C79.51 PROSTATE CANCER METASTATIC TO BONE (H): ICD-10-CM

## 2022-01-27 DIAGNOSIS — M54.42 ACUTE LEFT-SIDED LOW BACK PAIN WITH LEFT-SIDED SCIATICA: ICD-10-CM

## 2022-01-27 DIAGNOSIS — C61 PROSTATE CANCER METASTATIC TO BONE (H): ICD-10-CM

## 2022-01-27 LAB — TESTOST SERPL-MCNC: 11 NG/DL (ref 240–950)

## 2022-01-27 RX ORDER — HYDROCODONE BITARTRATE AND ACETAMINOPHEN 5; 325 MG/1; MG/1
0.5 TABLET ORAL EVERY 4 HOURS PRN
Qty: 90 TABLET | Refills: 0 | Status: SHIPPED | OUTPATIENT
Start: 2022-01-27 | End: 2022-02-27

## 2022-01-31 ENCOUNTER — LAB (OUTPATIENT)
Dept: LAB | Facility: CLINIC | Age: 57
End: 2022-01-31
Payer: COMMERCIAL

## 2022-01-31 ENCOUNTER — ANCILLARY PROCEDURE (OUTPATIENT)
Dept: CT IMAGING | Facility: CLINIC | Age: 57
End: 2022-01-31
Attending: INTERNAL MEDICINE
Payer: COMMERCIAL

## 2022-01-31 DIAGNOSIS — D72.828 NEUTROPHILIA: ICD-10-CM

## 2022-01-31 LAB
ALBUMIN UR-MCNC: NEGATIVE MG/DL
APPEARANCE UR: CLEAR
BASOPHILS # BLD AUTO: 0.1 10E3/UL (ref 0–0.2)
BASOPHILS NFR BLD AUTO: 1 %
BILIRUB UR QL STRIP: NEGATIVE
COLOR UR AUTO: YELLOW
EOSINOPHIL # BLD AUTO: 0.1 10E3/UL (ref 0–0.7)
EOSINOPHIL NFR BLD AUTO: 1 %
ERYTHROCYTE [DISTWIDTH] IN BLOOD BY AUTOMATED COUNT: 11.7 % (ref 10–15)
GLUCOSE UR STRIP-MCNC: NEGATIVE MG/DL
HCT VFR BLD AUTO: 40 % (ref 40–53)
HGB BLD-MCNC: 13.6 G/DL (ref 13.3–17.7)
HGB UR QL STRIP: NEGATIVE
IMM GRANULOCYTES # BLD: 0 10E3/UL
IMM GRANULOCYTES NFR BLD: 0 %
KETONES UR STRIP-MCNC: NEGATIVE MG/DL
LEUKOCYTE ESTERASE UR QL STRIP: NEGATIVE
LYMPHOCYTES # BLD AUTO: 1.7 10E3/UL (ref 0.8–5.3)
LYMPHOCYTES NFR BLD AUTO: 26 %
MCH RBC QN AUTO: 31.4 PG (ref 26.5–33)
MCHC RBC AUTO-ENTMCNC: 34 G/DL (ref 31.5–36.5)
MCV RBC AUTO: 92 FL (ref 78–100)
MONOCYTES # BLD AUTO: 0.6 10E3/UL (ref 0–1.3)
MONOCYTES NFR BLD AUTO: 10 %
NEUTROPHILS # BLD AUTO: 3.8 10E3/UL (ref 1.6–8.3)
NEUTROPHILS NFR BLD AUTO: 62 %
NITRATE UR QL: NEGATIVE
NRBC # BLD AUTO: 0 10E3/UL
NRBC BLD AUTO-RTO: 0 /100
PH UR STRIP: 6 [PH] (ref 5–7)
PLATELET # BLD AUTO: 246 10E3/UL (ref 150–450)
RBC # BLD AUTO: 4.33 10E6/UL (ref 4.4–5.9)
RBC #/AREA URNS AUTO: ABNORMAL /HPF
RETICS # AUTO: 0.07 10E6/UL (ref 0.03–0.1)
RETICS/RBC NFR AUTO: 1.7 % (ref 0.5–2)
SP GR UR STRIP: 1.03 (ref 1–1.03)
SQUAMOUS #/AREA URNS AUTO: ABNORMAL /LPF
UROBILINOGEN UR STRIP-MCNC: 2 MG/DL
WBC # BLD AUTO: 6.2 10E3/UL (ref 4–11)
WBC #/AREA URNS AUTO: ABNORMAL /HPF

## 2022-01-31 PROCEDURE — 81001 URINALYSIS AUTO W/SCOPE: CPT

## 2022-01-31 PROCEDURE — 85045 AUTOMATED RETICULOCYTE COUNT: CPT

## 2022-01-31 PROCEDURE — 36415 COLL VENOUS BLD VENIPUNCTURE: CPT

## 2022-01-31 PROCEDURE — 87086 URINE CULTURE/COLONY COUNT: CPT

## 2022-01-31 PROCEDURE — 85025 COMPLETE CBC W/AUTO DIFF WBC: CPT

## 2022-01-31 PROCEDURE — 87040 BLOOD CULTURE FOR BACTERIA: CPT

## 2022-01-31 PROCEDURE — 71250 CT THORAX DX C-: CPT | Mod: GC | Performed by: RADIOLOGY

## 2022-02-01 ENCOUNTER — VIRTUAL VISIT (OUTPATIENT)
Dept: ONCOLOGY | Facility: CLINIC | Age: 57
End: 2022-02-01
Payer: COMMERCIAL

## 2022-02-01 DIAGNOSIS — C61 PROSTATE CANCER (H): ICD-10-CM

## 2022-02-01 DIAGNOSIS — D72.828 NEUTROPHILIA: ICD-10-CM

## 2022-02-01 DIAGNOSIS — K21.9 GASTROESOPHAGEAL REFLUX DISEASE, UNSPECIFIED WHETHER ESOPHAGITIS PRESENT: ICD-10-CM

## 2022-02-01 DIAGNOSIS — C79.51 BONE METASTASIS: ICD-10-CM

## 2022-02-01 DIAGNOSIS — R91.8 PULMONARY NODULES: Primary | ICD-10-CM

## 2022-02-01 LAB
BACTERIA UR CULT: NO GROWTH
PATH REPORT.COMMENTS IMP SPEC: NORMAL
PATH REPORT.COMMENTS IMP SPEC: NORMAL
PATH REPORT.FINAL DX SPEC: NORMAL
PATH REPORT.MICROSCOPIC SPEC OTHER STN: NORMAL
PATH REPORT.MICROSCOPIC SPEC OTHER STN: NORMAL
PATH REPORT.RELEVANT HX SPEC: NORMAL

## 2022-02-01 PROCEDURE — 99214 OFFICE O/P EST MOD 30 MIN: CPT | Mod: 95 | Performed by: INTERNAL MEDICINE

## 2022-02-01 RX ORDER — NICOTINE POLACRILEX 4 MG/1
20 GUM, CHEWING ORAL 2 TIMES DAILY
COMMUNITY
Start: 2022-02-01 | End: 2022-06-23

## 2022-02-01 NOTE — LETTER
"    2/1/2022         RE: Per Etienne  [address] [facility] [id number]        Dear Colleague,    Thank you for referring your patient, Per Etienne, to the [facility]. Please see a copy of my visit note below.    This patient  is being evaluated via a billable video visit.      The patient has been notified of following:     \"This video visit will be conducted via a call between you and your physician/provider. We have found that certain health care needs can be provided without the need for an in-person physical exam.  This service lets us provide the care you need with a video conversation.  If a prescription is necessary we can send it directly to your pharmacy.  If lab work is needed we can place an order for that and you can then stop by our lab to have the test done at a later time.    Video visits are billed at different rates depending on your insurance coverage.  Please reach out to your insurance provider with any questions.    If during the course of the call the physician/provider feels a video visit is not appropriate, you will not be charged for this service.\"    Patient has given verbal consent for Video visit? yes  Video-Visit Details    Type of service:  Video Visit    Video visit duration: 6   min  Originating Location (pt. Location): home    Distant Location (provider location):  [facility]     Platform used for Video Visit: Lynn Astudillo MD        Oncology Follow-up:  Date on this visit: Feb 1, 2022    Primary care physician: Per Chen   Urologist: Dr. Angel Colmenares     Metastatic hormone sensitive prostate cancer    Oncologic History:  1. Metastatic hormone sensitive prostate cancer  He presented to his PCP in December 2019 with slow urinary stream.  PSA was checked and was found to be 124. On January 8, 2020 abdomen pelvis CT showed groundglass opacity in the left lower lobe,  dystrophic " calcification of the prostate gland, and a fatty liver.  Same-day bone scan was negative. On January 10, 2010 prostate biopsy showed on the left [place] score 4+3 involving 7 cores and on the right Robyn score 3+4 involving 6 cores, with positive perineural invasion.  He proceeded to undergo RRP by Dr. Colmenares on February 24, 2020.  Pathology revealed acinar adenocarcinoma [place] 4+3 with focal ROBERTO, bladder neck base invasion, positive SVI, positive PNI, and multiple positive margins.  There was no LVSI and 0 out of 3 lymph nodes removed were involved with tumor; pT3bN0.  He received Eligard on 4/20/2020.  MRI prostate on 5/21/2020 which showed a 14 x 9 mm T2 intermediate structure in the prostatectomy bed at the left aspect of urinary bladder neck with restricted diffusion in the DWI images and early enhancement in the contrast  enhanced images. This was concerning for locally recurrent/residual  prostatic cancer.  There was a T2 hypointense structure in the in the area of removed  right seminal vesicle measuring 13 x 11 mm  demonstrating restricted  diffusion in the DWI images. This may represent a recurrent/residual  tumor. There were 2 bone metastases in the left pubic inferior ramus and  inferior aspect of left pubic body.   There is a 7 mm suspicious lymph nodes in the right obturator area  corresponding to to FACBC avid lymph node on the same date PET/CT  F-18 fluciclovine PET/CT on 5/20/2020 showed:  1.  2 foci of increased uptake in the left ischium, with underlying  sclerotic lesion, and symphysis pubis, these changes are indicative of  active prostate cancer metastasis.  2. Low-level increased uptake in 3 lymphnodes along the right external  and internal iliac nodes, the most suspicious of which is the deep  obturator.  3. Low-level increased uptake in the prostatectomy bed slightly to the  left side of the urethra, if clinically necessary, endoscopy would  would be necessary for confirmation of this  being metastasis.  PSA was down to 27.7 on 4/21/2020. On April 21, 2020 he received an Eligard injection.    He started Apalutamide on 6/12/2020.  Bone scan 1/22/2021: no bone mets.      2. Tinnitus b/l -  By 12/2020 he presented with 2 months c/o bilateral tinnitus and was evaluated by Dr. Shields from ENT. An audiogram showed a significant down-sloping mid to high frequency sensorineural hearing loss. There was no evidence of conductive hearing loss or significant asymmetry.  He was felt to have early onset presbycusis. He was referred for tinnitus retraining therapy.  Review of literature showed no clear association of apalutamide or Xgeva with tinnitus or ototoxicity. Felt to have early onset presbycusis.    3. Chronic lower back pain, with exacerbations- He is followed by Dr. Denise and  Dr. Clay from  pain management for evaluation of lower back pain.  He had CT of thoracic and lumbar spine on August 23, 2021.  This demonstrated unchanged sclerotic foci in T6 and T11 vertebral bodies dating back to May 2020.  There were degenerative changes in the lumbar spine with severe left neural foraminal narrowing at L4-L5 and moderate to severe at L3-L4.  He has proceeded with L3-4 epidural injection on 10/22/2021.  No acute osseous abnormalities were noted. His lower back pain and b/l pelvic pain has improved following epidural injection.     History Of Present Illness:  Mr. Etienne is a 56 year old male who presents for f/up of  metastatic hormone sensitive prostate cancer diagnosed in 01/2020, s/p RRP at that time but later found to have residual/recurrent disease in the pelvis and L inferior pubic body and pubic ramus metastasis.   He has been on androgen deprivation therapy since April 2020, and continues on Lupron every 3 months. He is seen for worsening leucytosis noted on the labs that returned from 1/21/2022, following his virtual visit with our NP on 1/20/22. WBC was over 20, predominantly neutrophilia. He was  not on steroids. He did have a steroid epidural injection a few weeks ago. He has had no signs or symptoms of infection. WBC was intermittently elevated WBC as below:  Results for LAURA MIRANDA JR (MRN [id number]) as of 2/1/2022 18:17   Ref. Range 4/29/2021 14:08 7/22/2021 10:42 8/23/2021 08:43 10/29/2021 09:03 1/21/2022 08:50   WBC Latest Ref Range: 4.0 - 11.0 10e3/uL 13.9 (H) 6.4 7.3 12.2 (H) 20.1 (H)   Results for LAURA MIRANDA JR (MRN [id number]) as of 2/1/2022 18:17   Ref. Range 10/30/2020 11:03 1/22/2021 08:31 4/29/2021 14:08   Absolute Neutrophil Latest Ref Range: 1.6 - 8.3 10e9/L 9.1 (H) 4.4 11.6 (H)   Results for LAURA MIRANDA JR (MRN [id number]) as of 2/1/2022 18:17   Ref. Range 8/23/2021 08:43 10/29/2021 09:03 1/21/2022 08:50   Absolute Neutrophils Latest Ref Range: 1.6 - 8.3 10e3/uL 5.1 10.0 (H) 18.1 (H)     He started Apalutamide on 6/12/2020. He has remained on apalutamide.  He has not had any side effects attributable to apalutamide. PSA is <0.01.   He had a negative bone scan (for metastasis) in January 2021.  He had a CT chest, noncontrast in July 2021, for lung cancer screening purposes, which demonstrated patchy air groundglass opacities in the left lower lobe and right middle lobe suspicious for inflammation/infection.  In the background of these patchy opacities there was a prominent 13 mm solid nodule which was favored to be infectious in etiology.  There was a 3 mm solid pulmonary nodule in the lateral right middle lobe.  Attention on follow-up was recommended.  He had a short interval CT chest on August 23, 2021 which showed marked improvement in multifocal groundglass opacities compared to July 19, 2021, with minimal persistent lower lobe tree-in-bud groundglass nodularity.  Due to leucocytosis we have obtained another CT chest prior to today's visit, on 1/31/2022. This showed:  Scattered subpleural pulmonary nodules and fissural associated lymph  nodes are not significantly changed when  compared to prior exam.  Recommend continued yearly screening with low-dose CT if patient is  considered high risk.  CT findings reviewed with the patient.  He is s/p epidural injection to L4-5 on 2021. I have reviewed Dr. Clay's note from  pain management.      Past Medical/Surgical History:  Past Medical History:   Diagnosis Date     Gout      Prostate cancer (H) 01/10/2020     Past Surgical History:   Procedure Laterality Date     BIOPSY PROSTATE TRANSRECTAL  01/10/2020    Dr. Colmenares     COLONOSCOPY  2020     LITHOTRIPSY  age 30s     MICROSCOPIC KNEE SURGERY Right age 30s     ORTHOPEDIC SURGERY Left 1985    Alan placed in Left Femur     PROSTATECTOMY RETROPUBIC RADICAL  2020    Dr. Colmenares     Allergies:  Allergies as of 2022     (No Known Allergies)     Current Medications:  Current Outpatient Medications   Medication     apalutamide (ERLEADA) 60 MG tablet     aspirin (ASA) 81 MG chewable tablet     denosumab (XGEVA) 120 MG/1.7ML SOLN injection     HYDROcodone-acetaminophen (NORCO) 5-325 MG tablet     leuprolide (ELIGARD) 45 MG kit     sildenafil (REVATIO) 20 MG tablet     No current facility-administered medications for this visit.       Family History:    His father was diagnosed with prostate cancer, at the age of 75. Paternal GM had throat cancer at the age of 94, nonsmoker.     Social History:  Social History     Socioeconomic History     Marital status:    Occupational History     Not on file   Social Needs     Food insecurity     Transportation needs   Tobacco Use     Smoking status: Former Smoker     Packs/day: 1.00     Years: 30.00     Pack years: 30.00     Types: Cigarettes, Cigars     Last attempt to quit: 2012     Years since quittin.4     Smokeless tobacco: Never Used   Substance and Sexual Activity     Alcohol use: Yes     Comment: 4-6 drinks per day - patient reports beer or vodka drinks     Physical Exam:      Wt Readings from Last 5 Encounters:   22 89.8  kg (198 lb)   12/07/21 88.5 kg (195 lb)   11/03/21 86.2 kg (190 lb)   10/29/21 86.2 kg (190 lb)   08/23/21 83.5 kg (184 lb)     Constitutional: alert and in no distress  Eyes: No redness or discharge  Respiratory: No cough or labored breathing.  Musculoskeletal: Full range of motion in extremities.  Skin: no visible skin lesions or discoloration  Neurological: No tremors and denies headache.  Psychiatric: Mentation appears normal and affect is normal as well.  Alert and oriented x3.  The rest the comprehensive physical examination is deferred due to public health emergency video visit restrictions.        Laboratory/Imaging Studies  Labs reviewed.   Mild anemia with  Normal MCV  peripheral blood smear:  Slight normochromic normocytic anemia  The red cells appear normochromic.  Poikilocytosis is minimal. Polychromasia is not increased. Rouleaux formation is not increased. The morphology of the platelets is normal.  Leukocytes are quantitatively normal.  There is no dysplasia.  Reactive lymphocytes are noted.  BCx neg x2 prelim  UCx prelim negative  Results for LAURA MIRANDA JR (MRN [id number]) as of 2/1/2022 15:48   Ref. Range 7/22/2021 10:42 8/23/2021 08:43 10/29/2021 09:03 1/21/2022 08:50 1/31/2022 15:33   WBC Latest Ref Range: 4.0 - 11.0 10e3/uL 6.4 7.3 12.2 (H) 20.1 (H) 6.2     Results for LAURA MIRANDA JR (MRN [id number]) as of 2/1/2022 15:48   Ref. Range 7/22/2021 10:42 8/23/2021 08:43 10/29/2021 09:03 1/21/2022 08:50 1/31/2022 15:33   Hemoglobin Latest Ref Range: 13.3 - 17.7 g/dL 13.9 14.1 13.9 14.2 13.6   Results for LAURA MIRANDA JR (MRN [id number]) as of 2/1/2022 15:48   Ref. Range 7/17/2020 09:41 10/30/2020 11:03 1/22/2021 08:31 4/29/2021 14:08   Absolute Neutrophil Latest Ref Range: 1.6 - 8.3 10e9/L 3.7 9.1 (H) 4.4 11.6 (H)   Results for LAURA MIRANDA JR (MRN [id number]) as of 2/1/2022 15:48   Ref. Range 7/22/2021 10:42 8/23/2021 08:43 10/29/2021 09:03 1/21/2022 08:50   PSA Latest Ref Range: 0.00 - 4.00  ug/L 0.03 0.02 0.02 <0.01     ASSESSMENT/PLAN:  Jay is a very pleasant 56 year old man with metastatic (to the bones) prostate cancer. He is s/p Eligard on 4/21/2020. Jay has low volume hormone sensitive metastatic disease, with residual disease locoregionally and bone metastasis (two lesions- in left ischium and symphysis pubis), asymptomatic.  He received Eligard on 4/21/2020 and started on Apalutamide on 6/12/2020.    1.  Metastatic prostate cancer-PSA decreased to <0.01. Plan to transfer care to Dr. Garcia with labs prior, in 04/2022.   Continue apalutamide daily and Lupron every 3 months.  We will continue to intensely monitor the patient per treatment protocol.  We will follow CBC differential, CMP, total testosterone level and PSA every 3 months.    2. Bone metastasis-continue Xgeva q 3 months for prevention of skeletal related events given low volume metastatic disease to the bones.      3.  Lung cancer screening- CT chest findings from 1/31/2022 negative as above. Next due in one year    4. Intermittent leucocytosis secondary to intermittent neutrophilia- resolved on most recent labs. No dysplastic changes in neutrophils on recent peripheral blood smear.    5. Drug monitoring- TSH monitoring q 3-4 months. TSH normal in 01/2022.     6. Mild normochromic normocytic anemia- continue to follow on CBCd, likely secondary to chronic inflammation, androgen deprivation therapy, apalutamide.    7. GI - on PPI for s/o GErD and cough in am with improvement of symptoms per GI (Brett Preston's note)- on Omeprazole PO BID. Apalutamide associated with the following GI symptoms: Decreased appetite (12%), diarrhea (9% to 20%), nausea (18%) but not GERD s/p. Previously referred to GI due to persistent GERD symptoms and was continued by GI on Omeprazole and recommended to proceed with upper EGD for further evaluation. He did not follow through with upper EgD or  GI appt.    At the end of our visit patient verbalized understanding  and concurred with the plan.  Chart documentation completed in part with Dragon voice-recognition software.  Even though reviewed some grammatical, spelling, and word errors may remain.  35 minutes spent on the date of the encounter doing chart review, review of test results, interpretation of tests, patient visit and documentation.            Again, thank you for allowing me to participate in the care of your patient.        Sincerely,        Na Astudillo MD, MD

## 2022-02-01 NOTE — PROGRESS NOTES
"This patient  is being evaluated via a billable video visit.      The patient has been notified of following:     \"This video visit will be conducted via a call between you and your physician/provider. We have found that certain health care needs can be provided without the need for an in-person physical exam.  This service lets us provide the care you need with a video conversation.  If a prescription is necessary we can send it directly to your pharmacy.  If lab work is needed we can place an order for that and you can then stop by our lab to have the test done at a later time.    Video visits are billed at different rates depending on your insurance coverage.  Please reach out to your insurance provider with any questions.    If during the course of the call the physician/provider feels a video visit is not appropriate, you will not be charged for this service.\"    Patient has given verbal consent for Video visit? yes  Video-Visit Details    Type of service:  Video Visit    Video visit duration: 6   min  Originating Location (pt. Location): home    Distant Location (provider location):  [facility]     Platform used for Video Visit: Lynn Astudillo MD        Oncology Follow-up:  Date on this visit: Feb 1, 2022    Primary care physician: Per Chen   Urologist: Dr. Angel Colmenares     Metastatic hormone sensitive prostate cancer    Oncologic History:  1. Metastatic hormone sensitive prostate cancer  He presented to his PCP in December 2019 with slow urinary stream.  PSA was checked and was found to be 124. On January 8, 2020 abdomen pelvis CT showed groundglass opacity in the left lower lobe,  dystrophic calcification of the prostate gland, and a fatty liver.  Same-day bone scan was negative. On January 10, 2010 prostate biopsy showed on the left Robyn score 4+3 involving 7 cores and on the right Barneveld score 3+4 involving 6 cores, with positive perineural " invasion.  He proceeded to undergo RRP by Dr. Colmenares on February 24, 2020.  Pathology revealed acinar adenocarcinoma Robyn 4+3 with focal ROBERTO, bladder neck base invasion, positive SVI, positive PNI, and multiple positive margins.  There was no LVSI and 0 out of 3 lymph nodes removed were involved with tumor; pT3bN0.  He received Eligard on 4/20/2020.  MRI prostate on 5/21/2020 which showed a 14 x 9 mm T2 intermediate structure in the prostatectomy bed at the left aspect of urinary bladder neck with restricted diffusion in the DWI images and early enhancement in the contrast  enhanced images. This was concerning for locally recurrent/residual  prostatic cancer.  There was a T2 hypointense structure in the in the area of removed  right seminal vesicle measuring 13 x 11 mm  demonstrating restricted  diffusion in the DWI images. This may represent a recurrent/residual  tumor. There were 2 bone metastases in the left pubic inferior ramus and  inferior aspect of left pubic body.   There is a 7 mm suspicious lymph nodes in the right obturator area  corresponding to to FACBC avid lymph node on the same date PET/CT  F-18 fluciclovine PET/CT on 5/20/2020 showed:  1.  2 foci of increased uptake in the left ischium, with underlying  sclerotic lesion, and symphysis pubis, these changes are indicative of  active prostate cancer metastasis.  2. Low-level increased uptake in 3 lymphnodes along the right external  and internal iliac nodes, the most suspicious of which is the deep  obturator.  3. Low-level increased uptake in the prostatectomy bed slightly to the  left side of the urethra, if clinically necessary, endoscopy would  would be necessary for confirmation of this being metastasis.  PSA was down to 27.7 on 4/21/2020. On April 21, 2020 he received an Eligard injection.    He started Apalutamide on 6/12/2020.  Bone scan 1/22/2021: no bone mets.      2. Tinnitus b/l -  By 12/2020 he presented with 2 months c/o bilateral  tinnitus and was evaluated by Dr. Shields from ENT. An audiogram showed a significant down-sloping mid to high frequency sensorineural hearing loss. There was no evidence of conductive hearing loss or significant asymmetry.  He was felt to have early onset presbycusis. He was referred for tinnitus retraining therapy.  Review of literature showed no clear association of apalutamide or Xgeva with tinnitus or ototoxicity. Felt to have early onset presbycusis.    3. Chronic lower back pain, with exacerbations- He is followed by Dr. Denise and  Dr. Clay from  pain management for evaluation of lower back pain.  He had CT of thoracic and lumbar spine on August 23, 2021.  This demonstrated unchanged sclerotic foci in T6 and T11 vertebral bodies dating back to May 2020.  There were degenerative changes in the lumbar spine with severe left neural foraminal narrowing at L4-L5 and moderate to severe at L3-L4.  He has proceeded with L3-4 epidural injection on 10/22/2021.  No acute osseous abnormalities were noted. His lower back pain and b/l pelvic pain has improved following epidural injection.     History Of Present Illness:  Mr. Miranda is a 56 year old male who presents for f/up of  metastatic hormone sensitive prostate cancer diagnosed in 01/2020, s/p RRP at that time but later found to have residual/recurrent disease in the pelvis and L inferior pubic body and pubic ramus metastasis.   He has been on androgen deprivation therapy since April 2020, and continues on Lupron every 3 months. He is seen for worsening leucytosis noted on the labs that returned from 1/21/2022, following his virtual visit with our NP on 1/20/22. WBC was over 20, predominantly neutrophilia. He was not on steroids. He did have a steroid epidural injection a few weeks ago. He has had no signs or symptoms of infection. WBC was intermittently elevated WBC as below:  Results for LAURA MIRANAD JR (MRN [id number]) as of 2/1/2022 18:17   Ref. Range 4/29/2021  14:08 7/22/2021 10:42 8/23/2021 08:43 10/29/2021 09:03 1/21/2022 08:50   WBC Latest Ref Range: 4.0 - 11.0 10e3/uL 13.9 (H) 6.4 7.3 12.2 (H) 20.1 (H)   Results for LAURA MIRANDA JR (MRN [id number]) as of 2/1/2022 18:17   Ref. Range 10/30/2020 11:03 1/22/2021 08:31 4/29/2021 14:08   Absolute Neutrophil Latest Ref Range: 1.6 - 8.3 10e9/L 9.1 (H) 4.4 11.6 (H)   Results for LAURA MIRANDA JR (MRN [id number]) as of 2/1/2022 18:17   Ref. Range 8/23/2021 08:43 10/29/2021 09:03 1/21/2022 08:50   Absolute Neutrophils Latest Ref Range: 1.6 - 8.3 10e3/uL 5.1 10.0 (H) 18.1 (H)     He started Apalutamide on 6/12/2020. He has remained on apalutamide.  He has not had any side effects attributable to apalutamide. PSA is <0.01.   He had a negative bone scan (for metastasis) in January 2021.  He had a CT chest, noncontrast in July 2021, for lung cancer screening purposes, which demonstrated patchy air groundglass opacities in the left lower lobe and right middle lobe suspicious for inflammation/infection.  In the background of these patchy opacities there was a prominent 13 mm solid nodule which was favored to be infectious in etiology.  There was a 3 mm solid pulmonary nodule in the lateral right middle lobe.  Attention on follow-up was recommended.  He had a short interval CT chest on August 23, 2021 which showed marked improvement in multifocal groundglass opacities compared to July 19, 2021, with minimal persistent lower lobe tree-in-bud groundglass nodularity.  Due to leucocytosis we have obtained another CT chest prior to today's visit, on 1/31/2022. This showed:  Scattered subpleural pulmonary nodules and fissural associated lymph  nodes are not significantly changed when compared to prior exam.  Recommend continued yearly screening with low-dose CT if patient is  considered high risk.  CT findings reviewed with the patient.  He is s/p epidural injection to L4-5 on 12/20/2021. I have reviewed Dr. Clay's note from  pain  management.      Past Medical/Surgical History:  Past Medical History:   Diagnosis Date     Gout      Prostate cancer (H) 01/10/2020     Past Surgical History:   Procedure Laterality Date     BIOPSY PROSTATE TRANSRECTAL  01/10/2020    Dr. Colmenares     COLONOSCOPY  2020     LITHOTRIPSY  age 30s     MICROSCOPIC KNEE SURGERY Right age 30s     ORTHOPEDIC SURGERY Left 1985    Alan placed in Left Femur     PROSTATECTOMY RETROPUBIC RADICAL  2020    Dr. Colmenaers     Allergies:  Allergies as of 2022     (No Known Allergies)     Current Medications:  Current Outpatient Medications   Medication     apalutamide (ERLEADA) 60 MG tablet     aspirin (ASA) 81 MG chewable tablet     denosumab (XGEVA) 120 MG/1.7ML SOLN injection     HYDROcodone-acetaminophen (NORCO) 5-325 MG tablet     leuprolide (ELIGARD) 45 MG kit     sildenafil (REVATIO) 20 MG tablet     No current facility-administered medications for this visit.       Family History:    His father was diagnosed with prostate cancer, at the age of 75. Paternal GM had throat cancer at the age of 94, nonsmoker.     Social History:  Social History     Socioeconomic History     Marital status:    Occupational History     Not on file   Social Needs     Food insecurity     Transportation needs   Tobacco Use     Smoking status: Former Smoker     Packs/day: 1.00     Years: 30.00     Pack years: 30.00     Types: Cigarettes, Cigars     Last attempt to quit: 2012     Years since quittin.4     Smokeless tobacco: Never Used   Substance and Sexual Activity     Alcohol use: Yes     Comment: 4-6 drinks per day - patient reports beer or vodka drinks     Physical Exam:      Wt Readings from Last 5 Encounters:   22 89.8 kg (198 lb)   21 88.5 kg (195 lb)   21 86.2 kg (190 lb)   10/29/21 86.2 kg (190 lb)   21 83.5 kg (184 lb)     Constitutional: alert and in no distress  Eyes: No redness or discharge  Respiratory: No cough or labored  breathing.  Musculoskeletal: Full range of motion in extremities.  Skin: no visible skin lesions or discoloration  Neurological: No tremors and denies headache.  Psychiatric: Mentation appears normal and affect is normal as well.  Alert and oriented x3.  The rest the comprehensive physical examination is deferred due to public health emergency video visit restrictions.        Laboratory/Imaging Studies  Labs reviewed.   Mild anemia with  Normal MCV  peripheral blood smear:  Slight normochromic normocytic anemia  The red cells appear normochromic.  Poikilocytosis is minimal. Polychromasia is not increased. Rouleaux formation is not increased. The morphology of the platelets is normal.  Leukocytes are quantitatively normal.  There is no dysplasia.  Reactive lymphocytes are noted.  BCx neg x2 prelim  UCx prelim negative  Results for DIANNALAURA RENE JR (MRN [id number]) as of 2/1/2022 15:48   Ref. Range 7/22/2021 10:42 8/23/2021 08:43 10/29/2021 09:03 1/21/2022 08:50 1/31/2022 15:33   WBC Latest Ref Range: 4.0 - 11.0 10e3/uL 6.4 7.3 12.2 (H) 20.1 (H) 6.2     Results for LAURA MIRANDA JR R (MRN [id number]) as of 2/1/2022 15:48   Ref. Range 7/22/2021 10:42 8/23/2021 08:43 10/29/2021 09:03 1/21/2022 08:50 1/31/2022 15:33   Hemoglobin Latest Ref Range: 13.3 - 17.7 g/dL 13.9 14.1 13.9 14.2 13.6   Results for LAURA MIRANDA JR R (MRN [id number]) as of 2/1/2022 15:48   Ref. Range 7/17/2020 09:41 10/30/2020 11:03 1/22/2021 08:31 4/29/2021 14:08   Absolute Neutrophil Latest Ref Range: 1.6 - 8.3 10e9/L 3.7 9.1 (H) 4.4 11.6 (H)   Results for LAURA MIRANDA JR (MRN [id number]) as of 2/1/2022 15:48   Ref. Range 7/22/2021 10:42 8/23/2021 08:43 10/29/2021 09:03 1/21/2022 08:50   PSA Latest Ref Range: 0.00 - 4.00 ug/L 0.03 0.02 0.02 <0.01     ASSESSMENT/PLAN:  Laura is a very pleasant 56 year old man with metastatic (to the bones) prostate cancer. He is s/p Eligard on 4/21/2020. Laura has low volume hormone sensitive metastatic disease, with  residual disease locoregionally and bone metastasis (two lesions- in left ischium and symphysis pubis), asymptomatic.  He received Eligard on 4/21/2020 and started on Apalutamide on 6/12/2020.    1.  Metastatic prostate cancer-PSA decreased to <0.01. Plan to transfer care to Dr. Garcia with labs prior, in 04/2022.   Continue apalutamide daily and Lupron every 3 months.  We will continue to intensely monitor the patient per treatment protocol.  We will follow CBC differential, CMP, total testosterone level and PSA every 3 months.    2. Bone metastasis-continue Xgeva q 3 months for prevention of skeletal related events given low volume metastatic disease to the bones.      3.  Lung cancer screening- CT chest findings from 1/31/2022 negative as above. Next due in one year    4. Intermittent leucocytosis secondary to intermittent neutrophilia- resolved on most recent labs. No dysplastic changes in neutrophils on recent peripheral blood smear.    5. Drug monitoring- TSH monitoring q 3-4 months. TSH normal in 01/2022.     6. Mild normochromic normocytic anemia- continue to follow on CBCd, likely secondary to chronic inflammation, androgen deprivation therapy, apalutamide.    7. GI - on PPI for s/o GErD and cough in am with improvement of symptoms per GI (Brett Preston's note)- on Omeprazole PO BID. Apalutamide associated with the following GI symptoms: Decreased appetite (12%), diarrhea (9% to 20%), nausea (18%) but not GERD s/p. Previously referred to GI due to persistent GERD symptoms and was continued by GI on Omeprazole and recommended to proceed with upper EGD for further evaluation. He did not follow through with upper EgD or  GI appt.    At the end of our visit patient verbalized understanding and concurred with the plan.  Chart documentation completed in part with Dragon voice-recognition software.  Even though reviewed some grammatical, spelling, and word errors may remain.  35 minutes spent on the date of the  encounter doing chart review, review of test results, interpretation of tests, patient visit and documentation.

## 2022-02-05 LAB
BACTERIA BLD CULT: NO GROWTH
BACTERIA BLD CULT: NO GROWTH

## 2022-02-18 DIAGNOSIS — C61 PROSTATE CANCER (H): Primary | ICD-10-CM

## 2022-02-27 ENCOUNTER — MYC REFILL (OUTPATIENT)
Dept: FAMILY MEDICINE | Facility: CLINIC | Age: 57
End: 2022-02-27
Payer: COMMERCIAL

## 2022-02-27 DIAGNOSIS — C79.51 PROSTATE CANCER METASTATIC TO BONE (H): ICD-10-CM

## 2022-02-27 DIAGNOSIS — M54.42 ACUTE LEFT-SIDED LOW BACK PAIN WITH LEFT-SIDED SCIATICA: ICD-10-CM

## 2022-02-27 DIAGNOSIS — C61 PROSTATE CANCER METASTATIC TO BONE (H): ICD-10-CM

## 2022-02-28 RX ORDER — HYDROCODONE BITARTRATE AND ACETAMINOPHEN 5; 325 MG/1; MG/1
0.5 TABLET ORAL EVERY 4 HOURS PRN
Qty: 90 TABLET | Refills: 0 | Status: SHIPPED | OUTPATIENT
Start: 2022-02-28 | End: 2022-03-28

## 2022-02-28 NOTE — TELEPHONE ENCOUNTER
Routing refill request to provider for review/approval because:  Drug not on the [facility] refill protocol   Soni Cordoba RN

## 2022-03-04 DIAGNOSIS — C61 PROSTATE CANCER (H): ICD-10-CM

## 2022-03-04 RX ORDER — APALUTAMIDE 60 MG/1
TABLET, FILM COATED ORAL
Qty: 120 TABLET | Refills: 0 | OUTPATIENT
Start: 2022-03-04

## 2022-03-21 DIAGNOSIS — C61 PROSTATE CANCER (H): Primary | ICD-10-CM

## 2022-03-28 ENCOUNTER — MYC REFILL (OUTPATIENT)
Dept: FAMILY MEDICINE | Facility: CLINIC | Age: 57
End: 2022-03-28
Payer: COMMERCIAL

## 2022-03-28 DIAGNOSIS — C61 PROSTATE CANCER METASTATIC TO BONE (H): ICD-10-CM

## 2022-03-28 DIAGNOSIS — M54.42 ACUTE LEFT-SIDED LOW BACK PAIN WITH LEFT-SIDED SCIATICA: ICD-10-CM

## 2022-03-28 DIAGNOSIS — C79.51 PROSTATE CANCER METASTATIC TO BONE (H): ICD-10-CM

## 2022-03-29 RX ORDER — HYDROCODONE BITARTRATE AND ACETAMINOPHEN 5; 325 MG/1; MG/1
0.5 TABLET ORAL EVERY 4 HOURS PRN
Qty: 90 TABLET | Refills: 0 | Status: SHIPPED | OUTPATIENT
Start: 2022-03-29 | End: 2022-04-28

## 2022-03-29 NOTE — TELEPHONE ENCOUNTER
Routing refill request to provider for review/approval because:  Drug not on the [facility] refill protocol   Doretha GALON, RN

## 2022-04-13 DIAGNOSIS — M79.606 PAIN OF LOWER EXTREMITY, UNSPECIFIED LATERALITY: ICD-10-CM

## 2022-04-13 DIAGNOSIS — C79.51 BONE METASTASIS: ICD-10-CM

## 2022-04-13 DIAGNOSIS — C61 PROSTATE CANCER (H): Primary | ICD-10-CM

## 2022-04-17 DIAGNOSIS — K21.9 GASTROESOPHAGEAL REFLUX DISEASE, UNSPECIFIED WHETHER ESOPHAGITIS PRESENT: ICD-10-CM

## 2022-04-19 NOTE — TELEPHONE ENCOUNTER
"Routing refill request to provider for review/approval because:  Drug not active on patient's medication list.  Medication discontinued on 2/1/22 - to provider to review if refill appropriate.     Requested Prescriptions   Pending Prescriptions Disp Refills    omeprazole (PRILOSEC) 20 MG DR capsule [Pharmacy Med Name: OMEPRAZOLE DR 20 MG CAPSULE] 180 capsule      Sig: TAKE 1 CAPSULE BY MOUTH TWICE A DAY        PPI Protocol Passed - 4/17/2022  7:30 AM        Passed - Not on Clopidogrel (unless Pantoprazole ordered)        Passed - No diagnosis of osteoporosis on record        Passed - Recent (12 mo) or future (30 days) visit within the authorizing provider's specialty     Patient has had an office visit with the authorizing provider or a provider within the authorizing providers department within the previous 12 mos or has a future within next 30 days. See \"Patient Info\" tab in inbasket, or \"Choose Columns\" in Meds & Orders section of the refill encounter.              Passed - Medication is active on med list        Passed - Patient is age 18 or older            Diamond Rankin RN, BSN  [facility]        "

## 2022-04-21 ENCOUNTER — LAB (OUTPATIENT)
Dept: LAB | Facility: CLINIC | Age: 57
End: 2022-04-21
Payer: COMMERCIAL

## 2022-04-21 ENCOUNTER — ONCOLOGY VISIT (OUTPATIENT)
Dept: ONCOLOGY | Facility: CLINIC | Age: 57
End: 2022-04-21
Payer: COMMERCIAL

## 2022-04-21 VITALS
DIASTOLIC BLOOD PRESSURE: 87 MMHG | SYSTOLIC BLOOD PRESSURE: 134 MMHG | WEIGHT: 194 LBS | BODY MASS INDEX: 28.65 KG/M2 | HEART RATE: 104 BPM | OXYGEN SATURATION: 95 %

## 2022-04-21 DIAGNOSIS — Z87.891 HISTORY OF TOBACCO USE: ICD-10-CM

## 2022-04-21 DIAGNOSIS — C61 PROSTATE CANCER METASTATIC TO BONE (H): ICD-10-CM

## 2022-04-21 DIAGNOSIS — M79.606 PAIN OF LOWER EXTREMITY, UNSPECIFIED LATERALITY: ICD-10-CM

## 2022-04-21 DIAGNOSIS — R91.8 PULMONARY NODULES: ICD-10-CM

## 2022-04-21 DIAGNOSIS — D72.828 ACQUIRED NEUTROPHILIA: ICD-10-CM

## 2022-04-21 DIAGNOSIS — C79.51 BONE METASTASIS: ICD-10-CM

## 2022-04-21 DIAGNOSIS — C61 PROSTATE CANCER (H): Primary | ICD-10-CM

## 2022-04-21 DIAGNOSIS — C79.51 PROSTATE CANCER METASTATIC TO BONE (H): ICD-10-CM

## 2022-04-21 DIAGNOSIS — D72.828 ACQUIRED NEUTROPHILIA: Primary | ICD-10-CM

## 2022-04-21 DIAGNOSIS — C61 PROSTATE CANCER (H): ICD-10-CM

## 2022-04-21 LAB
ALBUMIN SERPL-MCNC: 3.8 G/DL (ref 3.4–5)
ALP SERPL-CCNC: 72 U/L (ref 40–150)
ALT SERPL W P-5'-P-CCNC: 33 U/L (ref 0–70)
ANION GAP SERPL CALCULATED.3IONS-SCNC: 8 MMOL/L (ref 3–14)
AST SERPL W P-5'-P-CCNC: 15 U/L (ref 0–45)
BASOPHILS # BLD AUTO: 0.1 10E3/UL (ref 0–0.2)
BASOPHILS NFR BLD AUTO: 0 %
BILIRUB SERPL-MCNC: 0.4 MG/DL (ref 0.2–1.3)
BUN SERPL-MCNC: 16 MG/DL (ref 7–30)
CALCIUM SERPL-MCNC: 9.3 MG/DL (ref 8.5–10.1)
CHLORIDE BLD-SCNC: 107 MMOL/L (ref 94–109)
CO2 SERPL-SCNC: 25 MMOL/L (ref 20–32)
CREAT SERPL-MCNC: 1.05 MG/DL (ref 0.66–1.25)
CRP SERPL-MCNC: 6.8 MG/L (ref 0–8)
EOSINOPHIL # BLD AUTO: 0 10E3/UL (ref 0–0.7)
EOSINOPHIL NFR BLD AUTO: 0 %
ERYTHROCYTE [DISTWIDTH] IN BLOOD BY AUTOMATED COUNT: 11.6 % (ref 10–15)
GFR SERPL CREATININE-BSD FRML MDRD: 83 ML/MIN/1.73M2
GLUCOSE BLD-MCNC: 130 MG/DL (ref 70–99)
HCT VFR BLD AUTO: 41.6 % (ref 40–53)
HGB BLD-MCNC: 14.5 G/DL (ref 13.3–17.7)
IMM GRANULOCYTES # BLD: 0.2 10E3/UL
IMM GRANULOCYTES NFR BLD: 1 %
LAB DIRECTOR COMMENTS: NORMAL
LAB DIRECTOR COMMENTS: NORMAL
LAB DIRECTOR DISCLAIMER: NORMAL
LAB DIRECTOR DISCLAIMER: NORMAL
LAB DIRECTOR INTERPRETATION: NORMAL
LAB DIRECTOR INTERPRETATION: NORMAL
LAB DIRECTOR METHODOLOGY: NORMAL
LAB DIRECTOR METHODOLOGY: NORMAL
LAB DIRECTOR RESULTS: NORMAL
LAB DIRECTOR RESULTS: NORMAL
LYMPHOCYTES # BLD AUTO: 0.9 10E3/UL (ref 0.8–5.3)
LYMPHOCYTES NFR BLD AUTO: 5 %
MCH RBC QN AUTO: 31.5 PG (ref 26.5–33)
MCHC RBC AUTO-ENTMCNC: 34.9 G/DL (ref 31.5–36.5)
MCV RBC AUTO: 90 FL (ref 78–100)
MONOCYTES # BLD AUTO: 1.5 10E3/UL (ref 0–1.3)
MONOCYTES NFR BLD AUTO: 8 %
NEUTROPHILS # BLD AUTO: 16.5 10E3/UL (ref 1.6–8.3)
NEUTROPHILS NFR BLD AUTO: 86 %
NRBC # BLD AUTO: 0 10E3/UL
NRBC BLD AUTO-RTO: 0 /100
PLATELET # BLD AUTO: 292 10E3/UL (ref 150–450)
POTASSIUM BLD-SCNC: 4.2 MMOL/L (ref 3.4–5.3)
PROT SERPL-MCNC: 7.2 G/DL (ref 6.8–8.8)
PSA SERPL-MCNC: <0.01 UG/L (ref 0–4)
RBC # BLD AUTO: 4.61 10E6/UL (ref 4.4–5.9)
SODIUM SERPL-SCNC: 140 MMOL/L (ref 133–144)
SPECIMEN DESCRIPTION: NORMAL
SPECIMEN DESCRIPTION: NORMAL
WBC # BLD AUTO: 19.3 10E3/UL (ref 4–11)

## 2022-04-21 PROCEDURE — 88189 FLOWCYTOMETRY/READ 16 & >: CPT | Performed by: PATHOLOGY

## 2022-04-21 PROCEDURE — 36415 COLL VENOUS BLD VENIPUNCTURE: CPT

## 2022-04-21 PROCEDURE — G0452 MOLECULAR PATHOLOGY INTERPR: HCPCS | Performed by: PATHOLOGY

## 2022-04-21 PROCEDURE — 80053 COMPREHEN METABOLIC PANEL: CPT

## 2022-04-21 PROCEDURE — 96372 THER/PROPH/DIAG INJ SC/IM: CPT | Mod: 59 | Performed by: NURSE PRACTITIONER

## 2022-04-21 PROCEDURE — 88185 FLOWCYTOMETRY/TC ADD-ON: CPT | Performed by: NURSE PRACTITIONER

## 2022-04-21 PROCEDURE — 88184 FLOWCYTOMETRY/ TC 1 MARKER: CPT | Performed by: NURSE PRACTITIONER

## 2022-04-21 PROCEDURE — 84153 ASSAY OF PSA TOTAL: CPT

## 2022-04-21 PROCEDURE — 86431 RHEUMATOID FACTOR QUANT: CPT

## 2022-04-21 PROCEDURE — 99215 OFFICE O/P EST HI 40 MIN: CPT | Performed by: INTERNAL MEDICINE

## 2022-04-21 PROCEDURE — 81206 BCR/ABL1 GENE MAJOR BP: CPT | Performed by: NURSE PRACTITIONER

## 2022-04-21 PROCEDURE — 96402 CHEMO HORMON ANTINEOPL SQ/IM: CPT | Performed by: NURSE PRACTITIONER

## 2022-04-21 PROCEDURE — 84403 ASSAY OF TOTAL TESTOSTERONE: CPT

## 2022-04-21 PROCEDURE — 86038 ANTINUCLEAR ANTIBODIES: CPT

## 2022-04-21 PROCEDURE — 81207 BCR/ABL1 GENE MINOR BP: CPT | Performed by: NURSE PRACTITIONER

## 2022-04-21 PROCEDURE — 86140 C-REACTIVE PROTEIN: CPT

## 2022-04-21 PROCEDURE — 85025 COMPLETE CBC W/AUTO DIFF WBC: CPT

## 2022-04-21 ASSESSMENT — PAIN SCALES - GENERAL: PAINLEVEL: NO PAIN (0)

## 2022-04-21 NOTE — PROGRESS NOTES
"This patient  is being evaluated via a billable video visit.      The patient has been notified of following:     \"This video visit will be conducted via a call between you and your physician/provider. We have found that certain health care needs can be provided without the need for an in-person physical exam.  This service lets us provide the care you need with a video conversation.  If a prescription is necessary we can send it directly to your pharmacy.  If lab work is needed we can place an order for that and you can then stop by our lab to have the test done at a later time.    Video visits are billed at different rates depending on your insurance coverage.  Please reach out to your insurance provider with any questions.    If during the course of the call the physician/provider feels a video visit is not appropriate, you will not be charged for this service.\"    Patient has given verbal consent for Video visit? yes  Video-Visit Details    Type of service:  Video Visit    Video visit duration: 6   min  Originating Location (pt. Location): home    Distant Location (provider location):  [facility]     Platform used for Video Visit: Lynn Astudillo MD        Oncology Follow-up:  Date on this visit: Apr 21, 2022    Primary care physician: Per Chen   Urologist: Dr. Angel Colmenares     Metastatic hormone sensitive prostate cancer    Oncologic History:  1. Metastatic hormone sensitive prostate cancer  He presented to his PCP in December 2019 with slow urinary stream.  PSA was checked and was found to be 124. On January 8, 2020 abdomen pelvis CT showed groundglass opacity in the left lower lobe,  dystrophic calcification of the prostate gland, and a fatty liver.  Same-day bone scan was negative. On January 10, 2010 prostate biopsy showed on the left Stephenson score 4+3 involving 7 cores and on the right Robyn score 3+4 involving 6 cores, with positive perineural " invasion.  He proceeded to undergo RRP by Dr. Colmenares on February 24, 2020.  Pathology revealed acinar adenocarcinoma Robyn 4+3 with focal ROBERTO, bladder neck base invasion, positive SVI, positive PNI, and multiple positive margins.  There was no LVSI and 0 out of 3 lymph nodes removed were involved with tumor; pT3bN0.  He received Eligard on 4/20/2020.  MRI prostate on 5/21/2020 which showed a 14 x 9 mm T2 intermediate structure in the prostatectomy bed at the left aspect of urinary bladder neck with restricted diffusion in the DWI images and early enhancement in the contrast  enhanced images. This was concerning for locally recurrent/residual  prostatic cancer.  There was a T2 hypointense structure in the in the area of removed  right seminal vesicle measuring 13 x 11 mm  demonstrating restricted  diffusion in the DWI images. This may represent a recurrent/residual  tumor. There were 2 bone metastases in the left pubic inferior ramus and  inferior aspect of left pubic body.   There is a 7 mm suspicious lymph nodes in the right obturator area  corresponding to to FACBC avid lymph node on the same date PET/CT  F-18 fluciclovine PET/CT on 5/20/2020 showed:  1.  2 foci of increased uptake in the left ischium, with underlying  sclerotic lesion, and symphysis pubis, these changes are indicative of  active prostate cancer metastasis.  2. Low-level increased uptake in 3 lymphnodes along the right external  and internal iliac nodes, the most suspicious of which is the deep  obturator.  3. Low-level increased uptake in the prostatectomy bed slightly to the  left side of the urethra, if clinically necessary, endoscopy would  would be necessary for confirmation of this being metastasis.  PSA was down to 27.7 on 4/21/2020. On April 21, 2020 he received an Eligard injection.    He started Apalutamide on 6/12/2020.  Bone scan 1/22/2021: no bone mets.      2. Tinnitus b/l -  By 12/2020 he presented with 2 months c/o bilateral  tinnitus and was evaluated by Dr. Shields from ENT. An audiogram showed a significant down-sloping mid to high frequency sensorineural hearing loss. There was no evidence of conductive hearing loss or significant asymmetry.  He was felt to have early onset presbycusis. He was referred for tinnitus retraining therapy.  Review of literature showed no clear association of apalutamide or Xgeva with tinnitus or ototoxicity. Felt to have early onset presbycusis.    3. Chronic lower back pain, with exacerbations- He is followed by Dr. Denise and  Dr. Clay from  pain management for evaluation of lower back pain.  He had CT of thoracic and lumbar spine on August 23, 2021.  This demonstrated unchanged sclerotic foci in T6 and T11 vertebral bodies dating back to May 2020.  There were degenerative changes in the lumbar spine with severe left neural foraminal narrowing at L4-L5 and moderate to severe at L3-L4.  He has proceeded with L3-4 epidural injection on 10/22/2021.  No acute osseous abnormalities were noted. His lower back pain and b/l pelvic pain has improved following epidural injection.     History Of Present Illness:  Mr. Etienne is a 56 year old male who presents for f/up of  metastatic hormone sensitive prostate cancer diagnosed in 01/2020, s/p RRP at that time but later found to have residual/recurrent disease in the pelvis and L inferior pubic body and pubic ramus metastasis.   He has been on androgen deprivation therapy since April 2020, and continues on Lupron every 3 months.     He started Apalutamide on 6/12/2020. He has remained on apalutamide.  He has not had any side effects attributable to apalutamide.   He is s/p epidural injection to L4-5 on 12/20/2021. I have reviewed Dr. Clay's note from  pain management.      Past Medical/Surgical History:  Past Medical History:   Diagnosis Date     Gout      Prostate cancer (H) 01/10/2020     Past Surgical History:   Procedure Laterality Date     BIOPSY PROSTATE  TRANSRECTAL  01/10/2020    Dr. Colmenares     COLONOSCOPY  2020     LITHOTRIPSY  age 30s     MICROSCOPIC KNEE SURGERY Right age 30s     ORTHOPEDIC SURGERY Left 1985    Alan placed in Left Femur     PROSTATECTOMY RETROPUBIC RADICAL  2020    Dr. Colmenares     Allergies:  Allergies as of 2022     (No Known Allergies)     Current Medications:  Current Outpatient Medications   Medication     apalutamide (ERLEADA) 60 MG tablet     aspirin (ASA) 81 MG chewable tablet     denosumab (XGEVA) 120 MG/1.7ML SOLN injection     HYDROcodone-acetaminophen (NORCO) 5-325 MG tablet     leuprolide (ELIGARD) 45 MG kit     sildenafil (REVATIO) 20 MG tablet     No current facility-administered medications for this visit.       Family History:    His father was diagnosed with prostate cancer, at the age of 75. Paternal GM had throat cancer at the age of 94, nonsmoker.     Social History:  Social History     Socioeconomic History     Marital status:    Occupational History     Not on file   Social Needs     Food insecurity     Transportation needs   Tobacco Use     Smoking status: Former Smoker     Packs/day: 1.00     Years: 30.00     Pack years: 30.00     Types: Cigarettes, Cigars     Last attempt to quit: 2012     Years since quittin.4     Smokeless tobacco: Never Used   Substance and Sexual Activity     Alcohol use: Yes     Comment: 4-6 drinks per day - patient reports beer or vodka drinks     Physical Exam:      Wt Readings from Last 5 Encounters:   22 88 kg (194 lb)   22 89.8 kg (198 lb)   21 88.5 kg (195 lb)   21 86.2 kg (190 lb)   10/29/21 86.2 kg (190 lb)     Constitutional: alert and in no distress  Eyes: No redness or discharge  Respiratory: No cough or labored breathing.  Musculoskeletal: Full range of motion in extremities.  Skin: no visible skin lesions or discoloration  Neurological: No tremors and denies headache.  Psychiatric: Mentation appears normal and affect is normal as well.   Alert and oriented x3.  The rest the comprehensive physical examination is deferred due to public [facility] emergency video visit restrictions.        Laboratory/Imaging Studies  Labs reviewed.   Mild anemia with  Normal MCV  peripheral blood smear:  Slight normochromic normocytic anemia  The red cells appear normochromic.  Poikilocytosis is minimal. Polychromasia is not increased. Rouleaux formation is not increased. The morphology of the platelets is normal.  Leukocytes are quantitatively normal.  There is no dysplasia.  Reactive lymphocytes are noted.  BCx neg x2 prelim  UCx prelim negative    Recent Labs   Lab Test 04/21/22  0932 01/21/22  0850 10/29/21  0903 08/23/21  0843 07/22/21  1042    140 138 138 138   POTASSIUM 4.2 4.7 3.9 4.7 4.7   CHLORIDE 107 108 106 106 107   CO2 25 26 28 27 28   ANIONGAP 8 6 4 5 3   BUN 16 19 16 23 20   CR 1.05 0.89 1.04 0.91 0.90   * 127* 138* 107* 118*   NATALIYA 9.3 9.5 9.7 9.1 8.6     No results for input(s): MAG, PHOS in the last 79981 hours.  Recent Labs   Lab Test 04/21/22  0932 01/31/22  1533 01/21/22  0850 10/29/21  0903 08/23/21  0843   WBC 19.3* 6.2 20.1* 12.2* 7.3   HGB 14.5 13.6 14.2 13.9 14.1    246 249 268 255   MCV 90 92 91 91 89   NEUTROPHIL 86 62 90 80 69     Recent Labs   Lab Test 04/21/22  0932 01/21/22  0850 10/29/21  0903   BILITOTAL 0.4 0.4 0.3   ALKPHOS 72 67 81   ALT 33 42 33   AST 15 20 14   ALBUMIN 3.8 3.8 3.7     TSH   Date Value Ref Range Status   01/21/2022 1.03 0.40 - 4.00 mU/L Final   10/29/2021 1.91 0.40 - 4.00 mU/L Final   08/23/2021 2.49 0.40 - 4.00 mU/L Final   04/29/2021 2.57 0.40 - 4.00 mU/L Final   01/22/2021 3.46 0.40 - 4.00 mU/L Final   10/30/2020 1.97 0.40 - 4.00 mU/L Final     No results for input(s): CEA in the last 50999 hours.  Results for orders placed or performed in visit on 01/31/22   CT Chest w/o contrast    Narrative    EXAMINATION: CT CHEST W/O CONTRAST  1/31/2022 5:14 PM      CLINICAL HISTORY: Lung nodule <6 mm, high  cancer risk.    COMPARISON: 9/23/2021, 7/19/2021.    TECHNIQUE: CT imaging obtained through the chest without intravenous  contrast. Coronal and axial MIP reformatted images obtained.    FINDINGS:  Lungs: Scattered pulmonary nodules are not significantly changed from  exam 8/23/2021 for exam 7/19/2021. For example the 4 mm fissural  associated nodule on series 3, image 149 on the right minor fissure is  stable. An additional fissural associated nodule located inferiorly on  the right minor fissure is also stable. 3 mm subpleural nodule in the  right middle lobe is also unchanged from exam 8/23/2021. Scattered  other tiny pulmonary nodules are not significantly changed.    No groundglass or consolidative opacities. Central tracheobronchial  tree is patent. No pleural effusion or pneumothorax.    Mediastinum:  Thyroid is normal. Heart size is normal. No pericardial effusion. Mild  coronary artery calcifications. Normal thoracic vasculature. No  thoracic lymphadenopathy.     Bones and soft tissues: No suspicious bone findings. Stable sclerotic  focus within the T6 vertebral body. Mild degenerative changes.    Partially imaged upper abdomen: Adrenal glands are normal. No other  focal lesion in the upper abdomen is demonstrated.      Impression    IMPRESSION:   Scattered subpleural pulmonary nodules and fissural associated lymph  nodes are not significantly changed when compared to prior exam.  Recommend continued yearly screening with low-dose CT if patient is  considered high risk.    I have personally reviewed the examination and initial interpretation  and I agree with the findings.    OLGA FUNK MD         SYSTEM ID:  [id number]     Recent Labs   Lab Test 01/21/22  0850 10/29/21  0913 10/29/21  0903 08/23/21  0843 07/22/21  1042 04/29/21  1408   PSA <0.01  --  0.02 0.02 0.03 0.04   TESTOSTTOTAL 11* 3*  --  9* 10* 6*        ASSESSMENT/PLAN:  Jay is a very pleasant 56 year old man with metastatic (to the bones)  prostate cancer. He is s/p Eligard on 4/21/2020. Jay has low volume hormone sensitive metastatic disease, with residual disease locoregionally and bone metastasis (two lesions- in left ischium and symphysis pubis), asymptomatic.  He received Eligard on 4/21/2020 and started on Apalutamide on 6/12/2020.    1.  Metastatic prostate cancer-PSA decreased to <0.01. Continue apalutamide daily and Lupron every 3 months.  We will continue to monitor the patient per treatment protocol. We will follow CBC differential, CMP, total testosterone level and PSA every 3 months.    2. Bone metastasis-continue Xgeva q 3 months for prevention of skeletal related events given low volume metastatic disease to the bones.      3.  Lung cancer screening- CT chest findings from 1/31/2022 negative. Next due in one year    4. Intermittent leucocytosis secondary to intermittent neutrophilia  Per has high total WBC count.  I explained that the white blood cell count comprises of several subtypes including the neutrophils, lymphocytes, basophils, eosinophils and other white cell precursors. Per primarily has neutrophilia     I explained the role of neutrophils and fighting infections.  In any situations of stress-primarily in the setting of infections the neutrophil counts go up.  These are our first line defense against infections.  On a careful review of his chart I have noted that he has persistent neutrophilia. This does not establish a diagnosis or a concern. We have check ups done at the times of sickness and most of these conditions are associated with rise in neutrophil counts.       I reviewed the different causes for neutrophilia.  Some patients can have high neutrophil counts at baseline. Smoking can cause the neutrophil counts to remain persistently elevated. Neutrophil counts rise in response to most infections including viral, bacterial or parasitic. Medications like steroids cause white cell counts to rise.     Neutrophil  counts do rise in the setting of chronic inflammatory rheumatologic conditions like systemic lupus erythematosus.     Rarely they can rise in the setting of primary hematologic disorders like acute chronic myelogenous leukemia.     I reviewed that we could go ahead and perform a work-up including reviewing a peripheral blood smear, BCR-ABL transcripts to evaluate for primary hematologic conditions like leukemia, flow cytometry for lymphoma, ALVERTO and rheumatoid factor for screening rheumatologic disease.    I have extensively reviewed the procedure of bone marrow biopsy. This is performed as an outpatient under sedation and with local anaesethetic. Bone marrow is the soft tissue inside bones that helps form blood cells. It is found in the hollow part of most bones. The procedure involves collection of bone marrow and a small amount of marrow fluid aspirate for analysis with a biopsy/aspirate needle. Only the finished cells are released in the circulation and bone marrow biopsy/aspirate helps assess precursor cells for abnormalities. It helps establish primary hematologic disorders like myelodysplastic/ myeloproliferative disorders, leukemia, lymphoma and multiple myeloma.         5. Drug monitoring- TSH monitoring q 3-4 months. TSH normal in 01/2022.     6. Mild normochromic normocytic anemia- continue to follow on CBCd, likely secondary to chronic inflammation, androgen deprivation therapy, apalutamide.    7. GI - on PPI for s/o GErD and cough in am with improvement of symptoms per GI (Brett Preston's note)- on Omeprazole PO BID. Apalutamide associated with the following GI symptoms: Decreased appetite (12%), diarrhea (9% to 20%), nausea (18%) but not GERD s/p. Previously referred to GI due to persistent GERD symptoms and was continued by GI on Omeprazole and recommended to proceed with upper EGD for further evaluation. He did not follow through with upper EgD or  GI appt.    At the end of our visit patient verbalized  understanding and concurred with the plan.  Chart documentation completed in part with Dragon voice-recognition software.  Even though reviewed some grammatical, spelling, and word errors may remain.    45 minutes spent on the date of the encounter doing chart review, review of test results, interpretation of tests, patient visit and documentation.

## 2022-04-21 NOTE — NURSING NOTE
"Oncology Rooming Note    April 21, 2022 9:56 AM   Per Etienne is a 56 year old male who presents for:    Chief Complaint   Patient presents with     Oncology Clinic Visit     Follow up - vomiting and shivers more often     Initial Vitals: /87 (BP Location: Left arm, Patient Position: Chair, Cuff Size: Adult Large)   Pulse 104   Wt 88 kg (194 lb)   SpO2 95%   BMI 28.65 kg/m   Estimated body mass index is 28.65 kg/m  as calculated from the following:    Height as of 8/23/21: 1.753 m (5' 9\").    Weight as of this encounter: 88 kg (194 lb). Body surface area is 2.07 meters squared.  No Pain (0) Comment: Data Unavailable   No LMP for male patient.  Allergies reviewed: Yes  Medications reviewed: Yes    Medications: Medication refills not needed today.  Pharmacy name entered into [employer]:    [id number] [place], [place] - [address] [place], [place] - [address] [facility]    Clinical concerns: Discuss Imaging - when is I needed next     Dr. Garcia was notified.      Viky Miller CMA              "

## 2022-04-21 NOTE — LETTER
"    4/21/2022         RE: Per Etienne  [id number] [facility] [id number]        Dear Colleague,    Thank you for referring your patient, Per Etienne, to the [facility]. Please see a copy of my visit note below.    This patient  is being evaluated via a billable video visit.      The patient has been notified of following:     \"This video visit will be conducted via a call between you and your physician/provider. We have found that certain health care needs can be provided without the need for an in-person physical exam.  This service lets us provide the care you need with a video conversation.  If a prescription is necessary we can send it directly to your pharmacy.  If lab work is needed we can place an order for that and you can then stop by our lab to have the test done at a later time.    Video visits are billed at different rates depending on your insurance coverage.  Please reach out to your insurance provider with any questions.    If during the course of the call the physician/provider feels a video visit is not appropriate, you will not be charged for this service.\"    Patient has given verbal consent for Video visit? yes  Video-Visit Details    Type of service:  Video Visit    Video visit duration: 6   min  Originating Location (pt. Location): home    Distant Location (provider location):  [facility]     Platform used for Video Visit: Lynn Astudillo MD        Oncology Follow-up:  Date on this visit: Apr 21, 2022    Primary care physician: Per Chen   Urologist: Dr. Angel Colmenares     Metastatic hormone sensitive prostate cancer    Oncologic History:  1. Metastatic hormone sensitive prostate cancer  He presented to his PCP in December 2019 with slow urinary stream.  PSA was checked and was found to be 124. On January 8, 2020 abdomen pelvis CT showed groundglass opacity in the left lower lobe,  " dystrophic calcification of the prostate gland, and a fatty liver.  Same-day bone scan was negative. On January 10, 2010 prostate biopsy showed on the left Oakley score 4+3 involving 7 cores and on the right Robyn score 3+4 involving 6 cores, with positive perineural invasion.  He proceeded to undergo RRP by Dr. Colmenares on February 24, 2020.  Pathology revealed acinar adenocarcinoma Oakley 4+3 with focal ROBERTO, bladder neck base invasion, positive SVI, positive PNI, and multiple positive margins.  There was no LVSI and 0 out of 3 lymph nodes removed were involved with tumor; pT3bN0.  He received Eligard on 4/20/2020.  MRI prostate on 5/21/2020 which showed a 14 x 9 mm T2 intermediate structure in the prostatectomy bed at the left aspect of urinary bladder neck with restricted diffusion in the DWI images and early enhancement in the contrast  enhanced images. This was concerning for locally recurrent/residual  prostatic cancer.  There was a T2 hypointense structure in the in the area of removed  right seminal vesicle measuring 13 x 11 mm  demonstrating restricted  diffusion in the DWI images. This may represent a recurrent/residual  tumor. There were 2 bone metastases in the left pubic inferior ramus and  inferior aspect of left pubic body.   There is a 7 mm suspicious lymph nodes in the right obturator area  corresponding to to FACBC avid lymph node on the same date PET/CT  F-18 fluciclovine PET/CT on 5/20/2020 showed:  1.  2 foci of increased uptake in the left ischium, with underlying  sclerotic lesion, and symphysis pubis, these changes are indicative of  active prostate cancer metastasis.  2. Low-level increased uptake in 3 lymphnodes along the right external  and internal iliac nodes, the most suspicious of which is the deep  obturator.  3. Low-level increased uptake in the prostatectomy bed slightly to the  left side of the urethra, if clinically necessary, endoscopy would  would be necessary for confirmation  of this being metastasis.  PSA was down to 27.7 on 4/21/2020. On April 21, 2020 he received an Eligard injection.    He started Apalutamide on 6/12/2020.  Bone scan 1/22/2021: no bone mets.      2. Tinnitus b/l -  By 12/2020 he presented with 2 months c/o bilateral tinnitus and was evaluated by Dr. Shields from ENT. An audiogram showed a significant down-sloping mid to high frequency sensorineural hearing loss. There was no evidence of conductive hearing loss or significant asymmetry.  He was felt to have early onset presbycusis. He was referred for tinnitus retraining therapy.  Review of literature showed no clear association of apalutamide or Xgeva with tinnitus or ototoxicity. Felt to have early onset presbycusis.    3. Chronic lower back pain, with exacerbations- He is followed by Dr. Denise and  Dr. Clay from  pain management for evaluation of lower back pain.  He had CT of thoracic and lumbar spine on August 23, 2021.  This demonstrated unchanged sclerotic foci in T6 and T11 vertebral bodies dating back to May 2020.  There were degenerative changes in the lumbar spine with severe left neural foraminal narrowing at L4-L5 and moderate to severe at L3-L4.  He has proceeded with L3-4 epidural injection on 10/22/2021.  No acute osseous abnormalities were noted. His lower back pain and b/l pelvic pain has improved following epidural injection.     History Of Present Illness:  Mr. Etienne is a 56 year old male who presents for f/up of  metastatic hormone sensitive prostate cancer diagnosed in 01/2020, s/p RRP at that time but later found to have residual/recurrent disease in the pelvis and L inferior pubic body and pubic ramus metastasis.   He has been on androgen deprivation therapy since April 2020, and continues on Lupron every 3 months.     He started Apalutamide on 6/12/2020. He has remained on apalutamide.  He has not had any side effects attributable to apalutamide.   He is s/p epidural injection to L4-5 on  2021. I have reviewed Dr. Clay's note from  pain management.      Past Medical/Surgical History:  Past Medical History:   Diagnosis Date     Gout      Prostate cancer (H) 01/10/2020     Past Surgical History:   Procedure Laterality Date     BIOPSY PROSTATE TRANSRECTAL  01/10/2020    Dr. Colmenares     COLONOSCOPY  2020     LITHOTRIPSY  age 30s     MICROSCOPIC KNEE SURGERY Right age 30s     ORTHOPEDIC SURGERY Left 1985    Alan placed in Left Femur     PROSTATECTOMY RETROPUBIC RADICAL  2020    Dr. Colmenares     Allergies:  Allergies as of 2022     (No Known Allergies)     Current Medications:  Current Outpatient Medications   Medication     apalutamide (ERLEADA) 60 MG tablet     aspirin (ASA) 81 MG chewable tablet     denosumab (XGEVA) 120 MG/1.7ML SOLN injection     HYDROcodone-acetaminophen (NORCO) 5-325 MG tablet     leuprolide (ELIGARD) 45 MG kit     sildenafil (REVATIO) 20 MG tablet     No current facility-administered medications for this visit.       Family History:    His father was diagnosed with prostate cancer, at the age of 75. Paternal GM had throat cancer at the age of 94, nonsmoker.     Social History:  Social History     Socioeconomic History     Marital status:    Occupational History     Not on file   Social Needs     Food insecurity     Transportation needs   Tobacco Use     Smoking status: Former Smoker     Packs/day: 1.00     Years: 30.00     Pack years: 30.00     Types: Cigarettes, Cigars     Last attempt to quit: 2012     Years since quittin.4     Smokeless tobacco: Never Used   Substance and Sexual Activity     Alcohol use: Yes     Comment: 4-6 drinks per day - patient reports beer or vodka drinks     Physical Exam:      Wt Readings from Last 5 Encounters:   22 88 kg (194 lb)   22 89.8 kg (198 lb)   21 88.5 kg (195 lb)   21 86.2 kg (190 lb)   10/29/21 86.2 kg (190 lb)     Constitutional: alert and in no distress  Eyes: No redness or  discharge  Respiratory: No cough or labored breathing.  Musculoskeletal: Full range of motion in extremities.  Skin: no visible skin lesions or discoloration  Neurological: No tremors and denies headache.  Psychiatric: Mentation appears normal and affect is normal as well.  Alert and oriented x3.  The rest the comprehensive physical examination is deferred due to public health emergency video visit restrictions.        Laboratory/Imaging Studies  Labs reviewed.   Mild anemia with  Normal MCV  peripheral blood smear:  Slight normochromic normocytic anemia  The red cells appear normochromic.  Poikilocytosis is minimal. Polychromasia is not increased. Rouleaux formation is not increased. The morphology of the platelets is normal.  Leukocytes are quantitatively normal.  There is no dysplasia.  Reactive lymphocytes are noted.  BCx neg x2 prelim  UCx prelim negative    Recent Labs   Lab Test 04/21/22  0932 01/21/22  0850 10/29/21  0903 08/23/21  0843 07/22/21  1042    140 138 138 138   POTASSIUM 4.2 4.7 3.9 4.7 4.7   CHLORIDE 107 108 106 106 107   CO2 25 26 28 27 28   ANIONGAP 8 6 4 5 3   BUN 16 19 16 23 20   CR 1.05 0.89 1.04 0.91 0.90   * 127* 138* 107* 118*   NATALIYA 9.3 9.5 9.7 9.1 8.6     No results for input(s): MAG, PHOS in the last 67550 hours.  Recent Labs   Lab Test 04/21/22  0932 01/31/22  1533 01/21/22  0850 10/29/21  0903 08/23/21  0843   WBC 19.3* 6.2 20.1* 12.2* 7.3   HGB 14.5 13.6 14.2 13.9 14.1    246 249 268 255   MCV 90 92 91 91 89   NEUTROPHIL 86 62 90 80 69     Recent Labs   Lab Test 04/21/22  0932 01/21/22  0850 10/29/21  0903   BILITOTAL 0.4 0.4 0.3   ALKPHOS 72 67 81   ALT 33 42 33   AST 15 20 14   ALBUMIN 3.8 3.8 3.7     TSH   Date Value Ref Range Status   01/21/2022 1.03 0.40 - 4.00 mU/L Final   10/29/2021 1.91 0.40 - 4.00 mU/L Final   08/23/2021 2.49 0.40 - 4.00 mU/L Final   04/29/2021 2.57 0.40 - 4.00 mU/L Final   01/22/2021 3.46 0.40 - 4.00 mU/L Final   10/30/2020 1.97 0.40 -  4.00 mU/L Final     No results for input(s): CEA in the last 35667 hours.  Results for orders placed or performed in visit on 01/31/22   CT Chest w/o contrast    Narrative    EXAMINATION: CT CHEST W/O CONTRAST  1/31/2022 5:14 PM      CLINICAL HISTORY: Lung nodule <6 mm, high cancer risk.    COMPARISON: 9/23/2021, 7/19/2021.    TECHNIQUE: CT imaging obtained through the chest without intravenous  contrast. Coronal and axial MIP reformatted images obtained.    FINDINGS:  Lungs: Scattered pulmonary nodules are not significantly changed from  exam 8/23/2021 for exam 7/19/2021. For example the 4 mm fissural  associated nodule on series 3, image 149 on the right minor fissure is  stable. An additional fissural associated nodule located inferiorly on  the right minor fissure is also stable. 3 mm subpleural nodule in the  right middle lobe is also unchanged from exam 8/23/2021. Scattered  other tiny pulmonary nodules are not significantly changed.    No groundglass or consolidative opacities. Central tracheobronchial  tree is patent. No pleural effusion or pneumothorax.    Mediastinum:  Thyroid is normal. Heart size is normal. No pericardial effusion. Mild  coronary artery calcifications. Normal thoracic vasculature. No  thoracic lymphadenopathy.     Bones and soft tissues: No suspicious bone findings. Stable sclerotic  focus within the T6 vertebral body. Mild degenerative changes.    Partially imaged upper abdomen: Adrenal glands are normal. No other  focal lesion in the upper abdomen is demonstrated.      Impression    IMPRESSION:   Scattered subpleural pulmonary nodules and fissural associated lymph  nodes are not significantly changed when compared to prior exam.  Recommend continued yearly screening with low-dose CT if patient is  considered high risk.    I have personally reviewed the examination and initial interpretation  and I agree with the findings.    OLGA FUNK MD         SYSTEM ID:  [id number]     Recent  Labs   Lab Test 01/21/22  0850 10/29/21  0913 10/29/21  0903 08/23/21  0843 07/22/21  1042 04/29/21  1408   PSA <0.01  --  0.02 0.02 0.03 0.04   TESTOSTTOTAL 11* 3*  --  9* 10* 6*        ASSESSMENT/PLAN:  Jay is a very pleasant 56 year old man with metastatic (to the bones) prostate cancer. He is s/p Eligard on 4/21/2020. Jay has low volume hormone sensitive metastatic disease, with residual disease locoregionally and bone metastasis (two lesions- in left ischium and symphysis pubis), asymptomatic.  He received Eligard on 4/21/2020 and started on Apalutamide on 6/12/2020.    1.  Metastatic prostate cancer-PSA decreased to <0.01. Continue apalutamide daily and Lupron every 3 months.  We will continue to monitor the patient per treatment protocol. We will follow CBC differential, CMP, total testosterone level and PSA every 3 months.    2. Bone metastasis-continue Xgeva q 3 months for prevention of skeletal related events given low volume metastatic disease to the bones.      3.  Lung cancer screening- CT chest findings from 1/31/2022 negative. Next due in one year    4. Intermittent leucocytosis secondary to intermittent neutrophilia  Per has high total WBC count.  I explained that the white blood cell count comprises of several subtypes including the neutrophils, lymphocytes, basophils, eosinophils and other white cell precursors. Per primarily has neutrophilia     I explained the role of neutrophils and fighting infections.  In any situations of stress-primarily in the setting of infections the neutrophil counts go up.  These are our first line defense against infections.  On a careful review of his chart I have noted that he has persistent neutrophilia. This does not establish a diagnosis or a concern. We have check ups done at the times of sickness and most of these conditions are associated with rise in neutrophil counts.       I reviewed the different causes for neutrophilia.  Some patients can have  high neutrophil counts at baseline. Smoking can cause the neutrophil counts to remain persistently elevated. Neutrophil counts rise in response to most infections including viral, bacterial or parasitic. Medications like steroids cause white cell counts to rise.     Neutrophil counts do rise in the setting of chronic inflammatory rheumatologic conditions like systemic lupus erythematosus.     Rarely they can rise in the setting of primary hematologic disorders like acute chronic myelogenous leukemia.     I reviewed that we could go ahead and perform a work-up including reviewing a peripheral blood smear, BCR-ABL transcripts to evaluate for primary hematologic conditions like leukemia, flow cytometry for lymphoma, ALVERTO and rheumatoid factor for screening rheumatologic disease.    I have extensively reviewed the procedure of bone marrow biopsy. This is performed as an outpatient under sedation and with local anaesethetic. Bone marrow is the soft tissue inside bones that helps form blood cells. It is found in the hollow part of most bones. The procedure involves collection of bone marrow and a small amount of marrow fluid aspirate for analysis with a biopsy/aspirate needle. Only the finished cells are released in the circulation and bone marrow biopsy/aspirate helps assess precursor cells for abnormalities. It helps establish primary hematologic disorders like myelodysplastic/ myeloproliferative disorders, leukemia, lymphoma and multiple myeloma.         5. Drug monitoring- TSH monitoring q 3-4 months. TSH normal in 01/2022.     6. Mild normochromic normocytic anemia- continue to follow on CBCd, likely secondary to chronic inflammation, androgen deprivation therapy, apalutamide.    7. GI - on PPI for s/o GErD and cough in am with improvement of symptoms per GI (Brett Preston's note)- on Omeprazole PO BID. Apalutamide associated with the following GI symptoms: Decreased appetite (12%), diarrhea (9% to 20%), nausea (18%)  but not GERD s/p. Previously referred to GI due to persistent GERD symptoms and was continued by GI on Omeprazole and recommended to proceed with upper EGD for further evaluation. He did not follow through with upper EgD or  GI appt.    At the end of our visit patient verbalized understanding and concurred with the plan.  Chart documentation completed in part with Dragon voice-recognition software.  Even though reviewed some grammatical, spelling, and word errors may remain.    45 minutes spent on the date of the encounter doing chart review, review of test results, interpretation of tests, patient visit and documentation.            Again, thank you for allowing me to participate in the care of your patient.        Sincerely,        Scott Garcia MD

## 2022-04-22 LAB
ANA SER QL IF: NEGATIVE
RHEUMATOID FACT SER NEPH-ACNC: 7 IU/ML

## 2022-04-23 LAB — TESTOST SERPL-MCNC: 15 NG/DL (ref 240–950)

## 2022-04-25 LAB
PATH REPORT.COMMENTS IMP SPEC: NORMAL
PATH REPORT.FINAL DX SPEC: NORMAL
PATH REPORT.MICROSCOPIC SPEC OTHER STN: NORMAL
PATH REPORT.RELEVANT HX SPEC: NORMAL

## 2022-04-28 ENCOUNTER — MYC REFILL (OUTPATIENT)
Dept: FAMILY MEDICINE | Facility: CLINIC | Age: 57
End: 2022-04-28
Payer: COMMERCIAL

## 2022-04-28 DIAGNOSIS — C61 PROSTATE CANCER METASTATIC TO BONE (H): ICD-10-CM

## 2022-04-28 DIAGNOSIS — C79.51 PROSTATE CANCER METASTATIC TO BONE (H): ICD-10-CM

## 2022-04-28 DIAGNOSIS — M54.42 ACUTE LEFT-SIDED LOW BACK PAIN WITH LEFT-SIDED SCIATICA: ICD-10-CM

## 2022-04-28 RX ORDER — HYDROCODONE BITARTRATE AND ACETAMINOPHEN 5; 325 MG/1; MG/1
0.5 TABLET ORAL EVERY 4 HOURS PRN
Qty: 90 TABLET | Refills: 0 | Status: SHIPPED | OUTPATIENT
Start: 2022-04-28 | End: 2022-05-29

## 2022-04-28 NOTE — TELEPHONE ENCOUNTER
Routing refill request to provider for review/approval because:  Drug or supplies are not on the [facility] refill protocol     Karen GALON, RN

## 2022-05-05 DIAGNOSIS — C61 PROSTATE CANCER (H): ICD-10-CM

## 2022-05-05 RX ORDER — APALUTAMIDE 60 MG/1
TABLET, FILM COATED ORAL
Qty: 120 TABLET | Refills: 0 | OUTPATIENT
Start: 2022-05-05

## 2022-05-23 DIAGNOSIS — C61 PROSTATE CANCER (H): Primary | ICD-10-CM

## 2022-05-29 ENCOUNTER — MYC REFILL (OUTPATIENT)
Dept: FAMILY MEDICINE | Facility: CLINIC | Age: 57
End: 2022-05-29
Payer: COMMERCIAL

## 2022-05-29 DIAGNOSIS — C79.51 PROSTATE CANCER METASTATIC TO BONE (H): ICD-10-CM

## 2022-05-29 DIAGNOSIS — M54.42 ACUTE LEFT-SIDED LOW BACK PAIN WITH LEFT-SIDED SCIATICA: ICD-10-CM

## 2022-05-29 DIAGNOSIS — C61 PROSTATE CANCER METASTATIC TO BONE (H): ICD-10-CM

## 2022-05-31 RX ORDER — HYDROCODONE BITARTRATE AND ACETAMINOPHEN 5; 325 MG/1; MG/1
0.5 TABLET ORAL EVERY 4 HOURS PRN
Qty: 90 TABLET | Refills: 0 | Status: SHIPPED | OUTPATIENT
Start: 2022-05-31 | End: 2022-06-28

## 2022-05-31 NOTE — TELEPHONE ENCOUNTER
Routing refill request to provider for review/approval because:  Drug not on the [facility] refill protocol     Sigrid Schilling RN

## 2022-06-10 DIAGNOSIS — C61 PROSTATE CANCER (H): ICD-10-CM

## 2022-06-10 RX ORDER — APALUTAMIDE 60 MG/1
TABLET, FILM COATED ORAL
Qty: 120 TABLET | Refills: 0 | OUTPATIENT
Start: 2022-06-10

## 2022-06-22 ENCOUNTER — PATIENT OUTREACH (OUTPATIENT)
Dept: ONCOLOGY | Facility: CLINIC | Age: 57
End: 2022-06-22

## 2022-06-22 DIAGNOSIS — C61 PROSTATE CANCER (H): Primary | ICD-10-CM

## 2022-06-22 ASSESSMENT — PATIENT HEALTH QUESTIONNAIRE - PHQ9
10. IF YOU CHECKED OFF ANY PROBLEMS, HOW DIFFICULT HAVE THESE PROBLEMS MADE IT FOR YOU TO DO YOUR WORK, TAKE CARE OF THINGS AT HOME, OR GET ALONG WITH OTHER PEOPLE: NOT DIFFICULT AT ALL
SUM OF ALL RESPONSES TO PHQ QUESTIONS 1-9: 4
SUM OF ALL RESPONSES TO PHQ QUESTIONS 1-9: 4

## 2022-06-22 NOTE — PROGRESS NOTES
[facility]:  Cancer Care Note    Received telephone call from patient this afternoon, requesting assistance with getting a refill of his Erleada sent to [facility].  Per patient, he had called [facility] on Monday, and then again earlier today, to check on the status, and he was informed they had not received the prescription from us yet.    Patient's chart was reviewed.  Writer noted that a refill of his Erleada had already been sent to [facility] earlier this afternoon.  Patient was notified of this.     See Ordaz, RN, BSN, OCN  RN Care Coordinator - Oncology  [facility]

## 2022-06-23 ENCOUNTER — OFFICE VISIT (OUTPATIENT)
Dept: FAMILY MEDICINE | Facility: CLINIC | Age: 57
End: 2022-06-23
Payer: COMMERCIAL

## 2022-06-23 VITALS
TEMPERATURE: 97.3 F | RESPIRATION RATE: 16 BRPM | OXYGEN SATURATION: 99 % | SYSTOLIC BLOOD PRESSURE: 146 MMHG | HEIGHT: 69 IN | HEART RATE: 92 BPM | BODY MASS INDEX: 28.44 KG/M2 | DIASTOLIC BLOOD PRESSURE: 88 MMHG | WEIGHT: 192 LBS

## 2022-06-23 DIAGNOSIS — F11.90 CHRONIC, CONTINUOUS USE OF OPIOIDS: ICD-10-CM

## 2022-06-23 DIAGNOSIS — M54.16 LUMBAR RADICULOPATHY: ICD-10-CM

## 2022-06-23 DIAGNOSIS — Z79.899 ENCOUNTER FOR LONG-TERM (CURRENT) USE OF MEDICATIONS: Primary | ICD-10-CM

## 2022-06-23 DIAGNOSIS — I10 HYPERTENSION GOAL BP (BLOOD PRESSURE) < 140/90: ICD-10-CM

## 2022-06-23 PROCEDURE — 99214 OFFICE O/P EST MOD 30 MIN: CPT | Performed by: FAMILY MEDICINE

## 2022-06-23 RX ORDER — LISINOPRIL 10 MG/1
10 TABLET ORAL DAILY
Qty: 30 TABLET | Refills: 1 | Status: SHIPPED | OUTPATIENT
Start: 2022-06-23 | End: 2022-08-01

## 2022-06-23 ASSESSMENT — PATIENT HEALTH QUESTIONNAIRE - PHQ9
10. IF YOU CHECKED OFF ANY PROBLEMS, HOW DIFFICULT HAVE THESE PROBLEMS MADE IT FOR YOU TO DO YOUR WORK, TAKE CARE OF THINGS AT HOME, OR GET ALONG WITH OTHER PEOPLE: NOT DIFFICULT AT ALL
SUM OF ALL RESPONSES TO PHQ QUESTIONS 1-9: 4

## 2022-06-23 ASSESSMENT — ENCOUNTER SYMPTOMS: BACK PAIN: 1

## 2022-06-23 ASSESSMENT — PAIN SCALES - GENERAL: PAINLEVEL: MILD PAIN (3)

## 2022-06-23 NOTE — LETTER
Opioid / Opioid Plus Controlled Substance Agreement    This is an agreement between you and your provider about the safe and appropriate use of controlled substance/opioids prescribed by your care team. Controlled substances are medicines that can cause physical and mental dependence (abuse).    There are strict laws about having and using these medicines. We here at [facility] are committing to working with you in your efforts to get better. To support you in this work, we ll help you schedule regular office appointments for medicine refills. If we must cancel or change your appointment for any reason, we ll make sure you have enough medicine to last until your next appointment.     As a Provider, I will:    Listen carefully to your concerns and treat you with respect.     Recommend a treatment plan that I believe is in your best interest. This plan may involve therapies other than opioid pain medication.     Talk with you often about the possible benefits, and the risk of harm of any medicine that we prescribe for you.     Provide a plan on how to taper (discontinue or go off) using this medicine if the decision is made to stop its use.    As a Patient, I understand that opioid(s):     Are a controlled substance prescribed by my care team to help me function or work and manage my condition(s).     Are strong medicines and can cause serious side effects such as:    Drowsiness, which can seriously affect my driving ability    A lower breathing rate, enough to cause death    Harm to my thinking ability     Depression     Abuse of and addiction to this medicine    Need to be taken exactly as prescribed. Combining opioids with certain medicines or chemicals (such as illegal drugs, sedatives, sleeping pills, and benzodiazepines) can be dangerous or even fatal. If I stop opioids suddenly, I may have severe withdrawal symptoms.    Do not work for all types of pain nor for all patients. If they re not helpful, I may  be asked to stop them.        The risks, benefits and side effects of these medicine(s) were explained to me. I agree that:  1. I will take part in other treatments as advised by my care team. This may be psychiatry or counseling, physical therapy, behavioral therapy, group treatment or a referral to a specialist.     2. I will keep all my appointments. I understand that this is part of the monitoring of opioids. My care team may require an office visit for EVERY opioid/controlled substance refill. If I miss appointments or don t follow instructions, my care team may stop my medicine.    3. I will take my medicines as prescribed. I will not change the dose or schedule unless my care team tells me to. There will be no refills if I run out early.     4. I may be asked to come to the clinic and complete a urine drug test or complete a pill count at any time. If I don t give a urine sample or participate in a pill count, the care team may stop my medicine.    5. I will only receive prescriptions from this clinic for chronic pain. If I am treated by another provider for acute pain issues, I will tell them that I am taking opioid pain medication for chronic pain and that I have a treatment agreement with this provider. I will inform my [facility] care team within one business day if I am given a prescription for any pain medication by another healthcare provider. My [facility] care team can contact other providers and pharmacists about my use of any medicines.    6. It is up to me to make sure that I don t run out of my medicines on weekends or holidays. If my care team is willing to refill my opioid prescription without a visit, I must request refills only during office hours. Refills may take up to 3 business days to process. I will use one pharmacy to fill all my opioid and other controlled substance prescriptions. I will notify the clinic about any changes to my insurance or medication  availability.    7. I am responsible for my prescriptions. If the medicine/prescription is lost, stolen or destroyed, it will not be replaced. I also agree not to share controlled substance medicines with anyone.    8. I am aware I should not use any illegal or recreational drugs. I agree not to drink alcohol unless my care team says I can.       9. If I enroll in the [place] Medical Cannabis program, I will tell my care team prior to my next refill.     10. I will tell my care team right away if I become pregnant, have a new medical problem treated outside of my regular clinic, or have a change in my medications.    11. I understand that this medicine can affect my thinking, judgment and reaction time. Alcohol and drugs affect the brain and body, which can affect the safety of my driving. Being under the influence of alcohol or drugs can affect my decision-making, behaviors, personal safety, and the safety of others. Driving while impaired (DWI) can occur if a person is driving, operating, or in physical control of a car, motorcycle, boat, snowmobile, ATV, motorbike, off-road vehicle, or any other motor vehicle ([place] Statute 169A.20). I understand the risk if I choose to drive or operate any vehicle or machinery.    I understand that if I do not follow any of the conditions above, my prescriptions or treatment may be stopped or changed.          Opioids  What You Need to Know    What are opioids?   Opioids are pain medicines that must be prescribed by a doctor. They are also known as narcotics.     Examples are:   1. morphine (MS Contin, Cherie)  2. oxycodone (Oxycontin)  3. oxycodone and acetaminophen (Percocet)  4. hydrocodone and acetaminophen (Vicodin, Norco)   5. fentanyl patch (Duragesic)   6. hydromorphone (Dilaudid)   7. methadone  8. codeine (Tylenol #3)     What do opioids do well?   Opioids are best for severe short-term pain such as after a surgery or injury. They may work well for cancer pain. They may  help some people with long-lasting (chronic) pain.     What do opioids NOT do well?   Opioids never get rid of pain entirely, and they don t work well for most patients with chronic pain. Opioids don t reduce swelling, one of the causes of pain.                                    Other ways to manage chronic pain and improve function include:       Treat the health problem that may be causing pain    Anti-inflammation medicines, which reduce swelling and tenderness, such as ibuprofen (Advil, Motrin) or naproxen (Aleve)    Acetaminophen (Tylenol)    Antidepressants and anti-seizure medicines, especially for nerve pain    Topical treatments such as patches or creams    Injections or nerve blocks    Chiropractic or osteopathic treatment    Acupuncture, massage, deep breathing, meditation, visual imagery, aromatherapy    Use heat or ice at the pain site    Physical therapy     Exercise    Stop smoking    Take part in therapy       Risks and side effects     Talk to your doctor before you start or decide to keep taking opioids. Possible side effects include:      Lowering your breathing rate enough to cause death    Overdose, including death, especially if taking higher than prescribed doses    Worse depression symptoms; less pleasure in things you usually enjoy    Feeling tired or sluggish    Slower thoughts or cloudy thinking    Being more sensitive to pain over time; pain is harder to control    Trouble sleeping or restless sleep    Changes in hormone levels (for example, less testosterone)    Changes in sex drive or ability to have sex    Constipation    Unsafe driving    Itching and sweating    Dizziness    Nausea, throwing up and dry mouth    What else should I know about opioids?    Opioids may lead to dependence, tolerance, or addiction.      Dependence means that if you stop or reduce the medicine too quickly, you will have withdrawal symptoms. These include loose poop (diarrhea), jitters, flu-like symptoms,  nervousness and tremors. Dependence is not the same as addiction.                       Tolerance means needing higher doses over time to get the same effect. This may increase the chance of serious side effects.      Addiction is when people improperly use a substance that harms their body, their mind or their relations with others. Use of opiates can cause a relapse of addiction if you have a history of drug or alcohol abuse.      People who have used opioids for a long time may have a lower quality of life, worse depression, higher levels of pain and more visits to doctors.    You can overdose on opioids. Take these steps to lower your risk of overdose:    1. Recognize the signs:  Signs of overdose include decrease or loss of consciousness (blackout), slowed breathing, trouble waking up and blue lips. If someone is worried about overdose, they should call 911.    2. Talk to your doctor about Narcan (naloxone).   If you are at risk for overdose, you may be given a prescription for Narcan. This medicine very quickly reverses the effects of opioids.   If you overdose, a friend or family member can give you Narcan while waiting for the ambulance. They need to know the signs of overdose and how to give Narcan.     3. Don't use alcohol or street drugs.   Taking them with opioids can cause death.    4. Do not take any of these medicines unless your doctor says it s OK. Taking these with opioids can cause death:    Benzodiazepines, such as lorazepam (Ativan), alprazolam (Xanax) or diazepam (Valium)    Muscle relaxers, such as cyclobenzaprine (Flexeril)    Sleeping pills like zolpidem (Ambien)     Other opioids      How to keep you and other people safe while taking opioids:    1. Never share your opioids with others.  Opioid medicines are regulated by the Drug Enforcement Agency ([place]). Selling or sharing medications is a criminal act.    2. Be sure to store opioids in a secure place, locked up if possible. Young children  can easily swallow them and overdose.    3. When you are traveling with your medicines, keep them in the original bottles. If you use a pill box, be sure you also carry a copy of your medicine list from your clinic or pharmacy.    4. Safe disposal of opioids    Most pharmacies have places to get rid of medicine, called disposal kiosks. Medicine disposal options are also available in every [facility]. Search your county and  medication disposal  to find more options. You can find more details at:  [email or web address]     I agree that my provider, clinic care team, and pharmacy may work with any city, state or federal law enforcement agency that investigates the misuse, sale, or other diversion of my controlled medicine. I will allow my provider to discuss my care with, or share a copy of, this agreement with any other treating provider, pharmacy or emergency room where I receive care.    I have read this agreement and have asked questions about anything I did not understand.    _______________________________________________________  Patient Signature - Per Etienne _____________________                   Date     _______________________________________________________  Provider Signature - Per Chen MD   _____________________                   Date     _______________________________________________________  Witness Signature (required if provider not present while patient signing)   _____________________                   Date

## 2022-06-23 NOTE — PROGRESS NOTES
"  Assessment & Plan     Encounter for long-term (current) use of medications      Lumbar radiculopathy    - Pain Management Referral; Future    Hypertension goal BP (blood pressure) < 140/90    - lisinopril (ZESTRIL) 10 MG tablet; Take 1 tablet (10 mg) by mouth daily  - BASIC METABOLIC PANEL; Future  - CBC with Platelets  ; Future  - UA reflex to Microscopic; Future  - Uric acid; Future    Chronic, continuous use of opioids                 BMI:   Estimated body mass index is 28.35 kg/m  as calculated from the following:    Height as of this encounter: 1.753 m (5' 9\").    Weight as of this encounter: 87.1 kg (192 lb).           Return in about 4 weeks (around 7/21/2022) for BP Recheck.    Per Chen MD  [facility]    Roland Thompson is a 56 year old, presenting for the following health issues:  Back Pain      Back Pain     History of Present Illness       Reason for visit:  Merline consumes 4 sweetened beverage(s) daily. He exercises with enough effort to increase his heart rate 3 or less days per week.   He is taking medications regularly.    Today's PHQ-9         PHQ-9 Total Score: 4    PHQ-9 Q9 Thoughts of better off dead/self-harm past 2 weeks :   Not at all    How difficult have these problems made it for you to do your work, take care of things at home, or get along with other people: Not difficult at all       Pain History:  When did you first notice your pain? - chronic  Have you seen anyone else for your pain? Yes - Dr. Valladares  Where in your body do you have pain? Back Pain  Onset/Duration: 6/2019  Description:   Location of pain: low back bilateral  Character of pain: sharp  Pain radiation: radiates into the left leg  New numbness or weakness in legs, not attributed to pain: no   Intensity: Currently 3/10  Progression of Symptoms: worsening  History:   Specific cause: chip in back and arthritis  Pain interferes with job: YES  History of back problems: previous osteoarthritis " of lumbar spine  Any previous MRI or X-rays: Yes- at [place].  Date 21  Sees a specialist for back pain: Yes, Dr. Jeffreis, no contact or appointment made at this time  Alleviating factors:   Improved by: hydrocodone and injections    Precipitating factors:  Worsened by: Nothing  Therapies tried and outcome: opioids and steroid injection    Accompanying Signs & Symptoms:  Risk of Fracture:   Risk of Cauda Equina:   Risk of Infection:   Risk of Cancer:   Risk of Ankylosing Spondylitis: Onset at age <35, male, AND morning back stiffness               Review of Systems   Musculoskeletal: Positive for back pain.            Objective    There were no vitals taken for this visit.  There is no height or weight on file to calculate BMI.  Physical Exam                       .  ..   --------------------------------------------------------------------------------------------------------------------------------------  Patient Name:  Per Etienne  Patient's :  1965  Date: 2022  Pain Diagnosis:  Encounter for long-term (current) use of medications  Current Outpatient Medications   Medication Sig Dispense Refill     apalutamide (ERLEADA) 60 MG tablet Take 4 tablets (240 mg) by mouth daily for 30 days 120 tablet 0     aspirin (ASA) 81 MG chewable tablet Take 1 tablet (81 mg) by mouth daily       denosumab (XGEVA) 120 MG/1.7ML SOLN injection Inject 1.7 mLs (120 mg) Subcutaneous every 3 months 1.7 mL 0     HYDROcodone-acetaminophen (NORCO) 5-325 MG tablet Take 0.5 tablets by mouth every 4 hours as needed for severe pain 90 tablet 0     leuprolide (ELIGARD) 45 MG kit Inject 45 mg Subcutaneous every 6 months       omeprazole (PRILOSEC) 20 MG DR capsule TAKE 1 CAPSULE BY MOUTH TWICE A  capsule 1     UNABLE TO FIND 1 tablet daily MEDICATION NAME: C, D, Zinc combination supplement       sildenafil (REVATIO) 20 MG tablet Take 1 to 5 tabs 30-60 minutes before intercourse.  Never use with nitroglycerin,  terazosin or doxazosin. (Patient not taking: Reported on 6/23/2022) 30 tablet 11         Subjective:  SUBJECTIVE: Per is a 56 year old male who presents for a scheduled visit regarding his chronic back pain. The patient reports that his pain is  worse since the last visit. The patient believes that the reason his pain is worse is unknown to him.     He is taking daily chemo and a medication to lower his testosterone and a medication to help his bone density.     He reports that the pain starts in his low back and radiated down the left lateral thigh and just beyond the knee.     He had an epidural steroid injection on 12-20-21 with Dr Clay  He reports that it  helped reduce his pain by 80%     He is taking hydrocodone lately.   He takes 3 tabs a day(s) on average          The patient reports that the medication has been helpful to improve these activities of daily living: taking care of his cabin. The patient reports that he is taking the medication as prescribed. The patient reports that the last time he took the medication was today  The patient denies any side effects from his current medication. He denies constipation.     The patient does not have narcan available.        Current Outpatient Medications:      apalutamide (ERLEADA) 60 MG tablet, Take 4 tablets (240 mg) by mouth daily for 30 days, Disp: 120 tablet, Rfl: 0     aspirin (ASA) 81 MG chewable tablet, Take 1 tablet (81 mg) by mouth daily, Disp: , Rfl:      denosumab (XGEVA) 120 MG/1.7ML SOLN injection, Inject 1.7 mLs (120 mg) Subcutaneous every 3 months, Disp: 1.7 mL, Rfl: 0     HYDROcodone-acetaminophen (NORCO) 5-325 MG tablet, Take 0.5 tablets by mouth every 4 hours as needed for severe pain, Disp: 90 tablet, Rfl: 0     leuprolide (ELIGARD) 45 MG kit, Inject 45 mg Subcutaneous every 6 months, Disp: , Rfl:      omeprazole (PRILOSEC) 20 MG DR capsule, TAKE 1 CAPSULE BY MOUTH TWICE A DAY, Disp: 180 capsule, Rfl: 1     UNABLE TO FIND, 1 tablet  daily MEDICATION NAME: C, D, Zinc combination supplement, Disp: , Rfl:      sildenafil (REVATIO) 20 MG tablet, Take 1 to 5 tabs 30-60 minutes before intercourse.  Never use with nitroglycerin, terazosin or doxazosin. (Patient not taking: Reported on 6/23/2022), Disp: 30 tablet, Rfl: 11    Social History     Socioeconomic History     Marital status:      Spouse name: None     Number of children: None     Years of education: None     Highest education level: None   Tobacco Use     Smoking status: Former Smoker     Packs/day: 1.00     Years: 30.00     Pack years: 30.00     Types: Cigarettes, Cigars     Quit date: 1/2/2012     Years since quitting: 10.4     Smokeless tobacco: Never Used   Vaping Use     Vaping Use: Never used   Substance and Sexual Activity     Alcohol use: Yes     Comment: 4-6 drinks per day - patient reports beer or vodka drinks     Drug use: No     Sexual activity: Yes     Partners: Female         See images and flowsheet for patient's report of condition.   Objective:  General:  Per is awake, alert, and cooperative and in no apparent distress.       The pain contract was last renewed in the last 12 months:  No  I had the patient read and sign the narcotic agreement letter and requested it to be scanned into the chart.     Assessment: Stable Opioid Maintenance Analgesia.          Plan:   Continue opioid maintenance analgesia without changes:     I referred the patient to the pain management clinic for a repeat epidural steroid injection     Continue opioid maintenance analgesia with the following changes:   The patient was instructed to follow up in 90 days. he will call for a refill until the next scheduled appointment.    Urine drug screen today: no      (I10) Hypertension goal BP (blood pressure) < 140/90  Comment:   Plan: lisinopril (ZESTRIL) 10 MG tablet, BASIC         METABOLIC PANEL, CBC with Platelets  , UA         reflex to Microscopic, Uric acid          Follow up in 4 weeks for  "a blood pressure recheck            F11.90 \"Chronic, continuous use of Opioids \" has been added to the Problem List and [facility]:[id number]  has been added to the overview Yes     How to change Problem List:  To do this, select Change Dx, and enter Chronic, continuous use of Opioids (or F11.90).  This will update the Problem List diagnosis code while maintaining the content of the Problem List overview related to the plan of care for opioid therapy.  The F11.90 code will be attached to the quality measure in the near future.         "

## 2022-06-24 ENCOUNTER — TELEPHONE (OUTPATIENT)
Dept: PALLIATIVE MEDICINE | Facility: CLINIC | Age: 57
End: 2022-06-24

## 2022-06-28 ENCOUNTER — MYC REFILL (OUTPATIENT)
Dept: FAMILY MEDICINE | Facility: CLINIC | Age: 57
End: 2022-06-28

## 2022-06-28 DIAGNOSIS — M54.42 ACUTE LEFT-SIDED LOW BACK PAIN WITH LEFT-SIDED SCIATICA: ICD-10-CM

## 2022-06-28 DIAGNOSIS — C61 PROSTATE CANCER METASTATIC TO BONE (H): ICD-10-CM

## 2022-06-28 DIAGNOSIS — C79.51 PROSTATE CANCER METASTATIC TO BONE (H): ICD-10-CM

## 2022-06-28 RX ORDER — HYDROCODONE BITARTRATE AND ACETAMINOPHEN 5; 325 MG/1; MG/1
0.5 TABLET ORAL EVERY 4 HOURS PRN
Qty: 90 TABLET | Refills: 0 | Status: SHIPPED | OUTPATIENT
Start: 2022-06-28 | End: 2022-07-29

## 2022-07-07 NOTE — TELEPHONE ENCOUNTER
Routing to pt's oncology team (Dr Garcia) to verify if pt is safe from their standpoint to undergo the following outpatient procedure: lumbar epidural steroid injection     Cassy WETZEL RN Care Coordinator  [facility]

## 2022-07-10 DIAGNOSIS — C61 PROSTATE CANCER (H): ICD-10-CM

## 2022-07-11 NOTE — TELEPHONE ENCOUNTER
ERLEADA Refill   Last prescribing provider: Dr Garcia     Last clinic visit date: 4/21/22 Dr Garcia     Any missed appointments or no-shows since last clinic visit?: N/A    Recommendations for requested medication (if none, N/A): Copied from chart note 4/21/22 Dr Garcia   He started Apalutamide on 6/12/2020. He has remained on apalutamide. He has not had any side effects attributable to apalutamide.   He is s/p epidural injection to L4-5 on 12/20/2021. I have reviewed Dr. Clay's note from [facility] pain management.      Next clinic visit date: 7/21/22 Dr Garcia     Any other pertinent information (if none, N/A): N/A

## 2022-07-12 RX ORDER — APALUTAMIDE 60 MG/1
TABLET, FILM COATED ORAL
Qty: 120 TABLET | Refills: 0 | Status: SHIPPED | OUTPATIENT
Start: 2022-07-12 | End: 2022-08-09

## 2022-07-13 DIAGNOSIS — C61 PROSTATE CANCER (H): ICD-10-CM

## 2022-07-13 RX ORDER — APALUTAMIDE 60 MG/1
TABLET, FILM COATED ORAL
Qty: 120 TABLET | Refills: 0 | OUTPATIENT
Start: 2022-07-13

## 2022-07-13 NOTE — TELEPHONE ENCOUNTER
Have not heard back.  Routed to Na Astudillo MD Dr. Elimelakh is it safe for pt to have a lumbar epidural steroid injection?      Shefali RN-BSN  [facility]

## 2022-07-14 NOTE — TELEPHONE ENCOUNTER
Okay to schedule.      Shefali RN-BSN  [facility]

## 2022-07-14 NOTE — TELEPHONE ENCOUNTER
Reviewed with [facility], as well as Ania Jensen, LYNDA.  Both advise they see no contraindication or concerns with the patient proceeding with a lumbar epidural steroid injection at this time, from an Oncology standpoint.    See Ordaz, RN, BSN, OCN  RN Care Coordinator - Oncology  [facility]

## 2022-07-19 NOTE — TELEPHONE ENCOUNTER
Pt scheduled 8/3    Closing    Cassy WETZEL RN Care Coordinator  [facility]

## 2022-07-21 ENCOUNTER — INFUSION THERAPY VISIT (OUTPATIENT)
Dept: INFUSION THERAPY | Facility: CLINIC | Age: 57
End: 2022-07-21
Payer: COMMERCIAL

## 2022-07-21 ENCOUNTER — LAB (OUTPATIENT)
Dept: LAB | Facility: CLINIC | Age: 57
End: 2022-07-21

## 2022-07-21 ENCOUNTER — ONCOLOGY VISIT (OUTPATIENT)
Dept: ONCOLOGY | Facility: CLINIC | Age: 57
End: 2022-07-21
Payer: COMMERCIAL

## 2022-07-21 VITALS
BODY MASS INDEX: 28.14 KG/M2 | SYSTOLIC BLOOD PRESSURE: 121 MMHG | WEIGHT: 190 LBS | DIASTOLIC BLOOD PRESSURE: 80 MMHG | HEART RATE: 87 BPM | OXYGEN SATURATION: 98 % | HEIGHT: 69 IN

## 2022-07-21 DIAGNOSIS — C79.51 PROSTATE CANCER METASTATIC TO BONE (H): ICD-10-CM

## 2022-07-21 DIAGNOSIS — C61 PROSTATE CANCER (H): Primary | ICD-10-CM

## 2022-07-21 DIAGNOSIS — R91.8 PULMONARY NODULES: ICD-10-CM

## 2022-07-21 DIAGNOSIS — C79.51 BONE METASTASIS: ICD-10-CM

## 2022-07-21 DIAGNOSIS — M79.606 PAIN OF LOWER EXTREMITY, UNSPECIFIED LATERALITY: ICD-10-CM

## 2022-07-21 DIAGNOSIS — C61 PROSTATE CANCER METASTATIC TO BONE (H): ICD-10-CM

## 2022-07-21 DIAGNOSIS — C61 PROSTATE CANCER (H): ICD-10-CM

## 2022-07-21 LAB
ALBUMIN SERPL-MCNC: 3.9 G/DL (ref 3.4–5)
ALP SERPL-CCNC: 80 U/L (ref 40–150)
ALT SERPL W P-5'-P-CCNC: 34 U/L (ref 0–70)
ANION GAP SERPL CALCULATED.3IONS-SCNC: 6 MMOL/L (ref 3–14)
AST SERPL W P-5'-P-CCNC: 18 U/L (ref 0–45)
BASOPHILS # BLD AUTO: 0.1 10E3/UL (ref 0–0.2)
BASOPHILS NFR BLD AUTO: 1 %
BILIRUB SERPL-MCNC: 0.4 MG/DL (ref 0.2–1.3)
BUN SERPL-MCNC: 17 MG/DL (ref 7–30)
CALCIUM SERPL-MCNC: 9.2 MG/DL (ref 8.5–10.1)
CHLORIDE BLD-SCNC: 106 MMOL/L (ref 94–109)
CO2 SERPL-SCNC: 29 MMOL/L (ref 20–32)
CREAT SERPL-MCNC: 0.99 MG/DL (ref 0.66–1.25)
EOSINOPHIL # BLD AUTO: 0.2 10E3/UL (ref 0–0.7)
EOSINOPHIL NFR BLD AUTO: 2 %
ERYTHROCYTE [DISTWIDTH] IN BLOOD BY AUTOMATED COUNT: 11.8 % (ref 10–15)
GFR SERPL CREATININE-BSD FRML MDRD: 88 ML/MIN/1.73M2
GLUCOSE BLD-MCNC: 97 MG/DL (ref 70–99)
HCT VFR BLD AUTO: 39.8 % (ref 40–53)
HGB BLD-MCNC: 13.7 G/DL (ref 13.3–17.7)
IMM GRANULOCYTES # BLD: 0 10E3/UL
IMM GRANULOCYTES NFR BLD: 0 %
LYMPHOCYTES # BLD AUTO: 2.2 10E3/UL (ref 0.8–5.3)
LYMPHOCYTES NFR BLD AUTO: 29 %
MCH RBC QN AUTO: 31.3 PG (ref 26.5–33)
MCHC RBC AUTO-ENTMCNC: 34.4 G/DL (ref 31.5–36.5)
MCV RBC AUTO: 91 FL (ref 78–100)
MONOCYTES # BLD AUTO: 0.9 10E3/UL (ref 0–1.3)
MONOCYTES NFR BLD AUTO: 12 %
NEUTROPHILS # BLD AUTO: 4.2 10E3/UL (ref 1.6–8.3)
NEUTROPHILS NFR BLD AUTO: 56 %
NRBC # BLD AUTO: 0 10E3/UL
NRBC BLD AUTO-RTO: 0 /100
PLATELET # BLD AUTO: 234 10E3/UL (ref 150–450)
POTASSIUM BLD-SCNC: 4.1 MMOL/L (ref 3.4–5.3)
PROT SERPL-MCNC: 7.1 G/DL (ref 6.8–8.8)
PSA SERPL-MCNC: <0.01 UG/L (ref 0–4)
RBC # BLD AUTO: 4.38 10E6/UL (ref 4.4–5.9)
SODIUM SERPL-SCNC: 141 MMOL/L (ref 133–144)
WBC # BLD AUTO: 7.5 10E3/UL (ref 4–11)

## 2022-07-21 PROCEDURE — 99214 OFFICE O/P EST MOD 30 MIN: CPT | Performed by: INTERNAL MEDICINE

## 2022-07-21 PROCEDURE — 99207 PR NO CHARGE LOS: CPT

## 2022-07-21 PROCEDURE — 84403 ASSAY OF TOTAL TESTOSTERONE: CPT

## 2022-07-21 PROCEDURE — 96402 CHEMO HORMON ANTINEOPL SQ/IM: CPT | Performed by: NURSE PRACTITIONER

## 2022-07-21 PROCEDURE — 96372 THER/PROPH/DIAG INJ SC/IM: CPT | Mod: 59 | Performed by: NURSE PRACTITIONER

## 2022-07-21 PROCEDURE — 80053 COMPREHEN METABOLIC PANEL: CPT

## 2022-07-21 PROCEDURE — 85025 COMPLETE CBC W/AUTO DIFF WBC: CPT

## 2022-07-21 PROCEDURE — 84153 ASSAY OF PSA TOTAL: CPT

## 2022-07-21 PROCEDURE — 36415 COLL VENOUS BLD VENIPUNCTURE: CPT

## 2022-07-21 ASSESSMENT — PAIN SCALES - GENERAL: PAINLEVEL: NO PAIN (0)

## 2022-07-21 NOTE — PROGRESS NOTES
Infusion Nursing Note:  Per Etienne presents today for   Chief Complaint   Patient presents with     Allied Health Visit     Xgeva/Lupron       Patient seen by provider today: Yes: Dr. Garcia     present during visit today: Not Applicable.    Note: N/A.    Intravenous Access:  No Intravenous access/labs at this visit.    Treatment Conditions:  Not Applicable.    Post Infusion Assessment:  Patient tolerated injection without incident.  Site patent and intact, free from redness, edema or discomfort.     Discharge Plan:   Patient discharged in stable condition accompanied by: self.  Departure Mode: Ambulatory.      Viky Miller CMA

## 2022-07-21 NOTE — LETTER
7/21/2022         RE: Per Etienne  [id number] [facility] [id number]        Dear Colleague,    Thank you for referring your patient, Per Etienne, to the [facility]. Please see a copy of my visit note below.    Oncology Follow-up:  Date on this visit: Jul 21, 2022    Primary care physician: Per Chen   Urologist: Dr. Angel Colmenares     Metastatic hormone sensitive prostate cancer    Oncologic History:  1. Metastatic hormone sensitive prostate cancer  He presented to his PCP in December 2019 with slow urinary stream.  PSA was checked and was found to be 124. On January 8, 2020 abdomen pelvis CT showed groundglass opacity in the left lower lobe,  dystrophic calcification of the prostate gland, and a fatty liver.  Same-day bone scan was negative. On January 10, 2010 prostate biopsy showed on the left Robyn score 4+3 involving 7 cores and on the right Robyn score 3+4 involving 6 cores, with positive perineural invasion.  He proceeded to undergo RRP by Dr. Colmenares on February 24, 2020.  Pathology revealed acinar adenocarcinoma Robyn 4+3 with focal ROBERTO, bladder neck base invasion, positive SVI, positive PNI, and multiple positive margins.  There was no LVSI and 0 out of 3 lymph nodes removed were involved with tumor; pT3bN0.  He received Eligard on 4/20/2020.  MRI prostate on 5/21/2020 which showed a 14 x 9 mm T2 intermediate structure in the prostatectomy bed at the left aspect of urinary bladder neck with restricted diffusion in the DWI images and early enhancement in the contrast  enhanced images. This was concerning for locally recurrent/residual  prostatic cancer.  There was a T2 hypointense structure in the in the area of removed  right seminal vesicle measuring 13 x 11 mm  demonstrating restricted  diffusion in the DWI images. This may represent a recurrent/residual  tumor. There were 2 bone metastases in the left pubic inferior ramus and  inferior  aspect of left pubic body.   There is a 7 mm suspicious lymph nodes in the right obturator area  corresponding to to FACBC avid lymph node on the same date PET/CT  F-18 fluciclovine PET/CT on 5/20/2020 showed:  1.  2 foci of increased uptake in the left ischium, with underlying  sclerotic lesion, and symphysis pubis, these changes are indicative of  active prostate cancer metastasis.  2. Low-level increased uptake in 3 lymphnodes along the right external  and internal iliac nodes, the most suspicious of which is the deep  obturator.  3. Low-level increased uptake in the prostatectomy bed slightly to the  left side of the urethra, if clinically necessary, endoscopy would  would be necessary for confirmation of this being metastasis.  PSA was down to 27.7 on 4/21/2020. On April 21, 2020 he received an Eligard injection.    He started Apalutamide on 6/12/2020.  Bone scan 1/22/2021: no bone mets.      2. Tinnitus b/l -  By 12/2020 he presented with 2 months c/o bilateral tinnitus and was evaluated by Dr. Shields from ENT. An audiogram showed a significant down-sloping mid to high frequency sensorineural hearing loss. There was no evidence of conductive hearing loss or significant asymmetry.  He was felt to have early onset presbycusis. He was referred for tinnitus retraining therapy.  Review of literature showed no clear association of apalutamide or Xgeva with tinnitus or ototoxicity. Felt to have early onset presbycusis.    3. Chronic lower back pain, with exacerbations- He is followed by Dr. Denise and  Dr. Clay from  pain management for evaluation of lower back pain.  He had CT of thoracic and lumbar spine on August 23, 2021.  This demonstrated unchanged sclerotic foci in T6 and T11 vertebral bodies dating back to May 2020.  There were degenerative changes in the lumbar spine with severe left neural foraminal narrowing at L4-L5 and moderate to severe at L3-L4.  He has proceeded with L3-4 epidural injection on  10/22/2021.  No acute osseous abnormalities were noted. His lower back pain and b/l pelvic pain has improved following epidural injection.     History Of Present Illness:  Mr. Etienne is a 56 year old male who presents for f/up of  metastatic hormone sensitive prostate cancer diagnosed in 01/2020, s/p RRP at that time but later found to have residual/recurrent disease in the pelvis and L inferior pubic body and pubic ramus metastasis.   He has been on androgen deprivation therapy since April 2020, and continues on Lupron every 3 months.     He started Apalutamide on 6/12/2020. He has remained on apalutamide.  He has not had any side effects attributable to apalutamide.   He is s/p epidural injection to L4-5 on 12/20/2021.    Bill has been doing well and has no new complaints at this clinic visit.  He has been tolerating his apalutamide without any side effects.    Past Medical/Surgical History:  Past Medical History:   Diagnosis Date     Gout      Prostate cancer (H) 01/10/2020     Past Surgical History:   Procedure Laterality Date     BIOPSY PROSTATE TRANSRECTAL  01/10/2020    Dr. Colmenares     COLONOSCOPY  5/2020     LITHOTRIPSY  age 30s     MICROSCOPIC KNEE SURGERY Right age 30s     ORTHOPEDIC SURGERY Left 1985    Alan placed in Left Femur     PROSTATECTOMY RETROPUBIC RADICAL  02/24/2020    Dr. Colmenares     Allergies:  Allergies as of 07/21/2022     (No Known Allergies)     Current Medications:  Current Outpatient Medications   Medication     apalutamide (ERLEADA) 60 MG tablet     aspirin (ASA) 81 MG chewable tablet     denosumab (XGEVA) 120 MG/1.7ML SOLN injection     HYDROcodone-acetaminophen (NORCO) 5-325 MG tablet     leuprolide (ELIGARD) 45 MG kit     sildenafil (REVATIO) 20 MG tablet     No current facility-administered medications for this visit.       Family History:    His father was diagnosed with prostate cancer, at the age of 75. Paternal GM had throat cancer at the age of 94, nonsmoker.     Social  History:  Social History     Socioeconomic History     Marital status:    Occupational History     Not on file   Social Needs     Food insecurity     Transportation needs   Tobacco Use     Smoking status: Former Smoker     Packs/day: 1.00     Years: 30.00     Pack years: 30.00     Types: Cigarettes, Cigars     Last attempt to quit: 2012     Years since quittin.4     Smokeless tobacco: Never Used   Substance and Sexual Activity     Alcohol use: Yes     Comment: 4-6 drinks per day - patient reports beer or vodka drinks     Physical Exam:      Wt Readings from Last 5 Encounters:   22 86.2 kg (190 lb)   22 87.1 kg (192 lb)   22 88 kg (194 lb)   22 89.8 kg (198 lb)   21 88.5 kg (195 lb)     Constitutional: alert and in no distress  Eyes: No redness or discharge  Respiratory: No cough or labored breathing.  Musculoskeletal: Full range of motion in extremities.  Skin: no visible skin lesions or discoloration  Neurological: No tremors and denies headache.  Psychiatric: Mentation appears normal and affect is normal as well.  Alert and oriented x3.  The rest the comprehensive physical examination is deferred due to public health emergency video visit restrictions.        Laboratory/Imaging Studies  Labs reviewed.   Mild anemia with  Normal MCV  peripheral blood smear:  Slight normochromic normocytic anemia  The red cells appear normochromic.  Poikilocytosis is minimal. Polychromasia is not increased. Rouleaux formation is not increased. The morphology of the platelets is normal.  Leukocytes are quantitatively normal.  There is no dysplasia.  Reactive lymphocytes are noted.  BCx neg x2 prelim  UCx prelim negative    Recent Labs   Lab Test 22  1434 22  0932 22  0850 10/29/21  0903 21  0843    140 140 138 138   POTASSIUM 4.1 4.2 4.7 3.9 4.7   CHLORIDE 106 107 108 106 106   CO2 29 25 26 28 27   ANIONGAP 6 8 6 4 5   BUN 17 16 19 16 23   CR 0.99 1.05 0.89  1.04 0.91   GLC 97 130* 127* 138* 107*   NATALIYA 9.2 9.3 9.5 9.7 9.1     No results for input(s): MAG, PHOS in the last 78157 hours.  Recent Labs   Lab Test 07/21/22  1434 04/21/22  0932 01/31/22  1533 01/21/22  0850 10/29/21  0903   WBC 7.5 19.3* 6.2 20.1* 12.2*   HGB 13.7 14.5 13.6 14.2 13.9    292 246 249 268   MCV 91 90 92 91 91   NEUTROPHIL 56 86 62 90 80     Recent Labs   Lab Test 07/21/22  1434 04/21/22  0932 01/21/22  0850   BILITOTAL 0.4 0.4 0.4   ALKPHOS 80 72 67   ALT 34 33 42   AST 18 15 20   ALBUMIN 3.9 3.8 3.8     TSH   Date Value Ref Range Status   01/21/2022 1.03 0.40 - 4.00 mU/L Final   10/29/2021 1.91 0.40 - 4.00 mU/L Final   08/23/2021 2.49 0.40 - 4.00 mU/L Final   04/29/2021 2.57 0.40 - 4.00 mU/L Final   01/22/2021 3.46 0.40 - 4.00 mU/L Final   10/30/2020 1.97 0.40 - 4.00 mU/L Final     ALVERTO interpretation Negative Negative     Comment:    Negative:              <1:40   Borderline Positive:   1:40 - 1:80   Positive:              >1:80         Component    Case Report   Flow Cytometry Report                             Case: [id number]                                   Authorizing Provider:  Scott Garcia MD    Collected:           04/21/2022 11:31 AM           Ordering Location:     [facility]   Received:            04/21/2022 11:47 AM                                  [facility]                                                            Pathologist:           Angel Gonzalez MD                                                    Specimen:    Peripheral Blood                                                                           Flow Interpretation   A. Peripheral Blood:  -Polytypic B cells  -No aberrant immunophenotype on T cells  -Rare to absent blasts   Electronically signed by Angel Gonzalez MD on 4/25/2022 at 12:49 PM   Comment    There is no immunophenotypic evidence of non-Hodgkin lymphoma, lymphoid leukemia, or high grade myeloid neoplasm.  T cell lymphomas cannot always be detected by this flow cytometry assay. Circulating neoplastic cells are not always present in lymphoma and leukemia; therefore, the peripheral blood findings do not rule out underlying disease elsewhere.  Final interpretation requires correlation with morphologic and clinical features.    Flow Phenotypic Data    Unless otherwise indicated, percentages reported below are based on the total number of CD45 positive viable leukocytes. If applicable, percentage of plasma cells is from total viable nucleated cells.     0.7% polytypic B cells     4.5% T cells with a CD4:CD8 ratio of 5.3:1      1.3% NK cells     CD34 positive blasts are rare to absent.      Component    METHODOLOGY    Total cellular RNA was extracted from above specimen and reverse transcribed to cDNA via random hexamer priming. The cDNA was amplified by a real time quantitative PCR assay (using an [employer] 3 Real-time analyzer) with an ABL1 specific primer and a primer specific for exon 13 of the BCR gene. An internal control (ABL1 gene), was amplified to confirm the presence of patient RNA. The presence of at least 1000 copies of the ABL1 gene was considered a cutoff value for determining an adequate specimen for quantitative analysis of the BCR::ABL1 translocation. The results are reported as a ratio of the number of BCR::ABL1 copies to the number of ABL1 copies to normalize for differences in amount of RNA in the reaction and enable serial monitoring of the BCR::ABL1 transcript numbers.  This test has been calibrated to the international scale(IS) using a validated reference standard.   RESULTS    BCR::ABL1 MAJOR(p210) QUANTITATION RESULTS:  BCR::ABL1/ABL1 Major(p210):  UNDETECTABLE     INTERNAL CONTROL (NUMBER OF ABL1 TRANSCRIPTS): 1,160   INTERPRETATION    INTERPRETATION:  Molecular testing performed on submitted Blood.     BCR::ABL1 transcripts of the major (p210) breakpoint cluster regions are undetectable in  this sample.  (Electronically signed by: Kwaku Ramirez MD April 29, 2022 1:59 PM)   COMMENTS    The limit of sensitivity of the quantitative BCR::ABL1 major (p210) assay is 10 copies of the BCR::ABL transcripts.   Individual measurements of BCR::ABL1 transcript levels yield limited prognostic information; serial monitoring of BCR::ABL1 transcript levels (at 3 or 6 month intervals) allows for clinically relevant assessment of response to therapy at the molecular level. A major molecular response has been defined as a 3-log decrease in BCR::ABL1/ABL1 from a baseline value. The patient's own BCR::ABL1/ABL1 ratio performed by the same laboratory serves as the definitive baseline from which a three log decrease is measured. However, in practice this value is not always available. Therefore the concept of the laboratory-specific mean pre-treatment baseline value has been established. In our laboratory, the mean pre-treatment BCR::ABL1/ABL1 in CML patients is 122% (range: 68% to 217%). Therefore, a three log reduction from the mean pre-treatment baseline in our laboratory is a BCR::ABL1/ABL1 value of 0.1%.     References:  N Engl J Med 2003; 17:2318, Int J Oncol 2006;28:1099, Blood 2007;108:28     Please note, comparisons of bone marrow and blood samples may not be clinically appropriate.         Component    METHODOLOGY    Total cellular RNA was extracted from above specimen and reverse transcribed to cDNA via random hexamer priming. The cDNA was amplified by a real time quantitative PCR assay (using an [employer] 3 Real-time analyzer) with an ABL1 specific primer and a primer specific for exon 1 of the BCR gene. An internal control (ABL1 gene), was amplified to confirm the presence of patient RNA. The presence of at least 1000 copies of the ABL1 gene was considered a cutoff value for determining an adequate specimen for quantitative analysis of the BCR::ABL1 translocation. The results are reported as a ratio  of the number of BCR::ABL1 copies to the number of ABL1 copies to normalize for differences in amount of RNA in the reaction and enable serial monitoring of the BCR::ABL1 transcript numbers.   RESULTS    BCR::ABL1 MINOR(p190) QUANTITATION RESULTS:  BCR::ABL1/ABL1 minor(p190):  UNDETECTABLE     INTERNAL CONTROL (NUMBER OF ABL1 TRANSCRIPTS): 1,400   INTERPRETATION    Molecular testing performed on submitted Blood.     BCR::ABL1 transcripts of the minor(p190) breakpoint cluster regions are undetectable in this sample.  (Electronically signed by: Kwaku Ramirez MD April 28, 2022 5:33 PM)   COMMENTS    The limit of sensitivity of the quantitative BCR::ABL1 assay is 10 copies of the BCR::ABL1 transcripts.     Please note, comparisons of bone marrow and blood samples may not be clinically appropriate.         Results for orders placed or performed in visit on 01/31/22   CT Chest w/o contrast    Narrative    EXAMINATION: CT CHEST W/O CONTRAST  1/31/2022 5:14 PM      CLINICAL HISTORY: Lung nodule <6 mm, high cancer risk.    COMPARISON: 9/23/2021, 7/19/2021.    TECHNIQUE: CT imaging obtained through the chest without intravenous  contrast. Coronal and axial MIP reformatted images obtained.    FINDINGS:  Lungs: Scattered pulmonary nodules are not significantly changed from  exam 8/23/2021 for exam 7/19/2021. For example the 4 mm fissural  associated nodule on series 3, image 149 on the right minor fissure is  stable. An additional fissural associated nodule located inferiorly on  the right minor fissure is also stable. 3 mm subpleural nodule in the  right middle lobe is also unchanged from exam 8/23/2021. Scattered  other tiny pulmonary nodules are not significantly changed.    No groundglass or consolidative opacities. Central tracheobronchial  tree is patent. No pleural effusion or pneumothorax.    Mediastinum:  Thyroid is normal. Heart size is normal. No pericardial effusion. Mild  coronary artery calcifications.  Normal thoracic vasculature. No  thoracic lymphadenopathy.     Bones and soft tissues: No suspicious bone findings. Stable sclerotic  focus within the T6 vertebral body. Mild degenerative changes.    Partially imaged upper abdomen: Adrenal glands are normal. No other  focal lesion in the upper abdomen is demonstrated.      Impression    IMPRESSION:   Scattered subpleural pulmonary nodules and fissural associated lymph  nodes are not significantly changed when compared to prior exam.  Recommend continued yearly screening with low-dose CT if patient is  considered high risk.    I have personally reviewed the examination and initial interpretation  and I agree with the findings.    OLGA FUNK MD         SYSTEM ID:  [id number]     Recent Labs   Lab Test 04/21/22  0932 01/21/22  0850 10/29/21  0913 10/29/21  0903 08/23/21  0843 07/22/21  1042   PSA <0.01 <0.01  --  0.02 0.02 0.03   TESTOSTTOTAL 15* 11* 3*  --  9* 10*      ASSESSMENT/PLAN:  Jay is a very pleasant 56 year old man with metastatic (to the bones) prostate cancer. He is s/p Eligard on 4/21/2020. Jay has low volume hormone sensitive metastatic disease, with residual disease locoregionally and bone metastasis (two lesions- in left ischium and symphysis pubis), asymptomatic.  He received Eligard on 4/21/2020 and started on Apalutamide on 6/12/2020.    1.  Metastatic prostate cancer-PSA decreased to <0.01. Continue apalutamide daily and Lupron every 3 months.  We will continue to monitor the patient per treatment protocol. We will follow CBC differential, CMP, total testosterone level and PSA every 3 months.    2. Bone metastasis-continue Xgeva q 3 months for prevention of skeletal related events given low volume metastatic disease to the bones.      3.  Lung cancer screening- CT chest findings from 1/31/2022 negative. Next due in one year    4. Intermittent leucocytosis secondary to intermittent neutrophilia  Per tends to get intermittent leukocytosis  with neutrophilia.  He had high total WBC count with absolute neutrophil counts of 16.5 thousand.  This has dropped to 4.2K-well within the normal range.  He has been getting intermittent cyclical neutrophilia.     Neutrophil count trend over time     I explained that the white blood cell count comprises of several subtypes including the neutrophils, lymphocytes, basophils, eosinophils and other white cell precursors. Per primarily has neutrophilia     I explained the role of neutrophils and fighting infections.  In any situations of stress-primarily in the setting of infections the neutrophil counts go up.  These are our first line defense against infections.  On a careful review of his chart I have noted that he has persistent neutrophilia. This does not establish a diagnosis or a concern. We have check ups done at the times of sickness and most of these conditions are associated with rise in neutrophil counts.       I reviewed the different causes for neutrophilia.  Some patients can have high neutrophil counts at baseline. Smoking can cause the neutrophil counts to remain persistently elevated. Neutrophil counts rise in response to most infections including viral, bacterial or parasitic. Medications like steroids cause white cell counts to rise.     Neutrophil counts do rise in the setting of chronic inflammatory rheumatologic conditions like systemic lupus erythematosus.     Rarely they can rise in the setting of primary hematologic disorders like acute chronic myelogenous leukemia.     I reviewed that we could go ahead and perform a work-up including reviewing a peripheral blood smear, BCR-ABL transcripts to evaluate for primary hematologic conditions like leukemia, flow cytometry for lymphoma, ALVERTO and rheumatoid factor for screening rheumatologic disease.    I have extensively reviewed the procedure of bone marrow biopsy. This is performed as an outpatient under sedation and with local anaesethetic. Bone  marrow is the soft tissue inside bones that helps form blood cells. It is found in the hollow part of most bones. The procedure involves collection of bone marrow and a small amount of marrow fluid aspirate for analysis with a biopsy/aspirate needle. Only the finished cells are released in the circulation and bone marrow biopsy/aspirate helps assess precursor cells for abnormalities. It helps establish primary hematologic disorders like myelodysplastic/ myeloproliferative disorders, leukemia, lymphoma and multiple myeloma.         5. Drug monitoring- TSH monitoring q 3-4 months. TSH normal in 01/2022.     6. Mild normochromic normocytic anemia- continue to follow on CBCd, likely secondary to chronic inflammation, androgen deprivation therapy, apalutamide.    7. GI - on PPI for s/o GErD and cough in am with improvement of symptoms per GI (Brett Preston's note)- on Omeprazole PO BID. Apalutamide associated with the following GI symptoms: Decreased appetite (12%), diarrhea (9% to 20%), nausea (18%) but not GERD s/p. Previously referred to GI due to persistent GERD symptoms and was continued by GI on Omeprazole and recommended to proceed with upper EGD for further evaluation. He did not follow through with upper EgD or  GI appt.    At the end of our visit patient verbalized understanding and concurred with the plan.  Chart documentation completed in part with Dragon voice-recognition software.  Even though reviewed some grammatical, spelling, and word errors may remain.    25 minutes spent on the date of the encounter doing chart review, review of test results, interpretation of tests, patient visit and documentation.            Again, thank you for allowing me to participate in the care of your patient.        Sincerely,        Scott Garcia MD

## 2022-07-21 NOTE — NURSING NOTE
"Oncology Rooming Note    July 21, 2022 3:19 PM   Per Etienne is a 57 year old male who presents for:    Chief Complaint   Patient presents with     Oncology Clinic Visit     3 Mo FU     Initial Vitals: /80 (BP Location: Left arm, Patient Position: Chair, Cuff Size: Adult Large)   Pulse 87   Ht 1.753 m (5' 9\")   Wt 86.2 kg (190 lb)   SpO2 98%   BMI 28.06 kg/m   Estimated body mass index is 28.06 kg/m  as calculated from the following:    Height as of this encounter: 1.753 m (5' 9\").    Weight as of this encounter: 86.2 kg (190 lb). Body surface area is 2.05 meters squared.  No Pain (0) Comment: Data Unavailable   No LMP for male patient.  Allergies reviewed: Yes  Medications reviewed: Yes    Medications: Medication refills not needed today.  Pharmacy name entered into [employer]:    [id number] [place], [place] - [address], [place] - [address] [facility]    Clinical concerns: NO Dr. Garcia was notified.      Viky Miller CMA              "

## 2022-07-21 NOTE — PROGRESS NOTES
Oncology Follow-up:  Date on this visit: Jul 21, 2022    Primary care physician: Per Chen   Urologist: Dr. Angel Colmenares     Metastatic hormone sensitive prostate cancer    Oncologic History:  1. Metastatic hormone sensitive prostate cancer  He presented to his PCP in December 2019 with slow urinary stream.  PSA was checked and was found to be 124. On January 8, 2020 abdomen pelvis CT showed groundglass opacity in the left lower lobe,  dystrophic calcification of the prostate gland, and a fatty liver.  Same-day bone scan was negative. On January 10, 2010 prostate biopsy showed on the left Robyn score 4+3 involving 7 cores and on the right Robyn score 3+4 involving 6 cores, with positive perineural invasion.  He proceeded to undergo RRP by Dr. Colmenares on February 24, 2020.  Pathology revealed acinar adenocarcinoma [place] 4+3 with focal ROBERTO, bladder neck base invasion, positive SVI, positive PNI, and multiple positive margins.  There was no LVSI and 0 out of 3 lymph nodes removed were involved with tumor; pT3bN0.  He received Eligard on 4/20/2020.  MRI prostate on 5/21/2020 which showed a 14 x 9 mm T2 intermediate structure in the prostatectomy bed at the left aspect of urinary bladder neck with restricted diffusion in the DWI images and early enhancement in the contrast  enhanced images. This was concerning for locally recurrent/residual  prostatic cancer.  There was a T2 hypointense structure in the in the area of removed  right seminal vesicle measuring 13 x 11 mm  demonstrating restricted  diffusion in the DWI images. This may represent a recurrent/residual  tumor. There were 2 bone metastases in the left pubic inferior ramus and  inferior aspect of left pubic body.   There is a 7 mm suspicious lymph nodes in the right obturator area  corresponding to to FACBC avid lymph node on the same date PET/CT  F-18 fluciclovine PET/CT on 5/20/2020 showed:  1.  2 foci of increased uptake in the left ischium, with  underlying  sclerotic lesion, and symphysis pubis, these changes are indicative of  active prostate cancer metastasis.  2. Low-level increased uptake in 3 lymphnodes along the right external  and internal iliac nodes, the most suspicious of which is the deep  obturator.  3. Low-level increased uptake in the prostatectomy bed slightly to the  left side of the urethra, if clinically necessary, endoscopy would  would be necessary for confirmation of this being metastasis.  PSA was down to 27.7 on 4/21/2020. On April 21, 2020 he received an Eligard injection.    He started Apalutamide on 6/12/2020.  Bone scan 1/22/2021: no bone mets.      2. Tinnitus b/l -  By 12/2020 he presented with 2 months c/o bilateral tinnitus and was evaluated by Dr. Shields from ENT. An audiogram showed a significant down-sloping mid to high frequency sensorineural hearing loss. There was no evidence of conductive hearing loss or significant asymmetry.  He was felt to have early onset presbycusis. He was referred for tinnitus retraining therapy.  Review of literature showed no clear association of apalutamide or Xgeva with tinnitus or ototoxicity. Felt to have early onset presbycusis.    3. Chronic lower back pain, with exacerbations- He is followed by Dr. Denise and  Dr. Clay from  pain management for evaluation of lower back pain.  He had CT of thoracic and lumbar spine on August 23, 2021.  This demonstrated unchanged sclerotic foci in T6 and T11 vertebral bodies dating back to May 2020.  There were degenerative changes in the lumbar spine with severe left neural foraminal narrowing at L4-L5 and moderate to severe at L3-L4.  He has proceeded with L3-4 epidural injection on 10/22/2021.  No acute osseous abnormalities were noted. His lower back pain and b/l pelvic pain has improved following epidural injection.     History Of Present Illness:  Mr. Etienne is a 56 year old male who presents for f/up of  metastatic hormone sensitive prostate  cancer diagnosed in 01/2020, s/p RRP at that time but later found to have residual/recurrent disease in the pelvis and L inferior pubic body and pubic ramus metastasis.   He has been on androgen deprivation therapy since April 2020, and continues on Lupron every 3 months.     He started Apalutamide on 6/12/2020. He has remained on apalutamide.  He has not had any side effects attributable to apalutamide.   He is s/p epidural injection to L4-5 on 12/20/2021.    Jay has been doing well and has no new complaints at this clinic visit.  He has been tolerating his apalutamide without any side effects.    Past Medical/Surgical History:  Past Medical History:   Diagnosis Date     Gout      Prostate cancer (H) 01/10/2020     Past Surgical History:   Procedure Laterality Date     BIOPSY PROSTATE TRANSRECTAL  01/10/2020    Dr. Colmenares     COLONOSCOPY  5/2020     LITHOTRIPSY  age 30s     MICROSCOPIC KNEE SURGERY Right age 30s     ORTHOPEDIC SURGERY Left 1985    Alan placed in Left Femur     PROSTATECTOMY RETROPUBIC RADICAL  02/24/2020    Dr. Colmenares     Allergies:  Allergies as of 07/21/2022     (No Known Allergies)     Current Medications:  Current Outpatient Medications   Medication     apalutamide (ERLEADA) 60 MG tablet     aspirin (ASA) 81 MG chewable tablet     denosumab (XGEVA) 120 MG/1.7ML SOLN injection     HYDROcodone-acetaminophen (NORCO) 5-325 MG tablet     leuprolide (ELIGARD) 45 MG kit     sildenafil (REVATIO) 20 MG tablet     No current facility-administered medications for this visit.       Family History:    His father was diagnosed with prostate cancer, at the age of 75. Paternal GM had throat cancer at the age of 94, nonsmoker.     Social History:  Social History     Socioeconomic History     Marital status:    Occupational History     Not on file   Social Needs     Food insecurity     Transportation needs   Tobacco Use     Smoking status: Former Smoker     Packs/day: 1.00     Years: 30.00     Pack years:  30.00     Types: Cigarettes, Cigars     Last attempt to quit: 2012     Years since quittin.4     Smokeless tobacco: Never Used   Substance and Sexual Activity     Alcohol use: Yes     Comment: 4-6 drinks per day - patient reports beer or vodka drinks     Physical Exam:      Wt Readings from Last 5 Encounters:   22 86.2 kg (190 lb)   22 87.1 kg (192 lb)   22 88 kg (194 lb)   22 89.8 kg (198 lb)   21 88.5 kg (195 lb)     Constitutional: alert and in no distress  Eyes: No redness or discharge  Respiratory: No cough or labored breathing.  Musculoskeletal: Full range of motion in extremities.  Skin: no visible skin lesions or discoloration  Neurological: No tremors and denies headache.  Psychiatric: Mentation appears normal and affect is normal as well.  Alert and oriented x3.  The rest the comprehensive physical examination is deferred due to public health emergency video visit restrictions.        Laboratory/Imaging Studies  Labs reviewed.   Mild anemia with  Normal MCV  peripheral blood smear:  Slight normochromic normocytic anemia  The red cells appear normochromic.  Poikilocytosis is minimal. Polychromasia is not increased. Rouleaux formation is not increased. The morphology of the platelets is normal.  Leukocytes are quantitatively normal.  There is no dysplasia.  Reactive lymphocytes are noted.  BCx neg x2 prelim  UCx prelim negative    Recent Labs   Lab Test 22  1434 22  0932 22  0850 10/29/21  0903 21  0843    140 140 138 138   POTASSIUM 4.1 4.2 4.7 3.9 4.7   CHLORIDE 106 107 108 106 106   CO2 29 25 26 28 27   ANIONGAP 6 8 6 4 5   BUN 17 16 19 16 23   CR 0.99 1.05 0.89 1.04 0.91   GLC 97 130* 127* 138* 107*   NATALIYA 9.2 9.3 9.5 9.7 9.1     No results for input(s): MAG, PHOS in the last 77587 hours.  Recent Labs   Lab Test 22  1434 22  0932 22  1533 22  0850 10/29/21  0903   WBC 7.5 19.3* 6.2 20.1* 12.2*   HGB 13.7 14.5 13.6  14.2 13.9    292 246 249 268   MCV 91 90 92 91 91   NEUTROPHIL 56 86 62 90 80     Recent Labs   Lab Test 07/21/22  1434 04/21/22  0932 01/21/22  0850   BILITOTAL 0.4 0.4 0.4   ALKPHOS 80 72 67   ALT 34 33 42   AST 18 15 20   ALBUMIN 3.9 3.8 3.8     TSH   Date Value Ref Range Status   01/21/2022 1.03 0.40 - 4.00 mU/L Final   10/29/2021 1.91 0.40 - 4.00 mU/L Final   08/23/2021 2.49 0.40 - 4.00 mU/L Final   04/29/2021 2.57 0.40 - 4.00 mU/L Final   01/22/2021 3.46 0.40 - 4.00 mU/L Final   10/30/2020 1.97 0.40 - 4.00 mU/L Final     ALVERTO interpretation Negative Negative     Comment:    Negative:              <1:40   Borderline Positive:   1:40 - 1:80   Positive:              >1:80         Component    Case Report   Flow Cytometry Report                             Case: [id number]                                   Authorizing Provider:  Scott Garcia MD    Collected:           04/21/2022 11:31 AM           Ordering Location:     [facility]   Received:            04/21/2022 11:47 AM                                  [facility]                                                            Pathologist:           Angel Gonzalez MD                                                    Specimen:    Peripheral Blood                                                                           Flow Interpretation   A. Peripheral Blood:  -Polytypic B cells  -No aberrant immunophenotype on T cells  -Rare to absent blasts   Electronically signed by Angel Gonzalez MD on 4/25/2022 at 12:49 PM   Comment    There is no immunophenotypic evidence of non-Hodgkin lymphoma, lymphoid leukemia, or high grade myeloid neoplasm. T cell lymphomas cannot always be detected by this flow cytometry assay. Circulating neoplastic cells are not always present in lymphoma and leukemia; therefore, the peripheral blood findings do not rule out underlying disease elsewhere.  Final interpretation requires correlation  with morphologic and clinical features.    Flow Phenotypic Data    Unless otherwise indicated, percentages reported below are based on the total number of CD45 positive viable leukocytes. If applicable, percentage of plasma cells is from total viable nucleated cells.     0.7% polytypic B cells     4.5% T cells with a CD4:CD8 ratio of 5.3:1      1.3% NK cells     CD34 positive blasts are rare to absent.      Component    METHODOLOGY    Total cellular RNA was extracted from above specimen and reverse transcribed to cDNA via random hexamer priming. The cDNA was amplified by a real time quantitative PCR assay (using an [employer] 3 Real-time analyzer) with an ABL1 specific primer and a primer specific for exon 13 of the BCR gene. An internal control (ABL1 gene), was amplified to confirm the presence of patient RNA. The presence of at least 1000 copies of the ABL1 gene was considered a cutoff value for determining an adequate specimen for quantitative analysis of the BCR::ABL1 translocation. The results are reported as a ratio of the number of BCR::ABL1 copies to the number of ABL1 copies to normalize for differences in amount of RNA in the reaction and enable serial monitoring of the BCR::ABL1 transcript numbers.  This test has been calibrated to the international scale(IS) using a validated reference standard.   RESULTS    BCR::ABL1 MAJOR(p210) QUANTITATION RESULTS:  BCR::ABL1/ABL1 Major(p210):  UNDETECTABLE     INTERNAL CONTROL (NUMBER OF ABL1 TRANSCRIPTS): 1,160   INTERPRETATION    INTERPRETATION:  Molecular testing performed on submitted Blood.     BCR::ABL1 transcripts of the major (p210) breakpoint cluster regions are undetectable in this sample.  (Electronically signed by: Kwaku Ramirez MD April 29, 2022 1:59 PM)   COMMENTS    The limit of sensitivity of the quantitative BCR::ABL1 major (p210) assay is 10 copies of the BCR::ABL transcripts.   Individual measurements of BCR::ABL1 transcript levels yield  limited prognostic information; serial monitoring of BCR::ABL1 transcript levels (at 3 or 6 month intervals) allows for clinically relevant assessment of response to therapy at the molecular level. A major molecular response has been defined as a 3-log decrease in BCR::ABL1/ABL1 from a baseline value. The patient's own BCR::ABL1/ABL1 ratio performed by the same laboratory serves as the definitive baseline from which a three log decrease is measured. However, in practice this value is not always available. Therefore the concept of the laboratory-specific mean pre-treatment baseline value has been established. In our laboratory, the mean pre-treatment BCR::ABL1/ABL1 in CML patients is 122% (range: 68% to 217%). Therefore, a three log reduction from the mean pre-treatment baseline in our laboratory is a BCR::ABL1/ABL1 value of 0.1%.     References:  N Engl J Med 2003; 17:2318, Int J Oncol 2006;28:1099, Blood 2007;108:28     Please note, comparisons of bone marrow and blood samples may not be clinically appropriate.         Component    METHODOLOGY    Total cellular RNA was extracted from above specimen and reverse transcribed to cDNA via random hexamer priming. The cDNA was amplified by a real time quantitative PCR assay (using an [employer] 3 Real-time analyzer) with an ABL1 specific primer and a primer specific for exon 1 of the BCR gene. An internal control (ABL1 gene), was amplified to confirm the presence of patient RNA. The presence of at least 1000 copies of the ABL1 gene was considered a cutoff value for determining an adequate specimen for quantitative analysis of the BCR::ABL1 translocation. The results are reported as a ratio of the number of BCR::ABL1 copies to the number of ABL1 copies to normalize for differences in amount of RNA in the reaction and enable serial monitoring of the BCR::ABL1 transcript numbers.   RESULTS    BCR::ABL1 MINOR(p190) QUANTITATION RESULTS:  BCR::ABL1/ABL1 minor(p190):   UNDETECTABLE     INTERNAL CONTROL (NUMBER OF ABL1 TRANSCRIPTS): 1,400   INTERPRETATION    Molecular testing performed on submitted Blood.     BCR::ABL1 transcripts of the minor(p190) breakpoint cluster regions are undetectable in this sample.  (Electronically signed by: Kwaku Ramirez MD April 28, 2022 5:33 PM)   COMMENTS    The limit of sensitivity of the quantitative BCR::ABL1 assay is 10 copies of the BCR::ABL1 transcripts.     Please note, comparisons of bone marrow and blood samples may not be clinically appropriate.         Results for orders placed or performed in visit on 01/31/22   CT Chest w/o contrast    Narrative    EXAMINATION: CT CHEST W/O CONTRAST  1/31/2022 5:14 PM      CLINICAL HISTORY: Lung nodule <6 mm, high cancer risk.    COMPARISON: 9/23/2021, 7/19/2021.    TECHNIQUE: CT imaging obtained through the chest without intravenous  contrast. Coronal and axial MIP reformatted images obtained.    FINDINGS:  Lungs: Scattered pulmonary nodules are not significantly changed from  exam 8/23/2021 for exam 7/19/2021. For example the 4 mm fissural  associated nodule on series 3, image 149 on the right minor fissure is  stable. An additional fissural associated nodule located inferiorly on  the right minor fissure is also stable. 3 mm subpleural nodule in the  right middle lobe is also unchanged from exam 8/23/2021. Scattered  other tiny pulmonary nodules are not significantly changed.    No groundglass or consolidative opacities. Central tracheobronchial  tree is patent. No pleural effusion or pneumothorax.    Mediastinum:  Thyroid is normal. Heart size is normal. No pericardial effusion. Mild  coronary artery calcifications. Normal thoracic vasculature. No  thoracic lymphadenopathy.     Bones and soft tissues: No suspicious bone findings. Stable sclerotic  focus within the T6 vertebral body. Mild degenerative changes.    Partially imaged upper abdomen: Adrenal glands are normal. No other  focal lesion  in the upper abdomen is demonstrated.      Impression    IMPRESSION:   Scattered subpleural pulmonary nodules and fissural associated lymph  nodes are not significantly changed when compared to prior exam.  Recommend continued yearly screening with low-dose CT if patient is  considered high risk.    I have personally reviewed the examination and initial interpretation  and I agree with the findings.    OLGA FUNK MD         SYSTEM ID:  [id number]     Recent Labs   Lab Test 04/21/22  0932 01/21/22  0850 10/29/21  0913 10/29/21  0903 08/23/21  0843 07/22/21  1042   PSA <0.01 <0.01  --  0.02 0.02 0.03   TESTOSTTOTAL 15* 11* 3*  --  9* 10*      ASSESSMENT/PLAN:  Jay is a very pleasant 56 year old man with metastatic (to the bones) prostate cancer. He is s/p Eligard on 4/21/2020. Jay has low volume hormone sensitive metastatic disease, with residual disease locoregionally and bone metastasis (two lesions- in left ischium and symphysis pubis), asymptomatic.  He received Eligard on 4/21/2020 and started on Apalutamide on 6/12/2020.    1.  Metastatic prostate cancer-PSA decreased to <0.01. Continue apalutamide daily and Lupron every 3 months.  We will continue to monitor the patient per treatment protocol. We will follow CBC differential, CMP, total testosterone level and PSA every 3 months.    2. Bone metastasis-continue Xgeva q 3 months for prevention of skeletal related events given low volume metastatic disease to the bones.      3.  Lung cancer screening- CT chest findings from 1/31/2022 negative. Next due in one year    4. Intermittent leucocytosis secondary to intermittent neutrophilia  Per tends to get intermittent leukocytosis with neutrophilia.  He had high total WBC count with absolute neutrophil counts of 16.5 thousand.  This has dropped to 4.2K-well within the normal range.  He has been getting intermittent cyclical neutrophilia.     Neutrophil count trend over time     I explained that the white blood  cell count comprises of several subtypes including the neutrophils, lymphocytes, basophils, eosinophils and other white cell precursors. Per primarily has neutrophilia     I explained the role of neutrophils and fighting infections.  In any situations of stress-primarily in the setting of infections the neutrophil counts go up.  These are our first line defense against infections.  On a careful review of his chart I have noted that he has persistent neutrophilia. This does not establish a diagnosis or a concern. We have check ups done at the times of sickness and most of these conditions are associated with rise in neutrophil counts.       I reviewed the different causes for neutrophilia.  Some patients can have high neutrophil counts at baseline. Smoking can cause the neutrophil counts to remain persistently elevated. Neutrophil counts rise in response to most infections including viral, bacterial or parasitic. Medications like steroids cause white cell counts to rise.     Neutrophil counts do rise in the setting of chronic inflammatory rheumatologic conditions like systemic lupus erythematosus.     Rarely they can rise in the setting of primary hematologic disorders like acute chronic myelogenous leukemia.     I reviewed that we could go ahead and perform a work-up including reviewing a peripheral blood smear, BCR-ABL transcripts to evaluate for primary hematologic conditions like leukemia, flow cytometry for lymphoma, ALVERTO and rheumatoid factor for screening rheumatologic disease.    I have extensively reviewed the procedure of bone marrow biopsy. This is performed as an outpatient under sedation and with local anaesethetic. Bone marrow is the soft tissue inside bones that helps form blood cells. It is found in the hollow part of most bones. The procedure involves collection of bone marrow and a small amount of marrow fluid aspirate for analysis with a biopsy/aspirate needle. Only the finished cells are  released in the circulation and bone marrow biopsy/aspirate helps assess precursor cells for abnormalities. It helps establish primary hematologic disorders like myelodysplastic/ myeloproliferative disorders, leukemia, lymphoma and multiple myeloma.         5. Drug monitoring- TSH monitoring q 3-4 months. TSH normal in 01/2022.     6. Mild normochromic normocytic anemia- continue to follow on CBCd, likely secondary to chronic inflammation, androgen deprivation therapy, apalutamide.    7. GI - on PPI for s/o GErD and cough in am with improvement of symptoms per GI (Brett Preston's note)- on Omeprazole PO BID. Apalutamide associated with the following GI symptoms: Decreased appetite (12%), diarrhea (9% to 20%), nausea (18%) but not GERD s/p. Previously referred to GI due to persistent GERD symptoms and was continued by GI on Omeprazole and recommended to proceed with upper EGD for further evaluation. He did not follow through with upper EgD or  GI appt.    At the end of our visit patient verbalized understanding and concurred with the plan.  Chart documentation completed in part with Dragon voice-recognition software.  Even though reviewed some grammatical, spelling, and word errors may remain.    25 minutes spent on the date of the encounter doing chart review, review of test results, interpretation of tests, patient visit and documentation.

## 2022-07-24 LAB — TESTOST SERPL-MCNC: 4 NG/DL (ref 240–950)

## 2022-07-25 ENCOUNTER — PATIENT OUTREACH (OUTPATIENT)
Dept: ONCOLOGY | Facility: CLINIC | Age: 57
End: 2022-07-25

## 2022-07-25 DIAGNOSIS — C61 PROSTATE CANCER (H): Primary | ICD-10-CM

## 2022-07-25 NOTE — PROGRESS NOTES
[facility]:  Cancer Care Note    Situation: Patient's chart reviewed by care coordinator.    Background: Patient with a diagnosis of metastatic prostate cancer, who presented to clinic on 7/21/22 for follow-up visit with Dr. Garcia and review of recent lab results.  Patient is currently being treated with Erleada, Lupron (every 3 months), and Xgeva (every 3 months).    Assessment: PSA level stable at <0.01.  Testosterone level down to 4.  Patient will continue with current therapies.    Plan/Recommendations: Patient will plan to follow-up with Dr. Garcia in another 3 months, with repeat labs prior, and next doses of Lupron and Xgeva to follow the visit.  These appointments have already been scheduled for the patient.    See Ordaz, RN, BSN, OCN  RN Care Coordinator - Oncology  [facility]

## 2022-07-26 ASSESSMENT — ANXIETY QUESTIONNAIRES
8. IF YOU CHECKED OFF ANY PROBLEMS, HOW DIFFICULT HAVE THESE MADE IT FOR YOU TO DO YOUR WORK, TAKE CARE OF THINGS AT HOME, OR GET ALONG WITH OTHER PEOPLE?: NOT DIFFICULT AT ALL
IF YOU CHECKED OFF ANY PROBLEMS ON THIS QUESTIONNAIRE, HOW DIFFICULT HAVE THESE PROBLEMS MADE IT FOR YOU TO DO YOUR WORK, TAKE CARE OF THINGS AT HOME, OR GET ALONG WITH OTHER PEOPLE: NOT DIFFICULT AT ALL
7. FEELING AFRAID AS IF SOMETHING AWFUL MIGHT HAPPEN: NOT AT ALL
4. TROUBLE RELAXING: NOT AT ALL
2. NOT BEING ABLE TO STOP OR CONTROL WORRYING: NOT AT ALL
7. FEELING AFRAID AS IF SOMETHING AWFUL MIGHT HAPPEN: NOT AT ALL
6. BECOMING EASILY ANNOYED OR IRRITABLE: NOT AT ALL
3. WORRYING TOO MUCH ABOUT DIFFERENT THINGS: NOT AT ALL
GAD7 TOTAL SCORE: 0
5. BEING SO RESTLESS THAT IT IS HARD TO SIT STILL: NOT AT ALL
GAD7 TOTAL SCORE: 0
1. FEELING NERVOUS, ANXIOUS, OR ON EDGE: NOT AT ALL

## 2022-07-29 ENCOUNTER — MYC REFILL (OUTPATIENT)
Dept: FAMILY MEDICINE | Facility: CLINIC | Age: 57
End: 2022-07-29

## 2022-07-29 DIAGNOSIS — C79.51 PROSTATE CANCER METASTATIC TO BONE (H): ICD-10-CM

## 2022-07-29 DIAGNOSIS — C61 PROSTATE CANCER METASTATIC TO BONE (H): ICD-10-CM

## 2022-07-29 DIAGNOSIS — M54.42 ACUTE LEFT-SIDED LOW BACK PAIN WITH LEFT-SIDED SCIATICA: ICD-10-CM

## 2022-08-01 ENCOUNTER — OFFICE VISIT (OUTPATIENT)
Dept: FAMILY MEDICINE | Facility: CLINIC | Age: 57
End: 2022-08-01
Payer: COMMERCIAL

## 2022-08-01 VITALS
TEMPERATURE: 97.7 F | OXYGEN SATURATION: 99 % | SYSTOLIC BLOOD PRESSURE: 124 MMHG | HEART RATE: 99 BPM | BODY MASS INDEX: 28.68 KG/M2 | DIASTOLIC BLOOD PRESSURE: 73 MMHG | RESPIRATION RATE: 20 BRPM | WEIGHT: 194.2 LBS

## 2022-08-01 DIAGNOSIS — I10 HYPERTENSION GOAL BP (BLOOD PRESSURE) < 140/90: ICD-10-CM

## 2022-08-01 DIAGNOSIS — F11.90 CHRONIC, CONTINUOUS USE OF OPIOIDS: Primary | ICD-10-CM

## 2022-08-01 DIAGNOSIS — C61 PROSTATE CANCER METASTATIC TO BONE (H): ICD-10-CM

## 2022-08-01 DIAGNOSIS — C79.51 PROSTATE CANCER METASTATIC TO BONE (H): ICD-10-CM

## 2022-08-01 LAB
AMPHETAMINES UR QL: NOT DETECTED
BARBITURATES UR QL SCN: NOT DETECTED
BENZODIAZ UR QL SCN: NOT DETECTED
BUPRENORPHINE UR QL: NOT DETECTED
CANNABINOIDS UR QL: NOT DETECTED
COCAINE UR QL SCN: NOT DETECTED
D-METHAMPHET UR QL: NOT DETECTED
METHADONE UR QL SCN: NOT DETECTED
OPIATES UR QL SCN: DETECTED
OXYCODONE UR QL SCN: NOT DETECTED
PCP UR QL SCN: NOT DETECTED
PROPOXYPH UR QL: NOT DETECTED
TRICYCLICS UR QL SCN: NOT DETECTED

## 2022-08-01 PROCEDURE — 80306 DRUG TEST PRSMV INSTRMNT: CPT | Performed by: FAMILY MEDICINE

## 2022-08-01 PROCEDURE — 99213 OFFICE O/P EST LOW 20 MIN: CPT | Performed by: FAMILY MEDICINE

## 2022-08-01 RX ORDER — LISINOPRIL 10 MG/1
10 TABLET ORAL DAILY
Qty: 90 TABLET | Refills: 3 | Status: SHIPPED | OUTPATIENT
Start: 2022-08-01 | End: 2023-08-31

## 2022-08-01 RX ORDER — HYDROCODONE BITARTRATE AND ACETAMINOPHEN 5; 325 MG/1; MG/1
0.5 TABLET ORAL EVERY 4 HOURS PRN
Qty: 90 TABLET | Refills: 0 | Status: SHIPPED | OUTPATIENT
Start: 2022-08-01 | End: 2022-09-01

## 2022-08-01 ASSESSMENT — PAIN SCALES - GENERAL: PAINLEVEL: MODERATE PAIN (4)

## 2022-08-01 ASSESSMENT — ANXIETY QUESTIONNAIRES: GAD7 TOTAL SCORE: 0

## 2022-08-01 NOTE — LETTER
Opioid / Opioid Plus Controlled Substance Agreement    This is an agreement between you and your provider about the safe and appropriate use of controlled substance/opioids prescribed by your care team. Controlled substances are medicines that can cause physical and mental dependence (abuse).    There are strict laws about having and using these medicines. We here at [facility] are committing to working with you in your efforts to get better. To support you in this work, we ll help you schedule regular office appointments for medicine refills. If we must cancel or change your appointment for any reason, we ll make sure you have enough medicine to last until your next appointment.     As a Provider, I will:    Listen carefully to your concerns and treat you with respect.     Recommend a treatment plan that I believe is in your best interest. This plan may involve therapies other than opioid pain medication.     Talk with you often about the possible benefits, and the risk of harm of any medicine that we prescribe for you.     Provide a plan on how to taper (discontinue or go off) using this medicine if the decision is made to stop its use.    As a Patient, I understand that opioid(s):     Are a controlled substance prescribed by my care team to help me function or work and manage my condition(s).     Are strong medicines and can cause serious side effects such as:    Drowsiness, which can seriously affect my driving ability    A lower breathing rate, enough to cause death    Harm to my thinking ability     Depression     Abuse of and addiction to this medicine    Need to be taken exactly as prescribed. Combining opioids with certain medicines or chemicals (such as illegal drugs, sedatives, sleeping pills, and benzodiazepines) can be dangerous or even fatal. If I stop opioids suddenly, I may have severe withdrawal symptoms.    Do not work for all types of pain nor for all patients. If they re not helpful, I may  be asked to stop them.        The risks, benefits and side effects of these medicine(s) were explained to me. I agree that:  1. I will take part in other treatments as advised by my care team. This may be psychiatry or counseling, physical therapy, behavioral therapy, group treatment or a referral to a specialist.     2. I will keep all my appointments. I understand that this is part of the monitoring of opioids. My care team may require an office visit for EVERY opioid/controlled substance refill. If I miss appointments or don t follow instructions, my care team may stop my medicine.    3. I will take my medicines as prescribed. I will not change the dose or schedule unless my care team tells me to. There will be no refills if I run out early.     4. I may be asked to come to the clinic and complete a urine drug test or complete a pill count at any time. If I don t give a urine sample or participate in a pill count, the care team may stop my medicine.    5. I will only receive prescriptions from this clinic for chronic pain. If I am treated by another provider for acute pain issues, I will tell them that I am taking opioid pain medication for chronic pain and that I have a treatment agreement with this provider. I will inform my [facility] care team within one business day if I am given a prescription for any pain medication by another healthcare provider. My [facility] care team can contact other providers and pharmacists about my use of any medicines.    6. It is up to me to make sure that I don t run out of my medicines on weekends or holidays. If my care team is willing to refill my opioid prescription without a visit, I must request refills only during office hours. Refills may take up to 3 business days to process. I will use one pharmacy to fill all my opioid and other controlled substance prescriptions. I will notify the clinic about any changes to my insurance or medication  availability.    7. I am responsible for my prescriptions. If the medicine/prescription is lost, stolen or destroyed, it will not be replaced. I also agree not to share controlled substance medicines with anyone.    8. I am aware I should not use any illegal or recreational drugs. I agree not to drink alcohol unless my care team says I can.       9. If I enroll in the [place] [facility] Cann[facility] program, I will tell my care team prior to my next refill.     10. I will tell my care team right away if I become pregnant, have a new medical problem treated outside of my regular clinic, or have a change in my medications.    11. I understand that this medicine can affect my thinking, judgment and reaction time. Alcohol and drugs affect the brain and body, which can affect the safety of my driving. Being under the influence of alcohol or drugs can affect my decision-making, behaviors, personal safety, and the safety of others. Driving while impaired (DWI) can occur if a person is driving, operating, or in physical control of a car, motorcycle, boat, snowmobile, ATV, motorbike, off-road vehicle, or any other motor vehicle ([place] Statute 169A.20). I understand the risk if I choose to drive or operate any vehicle or machinery.    I understand that if I do not follow any of the conditions above, my prescriptions or treatment may be stopped or changed.          Opioids  What You Need to Know    What are opioids?   Opioids are pain medicines that must be prescribed by a doctor. They are also known as narcotics.     Examples are:   1. morphine (MS Contin, Cherie)  2. oxycodone (Oxycontin)  3. oxycodone and acetaminophen (Percocet)  4. hydrocodone and acetaminophen (Vicodin, Norco)   5. fentanyl patch (Duragesic)   6. hydromorphone (Dilaudid)   7. methadone  8. codeine (Tylenol #3)     What do opioids do well?   Opioids are best for severe short-term pain such as after a surgery or injury. They may work well for cancer pain. They may  help some people with long-lasting (chronic) pain.     What do opioids NOT do well?   Opioids never get rid of pain entirely, and they don t work well for most patients with chronic pain. Opioids don t reduce swelling, one of the causes of pain.                                    Other ways to manage chronic pain and improve function include:       Treat the health problem that may be causing pain    Anti-inflammation medicines, which reduce swelling and tenderness, such as ibuprofen (Advil, Motrin) or naproxen (Aleve)    Acetaminophen (Tylenol)    Antidepressants and anti-seizure medicines, especially for nerve pain    Topical treatments such as patches or creams    Injections or nerve blocks    Chiropractic or osteopathic treatment    Acupuncture, massage, deep breathing, meditation, visual imagery, aromatherapy    Use heat or ice at the pain site    Physical therapy     Exercise    Stop smoking    Take part in therapy       Risks and side effects     Talk to your doctor before you start or decide to keep taking opioids. Possible side effects include:      Lowering your breathing rate enough to cause death    Overdose, including death, especially if taking higher than prescribed doses    Worse depression symptoms; less pleasure in things you usually enjoy    Feeling tired or sluggish    Slower thoughts or cloudy thinking    Being more sensitive to pain over time; pain is harder to control    Trouble sleeping or restless sleep    Changes in hormone levels (for example, less testosterone)    Changes in sex drive or ability to have sex    Constipation    Unsafe driving    Itching and sweating    Dizziness    Nausea, throwing up and dry mouth    What else should I know about opioids?    Opioids may lead to dependence, tolerance, or addiction.      Dependence means that if you stop or reduce the medicine too quickly, you will have withdrawal symptoms. These include loose poop (diarrhea), jitters, flu-like symptoms,  nervousness and tremors. Dependence is not the same as addiction.                       Tolerance means needing higher doses over time to get the same effect. This may increase the chance of serious side effects.      Addiction is when people improperly use a substance that harms their body, their mind or their relations with others. Use of opiates can cause a relapse of addiction if you have a history of drug or alcohol abuse.      People who have used opioids for a long time may have a lower quality of life, worse depression, higher levels of pain and more visits to doctors.    You can overdose on opioids. Take these steps to lower your risk of overdose:    1. Recognize the signs:  Signs of overdose include decrease or loss of consciousness (blackout), slowed breathing, trouble waking up and blue lips. If someone is worried about overdose, they should call 911.    2. Talk to your doctor about Narcan (naloxone).   If you are at risk for overdose, you may be given a prescription for Narcan. This medicine very quickly reverses the effects of opioids.   If you overdose, a friend or family member can give you Narcan while waiting for the ambulance. They need to know the signs of overdose and how to give Narcan.     3. Don't use alcohol or street drugs.   Taking them with opioids can cause death.    4. Do not take any of these medicines unless your doctor says it s OK. Taking these with opioids can cause death:    Benzodiazepines, such as lorazepam (Ativan), alprazolam (Xanax) or diazepam (Valium)    Muscle relaxers, such as cyclobenzaprine (Flexeril)    Sleeping pills like zolpidem (Ambien)     Other opioids      How to keep you and other people safe while taking opioids:    1. Never share your opioids with others.  Opioid medicines are regulated by the Drug Enforcement Agency ([place]). Selling or sharing medications is a criminal act.    2. Be sure to store opioids in a secure place, locked up if possible. Young children  can easily swallow them and overdose.    3. When you are traveling with your medicines, keep them in the original bottles. If you use a pill box, be sure you also carry a copy of your medicine list from your clinic or pharmacy.    4. Safe disposal of opioids    Most pharmacies have places to get rid of medicine, called disposal kiosks. Medicine disposal options are also available in every [facility]. Search your county and  medication disposal  to find more options. You can find more details at:  [email or web address]     I agree that my provider, clinic care team, and pharmacy may work with any city, state or federal law enforcement agency that investigates the misuse, sale, or other diversion of my controlled medicine. I will allow my provider to discuss my care with, or share a copy of, this agreement with any other treating provider, pharmacy or emergency room where I receive care.    I have read this agreement and have asked questions about anything I did not understand.    _______________________________________________________  Patient Signature - Per Etienne _____________________                   Date     _______________________________________________________  Provider Signature - Per Chen MD   _____________________                   Date     _______________________________________________________  Witness Signature (required if provider not present while patient signing)   _____________________                   Date

## 2022-08-01 NOTE — PROGRESS NOTES
Roland Thompson is a 57 year old, presenting for the following health issues:  No chief complaint on file.      History of Present Illness       Hypertension: He presents for follow up of hypertension.  He does not check blood pressure  regularly outside of the clinic. Outside blood pressures have been over 140/90. He does not follow a low salt diet.    Today's ROB-7 Score: 0             Review of Systems         Objective    There were no vitals taken for this visit.  There is no height or weight on file to calculate BMI.  Physical Exam                       .  ..   --------------------------------------------------------------------------------------------------------------------------------------    SUBJECTIVE:  Per Etienne is an 57 year old male who presents for a follow up evaluation of his hypertension.The patient was started on 10 mg of lisinopril  at the last visit. The patient reports that he IS taking the medication as prescribed. He reports side effects of medication.  These side effects include: dry mouth in the am but it is getting better and is not a big deal.     Patient Active Problem List   Diagnosis     CARDIOVASCULAR SCREENING; LDL GOAL LESS THAN 160     Left varicocele     Vitamin D deficiency     Kidney stone     Prostate cancer (H)     High risk medication use     Prostate cancer metastatic to bone (H)     Bone metastasis (H)     Chronic, continuous use of opioids     Hypertension goal BP (blood pressure) < 140/90       Is the HYPERTENSION goal on the problem list? Yes      Use of agents associated with hypertension: none  Current Outpatient Medications   Medication     apalutamide (ERLEADA) 60 MG tablet     aspirin (ASA) 81 MG chewable tablet     denosumab (XGEVA) 120 MG/1.7ML SOLN injection     ERLEADA 60 MG tablet     HYDROcodone-acetaminophen (NORCO) 5-325 MG tablet     leuprolide (ELIGARD) 45 MG kit     lisinopril (ZESTRIL) 10 MG tablet     omeprazole (PRILOSEC) 20 MG   capsule     UNABLE TO FIND     sildenafil (REVATIO) 20 MG tablet     No current facility-administered medications for this visit.     Facility-Administered Medications Ordered in Other Visits   Medication     leuprolide (LUPRON DEPOT) kit 22.5 mg         No Known Allergies    Social History     Tobacco Use     Smoking status: Former Smoker     Packs/day: 1.00     Years: 30.00     Pack years: 30.00     Types: Cigarettes, Cigars     Quit date: 1/2/2012     Years since quitting: 10.5     Smokeless tobacco: Never Used   Substance Use Topics     Alcohol use: Yes     Comment: 4-6 drinks per day - patient reports beer or vodka drinks       OBJECTIVE:  BP (!) 145/70   Pulse 99   Temp 97.7  F (36.5  C) (Oral)   Resp 20   Wt 88.1 kg (194 lb 3.2 oz)   SpO2 99%   BMI 28.68 kg/m      Heart: negative, PMI normal. No lifts, heaves, or thrills. RRR. No murmurs, clicks gallops or rub  Lower Extremities:No edema on right and No  edema on the left       He had labs 1 week ago   Lab on 07/21/2022   Component Date Value Ref Range Status     Testosterone Total 07/21/2022 4 (A) 240 - 950 ng/dL Final     PSA Tumor Marker 07/21/2022 <0.01  0.00 - 4.00 ug/L Final     Sodium 07/21/2022 141  133 - 144 mmol/L Final     Potassium 07/21/2022 4.1  3.4 - 5.3 mmol/L Final     Chloride 07/21/2022 106  94 - 109 mmol/L Final     Carbon Dioxide (CO2) 07/21/2022 29  20 - 32 mmol/L Final     Anion Gap 07/21/2022 6  3 - 14 mmol/L Final     Urea Nitrogen 07/21/2022 17  7 - 30 mg/dL Final     Creatinine 07/21/2022 0.99  0.66 - 1.25 mg/dL Final     Calcium 07/21/2022 9.2  8.5 - 10.1 mg/dL Final     Glucose 07/21/2022 97  70 - 99 mg/dL Final     Alkaline Phosphatase 07/21/2022 80  40 - 150 U/L Final     AST 07/21/2022 18  0 - 45 U/L Final     ALT 07/21/2022 34  0 - 70 U/L Final     Protein Total 07/21/2022 7.1  6.8 - 8.8 g/dL Final     Albumin 07/21/2022 3.9  3.4 - 5.0 g/dL Final     Bilirubin Total 07/21/2022 0.4  0.2 - 1.3 mg/dL Final     GFR Estimate  07/21/2022 88  >60 mL/min/1.73m2 Final    Effective December 21, 2021 eGFRcr in adults is calculated using the 2021 CKD-EPI creatinine equation which includes age and gender (Martin et al., [facility], DOI: 10.1056/QVJMvl7714829)     WBC Count 07/21/2022 7.5  4.0 - 11.0 10e3/uL Final     RBC Count 07/21/2022 4.38 (A) 4.40 - 5.90 10e6/uL Final     Hemoglobin 07/21/2022 13.7  13.3 - 17.7 g/dL Final     Hematocrit 07/21/2022 39.8 (A) 40.0 - 53.0 % Final     MCV 07/21/2022 91  78 - 100 fL Final     MCH 07/21/2022 31.3  26.5 - 33.0 pg Final     MCHC 07/21/2022 34.4  31.5 - 36.5 g/dL Final     RDW 07/21/2022 11.8  10.0 - 15.0 % Final     Platelet Count 07/21/2022 234  150 - 450 10e3/uL Final     % Neutrophils 07/21/2022 56  % Final     % Lymphocytes 07/21/2022 29  % Final     % Monocytes 07/21/2022 12  % Final     % Eosinophils 07/21/2022 2  % Final     % Basophils 07/21/2022 1  % Final     % Immature Granulocytes 07/21/2022 0  % Final     NRBCs per 100 WBC 07/21/2022 0  <1 /100 Final     Absolute Neutrophils 07/21/2022 4.2  1.6 - 8.3 10e3/uL Final     Absolute Lymphocytes 07/21/2022 2.2  0.8 - 5.3 10e3/uL Final     Absolute Monocytes 07/21/2022 0.9  0.0 - 1.3 10e3/uL Final     Absolute Eosinophils 07/21/2022 0.2  0.0 - 0.7 10e3/uL Final     Absolute Basophils 07/21/2022 0.1  0.0 - 0.2 10e3/uL Final     Absolute Immature Granulocytes 07/21/2022 0.0  <=0.4 10e3/uL Final     Absolute NRBCs 07/21/2022 0.0  10e3/uL Final        No results found for any visits on 08/01/22.    The 10-year ASCVD risk score (Laura PINZON Jr., et al., 2013) is: 8.9%    Values used to calculate the score:      Age: 57 years      Sex: Male      Is Non- : No      Diabetic: No      Tobacco smoker: No      Systolic Blood Pressure: 145 mmHg      Is BP treated: Yes      HDL Cholesterol: 85 mg/dL      Total Cholesterol: 256 mg/dL    ASSESSMENT:  Essential hypertension which is very well controlled.       Plan:  - Medication:continue the  current doses of medication.  The patients antihypertensive medication was filled for 12 months.    The patient was advised to do the following therapuetic life style changes  - Dietary sodium restriction and increase potassium and Calcium intake  - Regular aerobic exercise  - Weight loss  - Discontinue smoking if applicable  - Avoid regular NSAID use if applicable  - Avoid regular decongestant use if applicable  - Follow up in clinic in 1-2  months for his complete physical exam. He discussed with his friend who is also my patient and they are planning on transferring their care to Dr Boateng.     Patient Education: Reviewed risks of hypertension and principles of   Treatment.    The   --------------------------------------------------------------------------------------------------------------------------------------  Patient Name:  Per Etienne  Patient's :  1965  Date: 2022  Pain Diagnosis:     Hypertension goal BP (blood pressure) < 140/90  Chronic, continuous use of opioids  Prostate cancer metastatic to bone (H)  Current Outpatient Medications   Medication Sig Dispense Refill     apalutamide (ERLEADA) 60 MG tablet Take 4 tablets (240 mg) by mouth daily for 30 days 120 tablet 0     aspirin (ASA) 81 MG chewable tablet Take 1 tablet (81 mg) by mouth daily       denosumab (XGEVA) 120 MG/1.7ML SOLN injection Inject 1.7 mLs (120 mg) Subcutaneous every 3 months 1.7 mL 0     ERLEADA 60 MG tablet TAKE 4 TABLETS DAILY 120 tablet 0     HYDROcodone-acetaminophen (NORCO) 5-325 MG tablet Take 0.5 tablets by mouth every 4 hours as needed for severe pain 90 tablet 0     leuprolide (ELIGARD) 45 MG kit Inject 45 mg Subcutaneous every 6 months       lisinopril (ZESTRIL) 10 MG tablet Take 1 tablet (10 mg) by mouth daily 30 tablet 1     omeprazole (PRILOSEC) 20 MG DR capsule TAKE 1 CAPSULE BY MOUTH TWICE A  capsule 1     UNABLE TO FIND 1 tablet daily MEDICATION NAME: C, D, Zinc combination supplement        sildenafil (REVATIO) 20 MG tablet Take 1 to 5 tabs 30-60 minutes before intercourse.  Never use with nitroglycerin, terazosin or doxazosin. (Patient not taking: Reported on 8/1/2022) 30 tablet 11         Subjective:  SUBJECTIVE: Per is a 57 year old male who presents for a scheduled visit regarding his chronic back pain. The patient reports that his pain is  same since the last visit.     He had an epidural steroid injection in dec and that did help his pain.  He has another one scheduled for tomorrow.     The patient reports that the medication has been helpful to improve these activities of daily living: general activity of doing things around the house. The patient reports that he is taking the medication as prescribed. He takes one tablet 3 times a day(s) generally around meal time.     The patient reports that the last time he took the medication was noon The patient denies any side effects from his current medication.    The patient  does not have narcan available.        He is seeing Dr Garcia at [facility] for his prostate cancer. The pain medication is mostly for his spine pain but he reports that he still has some pain from the pelvic mets but that is better.       Current Outpatient Medications:      apalutamide (ERLEADA) 60 MG tablet, Take 4 tablets (240 mg) by mouth daily for 30 days, Disp: 120 tablet, Rfl: 0     aspirin (ASA) 81 MG chewable tablet, Take 1 tablet (81 mg) by mouth daily, Disp: , Rfl:      denosumab (XGEVA) 120 MG/1.7ML SOLN injection, Inject 1.7 mLs (120 mg) Subcutaneous every 3 months, Disp: 1.7 mL, Rfl: 0     ERLEADA 60 MG tablet, TAKE 4 TABLETS DAILY, Disp: 120 tablet, Rfl: 0     HYDROcodone-acetaminophen (NORCO) 5-325 MG tablet, Take 0.5 tablets by mouth every 4 hours as needed for severe pain, Disp: 90 tablet, Rfl: 0     leuprolide (ELIGARD) 45 MG kit, Inject 45 mg Subcutaneous every 6 months, Disp: , Rfl:      lisinopril (ZESTRIL) 10 MG tablet, Take 1 tablet (10 mg) by mouth  "daily, Disp: 30 tablet, Rfl: 1     omeprazole (PRILOSEC) 20 MG DR capsule, TAKE 1 CAPSULE BY MOUTH TWICE A DAY, Disp: 180 capsule, Rfl: 1     UNABLE TO FIND, 1 tablet daily MEDICATION NAME: C, D, Zinc combination supplement, Disp: , Rfl:      sildenafil (REVATIO) 20 MG tablet, Take 1 to 5 tabs 30-60 minutes before intercourse.  Never use with nitroglycerin, terazosin or doxazosin. (Patient not taking: Reported on 8/1/2022), Disp: 30 tablet, Rfl: 11  No current facility-administered medications for this visit.    Facility-Administered Medications Ordered in Other Visits:      leuprolide (LUPRON DEPOT) kit 22.5 mg, 22.5 mg, Intramuscular, Q90 Days, Scott Garcia MD, 22.5 mg at 07/21/22 1557    Social History     Socioeconomic History     Marital status:      Spouse name: None     Number of children: None     Years of education: None     Highest education level: None   Tobacco Use     Smoking status: Former Smoker     Packs/day: 1.00     Years: 30.00     Pack years: 30.00     Types: Cigarettes, Cigars     Quit date: 1/2/2012     Years since quitting: 10.5     Smokeless tobacco: Never Used   Vaping Use     Vaping Use: Never used   Substance and Sexual Activity     Alcohol use: Yes     Comment: 4-6 drinks per day - patient reports beer or vodka drinks     Drug use: No     Sexual activity: Yes     Partners: Female         See images and flowsheet for patient's report of condition.   Objective:  General:  Per is awake, alert, and cooperative and in no apparent distress.           The pain contract was last renewed in the last 12 months:  Yes 6-23-22  I had the patient read and sign the narcotic agreement letter and requested it to be scanned into the chart.     Assessment: Stable Opioid Maintenance Analgesia. \     Plan:   Continue opioid maintenance analgesia without changes: he is taking 15 MME per day(s)         Urine drug screen today: done         F11.90 \"Chronic, continuous use of Opioids \" has been " added to the Problem List and controlledsubstanceproblemlistoverview:[id number]  has been added to the overview Yes     How to change Problem List:  To do this, select Change Dx, and enter Chronic, continuous use of Opioids (or F11.90).  This will update the Problem List diagnosis code while maintaining the content of the Problem List overview related to the plan of care for opioid therapy.  The F11.90 code will be attached to the quality measure in the near future.

## 2022-08-01 NOTE — PATIENT INSTRUCTIONS
I recommend that you return to clinic for an appointment in August or September for your yearly complete physical exam and you can get all of your preventative medical needs taken care of at that time. You can also establish care with Dr Boateng. I also recommended that you have a laboratory appointment 1 week prior to that to do his fasting laboratory work.

## 2022-08-02 ENCOUNTER — RADIOLOGY INJECTION OFFICE VISIT (OUTPATIENT)
Dept: PALLIATIVE MEDICINE | Facility: CLINIC | Age: 57
End: 2022-08-02
Attending: FAMILY MEDICINE
Payer: COMMERCIAL

## 2022-08-02 VITALS — SYSTOLIC BLOOD PRESSURE: 145 MMHG | HEART RATE: 83 BPM | OXYGEN SATURATION: 98 % | DIASTOLIC BLOOD PRESSURE: 86 MMHG

## 2022-08-02 DIAGNOSIS — M54.16 LUMBAR RADICULOPATHY: ICD-10-CM

## 2022-08-02 PROCEDURE — 62323 NJX INTERLAMINAR LMBR/SAC: CPT | Performed by: PAIN MEDICINE

## 2022-08-02 RX ADMIN — TRIAMCINOLONE ACETONIDE 40 MG: 40 INJECTION, SUSPENSION INTRA-ARTICULAR; INTRAMUSCULAR at 14:28

## 2022-08-02 ASSESSMENT — PAIN SCALES - GENERAL: PAINLEVEL: EXTREME PAIN (8)

## 2022-08-02 NOTE — PATIENT INSTRUCTIONS
[facility]   Procedure Discharge Instructions    Today you saw:  Dr. Issac Tapia    You had an:  Epidural steroid injection   -lumbar    Be cautious when walking. Numbness and/or weakness in the lower extremities may occur for up to 6-8 hours after the procedure due to effect of the local anesthetic  Do not drive for 6 hours. The effect of the local anesthetic could slow your reflexes.   You may resume your regular activities after 24 hours  Avoid strenuous activity for the first 24 hours  You may shower, however avoid swimming, tub baths or hot tubs for 24 hours following your procedure  You may have a mild to moderate increase in pain for several days following the injection.  It may take up to 14 days for the steroid medication to start working although you may feel the effect as early as a few days after the procedure.     You may use ice packs for 10-15 minutes, 3 to 4 times a day at the injection site for comfort  Do not use heat to painful areas for 6 to 8 hours. This will give the local anesthetic time to wear off and prevent you from accidentally burning your skin.   Unless you have been directed to avoid the use of anti-inflammatory medications (NSAIDS), you may use medications such as ibuprofen, Aleve or Tylenol for pain control if needed.   If you were fasting, you may resume your normal diet and medications after the procedure  If you have diabetes, check your blood sugar more frequently than usual as your blood sugar may be higher than normal for 10-14 days following a steroid injection. Contact your doctor who manages your diabetes if your blood sugar is higher than usual  Possible side effects of steroids that you may experience include flushing, elevated blood pressure, increased appetite, mild headaches and restlessness.  All of these symptoms will get better with time.  If you experience any of the following, call the Pain Clinic during work hours (Mon-Friday  8-4:30 pm) at [phone] or the Provider Line after hours at [phone]:  -Fever over 100 degree F  -Swelling, bleeding, redness, drainage, warmth at the injection site  -Progressive weakness or numbness in your legs or arms  -Loss of bowel or bladder function  -Unusual headache that is not relieved by Tylenol or other pain reliever  -Unusual new onset of pain that is not improving

## 2022-08-02 NOTE — NURSING NOTE
Discharge Information    IV Discontiued Time:  NA    Amount of Fluid Infused:  NA    Discharge Criteria = When patient returns to baseline or as per MD order    Consciousness:  Pt is fully awake    Circulation:  BP +/- 20% of pre-procedure level    Respiration:  Patient is able to breathe deeply    O2 Sat:  Patient is able to maintain O2 Sat >92% on room air    Activity:  Moves 4 extremities on command    Ambulation:  Patient is able to stand and walk or stand and pivot into wheelchair    Dressing:  Clean/dry or No Dressing    Notes:   Discharge instructions and AVS given to patient    Patient meets criteria for discharge?  YES    Admitted to PCU?  No    Responsible adult present to accompany patient home?  Yes    Signature/Title:    faustino joiner RN  RN Care Coordinator  [facility]

## 2022-08-03 RX ORDER — TRIAMCINOLONE ACETONIDE 40 MG/ML
40 INJECTION, SUSPENSION INTRA-ARTICULAR; INTRAMUSCULAR ONCE
Status: COMPLETED | OUTPATIENT
Start: 2022-08-02 | End: 2022-08-02

## 2022-08-03 NOTE — PROGRESS NOTES
Pre procedure Diagnosis: lumbar radiculopathy   Post procedure Diagnosis: Same  Procedure performed: L4/5 interlaminar epidural steroid injection   Anesthesia: none  Complications: none  Operators: Issac Tapia    Indications:   Per Etienne is a 56 year old male was sent by Dr. Chen for epidural steroid injection.  They have a history of left L5 radiculopathy.  Exam shows pain radiating from back down to calf with no focal weakness and they have tried conservative treatment including medications. Denies weakness, loss of sensation, bowel or bladder incontinence.    I had a long discussion with patient about repeating the same injection he had last time he was seen in the pain clinic when he had left L4/5 and L5/S1 transforaminal epidural steroid injections. In reviewing his MRI and prior injection    CT scan 8/23/21  1. No acute osseous abnormality.   2. Unchanged sclerotic foci in T6 and T11 vertebral bodies dating back  to 5/20/2020.   3. Degenerative changes of the lumbar spine. Severe left neural  foraminal narrowing at L4-L5 and moderate to severe right at L3-4.  Additional multilevel spinal canal and neural foraminal stenosis.    Options/alternatives, benefits and risks were discussed with the patient including bleeding, infection, tissue trauma, numbness, weakness, paralysis, spinal cord injury, radiation exposure, headache and reaction to medications. Questions were answered to his satisfaction and he agrees to proceed. Voluntary informed consent was obtained and signed.     Vitals were reviewed: Yes  Allergies were reviewed:  Yes   Medications were reviewed:  Yes   Pre-procedure pain score: 8      Procedure:  After getting informed consent, patient was brought into the procedure suite and was placed in a prone position on the procedure table.   A Pause for the Cause was performed.  Patient was prepped and draped in sterile fashion.     The L4-5 interspace was identified with AP fluoroscopy.  A  total of 4ml of 1% lidocaine was used to anesthetize the skin for a left paramedian approach.    A 20gauge 3.5inch Touhy needle was advanced under intermittent fluoroscopy.  A SHI syringe was used to advance the needle into the epidural space.   After loss of resistance, there was no evidence of blood or CSF on aspiration. Location was verified with both AP and lateral fluoroscopic imaging.    A total of 1ml of Omnipaque 300 was injected demonstrating appropriate epidural spread and was checked in both the AP and lateral views. 9ml was wasted. There was no evidence of intravascular spread.    2ml of 0.5% bupivacaine with 40mg of kenalog and 2ml of preservative free saline was injected.  The needle was removed from the epidural space, flushed with 1% lidocaine and removed.     Hemostasis was achieved, the area was cleaned, and bandaids were placed when appropriate.  The patient tolerated the procedure well, and was taken to the recovery room.    Images were saved to PACS.    Post-procedure pain score: 3/10  Follow-up includes:   -f/u with referring provider    Issac Tapia MD  [facility]

## 2022-08-09 DIAGNOSIS — C61 PROSTATE CANCER (H): ICD-10-CM

## 2022-08-09 RX ORDER — APALUTAMIDE 60 MG/1
TABLET, FILM COATED ORAL
Qty: 120 TABLET | Refills: 0 | Status: SHIPPED | OUTPATIENT
Start: 2022-08-09 | End: 2023-02-02

## 2022-08-09 NOTE — TELEPHONE ENCOUNTER
ERLEADA Refill   Last prescribing provider: Dr Garcia     Last clinic visit date: 7/21/22 Dr Garcia     Any missed appointments or no-shows since last clinic visit?: no     Recommendations for requested medication (if none, N/A): copied from chart note     Next clinic visit date: 11/3/22 Dr Garcia 7/21/22   He started Apalutamide on 6/12/2020. He has remained on apalutamide. He has not had any side effects attributable to apalutamide.   He is s/p epidural injection to L4-5 on 12/20/2021.      Any other pertinent information (if none, N/A): N/A

## 2022-08-23 ASSESSMENT — ENCOUNTER SYMPTOMS
JOINT SWELLING: 0
HEMATOCHEZIA: 0
PARESTHESIAS: 0
CONSTIPATION: 1
COUGH: 1
HEARTBURN: 1
PALPITATIONS: 0
NAUSEA: 1
ABDOMINAL PAIN: 0
NERVOUS/ANXIOUS: 0
EYE PAIN: 0
CHILLS: 1
SORE THROAT: 0
DYSURIA: 0
ARTHRALGIAS: 1
HEMATURIA: 0
WEAKNESS: 0
FREQUENCY: 0
DIARRHEA: 1
FEVER: 0
MYALGIAS: 1
HEADACHES: 0
SHORTNESS OF BREATH: 0

## 2022-08-24 DIAGNOSIS — C61 PROSTATE CANCER (H): Primary | ICD-10-CM

## 2022-08-29 ENCOUNTER — OFFICE VISIT (OUTPATIENT)
Dept: FAMILY MEDICINE | Facility: CLINIC | Age: 57
End: 2022-08-29
Payer: COMMERCIAL

## 2022-08-29 VITALS
DIASTOLIC BLOOD PRESSURE: 84 MMHG | BODY MASS INDEX: 28.44 KG/M2 | TEMPERATURE: 97.5 F | WEIGHT: 192 LBS | SYSTOLIC BLOOD PRESSURE: 133 MMHG | OXYGEN SATURATION: 97 % | HEART RATE: 96 BPM | HEIGHT: 69 IN

## 2022-08-29 DIAGNOSIS — F11.90 CHRONIC, CONTINUOUS USE OF OPIOIDS: ICD-10-CM

## 2022-08-29 DIAGNOSIS — C79.51 BONE METASTASIS: ICD-10-CM

## 2022-08-29 DIAGNOSIS — Z00.00 ROUTINE GENERAL MEDICAL EXAMINATION AT A HEALTH CARE FACILITY: Primary | ICD-10-CM

## 2022-08-29 DIAGNOSIS — G89.3 CHRONIC PAIN DUE TO NEOPLASM: ICD-10-CM

## 2022-08-29 DIAGNOSIS — C61 PROSTATE CANCER (H): ICD-10-CM

## 2022-08-29 PROCEDURE — 90471 IMMUNIZATION ADMIN: CPT | Performed by: FAMILY MEDICINE

## 2022-08-29 PROCEDURE — 99396 PREV VISIT EST AGE 40-64: CPT | Mod: 25 | Performed by: FAMILY MEDICINE

## 2022-08-29 PROCEDURE — 90472 IMMUNIZATION ADMIN EACH ADD: CPT | Performed by: FAMILY MEDICINE

## 2022-08-29 PROCEDURE — 99214 OFFICE O/P EST MOD 30 MIN: CPT | Mod: 25 | Performed by: FAMILY MEDICINE

## 2022-08-29 PROCEDURE — 90715 TDAP VACCINE 7 YRS/> IM: CPT | Performed by: FAMILY MEDICINE

## 2022-08-29 PROCEDURE — 90677 PCV20 VACCINE IM: CPT | Performed by: FAMILY MEDICINE

## 2022-08-29 ASSESSMENT — ANXIETY QUESTIONNAIRES
2. NOT BEING ABLE TO STOP OR CONTROL WORRYING: NOT AT ALL
GAD7 TOTAL SCORE: 0
IF YOU CHECKED OFF ANY PROBLEMS ON THIS QUESTIONNAIRE, HOW DIFFICULT HAVE THESE PROBLEMS MADE IT FOR YOU TO DO YOUR WORK, TAKE CARE OF THINGS AT HOME, OR GET ALONG WITH OTHER PEOPLE: NOT DIFFICULT AT ALL
5. BEING SO RESTLESS THAT IT IS HARD TO SIT STILL: NOT AT ALL
7. FEELING AFRAID AS IF SOMETHING AWFUL MIGHT HAPPEN: NOT AT ALL
1. FEELING NERVOUS, ANXIOUS, OR ON EDGE: NOT AT ALL
7. FEELING AFRAID AS IF SOMETHING AWFUL MIGHT HAPPEN: NOT AT ALL
4. TROUBLE RELAXING: NOT AT ALL
8. IF YOU CHECKED OFF ANY PROBLEMS, HOW DIFFICULT HAVE THESE MADE IT FOR YOU TO DO YOUR WORK, TAKE CARE OF THINGS AT HOME, OR GET ALONG WITH OTHER PEOPLE?: NOT DIFFICULT AT ALL
3. WORRYING TOO MUCH ABOUT DIFFERENT THINGS: NOT AT ALL
GAD7 TOTAL SCORE: 0
6. BECOMING EASILY ANNOYED OR IRRITABLE: NOT AT ALL

## 2022-08-29 ASSESSMENT — ENCOUNTER SYMPTOMS
PARESTHESIAS: 0
EYE PAIN: 0
DYSURIA: 0
HEARTBURN: 1
FEVER: 0
DIARRHEA: 1
SHORTNESS OF BREATH: 0
FREQUENCY: 0
WEAKNESS: 0
HEADACHES: 0
JOINT SWELLING: 0
HEMATURIA: 0
CONSTIPATION: 1
PALPITATIONS: 0
NERVOUS/ANXIOUS: 0
COUGH: 1
SORE THROAT: 0
ABDOMINAL PAIN: 0
MYALGIAS: 1
NAUSEA: 1
HEMATOCHEZIA: 0
ARTHRALGIAS: 1
CHILLS: 1

## 2022-08-29 NOTE — PROGRESS NOTES
SUBJECTIVE:   CC: Per Etienne is an 57 year old male who presents for preventative health visit.       Patient has been advised of split billing requirements and indicates understanding: No  Healthy Habits:     Getting at least 3 servings of Calcium per day:  NO    Bi-annual eye exam:  Yes    Dental care twice a year:  Yes    Sleep apnea or symptoms of sleep apnea:  Excessive snoring    Diet:  Regular (no restrictions)    Frequency of exercise:  None    Taking medications regularly:  Yes    Medication side effects:  Other    PHQ-2 Total Score: 0    Additional concerns today:  No  Answers for HPI/ROS submitted by the patient on 8/29/2022  ROB 7 TOTAL SCORE: 0  Frequency of exercise:: None  Getting at least 3 servings of Calcium per day:: NO  Diet:: Regular (no restrictions)  Taking medications regularly:: Yes  Medication side effects:: Other  Bi-annual eye exam:: Yes  Dental care twice a year:: Yes  Sleep apnea or symptoms of sleep apnea:: Excessive snoring  abdominal pain: No  Blood in stool: No  Blood in urine: No  chest pain: Yes  chills: Yes  congestion: No  constipation: Yes  cough: Yes  diarrhea: Yes  ear pain: No  eye pain: No  nervous/anxious: No  fever: No  frequency: No  genital sores: No  headaches: No  hearing loss: No  heartburn: Yes  arthralgias: Yes  joint swelling: No  peripheral edema: No  mood changes: No  myalgias: Yes  nausea: Yes  dysuria: No  palpitations: No  Skin sensation changes: No  sore throat: No  urgency: No  rash: Yes  shortness of breath: No  visual disturbance: No  weakness: No  impotence: Yes  penile discharge: No  Additional concerns today:: No  Preventive - advised to get Shingrix from our pharmacy.    Immunization History   Administered Date(s) Administered     Influenza Quad, Recombinant, pf(RIV4) (Flublok) 10/14/2020     TDAP Vaccine (Adacel) 09/05/2012   declined COVID vaccine       - Colon CA screen: Colonoscopy, age 45-75 every 10 years or FIT every year or Cologuard  every 3 years   Last colonoscopy in 2020   Next 2025     Hx of prostate cancer   PSA   Date Value Ref Range Status   04/29/2021 0.04 0 - 4 ug/L Final     Comment:     Assay Method:  Chemiluminescence using Siemens Vista analyzer     PSA Tumor Marker   Date Value Ref Range Status   07/21/2022 <0.01 0.00 - 4.00 ug/L Final       SH:    Marital status:    Kids: 2  Employment: [employer]    Exercise: at work   Tobacco: no , quit 11 years ago   Etoh: daily 2-3 drinks   Recreational drugs: Marijuana       Today's PHQ-2 Score:   PHQ-2 ( 1999 Pfizer) 8/23/2022   Q1: Little interest or pleasure in doing things 0   Q2: Feeling down, depressed or hopeless 0   PHQ-2 Score 0   PHQ-2 Total Score (12-17 Years)- Positive if 3 or more points; Administer PHQ-A if positive -   Q1: Little interest or pleasure in doing things Not at all   Q2: Feeling down, depressed or hopeless Not at all   PHQ-2 Score 0     Answers for HPI/ROS submitted by the patient on 8/29/2022  ROB 7 TOTAL SCORE: 0      Abuse: Current or Past(Physical, Sexual or Emotional)- No  Do you feel safe in your environment? Yes        Social History     Tobacco Use     Smoking status: Former Smoker     Packs/day: 1.00     Years: 30.00     Pack years: 30.00     Types: Cigarettes, Cigars     Quit date: 1/2/2012     Years since quitting: 10.6     Smokeless tobacco: Never Used   Substance Use Topics     Alcohol use: Yes     Comment: 4-6 drinks per day - patient reports beer or vodka drinks     If you drink alcohol do you typically have >3 drinks per day or >7 drinks per week? No      AUDIT - Alcohol Use Disorders Identification Test - Reproduced from the World Health Organization Audit 2001 (Second Edition) 8/23/2022   1.  How often do you have a drink containing alcohol? 4 or more times a week   2.  How many drinks containing alcohol do you have on a typical day when you are drinking? 5 or 6   3.  How often do you have five or more drinks on  one occasion? Weekly   4.  How often during the last year have you found that you were not able to stop drinking once you had started? Never   5.  How often during the last year have you failed to do what was normally expected of you because of drinking? Never   6.  How often during the last year have you needed a first drink in the morning to get yourself going after a heavy drinking session? Never   7.  How often during the last year have you had a feeling of guilt or remorse after drinking? Never   8.  How often during the last year have you been unable to remember what happened the night before because of your drinking? Never   9.  Have you or someone else been injured because of your drinking? No   10. Has a relative, friend, doctor or other health care worker been concerned about your drinking or suggested you cut down? No   TOTAL SCORE 9       Last PSA:   PSA   Date Value Ref Range Status   04/29/2021 0.04 0 - 4 ug/L Final     Comment:     Assay Method:  Chemiluminescence using Siemens Vista analyzer     PSA Tumor Marker   Date Value Ref Range Status   07/21/2022 <0.01 0.00 - 4.00 ug/L Final       Reviewed orders with patient. Reviewed health maintenance and updated orders accordingly - Yes  Lab work is in process  Labs reviewed in EPIC  BP Readings from Last 3 Encounters:   08/29/22 133/84   08/02/22 (!) 145/86   08/01/22 124/73    Wt Readings from Last 3 Encounters:   08/29/22 87.1 kg (192 lb)   08/01/22 88.1 kg (194 lb 3.2 oz)   07/21/22 86.2 kg (190 lb)                  Patient Active Problem List   Diagnosis     CARDIOVASCULAR SCREENING; LDL GOAL LESS THAN 160     Left varicocele     Vitamin D deficiency     Kidney stone     Prostate cancer (H)     High risk medication use     Prostate cancer metastatic to bone (H)     Bone metastasis (H)     Chronic, continuous use of opioids     Hypertension goal BP (blood pressure) < 140/90     Past Surgical History:   Procedure Laterality Date     BIOPSY PROSTATE  TRANSRECTAL  01/10/2020    Dr. Colmenares     COLONOSCOPY  5/2020     LITHOTRIPSY  age 30s     MICROSCOPIC KNEE SURGERY Right age 30s     ORTHOPEDIC SURGERY Left 1985    Alan placed in Left Femur     PROSTATECTOMY RETROPUBIC RADICAL  02/24/2020    Dr. Colmenares       Social History     Tobacco Use     Smoking status: Former Smoker     Packs/day: 1.00     Years: 30.00     Pack years: 30.00     Types: Cigarettes, Cigars     Quit date: 1/2/2012     Years since quitting: 10.6     Smokeless tobacco: Never Used   Substance Use Topics     Alcohol use: Yes     Comment: 4-6 drinks per day - patient reports beer or vodka drinks     Family History   Problem Relation Age of Onset     Asthma Father      Prostate Cancer Father 73     Cancer Maternal Grandmother 92        throat      Asthma Sister          Current Outpatient Medications   Medication Sig Dispense Refill     apalutamide (ERLEADA) 60 MG tablet Take 4 tablets (240 mg) by mouth daily for 30 days 120 tablet 0     aspirin (ASA) 81 MG chewable tablet Take 1 tablet (81 mg) by mouth daily       denosumab (XGEVA) 120 MG/1.7ML SOLN injection Inject 1.7 mLs (120 mg) Subcutaneous every 3 months 1.7 mL 0     ERLEADA 60 MG tablet TAKE 4 TABLETS DAILY 120 tablet 0     HYDROcodone-acetaminophen (NORCO) 5-325 MG tablet Take 0.5 tablets by mouth every 4 hours as needed for severe pain 90 tablet 0     leuprolide (ELIGARD) 45 MG kit Inject 45 mg Subcutaneous every 6 months       lisinopril (ZESTRIL) 10 MG tablet Take 1 tablet (10 mg) by mouth daily 90 tablet 3     omeprazole (PRILOSEC) 20 MG DR capsule TAKE 1 CAPSULE BY MOUTH TWICE A  capsule 1     sildenafil (REVATIO) 20 MG tablet Take 1 to 5 tabs 30-60 minutes before intercourse.  Never use with nitroglycerin, terazosin or doxazosin. (Patient taking differently: Take 1 to 5 tabs 30-60 minutes before intercourse.  Never use with nitroglycerin, terazosin or doxazosin.) 30 tablet 11     UNABLE TO FIND 1 tablet daily MEDICATION NAME: C, D,  Zinc combination supplement       No Known Allergies  Recent Labs   Lab Test 07/21/22  1434 04/21/22  0932 01/21/22  0850 10/29/21  0903 08/23/21  0843 07/22/21  1201 07/22/21  1042 04/29/21  1408 01/22/21  0831 07/17/20  0941 06/03/20  1149 02/26/20  0000 10/10/19  1717 05/10/18  1106 05/05/18  0949   A1C  --   --   --   --   --  5.5  --   --   --   --   --   --  5.4 5.7*  --    LDL  --   --   --   --   --   --   --   --  129*  --  109*  --   --   --  110*   HDL  --   --   --   --   --   --   --   --  85  --  78  --   --   --  52   TRIG  --   --   --   --   --   --   --   --  209*  --  67  --   --   --  62   ALT 34 33 42 33   < >  --    < > 22 27   < > 29  --   --   --  30   CR 0.99 1.05 0.89 1.04   < >  --    < > 0.95 0.96   < > 0.89   < > 1.12  --  1.05   GFRESTIMATED 88 83 >90 79   < >  --    < > 88 87   < > >90   < > 73  --  74   GFRESTBLACK  --   --   --   --   --   --   --  >90 >90   < > >90   < > 85  --  89   POTASSIUM 4.1 4.2 4.7 3.9   < >  --    < > 3.9 4.4   < > 4.5   < > 4.2  --  4.6   TSH  --   --  1.03 1.91   < >  --    < > 2.57 3.46   < > 0.97  --   --   --  1.75    < > = values in this interval not displayed.        Reviewed and updated as needed this visit by clinical staff   Tobacco  Allergies  Meds   Med Hx  Surg Hx             Reviewed and updated as needed this visit by Provider       Med Hx  Surg Hx            Past Medical History:   Diagnosis Date     Gout      Hypertension goal BP (blood pressure) < 140/90 08/01/2022     Lumbar stenosis      Prostate cancer (H) 01/10/2020      Past Surgical History:   Procedure Laterality Date     BIOPSY PROSTATE TRANSRECTAL  01/10/2020    Dr. Colmenares     COLONOSCOPY  5/2020     LITHOTRIPSY  age 30s     MICROSCOPIC KNEE SURGERY Right age 30s     ORTHOPEDIC SURGERY Left 1985    Alan placed in Left Femur     PROSTATECTOMY RETROPUBIC RADICAL  02/24/2020    Dr. Colmenares       Review of Systems   Constitutional: Positive for chills. Negative for fever.   HENT:  "Negative for congestion, ear pain, hearing loss and sore throat.    Eyes: Negative for pain and visual disturbance.   Respiratory: Positive for cough. Negative for shortness of breath.    Cardiovascular: Positive for chest pain. Negative for palpitations and peripheral edema.   Gastrointestinal: Positive for constipation, diarrhea, heartburn and nausea. Negative for abdominal pain and hematochezia.   Genitourinary: Positive for impotence. Negative for dysuria, frequency, genital sores, hematuria, penile discharge and urgency.   Musculoskeletal: Positive for arthralgias and myalgias. Negative for joint swelling.   Skin: Positive for rash.   Neurological: Negative for weakness, headaches and paresthesias.   Psychiatric/Behavioral: Negative for mood changes. The patient is not nervous/anxious.          OBJECTIVE:   /84 (BP Location: Left arm, Patient Position: Sitting, Cuff Size: Adult Regular)   Pulse 96   Temp 97.5  F (36.4  C) (Tympanic)   Ht 1.753 m (5' 9\")   Wt 87.1 kg (192 lb)   SpO2 97%   BMI 28.35 kg/m      Physical Exam  GENERAL: healthy, alert and no distress  EYES: Eyes grossly normal to inspection, PERRL and conjunctivae and sclerae normal  HENT: ear canals and TM's normal, nose and mouth without ulcers or lesions  NECK: no adenopathy, no asymmetry, masses, or scars and thyroid normal to palpation  RESP: lungs clear to auscultation - no rales, rhonchi or wheezes  CV: regular rate and rhythm, normal S1 S2, no S3 or S4, no murmur, click or rub, no peripheral edema and peripheral pulses strong  ABDOMEN: soft, nontender, no hepatosplenomegaly, no masses and bowel sounds normal  MS: no gross musculoskeletal defects noted, no edema  SKIN: no suspicious lesions or rashes  NEURO: Normal strength and tone, mentation intact and speech normal  PSYCH: mentation appears normal, affect normal/bright        ASSESSMENT/PLAN:   (Z00.00) Routine general medical examination at a health care facility  ([facility] " "encounter diagnosis)  Comment: Preventive care reviewed and updated.    Plan: Repeat yearly     (F11.90) Chronic, continuous use of opioids  Plan: Pain Management  Referral     (G89.3) Chronic pain due to neoplasm    Currently taking Norco 5-325 for chronic pain syndrome , back pain and pelvic pain related to metastatic prostate cancer   The patient reports that the medication has been helpful to improve these activities of daily living: general activity of doing things around the house. The patient reports that he is taking the medication as prescribed  Plan: Pain Management  Referral  Will refer to pain clinic for second opinion  No need for refill now   PDMP Review       Value Time User    State PDMP site checked  Yes 8/29/2022  3:19 PM Palak Doe MD        The [place] Prescription Monitoring Program has been reviewed and there are no concerns about diversionary activity for controlled substances at this time.    (C61) Prostate cancer (H)  (C79.51) Bone metastasis (H)  diagnosed in 12/2019,, s/p RRP at that time but later found to have residual/recurrent disease in the pelvis and L inferior pubic body and pubic ramus metastasis.   He has been on androgen deprivation therapy since April 2020, and continues on Lupron every 3 months  Plan: follow with oncology and urology     Patient has been advised of split billing requirements and indicates understanding: Yes    COUNSELING:   Reviewed preventive health counseling, as reflected in patient instructions       Regular exercise       Healthy diet/nutrition       Immunizations    Vaccinated for: Pneumococcal and TDAP          Estimated body mass index is 28.35 kg/m  as calculated from the following:    Height as of this encounter: 1.753 m (5' 9\").    Weight as of this encounter: 87.1 kg (192 lb).     Weight management plan: Discussed healthy diet and exercise guidelines    He reports that he quit smoking about 10 years ago. His smoking use " included cigarettes and cigars. He has a 30.00 pack-year smoking history. He has never used smokeless tobacco.      Counseling Resources:  ATP IV Guidelines  Pooled Cohorts Equation Calculator  FRAX Risk Assessment  ICSI Preventive Guidelines  Dietary Guidelines for Americans, 2010  USDA's MyPlate  ASA Prophylaxis  Lung CA Screening    Palak Doe MD  [facility]

## 2022-08-29 NOTE — NURSING NOTE
Prior to immunization administration, verified patients identity using patient s name and date of birth. Please see Immunization Activity for additional information.     Screening Questionnaire for Adult Immunization    Are you sick today?   No   Do you have allergies to medications, food, a vaccine component or latex?   No   Have you ever had a serious reaction after receiving a vaccination?   No   Do you have a long-term health problem with heart, lung, kidney, or metabolic disease (e.g., diabetes), asthma, a blood disorder, no spleen, complement component deficiency, a cochlear implant, or a spinal fluid leak?  Are you on long-term aspirin therapy?   No   Do you have cancer, leukemia, HIV/AIDS, or any other immune system problem?   No   Do you have a parent, brother, or sister with an immune system problem?   No   In the past 3 months, have you taken medications that affect  your immune system, such as prednisone, other steroids, or anticancer drugs; drugs for the treatment of rheumatoid arthritis, Crohn s disease, or psoriasis; or have you had radiation treatments?   No   Have you had a seizure, or a brain or other nervous system problem?   No   During the past year, have you received a transfusion of blood or blood    products, or been given immune (gamma) globulin or antiviral drug?   No   For women: Are you pregnant or is there a chance you could become       pregnant during the next month?   No   Have you received any vaccinations in the past 4 weeks?   No     Immunization questionnaire answers were all negative.        Per orders of Dr. Doe, injection of Tdap, [id number] given by Roseline Washington CMA. Patient instructed to remain in clinic for 15 minutes afterwards, and to report any adverse reaction to me immediately.       Screening performed by Roseline Washington CMA on 8/29/2022 at 3:47 PM.

## 2022-08-29 NOTE — PROGRESS NOTES
Patient Name:  Per Etienne  Patient's :  1965  Date: 2022  Pain Diagnosis:  Routine general medical examination at a health care facility  Current Outpatient Medications   Medication Sig Dispense Refill     apalutamide (ERLEADA) 60 MG tablet Take 4 tablets (240 mg) by mouth daily for 30 days 120 tablet 0     aspirin (ASA) 81 MG chewable tablet Take 1 tablet (81 mg) by mouth daily       denosumab (XGEVA) 120 MG/1.7ML SOLN injection Inject 1.7 mLs (120 mg) Subcutaneous every 3 months 1.7 mL 0     ERLEADA 60 MG tablet TAKE 4 TABLETS DAILY 120 tablet 0     HYDROcodone-acetaminophen (NORCO) 5-325 MG tablet Take 0.5 tablets by mouth every 4 hours as needed for severe pain 90 tablet 0     leuprolide (ELIGARD) 45 MG kit Inject 45 mg Subcutaneous every 6 months       lisinopril (ZESTRIL) 10 MG tablet Take 1 tablet (10 mg) by mouth daily 90 tablet 3     omeprazole (PRILOSEC) 20 MG DR capsule TAKE 1 CAPSULE BY MOUTH TWICE A  capsule 1     sildenafil (REVATIO) 20 MG tablet Take 1 to 5 tabs 30-60 minutes before intercourse.  Never use with nitroglycerin, terazosin or doxazosin. (Patient taking differently: Take 1 to 5 tabs 30-60 minutes before intercourse.  Never use with nitroglycerin, terazosin or doxazosin.) 30 tablet 11     UNABLE TO FIND 1 tablet daily MEDICATION NAME: C, D, Zinc combination supplement       Answers for HPI/ROS submitted by the patient on 2022  ROB 7 TOTAL SCORE: 0  Frequency of exercise:: None  Getting at least 3 servings of Calcium per day:: NO  Diet:: Regular (no restrictions)  Taking medications regularly:: Yes  Medication side effects:: Other  Bi-annual eye exam:: Yes  Dental care twice a year:: Yes  Sleep apnea or symptoms of sleep apnea:: Excessive snoring  abdominal pain: No  Blood in stool: No  Blood in urine: No  chest pain: Yes  chills: Yes  congestion: No  constipation: Yes  cough: Yes  diarrhea: Yes  ear pain: No  eye pain: No  nervous/anxious: No  fever:  "No  frequency: No  genital sores: No  headaches: No  hearing loss: No  heartburn: Yes  arthralgias: Yes  joint swelling: No  peripheral edema: No  mood changes: No  myalgias: Yes  nausea: Yes  dysuria: No  palpitations: No  Skin sensation changes: No  sore throat: No  urgency: No  rash: Yes  shortness of breath: No  visual disturbance: No  weakness: No  impotence: Yes  penile discharge: No  Additional concerns today:: No        Subjective:  SUBJECTIVE: Per is a 57 year old male who presents for a un***scheduled visit regarding his chronic {BODY PART:886463} pain. The patient reports that his pain is  {WORSESAMEIMPROVEDRESOLVED:814415926} since the last visit. The patient believes that the reason his pain is worse is ***.     What number best describes your pain on average in the past week?  0 1 2 3 4 5 6 7 8 9 10  {NUMBERS 0-10:003306}  0 = No pain 10 = Pain as bad as you can imagine    What number best describes how, during the past week, pain has interfered with your enjoyment of life?  0 1 2 3 4 5 6 7 8 9 10  {NUMBERS 0-10:479069}  0 = Does not  10 = Completely interfere interferes    What number best describes how, during the past week, pain has interfered with your general activity?  0 1 2 3 4 5 6 7 8 9 10  {NUMBERS 0-10:870962}  0 = Does not interfere 10 = Completely interferes    The patient reports that the recent level of pain has been a {NUMBERS 1-12:378409} out of 10 on the pain scale. The patient reports that the medication has been helpful to improve these activities of daily living: ***. The patient reports that he {IS/IS NOT:569439::\"is\"} taking the medication as prescribed. The patient reports that the last time he took the medication was *** The patient {REPORTS/DENIES:338353} any side effects from his current medication. He belives that the *** is causing the side effect of ***.    The patient has*** does not have*** narcan available.  The expiration date on the prescription(s) is " ***-***-***.      Current Outpatient Medications:      apalutamide (ERLEADA) 60 MG tablet, Take 4 tablets (240 mg) by mouth daily for 30 days, Disp: 120 tablet, Rfl: 0     aspirin (ASA) 81 MG chewable tablet, Take 1 tablet (81 mg) by mouth daily, Disp: , Rfl:      denosumab (XGEVA) 120 MG/1.7ML SOLN injection, Inject 1.7 mLs (120 mg) Subcutaneous every 3 months, Disp: 1.7 mL, Rfl: 0     ERLEADA 60 MG tablet, TAKE 4 TABLETS DAILY, Disp: 120 tablet, Rfl: 0     HYDROcodone-acetaminophen (NORCO) 5-325 MG tablet, Take 0.5 tablets by mouth every 4 hours as needed for severe pain, Disp: 90 tablet, Rfl: 0     leuprolide (ELIGARD) 45 MG kit, Inject 45 mg Subcutaneous every 6 months, Disp: , Rfl:      lisinopril (ZESTRIL) 10 MG tablet, Take 1 tablet (10 mg) by mouth daily, Disp: 90 tablet, Rfl: 3     omeprazole (PRILOSEC) 20 MG DR capsule, TAKE 1 CAPSULE BY MOUTH TWICE A DAY, Disp: 180 capsule, Rfl: 1     sildenafil (REVATIO) 20 MG tablet, Take 1 to 5 tabs 30-60 minutes before intercourse.  Never use with nitroglycerin, terazosin or doxazosin. (Patient taking differently: Take 1 to 5 tabs 30-60 minutes before intercourse.  Never use with nitroglycerin, terazosin or doxazosin.), Disp: 30 tablet, Rfl: 11     UNABLE TO FIND, 1 tablet daily MEDICATION NAME: C, D, Zinc combination supplement, Disp: , Rfl:   No current facility-administered medications for this visit.    Facility-Administered Medications Ordered in Other Visits:      leuprolide (LUPRON DEPOT) kit 22.5 mg, 22.5 mg, Intramuscular, Q90 Days, Scott Garcia MD, 22.5 mg at 07/21/22 9030    Social History     Socioeconomic History     Marital status:    Tobacco Use     Smoking status: Former Smoker     Packs/day: 1.00     Years: 30.00     Pack years: 30.00     Types: Cigarettes, Cigars     Quit date: 1/2/2012     Years since quitting: 10.6     Smokeless tobacco: Never Used   Vaping Use     Vaping Use: Never used   Substance and Sexual Activity     Alcohol  "use: Yes     Comment: 4-6 drinks per day - patient reports beer or vodka drinks     Drug use: No     Sexual activity: Yes     Partners: Female       The patient has none of*** the following risk factors for aberant behavior/harm from opioid use:  Age >45  Female gender  FHH of prescription drug or alcohol abuse  Cigarette smoking  Substance use disorder  Preadolescent sexual abuse in women  Major psychiatric disorder  Prior legal problems  History of MVA  Poor family support  Involvement in a problematic subculture    The patient has*** the following risk factors for aberant behavior/harm from opioid use:  Age >45  Female gender  FHH of prescription drug or alcohol abuse  Cigarette smoking  Substance use disorder  Preadolescent sexual abuse in women  Major psychiatric disorder  Prior legal problems  History of MVA  Poor family support  Involvement in a problematic subculture        See images and flowsheet for patient's report of condition.   Objective:  General:  Per is awake, alert, and cooperative and in no apparent distress. ***    The patient's last urine drug screen was (date):  ***  Options: [id number], 5743 or 4580  The pain contract was last renewed in the last 12 months:  {Yes /No default.:996777::\"No\"}  I had the patient read and sign the narcotic agreement letter and requested it to be scanned into the chart.     Assessment: Stable Opioid Maintenance Analgesia. ***  Patient is subjectively not having adequate pain control.***  The patient is not compliant with the medication regimen.***  I have significant concerns that the  patient is abusing the prescribed drugs.***  I have significant concerns that the  patient is diverting the prescribed drugs.***   Plan:   Continue opioid maintenance analgesia without changes: ***  Continue opioid maintenance analgesia with the following changes: ***  The patient was instructed to follow up in {NUMBER 30,60,90,120:410362} days. he will call for a refill until the " "next scheduled appointment.    Urine drug screen today: {YES:297488::\"attempted *** times\"}    Narcan was prescribed*** renewed***.       F11.90 \"Chronic, continuous use of Opioids \" has been added to the Problem List and controlledsubstanceproblemlistoverview:356486  has been added to the overview {Yes/No:592623::\"Yes\"}     How to change Problem List:  To do this, select Change Dx, and enter Chronic, continuous use of Opioids (or F11.90).  This will update the Problem List diagnosis code while maintaining the content of the Problem List overview related to the plan of care for opioid therapy.  The F11.90 code will be attached to the quality measure in the near future.           "

## 2022-09-01 ENCOUNTER — MYC REFILL (OUTPATIENT)
Dept: FAMILY MEDICINE | Facility: CLINIC | Age: 57
End: 2022-09-01

## 2022-09-01 DIAGNOSIS — C79.51 PROSTATE CANCER METASTATIC TO BONE (H): ICD-10-CM

## 2022-09-01 DIAGNOSIS — C61 PROSTATE CANCER METASTATIC TO BONE (H): ICD-10-CM

## 2022-09-01 DIAGNOSIS — M54.42 ACUTE LEFT-SIDED LOW BACK PAIN WITH LEFT-SIDED SCIATICA: ICD-10-CM

## 2022-09-05 RX ORDER — HYDROCODONE BITARTRATE AND ACETAMINOPHEN 5; 325 MG/1; MG/1
0.5 TABLET ORAL EVERY 4 HOURS PRN
Qty: 90 TABLET | Refills: 0 | Status: SHIPPED | OUTPATIENT
Start: 2022-09-05 | End: 2022-11-03

## 2022-09-09 ASSESSMENT — ENCOUNTER SYMPTOMS
MUSCLE WEAKNESS: 1
MYALGIAS: 1
POLYDIPSIA: 0
NAUSEA: 1
DIARRHEA: 1
WEIGHT LOSS: 0
RECTAL PAIN: 0
ARTHRALGIAS: 1
CHILLS: 1
JAUNDICE: 0
HALLUCINATIONS: 0
NIGHT SWEATS: 1
WEIGHT GAIN: 1
BLOATING: 0
JOINT SWELLING: 0
INSOMNIA: 1
DECREASED CONCENTRATION: 1
EYE IRRITATION: 1
BACK PAIN: 1
POLYPHAGIA: 0
EYE REDNESS: 0
EYE WATERING: 0
FEVER: 1
BOWEL INCONTINENCE: 0
EYE PAIN: 0
BLOOD IN STOOL: 0
PANIC: 0
NERVOUS/ANXIOUS: 0
INCREASED ENERGY: 1
HEARTBURN: 1
DOUBLE VISION: 0
NECK PAIN: 0
STIFFNESS: 1
CONSTIPATION: 0
VOMITING: 1
DECREASED APPETITE: 0
ABDOMINAL PAIN: 1
MUSCLE CRAMPS: 0
ALTERED TEMPERATURE REGULATION: 1
DEPRESSION: 0
FATIGUE: 1

## 2022-09-09 ASSESSMENT — ANXIETY QUESTIONNAIRES
1. FEELING NERVOUS, ANXIOUS, OR ON EDGE: NOT AT ALL
3. WORRYING TOO MUCH ABOUT DIFFERENT THINGS: NOT AT ALL
IF YOU CHECKED OFF ANY PROBLEMS ON THIS QUESTIONNAIRE, HOW DIFFICULT HAVE THESE PROBLEMS MADE IT FOR YOU TO DO YOUR WORK, TAKE CARE OF THINGS AT HOME, OR GET ALONG WITH OTHER PEOPLE: NOT DIFFICULT AT ALL
7. FEELING AFRAID AS IF SOMETHING AWFUL MIGHT HAPPEN: NOT AT ALL
6. BECOMING EASILY ANNOYED OR IRRITABLE: NOT AT ALL
2. NOT BEING ABLE TO STOP OR CONTROL WORRYING: NOT AT ALL
GAD7 TOTAL SCORE: 0
5. BEING SO RESTLESS THAT IT IS HARD TO SIT STILL: NOT AT ALL
GAD7 TOTAL SCORE: 0
8. IF YOU CHECKED OFF ANY PROBLEMS, HOW DIFFICULT HAVE THESE MADE IT FOR YOU TO DO YOUR WORK, TAKE CARE OF THINGS AT HOME, OR GET ALONG WITH OTHER PEOPLE?: NOT DIFFICULT AT ALL
7. FEELING AFRAID AS IF SOMETHING AWFUL MIGHT HAPPEN: NOT AT ALL
GAD7 TOTAL SCORE: 0
4. TROUBLE RELAXING: NOT AT ALL

## 2022-09-09 ASSESSMENT — PAIN SCALES - PAIN ENJOYMENT GENERAL ACTIVITY SCALE (PEG)
PEG_TOTALSCORE: 6.67
AVG_PAIN_PASTWEEK: 6
INTERFERED_GENERAL_ACTIVITY: 7
INTERFERED_ENJOYMENT_LIFE: 7

## 2022-09-15 ENCOUNTER — OFFICE VISIT (OUTPATIENT)
Dept: PALLIATIVE MEDICINE | Facility: CLINIC | Age: 57
End: 2022-09-15
Attending: FAMILY MEDICINE
Payer: COMMERCIAL

## 2022-09-15 VITALS — HEART RATE: 108 BPM | DIASTOLIC BLOOD PRESSURE: 109 MMHG | SYSTOLIC BLOOD PRESSURE: 154 MMHG

## 2022-09-15 DIAGNOSIS — C79.51 BONE METASTASIS: ICD-10-CM

## 2022-09-15 DIAGNOSIS — C61 MALIGNANT NEOPLASM OF PROSTATE (H): ICD-10-CM

## 2022-09-15 DIAGNOSIS — M54.16 LUMBAR RADICULOPATHY: ICD-10-CM

## 2022-09-15 DIAGNOSIS — G89.3 CANCER ASSOCIATED PAIN: Primary | ICD-10-CM

## 2022-09-15 PROCEDURE — 99205 OFFICE O/P NEW HI 60 MIN: CPT

## 2022-09-15 RX ORDER — MORPHINE SULFATE 15 MG/1
15 TABLET, FILM COATED, EXTENDED RELEASE ORAL DAILY
Qty: 30 TABLET | Refills: 0 | Status: SHIPPED | OUTPATIENT
Start: 2022-09-15 | End: 2022-10-06

## 2022-09-15 ASSESSMENT — PAIN SCALES - GENERAL: PAINLEVEL: EXTREME PAIN (8)

## 2022-09-15 NOTE — CONFIDENTIAL NOTE
[facility]     Date of visit: 9/15/2022    Assessment:  Per Etienne is a 57 year old male with a past medical history significant for prostate cancer with bone metastasis, pulmonary nodules, lumbar stenosis, HTN, gout who presents with complaints of cancer associated pain.     1. Low back pain with LLE radic, neuropathy bilat feet, pain of multiple joints - His back pain with left sided radicular pain with L5 distribution that has been present for many years. He has been referred for procedure by his PCP for repeat L4/5 LUIS (Regina/Obed), which historically have given him significant symptom relief, although he did not appreciate as much benefit from the most recent LUIS on 8/2/22. Lumbar CT from 8/2021 demonstrated severe left neural foraminal narrowing at L4-L5 and moderate to severe right at L3-4, additional multilevel spinal canal stenosis and neural foraminal stenosis noted. I suspect his low back pain is associated with multiple factors, including underlying degenerative changes of lumbar spine, bone metastasis, and likely some myofascial component. Neuropathic symptoms in feet and generalized joint pain is likely secondary effects from cancer treatment and bone metastasis.   2. Mental Health - the patient's mental health concerns, specifically prostate cancer with bone mets, affect his experience of pain and contribute to his clinically significant distress. He may benefit from more generalized health psychology to support chronic illness/cancer management and will consider discussing at future follow up.     1. Cancer associated pain    2. Malignant neoplasm of prostate (H)    3. Bone metastasis (H)    4. Lumbar radiculopathy        Plan:  The following recommendations were given to the patient. Diagnosis, treatment options, risks, benefits, and alternatives were discussed, and all questions were answered. The patient expressed understanding of the plan for management.     I  am recommending a multidisciplinary treatment plan to help this patient better manage his pain.  This includes:    1.  Pain Physical Therapy:  WALT Thompson is very active. Despite the pain, he continues to work full time as a , which is somewhat labor intensive (frequently using ladder).    2.  Pain Psychologist to address relaxation, behavioral change, coping style, and other factors important to improvement.  WALT Thompson may possibly benefit from support for his chronic health conditions overall, though he does seem to manage fairly well. He continues to work full time and likes to stay active.    3.  Diagnostic Studies:  Reviewed imaging in chart.    4.  Medication Management:   1. Start MS Contin 15 mg in the morning. Take your first dose over the weekend when you are not working. We discussed possible side effects, advised to trial first dose at home over the weekend, and avoid driving and climbing on ladders until you know how the med affects you.   2. Continue hydrocodone 5-325 three times daily as needed for breakthrough pain.   3. We will plan to do controlled substance agreement requirements at your next visit. *We will need to discuss avoiding alcohol use while taking opioids. 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    5.  Potential procedures: We will order repeat lumbar epidural at your next visit to have done end of October/beginning of November 7.  Follow up with TEREZA Sanchez CNP in 3-4 weeks     Considerations for future visit:   -Consultation for ITP   -Collaborate with Dr. Garcia on care needs  -Consider palliative referral    Review of Electronic Chart: Today I have also reviewed available medical information in the patient's medical record at [place] ([facility]), including relevant provider notes, laboratory work, and imaging.       Chanel Bruno, JANET, APRN, AGNP-C  [facility]        -------------------------------------------------    Subjective:    Reason for consultation:    Per Etienne is a 57 year old male who is seen in consultation today at the request of his PCP,  Palak Doe for evaluation of his pain issues and recommendations for management, with specific emphasis on  Referral dx - Chronic pain associated with neoplasm (prostate), chronic continuous opioids     Please see the [facility] [place] health questionnaire which the patient completed and reviewed with me in detail.    Review of [place] Prescription Monitoring Program (): No concern for abuse or misuse of controlled medications based on this report. Reviewed 9/15/22 - appears appropriate.     Review of Electronic Chart: Today I have also reviewed available medical information in the patient's medical record at [place] ([facility]), including relevant provider notes, laboratory work, and imaging.     Pain medications are being prescribed by me (previously saw Dr. Chen at [facility] who has since left the [facility] system).     Chief Complaint:    Chief Complaint   Patient presents with     Pain       Pain history:  Per Etienne is a 57 year old male who presents for initial evaluation of chief complaint of low back and LE, cancer associated (prostate with bone mets).      -He has low back pain that radiates into left leg and cancer associated pain, secondary to prostate cancer with bone mets. Of note, he has been dealing with back pain for a long time. He has seen Dee Tapia and Obed in the past for lumbar ESIs, which have been moderately helpful with low back and left leg pain. His last L4/5 interlaminar epidural steroid injection was on 8/2/22 with Dr. Tapia, but he reports    -He was diagnosed with prostate cancer January 2020. Despite ongoing treatment since diagnosis, he has bone metastasis, states he has multiple affected lymph nodes in pelvic region. His current chemo tx includes  daily oral apalutamide daily and Lupron injections [id number].   -Since starting chemo, he has noticed numbness/tingling in his feet, and he is aware neuropathy is a known side effect from certain chemo agents.   -He is still working despite cancer and pain. He is a  and very active in his job. He has plans to retire in 4 years and is looking for better pain control that allows him to continue working.  -Son got  last year, is expecting first child (boy), due date is 10/25. States he has a lot to look forward to with life. His grandson's name will be Arturo Rebollar.       Pain description:  Location: Low back, joints  Quality: stiff, ache, weakness   Severity/Intensity: 4/10 at best, 9/10 at worst, 7/10 on average  Aggravating factors include: certain movements/activities, but he usually keeps pushing through despite pain   Relieving factors include: Norco, rest    The patient otherwise denies bowel or bladder incontinence, parasthesias, weakness, saddle anesthesia, unintentional weight loss, or fever/chills/sweats.     Per Etienne has not been seen at a pain clinic in the past.      Pain Treatments:    1. Medications:       Current pain medications:   Hydrocodone 5-325 mg, takes 1 tab TID   MS Contin 15 mg daily       Current calculated MME: 30 MME (Norco 5-325, 3 tabs/day; new today - MS contin 15 mg)         Past Medical History:  Past Medical History:   Diagnosis Date     Gout      Hypertension goal BP (blood pressure) < 140/90 08/01/2022     Lumbar stenosis      Prostate cancer (H) 01/10/2020       Past Surgical History:  Past Surgical History:   Procedure Laterality Date     BIOPSY PROSTATE TRANSRECTAL  01/10/2020    Dr. Colmenares     COLONOSCOPY  5/2020     LITHOTRIPSY  age 30s     MICROSCOPIC KNEE SURGERY Right age 30s     ORTHOPEDIC SURGERY Left 1985    Alan placed in Left Femur     PROSTATECTOMY RETROPUBIC RADICAL  02/24/2020    Dr. Colmenares       Medications:  Current Outpatient  Medications   Medication Sig Dispense Refill     apalutamide (ERLEADA) 60 MG tablet Take 4 tablets (240 mg) by mouth daily for 30 days 120 tablet 0     aspirin (ASA) 81 MG chewable tablet Take 1 tablet (81 mg) by mouth daily       denosumab (XGEVA) 120 MG/1.7ML SOLN injection Inject 1.7 mLs (120 mg) Subcutaneous every 3 months 1.7 mL 0     ERLEADA 60 MG tablet TAKE 4 TABLETS DAILY 120 tablet 0     HYDROcodone-acetaminophen (NORCO) 5-325 MG tablet Take 0.5 tablets by mouth every 4 hours as needed for severe pain 90 tablet 0     leuprolide (ELIGARD) 45 MG kit Inject 45 mg Subcutaneous every 6 months       lisinopril (ZESTRIL) 10 MG tablet Take 1 tablet (10 mg) by mouth daily 90 tablet 3     morphine (MS CONTIN) 15 MG CR tablet Take 1 tablet (15 mg) by mouth daily for 30 days 30 tablet 0     omeprazole (PRILOSEC) 20 MG DR capsule TAKE 1 CAPSULE BY MOUTH TWICE A  capsule 1     UNABLE TO FIND 1 tablet daily MEDICATION NAME: C, D, Zinc combination supplement       sildenafil (REVATIO) 20 MG tablet Take 1 to 5 tabs 30-60 minutes before intercourse.  Never use with nitroglycerin, terazosin or doxazosin. (Patient not taking: Reported on 9/15/2022) 30 tablet 11       Allergies:   No Known Allergies      Family history:  Family History   Problem Relation Age of Onset     Asthma Father      Prostate Cancer Father 73     Cancer Maternal Grandmother 92        throat      Asthma Sister        Review of Systems:    POSTIVE IN BOLD  GENERAL: fever/chills, fatigue, general unwell feeling, weight gain/loss.  HEAD/EYES:  headache, dizziness, or vision changes.    EARS/NOSE/THROAT: nosebleeds, hearing loss, sinus infection, earache, tinnitus.  IMMUNE:  allergies, cancer, immune deficiency, or infections.  SKIN:  itching, rash, hives  HEME/Lymphatic: anemia, easy bruising, easy bleeding.  RESPIRATORY: cough, wheezing, or shortness of breath  CARDIOVASCULAR/Circulation: extremity edema, syncope, hypertension, tachycardia, or  angina.  GASTROINTESTINAL: abdominal pain, nausea/emesis, diarrhea, heartburn, constipation,  hematochezia, or melena.  ENDOCRINE:  diabetes, steroid use,  thyroid disease or osteoporosis.  MUSCULOSKELETAL: joint pain, stiffness, neck pain, back pain, arthritis, or gout.  GENITOURINARY: frequency, urgency, dysuria, difficulty voiding, hematuria or incontinence.  NEUROLOGIC: weakness, numbness, paresthesias, seizure, tremor, stroke or memory loss.  PSYCHIATRIC: depression, anxiety, stress, suicidal thoughts or mood swings.     Objective:    Imaging:  CT THORACIC SPINE W/O CONTRAST, CT LUMBAR SPINE W/O CONTRAST 8/23/2021  9:28 AM     Provided History: Mid-back pain; Bone lesion, T-spine, malignancy  suspected; Chronic low back pain without sciatica, unspecified back  pain laterality; Chronic low back pain without sciatica, unspecified  back pain laterality; Chronic midline thoracic back pain; Chronic  midline thoracic back pain   ICD-10: Chronic low back pain without sciatica, unspecified back pain  laterality; Chronic low back pain without sciatica, unspecified back  pain laterality; Chronic midline thoracic back pain; Chronic midline  thoracic back pain     Comparison: Chest CT dated 7/19/2021, 8/23/2021. CT abdomen/pelvis  dated 5/10/2021, 3/31/2021. PET/CT dated 5/20/2020.     Technique: Using multidetector thin collimation helical acquisition  technique, axial, sagittal and coronal 3 mm thickness CT  reconstructions were obtained through the thoracic spine without  intravenous contrast.  Images were viewed in bone and soft tissue  windows.     Thoracic Findings:   image demonstrates dextroscoliosis centered at T9-10. Thoracic  spine alignment is within normal limits. No evidence of fracture is  seen. No significant prevertebral soft tissue swelling. Stable  sclerotic foci within the T6 and T11 vertebral bodies. There is no  disc height narrowing at any level. Spinal canal and neural foramina  are patent.       The visualized prevertebral and paravertebral tissues are  unremarkable. Biapical scarring and multifocal groundglass opacities  and pulmonary nodules better characterized on chest CT dated  8/23/2021.     Lumbar Findings:  There are 5 lumbar type vertebrae. Levoscoliosis of the lumbar spine,  with apex centered at L3. Regarding alignment, the lumbar spine  alignment appears preserved. There is multilevel disc space narrowing  at L5-S1, most severe at L3-4. No definite lesions are noted within  the vertebra. Findings on a level by level basis are as follows:     L1-L2: Disc bulge. Bilateral facet hypertrophy. No left neural  foraminal narrowing. Mild right neuroforaminal narrowing. No spinal  canal narrowing     L2-L3: Disc bulge, asymmetric to the right. Facet hypertrophy. Mild  bilateral neuroforaminal narrowing, right greater than left. No spinal  canal narrowing.     L3-L4: Right eccentric disc osteophyte complex. Bilateral facet  hypertrophy. Ligamentum flavum hypertrophy. Moderate to severe right  and mild left neural foraminal narrowing. Mild spinal canal narrowing.     L4-L5: Disc bulge. Bilateral facet hypertrophy. Ligamentum flavum  hypertrophy. Severe left neuroforaminal narrowing. Mild right neural  foraminal narrowing. No spinal canal narrowing.     L5-S1: No neural foraminal narrowing or spinal canal narrowing.     The visualized paraspinous tissues appear unremarkable.                                                                      Impression:   1. No acute osseous abnormality.   2. Unchanged sclerotic foci in T6 and T11 vertebral bodies dating back  to 5/20/2020.   3. Degenerative changes of the lumbar spine. Severe left neural  foraminal narrowing at L4-L5 and moderate to severe right at L3-4.  Additional multilevel spinal canal and neural foraminal stenosis.     I have personally reviewed the examination and initial interpretation  and I agree with the findings.     CHRISTIAN GONSALVES MD           Physical Exam:  Vitals:    09/15/22 1523   BP: (!) 154/109   Pulse: 108     Exam:  Constitutional: alert, well groomed, appears stated age.  HEENT: Head atraumatic, normocephalic. Eyes without conjunctival injection or jaundice. Oropharynx clear. Neck supple. No obvious neck masses.  Skin: No rash, lesions, or petechiae of exposed skin.   Extremities: No gross edema noted   Psychiatric/mental status: Alert, without lethargy or stupor. Speech fluent. Appropriate affect. Mood normal. Able to follow commands without difficulty.       BILLING TIME DOCUMENTATION:   The total TIME spent on this patient on the date of the encounter/appointment was 75 minutes.      TOTAL TIME includes:   Time spent preparing to see the patient (reviewing records and tests)   Time spent face to face (or over the phone) with the patient   Time spent ordering tests, medications, procedures and referrals   Time spent Referring and communicating with other healthcare professionals   Time spent documenting clinical information in Epic

## 2022-09-15 NOTE — PROGRESS NOTES
09/15/22 1527   PEG: A Thee-Item Scale Assessing Pain Intensity and Interference        0 = No pain / No interference    10 = Pain as bad as you can imagine / Completely interferes   What number best describes your pain on average in the past week? 7   What number best describes how, during the past week, pain has interfered with your enjoyment of life? 5   What number best describes how, during the past week, pain has interfered with your general activity? 5   PEG Total Score 5.67

## 2022-09-15 NOTE — PATIENT INSTRUCTIONS
1.  Pain Physical Therapy:  NO   - Bill is very active.    2.  Pain Psychologist to address relaxation, behavioral change, coping style, and other factors important to improvement.  NO   3.  Diagnostic Studies:  Reviewed imaging in chart.    4.  Medication Management:   Start MS Contin 15 mg in the morning. Take your first dose over the weekend when you are not working.  Continue hydrocodone 5-325 three times daily as needed for breakthrough pain.   We will plan to do controlled substance agreement requirements at your  next visit.    5.  Potential procedures: We will order repeat lumbar epidural at your next visit to have done end of October/beginning of November    7.  Follow up with TEREZA Sanchez CNP in 3-4 weeks   ----------------------------------------------------------------  Clinic Number:  [phone]   Call with any questions about your care and for scheduling assistance.   Calls are returned Monday through Friday between 8 AM and 4:30 PM. We usually get back to you within 2 business days depending on the issue/request.    If we are prescribing your medications:  For opioid medication refills, call the clinic or send a [employer] message 7 days in advance.  Please include:  Name of requested medication  Name of the pharmacy.  For non-opioid medications, call your pharmacy directly to request a refill. Please allow 3-4 days to be processed.   Per [place] State Law:  All controlled substance prescriptions must be filled within 30 days of being written.    For those controlled substances allowing refills, pickup must occur within 30 days of last fill.      We believe regular attendance is key to your success in our program!    Any time you are unable to keep your appointment we ask that you call us at least 24 hours in advance to cancel.This will allow us to offer the appointment time to another patient.   Multiple missed appointments may lead to dismissal from the clinic.

## 2022-09-15 NOTE — NURSING NOTE
PEG Score 9/15/2022   PEG Total Score 5.67       
31yo  on PPD#2 doing well. Tolerating diet and ambulation without difficulties. Normal lochia.

## 2022-09-21 NOTE — PROGRESS NOTES
GENERAL SURGERY PROGRESS NOTE    Patient: ARIEL INIGUEZ , 80y ([birth year])Female   MRN: [id number]  Location: [address]  Visit: 09-14-22 Inpatient  Date: 09-20-22 @ 15:55    Hospital Day #: 8  Post-Op Day #: 4    Events of past 24 hours: passed TOV. No acute events overnight. Afebrile. Patient seen and examined at bedside.     PAST MEDICAL & SURGICAL HISTORY:  Hypothyroid      Hypertension      Cirrhosis      RA (rheumatoid arthritis)      High cholesterol      Poor circulation      Incontinence      History of hip surgery      H/O: hysterectomy          Vitals:   T(F): 96.9 (09-20-22 @ 12:34), Max: 98 (09-19-22 @ 21:00)  HR: 62 (09-20-22 @ 12:34)  BP: 138/63 (09-20-22 @ 12:34)  RR: 18 (09-20-22 @ 12:34)  SpO2: 94% (09-20-22 @ 12:34)      Diet, Regular:   DASH/TLC Sodium & Cholesterol Restricted      Fluids:     I & O's:    09-19-22 @ 07:01  -  09-20-22 @ 07:00  --------------------------------------------------------  IN:  Total IN: 0 mL    OUT:    Indwelling Catheter - Urethral (mL): 550 mL    Voided (mL): 800 mL  Total OUT: 1350 mL    Total NET: -1350 mL        Bowel Movement: : [] YES [] NO  Flatus: : [] YES [] NO    PHYSICAL EXAM:  GENERAL: NAD, well-appearing  CHEST/LUNG: Clear to auscultation bilaterally  HEART: Regular rate and rhythm  ABDOMEN: Soft, Nontender, Nondistended; Bowel sounds present  EXTREMITIES:  No clubbing, cyanosis, or edema      MEDICATIONS  (STANDING):  acetaminophen     Tablet .. 650 milliGRAM(s) Oral every 6 hours  albuterol/ipratropium for Nebulization 3 milliLiter(s) Nebulizer once  allopurinol 100 milliGRAM(s) Oral two times a day  amLODIPine   Tablet 5 milliGRAM(s) Oral <User Schedule>  carvedilol 6.25 milliGRAM(s) Oral every 12 hours  DULoxetine 60 milliGRAM(s) Oral daily  ferrous    sulfate 325 milliGRAM(s) Oral daily  folic acid 1 milliGRAM(s) Oral daily  heparin   Injectable 5000 Unit(s) SubCutaneous every 8 hours  ketorolac   Injectable 15 milliGRAM(s) IV Push once  levothyroxine 50 MICROGram(s) Oral daily  oxybutynin 10 milliGRAM(s) Oral two times a day  pantoprazole    Tablet 40 milliGRAM(s) Oral before breakfast    MEDICATIONS  (PRN):  acetaminophen     Tablet .. 650 milliGRAM(s) Oral every 6 hours PRN Mild Pain (1 - 3)  ketorolac   Injectable 30 milliGRAM(s) IV Push every 8 hours PRN Moderate Pain (4 - 6)  oxyCODONE    IR 5 milliGRAM(s) Oral every 6 hours PRN Moderate Pain (4 - 6)      DVT PROPHYLAXIS: heparin   Injectable 5000 Unit(s) SubCutaneous every 8 hours    GI PROPHYLAXIS: pantoprazole    Tablet 40 milliGRAM(s) Oral before breakfast    ANTICOAGULATION:   ANTIBIOTICS:            LAB/STUDIES:  Labs:  CAPILLARY BLOOD GLUCOSE                              10.8   7.58  )-----------( 247      ( 19 Sep 2022 21:32 )             32.9       Auto Neutrophil %: 77.1 % (09-19-22 @ 21:32)  Auto Immature Granulocyte %: 0.7 % (09-19-22 @ 21:32)    09-19    131<L>  |  102  |  16  ----------------------------<  97  4.6   |  22  |  0.7      Calcium, Total Serum: 7.9 mg/dL (09-19-22 @ 21:32)      LFTs:       ABG - ( 17 Sep 2022 08:24 )  pH: 7.40  /  pCO2: 35    /  pO2: 82    / HCO3: 22    / Base Excess: -2.7  /  SaO2: 98.4            ABG - ( 16 Sep 2022 21:13 )  pH: 7.42  /  pCO2: 33    /  pO2: 109   / HCO3: 21    / Base Excess: -2.3  /  SaO2: 98.9              Coags:                        IMAGING:   GENERAL SURGERY PROGRESS NOTE    Patient: ARIEL INIGUEZ , 80y ([birth year])Female   MRN: [id number]  Location: [facility] ER Hold [address]  Visit: 09-14-22 Inpatient  Date: 09-15-22 @ 08:45    Hospital Day #: 3  Post-Op Day #:    Procedure/Dx/Injuries: Left femoral shaft fracture    Events of past 24 hours: The patient was admitted for planned operative intervention for left femoral shaft fracture. Evaluated by cardiology for clearance, moderate risk for surgery- requesting echocardiogram. The patient's hgb is downtrending, now 6.7, will transfuse 1unit PRBC and repeat cbc after transfusion.     PAST MEDICAL & SURGICAL HISTORY:  Hypothyroid  Hypertension  Cirrhosi  RA (rheumatoid arthritis)  High cholesterol  Poor circulation  Incontinence  History of hip surgery  H/O: hysterectomy      Vitals:   T(F): 97.6 (09-15-22 @ 08:40), Max: 98.1 (09-14-22 @ 13:00)  HR: 65 (09-15-22 @ 08:40)  BP: 189/77 (09-15-22 @ 08:40)  RR: 18 (09-15-22 @ 08:40)  SpO2: 100% (09-15-22 @ 08:40)      Diet, NPO after Midnight:      NPO Start Date: 15-Sep-2022,   NPO Start Time: 23:59  Diet, Regular:   DASH/TLC Sodium & Cholesterol Restricted      Fluids: lactated ringers.: Solution, 1000 milliLiter(s) infuse at 100 mL/Hr      I & O's:      PHYSICAL EXAM:  GENERAL: NAD, well-appearing  CHEST/LUNG: Clear to auscultation bilaterally  HEART: Regular rate and rhythm  ABDOMEN: Soft, Nontender, Nondistended; Bowel sounds present  EXTREMITIES: LLE with distal pulses, swelling noted above knee.  No clubbing, cyanosis, or edema      MEDICATIONS  (STANDING):  allopurinol 100 milliGRAM(s) Oral two times a day  amLODIPine   Tablet 5 milliGRAM(s) Oral <User Schedule>  carvedilol 6.25 milliGRAM(s) Oral every 12 hours  DULoxetine 60 milliGRAM(s) Oral daily  ferrous    sulfate 325 milliGRAM(s) Oral daily  folic acid 1 milliGRAM(s) Oral daily  heparin   Injectable 5000 Unit(s) SubCutaneous every 8 hours  lactated ringers. 1000 milliLiter(s) (100 mL/Hr) IV Continuous <Continuous>  levothyroxine 50 MICROGram(s) Oral daily  oxybutynin 10 milliGRAM(s) Oral two times a day  pantoprazole    Tablet 40 milliGRAM(s) Oral before breakfast    MEDICATIONS  (PRN):  acetaminophen     Tablet .. 650 milliGRAM(s) Oral every 6 hours PRN Mild Pain (1 - 3)  ketorolac   Injectable 30 milliGRAM(s) IV Push every 8 hours PRN Moderate Pain (4 - 6)      DVT PROPHYLAXIS: heparin   Injectable 5000 Unit(s) SubCutaneous every 8 hours    GI PROPHYLAXIS: pantoprazole    Tablet 40 milliGRAM(s) Oral before breakfast    ANTICOAGULATION:   ANTIBIOTICS:      LAB/STUDIES:  Labs:  CAPILLARY BLOOD GLUCOSE                          6.8    5.84  )-----------( 178      ( 15 Sep 2022 00:15 )             22.2       Auto Neutrophil %: 74.6 % (09-14-22 @ 17:40)  Auto Immature Granulocyte %: 0.4 % (09-14-22 @ 17:40)    09-14    135  |  103  |  14  ----------------------------<  108<H>  4.8   |  25  |  0.8      Calcium, Total Serum: 8.0 mg/dL (09-14-22 @ 17:40)      LFTs:             5.4  | 0.3  | 20       ------------------[99      ( 14 Sep 2022 17:40 )  2.4  | <0.2 | 11          Lipase:x      Amylase:x             Coags:     12.90  ----< 1.12    ( 15 Sep 2022 07:29 )     32.5      IMAGING:  n/a    ACCESS/ DEVICES:  [ x] Peripheral IV       GENERAL SURGERY PROGRESS NOTE    Patient: ARIEL INIGUEZ , 80y ([birth year])Female   MRN: [id number]  Location: [facility] [address]  Visit: 09-14-22 Inpatient  Date: 09-16-22 @ 08:04    Hospital Day #: 4  Post-Op Day #:    Procedure/Dx/Injuries: Left femoral shaft fracture    Events of past 24 hours: Transfused 1uPRBC 9/15, repeat hgb 8.0, now 7.3. Planned for OR today with orthopedics.     PAST MEDICAL & SURGICAL HISTORY:  Hypothyroid  Hypertension  Cirrhosis  RA (rheumatoid arthritis)  High cholesterol  Poor circulation  Incontinence  History of hip surgery  H/O: hysterectomy    Vitals:   T(F): 98.6 (09-16-22 @ 05:15), Max: 98.6 (09-16-22 @ 05:15)  HR: 77 (09-16-22 @ 05:15)  BP: 156/69 (09-16-22 @ 05:15)  RR: 18 (09-16-22 @ 05:15)  SpO2: 96% (09-16-22 @ 05:15)      Diet, Regular:   DASH/TLC Sodium & Cholesterol Restricted  Diet, NPO after Midnight:      NPO Start Date: 15-Sep-2022,   NPO Start Time: 23:59      PHYSICAL EXAM:  GENERAL: NAD, well-appearing  CHEST/LUNG: Clear to auscultation bilaterally  HEART: Regular rate and rhythm  ABDOMEN: Soft, Nontender, Nondistended; Bowel sounds present  EXTREMITIES:  left lower extremity swelling, palpable distal pulses. No clubbing, cyanosis, or edema    MEDICATIONS  (STANDING):  acetaminophen     Tablet .. 650 milliGRAM(s) Oral every 6 hours  allopurinol 100 milliGRAM(s) Oral two times a day  amLODIPine   Tablet 5 milliGRAM(s) Oral <User Schedule>  carvedilol 6.25 milliGRAM(s) Oral every 12 hours  DULoxetine 60 milliGRAM(s) Oral daily  ferrous    sulfate 325 milliGRAM(s) Oral daily  folic acid 1 milliGRAM(s) Oral daily  lactated ringers. 1000 milliLiter(s) (100 mL/Hr) IV Continuous <Continuous>  levothyroxine 50 MICROGram(s) Oral daily  oxybutynin 10 milliGRAM(s) Oral two times a day  pantoprazole    Tablet 40 milliGRAM(s) Oral before breakfast    MEDICATIONS  (PRN):  oxyCODONE    IR 5 milliGRAM(s) Oral every 6 hours PRN Moderate Pain (4 - 6)      DVT PROPHYLAXIS:   GI PROPHYLAXIS: pantoprazole    Tablet 40 milliGRAM(s) Oral before breakfast    LAB/STUDIES:  Labs:  CAPILLARY BLOOD GLUCOSE                       7.3    4.85  )-----------( 149      ( 15 Sep 2022 20:28 )             23.4       Auto Neutrophil %: 77.5 % (09-15-22 @ 20:28)  Auto Immature Granulocyte %: 0.4 % (09-15-22 @ 20:28)    09-15    136  |  105  |  13  ----------------------------<  109<H>  4.6   |  23  |  0.7      Calcium, Total Serum: 7.8 mg/dL (09-15-22 @ 20:28)      LFTs:             5.4  | 0.3  | 20       ------------------[99      ( 14 Sep 2022 17:40 )  2.4  | <0.2 | 11            Coags:     12.50  ----< 1.09    ( 15 Sep 2022 20:28 )     31.9      IMAGING:  n/a    ACCESS/ DEVICES:  [x ] Peripheral IV         GENERAL SURGERY PROGRESS NOTE    Patient: ARIEL INIGUEZ , 80y ([birth year])Female   MRN: [id number]  Location: [address]  Visit: 09-14-22 Inpatient  Date: 09-21-22 @ 12:43    Hospital Day #:9  Post-Op Day #:5  Events of past 24 hours: Pending [facility] acceptance.     PAST MEDICAL & SURGICAL HISTORY:  Hypothyroid      Hypertension      Cirrhosis      RA (rheumatoid arthritis)      High cholesterol      Poor circulation      Incontinence      History of hip surgery      H/O: hysterectomy          Vitals:   T(F): 97.4 (09-21-22 @ 12:11), Max: 99.1 (09-20-22 @ 20:45)  HR: 66 (09-21-22 @ 12:11)  BP: 119/68 (09-21-22 @ 12:11)  RR: 18 (09-21-22 @ 12:11)  SpO2: 96% (09-21-22 @ 12:11)      Diet, Regular:   DASH/TLC Sodium & Cholesterol Restricted      Fluids:     I & O's:    09-20-22 @ 07:01  -  09-21-22 @ 07:00  --------------------------------------------------------  IN:    Oral Fluid: 560 mL  Total IN: 560 mL    OUT:    Voided (mL): 625 mL  Total OUT: 625 mL    Total NET: -65 mL        Bowel Movement: : [] YES [] NO  Flatus: : [] YES [] NO    PHYSICAL EXAM:  GENERAL: NAD, well-appearing  CHEST/LUNG: Clear to auscultation bilaterally  HEART: Regular rate and rhythm  ABDOMEN: Soft, Nontender, Nondistended; Bowel sounds present  EXTREMITIES:  No clubbing, cyanosis, or edema    MEDICATIONS  (STANDING):  acetaminophen     Tablet .. 650 milliGRAM(s) Oral every 6 hours  albuterol/ipratropium for Nebulization 3 milliLiter(s) Nebulizer once  allopurinol 100 milliGRAM(s) Oral two times a day  amLODIPine   Tablet 5 milliGRAM(s) Oral <User Schedule>  carvedilol 6.25 milliGRAM(s) Oral every 12 hours  DULoxetine 60 milliGRAM(s) Oral daily  ferrous    sulfate 325 milliGRAM(s) Oral daily  folic acid 1 milliGRAM(s) Oral daily  heparin   Injectable 5000 Unit(s) SubCutaneous every 8 hours  ketorolac   Injectable 15 milliGRAM(s) IV Push once  levothyroxine 50 MICROGram(s) Oral daily  oxybutynin 10 milliGRAM(s) Oral two times a day  pantoprazole    Tablet 40 milliGRAM(s) Oral before breakfast    MEDICATIONS  (PRN):  acetaminophen     Tablet .. 650 milliGRAM(s) Oral every 6 hours PRN Mild Pain (1 - 3)  ketorolac   Injectable 30 milliGRAM(s) IV Push every 8 hours PRN Moderate Pain (4 - 6)  oxyCODONE    IR 5 milliGRAM(s) Oral every 6 hours PRN Moderate Pain (4 - 6)      DVT PROPHYLAXIS: heparin   Injectable 5000 Unit(s) SubCutaneous every 8 hours    GI PROPHYLAXIS: pantoprazole    Tablet 40 milliGRAM(s) Oral before breakfast    ANTICOAGULATION:   ANTIBIOTICS:            LAB/STUDIES:  Labs:  CAPILLARY BLOOD GLUCOSE                              10.8   7.58  )-----------( 247      ( 19 Sep 2022 21:32 )             32.9         09-19    131<L>  |  102  |  16  ----------------------------<  97  4.6   |  22  |  0.7          LFTs:       ABG - ( 17 Sep 2022 08:24 )  pH: 7.40  /  pCO2: 35    /  pO2: 82    / HCO3: 22    / Base Excess: -2.7  /  SaO2: 98.4            ABG - ( 16 Sep 2022 21:13 )  pH: 7.42  /  pCO2: 33    /  pO2: 109   / HCO3: 21    / Base Excess: -2.3  /  SaO2: 98.9              Coags:                        IMAGING:     Patient is here for prostate biopsy.  He was placed in the dorsal lithotomy position.  Prostate ultrasound placed transrectally.  The neurovascular bundle was anesthetized using 1% lidocaine.  Prostate measurements obtained.  Prostate size is 22 cc.  12 biopsies obtained under guidance of prostate ultrasound using biopsy needle.  Patient tolerated procedure.  Return to clinic in one week for biopsy result.   GENERAL SURGERY PROGRESS NOTE    Patient: ARIEL INIGUEZ , 80y ([birth year])Female   MRN: [id number]  Location: [address]  Visit: 09-14-22 Inpatient  Date: 09-18-22 @ 03:40    Hospital Day #: 6  Post-Op Day #:    Procedure/Dx/Injuries: left femoral shaft fracture s/p ORIF    Events of past 24 hours:  NAEON  No repletions  CXR demonstrate mildly increased bibasilar opacities  Patient ABG improved, lactate improved    PAST MEDICAL & SURGICAL HISTORY:  Hypothyroid      Hypertension      Cirrhosis      RA (rheumatoid arthritis)      High cholesterol      Poor circulation      Incontinence      History of hip surgery      H/O: hysterectomy          Vitals:   T(F): 97 (09-18-22 @ 00:32), Max: 98.1 (09-17-22 @ 16:00)  HR: 87 (09-18-22 @ 00:32)  BP: 118/65 (09-18-22 @ 00:32)  RR: 18 (09-18-22 @ 00:32)  SpO2: 97% (09-18-22 @ 00:32)      Diet, Regular:   DASH/TLC Sodium & Cholesterol Restricted      Fluids:     I & O's:    09-16-22 @ 07:01  -  09-17-22 @ 07:00  --------------------------------------------------------  IN:    IV PiggyBack: 100 mL    Lactated Ringers: 1200 mL    Lactated Ringers Bolus: 250 mL  Total IN: 1550 mL    OUT:    Indwelling Catheter - Urethral (mL): 1350 mL  Total OUT: 1350 mL    Total NET: 200 mL    PHYSICAL EXAM:  General: NAD  HEENT: Normocephalic, atraumatic, EOMI, PEERLA. no scalp lacerations   Neck: Soft, midline trachea. no c-spine tenderness  Chest: No chest wall tenderness, no subcutaneous emphysema   Cardiac: S1, S2, RRR  Respiratory: Bilateral breath sounds, clear and equal bilaterally  Abdomen: Soft, non-distended, non-tender, no rebound, no guarding.    MEDICATIONS  (STANDING):  acetaminophen     Tablet .. 650 milliGRAM(s) Oral every 6 hours  albuterol/ipratropium for Nebulization 3 milliLiter(s) Nebulizer once  allopurinol 100 milliGRAM(s) Oral two times a day  amLODIPine   Tablet 5 milliGRAM(s) Oral <User Schedule>  carvedilol 6.25 milliGRAM(s) Oral every 12 hours  DULoxetine 60 milliGRAM(s) Oral daily  ferrous    sulfate 325 milliGRAM(s) Oral daily  folic acid 1 milliGRAM(s) Oral daily  heparin   Injectable 5000 Unit(s) SubCutaneous every 8 hours  ketorolac   Injectable 15 milliGRAM(s) IV Push once  lactated ringers Bolus 500 milliLiter(s) IV Bolus once  levothyroxine 50 MICROGram(s) Oral daily  oxybutynin 10 milliGRAM(s) Oral two times a day  pantoprazole    Tablet 40 milliGRAM(s) Oral before breakfast    MEDICATIONS  (PRN):  acetaminophen     Tablet .. 650 milliGRAM(s) Oral every 6 hours PRN Mild Pain (1 - 3)  ketorolac   Injectable 30 milliGRAM(s) IV Push every 8 hours PRN Moderate Pain (4 - 6)  oxyCODONE    IR 5 milliGRAM(s) Oral every 6 hours PRN Moderate Pain (4 - 6)      DVT PROPHYLAXIS: heparin   Injectable 5000 Unit(s) SubCutaneous every 8 hours    GI PROPHYLAXIS: pantoprazole    Tablet 40 milliGRAM(s) Oral before breakfast    ANTICOAGULATION:   ANTIBIOTICS:            LAB/STUDIES:  Labs:  CAPILLARY BLOOD GLUCOSE                              9.2    4.92  )-----------( 161      ( 17 Sep 2022 23:14 )             27.9       Auto Neutrophil %: 72.8 % (09-17-22 @ 23:14)  Auto Immature Granulocyte %: 0.6 % (09-17-22 @ 23:14)    09-17    134<L>  |  104  |  16  ----------------------------<  93  4.3   |  23  |  0.8      Calcium, Total Serum: 7.7 mg/dL (09-17-22 @ 23:14)      LFTs:     Blood Gas Arterial, Lactate: 1.30 mmol/L (09-17-22 @ 08:24)  Blood Gas Arterial, Lactate: 1.80 mmol/L (09-16-22 @ 21:13)    ABG - ( 17 Sep 2022 08:24 )  pH: 7.40  /  pCO2: 35    /  pO2: 82    / HCO3: 22    / Base Excess: -2.7  /  SaO2: 98.4            ABG - ( 16 Sep 2022 21:13 )  pH: 7.42  /  pCO2: 33    /  pO2: 109   / HCO3: 21    / Base Excess: -2.3  /  SaO2: 98.9              Coags:    CARDIAC MARKERS ( 16 Sep 2022 18:48 )  x     / <0.01 ng/mL / 434 U/L / x     / x               GENERAL SURGERY PROGRESS NOTE    Patient: ARIEL INIGUEZ , 80y ([birth year])Female   MRN: [id number]  Location: [address]  Visit: 09-14-22 Inpatient  Date: 09-19-22 @ 09:52    Hospital Day #:7  Post-Op Day #: 3    Procedure/Dx/Injuries: Left femoral fracture, s/p left ORIF    Events of past 24 hours: Worked with physical therapy, planning on disposition to SNF.    PAST MEDICAL & SURGICAL HISTORY:  Hypothyroid  Hypertension  Cirrhosis  RA (rheumatoid arthritis)  High cholesterol  Poor circulation  Incontinence  History of hip surgery  H/O: hysterectomy          Vitals:   T(F): 96.6 (09-19-22 @ 08:11), Max: 97 (09-18-22 @ 12:53)  HR: 80 (09-19-22 @ 08:11)  BP: 141/63 (09-19-22 @ 08:11)  RR: 18 (09-19-22 @ 08:11)  SpO2: 93% (09-19-22 @ 08:11)      Diet, Regular:   DASH/TLC Sodium & Cholesterol Restricted      Fluids:     I & O's:    09-18-22 @ 07:01  -  09-19-22 @ 07:00  --------------------------------------------------------  IN:  Total IN: 0 mL    OUT:    Indwelling Catheter - Urethral (mL): 780 mL  Total OUT: 780 mL    Total NET: -780 mL    PHYSICAL EXAM:  GENERAL: NAD, well-appearing  CHEST/LUNG: Clear to auscultation bilaterally  HEART: Regular rate and rhythm  ABDOMEN: Soft, Nontender, Nondistended; Bowel sounds present  EXTREMITIES:  No clubbing, cyanosis, or edema    MEDICATIONS  (STANDING):  acetaminophen     Tablet .. 650 milliGRAM(s) Oral every 6 hours  albuterol/ipratropium for Nebulization 3 milliLiter(s) Nebulizer once  allopurinol 100 milliGRAM(s) Oral two times a day  amLODIPine   Tablet 5 milliGRAM(s) Oral <User Schedule>  carvedilol 6.25 milliGRAM(s) Oral every 12 hours  DULoxetine 60 milliGRAM(s) Oral daily  ferrous    sulfate 325 milliGRAM(s) Oral daily  folic acid 1 milliGRAM(s) Oral daily  heparin   Injectable 5000 Unit(s) SubCutaneous every 8 hours  ketorolac   Injectable 15 milliGRAM(s) IV Push once  lactated ringers Bolus 500 milliLiter(s) IV Bolus once  levothyroxine 50 MICROGram(s) Oral daily  oxybutynin 10 milliGRAM(s) Oral two times a day  pantoprazole    Tablet 40 milliGRAM(s) Oral before breakfast    MEDICATIONS  (PRN):  acetaminophen     Tablet .. 650 milliGRAM(s) Oral every 6 hours PRN Mild Pain (1 - 3)  ketorolac   Injectable 30 milliGRAM(s) IV Push every 8 hours PRN Moderate Pain (4 - 6)  oxyCODONE    IR 5 milliGRAM(s) Oral every 6 hours PRN Moderate Pain (4 - 6)      DVT PROPHYLAXIS: heparin   Injectable 5000 Unit(s) SubCutaneous every 8 hours    GI PROPHYLAXIS: pantoprazole    Tablet 40 milliGRAM(s) Oral before breakfast    ANTICOAGULATION:   ANTIBIOTICS:            LAB/STUDIES:  Labs:  CAPILLARY BLOOD GLUCOSE                              9.9    6.50  )-----------( 220      ( 18 Sep 2022 20:54 )             30.8         09-18    132<L>  |  101  |  19  ----------------------------<  97  4.6   |  22  |  0.8      Calcium, Total Serum: 7.8 mg/dL (09-18-22 @ 20:54)      LFTs:     Blood Gas Arterial, Lactate: 1.30 mmol/L (09-17-22 @ 08:24)  Blood Gas Arterial, Lactate: 1.80 mmol/L (09-16-22 @ 21:13)    ABG - ( 17 Sep 2022 08:24 )  pH: 7.40  /  pCO2: 35    /  pO2: 82    / HCO3: 22    / Base Excess: -2.7  /  SaO2: 98.4            ABG - ( 16 Sep 2022 21:13 )  pH: 7.42  /  pCO2: 33    /  pO2: 109   / HCO3: 21    / Base Excess: -2.3  /  SaO2: 98.9            IMAGING:  cxr pending    ACCESS/ DEVICES:  [x ] Peripheral IV         ORTHOPAEDIC SURGERY PROGRESS NOTE    Interval History:  Patient seen and examined at bedside. Pain is controlled. No complaints of chest pain, SOB, N/V. Resting comfortably.    Vital Signs Last 24 Hrs  T(C): 36.3 (18 Sep 2022 04:33), Max: 36.7 (17 Sep 2022 16:00)  T(F): 97.3 (18 Sep 2022 04:33), Max: 98.1 (17 Sep 2022 16:00)  HR: 64 (18 Sep 2022 04:33) (62 - 92)  BP: 143/63 (18 Sep 2022 04:33) (118/65 - 161/88)  BP(mean): 91 (17 Sep 2022 16:00) (83 - 101)  RR: 18 (18 Sep 2022 04:33) (16 - 23)  SpO2: 97% (18 Sep 2022 04:33) (97% - 99%)    Physical Exam:   Dressing C/D/I  Compartments soft and compressible  Motor intact distally  SILT distally  CR<2sec, palpable pulses                        9.2    4.92  )-----------( 161      ( 17 Sep 2022 23:14 )             27.9     134<L>  |  104  |  16  ----------------------------<  93  4.3   |  23  |  0.8    Ca    7.7<L>      17 Sep 2022 23:14  Phos  3.4     09-17  Mg     2.1     09-17    A/P: 80F s/p left distal femur ORIF on 9/16/22. Doing well.    NWB LLE  DVT PPx: SQH and SCDs  Abx: Clinda x24hrs completed  Pain control  Incentive spirometer  Home meds  Trend labs  PT/OT/Rehab    - If discharged, patient should follow up with Dr. Sam at [address] [facility] phone [phone]. ORTHOPEDIC POST-OP CHECK    Subjective: POD0 s/p L Distal Femur ORIF. Seen and examined at bedside. Doing well, pain controlled. Denies fevers, numbness/tingling. No other complaints.    MEDICATIONS  (STANDING):  acetaminophen     Tablet .. 650 milliGRAM(s) Oral every 6 hours  allopurinol 100 milliGRAM(s) Oral two times a day  amLODIPine   Tablet 5 milliGRAM(s) Oral <User Schedule>  carvedilol 6.25 milliGRAM(s) Oral every 12 hours  DULoxetine 60 milliGRAM(s) Oral daily  ferrous    sulfate 325 milliGRAM(s) Oral daily  folic acid 1 milliGRAM(s) Oral daily  heparin   Injectable 5000 Unit(s) SubCutaneous every 8 hours  ketorolac   Injectable 15 milliGRAM(s) IV Push once  lactated ringers Bolus 500 milliLiter(s) IV Bolus once  lactated ringers. 1000 milliLiter(s) (100 mL/Hr) IV Continuous <Continuous>  levothyroxine 50 MICROGram(s) Oral daily  oxybutynin 10 milliGRAM(s) Oral two times a day  pantoprazole    Tablet 40 milliGRAM(s) Oral before breakfast    MEDICATIONS  (PRN):  acetaminophen     Tablet .. 650 milliGRAM(s) Oral every 6 hours PRN Mild Pain (1 - 3)  ketorolac   Injectable 30 milliGRAM(s) IV Push every 8 hours PRN Moderate Pain (4 - 6)  oxyCODONE    IR 5 milliGRAM(s) Oral every 6 hours PRN Moderate Pain (4 - 6)    Objective:  T(C): 36.4 (09-16-22 @ 16:00), Max: 37 (09-16-22 @ 05:15)  HR: 63 (09-16-22 @ 17:00) (50 - 77)  BP: 154/68 (09-16-22 @ 17:00) (106/53 - 156/69)  RR: 15 (09-16-22 @ 17:00) (14 - 33)  SpO2: 96% (09-16-22 @ 17:00) (94% - 100%)    Physical Exam:    LLE:  Dressing c/d/i  SILT s/s/sp/dp/t  Motor intact TA/EHL/GS  Digits wwp    Labs:                9.7    6.75  )-----------( 147      ( 16 Sep 2022 16:38 )             29.6     134<L>  |  105  |  14  ----------------------------<  84  4.0   |  23  |  0.6<L>    Ca    7.4<L>      16 Sep 2022 16:38  Phos  3.1     09-16  Mg     2.9     09-16  TPro  5.4<L>  /  Alb  2.4<L>  /  TBili  0.3  /  DBili  <0.2  /  AST  20  /  ALT  11  /  AlkPhos  99  09-14      A/P: 80F POD0 s/p L Distal Femur ORIF on 9/16/22, doing well.    - Activity: WBAT for TRANSFERS, otherwise NWB during ambulation  - Abx: 24hrs post-op antibiotics  - DVT PPx: SQH per primary team  - Pain control  - IS encouraged  - AM labs  - PT/Rehab  - D/C planning    - If discharged, patient should follow up with Dr. Sam at [facility] phone [phone].  - Patient should be discharged on 6 weeks (from surgery date) of Aspirin 325mg daily for DVT prophylaxis as their mobility/function/ambulation will be decreased due to lower extremity orthopaedic surgery. Orthopaedic Surgery Progress Note    Patient noted to have wheezing, SBP to 200s and desats to the 70s while in the PACU post-op. Patient had low UOP and was receiving fluids. Noted to have mitral regurg on previous echo. Fluids were stopped, patient put on bipap, and given both hydralazine and lasix. Patient BPs and O2 sats improved. Otherwise, pain well controlled. No other complaints.      T(C): 36.4 (09-17-22 @ 02:00), Max: 36.6 (09-16-22 @ 08:44)  HR: 71 (09-17-22 @ 06:00) (50 - 85)  BP: 154/68 (09-16-22 @ 17:00) (106/53 - 154/68)  RR: 20 (09-17-22 @ 06:00) (14 - 37)  SpO2: 100% (09-17-22 @ 06:00) (83% - 100%)    P/E:  Alert, NAD  Nonlabored breathing    LLE  Dressing in place, c/d/i  SILT sp/dp/t/sural/saph  Firing ta/ehl/fhl/gs  Foot WWP, 2+ DP pulse    Labs                        12.1   13.24 )-----------( 264      ( 16 Sep 2022 18:48 )             36.4     09-16    131<L>  |  102  |  14  ----------------------------<  125<H>  4.3   |  21  |  0.7    Ca    8.1<L>      16 Sep 2022 18:48  Phos  3.5     09-16  Mg     2.6     09-16        PT/INR - ( 15 Sep 2022 20:28 )   PT: 12.50 sec;   INR: 1.09 ratio         PTT - ( 15 Sep 2022 20:28 )  PTT:31.9 sec    A/P:   79yo Female s/p left distal femur ORIF on 9/16/22, patient likely fluid overloaded post-op. Respiratory status now improved.    NWB LLE  DVT ppx: SCD, restart chemical DVT ppx per primary team  Abx: Ancef q8h for 3 doses post-op  Pain control  Incentive spirometer  Home meds  Trend labs  PT/OT/Rehab       Orthopaedic Surgery Progress Note    S&E at bedside this AM. Pain well controlled. Denies fever/chills/CP/SOB. No other complaints    T(C): 36.6 (09-20-22 @ 08:26), Max: 36.7 (09-19-22 @ 21:00)  HR: 79 (09-20-22 @ 08:26) (68 - 79)  BP: 143/63 (09-20-22 @ 08:26) (140/65 - 160/57)  RR: 18 (09-20-22 @ 08:26) (17 - 18)  SpO2: 96% (09-20-22 @ 08:26) (94% - 97%)    P/E:  Alert, NAD  Nonlabored breathing    LLE  Dressing in place, c/d/i  SILT sp/dp/t/sural/saph  Firing ta/ehl/fhl/gs  Foot WWP, 2+ DP pulse    Labs                        10.8   7.58  )-----------( 247      ( 19 Sep 2022 21:32 )             32.9     09-19    131<L>  |  102  |  16  ----------------------------<  97  4.6   |  22  |  0.7    Ca    7.9<L>      19 Sep 2022 21:32  Phos  3.0     09-19  Mg     1.9     09-19    A/P:   80F s/p left distal femur ORIF on 9/16/22. Doing well on POD4.    NWB LLE  DVT PPx: SQH and SCDs  Abx: Clinda x24hrs completed  Pain control  Incentive spirometer  Home meds  Trend labs  PT/OT/Rehab    On discharge for DVT PPx we recommend  x6 weeks, or alternative per primary team x6 weeks.  F/u in 2 weeks after discharge with Dr. Sam. Call [phone] to schedule appointment.          Orthopaedic Surgery Progress Note    S&E at bedside this AM. Pain well controlled. Dressing changed today. No active complaints.      T(C): 36.7 (09-19-22 @ 21:00), Max: 36.7 (09-19-22 @ 21:00)  HR: 73 (09-19-22 @ 21:00) (68 - 80)  BP: 140/65 (09-19-22 @ 21:00) (139/64 - 160/57)  RR: 18 (09-19-22 @ 21:00) (18 - 18)  SpO2: 96% (09-19-22 @ 21:00) (93% - 96%)    P/E:  Alert, NAD  Nonlabored breathing    LLE:  Dressing w/ spotting  Dressing changed  Incision healing well c/d/i  Compartments soft and compressible  Motor intact distally  SILT distally  CR<2sec, palpable pulses    Labs                        9.9    6.50  )-----------( 220      ( 18 Sep 2022 20:54 )             30.8     09-18    132<L>  |  101  |  19  ----------------------------<  97  4.6   |  22  |  0.8    Ca    7.8<L>      18 Sep 2022 20:54  Phos  2.9     09-18  Mg     2.1     09-18          A/P: 80F s/p left distal femur ORIF on 9/16/22. Doing well on POD3.    Dressing changed today    NWB LLE  DVT PPx: SQH and SCDs  Abx: Clinda x24hrs completed  Pain control  Incentive spirometer  Home meds  Trend labs  PT/OT/Rehab    On discharge for DVT PPx we recommend  x6 weeks, or alternative per primary team x6 weeks.  F/u in 2 weeks after discharge with Dr. Sam. Call [phone] to schedule appointment.            Patient is a 80y old  Female who presents with a chief complaint of s/p mechanical fall       HPI:  TRAUMA ACTIVATION LEVEL: CONSULT  ACTIVATED BY: ED  INTUBATED: NO    MECHANISM OF INJURY:   [x] Fall	    GCS: 15 	E: 4	V: 5	M: 6    HPI:  80yF w/ PMHx of HTN, RA, Gout, HLD, PAD on ASA/Prasugrel seen as Trauma Consult s/p ground level fall. -HT, -LOC, +ASA/Prasugrel. Patient had recent hospitalization for GI bleed/hematemesis s/p variceal banding. Also PSHx or hip replacement on left side years ago.    Patient states that she was walking with out walker, her daughter called her name and she turned around and lost balance and fell on left side. Patient states that she has pain in her left thigh and cannot stand and walk. patient denies head injury and loss of consciousness. Trauma assessment in ED: ABCs intact , GCS 15 , AAOx3.     PAST MEDICAL & SURGICAL HISTORY:  Hypothyroid  Hypertension  Cirrhosis  RA (rheumatoid arthritis)  High cholesterol  Poor circulation  Incontinence  History of hip surgery, L  H/O: hysterectomy    Allergies  Keflex (Unknown)  losartan (Unknown)  morphine (Unknown)  Rocephin (Unknown)  sulfamethoxazole (Hives)    Home Medications:  allopurinol 100 mg oral tablet: 1 tab(s) orally 2 times a day (09 Sep 2022 14:49)  amLODIPine 5 mg oral tablet: 1 tab(s) orally 2 times a day (09 Sep 2022 14:49)  aspirin 81 mg oral delayed release tablet: 1 tab(s) orally once a day (09 Sep 2022 14:49)  DULoxetine 60 mg oral delayed release capsule: 1 cap(s) orally once a day (09 Sep 2022 14:49)  ferrous sulfate 75 mg (15 mg elemental iron) oral tablet: 1 tab(s) orally once a day (13 Sep 2022 15:33)  folic acid 1 mg oral tablet: 1 tab(s) orally once a day (09 Sep 2022 14:49)  levothyroxine 50 mcg (0.05 mg) oral tablet: 1 tab(s) orally once a day (09 Sep 2022 14:49)  oxybutynin 10 mg/24 hr oral tablet, extended release: 1 tab(s) orally 2 times a day (09 Sep 2022 14:49)    ROS: 10-system review is otherwise negative except HPI above.      Primary Survey:    A - airway intact  B - bilateral breath sounds and good chest rise  C - palpable pulses in all extremities  D - GCS 15 on arrival, [place]  Exposure obtained   S/p left distal femur ORIF on 9/16/22, NWB LLE as per ortho        PHYSICAL EXAM    Vital Signs Last 24 Hrs  T(C): 36.1 (20 Sep 2022 12:34), Max: 36.7 (19 Sep 2022 21:00)  T(F): 96.9 (20 Sep 2022 12:34), Max: 98 (19 Sep 2022 21:00)  HR: 62 (20 Sep 2022 12:34) (62 - 79)  BP: 138/63 (20 Sep 2022 12:34) (138/63 - 160/57)  BP(mean): --  RR: 18 (20 Sep 2022 12:34) (17 - 18)  SpO2: 94% (20 Sep 2022 12:34) (94% - 97%)        Constitutional - NAD  Chest - CTA  Cardiovascular - S1S2+  Abdomen -  Soft  Extremities -  No calf tenderness   Function : bed mobility max A                 unable to ambulate     acetaminophen     Tablet .. 650 milliGRAM(s) Oral every 6 hours PRN  acetaminophen     Tablet .. 650 milliGRAM(s) Oral every 6 hours  albuterol/ipratropium for Nebulization 3 milliLiter(s) Nebulizer once  allopurinol 100 milliGRAM(s) Oral two times a day  amLODIPine   Tablet 5 milliGRAM(s) Oral <User Schedule>  carvedilol 6.25 milliGRAM(s) Oral every 12 hours  DULoxetine 60 milliGRAM(s) Oral daily  ferrous    sulfate 325 milliGRAM(s) Oral daily  folic acid 1 milliGRAM(s) Oral daily  heparin   Injectable 5000 Unit(s) SubCutaneous every 8 hours  ketorolac   Injectable 15 milliGRAM(s) IV Push once  ketorolac   Injectable 30 milliGRAM(s) IV Push every 8 hours PRN  levothyroxine 50 MICROGram(s) Oral daily  oxybutynin 10 milliGRAM(s) Oral two times a day  oxyCODONE    IR 5 milliGRAM(s) Oral every 6 hours PRN  pantoprazole    Tablet 40 milliGRAM(s) Oral before breakfast      RECENT LABS/IMAGING                        10.8   7.58  )-----------( 247      ( 19 Sep 2022 21:32 )             32.9     09-19    131<L>  |  102  |  16  ----------------------------<  97  4.6   |  22  |  0.7    Ca    7.9<L>      19 Sep 2022 21:32  Phos  3.0     09-19  Mg     1.9     09-19                 TRAUMA SURGERY PROGRESS NOTE    Patient: ARIEL INIGUEZ , 80y ([birth year])Female   MRN: [id number]  Location: [facility] 006 A  Visit: 09-14-22 Inpatient  Date: 09-17-22 @ 00:39      Events of past 24 hours: Patient s/p ORIF. Patient received 2U PRBC intraoperatively. Postoperatively patient was producing minimal urine of 15cc per hour, 500cc bolus was initiated. Patient then began to have wheezing and SOB. ABG showed hypercapnia and patient was placed on nonrebreather and then bipap. STAT ECG and CXR were obtained as well as stat labs. Patient was hypertensive into 200s systolic and patient was given 10 hydralazine and 10 lassix. Patient's vitals improved.     PAST MEDICAL & SURGICAL HISTORY:  Hypothyroid      Hypertension      Cirrhosis      RA (rheumatoid arthritis)      High cholesterol      Poor circulation      Incontinence      History of hip surgery      H/O: hysterectomy          Vitals:   T(F): 97.7 (09-16-22 @ 19:00), Max: 98.6 (09-16-22 @ 05:15)  HR: 79 (09-16-22 @ 19:00)  BP: 154/68 (09-16-22 @ 17:00)  RR: 28 (09-16-22 @ 19:00)  SpO2: 99% (09-16-22 @ 19:00)      Diet, Regular:   DASH/TLC Sodium & Cholesterol Restricted      Fluids: lactated ringers Bolus:   500 milliLiter(s), IV Bolus, once, infuse over 60 Minute(s), Stop After 1 Doses  Provider's Contact #: [phone]  lactated ringers.: Solution, 1000 milliLiter(s) infuse at 100 mL/Hr      I & O's:    PHYSICAL EXAM:  General: NAD  HEENT: Normocephalic, atraumatic, EOMI, PEERLA. no scalp lacerations   Neck: Soft, midline trachea. no c-spine tenderness  Chest: No chest wall tenderness, no subcutaneous emphysema   Cardiac: S1, S2, RRR  Respiratory: Bilateral breath sounds, clear and equal bilaterally  Abdomen: Soft, non-distended, non-tender, no rebound, no guarding.    MEDICATIONS  (STANDING):  acetaminophen     Tablet .. 650 milliGRAM(s) Oral every 6 hours  albuterol/ipratropium for Nebulization 3 milliLiter(s) Nebulizer once  allopurinol 100 milliGRAM(s) Oral two times a day  amLODIPine   Tablet 5 milliGRAM(s) Oral <User Schedule>  carvedilol 6.25 milliGRAM(s) Oral every 12 hours  clindamycin IVPB 900 milliGRAM(s) IV Intermittent every 8 hours  DULoxetine 60 milliGRAM(s) Oral daily  ferrous    sulfate 325 milliGRAM(s) Oral daily  folic acid 1 milliGRAM(s) Oral daily  heparin   Injectable 5000 Unit(s) SubCutaneous every 8 hours  ketorolac   Injectable 15 milliGRAM(s) IV Push once  lactated ringers Bolus 500 milliLiter(s) IV Bolus once  lactated ringers. 1000 milliLiter(s) (100 mL/Hr) IV Continuous <Continuous>  levothyroxine 50 MICROGram(s) Oral daily  oxybutynin 10 milliGRAM(s) Oral two times a day  pantoprazole    Tablet 40 milliGRAM(s) Oral before breakfast    MEDICATIONS  (PRN):  acetaminophen     Tablet .. 650 milliGRAM(s) Oral every 6 hours PRN Mild Pain (1 - 3)  ketorolac   Injectable 30 milliGRAM(s) IV Push every 8 hours PRN Moderate Pain (4 - 6)  oxyCODONE    IR 5 milliGRAM(s) Oral every 6 hours PRN Moderate Pain (4 - 6)      DVT PROPHYLAXIS: SCDs, heparin   Injectable 5000 Unit(s) SubCutaneous every 8 hours    GI PROPHYLAXIS: pantoprazole    Tablet 40 milliGRAM(s) Oral before breakfast    ANTICOAGULATION:   ANTIBIOTICS: clindamycin IVPB 900 milliGRAM(s)            LAB/STUDIES:  Labs:  CAPILLARY BLOOD GLUCOSE                              12.1   13.24 )-----------( 264      ( 16 Sep 2022 18:48 )             36.4       Auto Neutrophil %: 77.5 % (09-16-22 @ 18:48)  Auto Immature Granulocyte %: 0.7 % (09-16-22 @ 18:48)  Auto Neutrophil %: 79.9 % (09-16-22 @ 16:38)  Auto Immature Granulocyte %: 0.6 % (09-16-22 @ 16:38)  Auto Neutrophil %: 72.9 % (09-16-22 @ 07:30)  Auto Immature Granulocyte %: 0.7 % (09-16-22 @ 07:30)    09-16    131<L>  |  102  |  14  ----------------------------<  125<H>  4.3   |  21  |  0.7      Calcium, Total Serum: 8.1 mg/dL (09-16-22 @ 18:48)      LFTs:     Blood Gas Arterial, Lactate: 1.80 mmol/L (09-16-22 @ 21:13)    ABG - ( 16 Sep 2022 21:13 )  pH: 7.42  /  pCO2: 33    /  pO2: 109   / HCO3: 21    / Base Excess: -2.3  /  SaO2: 98.9              Coags:     12.50  ----< 1.09    ( 15 Sep 2022 20:28 )     31.9        CARDIAC MARKERS ( 16 Sep 2022 18:48 )  x     / <0.01 ng/mL / 434 U/L / x     / x

## 2022-09-29 DIAGNOSIS — C61 PROSTATE CANCER (H): Primary | ICD-10-CM

## 2022-10-03 ASSESSMENT — PAIN SCALES - PAIN ENJOYMENT GENERAL ACTIVITY SCALE (PEG)
INTERFERED_ENJOYMENT_LIFE: 5
PEG_TOTALSCORE: 4.67
INTERFERED_ENJOYMENT_LIFE: 5
INTERFERED_GENERAL_ACTIVITY: 5
AVG_PAIN_PASTWEEK: 4
AVG_PAIN_PASTWEEK: 4
INTERFERED_GENERAL_ACTIVITY: 5
PEG_TOTALSCORE: 4.67

## 2022-10-03 ASSESSMENT — ANXIETY QUESTIONNAIRES
3. WORRYING TOO MUCH ABOUT DIFFERENT THINGS: NOT AT ALL
GAD7 TOTAL SCORE: 0
4. TROUBLE RELAXING: NOT AT ALL
7. FEELING AFRAID AS IF SOMETHING AWFUL MIGHT HAPPEN: NOT AT ALL
7. FEELING AFRAID AS IF SOMETHING AWFUL MIGHT HAPPEN: NOT AT ALL
IF YOU CHECKED OFF ANY PROBLEMS ON THIS QUESTIONNAIRE, HOW DIFFICULT HAVE THESE PROBLEMS MADE IT FOR YOU TO DO YOUR WORK, TAKE CARE OF THINGS AT HOME, OR GET ALONG WITH OTHER PEOPLE: NOT DIFFICULT AT ALL
6. BECOMING EASILY ANNOYED OR IRRITABLE: NOT AT ALL
1. FEELING NERVOUS, ANXIOUS, OR ON EDGE: NOT AT ALL
5. BEING SO RESTLESS THAT IT IS HARD TO SIT STILL: NOT AT ALL
2. NOT BEING ABLE TO STOP OR CONTROL WORRYING: NOT AT ALL
GAD7 TOTAL SCORE: 0
8. IF YOU CHECKED OFF ANY PROBLEMS, HOW DIFFICULT HAVE THESE MADE IT FOR YOU TO DO YOUR WORK, TAKE CARE OF THINGS AT HOME, OR GET ALONG WITH OTHER PEOPLE?: NOT DIFFICULT AT ALL

## 2022-10-06 ENCOUNTER — LAB (OUTPATIENT)
Dept: LAB | Facility: CLINIC | Age: 57
End: 2022-10-06
Payer: COMMERCIAL

## 2022-10-06 ENCOUNTER — OFFICE VISIT (OUTPATIENT)
Dept: PALLIATIVE MEDICINE | Facility: CLINIC | Age: 57
End: 2022-10-06
Payer: COMMERCIAL

## 2022-10-06 VITALS — HEART RATE: 88 BPM | DIASTOLIC BLOOD PRESSURE: 79 MMHG | SYSTOLIC BLOOD PRESSURE: 130 MMHG

## 2022-10-06 DIAGNOSIS — Z79.899 CONTROLLED SUBSTANCE AGREEMENT SIGNED: ICD-10-CM

## 2022-10-06 DIAGNOSIS — G89.3 CANCER ASSOCIATED PAIN: Primary | ICD-10-CM

## 2022-10-06 DIAGNOSIS — Z51.81 ENCOUNTER FOR THERAPEUTIC DRUG MONITORING: ICD-10-CM

## 2022-10-06 DIAGNOSIS — C61 MALIGNANT NEOPLASM OF PROSTATE (H): ICD-10-CM

## 2022-10-06 PROCEDURE — 80307 DRUG TEST PRSMV CHEM ANLYZR: CPT

## 2022-10-06 PROCEDURE — 99214 OFFICE O/P EST MOD 30 MIN: CPT

## 2022-10-06 RX ORDER — MORPHINE SULFATE 15 MG/1
15 TABLET, FILM COATED, EXTENDED RELEASE ORAL EVERY 12 HOURS
Qty: 60 TABLET | Refills: 0 | Status: SHIPPED | OUTPATIENT
Start: 2022-10-06 | End: 2022-11-03

## 2022-10-06 ASSESSMENT — PAIN SCALES - GENERAL: PAINLEVEL: MODERATE PAIN (4)

## 2022-10-06 NOTE — PROGRESS NOTES
[facility] Pain Management     Date of visit: 10/6/2022      Assessment:   Per Etienne is a 57 year old male with a past medical history significant for prostate cancer with bone metastasis, pulmonary nodules, lumbar stenosis, HTN, gout who presents with complaints of cancer associated pain.      1. Low back pain with LLE radic, neuropathy bilat feet, pain of multiple joints - His back pain with left sided radicular pain with L5 distribution that has been present for many years. He has been referred for procedure by his PCP for repeat L4/5 LUIS (Regina/Obed), which historically have given him significant symptom relief, although he did not appreciate as much benefit from the most recent LUIS on 8/2/22. Lumbar CT from 8/2021 demonstrated severe left neural foraminal narrowing at L4-L5 and moderate to severe right at L3-4, additional multilevel spinal canal stenosis and neural foraminal stenosis noted. I suspect his low back pain is associated with multiple factors, including underlying degenerative changes of lumbar spine, bone metastasis, and likely some myofascial component. Neuropathic symptoms in feet and generalized joint pain is likely secondary effects from cancer treatment and bone metastasis.   2. Mental Health - the patient's mental health concerns, specifically prostate cancer with bone mets, affect his experience of pain and contribute to his clinically significant distress. He may benefit from more generalized health psychology to support chronic illness/cancer management and will consider discussing at future follow up    Visit Diagnoses:  1. Cancer associated pain    2. Malignant neoplasm of prostate (H)    3. Encounter for therapeutic drug monitoring    4. Controlled substance agreement signed        Plan:  Diagnosis reviewed, treatment option addressed, and risk/benifits discussed.  Self-care instructions given.  I am recommending a multidisciplinary treatment plan to help this  patient better manage her pain.                  1.  Pain Physical Therapy:  WALT Thompson is very active. Despite the pain, he continues to work full time as a , which is somewhat labor intensive (frequently using ladder).               2.  Pain Psychologist to address relaxation, behavioral change, coping style, and other factors important to improvement.  WALT Thompson may possibly benefit from support for his chronic health conditions overall, though he does seem to manage fairly well. He continues to work full time and likes to stay active.               3.  Diagnostic Studies:  Reviewed imaging in chart.               4.  Medication Management:   1. Increase MS Contin to 15 mg every 12 hours.   2. Continue hydrocodone 5-325 three times daily as needed for breakthrough pain. Let me know when you need a refill.   3. Controlled substance agreement signed and urine drug screen completed today.               5.  Potential procedures: Let me know if you are interested in repeating low back injection. Last L4-5 LUIS was on 8/2/22.               7.  Follow up with TEREZA Sanchez CNP in 4-6 weeks     Review of Electronic Chart: Today I have also reviewed available medical information in the patient's medical record at [facility] ([facility]) and Care Everywhere (if available), including relevant provider notes, laboratory work, and imaging.     Chanel Bruno, JANET, APRN, AGNP-C  [facility] Pain Management     -------------------------------------------------    Subjective:    Chief complaint:   Chief Complaint   Patient presents with     Pain       Interval history:  Per Etienne is a 57 year old male last seen on 9/15/22.  He is a patient of mine seen in follow up.     Recommendations/plan at the last visit included:              1.  Pain Physical Therapy:  WALT Thompson is very active. Despite the pain, he continues to work full time as a , which is somewhat labor intensive  "(frequently using ladder).               2.  Pain Psychologist to address relaxation, behavioral change, coping style, and other factors important to improvement.  WALT Thompson may possibly benefit from support for his chronic health conditions overall, though he does seem to manage fairly well. He continues to work full time and likes to stay active.               3.  Diagnostic Studies:  Reviewed imaging in chart.               4.  Medication Management:   1. Start MS Contin 15 mg in the morning. Take your first dose over the weekend when you are not working. We discussed possible side effects, advised to trial first dose at home over the weekend, and avoid driving and climbing on ladders until you know how the med affects you.   2. Continue hydrocodone 5-325 three times daily as needed for breakthrough pain.   3. We will plan to do controlled substance agreement requirements at your next visit. *We will need to discuss avoiding alcohol use while taking opioids. 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               5.  Potential procedures: We will order repeat lumbar epidural at your next visit to have done end of October/beginning of November 7.  Follow up with TEREZA Sanchez CNP in 3-4 weeks      Considerations for future visit:   -Consultation for ITP   -Collaborate with Dr. Garcia on care needs  -Consider palliative referral    Since his last visit, Per Etienne reports:    -His pain is much improved since last visit. He notices effects last for 12 hours \"on the nose,\" using hydrocodone PRN.   -Denies side effects,   -First day he took on a Saturday, was sleeping a lot the first day then noticed improvement.   -He does drink 1-2 alcoholic bevarages several days out the week.       Pain Information:   Pain rating: averages 4/10 on a 0-10 scale.      Current pain medications:   MS Contin 15 mg BID  Hydrocodone 5-325 mg TID " PRN (he will reach out for refill when needed)     Current MME: 30-45 (30 scheduled, up to 15 PRN)    Review of [place] Prescription Monitoring Program (): No concern for abuse or misuse of controlled medications based on this report. Reviewed and appears appropriate.     Annual Controlled Substance Agreement/UDS due date: Completed on 10/6/22    Past pain treatments:  Injections - L4-5 LUIS on 8/2/22, 12/20/21    Medications:  Current Outpatient Medications   Medication Sig Dispense Refill     apalutamide (ERLEADA) 60 MG tablet Take 4 tablets (240 mg) by mouth daily for 30 days 120 tablet 0     aspirin (ASA) 81 MG chewable tablet Take 1 tablet (81 mg) by mouth daily       denosumab (XGEVA) 120 MG/1.7ML SOLN injection Inject 1.7 mLs (120 mg) Subcutaneous every 3 months 1.7 mL 0     ERLEADA 60 MG tablet TAKE 4 TABLETS DAILY 120 tablet 0     HYDROcodone-acetaminophen (NORCO) 5-325 MG tablet Take 0.5 tablets by mouth every 4 hours as needed for severe pain 90 tablet 0     leuprolide (ELIGARD) 45 MG kit Inject 45 mg Subcutaneous every 6 months       lisinopril (ZESTRIL) 10 MG tablet Take 1 tablet (10 mg) by mouth daily 90 tablet 3     morphine (MS CONTIN) 15 MG CR tablet Take 1 tablet (15 mg) by mouth every 12 hours 60 tablet 0     naloxone (NARCAN) 4 MG/0.1ML nasal spray Spray 1 spray (4 mg) into one nostril alternating nostrils as needed for opioid reversal every 2-3 minutes until assistance arrives 0.2 mL 0     UNABLE TO FIND 1 tablet daily MEDICATION NAME: C, D, Zinc combination supplement       omeprazole (PRILOSEC) 20 MG DR capsule TAKE 1 CAPSULE BY MOUTH TWICE A  capsule 1     sildenafil (REVATIO) 20 MG tablet Take 1 to 5 tabs 30-60 minutes before intercourse.  Never use with nitroglycerin, terazosin or doxazosin. (Patient not taking: No sig reported) 30 tablet 11       Medical History: any changes in medical history since they were last seen? No    Review of Systems: A 10-point review of systems was  negative, unless noted otherwise in HPI.     Objective:    Physical Exam:  Blood pressure 130/79, pulse 88.  Constitutional: Well developed, well nourished, appears stated age.  Gait: Normal  HEENT: Head atraumatic, normocephalic. Eyes without conjunctival injection or jaundice. No obvious neck masses.  Skin: No rash, lesions, or petechiae of exposed skin.   Psychiatric/mental status: Alert, without lethargy or stupor. Speech fluent. Appropriate affect. Mood normal. Able to follow commands without difficulty.     Imaging:  CT THORACIC SPINE W/O CONTRAST, CT LUMBAR SPINE W/O CONTRAST 8/23/2021  9:28 AM     Provided History: Mid-back pain; Bone lesion, T-spine, malignancy  suspected; Chronic low back pain without sciatica, unspecified back  pain laterality; Chronic low back pain without sciatica, unspecified  back pain laterality; Chronic midline thoracic back pain; Chronic  midline thoracic back pain   ICD-10: Chronic low back pain without sciatica, unspecified back pain  laterality; Chronic low back pain without sciatica, unspecified back  pain laterality; Chronic midline thoracic back pain; Chronic midline  thoracic back pain     Comparison: Chest CT dated 7/19/2021, 8/23/2021. CT abdomen/pelvis  dated 5/10/2021, 3/31/2021. PET/CT dated 5/20/2020.     Technique: Using multidetector thin collimation helical acquisition  technique, axial, sagittal and coronal 3 mm thickness CT  reconstructions were obtained through the thoracic spine without  intravenous contrast.  Images were viewed in bone and soft tissue  windows.     Thoracic Findings:   image demonstrates dextroscoliosis centered at T9-10. Thoracic  spine alignment is within normal limits. No evidence of fracture is  seen. No significant prevertebral soft tissue swelling. Stable  sclerotic foci within the T6 and T11 vertebral bodies. There is no  disc height narrowing at any level. Spinal canal and neural foramina  are patent.      The visualized  prevertebral and paravertebral tissues are  unremarkable. Biapical scarring and multifocal groundglass opacities  and pulmonary nodules better characterized on chest CT dated  8/23/2021.     Lumbar Findings:  There are 5 lumbar type vertebrae. Levoscoliosis of the lumbar spine,  with apex centered at L3. Regarding alignment, the lumbar spine  alignment appears preserved. There is multilevel disc space narrowing  at L5-S1, most severe at L3-4. No definite lesions are noted within  the vertebra. Findings on a level by level basis are as follows:     L1-L2: Disc bulge. Bilateral facet hypertrophy. No left neural  foraminal narrowing. Mild right neuroforaminal narrowing. No spinal  canal narrowing     L2-L3: Disc bulge, asymmetric to the right. Facet hypertrophy. Mild  bilateral neuroforaminal narrowing, right greater than left. No spinal  canal narrowing.     L3-L4: Right eccentric disc osteophyte complex. Bilateral facet  hypertrophy. Ligamentum flavum hypertrophy. Moderate to severe right  and mild left neural foraminal narrowing. Mild spinal canal narrowing.     L4-L5: Disc bulge. Bilateral facet hypertrophy. Ligamentum flavum  hypertrophy. Severe left neuroforaminal narrowing. Mild right neural  foraminal narrowing. No spinal canal narrowing.     L5-S1: No neural foraminal narrowing or spinal canal narrowing.     The visualized paraspinous tissues appear unremarkable.                                                                      Impression:   1. No acute osseous abnormality.   2. Unchanged sclerotic foci in T6 and T11 vertebral bodies dating back  to 5/20/2020.   3. Degenerative changes of the lumbar spine. Severe left neural  foraminal narrowing at L4-L5 and moderate to severe right at L3-4.  Additional multilevel spinal canal and neural foraminal stenosis.     I have personally reviewed the examination and initial interpretation  and I agree with the findings.     CHRISTIAN GONSALVES MD       BILLING TIME  DOCUMENTATION:   The total TIME spent on this patient on the date of the encounter/appointment was 31 minutes.      TOTAL TIME includes:   Time spent preparing to see the patient (reviewing records and tests)   Time spent face to face (or over the phone) with the patient   Time spent ordering tests, medications, procedures and referrals   Time spent Referring and communicating with other healthcare professionals   Time spent documenting clinical information in Epic

## 2022-10-06 NOTE — LETTER
Opioid / Opioid Plus Controlled Substance Agreement    This is an agreement between you and your provider about the safe and appropriate use of controlled substance/opioids prescribed by your care team. Controlled substances are medicines that can cause physical and mental dependence (abuse).    There are strict laws about having and using these medicines. We here at [facility] are committing to working with you in your efforts to get better. To support you in this work, we ll help you schedule regular office appointments for medicine refills. If we must cancel or change your appointment for any reason, we ll make sure you have enough medicine to last until your next appointment.     As a Provider, I will:    Listen carefully to your concerns and treat you with respect.     Recommend a treatment plan that I believe is in your best interest. This plan may involve therapies other than opioid pain medication.     Talk with you often about the possible benefits, and the risk of harm of any medicine that we prescribe for you.     Provide a plan on how to taper (discontinue or go off) using this medicine if the decision is made to stop its use.    As a Patient, I understand that opioid(s):     Are a controlled substance prescribed by my care team to help me function or work and manage my condition(s).     Are strong medicines and can cause serious side effects such as:    Drowsiness, which can seriously affect my driving ability    A lower breathing rate, enough to cause death    Harm to my thinking ability     Depression     Abuse of and addiction to this medicine    Need to be taken exactly as prescribed. Combining opioids with certain medicines or chemicals (such as illegal drugs, sedatives, sleeping pills, and benzodiazepines) can be dangerous or even fatal. If I stop opioids suddenly, I may have severe withdrawal symptoms.    Do not work for all types of pain nor for all patients. If they re not helpful, I may  be asked to stop them.        The risks, benefits and side effects of these medicine(s) were explained to me. I agree that:  1. I will take part in other treatments as advised by my care team. This may be psychiatry or counseling, physical therapy, behavioral therapy, group treatment or a referral to a specialist.     2. I will keep all my appointments. I understand that this is part of the monitoring of opioids. My care team may require an office visit for EVERY opioid/controlled substance refill. If I miss appointments or don t follow instructions, my care team may stop my medicine.    3. I will take my medicines as prescribed. I will not change the dose or schedule unless my care team tells me to. There will be no refills if I run out early.     4. I may be asked to come to the clinic and complete a urine drug test or complete a pill count at any time. If I don t give a urine sample or participate in a pill count, the care team may stop my medicine.    5. I will only receive prescriptions from this clinic for chronic pain. If I am treated by another provider for acute pain issues, I will tell them that I am taking opioid pain medication for chronic pain and that I have a treatment agreement with this provider. I will inform my [facility] care team within one business day if I am given a prescription for any pain medication by another healthcare provider. My [facility] care team can contact other providers and pharmacists about my use of any medicines.    6. It is up to me to make sure that I don t run out of my medicines on weekends or holidays. If my care team is willing to refill my opioid prescription without a visit, I must request refills only during office hours. Refills may take up to 3 business days to process. I will use one pharmacy to fill all my opioid and other controlled substance prescriptions. I will notify the clinic about any changes to my insurance or medication  availability.    7. I am responsible for my prescriptions. If the medicine/prescription is lost, stolen or destroyed, it will not be replaced. I also agree not to share controlled substance medicines with anyone.    8. I am aware I should not use any illegal or recreational drugs. I agree not to drink alcohol unless my care team says I can.       9. If I enroll in the [place] [facility] program, I will tell my care team prior to my next refill.     10. I will tell my care team right away if I become pregnant, have a new medical problem treated outside of my regular clinic, or have a change in my medications.    11. I understand that this medicine can affect my thinking, judgment and reaction time. Alcohol and drugs affect the brain and body, which can affect the safety of my driving. Being under the influence of alcohol or drugs can affect my decision-making, behaviors, personal safety, and the safety of others. Driving while impaired (DWI) can occur if a person is driving, operating, or in physical control of a car, motorcycle, boat, snowmobile, ATV, motorbike, off-road vehicle, or any other motor vehicle ([place] Statute 169A.20). I understand the risk if I choose to drive or operate any vehicle or machinery.    I understand that if I do not follow any of the conditions above, my prescriptions or treatment may be stopped or changed.          Opioids  What You Need to Know    What are opioids?   Opioids are pain medicines that must be prescribed by a doctor. They are also known as narcotics.     Examples are:   1. morphine (MS Contin, Cherie)  2. oxycodone (Oxycontin)  3. oxycodone and acetaminophen (Percocet)  4. hydrocodone and acetaminophen (Vicodin, Norco)   5. fentanyl patch (Duragesic)   6. hydromorphone (Dilaudid)   7. methadone  8. codeine (Tylenol #3)     What do opioids do well?   Opioids are best for severe short-term pain such as after a surgery or injury. They may work well for cancer pain. They may  help some people with long-lasting (chronic) pain.     What do opioids NOT do well?   Opioids never get rid of pain entirely, and they don t work well for most patients with chronic pain. Opioids don t reduce swelling, one of the causes of pain.                                    Other ways to manage chronic pain and improve function include:       Treat the health problem that may be causing pain    Anti-inflammation medicines, which reduce swelling and tenderness, such as ibuprofen (Advil, Motrin) or naproxen (Aleve)    Acetaminophen (Tylenol)    Antidepressants and anti-seizure medicines, especially for nerve pain    Topical treatments such as patches or creams    Injections or nerve blocks    Chiropractic or osteopathic treatment    Acupuncture, massage, deep breathing, meditation, visual imagery, aromatherapy    Use heat or ice at the pain site    Physical therapy     Exercise    Stop smoking    Take part in therapy       Risks and side effects     Talk to your doctor before you start or decide to keep taking opioids. Possible side effects include:      Lowering your breathing rate enough to cause death    Overdose, including death, especially if taking higher than prescribed doses    Worse depression symptoms; less pleasure in things you usually enjoy    Feeling tired or sluggish    Slower thoughts or cloudy thinking    Being more sensitive to pain over time; pain is harder to control    Trouble sleeping or restless sleep    Changes in hormone levels (for example, less testosterone)    Changes in sex drive or ability to have sex    Constipation    Unsafe driving    Itching and sweating    Dizziness    Nausea, throwing up and dry mouth    What else should I know about opioids?    Opioids may lead to dependence, tolerance, or addiction.      Dependence means that if you stop or reduce the medicine too quickly, you will have withdrawal symptoms. These include loose poop (diarrhea), jitters, flu-like symptoms,  nervousness and tremors. Dependence is not the same as addiction.                       Tolerance means needing higher doses over time to get the same effect. This may increase the chance of serious side effects.      Addiction is when people improperly use a substance that harms their body, their mind or their relations with others. Use of opiates can cause a relapse of addiction if you have a history of drug or alcohol abuse.      People who have used opioids for a long time may have a lower quality of life, worse depression, higher levels of pain and more visits to doctors.    You can overdose on opioids. Take these steps to lower your risk of overdose:    1. Recognize the signs:  Signs of overdose include decrease or loss of consciousness (blackout), slowed breathing, trouble waking up and blue lips. If someone is worried about overdose, they should call 911.    2. Talk to your doctor about Narcan (naloxone).   If you are at risk for overdose, you may be given a prescription for Narcan. This medicine very quickly reverses the effects of opioids.   If you overdose, a friend or family member can give you Narcan while waiting for the ambulance. They need to know the signs of overdose and how to give Narcan.     3. Don't use alcohol or street drugs.   Taking them with opioids can cause death.    4. Do not take any of these medicines unless your doctor says it s OK. Taking these with opioids can cause death:    Benzodiazepines, such as lorazepam (Ativan), alprazolam (Xanax) or diazepam (Valium)    Muscle relaxers, such as cyclobenzaprine (Flexeril)    Sleeping pills like zolpidem (Ambien)     Other opioids      How to keep you and other people safe while taking opioids:    1. Never share your opioids with others.  Opioid medicines are regulated by the Drug Enforcement Agency ([place]). Selling or sharing medications is a criminal act.    2. Be sure to store opioids in a secure place, locked up if possible. Young children  can easily swallow them and overdose.    3. When you are traveling with your medicines, keep them in the original bottles. If you use a pill box, be sure you also carry a copy of your medicine list from your clinic or pharmacy.    4. Safe disposal of opioids    Most pharmacies have places to get rid of medicine, called disposal kiosks. Medicine disposal options are also available in every [facility]. Search your county and  medication disposal  to find more options. You can find more details at:  [email or web address]     I agree that my provider, clinic care team, and pharmacy may work with any city, state or federal law enforcement agency that investigates the misuse, sale, or other diversion of my controlled medicine. I will allow my provider to discuss my care with, or share a copy of, this agreement with any other treating provider, pharmacy or emergency room where I receive care.    I have read this agreement and have asked questions about anything I did not understand.    _______________________________________________________  Patient Signature - Per Etienne _____________________                   Date     _______________________________________________________  Provider Signature - TEREZA Sanchez CNP   _____________________                   Date     _______________________________________________________  Witness Signature (required if provider not present while patient signing)   _____________________                   Date

## 2022-10-06 NOTE — PATIENT INSTRUCTIONS
1.  Pain Physical Therapy:  WALT Thompson is very active. Despite the pain, he continues to work full time as a , which is somewhat labor intensive (frequently using ladder).               2.  Pain Psychologist to address relaxation, behavioral change, coping style, and other factors important to improvement.  WALT Thompson may possibly benefit from support for his chronic health conditions overall, though he does seem to manage fairly well. He continues to work full time and likes to stay active.               3.  Diagnostic Studies:  Reviewed imaging in chart.               4.  Medication Management:   Increase MS Contin to 15 mg every 12 hours.   Continue hydrocodone 5-325 three times daily as needed for breakthrough pain. Let me know when you need a refill.   Controlled substance agreement signed and urine drug screen completed today.               5.  Potential procedures: Let me know if you are interested in repeating low back injection.               7.  Follow up with TEREZA Sanchez CNP in 4-6 weeks     ----------------------------------------------------------------  Clinic Number:  [phone]   Call with any questions about your care and for scheduling assistance.   Calls are returned Monday through Friday between 8 AM and 4:30 PM. We usually get back to you within 2 business days depending on the issue/request.    If we are prescribing your medications:  For opioid medication refills, call the clinic or send a [email or web address] message 7 days in advance.  Please include:  Name of requested medication  Name of the pharmacy.  For non-opioid medications, call your pharmacy directly to request a refill. Please allow 3-4 days to be processed.   Per [place] State Law:  All controlled substance prescriptions must be filled within 30 days of being written.    For those controlled substances allowing refills, pickup must occur within 30 days of last fill.      We believe regular attendance is key to  your success in our program!    Any time you are unable to keep your appointment we ask that you call us at least 24 hours in advance to cancel.This will allow us to offer the appointment time to another patient.   Multiple missed appointments may lead to dismissal from the clinic.

## 2022-10-07 LAB — CREAT UR-MCNC: 124 MG/DL

## 2022-10-09 ENCOUNTER — HEALTH MAINTENANCE LETTER (OUTPATIENT)
Age: 57
End: 2022-10-09

## 2022-10-10 LAB
HYDROMORPHONE UR CFM-MCNC: 143 NG/ML
HYDROMORPHONE/CREAT UR: 115 NG/MG {CREAT}
MORPHINE UR CFM-MCNC: >7000 NG/ML
MORPHINE/CREAT UR: ABNORMAL

## 2022-10-26 ENCOUNTER — OFFICE VISIT (OUTPATIENT)
Dept: URGENT CARE | Facility: URGENT CARE | Age: 57
End: 2022-10-26
Payer: COMMERCIAL

## 2022-10-26 VITALS
SYSTOLIC BLOOD PRESSURE: 144 MMHG | WEIGHT: 189.6 LBS | HEART RATE: 75 BPM | BODY MASS INDEX: 28 KG/M2 | OXYGEN SATURATION: 98 % | TEMPERATURE: 98.6 F | DIASTOLIC BLOOD PRESSURE: 74 MMHG

## 2022-10-26 DIAGNOSIS — D84.9 IMMUNOCOMPROMISED (H): ICD-10-CM

## 2022-10-26 DIAGNOSIS — B02.9 HERPES ZOSTER WITHOUT COMPLICATION: Primary | ICD-10-CM

## 2022-10-26 PROCEDURE — 99214 OFFICE O/P EST MOD 30 MIN: CPT | Performed by: FAMILY MEDICINE

## 2022-10-26 RX ORDER — PREDNISONE 20 MG/1
TABLET ORAL
Qty: 20 TABLET | Refills: 0 | Status: SHIPPED | OUTPATIENT
Start: 2022-10-26 | End: 2023-05-11

## 2022-10-26 RX ORDER — VALACYCLOVIR HYDROCHLORIDE 1 G/1
1000 TABLET, FILM COATED ORAL 3 TIMES DAILY
Qty: 21 TABLET | Refills: 0 | Status: SHIPPED | OUTPATIENT
Start: 2022-10-26 | End: 2023-05-11

## 2022-10-26 NOTE — PROGRESS NOTES
Chief complaint: rash    Rash right scalp posterior ear and slight jaw x 2 days  painflul    Patient has had chicken pox as a a child        Character or description: blistery   Itching (Pruritis): no    Progression of Symptoms:  worsening    Accompanying Signs & Symptoms:  Fever: no   Body aches or joint pain: no   Sore throat symptoms: no   Recent cold symptoms: no    History:   Previous similar rash: no     Precipitating factors:   Exposure to similar rash: no   New exposures: None   Recent travel: no     Alleviating factors:  none     Therapies Tried and outcome: none    No hearing loss or tinnitus     Problem list, Medication list, Allergies, and Medical/Social/Surgical histories reviewed in [facility] and updated as appropriate.    ROS:  Constitutional, HEENT, cardiovascular, pulmonary, gi and gu systems are negative, except as otherwise noted.    OBJECTIVE:                                                    BP (!) 144/74 (BP Location: Right arm, Patient Position: Sitting, Cuff Size: Adult Regular)   Pulse 75   Temp 98.6  F (37  C) (Tympanic)   Wt 86 kg (189 lb 9.6 oz)   SpO2 98%   BMI 28.00 kg/m    Body mass index is 28 kg/m .  GENERAL: healthy, alert and no distress  Neurologic: Cranial nerves intact no gross neurological deficits  Psych: appropriate mood and affect  MS: no gross musculoskeletal defects noted, no edema  Skin: erythematous plaques made off coalescing vesicles on erythematous base right parietal and temporal scalp  Posterior auricular area   And slight on right lower jaw   NO LESIONS ON AURICLE OF THE EAR, NO LESIONS IN THE EAR CANAL      Diagnostic Test Results:  none      ASSESSMENT/PLAN:                                                        ICD-10-CM    1. Herpes zoster without complication  B02.9 valACYclovir (VALTREX) 1000 mg tablet     predniSONE (DELTASONE) 20 MG tablet      2. Immunocompromised (H)  D84.9 valACYclovir (VALTREX) 1000 mg tablet     predniSONE (DELTASONE) 20 MG tablet           Prescribed with above  pred taper to help with pain and concern for risk for zoster oticus given location. Patient immunocompromised on cancer treatment  Recommend follow up in clinic or dermatology if no relief , sooner if worse  Adverse reactions of medications discussed.  Over the counter medications discussed.   Aware to come back in if with worsening symptoms or if no relief despite treatment plan  Patient voiced understanding and had no further questions.   See AVS  Contact precautions discussed     MD Helena Rosado MD  [facility] [place]

## 2022-10-27 ASSESSMENT — ENCOUNTER SYMPTOMS
SINUS CONGESTION: 0
SINUS PAIN: 0
SORE THROAT: 0
HOARSE VOICE: 0
NECK MASS: 0
TROUBLE SWALLOWING: 0
TASTE DISTURBANCE: 0
SMELL DISTURBANCE: 0

## 2022-10-27 ASSESSMENT — PAIN SCALES - PAIN ENJOYMENT GENERAL ACTIVITY SCALE (PEG)
PEG_TOTALSCORE: 4.33
INTERFERED_GENERAL_ACTIVITY: 4
PEG_TOTALSCORE: 4.33
AVG_PAIN_PASTWEEK: 4
INTERFERED_GENERAL_ACTIVITY: 4
INTERFERED_ENJOYMENT_LIFE: 5
INTERFERED_ENJOYMENT_LIFE: 5
AVG_PAIN_PASTWEEK: 4

## 2022-10-27 ASSESSMENT — ANXIETY QUESTIONNAIRES
7. FEELING AFRAID AS IF SOMETHING AWFUL MIGHT HAPPEN: NOT AT ALL
1. FEELING NERVOUS, ANXIOUS, OR ON EDGE: NOT AT ALL
5. BEING SO RESTLESS THAT IT IS HARD TO SIT STILL: NOT AT ALL
4. TROUBLE RELAXING: NOT AT ALL
2. NOT BEING ABLE TO STOP OR CONTROL WORRYING: NOT AT ALL
GAD7 TOTAL SCORE: 0
3. WORRYING TOO MUCH ABOUT DIFFERENT THINGS: NOT AT ALL
GAD7 TOTAL SCORE: 0
6. BECOMING EASILY ANNOYED OR IRRITABLE: NOT AT ALL
GAD7 TOTAL SCORE: 0
7. FEELING AFRAID AS IF SOMETHING AWFUL MIGHT HAPPEN: NOT AT ALL

## 2022-10-28 ENCOUNTER — MYC MEDICAL ADVICE (OUTPATIENT)
Dept: FAMILY MEDICINE | Facility: CLINIC | Age: 57
End: 2022-10-28

## 2022-11-01 ENCOUNTER — LAB (OUTPATIENT)
Dept: LAB | Facility: CLINIC | Age: 57
End: 2022-11-01
Payer: COMMERCIAL

## 2022-11-01 DIAGNOSIS — C61 PROSTATE CANCER (H): ICD-10-CM

## 2022-11-01 DIAGNOSIS — M79.606 PAIN OF LOWER EXTREMITY, UNSPECIFIED LATERALITY: ICD-10-CM

## 2022-11-01 DIAGNOSIS — C79.51 BONE METASTASIS: ICD-10-CM

## 2022-11-01 LAB
BASOPHILS # BLD AUTO: 0 10E3/UL (ref 0–0.2)
BASOPHILS NFR BLD AUTO: 0 %
EOSINOPHIL # BLD AUTO: 0 10E3/UL (ref 0–0.7)
EOSINOPHIL NFR BLD AUTO: 0 %
ERYTHROCYTE [DISTWIDTH] IN BLOOD BY AUTOMATED COUNT: 11.7 % (ref 10–15)
HCT VFR BLD AUTO: 38.4 % (ref 40–53)
HGB BLD-MCNC: 13.2 G/DL (ref 13.3–17.7)
LYMPHOCYTES # BLD AUTO: 2 10E3/UL (ref 0.8–5.3)
LYMPHOCYTES NFR BLD AUTO: 22 %
MCH RBC QN AUTO: 32.4 PG (ref 26.5–33)
MCHC RBC AUTO-ENTMCNC: 34.4 G/DL (ref 31.5–36.5)
MCV RBC AUTO: 94 FL (ref 78–100)
MONOCYTES # BLD AUTO: 0.6 10E3/UL (ref 0–1.3)
MONOCYTES NFR BLD AUTO: 7 %
NEUTROPHILS # BLD AUTO: 6.3 10E3/UL (ref 1.6–8.3)
NEUTROPHILS NFR BLD AUTO: 71 %
PLATELET # BLD AUTO: 292 10E3/UL (ref 150–450)
RBC # BLD AUTO: 4.08 10E6/UL (ref 4.4–5.9)
WBC # BLD AUTO: 8.9 10E3/UL (ref 4–11)

## 2022-11-01 PROCEDURE — 84153 ASSAY OF PSA TOTAL: CPT

## 2022-11-01 PROCEDURE — 84403 ASSAY OF TOTAL TESTOSTERONE: CPT

## 2022-11-01 PROCEDURE — 36415 COLL VENOUS BLD VENIPUNCTURE: CPT

## 2022-11-01 PROCEDURE — 80053 COMPREHEN METABOLIC PANEL: CPT

## 2022-11-01 PROCEDURE — 85025 COMPLETE CBC W/AUTO DIFF WBC: CPT

## 2022-11-02 LAB
ALBUMIN SERPL-MCNC: 3.6 G/DL (ref 3.4–5)
ALP SERPL-CCNC: 66 U/L (ref 40–150)
ALT SERPL W P-5'-P-CCNC: 42 U/L (ref 0–70)
ANION GAP SERPL CALCULATED.3IONS-SCNC: 7 MMOL/L (ref 3–14)
AST SERPL W P-5'-P-CCNC: 17 U/L (ref 0–45)
BILIRUB SERPL-MCNC: 0.2 MG/DL (ref 0.2–1.3)
BUN SERPL-MCNC: 14 MG/DL (ref 7–30)
CALCIUM SERPL-MCNC: 9.3 MG/DL (ref 8.5–10.1)
CHLORIDE BLD-SCNC: 104 MMOL/L (ref 94–109)
CO2 SERPL-SCNC: 29 MMOL/L (ref 20–32)
CREAT SERPL-MCNC: 1.04 MG/DL (ref 0.66–1.25)
GFR SERPL CREATININE-BSD FRML MDRD: 83 ML/MIN/1.73M2
GLUCOSE BLD-MCNC: 99 MG/DL (ref 70–99)
POTASSIUM BLD-SCNC: 4.1 MMOL/L (ref 3.4–5.3)
PROT SERPL-MCNC: 6.9 G/DL (ref 6.8–8.8)
PSA SERPL-MCNC: <0.01 UG/L (ref 0–4)
SODIUM SERPL-SCNC: 140 MMOL/L (ref 133–144)

## 2022-11-03 ENCOUNTER — OFFICE VISIT (OUTPATIENT)
Dept: PALLIATIVE MEDICINE | Facility: CLINIC | Age: 57
End: 2022-11-03
Payer: COMMERCIAL

## 2022-11-03 ENCOUNTER — ONCOLOGY VISIT (OUTPATIENT)
Dept: ONCOLOGY | Facility: CLINIC | Age: 57
End: 2022-11-03
Payer: COMMERCIAL

## 2022-11-03 ENCOUNTER — INFUSION THERAPY VISIT (OUTPATIENT)
Dept: INFUSION THERAPY | Facility: CLINIC | Age: 57
End: 2022-11-03
Attending: INTERNAL MEDICINE
Payer: COMMERCIAL

## 2022-11-03 VITALS
OXYGEN SATURATION: 98 % | HEIGHT: 69 IN | SYSTOLIC BLOOD PRESSURE: 145 MMHG | BODY MASS INDEX: 28.29 KG/M2 | DIASTOLIC BLOOD PRESSURE: 84 MMHG | WEIGHT: 191 LBS | HEART RATE: 69 BPM

## 2022-11-03 VITALS — HEART RATE: 79 BPM | DIASTOLIC BLOOD PRESSURE: 91 MMHG | SYSTOLIC BLOOD PRESSURE: 153 MMHG

## 2022-11-03 DIAGNOSIS — C61 PROSTATE CANCER METASTATIC TO BONE (H): ICD-10-CM

## 2022-11-03 DIAGNOSIS — C79.51 PROSTATE CANCER METASTATIC TO BONE (H): Primary | ICD-10-CM

## 2022-11-03 DIAGNOSIS — C79.51 PROSTATE CANCER METASTATIC TO BONE (H): ICD-10-CM

## 2022-11-03 DIAGNOSIS — M54.42 ACUTE LEFT-SIDED LOW BACK PAIN WITH LEFT-SIDED SCIATICA: ICD-10-CM

## 2022-11-03 DIAGNOSIS — K59.03 THERAPEUTIC OPIOID INDUCED CONSTIPATION: ICD-10-CM

## 2022-11-03 DIAGNOSIS — C61 MALIGNANT NEOPLASM OF PROSTATE (H): ICD-10-CM

## 2022-11-03 DIAGNOSIS — C79.51 BONE METASTASIS: ICD-10-CM

## 2022-11-03 DIAGNOSIS — C61 PROSTATE CANCER METASTATIC TO BONE (H): Primary | ICD-10-CM

## 2022-11-03 DIAGNOSIS — G89.3 CANCER ASSOCIATED PAIN: Primary | ICD-10-CM

## 2022-11-03 DIAGNOSIS — C61 PROSTATE CANCER (H): ICD-10-CM

## 2022-11-03 DIAGNOSIS — T40.2X5A THERAPEUTIC OPIOID INDUCED CONSTIPATION: ICD-10-CM

## 2022-11-03 DIAGNOSIS — M54.16 LUMBAR RADICULOPATHY: ICD-10-CM

## 2022-11-03 PROCEDURE — 99215 OFFICE O/P EST HI 40 MIN: CPT

## 2022-11-03 PROCEDURE — 96402 CHEMO HORMON ANTINEOPL SQ/IM: CPT | Performed by: NURSE PRACTITIONER

## 2022-11-03 PROCEDURE — 96372 THER/PROPH/DIAG INJ SC/IM: CPT | Mod: 59 | Performed by: NURSE PRACTITIONER

## 2022-11-03 PROCEDURE — 99207 PR NO CHARGE LOS: CPT

## 2022-11-03 PROCEDURE — 99214 OFFICE O/P EST MOD 30 MIN: CPT | Mod: 25 | Performed by: INTERNAL MEDICINE

## 2022-11-03 RX ORDER — SENNA AND DOCUSATE SODIUM 50; 8.6 MG/1; MG/1
1 TABLET, FILM COATED ORAL AT BEDTIME
Qty: 30 TABLET | Refills: 1 | Status: SHIPPED | OUTPATIENT
Start: 2022-11-03 | End: 2023-01-18

## 2022-11-03 RX ORDER — HYDROCODONE BITARTRATE AND ACETAMINOPHEN 5; 325 MG/1; MG/1
1 TABLET ORAL 3 TIMES DAILY PRN
Qty: 30 TABLET | Refills: 0 | Status: SHIPPED | OUTPATIENT
Start: 2022-11-03 | End: 2023-01-18

## 2022-11-03 RX ORDER — MORPHINE SULFATE 15 MG/1
15 TABLET, FILM COATED, EXTENDED RELEASE ORAL EVERY 12 HOURS
Qty: 60 TABLET | Refills: 0 | Status: SHIPPED | OUTPATIENT
Start: 2022-11-03 | End: 2022-12-07

## 2022-11-03 ASSESSMENT — PAIN SCALES - GENERAL
PAINLEVEL: SEVERE PAIN (6)
PAINLEVEL: NO PAIN (0)

## 2022-11-03 NOTE — PATIENT INSTRUCTIONS
1.  Pain Physical Therapy:  WALT Thompson is very active. Despite the pain, he continues to work full time as a , which is somewhat labor intensive (frequently using ladder).               2.  Pain Psychologist to address relaxation, behavioral change, coping style, and other factors important to improvement.  WALT Thompson may possibly benefit from support for his chronic health conditions overall, though he does seem to manage fairly well. He continues to work full time and likes to stay active.               3.  Diagnostic Studies:  Reviewed imaging in chart.               4.  Medication Management:   Continue MS Contin to 15 mg every 12 hours.   Continue hydrocodone 5-325 three times daily as needed for breakthrough pain.   Start docusate sodium at bedtime to help with constipation. Let me know if you are still struggling after trying for 2 weeks. Also, increase water intake.               5.  Potential procedures: Last L4-5 LUIS was on 8/2/22. He appreciates 85-90% improvement in pain symptoms in legs that lasts 3-9 months. I am ordering repeat injection, and you will be contacted to schedule.               7.  Follow up with TEREZA Sanchez CNP in 2 weeks after your procedure    ----------------------------------------------------------------  Clinic Number:  [phone]   Call with any questions about your care and for scheduling assistance.   Calls are returned Monday through Friday between 8 AM and 4:30 PM. We usually get back to you within 2 business days depending on the issue/request.    If we are prescribing your medications:  For opioid medication refills, call the clinic or send a [employer] message 7 days in advance.  Please include:  Name of requested medication  Name of the pharmacy.  For non-opioid medications, call your pharmacy directly to request a refill. Please allow 3-4 days to be processed.   Per [place] State Law:  All controlled substance prescriptions must be filled  within 30 days of being written.    For those controlled substances allowing refills, pickup must occur within 30 days of last fill.      We believe regular attendance is key to your success in our program!    Any time you are unable to keep your appointment we ask that you call us at least 24 hours in advance to cancel.This will allow us to offer the appointment time to another patient.   Multiple missed appointments may lead to dismissal from the clinic.

## 2022-11-03 NOTE — PROGRESS NOTES
[facility]     Date of visit: 11/3/2022      Assessment:   Per Etienne is a 57 year old male with a past medical history significant for prostate cancer with bone metastasis, pulmonary nodules, lumbar stenosis, HTN, gout who presents with complaints of cancer associated pain.      1. Low back pain with LLE radic, neuropathy bilat feet, pain of multiple joints - His back pain with left sided radicular pain with L5 distribution that has been present for many years. He has been referred for procedure by his PCP for repeat L4/5 LUIS ([facility]/Obed), which historically have given him significant symptom relief, although he did not appreciate as much benefit from the most recent LUIS on 8/2/22. Lumbar CT from 8/2021 demonstrated severe left neural foraminal narrowing at L4-L5 and moderate to severe right at L3-4, additional multilevel spinal canal stenosis and neural foraminal stenosis noted. I suspect his low back pain is associated with multiple factors, including underlying degenerative changes of lumbar spine, bone metastasis, and likely some myofascial component. Neuropathic symptoms in feet and generalized joint pain is likely secondary effects from cancer treatment and bone metastasis.   2. Mental Health - the patient's mental health concerns, specifically prostate cancer with bone mets, affect his experience of pain and contribute to his clinically significant distress. He may benefit from more generalized health psychology to support chronic illness/cancer management and will consider discussing at future follow up    Visit Diagnoses:  1. Cancer associated pain    2. Prostate cancer (H)    3. Bone metastasis (H)    4. Therapeutic opioid induced constipation    5. Malignant neoplasm of prostate (H)    6. Acute left-sided low back pain with left-sided sciatica    7. Prostate cancer metastatic to bone (H)    8. Lumbar radiculopathy        Plan:  Diagnosis reviewed, treatment option  addressed, and risk/benifits discussed.  Self-care instructions given.  I am recommending a multidisciplinary treatment plan to help this patient better manage their pain.                1.  Pain Physical Therapy:  WALT Thompson is very active. Despite the pain, he continues to work full time as a , which is somewhat labor intensive (frequently using ladder).               2.  Pain Psychologist to address relaxation, behavioral change, coping style, and other factors important to improvement.  WALT Thompson may possibly benefit from support for his chronic health conditions overall, though he does seem to manage fairly well. He continues to work full time and likes to stay active.               3.  Diagnostic Studies:  Reviewed imaging in chart.               4.  Medication Management:   1. Continue MS Contin to 15 mg every 12 hours.   2. Continue hydrocodone 5-325 three times daily as needed for breakthrough pain.   3. Start docusate sodium at bedtime to help with constipation. Let me know if you are still struggling after trying for 2 weeks. Also, increase water intake.               5.  Potential procedures: Last L4-5 LUIS was on 8/2/22. He appreciates 85-90% improvement in pain symptoms in legs that lasts 3-9 months. I am ordering repeat injection, and you will be contacted to schedule.               7.  Follow up with TEREZA Sanchez CNP in 2 weeks after your procedure    Review of Electronic Chart: Today I have also reviewed available medical information in the patient's medical record at [facility] and Care Everywhere (if available), including relevant provider notes, laboratory work, and imaging.     Chanel Bruno DNP, APRN, AGNP-C  [facility]     -------------------------------------------------    Subjective:    Chief complaint:   Chief Complaint   Patient presents with     Pain       Interval history:  Per Elainemichelle is a 57 year old male last seen on  10/6/22.  He is a patient of mine seen in follow up.     Recommendations/plan at the last visit included:              1.  Pain Physical Therapy:  WALT Thompson is very active. Despite the pain, he continues to work full time as a , which is somewhat labor intensive (frequently using ladder).               2.  Pain Psychologist to address relaxation, behavioral change, coping style, and other factors important to improvement.  WALT Thompson may possibly benefit from support for his chronic health conditions overall, though he does seem to manage fairly well. He continues to work full time and likes to stay active.               3.  Diagnostic Studies:  Reviewed imaging in chart.               4.  Medication Management:   1. Increase MS Contin to 15 mg every 12 hours.   2. Continue hydrocodone 5-325 three times daily as needed for breakthrough pain. Let me know when you need a refill.   3. Controlled substance agreement signed and urine drug screen completed today.               5.  Potential procedures: Let me know if you are interested in repeating low back injection. Last L4-5 LUIS was on 8/2/22.               7.  Follow up with TEREZA Sanchez CNP in 4-6 weeks     Since his last visit, Per Etienne reports:    -He had shingles since last visit  -Pain has been increased  -He is also been experiencing constipation after increasing MS Contin 15mg to BID dosing.   -His grandson, Juwan Del Castillo, was born on 10/20/22.   -He will be getting shingles injection soon.  -He has been able to be more acvtive with long acting opioid  -[place] 2 miles per day, lost 8 pounds, able to do job better now that pain is under better control.     Pain Information:   Pain rating: averages 4/10 on a 0-10 scale.      Current pain medications:   MS Contin 15 mg BID  Hydrocodone 5-325 mg TID PRN     Current MME: 45    Review of [place] Prescription Monitoring Program (): No concern for abuse or misuse of controlled  medications based on this report.     Annual Controlled Substance Agreement/UDS due date: Completed on 10/6/22    Past pain treatments:  LESI - helpful     Medications:  Current Outpatient Medications   Medication Sig Dispense Refill     aspirin (ASA) 81 MG chewable tablet Take 1 tablet (81 mg) by mouth daily       ERLEADA 60 MG tablet TAKE 4 TABLETS DAILY 120 tablet 0     HYDROcodone-acetaminophen (NORCO) 5-325 MG tablet Take 1 tablet by mouth 3 times daily as needed for breakthrough pain or severe pain 30 tablet 0     leuprolide (ELIGARD) 45 MG kit Inject 45 mg Subcutaneous every 6 months       lisinopril (ZESTRIL) 10 MG tablet Take 1 tablet (10 mg) by mouth daily 90 tablet 3     morphine (MS CONTIN) 15 MG CR tablet Take 1 tablet (15 mg) by mouth every 12 hours Start this prescription on or after 11/12/22. 60 tablet 0     naloxone (NARCAN) 4 MG/0.1ML nasal spray Spray 1 spray (4 mg) into one nostril alternating nostrils as needed for opioid reversal every 2-3 minutes until assistance arrives 0.2 mL 0     omeprazole (PRILOSEC) 20 MG DR capsule TAKE 1 CAPSULE BY MOUTH TWICE A  capsule 1     predniSONE (DELTASONE) 20 MG tablet Take 3 tabs by mouth daily x 3 days, then 2 tabs daily x 3 days, then 1 tab daily x 3 days, then 1/2 tab daily x 3 days. 20 tablet 0     SENNA-docusate sodium (SENNA S) 8.6-50 MG tablet Take 1 tablet by mouth At Bedtime 30 tablet 1     UNABLE TO FIND 1 tablet daily MEDICATION NAME: C, D, Zinc combination supplement       apalutamide (ERLEADA) 60 MG tablet Take 4 tablets (240 mg) by mouth daily for 30 days 120 tablet 0     denosumab (XGEVA) 120 MG/1.7ML SOLN injection Inject 1.7 mLs (120 mg) Subcutaneous every 3 months 1.7 mL 0     sildenafil (REVATIO) 20 MG tablet Take 1 to 5 tabs 30-60 minutes before intercourse.  Never use with nitroglycerin, terazosin or doxazosin. (Patient not taking: Reported on 11/3/2022) 30 tablet 11     valACYclovir (VALTREX) 1000 mg tablet Take 1 tablet (1,000  mg) by mouth 3 times daily for 7 days 21 tablet 0       Medical History: any changes in medical history since they were last seen? Yes -  shingles      Objective:    Physical Exam:  Blood pressure (!) 153/91, pulse 79.  Constitutional: Well developed, well nourished, appears stated age.  Gait: Normal  HEENT: Head atraumatic, normocephalic. Eyes without conjunctival injection or jaundice. Oropharynx clear. Neck supple. No obvious neck masses.  Skin: No rash, lesions, or petechiae of exposed skin.   Psychiatric/mental status: Alert, without lethargy or stupor. Speech fluent. Appropriate affect. Mood normal. Able to follow commands without difficulty.     Imaging:  CT THORACIC SPINE W/O CONTRAST, CT LUMBAR SPINE W/O CONTRAST 8/23/2021  9:28 AM     Provided History: Mid-back pain; Bone lesion, T-spine, malignancy  suspected; Chronic low back pain without sciatica, unspecified back  pain laterality; Chronic low back pain without sciatica, unspecified  back pain laterality; Chronic midline thoracic back pain; Chronic  midline thoracic back pain   ICD-10: Chronic low back pain without sciatica, unspecified back pain  laterality; Chronic low back pain without sciatica, unspecified back  pain laterality; Chronic midline thoracic back pain; Chronic midline  thoracic back pain     Comparison: Chest CT dated 7/19/2021, 8/23/2021. CT abdomen/pelvis  dated 5/10/2021, 3/31/2021. PET/CT dated 5/20/2020.     Technique: Using multidetector thin collimation helical acquisition  technique, axial, sagittal and coronal 3 mm thickness CT  reconstructions were obtained through the thoracic spine without  intravenous contrast.  Images were viewed in bone and soft tissue  windows.     Thoracic Findings:   image demonstrates dextroscoliosis centered at T9-10. Thoracic  spine alignment is within normal limits. No evidence of fracture is  seen. No significant prevertebral soft tissue swelling. Stable  sclerotic foci within the T6 and T11  vertebral bodies. There is no  disc height narrowing at any level. Spinal canal and neural foramina  are patent.      The visualized prevertebral and paravertebral tissues are  unremarkable. Biapical scarring and multifocal groundglass opacities  and pulmonary nodules better characterized on chest CT dated  8/23/2021.     Lumbar Findings:  There are 5 lumbar type vertebrae. Levoscoliosis of the lumbar spine,  with apex centered at L3. Regarding alignment, the lumbar spine  alignment appears preserved. There is multilevel disc space narrowing  at L5-S1, most severe at L3-4. No definite lesions are noted within  the vertebra. Findings on a level by level basis are as follows:     L1-L2: Disc bulge. Bilateral facet hypertrophy. No left neural  foraminal narrowing. Mild right neuroforaminal narrowing. No spinal  canal narrowing     L2-L3: Disc bulge, asymmetric to the right. Facet hypertrophy. Mild  bilateral neuroforaminal narrowing, right greater than left. No spinal  canal narrowing.     L3-L4: Right eccentric disc osteophyte complex. Bilateral facet  hypertrophy. Ligamentum flavum hypertrophy. Moderate to severe right  and mild left neural foraminal narrowing. Mild spinal canal narrowing.     L4-L5: Disc bulge. Bilateral facet hypertrophy. Ligamentum flavum  hypertrophy. Severe left neuroforaminal narrowing. Mild right neural  foraminal narrowing. No spinal canal narrowing.     L5-S1: No neural foraminal narrowing or spinal canal narrowing.    BILLING TIME DOCUMENTATION:   The total TIME spent on this patient on the date of the encounter/appointment was 45 minutes.      TOTAL TIME includes:   Time spent preparing to see the patient (reviewing records and tests)   Time spent face to face (or over the phone) with the patient   Time spent ordering tests, medications, procedures and referrals   Time spent Referring and communicating with other healthcare professionals   Time spent documenting clinical information in  Epic

## 2022-11-03 NOTE — PROGRESS NOTES
Oncology Follow-up:  Date on this visit: Nov 3, 2022    Primary care physician: Per Chen   Urologist: Dr. Angel Colmenares     Metastatic hormone sensitive prostate cancer    Oncologic History:  1. Metastatic hormone sensitive prostate cancer  He presented to his PCP in December 2019 with slow urinary stream.  PSA was checked and was found to be 124. On January 8, 2020 abdomen pelvis CT showed groundglass opacity in the left lower lobe,  dystrophic calcification of the prostate gland, and a fatty liver.  Same-day bone scan was negative. On January 10, 2010 prostate biopsy showed on the left Robyn score 4+3 involving 7 cores and on the right Robyn score 3+4 involving 6 cores, with positive perineural invasion.  He proceeded to undergo RRP by Dr. Colmenares on February 24, 2020.  Pathology revealed acinar adenocarcinoma Robyn 4+3 with focal ROBERTO, bladder neck base invasion, positive SVI, positive PNI, and multiple positive margins.  There was no LVSI and 0 out of 3 lymph nodes removed were involved with tumor; pT3bN0.  He received Eligard on 4/20/2020.  MRI prostate on 5/21/2020 was concerning for local recurrence in the prostatectomy bed and pelvic lymph nodes.  He had a PET/CT F-18 fluciclovine PET/CT on 5/20/2020 which showed 2 foci of increased uptake in the left ischium, with underlying  sclerotic lesion, and symphysis pubis, additionally low-level increased uptake in 3 lymph nodes along the right external and internal iliac nodes, and prostatectomy bed was noted.    On April 21, 2020 he received an Eligard injection.    He started Apalutamide on 6/12/2020.  Bone scan 1/22/2021: no bone mets.      2. Tinnitus b/l -  By 12/2020 he presented with 2 months c/o bilateral tinnitus and was evaluated by Dr. Shields from ENT. An audiogram showed a significant down-sloping mid to high frequency sensorineural hearing loss. There was no evidence of conductive hearing loss or significant asymmetry.  He was felt to have  early onset presbycusis. He was referred for tinnitus retraining therapy.  Review of literature showed no clear association of apalutamide or Xgeva with tinnitus or ototoxicity. Felt to have early onset presbycusis.    3. Chronic lower back pain, with exacerbations- He is followed by Dr. Denise and  Dr. Clay from  pain management for evaluation of lower back pain.  He had CT of thoracic and lumbar spine on August 23, 2021.  This demonstrated unchanged sclerotic foci in T6 and T11 vertebral bodies dating back to May 2020.  There were degenerative changes in the lumbar spine with severe left neural foraminal narrowing at L4-L5 and moderate to severe at L3-L4.  He has proceeded with L3-4 epidural injection on 10/22/2021.  No acute osseous abnormalities were noted. His lower back pain and b/l pelvic pain has improved following epidural injection.     History Of Present Illness:  Mr. Etienne is a 56 year old male who presents for f/up of  metastatic hormone sensitive prostate cancer diagnosed in 01/2020, s/p RRP at that time but later found to have residual/recurrent disease in the pelvis and L inferior pubic body and pubic ramus metastasis.   He has been on androgen deprivation therapy since April 2020, and continues on Lupron every 3 months.     He started Apalutamide on 6/12/2020. He has remained on apalutamide.  He has not had any side effects attributable to apalutamide.  He has been taking his medications at the same time by the clock each day.  He has not missed a single dose that he is aware of.  His pain medications have been adjusted lately.  He notes that he has been switched to morphine.  He has a follow-up in pain clinic later today.    He gets epidural injection to lumbar spine for back pain.    Jay has been doing well and has no new complaints at this clinic visit.  He has been tolerating his apalutamide without any side effects.    Past Medical/Surgical History:  Past Medical History:   Diagnosis Date      Gout      Hypertension goal BP (blood pressure) < 140/90 2022     Lumbar stenosis      Prostate cancer (H) 01/10/2020     Past Surgical History:   Procedure Laterality Date     BIOPSY PROSTATE TRANSRECTAL  01/10/2020    Dr. Colmenares     COLONOSCOPY  2020     LITHOTRIPSY  age 30s     MICROSCOPIC KNEE SURGERY Right age 30s     ORTHOPEDIC SURGERY Left 1985    Alan placed in Left Femur     PROSTATECTOMY RETROPUBIC RADICAL  2020    Dr. Colmenares     Allergies:  Allergies as of 2022     (No Known Allergies)     Current Medications:  Current Outpatient Medications   Medication     apalutamide (ERLEADA) 60 MG tablet     aspirin (ASA) 81 MG chewable tablet     denosumab (XGEVA) 120 MG/1.7ML SOLN injection     HYDROcodone-acetaminophen (NORCO) 5-325 MG tablet     leuprolide (ELIGARD) 45 MG kit     sildenafil (REVATIO) 20 MG tablet     No current facility-administered medications for this visit.       Family History:    His father was diagnosed with prostate cancer, at the age of 75. Paternal GM had throat cancer at the age of 94, nonsmoker.     Social History:  Social History     Socioeconomic History     Marital status:    Occupational History     Not on file   Social Needs     Food insecurity     Transportation needs   Tobacco Use     Smoking status: Former Smoker     Packs/day: 1.00     Years: 30.00     Pack years: 30.00     Types: Cigarettes, Cigars     Last attempt to quit: 2012     Years since quittin.4     Smokeless tobacco: Never Used   Substance and Sexual Activity     Alcohol use: Yes     Comment: 4-6 drinks per day - patient reports beer or vodka drinks     Physical Exam:      Wt Readings from Last 5 Encounters:   22 86.6 kg (191 lb)   10/26/22 86 kg (189 lb 9.6 oz)   22 87.1 kg (192 lb)   22 88.1 kg (194 lb 3.2 oz)   22 86.2 kg (190 lb)     Constitutional: alert and in no distress  Eyes: No redness or discharge  Respiratory: No cough or labored  breathing.  Musculoskeletal: Full range of motion in extremities.  Skin: no visible skin lesions or discoloration  Neurological: No tremors and denies headache.  Psychiatric: Mentation appears normal and affect is normal as well.  Alert and oriented x3.  The rest the comprehensive physical examination is deferred due to public health emergency video visit restrictions.        Laboratory/Imaging Studies  Labs reviewed.   Recent Labs   Lab Test 11/01/22  1432 07/21/22  1434 04/21/22  0932 01/21/22  0850 10/29/21  0903    141 140 140 138   POTASSIUM 4.1 4.1 4.2 4.7 3.9   CHLORIDE 104 106 107 108 106   CO2 29 29 25 26 28   ANIONGAP 7 6 8 6 4   BUN 14 17 16 19 16   CR 1.04 0.99 1.05 0.89 1.04   GLC 99 97 130* 127* 138*   NATALIYA 9.3 9.2 9.3 9.5 9.7     No results for input(s): MAG, PHOS in the last 87860 hours.  Recent Labs   Lab Test 11/01/22  1432 07/21/22  1434 04/21/22  0932 01/31/22  1533 01/21/22  0850   WBC 8.9 7.5 19.3* 6.2 20.1*   HGB 13.2* 13.7 14.5 13.6 14.2    234 292 246 249   MCV 94 91 90 92 91   NEUTROPHIL 71 56 86 62 90     Recent Labs   Lab Test 11/01/22  1432 07/21/22  1434 04/21/22  0932   BILITOTAL 0.2 0.4 0.4   ALKPHOS 66 80 72   ALT 42 34 33   AST 17 18 15   ALBUMIN 3.6 3.9 3.8     TSH   Date Value Ref Range Status   01/21/2022 1.03 0.40 - 4.00 mU/L Final   10/29/2021 1.91 0.40 - 4.00 mU/L Final   08/23/2021 2.49 0.40 - 4.00 mU/L Final   04/29/2021 2.57 0.40 - 4.00 mU/L Final   01/22/2021 3.46 0.40 - 4.00 mU/L Final   10/30/2020 1.97 0.40 - 4.00 mU/L Final     Recent Labs   Lab Test 11/01/22  1432 07/21/22  1434 04/21/22  0932 01/21/22  0850 10/29/21  0913 10/29/21  0903 08/23/21  0843   PSA <0.01 <0.01 <0.01 <0.01  --  0.02 0.02   TESTOSTTOTAL  --  4* 15* 11* 3*  --  9*       Results for orders placed or performed in visit on 08/02/22   PAIN Interlaminar Epidural Steroid Injection Lumbar/Sacral    Narrative    Pre procedure Diagnosis: lumbar radiculopathy   Post procedure Diagnosis:  Same  Procedure performed: L4/5 interlaminar epidural steroid injection   Anesthesia: none  Complications: none  Operators: Issac Tapia    Indications:   Per Etienne is a 56 year old male was sent by Dr. Chen for   epidural steroid injection.  They have a history of left L5 radiculopathy.    Exam shows pain radiating from back down to calf with no focal weakness   and they have tried conservative treatment including medications. Denies   weakness, loss of sensation, bowel or bladder incontinence.    I had a long discussion with patient about repeating the same injection he   had last time he was seen in the pain clinic when he had left L4/5 and   L5/S1 transforaminal epidural steroid injections. In reviewing his MRI and   prior injection    CT scan 8/23/21  1. No acute osseous abnormality.   2. Unchanged sclerotic foci in T6 and T11 vertebral bodies dating back  to 5/20/2020.   3. Degenerative changes of the lumbar spine. Severe left neural  foraminal narrowing at L4-L5 and moderate to severe right at L3-4.  Additional multilevel spinal canal and neural foraminal stenosis.    Options/alternatives, benefits and risks were discussed with the patient   including bleeding, infection, tissue trauma, numbness, weakness,   paralysis, spinal cord injury, radiation exposure, headache and reaction   to medications. Questions were answered to his satisfaction and he agrees   to proceed. Voluntary informed consent was obtained and signed.     Vitals were reviewed: Yes  Allergies were reviewed:  Yes   Medications were reviewed:  Yes   Pre-procedure pain score: 8      Procedure:  After getting informed consent, patient was brought into the procedure   suite and was placed in a prone position on the procedure table.   A Pause   for the Cause was performed.  Patient was prepped and draped in sterile   fashion.     The L4-5 interspace was identified with AP fluoroscopy.  A total of 4ml of   1% lidocaine was used to  anesthetize the skin for a left paramedian   approach.    A 20gauge 3.5inch Touhy needle was advanced under   intermittent fluoroscopy.  A SHI syringe was used to advance the needle   into the epidural space.   After loss of resistance, there was no evidence   of blood or CSF on aspiration. Location was verified with both AP and   lateral fluoroscopic imaging.    A total of 1ml of Omnipaque 300 was injected demonstrating appropriate   epidural spread and was checked in both the AP and lateral views. 9ml was   wasted. There was no evidence of intravascular spread.    2ml of 0.5% bupivacaine with 40mg of kenalog and 2ml of preservative free   saline was injected.  The needle was removed from the epidural space,   flushed with 1% lidocaine and removed.     Hemostasis was achieved, the area was cleaned, and bandaids were placed   when appropriate.  The patient tolerated the procedure well, and was taken to the recovery   room.    Images were saved to PACS.    Post-procedure pain score: 3/10  Follow-up includes:   -f/u with referring provider    Issac Tapia MD  [facility]                  ALVERTO interpretation Negative Negative     Comment:    Negative:              <1:40   Borderline Positive:   1:40 - 1:80   Positive:              >1:80         Component    Case Report   Flow Cytometry Report                             Case: [id number]                                   Authorizing Provider:  Scott Garcia MD    Collected:           04/21/2022 11:31 AM           Ordering Location:     [facility]   Received:            04/21/2022 11:47 AM                                  [facility]                                                            Pathologist:           Angel Gonzalez MD                                                    Specimen:    Peripheral Blood                                                                           Flow Interpretation   A.  Peripheral Blood:  -Polytypic B cells  -No aberrant immunophenotype on T cells  -Rare to absent blasts   Electronically signed by Angel Gonzalez MD on 4/25/2022 at 12:49 PM   Comment    There is no immunophenotypic evidence of non-Hodgkin lymphoma, lymphoid leukemia, or high grade myeloid neoplasm. T cell lymphomas cannot always be detected by this flow cytometry assay. Circulating neoplastic cells are not always present in lymphoma and leukemia; therefore, the peripheral blood findings do not rule out underlying disease elsewhere.  Final interpretation requires correlation with morphologic and clinical features.    Flow Phenotypic Data    Unless otherwise indicated, percentages reported below are based on the total number of CD45 positive viable leukocytes. If applicable, percentage of plasma cells is from total viable nucleated cells.     0.7% polytypic B cells     4.5% T cells with a CD4:CD8 ratio of 5.3:1      1.3% NK cells     CD34 positive blasts are rare to absent.      Component    METHODOLOGY    Total cellular RNA was extracted from above specimen and reverse transcribed to cDNA via random hexamer priming. The cDNA was amplified by a real time quantitative PCR assay (using an [employer] 3 Real-time analyzer) with an ABL1 specific primer and a primer specific for exon 13 of the BCR gene. An internal control (ABL1 gene), was amplified to confirm the presence of patient RNA. The presence of at least 1000 copies of the ABL1 gene was considered a cutoff value for determining an adequate specimen for quantitative analysis of the BCR::ABL1 translocation. The results are reported as a ratio of the number of BCR::ABL1 copies to the number of ABL1 copies to normalize for differences in amount of RNA in the reaction and enable serial monitoring of the BCR::ABL1 transcript numbers.  This test has been calibrated to the international scale(IS) using a validated reference standard.   RESULTS    BCR::ABL1  MAJOR(p210) QUANTITATION RESULTS:  BCR::ABL1/ABL1 Major(p210):  UNDETECTABLE     INTERNAL CONTROL (NUMBER OF ABL1 TRANSCRIPTS): 1,160   INTERPRETATION    INTERPRETATION:  Molecular testing performed on submitted Blood.     BCR::ABL1 transcripts of the major (p210) breakpoint cluster regions are undetectable in this sample.  (Electronically signed by: Kwaku Ramirez MD April 29, 2022 1:59 PM)   COMMENTS    The limit of sensitivity of the quantitative BCR::ABL1 major (p210) assay is 10 copies of the BCR::ABL transcripts.   Individual measurements of BCR::ABL1 transcript levels yield limited prognostic information; serial monitoring of BCR::ABL1 transcript levels (at 3 or 6 month intervals) allows for clinically relevant assessment of response to therapy at the molecular level. A major molecular response has been defined as a 3-log decrease in BCR::ABL1/ABL1 from a baseline value. The patient's own BCR::ABL1/ABL1 ratio performed by the same laboratory serves as the definitive baseline from which a three log decrease is measured. However, in practice this value is not always available. Therefore the concept of the laboratory-specific mean pre-treatment baseline value has been established. In our laboratory, the mean pre-treatment BCR::ABL1/ABL1 in CML patients is 122% (range: 68% to 217%). Therefore, a three log reduction from the mean pre-treatment baseline in our laboratory is a BCR::ABL1/ABL1 value of 0.1%.     References:  N Engl J Med 2003; 17:2318, Int J Oncol 2006;28:1099, Blood 2007;108:28     Please note, comparisons of bone marrow and blood samples may not be clinically appropriate.         Component    METHODOLOGY    Total cellular RNA was extracted from above specimen and reverse transcribed to cDNA via random hexamer priming. The cDNA was amplified by a real time quantitative PCR assay (using an "[employer] 3 Real-time analyzer) with an ABL1 specific primer and a primer specific for exon 1 of the  BCR gene. An internal control (ABL1 gene), was amplified to confirm the presence of patient RNA. The presence of at least 1000 copies of the ABL1 gene was considered a cutoff value for determining an adequate specimen for quantitative analysis of the BCR::ABL1 translocation. The results are reported as a ratio of the number of BCR::ABL1 copies to the number of ABL1 copies to normalize for differences in amount of RNA in the reaction and enable serial monitoring of the BCR::ABL1 transcript numbers.   RESULTS    BCR::ABL1 MINOR(p190) QUANTITATION RESULTS:  BCR::ABL1/ABL1 minor(p190):  UNDETECTABLE     INTERNAL CONTROL (NUMBER OF ABL1 TRANSCRIPTS): 1,400   INTERPRETATION    Molecular testing performed on submitted Blood.     BCR::ABL1 transcripts of the minor(p190) breakpoint cluster regions are undetectable in this sample.  (Electronically signed by: Kwaku Ramirez MD April 28, 2022 5:33 PM)   COMMENTS    The limit of sensitivity of the quantitative BCR::ABL1 assay is 10 copies of the BCR::ABL1 transcripts.     Please note, comparisons of bone marrow and blood samples may not be clinically appropriate.          ASSESSMENT/PLAN:  Jay is a very pleasant 56 year old man with metastatic (to the bones) prostate cancer. He is s/p Eligard on 4/21/2020. Jay has low volume hormone sensitive metastatic disease, with residual disease locoregionally and bone metastasis (two lesions- in left ischium and symphysis pubis), asymptomatic.  He received Eligard on 4/21/2020 and started on Apalutamide on 6/12/2020.    1.  Metastatic prostate cancer-PSA decreased to <0.01. Continue apalutamide daily and Lupron every 3 months.  We will continue to monitor the patient per treatment protocol. We will follow CBC differential, CMP, total testosterone level and PSA every 3 months.    He had questions over need for apalutamide given his low PSA values.      Apalutamide, a newer generation competitive inhibitor of the androgen receptor  like bicalutamide has been evaluated in men with nonmetastatic castration-resistant prostate cancer like him who were at high risk for the development of metastasis (N Engl J Med. 2018 Apr 12;378(63):9328-2552). A total of 1207 men underwent randomization (806 to the apalutamide group and 401 to the placebo group). Among men with nonmetastatic castration-resistant prostate cancer, metastasis-free survival and time to symptomatic progression were significantly longer with apalutamide than with placebo -  median metastasis-free survival was 40.5 months in the apalutamide group as compared with 16.2 months in the placebo group (hazard ratio for metastasis or death, 0.28; 95% confidence interval [CI], 0.23 to 0.35; P<0.001);  time to symptomatic progression was significantly longer with apalutamide than with placebo (hazard ratio, 0.45; 95% CI, 0.32 to 0.63; P<0.001). Apalutamide is well tolerated with no serious side effects except for a rash in 25% of patients.     I briefly reviewed other medications like enzalutamide, darolutamide and the abiraterone.  Most of these agents have been tested upfront in the metastatic setting and have shown improvement in survival.  For a patient with biochemical PSA relapse, these are done only in the setting of castration resistance but he had metastatic disease and so combined androgen deprivation therapy was chosen.  He has undetectable PSA but it is possible that it has been undetectable for so long likely because of the combined androgen deprivation therapy rather than single agent leuprolide.  We might have continued disease control with leuprolide alone but it is possible that we might end up having progressive disease.  Apalutamide should respond in the setting of castration resistance however the depth of response might not be the same.  The disease control.  Is much shorter in the castration resistant setting as opposed to hormone sensitive setting.    2. Bone  metastasis-continue Xgeva q 3 months for prevention of skeletal related events given low volume metastatic disease to the bones.      3.  Lung cancer screening- CT chest findings from 1/31/2022 negative. Next due in one year    4. Intermittent leucocytosis secondary to intermittent neutrophilia  Per tends to get intermittent leukocytosis with neutrophilia.  He had high total WBC count with absolute neutrophil counts of 16.5 thousand.  This has dropped to 4.2K-well within the normal range.  He has been getting intermittent cyclical neutrophilia.     5. Drug monitoring- TSH monitoring q 3-4 months. TSH normal in 01/2022.     6. Mild normochromic normocytic anemia- continue to follow on CBCd, likely secondary to chronic inflammation, androgen deprivation therapy, apalutamide.  He is hemoglobin values have been fluctuating since his diagnosis of prostate cancer but most values have been between 13 and 14 g/dL.    7. GI - on PPI for s/o GErD and cough in am with improvement of symptoms per GI (Brett Preston's note)- on Omeprazole PO BID. Apalutamide associated with the following GI symptoms: Decreased appetite (12%), diarrhea (9% to 20%), nausea (18%) but not GERD s/p. Previously referred to GI due to persistent GERD symptoms and was continued by GI on Omeprazole and recommended to proceed with upper EGD for further evaluation. He did not follow through with upper EgD or  GI appt.    At the end of our visit patient verbalized understanding and concurred with the plan.  Chart documentation completed in part with Dragon voice-recognition software.  Even though reviewed some grammatical, spelling, and word errors may remain.    25 minutes spent on the date of the encounter doing chart review, review of test results, interpretation of tests, patient visit and documentation.

## 2022-11-03 NOTE — LETTER
11/3/2022         RE: Per Etienne  [address] [facility] [id number]        Dear Colleague,    Thank you for referring your patient, Per Etienne, to the [facility]. Please see a copy of my visit note below.    Oncology Follow-up:  Date on this visit: Nov 3, 2022    Primary care physician: Per Chen   Urologist: Dr. Angel Colmenares     Metastatic hormone sensitive prostate cancer    Oncologic History:  1. Metastatic hormone sensitive prostate cancer  He presented to his PCP in December 2019 with slow urinary stream.  PSA was checked and was found to be 124. On January 8, 2020 abdomen pelvis CT showed groundglass opacity in the left lower lobe,  dystrophic calcification of the prostate gland, and a fatty liver.  Same-day bone scan was negative. On January 10, 2010 prostate biopsy showed on the left Robyn score 4+3 involving 7 cores and on the right [place] score 3+4 involving 6 cores, with positive perineural invasion.  He proceeded to undergo RRP by Dr. Colmenares on February 24, 2020.  Pathology revealed acinar adenocarcinoma [place] 4+3 with focal ROBERTO, bladder neck base invasion, positive SVI, positive PNI, and multiple positive margins.  There was no LVSI and 0 out of 3 lymph nodes removed were involved with tumor; pT3bN0.  He received Eligard on 4/20/2020.  MRI prostate on 5/21/2020 was concerning for local recurrence in the prostatectomy bed and pelvic lymph nodes.  He had a PET/CT F-18 fluciclovine PET/CT on 5/20/2020 which showed 2 foci of increased uptake in the left ischium, with underlying  sclerotic lesion, and symphysis pubis, additionally low-level increased uptake in 3 lymph nodes along the right external and internal iliac nodes, and prostatectomy bed was noted.    On April 21, 2020 he received an Eligard injection.    He started Apalutamide on 6/12/2020.  Bone scan 1/22/2021: no bone mets.      2. Tinnitus b/l -  By 12/2020 he presented with  2 months c/o bilateral tinnitus and was evaluated by Dr. Shields from ENT. An audiogram showed a significant down-sloping mid to high frequency sensorineural hearing loss. There was no evidence of conductive hearing loss or significant asymmetry.  He was felt to have early onset presbycusis. He was referred for tinnitus retraining therapy.  Review of literature showed no clear association of apalutamide or Xgeva with tinnitus or ototoxicity. Felt to have early onset presbycusis.    3. Chronic lower back pain, with exacerbations- He is followed by Dr. Denise and  Dr. Clay from  pain management for evaluation of lower back pain.  He had CT of thoracic and lumbar spine on August 23, 2021.  This demonstrated unchanged sclerotic foci in T6 and T11 vertebral bodies dating back to May 2020.  There were degenerative changes in the lumbar spine with severe left neural foraminal narrowing at L4-L5 and moderate to severe at L3-L4.  He has proceeded with L3-4 epidural injection on 10/22/2021.  No acute osseous abnormalities were noted. His lower back pain and b/l pelvic pain has improved following epidural injection.     History Of Present Illness:  Mr. Etienne is a 56 year old male who presents for f/up of  metastatic hormone sensitive prostate cancer diagnosed in 01/2020, s/p RRP at that time but later found to have residual/recurrent disease in the pelvis and L inferior pubic body and pubic ramus metastasis.   He has been on androgen deprivation therapy since April 2020, and continues on Lupron every 3 months.     He started Apalutamide on 6/12/2020. He has remained on apalutamide.  He has not had any side effects attributable to apalutamide.  He has been taking his medications at the same time by the clock each day.  He has not missed a single dose that he is aware of.  His pain medications have been adjusted lately.  He notes that he has been switched to morphine.  He has a follow-up in pain clinic later today.    He gets  epidural injection to lumbar spine for back pain.    Jay has been doing well and has no new complaints at this clinic visit.  He has been tolerating his apalutamide without any side effects.    Past Medical/Surgical History:  Past Medical History:   Diagnosis Date     Gout      Hypertension goal BP (blood pressure) < 140/90 2022     Lumbar stenosis      Prostate cancer (H) 01/10/2020     Past Surgical History:   Procedure Laterality Date     BIOPSY PROSTATE TRANSRECTAL  01/10/2020    Dr. Colmenares     COLONOSCOPY  2020     LITHOTRIPSY  age 30s     MICROSCOPIC KNEE SURGERY Right age 30s     ORTHOPEDIC SURGERY Left 1985    Alan placed in Left Femur     PROSTATECTOMY RETROPUBIC RADICAL  2020    Dr. Colmenares     Allergies:  Allergies as of 2022     (No Known Allergies)     Current Medications:  Current Outpatient Medications   Medication     apalutamide (ERLEADA) 60 MG tablet     aspirin (ASA) 81 MG chewable tablet     denosumab (XGEVA) 120 MG/1.7ML SOLN injection     HYDROcodone-acetaminophen (NORCO) 5-325 MG tablet     leuprolide (ELIGARD) 45 MG kit     sildenafil (REVATIO) 20 MG tablet     No current facility-administered medications for this visit.       Family History:    His father was diagnosed with prostate cancer, at the age of 75. Paternal GM had throat cancer at the age of 94, nonsmoker.     Social History:  Social History     Socioeconomic History     Marital status:    Occupational History     Not on file   Social Needs     Food insecurity     Transportation needs   Tobacco Use     Smoking status: Former Smoker     Packs/day: 1.00     Years: 30.00     Pack years: 30.00     Types: Cigarettes, Cigars     Last attempt to quit: 2012     Years since quittin.4     Smokeless tobacco: Never Used   Substance and Sexual Activity     Alcohol use: Yes     Comment: 4-6 drinks per day - patient reports beer or vodka drinks     Physical Exam:      Wt Readings from Last 5 Encounters:   22  86.6 kg (191 lb)   10/26/22 86 kg (189 lb 9.6 oz)   08/29/22 87.1 kg (192 lb)   08/01/22 88.1 kg (194 lb 3.2 oz)   07/21/22 86.2 kg (190 lb)     Constitutional: alert and in no distress  Eyes: No redness or discharge  Respiratory: No cough or labored breathing.  Musculoskeletal: Full range of motion in extremities.  Skin: no visible skin lesions or discoloration  Neurological: No tremors and denies headache.  Psychiatric: Mentation appears normal and affect is normal as well.  Alert and oriented x3.  The rest the comprehensive physical examination is deferred due to public health emergency video visit restrictions.        Laboratory/Imaging Studies  Labs reviewed.   Recent Labs   Lab Test 11/01/22  1432 07/21/22  1434 04/21/22  0932 01/21/22  0850 10/29/21  0903    141 140 140 138   POTASSIUM 4.1 4.1 4.2 4.7 3.9   CHLORIDE 104 106 107 108 106   CO2 29 29 25 26 28   ANIONGAP 7 6 8 6 4   BUN 14 17 16 19 16   CR 1.04 0.99 1.05 0.89 1.04   GLC 99 97 130* 127* 138*   NATALIYA 9.3 9.2 9.3 9.5 9.7     No results for input(s): MAG, PHOS in the last 83354 hours.  Recent Labs   Lab Test 11/01/22  1432 07/21/22  1434 04/21/22  0932 01/31/22  1533 01/21/22  0850   WBC 8.9 7.5 19.3* 6.2 20.1*   HGB 13.2* 13.7 14.5 13.6 14.2    234 292 246 249   MCV 94 91 90 92 91   NEUTROPHIL 71 56 86 62 90     Recent Labs   Lab Test 11/01/22  1432 07/21/22  1434 04/21/22  0932   BILITOTAL 0.2 0.4 0.4   ALKPHOS 66 80 72   ALT 42 34 33   AST 17 18 15   ALBUMIN 3.6 3.9 3.8     TSH   Date Value Ref Range Status   01/21/2022 1.03 0.40 - 4.00 mU/L Final   10/29/2021 1.91 0.40 - 4.00 mU/L Final   08/23/2021 2.49 0.40 - 4.00 mU/L Final   04/29/2021 2.57 0.40 - 4.00 mU/L Final   01/22/2021 3.46 0.40 - 4.00 mU/L Final   10/30/2020 1.97 0.40 - 4.00 mU/L Final     Recent Labs   Lab Test 11/01/22  1432 07/21/22  1434 04/21/22  0932 01/21/22  0850 10/29/21  0913 10/29/21  0903 08/23/21  0843   PSA <0.01 <0.01 <0.01 <0.01  --  0.02 0.02   TESTOSTTOTAL   --  4* 15* 11* 3*  --  9*       Results for orders placed or performed in visit on 08/02/22   PAIN Interlaminar Epidural Steroid Injection Lumbar/Sacral    Narrative    Pre procedure Diagnosis: lumbar radiculopathy   Post procedure Diagnosis: Same  Procedure performed: L4/5 interlaminar epidural steroid injection   Anesthesia: none  Complications: none  Operators: Issac Tapia    Indications:   Per Etienne is a 56 year old male was sent by Dr. Chen for   epidural steroid injection.  They have a history of left L5 radiculopathy.    Exam shows pain radiating from back down to calf with no focal weakness   and they have tried conservative treatment including medications. Denies   weakness, loss of sensation, bowel or bladder incontinence.    I had a long discussion with patient about repeating the same injection he   had last time he was seen in the pain clinic when he had left L4/5 and   L5/S1 transforaminal epidural steroid injections. In reviewing his MRI and   prior injection    CT scan 8/23/21  1. No acute osseous abnormality.   2. Unchanged sclerotic foci in T6 and T11 vertebral bodies dating back  to 5/20/2020.   3. Degenerative changes of the lumbar spine. Severe left neural  foraminal narrowing at L4-L5 and moderate to severe right at L3-4.  Additional multilevel spinal canal and neural foraminal stenosis.    Options/alternatives, benefits and risks were discussed with the patient   including bleeding, infection, tissue trauma, numbness, weakness,   paralysis, spinal cord injury, radiation exposure, headache and reaction   to medications. Questions were answered to his satisfaction and he agrees   to proceed. Voluntary informed consent was obtained and signed.     Vitals were reviewed: Yes  Allergies were reviewed:  Yes   Medications were reviewed:  Yes   Pre-procedure pain score: 8      Procedure:  After getting informed consent, patient was brought into the procedure   suite and was placed in a  prone position on the procedure table.   A Pause   for the Cause was performed.  Patient was prepped and draped in sterile   fashion.     The L4-5 interspace was identified with AP fluoroscopy.  A total of 4ml of   1% lidocaine was used to anesthetize the skin for a left paramedian   approach.    A 20gauge 3.5inch Touhy needle was advanced under   intermittent fluoroscopy.  A SHI syringe was used to advance the needle   into the epidural space.   After loss of resistance, there was no evidence   of blood or CSF on aspiration. Location was verified with both AP and   lateral fluoroscopic imaging.    A total of 1ml of Omnipaque 300 was injected demonstrating appropriate   epidural spread and was checked in both the AP and lateral views. 9ml was   wasted. There was no evidence of intravascular spread.    2ml of 0.5% bupivacaine with 40mg of kenalog and 2ml of preservative free   saline was injected.  The needle was removed from the epidural space,   flushed with 1% lidocaine and removed.     Hemostasis was achieved, the area was cleaned, and bandaids were placed   when appropriate.  The patient tolerated the procedure well, and was taken to the recovery   room.    Images were saved to PACS.    Post-procedure pain score: 3/10  Follow-up includes:   -f/u with referring provider    Issac Tapia MD  [facility]                  ALVERTO interpretation Negative Negative     Comment:    Negative:              <1:40   Borderline Positive:   1:40 - 1:80   Positive:              >1:80         Component    Case Report   Flow Cytometry Report                             Case: [id number]                                   Authorizing Provider:  Scott Garcia MD    Collected:           04/21/2022 11:31 AM           Ordering Location:     [facility]   Received:            04/21/2022 11:47 AM                                  [facility]                                                             Pathologist:           Angel Gonzalez MD                                                    Specimen:    Peripheral Blood                                                                           Flow Interpretation   A. Peripheral Blood:  -Polytypic B cells  -No aberrant immunophenotype on T cells  -Rare to absent blasts   Electronically signed by Angel Gonzalez MD on 4/25/2022 at 12:49 PM   Comment    There is no immunophenotypic evidence of non-Hodgkin lymphoma, lymphoid leukemia, or high grade myeloid neoplasm. T cell lymphomas cannot always be detected by this flow cytometry assay. Circulating neoplastic cells are not always present in lymphoma and leukemia; therefore, the peripheral blood findings do not rule out underlying disease elsewhere.  Final interpretation requires correlation with morphologic and clinical features.    Flow Phenotypic Data    Unless otherwise indicated, percentages reported below are based on the total number of CD45 positive viable leukocytes. If applicable, percentage of plasma cells is from total viable nucleated cells.     0.7% polytypic B cells     4.5% T cells with a CD4:CD8 ratio of 5.3:1      1.3% NK cells     CD34 positive blasts are rare to absent.      Component    METHODOLOGY    Total cellular RNA was extracted from above specimen and reverse transcribed to cDNA via random hexamer priming. The cDNA was amplified by a real time quantitative PCR assay (using an Sigma Force 3 Real-time analyzer) with an ABL1 specific primer and a primer specific for exon 13 of the BCR gene. An internal control (ABL1 gene), was amplified to confirm the presence of patient RNA. The presence of at least 1000 copies of the ABL1 gene was considered a cutoff value for determining an adequate specimen for quantitative analysis of the BCR::ABL1 translocation. The results are reported as a ratio of the number of BCR::ABL1 copies to the number of ABL1 copies to normalize  for differences in amount of RNA in the reaction and enable serial monitoring of the BCR::ABL1 transcript numbers.  This test has been calibrated to the international scale(IS) using a validated reference standard.   RESULTS    BCR::ABL1 MAJOR(p210) QUANTITATION RESULTS:  BCR::ABL1/ABL1 Major(p210):  UNDETECTABLE     INTERNAL CONTROL (NUMBER OF ABL1 TRANSCRIPTS): 1,160   INTERPRETATION    INTERPRETATION:  Molecular testing performed on submitted Blood.     BCR::ABL1 transcripts of the major (p210) breakpoint cluster regions are undetectable in this sample.  (Electronically signed by: Kwaku Ramirez MD April 29, 2022 1:59 PM)   COMMENTS    The limit of sensitivity of the quantitative BCR::ABL1 major (p210) assay is 10 copies of the BCR::ABL transcripts.   Individual measurements of BCR::ABL1 transcript levels yield limited prognostic information; serial monitoring of BCR::ABL1 transcript levels (at 3 or 6 month intervals) allows for clinically relevant assessment of response to therapy at the molecular level. A major molecular response has been defined as a 3-log decrease in BCR::ABL1/ABL1 from a baseline value. The patient's own BCR::ABL1/ABL1 ratio performed by the same laboratory serves as the definitive baseline from which a three log decrease is measured. However, in practice this value is not always available. Therefore the concept of the laboratory-specific mean pre-treatment baseline value has been established. In our laboratory, the mean pre-treatment BCR::ABL1/ABL1 in CML patients is 122% (range: 68% to 217%). Therefore, a three log reduction from the mean pre-treatment baseline in our laboratory is a BCR::ABL1/ABL1 value of 0.1%.     References:  N Engl J Med 2003; 17:2318, Int J Oncol 2006;28:1099, Blood 2007;108:28     Please note, comparisons of bone marrow and blood samples may not be clinically appropriate.         Component    METHODOLOGY    Total cellular RNA was extracted from above specimen  and reverse transcribed to cDNA via random hexamer priming. The cDNA was amplified by a real time quantitative PCR assay (using an [employer] 3 Real-time analyzer) with an ABL1 specific primer and a primer specific for exon 1 of the BCR gene. An internal control (ABL1 gene), was amplified to confirm the presence of patient RNA. The presence of at least 1000 copies of the ABL1 gene was considered a cutoff value for determining an adequate specimen for quantitative analysis of the BCR::ABL1 translocation. The results are reported as a ratio of the number of BCR::ABL1 copies to the number of ABL1 copies to normalize for differences in amount of RNA in the reaction and enable serial monitoring of the BCR::ABL1 transcript numbers.   RESULTS    BCR::ABL1 MINOR(p190) QUANTITATION RESULTS:  BCR::ABL1/ABL1 minor(p190):  UNDETECTABLE     INTERNAL CONTROL (NUMBER OF ABL1 TRANSCRIPTS): 1,400   INTERPRETATION    Molecular testing performed on submitted Blood.     BCR::ABL1 transcripts of the minor(p190) breakpoint cluster regions are undetectable in this sample.  (Electronically signed by: Kwaku Ramirez MD April 28, 2022 5:33 PM)   COMMENTS    The limit of sensitivity of the quantitative BCR::ABL1 assay is 10 copies of the BCR::ABL1 transcripts.     Please note, comparisons of bone marrow and blood samples may not be clinically appropriate.          ASSESSMENT/PLAN:  Jay is a very pleasant 56 year old man with metastatic (to the bones) prostate cancer. He is s/p Eligard on 4/21/2020. Jay has low volume hormone sensitive metastatic disease, with residual disease locoregionally and bone metastasis (two lesions- in left ischium and symphysis pubis), asymptomatic.  He received Eligard on 4/21/2020 and started on Apalutamide on 6/12/2020.    1.  Metastatic prostate cancer-PSA decreased to <0.01. Continue apalutamide daily and Lupron every 3 months.  We will continue to monitor the patient per treatment protocol. We will  follow CBC differential, CMP, total testosterone level and PSA every 3 months.    He had questions over need for apalutamide given his low PSA values.      Apalutamide, a newer generation competitive inhibitor of the androgen receptor like bicalutamide has been evaluated in men with nonmetastatic castration-resistant prostate cancer like him who were at high risk for the development of metastasis (N Engl J Med. 2018 Apr 12;378(57):6610-4115). A total of 1207 men underwent randomization (806 to the apalutamide group and 401 to the placebo group). Among men with nonmetastatic castration-resistant prostate cancer, metastasis-free survival and time to symptomatic progression were significantly longer with apalutamide than with placebo -  median metastasis-free survival was 40.5 months in the apalutamide group as compared with 16.2 months in the placebo group (hazard ratio for metastasis or death, 0.28; 95% confidence interval [CI], 0.23 to 0.35; P<0.001);  time to symptomatic progression was significantly longer with apalutamide than with placebo (hazard ratio, 0.45; 95% CI, 0.32 to 0.63; P<0.001). Apalutamide is well tolerated with no serious side effects except for a rash in 25% of patients.     I briefly reviewed other medications like enzalutamide, darolutamide and the abiraterone.  Most of these agents have been tested upfront in the metastatic setting and have shown improvement in survival.  For a patient with biochemical PSA relapse, these are done only in the setting of castration resistance but he had metastatic disease and so combined androgen deprivation therapy was chosen.  He has undetectable PSA but it is possible that it has been undetectable for so long likely because of the combined androgen deprivation therapy rather than single agent leuprolide.  We might have continued disease control with leuprolide alone but it is possible that we might end up having progressive disease.  Apalutamide should respond  in the setting of castration resistance however the depth of response might not be the same.  The disease control.  Is much shorter in the castration resistant setting as opposed to hormone sensitive setting.    2. Bone metastasis-continue Xgeva q 3 months for prevention of skeletal related events given low volume metastatic disease to the bones.      3.  Lung cancer screening- CT chest findings from 1/31/2022 negative. Next due in one year    4. Intermittent leucocytosis secondary to intermittent neutrophilia  Per tends to get intermittent leukocytosis with neutrophilia.  He had high total WBC count with absolute neutrophil counts of 16.5 thousand.  This has dropped to 4.2K-well within the normal range.  He has been getting intermittent cyclical neutrophilia.     5. Drug monitoring- TSH monitoring q 3-4 months. TSH normal in 01/2022.     6. Mild normochromic normocytic anemia- continue to follow on CBCd, likely secondary to chronic inflammation, androgen deprivation therapy, apalutamide.  He is hemoglobin values have been fluctuating since his diagnosis of prostate cancer but most values have been between 13 and 14 g/dL.    7. GI - on PPI for s/o GErD and cough in am with improvement of symptoms per GI (Brett Preston's note)- on Omeprazole PO BID. Apalutamide associated with the following GI symptoms: Decreased appetite (12%), diarrhea (9% to 20%), nausea (18%) but not GERD s/p. Previously referred to GI due to persistent GERD symptoms and was continued by GI on Omeprazole and recommended to proceed with upper EGD for further evaluation. He did not follow through with upper EgD or  GI appt.    At the end of our visit patient verbalized understanding and concurred with the plan.  Chart documentation completed in part with Dragon voice-recognition software.  Even though reviewed some grammatical, spelling, and word errors may remain.    25 minutes spent on the date of the encounter doing chart review, review of  test results, interpretation of tests, patient visit and documentation.            Again, thank you for allowing me to participate in the care of your patient.        Sincerely,        Scott Garcia MD

## 2022-11-03 NOTE — NURSING NOTE
"Oncology Rooming Note    November 3, 2022 9:12 AM   Per Etienne is a 57 year old male who presents for:    Chief Complaint   Patient presents with     Oncology Clinic Visit     Follow up     Initial Vitals: BP (!) 145/84 (BP Location: Left arm, Patient Position: Chair, Cuff Size: Adult Regular)   Pulse 69   Ht 1.753 m (5' 9\")   Wt 86.6 kg (191 lb)   SpO2 98%   BMI 28.21 kg/m   Estimated body mass index is 28.21 kg/m  as calculated from the following:    Height as of this encounter: 1.753 m (5' 9\").    Weight as of this encounter: 86.6 kg (191 lb). Body surface area is 2.05 meters squared.  No Pain (0) Comment: Data Unavailable   No LMP for male patient.  Allergies reviewed: Yes  Medications reviewed: Yes    Medications: Medication refills not needed today.  Pharmacy name entered into fl3ur:    [id number] [place], [place] - [address], [place] - [address] [facility]    Clinical concerns: NO Jose was notified.      Viky Miller CMA              "

## 2022-11-03 NOTE — PROGRESS NOTES
Infusion Nursing Note:  Per Etienne presents today for Lupron and xgeva.    Patient seen by provider today: Yes: Dr. Garcia   present during visit today: Not Applicable.    Note: N/A.    Intravenous Access:  No Intravenous access needed today.    Treatment Conditions:  Lab Results   Component Value Date    HGB 13.2 (L) 11/01/2022    WBC 8.9 11/01/2022    ANEU 11.6 (H) 04/29/2021    ANEUTAUTO 6.3 11/01/2022     11/01/2022      Lab Results   Component Value Date     11/01/2022    POTASSIUM 4.1 11/01/2022    CR 1.04 11/01/2022    NATALIYA 9.3 11/01/2022    BILITOTAL 0.2 11/01/2022    ALBUMIN 3.6 11/01/2022    ALT 42 11/01/2022    AST 17 11/01/2022     Results reviewed, labs MET treatment parameters, ok to proceed with treatment.    Post Infusion Assessment:  Patient tolerated injection without incident.  Site patent and intact, free from redness, edema or discomfort.  No evidence of extravasations.     Discharge Plan:   Discharge instructions reviewed with: Patient and Family.  Patient and/or family verbalized understanding of discharge instructions and all questions answered.  Patient discharged in stable condition accompanied by: wife.  Departure Mode: Ambulatory.      Haven Rojas RN

## 2022-11-04 DIAGNOSIS — C61 PROSTATE CANCER (H): ICD-10-CM

## 2022-11-06 LAB — TESTOST SERPL-MCNC: 2 NG/DL (ref 240–950)

## 2022-11-07 DIAGNOSIS — C61 PROSTATE CANCER (H): Primary | ICD-10-CM

## 2022-11-07 RX ORDER — APALUTAMIDE 60 MG/1
TABLET, FILM COATED ORAL
Qty: 120 TABLET | Refills: 0 | OUTPATIENT
Start: 2022-11-07

## 2022-12-01 ENCOUNTER — RADIOLOGY INJECTION OFFICE VISIT (OUTPATIENT)
Dept: PALLIATIVE MEDICINE | Facility: CLINIC | Age: 57
End: 2022-12-01
Payer: COMMERCIAL

## 2022-12-01 VITALS
RESPIRATION RATE: 16 BRPM | SYSTOLIC BLOOD PRESSURE: 149 MMHG | HEART RATE: 98 BPM | DIASTOLIC BLOOD PRESSURE: 81 MMHG | OXYGEN SATURATION: 98 %

## 2022-12-01 DIAGNOSIS — M54.16 LUMBAR RADICULOPATHY: ICD-10-CM

## 2022-12-01 PROCEDURE — 62323 NJX INTERLAMINAR LMBR/SAC: CPT | Performed by: PAIN MEDICINE

## 2022-12-01 RX ORDER — TRIAMCINOLONE ACETONIDE 40 MG/ML
40 INJECTION, SUSPENSION INTRA-ARTICULAR; INTRAMUSCULAR ONCE
Status: COMPLETED | OUTPATIENT
Start: 2022-12-01 | End: 2022-12-01

## 2022-12-01 RX ADMIN — TRIAMCINOLONE ACETONIDE 40 MG: 40 INJECTION, SUSPENSION INTRA-ARTICULAR; INTRAMUSCULAR at 14:13

## 2022-12-01 ASSESSMENT — PAIN SCALES - GENERAL
PAINLEVEL: MODERATE PAIN (5)
PAINLEVEL: SEVERE PAIN (6)

## 2022-12-01 NOTE — PROGRESS NOTES
Pre procedure Diagnosis: Lumbar radiculopathy  Post procedure Diagnosis: Same  Procedure performed: l4-5 interlaminar epidural steroid injection   Anesthesia: none  Complications: none  Operators: Issac Tapia MD     Indications:   Per Etienne is a 57 year old male.  The patient has a history of bilateral lbp radiating to his le.  Examination shows neg slump.  he has tried conservative treatment including meds/pt/injections.    MRI reviewed  Options/alternatives, benefits and risks were discussed with the patient including but not limited to bleeding, infection, no pain relief, tissue trauma, exposure to radiation, reaction to medications, spinal cord injury, dural puncture, weakness, numbness and headache.  Questions were answered to his satisfaction and he wishes to proceed. Voluntary informed consent was obtained and signed.     Vitals were reviewed: Yes  Allergies were reviewed:  Yes   Medications were reviewed:  Yes   Pre-procedure pain score: 6/10    Procedure:  The patient's medical history, medications, and allergies were reviewed and reconciled.  After obtaining signed informed consent, the patient was brought into the procedure suite and was placed in a prone position on the procedure table.   A Pause for the Cause was performed.  Patient was prepped and draped in the usual sterile fashion.     The L4-5 interspace was identified with AP fluoroscopy.  A total of 4ml of 1% lidocaine was used to anesthetize the skin and subcutaneous tissues for a  midline approach.    A 20gauge 3.5inch Touhy needle was advanced utilizing intermittent AP and Lateral fluoroscopy and air for loss of resistance.  The epidural space was encountered on the first pass without difficulties.  Aspiration for blood and CSF was negative.  Needle position was verified by injecting 1 ml of Omnipaque utilizing real-time fluoroscopy that showed good needle placement and epidural spread without signs of intravascular or  intrathecal uptake.  Omnipaque wasted:  9 ml.    Then, after repeated negative aspiration for blood or CSF, a combination of Kenalog 40 mg, Bupivicaine 0.25% 2 ml, diluted with 3 ml of normal saline to a total injectate volume of 6 ml was injected into the epidural space in a slow and incremental fashion and the needle was restyletted and withdrawn.  All injected medications were preservative free.    The injection site was cleaned and a sterile dressing was applied.    The patient tolerated the procedure well without complications and was taken to the recovery room for continued observation.    Images were saved to PACS.    Post-procedure pain score: 5/10  Follow-up includes:   -f/u with referring provider     Issac Tapia MD  [facility] [place]

## 2022-12-01 NOTE — NURSING NOTE
Discharge Information    IV Discontiued Time:  NA    Amount of Fluid Infused:  NA    Discharge Criteria = When patient returns to baseline or as per MD order    Consciousness:  Pt is fully awake    Circulation:  BP +/- 20% of pre-procedure level    Respiration:  Patient is able to breathe deeply    O2 Sat:  Patient is able to maintain O2 Sat >92% on room air    Activity:  Moves 4 extremities on command    Ambulation:  Patient is able to stand and walk or stand and pivot into wheelchair    Dressing:  Clean/dry or No Dressing    Notes:   Discharge instructions and AVS given to patient    Patient meets criteria for discharge?  YES    Admitted to PCU?  No    Responsible adult present to accompany patient home?  Yes    Signature/Title:    Trav Guerra RN  RN Care Coordinator  [facility] [place]

## 2022-12-01 NOTE — PATIENT INSTRUCTIONS
[facility]   Procedure Discharge Instructions    Today you saw:     Dr. Issac Tapia    You had an:  Epidural steroid injection   -lumbar          Be cautious when walking. Numbness and/or weakness in the lower extremities may occur for up to 6-8 hours after the procedure due to effect of the local anesthetic  Do not drive for 6 hours. The effect of the local anesthetic could slow your reflexes.   You may resume your regular activities after 24 hours  Avoid strenuous activity for the first 24 hours  You may shower, however avoid swimming, tub baths or hot tubs for 24 hours following your procedure  You may have a mild to moderate increase in pain for several days following the injection.  It may take up to 14 days for the steroid medication to start working although you may feel the effect as early as a few days after the procedure.     You may use ice packs for 10-15 minutes, 3 to 4 times a day at the injection site for comfort  Do not use heat to painful areas for 6 to 8 hours. This will give the local anesthetic time to wear off and prevent you from accidentally burning your skin.   Unless you have been directed to avoid the use of anti-inflammatory medications (NSAIDS), you may use medications such as ibuprofen, Aleve or Tylenol for pain control if needed.   If you were fasting, you may resume your normal diet and medications after the procedure  If you have diabetes, check your blood sugar more frequently than usual as your blood sugar may be higher than normal for 10-14 days following a steroid injection. Contact your doctor who manages your diabetes if your blood sugar is higher than usual  Possible side effects of steroids that you may experience include flushing, elevated blood pressure, increased appetite, mild headaches and restlessness.  All of these symptoms will get better with time.  If you experience any of the following, call the Pain Clinic during work hours  (Mon-Friday 8-4:30 pm) at [phone] or the Provider Line after hours at [phone]:  -Fever over 100 degree F  -Swelling, bleeding, redness, drainage, warmth at the injection site  -Progressive weakness or numbness in your legs or arms  -Loss of bowel or bladder function  -Unusual headache that is not relieved by Tylenol or other pain reliever  -Unusual new onset of pain that is not improving

## 2022-12-01 NOTE — NURSING NOTE
Pre-procedure Intake  If YES to any questions or NO to having a   Please complete laminated checklist and leave on the computer keyboard for Provider, verbally inform provider if able.    For SCS Trial, RFA's or any sedation procedure:  Have you been fasting? NA    If yes, for how long? NA    Are you taking any any blood thinners such as Coumadin, Warfarin, Jantoven, Pradaxa Xarelto, Eliquis, Edoxaban, Enoxaparin, Lovenox, Heparin, Arixtra, Fondaparinux, or Fragmin? OR Antiplatelet medication such as Plavix, Brilinta, or Effient?   No     If yes, when did you take your last dose? NA    Do you take aspirin?  No    If cervical procedure, have you held aspirin for 6 days?   Yes    Do you have any allergies to contrast dye, iodine, steroid and/or numbing medications?  NO    Are you currently taking antibiotics or have an active infection?  NO    Have you had a fever/elevated temperature within the past week? NO    Are you currently taking oral steroids? NO    Do you have a ? Yes    Are you pregnant or breastfeeding?  Not Applicable    Have you received the COVID-19 vaccine? NA    If yes, was it your 1st, 2nd or only dose needed? NA    Date of most recent vaccine: NA    Notify provider and RNs if systolic BP >170, diastolic BP >100, P >100 or O2 sats < 90%      Stephania Orozco CMA (AAMA)

## 2022-12-07 ENCOUNTER — MYC REFILL (OUTPATIENT)
Dept: PALLIATIVE MEDICINE | Facility: CLINIC | Age: 57
End: 2022-12-07

## 2022-12-07 DIAGNOSIS — G89.3 CANCER ASSOCIATED PAIN: ICD-10-CM

## 2022-12-07 DIAGNOSIS — C61 MALIGNANT NEOPLASM OF PROSTATE (H): ICD-10-CM

## 2022-12-07 RX ORDER — MORPHINE SULFATE 15 MG/1
15 TABLET, FILM COATED, EXTENDED RELEASE ORAL EVERY 12 HOURS
Qty: 60 TABLET | Refills: 0 | Status: SHIPPED | OUTPATIENT
Start: 2022-12-07 | End: 2023-01-07

## 2022-12-07 NOTE — TELEPHONE ENCOUNTER
Chart reviewed - request appears appropriate. Refill approved.     Chanel Bruno DNP, APRN, AGNP-C  [facility]

## 2022-12-07 NOTE — TELEPHONE ENCOUNTER
Refills have been requested for the following medications:         morphine (MS CONTIN) 15 MG CR tablet [Chanel SHARMA]     [facility] [id number] [place] [facility] [place], [place] - [address] [facility]

## 2022-12-07 NOTE — TELEPHONE ENCOUNTER
Received call from patient requesting refill(s) of morphine (MS CONTIN) 15 MG CR tablet     Last dispensed from pharmacy on 11/12/2022    Patient's last office/virtual visit by prescribing provider on 12/01/2022  Next office/virtual appointment scheduled for 01/18/2023    Last urine drug screen date 10/06/2022  Current opioid agreement on file (completed within the last year) Yes Date of opioid agreement: 10/06/2022    E-prescribe to [facility] [id number] [place], [place] - [address] [facility]  pharmacy    Will route to nursing [place] for review and preparation of prescription(s).     Roseline Boyd MA  [facility] [place]

## 2022-12-07 NOTE — TELEPHONE ENCOUNTER
Medication refill information reviewed.     Last due:  11/12/22  Due date:  12/12/22      Prescriptions prepped for review.     MIKAL Meehan-BSN  [facility]

## 2022-12-12 DIAGNOSIS — C61 PROSTATE CANCER (H): Primary | ICD-10-CM

## 2023-01-01 DIAGNOSIS — C61 PROSTATE CANCER (H): ICD-10-CM

## 2023-01-02 NOTE — TELEPHONE ENCOUNTER
Last prescribing provider: Dr. Garcia    Last clinic visit date: 11/3/22 Dr. Garcia    Any missed appointments or no-shows since last clinic visit?: none    Recommendations for requested medication (if none, N/A): TAKE 4 TABLETS DAILY    Next clinic visit date: 2/2/23 Ania REID    Any other pertinent information (if none, N/A): N/A

## 2023-01-03 RX ORDER — APALUTAMIDE 60 MG/1
TABLET, FILM COATED ORAL
Qty: 120 TABLET | Refills: 0 | OUTPATIENT
Start: 2023-01-03

## 2023-01-06 ENCOUNTER — TELEPHONE (OUTPATIENT)
Dept: ONCOLOGY | Facility: CLINIC | Age: 58
End: 2023-01-06

## 2023-01-06 NOTE — TELEPHONE ENCOUNTER
Copay card Approved for Erleada  Effective Dates: 1/1/23-12/31/23  Patient notified? Yes  Additional Information: copay will be $0 a fill and will cover up to $15,000 a year.   Copay card giving to [facility] pharmacy to put on Bill's account.

## 2023-01-07 ENCOUNTER — MYC REFILL (OUTPATIENT)
Dept: PALLIATIVE MEDICINE | Facility: CLINIC | Age: 58
End: 2023-01-07

## 2023-01-07 DIAGNOSIS — C61 MALIGNANT NEOPLASM OF PROSTATE (H): ICD-10-CM

## 2023-01-07 DIAGNOSIS — G89.3 CANCER ASSOCIATED PAIN: ICD-10-CM

## 2023-01-09 NOTE — TELEPHONE ENCOUNTER
Received call from patient requesting refill(s) of  morphine (MS CONTIN) 15 MG CR tablet     Last dispensed from pharmacy on 12/11/22    Patient's last office/virtual visit by prescribing provider on 11/03/22  Next office/virtual appointment scheduled for 01/18/23    Last urine drug screen date 10/06/22  Current opioid agreement on file (completed within the last year) Yes Date of opioid agreement: 10/06/22    E-prescribe to pharmacy-[facility] [id number] [place] [facility] [place], [place] - [address] [facility]     Will route to nursing [place] for review and preparation of prescription(s).

## 2023-01-09 NOTE — TELEPHONE ENCOUNTER
Refills have been requested for the following medications:         morphine (MS CONTIN) 15 MG CR tablet [Chanel SHARMA]     [facility] [id number] [place] [facility] [place], [place] - [address] [facility]

## 2023-01-10 RX ORDER — MORPHINE SULFATE 15 MG/1
15 TABLET, FILM COATED, EXTENDED RELEASE ORAL EVERY 12 HOURS
Qty: 60 TABLET | Refills: 0 | Status: SHIPPED | OUTPATIENT
Start: 2023-01-10 | End: 2023-01-18

## 2023-01-10 NOTE — TELEPHONE ENCOUNTER
Medication refill information reviewed.     MS Contin last due:  Fill 12/10/22. Start 1212/22  Due date:  1/11/23      Prescriptions prepped for review.     Sheflai RN-BSN  [facility]

## 2023-01-10 NOTE — TELEPHONE ENCOUNTER
Notified pt of prescription for morphine (MS CONTIN) 15 MG CR tablet was signed and sent to [facility] [id number] [place] [facility] - [place], [place]   Fill now. Start 1/11/23.

## 2023-01-10 NOTE — TELEPHONE ENCOUNTER
Chart reviewed - request appears appropriate. Refilled.     Chanel Bruno DNP, APRN, AGNP-C  [facility]

## 2023-01-11 DIAGNOSIS — C61 PROSTATE CANCER (H): Primary | ICD-10-CM

## 2023-01-12 ASSESSMENT — ANXIETY QUESTIONNAIRES
2. NOT BEING ABLE TO STOP OR CONTROL WORRYING: NOT AT ALL
GAD7 TOTAL SCORE: 0
3. WORRYING TOO MUCH ABOUT DIFFERENT THINGS: NOT AT ALL
1. FEELING NERVOUS, ANXIOUS, OR ON EDGE: NOT AT ALL
IF YOU CHECKED OFF ANY PROBLEMS ON THIS QUESTIONNAIRE, HOW DIFFICULT HAVE THESE PROBLEMS MADE IT FOR YOU TO DO YOUR WORK, TAKE CARE OF THINGS AT HOME, OR GET ALONG WITH OTHER PEOPLE: NOT DIFFICULT AT ALL
6. BECOMING EASILY ANNOYED OR IRRITABLE: NOT AT ALL
8. IF YOU CHECKED OFF ANY PROBLEMS, HOW DIFFICULT HAVE THESE MADE IT FOR YOU TO DO YOUR WORK, TAKE CARE OF THINGS AT HOME, OR GET ALONG WITH OTHER PEOPLE?: NOT DIFFICULT AT ALL
5. BEING SO RESTLESS THAT IT IS HARD TO SIT STILL: NOT AT ALL
4. TROUBLE RELAXING: NOT AT ALL
7. FEELING AFRAID AS IF SOMETHING AWFUL MIGHT HAPPEN: NOT AT ALL
GAD7 TOTAL SCORE: 0
7. FEELING AFRAID AS IF SOMETHING AWFUL MIGHT HAPPEN: NOT AT ALL

## 2023-01-12 ASSESSMENT — PAIN SCALES - PAIN ENJOYMENT GENERAL ACTIVITY SCALE (PEG)
INTERFERED_ENJOYMENT_LIFE: 4
PEG_TOTALSCORE: 4.67
INTERFERED_GENERAL_ACTIVITY: 4
INTERFERED_GENERAL_ACTIVITY: 4
INTERFERED_ENJOYMENT_LIFE: 4
AVG_PAIN_PASTWEEK: 6
AVG_PAIN_PASTWEEK: 6
PEG_TOTALSCORE: 4.67

## 2023-01-18 ENCOUNTER — OFFICE VISIT (OUTPATIENT)
Dept: PALLIATIVE MEDICINE | Facility: CLINIC | Age: 58
End: 2023-01-18
Payer: COMMERCIAL

## 2023-01-18 VITALS — DIASTOLIC BLOOD PRESSURE: 86 MMHG | HEART RATE: 86 BPM | SYSTOLIC BLOOD PRESSURE: 144 MMHG

## 2023-01-18 DIAGNOSIS — C61 MALIGNANT NEOPLASM OF PROSTATE (H): ICD-10-CM

## 2023-01-18 DIAGNOSIS — C79.51 PROSTATE CANCER METASTATIC TO BONE (H): ICD-10-CM

## 2023-01-18 DIAGNOSIS — G89.3 CANCER ASSOCIATED PAIN: Primary | ICD-10-CM

## 2023-01-18 DIAGNOSIS — T40.2X5A THERAPEUTIC OPIOID INDUCED CONSTIPATION: ICD-10-CM

## 2023-01-18 DIAGNOSIS — C61 PROSTATE CANCER METASTATIC TO BONE (H): ICD-10-CM

## 2023-01-18 DIAGNOSIS — K59.03 THERAPEUTIC OPIOID INDUCED CONSTIPATION: ICD-10-CM

## 2023-01-18 PROCEDURE — 99214 OFFICE O/P EST MOD 30 MIN: CPT

## 2023-01-18 RX ORDER — SENNA AND DOCUSATE SODIUM 50; 8.6 MG/1; MG/1
1 TABLET, FILM COATED ORAL 2 TIMES DAILY
Qty: 60 TABLET | Refills: 3 | Status: SHIPPED | OUTPATIENT
Start: 2023-01-18 | End: 2023-11-09

## 2023-01-18 RX ORDER — HYDROCODONE BITARTRATE AND ACETAMINOPHEN 5; 325 MG/1; MG/1
1 TABLET ORAL 3 TIMES DAILY PRN
Qty: 30 TABLET | Refills: 0 | Status: SHIPPED | OUTPATIENT
Start: 2023-01-18 | End: 2023-02-13

## 2023-01-18 RX ORDER — MORPHINE SULFATE 15 MG/1
15 TABLET, FILM COATED, EXTENDED RELEASE ORAL EVERY 12 HOURS
Qty: 60 TABLET | Refills: 0 | Status: SHIPPED | OUTPATIENT
Start: 2023-01-18 | End: 2023-02-07

## 2023-01-18 ASSESSMENT — PAIN SCALES - GENERAL: PAINLEVEL: SEVERE PAIN (6)

## 2023-01-18 NOTE — PROGRESS NOTES
[facility]     Date of visit: 1/18/2023      Assessment:   Per Etienne is a 57 year old male with a past medical history significant for prostate cancer with bone metastasis, pulmonary nodules, lumbar stenosis, HTN, gout who presents with complaints of cancer associated pain.      1. Low back pain with LLE radic, neuropathy bilat feet, pain of multiple joints - His back pain with left sided radicular pain with L5 distribution that has been present for many years. He has been referred for procedure by his PCP for repeat L4/5 LUIS (Regina/Obed), which historically have given him significant symptom relief, although he did not appreciate as much benefit from the most recent LUIS on 8/2/22. Lumbar CT from 8/2021 demonstrated severe left neural foraminal narrowing at L4-L5 and moderate to severe right at L3-4, additional multilevel spinal canal stenosis and neural foraminal stenosis noted. I suspect his low back pain is associated with multiple factors, including underlying degenerative changes of lumbar spine, bone metastasis, and likely some myofascial component. Neuropathic symptoms in feet and generalized joint pain is likely secondary effects from cancer treatment and bone metastasis.   2. Mental Health - the patient's mental health concerns, specifically prostate cancer with bone mets, affect his experience of pain and contribute to his clinically significant distress. He may benefit from more generalized health psychology to support chronic illness/cancer management and will consider discussing at future follow up    Visit Diagnoses:  1. Cancer associated pain    2. Therapeutic opioid induced constipation    3. Prostate cancer metastatic to bone (H)    4. Malignant neoplasm of prostate (H)        Plan:  Diagnosis reviewed, treatment option addressed, and risk/benifits discussed.  Self-care instructions given.  I am recommending a multidisciplinary treatment plan to help this patient  better manage their pain.                  1.  Pain Physical Therapy:  WALT Thompson is very active. Despite the pain, he continues to work full time as a , which is somewhat labor intensive (frequently using ladder).               2.  Pain Psychologist to address relaxation, behavioral change, coping style, and other factors important to improvement.  WALT Thompson may possibly benefit from support for his chronic health conditions overall, though he does seem to manage fairly well. He continues to work full time and likes to stay active.               3.  Diagnostic Studies:  None               4.  Medication Management:   1. Continue MS Contin to 15 mg every 12 hours.   2. Continue hydrocodone 5-325 three times daily as needed for breakthrough pain. He does not use very frequently, usually only takes 1 tab daily as needed.   3. Increase Senna S to 1 tab twice daily.               5.  Potential procedures: He had LESI on 12/1/22 that was not as helpful as prior injections.               7.  Follow up with TERZEA Sanchez CNP in 8-10 weeks      Review of Electronic Chart: Today I have also reviewed available medical information in the patient's medical record at [facility] and Care Everywhere (if available), including relevant provider notes, laboratory work, and imaging.     Chanel Bruno DNP, TEREZA, AGNP-C  [facility]     -------------------------------------------------    Subjective:    Chief complaint:   Chief Complaint   Patient presents with     Pain       Interval history:  Per  HORTENCIA Georgesamreen is a 57 year old male last seen on 11/3/22.  They are a patient of mine seen in follow up.     Recommendations/plan at the last visit included:              1.  Pain Physical Therapy:  WALT Thompson is very active. Despite the pain, he continues to work full time as a , which is somewhat labor intensive (frequently using ladder).               2.  Pain  Psychologist to address relaxation, behavioral change, coping style, and other factors important to improvement.  WALT Thompson may possibly benefit from support for his chronic health conditions overall, though he does seem to manage fairly well. He continues to work full time and likes to stay active.               3.  Diagnostic Studies:  Reviewed imaging in chart.               4.  Medication Management:   4. Continue MS Contin to 15 mg every 12 hours.   5. Continue hydrocodone 5-325 three times daily as needed for breakthrough pain.   6. Start docusate sodium at bedtime to help with constipation. Let me know if you are still struggling after trying for 2 weeks. Also, increase water intake.               5.  Potential procedures: Last L4-5 LUIS was on 8/2/22. He appreciates 85-90% improvement in pain symptoms in legs that lasts 3-9 months. I am ordering repeat injection, and you will be contacted to schedule.               7.  Follow up with TEREZA Sanchez CNP in 2 weeks after your procedure    Since his last visit, Per Etienne reports:  -His pain is about the same as it was at last visit.   -He had LESI with Dr. Tapia on 12/1/22.  -He does not think it was as helpful as prior LESI.   -He is taking MS Contin 15 mg BID, has PRN Norco for breakthrough pain (does not use often).  -He has needed to use hydrocodone a bit more since last visit.   -He thinks pain is reasonably well controlled right now.   -He is taking Senna S daily at bedtime, has BM every other day, sometimes harder stool.   -He is open to increasing to BID frequency.   -His boss verbalized concerns about driving with medications, patient does not share similar concerns, denies side effects.   -He is waiting to hear about [employer] next year, will be involved with the project if [employer] wins.   -He is going to consider retiring sometime in the next year.       Interval history from last visit on 11/3/22:  -He had shingles since last  visit  -Pain has been increased  -He is also been experiencing constipation after increasing MS Contin 15mg to BID dosing.   -His grandson, Juwan Del Castillo, was born on 10/20/22.   -He will be getting shingles injection soon.  -He has been able to be more acvtive with long acting opioid  -[place] 2 miles per day, lost 8 pounds, able to do job better now that pain is under better control.       Pain Information:   Pain rating: averages 6/10 on a 0-10 scale.      Current Pain Treatments:  Medications:      MS Contin 15 mg BID   Hydrocodone 5-325 mg TID PRN     Other:      LESI on 12/1/22 with Dr. Tapia     Current MME: 44     Review of [place] Prescription Monitoring Program (): No concern for abuse or misuse of controlled medications based on this report.      Annual Controlled Substance Agreement/UDS due date: Completed on 10/6/22     Past pain treatments:  LESI - helpful     Medications:  Current Outpatient Medications   Medication Sig Dispense Refill     apalutamide (ERLEADA) 60 MG tablet Take 4 tablets (240 mg) by mouth daily for 30 days 120 tablet 0     aspirin (ASA) 81 MG chewable tablet Take 1 tablet (81 mg) by mouth daily       denosumab (XGEVA) 120 MG/1.7ML SOLN injection Inject 1.7 mLs (120 mg) Subcutaneous every 3 months 1.7 mL 0     ERLEADA 60 MG tablet TAKE 4 TABLETS DAILY 120 tablet 0     HYDROcodone-acetaminophen (NORCO) 5-325 MG tablet Take 1 tablet by mouth 3 times daily as needed for breakthrough pain or severe pain (7-10) 30 tablet 0     leuprolide (ELIGARD) 45 MG kit Inject 45 mg Subcutaneous every 6 months       lisinopril (ZESTRIL) 10 MG tablet Take 1 tablet (10 mg) by mouth daily 90 tablet 3     morphine (MS CONTIN) 15 MG CR tablet Take 1 tablet (15 mg) by mouth every 12 hours May fill 2/8/23, start 2/10/23. 60 tablet 0     omeprazole (PRILOSEC) 20 MG DR capsule TAKE 1 CAPSULE BY MOUTH TWICE A  capsule 1     SENNA-docusate sodium (SENNA S) 8.6-50 MG tablet Take 1 tablet by mouth 2  times daily 60 tablet 3     UNABLE TO FIND 1 tablet daily MEDICATION NAME: C, D, Zinc combination supplement       naloxone (NARCAN) 4 MG/0.1ML nasal spray Spray 1 spray (4 mg) into one nostril alternating nostrils as needed for opioid reversal every 2-3 minutes until assistance arrives (Patient not taking: Reported on 1/18/2023) 0.2 mL 0     predniSONE (DELTASONE) 20 MG tablet Take 3 tabs by mouth daily x 3 days, then 2 tabs daily x 3 days, then 1 tab daily x 3 days, then 1/2 tab daily x 3 days. (Patient not taking: Reported on 1/18/2023) 20 tablet 0     sildenafil (REVATIO) 20 MG tablet Take 1 to 5 tabs 30-60 minutes before intercourse.  Never use with nitroglycerin, terazosin or doxazosin. (Patient not taking: Reported on 11/3/2022) 30 tablet 11     valACYclovir (VALTREX) 1000 mg tablet Take 1 tablet (1,000 mg) by mouth 3 times daily for 7 days 21 tablet 0       Medical History: any changes in medical history since they were last seen? No      Objective:    Physical Exam:  Blood pressure (!) 144/86, pulse 86.  Constitutional: Well developed, well nourished, appears stated age.  Gait: WNL  HEENT: Head atraumatic, normocephalic. Eyes without conjunctival injection or jaundice. Oropharynx clear. Neck supple. No obvious neck masses.  Skin: No rash, lesions, or petechiae of exposed skin.   Psychiatric/mental status: Alert, without lethargy or stupor. Speech fluent. Appropriate affect. Mood normal. Able to follow commands without difficulty.     Imaging:  None     BILLING TIME DOCUMENTATION:   The total TIME spent on this patient on the date of the encounter/appointment was 30 minutes.      TOTAL TIME includes:   Time spent preparing to see the patient (reviewing records and tests)   Time spent face to face (or over the phone) with the patient   Time spent ordering tests, medications, procedures and referrals   Time spent Referring and communicating with other healthcare professionals   Time spent documenting clinical  information in Epic

## 2023-01-18 NOTE — PATIENT INSTRUCTIONS
1.  Pain Physical Therapy:  WALT Thompson is very active. Despite the pain, he continues to work full time as a , which is somewhat labor intensive (frequently using ladder).               2.  Pain Psychologist to address relaxation, behavioral change, coping style, and other factors important to improvement.  WALT Thompson may possibly benefit from support for his chronic health conditions overall, though he does seem to manage fairly well. He continues to work full time and likes to stay active.               3.  Diagnostic Studies:  None               4.  Medication Management:   Continue MS Contin to 15 mg every 12 hours.   Continue hydrocodone 5-325 three times daily as needed for breakthrough pain. He does not use very frequently, usually only takes 1 tab daily as needed.   Increase Senna S to 1 tab twice daily.               5.  Potential procedures: He had LESI on 12/1/22 that was not as helpful as prior injections.               7.  Follow up with TEREZA Sanchez CNP in 8-10 weeks    ----------------------------------------------------------------  Clinic Number:  [phone]   Call with any questions about your care and for scheduling assistance.   Calls are returned Monday through Friday between 8 AM and 4:30 PM. We usually get back to you within 2 business days depending on the issue/request.    If we are prescribing your medications:  For opioid medication refills, call the clinic or send a J-Kan message 7 days in advance.  Please include:  Name of requested medication  Name of the pharmacy.  For non-opioid medications, call your pharmacy directly to request a refill. Please allow 3-4 days to be processed.   Per [place] State Law:  All controlled substance prescriptions must be filled within 30 days of being written.    For those controlled substances allowing refills, pickup must occur within 30 days of last fill.      We believe regular attendance is key to your success in our  program!    Any time you are unable to keep your appointment we ask that you call us at least 24 hours in advance to cancel.This will allow us to offer the appointment time to another patient.   Multiple missed appointments may lead to dismissal from the clinic.

## 2023-01-31 ENCOUNTER — LAB (OUTPATIENT)
Dept: LAB | Facility: CLINIC | Age: 58
End: 2023-01-31
Payer: COMMERCIAL

## 2023-01-31 DIAGNOSIS — C61 PROSTATE CANCER (H): ICD-10-CM

## 2023-01-31 DIAGNOSIS — C79.51 BONE METASTASIS: ICD-10-CM

## 2023-01-31 DIAGNOSIS — M79.606 PAIN OF LOWER EXTREMITY, UNSPECIFIED LATERALITY: ICD-10-CM

## 2023-01-31 LAB
ALBUMIN SERPL-MCNC: 3.9 G/DL (ref 3.4–5)
ALP SERPL-CCNC: 67 U/L (ref 40–150)
ALT SERPL W P-5'-P-CCNC: 35 U/L (ref 0–70)
ANION GAP SERPL CALCULATED.3IONS-SCNC: 4 MMOL/L (ref 3–14)
AST SERPL W P-5'-P-CCNC: 20 U/L (ref 0–45)
BASOPHILS # BLD AUTO: 0 10E3/UL (ref 0–0.2)
BASOPHILS NFR BLD AUTO: 1 %
BILIRUB SERPL-MCNC: 0.3 MG/DL (ref 0.2–1.3)
BUN SERPL-MCNC: 17 MG/DL (ref 7–30)
CALCIUM SERPL-MCNC: 9.2 MG/DL (ref 8.5–10.1)
CHLORIDE BLD-SCNC: 109 MMOL/L (ref 94–109)
CO2 SERPL-SCNC: 30 MMOL/L (ref 20–32)
CREAT SERPL-MCNC: 1 MG/DL (ref 0.66–1.25)
EOSINOPHIL # BLD AUTO: 0.1 10E3/UL (ref 0–0.7)
EOSINOPHIL NFR BLD AUTO: 1 %
ERYTHROCYTE [DISTWIDTH] IN BLOOD BY AUTOMATED COUNT: 11.7 % (ref 10–15)
GFR SERPL CREATININE-BSD FRML MDRD: 87 ML/MIN/1.73M2
GLUCOSE BLD-MCNC: 110 MG/DL (ref 70–99)
HCT VFR BLD AUTO: 40.3 % (ref 40–53)
HGB BLD-MCNC: 13.6 G/DL (ref 13.3–17.7)
LYMPHOCYTES # BLD AUTO: 1.9 10E3/UL (ref 0.8–5.3)
LYMPHOCYTES NFR BLD AUTO: 30 %
MCH RBC QN AUTO: 32.2 PG (ref 26.5–33)
MCHC RBC AUTO-ENTMCNC: 33.7 G/DL (ref 31.5–36.5)
MCV RBC AUTO: 95 FL (ref 78–100)
MONOCYTES # BLD AUTO: 0.7 10E3/UL (ref 0–1.3)
MONOCYTES NFR BLD AUTO: 11 %
NEUTROPHILS # BLD AUTO: 3.5 10E3/UL (ref 1.6–8.3)
NEUTROPHILS NFR BLD AUTO: 56 %
PLATELET # BLD AUTO: 246 10E3/UL (ref 150–450)
POTASSIUM BLD-SCNC: 4.3 MMOL/L (ref 3.4–5.3)
PROT SERPL-MCNC: 7 G/DL (ref 6.8–8.8)
PSA SERPL-MCNC: <0.01 UG/L (ref 0–4)
RBC # BLD AUTO: 4.23 10E6/UL (ref 4.4–5.9)
SODIUM SERPL-SCNC: 143 MMOL/L (ref 133–144)
WBC # BLD AUTO: 6.3 10E3/UL (ref 4–11)

## 2023-01-31 PROCEDURE — 36415 COLL VENOUS BLD VENIPUNCTURE: CPT

## 2023-01-31 PROCEDURE — 84403 ASSAY OF TOTAL TESTOSTERONE: CPT

## 2023-01-31 PROCEDURE — 80053 COMPREHEN METABOLIC PANEL: CPT

## 2023-01-31 PROCEDURE — 85025 COMPLETE CBC W/AUTO DIFF WBC: CPT

## 2023-01-31 PROCEDURE — 84153 ASSAY OF PSA TOTAL: CPT

## 2023-02-02 ENCOUNTER — ONCOLOGY VISIT (OUTPATIENT)
Dept: ONCOLOGY | Facility: CLINIC | Age: 58
End: 2023-02-02
Attending: INTERNAL MEDICINE
Payer: COMMERCIAL

## 2023-02-02 ENCOUNTER — INFUSION THERAPY VISIT (OUTPATIENT)
Dept: INFUSION THERAPY | Facility: CLINIC | Age: 58
End: 2023-02-02
Attending: INTERNAL MEDICINE
Payer: COMMERCIAL

## 2023-02-02 VITALS
WEIGHT: 187.7 LBS | SYSTOLIC BLOOD PRESSURE: 136 MMHG | HEART RATE: 81 BPM | OXYGEN SATURATION: 98 % | RESPIRATION RATE: 16 BRPM | DIASTOLIC BLOOD PRESSURE: 78 MMHG | TEMPERATURE: 97.7 F | BODY MASS INDEX: 27.72 KG/M2

## 2023-02-02 DIAGNOSIS — C79.51 PROSTATE CANCER METASTATIC TO BONE (H): ICD-10-CM

## 2023-02-02 DIAGNOSIS — R91.8 PULMONARY NODULES: Primary | ICD-10-CM

## 2023-02-02 DIAGNOSIS — C61 PROSTATE CANCER METASTATIC TO BONE (H): ICD-10-CM

## 2023-02-02 DIAGNOSIS — C79.51 BONE METASTASIS: Primary | ICD-10-CM

## 2023-02-02 DIAGNOSIS — C61 PROSTATE CANCER (H): ICD-10-CM

## 2023-02-02 LAB — TESTOST SERPL-MCNC: 5 NG/DL (ref 240–950)

## 2023-02-02 PROCEDURE — 99207 PR NO CHARGE LOS: CPT

## 2023-02-02 PROCEDURE — 96372 THER/PROPH/DIAG INJ SC/IM: CPT | Mod: 59 | Performed by: NURSE PRACTITIONER

## 2023-02-02 PROCEDURE — 99214 OFFICE O/P EST MOD 30 MIN: CPT | Mod: 25 | Performed by: NURSE PRACTITIONER

## 2023-02-02 PROCEDURE — 96402 CHEMO HORMON ANTINEOPL SQ/IM: CPT | Performed by: NURSE PRACTITIONER

## 2023-02-02 NOTE — PROGRESS NOTES
Infusion Nursing Note:  Per Etienne presents today for   Chief Complaint   Patient presents with     Oncology Clinic Visit     Bone metastasis (H)     Imm/Inj     Grant Ashford     .    Patient seen by provider today: Yes: Ania Jensen   present during visit today: Not Applicable.      Intravenous Access:  No Intravenous access/labs at this visit.    Treatment Conditions:  Not Applicable.    Post Infusion Assessment:  Patient tolerated injection without incident.     Discharge Plan:   Patient discharged in stable condition accompanied by: self.  Departure Mode: Ambulatory.        Dariana Calvillo LPN February 2, 2023 2:15 PM

## 2023-02-02 NOTE — PROGRESS NOTES
Injections given by Dariana Calvillo LPN and charted in another encounter.     Caprice Manley RN-BSN, PHN, OCN  [facility]

## 2023-02-02 NOTE — NURSING NOTE
"Oncology Rooming Note    February 2, 2023 1:22 PM   Per Etienne is a 57 year old male who presents for:    Chief Complaint   Patient presents with     Oncology Clinic Visit     Bone metastasis (H)     Initial Vitals: /78 (BP Location: Right arm, Patient Position: Sitting, Cuff Size: Adult Regular)   Pulse 81   Temp 97.7  F (36.5  C) (Oral)   Resp 16   Wt 85.1 kg (187 lb 11.2 oz)   SpO2 98%   BMI 27.72 kg/m   Estimated body mass index is 27.72 kg/m  as calculated from the following:    Height as of 11/3/22: 1.753 m (5' 9\").    Weight as of this encounter: 85.1 kg (187 lb 11.2 oz). Body surface area is 2.04 meters squared.  Data Unavailable Comment: Data Unavailable   No LMP for male patient.  Allergies reviewed: Yes  Medications reviewed: Yes    Medications: Medication refills not needed today.  Pharmacy [facility] entered into [employer]:    [id number] [place] [place] [facility] [address] [place], [place] - [address] [place] [address]    Clinical concerns: Patient states that his nausea has resolved after taking pain medications fr three weeks now. No major concerns  Or changes reported. Patient expecting injections prior to discharge.        Dariana Calvillo LPN February 2, 2023 1:23 PM              "

## 2023-02-02 NOTE — LETTER
2/2/2023         RE: Per Etienne  [id number] [facility] [id number]        Dear Colleague,    Thank you for referring your patient, Per Etienne, to the [facility]. Please see a copy of my visit note below.    Oncology Follow Up Visit: February 2, 2023     Oncologist: Dr Garcia  PCP: Per Chen    Diagnosis: Metastatic hormone sensitive prostate cancer  Per Etienne is a 58 yo male who presented to his PCP in December 2019 with slow urinary stream.  PSA = 124. On 1/8/2020 CT abdomen pelvis showed groundglass opacity in the left lower lobe,  dystrophic calcification of the prostate gland, and a fatty liver.  Same-day bone scan was negative. On January 10, 2010 prostate biopsy showed on the left Kulm score 4+3 involving 7 cores and on the right Robyn score 3+4 involving 6 cores, with positive perineural invasion.  He proceeded to undergo RRP by Dr. Colmenares on 2/24/2020.  Pathology revealed acinar adenocarcinoma Kulm 4+3 with focal ROBERTO, bladder neck base invasion, positive SVI, positive PNI, and multiple positive margins.  There was no LVSI and 0 out of 3 lymph nodes removed were involved with tumor; pT3bN0.  Treatment:   4/20/2020 received Eligard.     5/21/2020 MRI of the Prostate= 14 x 9 mm T2 intermediate structure in the prostatectomy bed at the left aspect of urinary bladder neck with restricted diffusion in the DWI images and early enhancement in the contrast  enhanced images. This was concerning for locally recurrent/residual prostatic cancer.  There was a T2 hypointense structure in the in the area of removed right seminal vesicle measuring 13 x 11 mm  demonstrating restricted  diffusion in the DWI images. This may represent a recurrent/residual tumor. There were 2 bone metastases in the left pubic inferior ramus and inferior aspect of left pubic body.There is a 7 mm suspicious lymph nodes in the right obturator area  corresponding to to  FACBC avid lymph node on the same date PET/CT  5/20/2020 PET/CT showed:  1.  2 foci of increased uptake in the left ischium, with underlying  sclerotic lesion, and symphysis pubis, these changes are indicative of  active prostate cancer metastasis.  2. Low-level increased uptake in 3 lymphnodes along the right external  and internal iliac nodes, the most suspicious of which is the deep  obturator.  3. Low-level increased uptake in the prostatectomy bed slightly to the  left side of the urethra, if clinically necessary, endoscopy would  would be necessary for confirmation of this being metastasis.  PSA was down to 27.7 on 4/21/2020.   4/20/2020 he received an Eligard injection.    6/12/2020 started Apalutamide(Erleada)  Bone scan 1/22/2021: no bone mets.    Interval History: Mr. Etienne and wife are seen today with for review for continuation of Apalutamide / Leuprolide/ Xgeva. Pt reports he is feeling well today with no new issues. Continues to work with pain clinic for pain in pelvis at site of metastasis in pelvis- reports he has had some difficulty with change in meds but is now on MS Contin and this is working well. Has hiatal hernia so continues with omeprazole with good results as well. He is eating well and stays active with job as .   Rest of comprehensive and complete ROS is reviewed and is negative.   Past Medical History:   Diagnosis Date     Gout      Hypertension goal BP (blood pressure) < 140/90 08/01/2022     Lumbar stenosis      Prostate cancer (H) 01/10/2020     Current Outpatient Medications   Medication     apalutamide (ERLEADA) 60 MG tablet     aspirin (ASA) 81 MG chewable tablet     denosumab (XGEVA) 120 MG/1.7ML SOLN injection     ERLEADA 60 MG tablet     HYDROcodone-acetaminophen (NORCO) 5-325 MG tablet     leuprolide (ELIGARD) 45 MG kit     lisinopril (ZESTRIL) 10 MG tablet     morphine (MS CONTIN) 15 MG CR tablet     omeprazole (PRILOSEC) 20 MG DR capsule     SENNA-docusate  sodium (SENNA S) 8.6-50 MG tablet     UNABLE TO FIND     naloxone (NARCAN) 4 MG/0.1ML nasal spray     predniSONE (DELTASONE) 20 MG tablet     valACYclovir (VALTREX) 1000 mg tablet     No current facility-administered medications for this visit.     No Known Allergies    Physical Exam:/78 (BP Location: Right arm, Patient Position: Sitting, Cuff Size: Adult Regular)   Pulse 81   Temp 97.7  F (36.5  C) (Oral)   Resp 16   Wt 85.1 kg (187 lb 11.2 oz)   SpO2 98%   BMI 27.72 kg/m    Constitutional: Alert, healthy, and in no distress.   ENT: Eyes bright, No mouth sores  Neck: Supple, No adenopathy.  Cardiac: Heart rate and rhythm is regular with normal S1 and S2 without murmur  Respiratory: Breathing easy. Lung sounds clear to auscultation  Abdomen: Soft, non-tender, BS normal. No masses or organomegaly  MS: Muscle tone normal- moving well on own without signs of pain, extremities normal with no edema.   Skin: No suspicious lesions or rashes  Neuro: Sensory grossly WNL, gait normal.   Lymph: Normal ant/post cervical, axillary, supraclavicular nodes  Psych: Mentation appears normal and affect normal/bright with good conversation.    Laboratory Results:    Latest Reference Range & Units 01/31/23 15:19   Sodium 133 - 144 mmol/L 143   Potassium 3.4 - 5.3 mmol/L 4.3   Chloride 94 - 109 mmol/L 109   Carbon Dioxide 20 - 32 mmol/L 30   Urea Nitrogen 7 - 30 mg/dL 17   Creatinine 0.66 - 1.25 mg/dL 1.00   GFR Estimate >60 mL/min/1.73m2 87   Calcium 8.5 - 10.1 mg/dL 9.2   Anion Gap 3 - 14 mmol/L 4   Albumin 3.4 - 5.0 g/dL 3.9   Protein Total 6.8 - 8.8 g/dL 7.0   Alkaline Phosphatase 40 - 150 U/L 67   ALT 0 - 70 U/L 35   AST 0 - 45 U/L 20   Bilirubin Total 0.2 - 1.3 mg/dL 0.3   PSA 0.00 - 4.00 ug/L <0.01   Testosterone Total 240 - 950 ng/dL 5 (L)   Glucose 70 - 99 mg/dL 110 (H)   WBC 4.0 - 11.0 10e3/uL 6.3   Hemoglobin 13.3 - 17.7 g/dL 13.6   Hematocrit 40.0 - 53.0 % 40.3   Platelet Count 150 - 450 10e3/uL 246   RBC Count  4.40 - 5.90 10e6/uL 4.23 (L)   MCV 78 - 100 fL 95   MCH 26.5 - 33.0 pg 32.2   MCHC 31.5 - 36.5 g/dL 33.7   RDW 10.0 - 15.0 % 11.7   % Neutrophils % 56   % Lymphocytes % 30   % Monocytes % 11   % Eosinophils % 1   % Basophils % 1   Absolute Basophils 0.0 - 0.2 10e3/uL 0.0   Absolute Eosinophils 0.0 - 0.7 10e3/uL 0.1   Absolute Lymphocytes 0.8 - 5.3 10e3/uL 1.9   Absolute Monocytes 0.0 - 1.3 10e3/uL 0.7   Absolute Neutrophils 1.6 - 8.3 10e3/uL 3.5   (L): Data is abnormally low  (H): Data is abnormally high    Assessment and Plan:   Metastatic hormone sensitive prostate cancer with bone metastasis-patient is tolerating plan well with no new side effects noted - though has occasional nausea but may be related to pain medication as well.  Review, labs and exam showing good control of cancer.    We will continue with apalutamide 240 mg daily p.o. and should receive Lupron and Xgeva will be given today-To be repeated every 3 months.   He will return in 3 months with labs(CBC differential, CMP, total testosterone level and PSA ), visit and Lupron/Xgeva administration  Pt takes much pain medication for the pelvic bone mets and does not feelnew pains.  Though PSA and testosterone is normal offered Bone scan for review.   Lung cancer screening/ Pulmonary nodule- has 30 pack year smoking history-  quit in 2012.  Last imaging was 1/2022 with recommended annual screening with noted previous infiltrates and nodule noted.Ordered and will be set up soon.  The total time of this encounter amounted to 30 minutes. This time included face to face time spent with the patient and wife, prep work, ordering tests, and performing post visit documentation.  Ania Jensen Cnp          Again, thank you for allowing me to participate in the care of your patient.        Sincerely,        Ania Jensen, NP, APRN CNP

## 2023-02-02 NOTE — PROGRESS NOTES
Oncology Follow Up Visit: February 2, 2023     Oncologist: Dr Garcia  PCP: Per Chen    Diagnosis: Metastatic hormone sensitive prostate cancer  Per Etienne is a 58 yo male who presented to his PCP in December 2019 with slow urinary stream.  PSA = 124. On 1/8/2020 CT abdomen pelvis showed groundglass opacity in the left lower lobe,  dystrophic calcification of the prostate gland, and a fatty liver.  Same-day bone scan was negative. On January 10, 2010 prostate biopsy showed on the left Robyn score 4+3 involving 7 cores and on the right Robyn score 3+4 involving 6 cores, with positive perineural invasion.  He proceeded to undergo RRP by Dr. Colmenares on 2/24/2020.  Pathology revealed acinar adenocarcinoma Robyn 4+3 with focal ROBERTO, bladder neck base invasion, positive SVI, positive PNI, and multiple positive margins.  There was no LVSI and 0 out of 3 lymph nodes removed were involved with tumor; pT3bN0.  Treatment:   4/20/2020 received Eligard.     5/21/2020 MRI of the Prostate= 14 x 9 mm T2 intermediate structure in the prostatectomy bed at the left aspect of urinary bladder neck with restricted diffusion in the DWI images and early enhancement in the contrast  enhanced images. This was concerning for locally recurrent/residual prostatic cancer.  There was a T2 hypointense structure in the in the area of removed right seminal vesicle measuring 13 x 11 mm  demonstrating restricted  diffusion in the DWI images. This may represent a recurrent/residual tumor. There were 2 bone metastases in the left pubic inferior ramus and inferior aspect of left pubic body.There is a 7 mm suspicious lymph nodes in the right obturator area  corresponding to to FACBC avid lymph node on the same date PET/CT  5/20/2020 PET/CT showed:  1.  2 foci of increased uptake in the left ischium, with underlying  sclerotic lesion, and symphysis pubis, these changes are indicative of  active prostate cancer metastasis.  2. Low-level  increased uptake in 3 lymphnodes along the right external  and internal iliac nodes, the most suspicious of which is the deep  obturator.  3. Low-level increased uptake in the prostatectomy bed slightly to the  left side of the urethra, if clinically necessary, endoscopy would  would be necessary for confirmation of this being metastasis.  PSA was down to 27.7 on 4/21/2020.   4/20/2020 he received an Eligard injection.    6/12/2020 started Apalutamide(Erleada)  Bone scan 1/22/2021: no bone mets.    Interval History: Mr. Etienne and wife are seen today with for review for continuation of Apalutamide / Leuprolide/ Xgeva. Pt reports he is feeling well today with no new issues. Continues to work with pain clinic for pain in pelvis at site of metastasis in pelvis- reports he has had some difficulty with change in meds but is now on MS Contin and this is working well. Has hiatal hernia so continues with omeprazole with good results as well. He is eating well and stays active with job as .   Rest of comprehensive and complete ROS is reviewed and is negative.   Past Medical History:   Diagnosis Date     Gout      Hypertension goal BP (blood pressure) < 140/90 08/01/2022     Lumbar stenosis      Prostate cancer (H) 01/10/2020     Current Outpatient Medications   Medication     apalutamide (ERLEADA) 60 MG tablet     aspirin (ASA) 81 MG chewable tablet     denosumab (XGEVA) 120 MG/1.7ML SOLN injection     ERLEADA 60 MG tablet     HYDROcodone-acetaminophen (NORCO) 5-325 MG tablet     leuprolide (ELIGARD) 45 MG kit     lisinopril (ZESTRIL) 10 MG tablet     morphine (MS CONTIN) 15 MG CR tablet     omeprazole (PRILOSEC) 20 MG DR capsule     SENNA-docusate sodium (SENNA S) 8.6-50 MG tablet     UNABLE TO FIND     naloxone (NARCAN) 4 MG/0.1ML nasal spray     predniSONE (DELTASONE) 20 MG tablet     valACYclovir (VALTREX) 1000 mg tablet     No current facility-administered medications for this visit.     No Known  Allergies    Physical Exam:/78 (BP Location: Right arm, Patient Position: Sitting, Cuff Size: Adult Regular)   Pulse 81   Temp 97.7  F (36.5  C) (Oral)   Resp 16   Wt 85.1 kg (187 lb 11.2 oz)   SpO2 98%   BMI 27.72 kg/m    Constitutional: Alert, healthy, and in no distress.   ENT: Eyes bright, No mouth sores  Neck: Supple, No adenopathy.  Cardiac: Heart rate and rhythm is regular with normal S1 and S2 without murmur  Respiratory: Breathing easy. Lung sounds clear to auscultation  Abdomen: Soft, non-tender, BS normal. No masses or organomegaly  MS: Muscle tone normal- moving well on own without signs of pain, extremities normal with no edema.   Skin: No suspicious lesions or rashes  Neuro: Sensory grossly WNL, gait normal.   Lymph: Normal ant/post cervical, axillary, supraclavicular nodes  Psych: Mentation appears normal and affect normal/bright with good conversation.    Laboratory Results:    Latest Reference Range & Units 01/31/23 15:19   Sodium 133 - 144 mmol/L 143   Potassium 3.4 - 5.3 mmol/L 4.3   Chloride 94 - 109 mmol/L 109   Carbon Dioxide 20 - 32 mmol/L 30   Urea Nitrogen 7 - 30 mg/dL 17   Creatinine 0.66 - 1.25 mg/dL 1.00   GFR Estimate >60 mL/min/1.73m2 87   Calcium 8.5 - 10.1 mg/dL 9.2   Anion Gap 3 - 14 mmol/L 4   Albumin 3.4 - 5.0 g/dL 3.9   Protein Total 6.8 - 8.8 g/dL 7.0   Alkaline Phosphatase 40 - 150 U/L 67   ALT 0 - 70 U/L 35   AST 0 - 45 U/L 20   Bilirubin Total 0.2 - 1.3 mg/dL 0.3   PSA 0.00 - 4.00 ug/L <0.01   Testosterone Total 240 - 950 ng/dL 5 (L)   Glucose 70 - 99 mg/dL 110 (H)   WBC 4.0 - 11.0 10e3/uL 6.3   Hemoglobin 13.3 - 17.7 g/dL 13.6   Hematocrit 40.0 - 53.0 % 40.3   Platelet Count 150 - 450 10e3/uL 246   RBC Count 4.40 - 5.90 10e6/uL 4.23 (L)   MCV 78 - 100 fL 95   MCH 26.5 - 33.0 pg 32.2   MCHC 31.5 - 36.5 g/dL 33.7   RDW 10.0 - 15.0 % 11.7   % Neutrophils % 56   % Lymphocytes % 30   % Monocytes % 11   % Eosinophils % 1   % Basophils % 1   Absolute Basophils 0.0 - 0.2  10e3/uL 0.0   Absolute Eosinophils 0.0 - 0.7 10e3/uL 0.1   Absolute Lymphocytes 0.8 - 5.3 10e3/uL 1.9   Absolute Monocytes 0.0 - 1.3 10e3/uL 0.7   Absolute Neutrophils 1.6 - 8.3 10e3/uL 3.5   (L): Data is abnormally low  (H): Data is abnormally high    Assessment and Plan:   Metastatic hormone sensitive prostate cancer with bone metastasis-patient is tolerating plan well with no new side effects noted - though has occasional nausea but may be related to pain medication as well.  Review, labs and exam showing good control of cancer.    We will continue with apalutamide 240 mg daily p.o. and should receive Lupron and Xgeva will be given today-To be repeated every 3 months.   He will return in 3 months with labs(CBC differential, CMP, total testosterone level and PSA ), visit and Lupron/Xgeva administration  Pt takes much pain medication for the pelvic bone mets and does not feelnew pains.  Though PSA and testosterone is normal offered Bone scan for review.   Lung cancer screening/ Pulmonary nodule- has 30 pack year smoking history-  quit in 2012.  Last imaging was 1/2022 with recommended annual screening with noted previous infiltrates and nodule noted.Ordered and will be set up soon.  The total time of this encounter amounted to 30 minutes. This time included face to face time spent with the patient and wife, prep work, ordering tests, and performing post visit documentation.  Ania Jensen,Cnp

## 2023-02-07 ENCOUNTER — MYC REFILL (OUTPATIENT)
Dept: PALLIATIVE MEDICINE | Facility: CLINIC | Age: 58
End: 2023-02-07
Payer: COMMERCIAL

## 2023-02-07 DIAGNOSIS — G89.3 CANCER ASSOCIATED PAIN: ICD-10-CM

## 2023-02-07 DIAGNOSIS — C61 MALIGNANT NEOPLASM OF PROSTATE (H): ICD-10-CM

## 2023-02-07 DIAGNOSIS — C61 PROSTATE CANCER (H): ICD-10-CM

## 2023-02-07 RX ORDER — APALUTAMIDE 60 MG/1
TABLET, FILM COATED ORAL
Qty: 120 TABLET | Refills: 0 | OUTPATIENT
Start: 2023-02-07

## 2023-02-07 NOTE — TELEPHONE ENCOUNTER
Refills have been requested for the following medications:         morphine (MS CONTIN) 15 MG CR tablet [Chanel SHARMA]     [id number] [place] [facility] [place], [place] - [address] [facility]

## 2023-02-07 NOTE — TELEPHONE ENCOUNTER
Received call from patient requesting refill(s) of morphine (MS CONTIN) 15 MG CR tablet     Last dispensed from pharmacy on 01.12.2023    Patient's last office/virtual visit by prescribing provider on 01.18.2023  Next office/virtual appointment scheduled for 03.29.2023    UDT/CSA 10.26.2022    E-prescribe to    [facility] [id number] [place] [facility] - [place], [place] - [address] [facility] [place] [place] - [address] [place] [address],pharmacy    Will route to [facility] for review and preparation of prescription(s).

## 2023-02-09 ENCOUNTER — ANCILLARY PROCEDURE (OUTPATIENT)
Dept: CT IMAGING | Facility: CLINIC | Age: 58
End: 2023-02-09
Attending: NURSE PRACTITIONER
Payer: COMMERCIAL

## 2023-02-09 ENCOUNTER — ANCILLARY PROCEDURE (OUTPATIENT)
Dept: GENERAL RADIOLOGY | Facility: CLINIC | Age: 58
End: 2023-02-09
Attending: NURSE PRACTITIONER
Payer: COMMERCIAL

## 2023-02-09 DIAGNOSIS — C61 PROSTATE CANCER (H): ICD-10-CM

## 2023-02-09 DIAGNOSIS — C61 PROSTATE CANCER METASTATIC TO BONE (H): ICD-10-CM

## 2023-02-09 DIAGNOSIS — C79.51 PROSTATE CANCER METASTATIC TO BONE (H): ICD-10-CM

## 2023-02-09 DIAGNOSIS — R91.8 PULMONARY NODULES: ICD-10-CM

## 2023-02-09 PROCEDURE — 71250 CT THORAX DX C-: CPT | Mod: GC | Performed by: RADIOLOGY

## 2023-02-09 PROCEDURE — 77075 RADEX OSSEOUS SURVEY COMPL: CPT | Performed by: RADIOLOGY

## 2023-02-09 RX ORDER — MORPHINE SULFATE 15 MG/1
15 TABLET, FILM COATED, EXTENDED RELEASE ORAL EVERY 12 HOURS
Qty: 60 TABLET | Refills: 0 | Status: SHIPPED | OUTPATIENT
Start: 2023-02-09 | End: 2023-03-07

## 2023-02-09 NOTE — TELEPHONE ENCOUNTER
Medication refill information reviewed.     Due date for  morphine (MS CONTIN) 15 MG CR tablet is 02/11/23     Prescriptions prepped for review.     Will route to provider.

## 2023-02-09 NOTE — TELEPHONE ENCOUNTER
Chart reviewed - request appears appropriate. Refilled.     Chanel Bruno DNP, APRN, AGNP-C  [facility]

## 2023-02-10 DIAGNOSIS — C61 PROSTATE CANCER (H): Primary | ICD-10-CM

## 2023-02-13 ENCOUNTER — MYC REFILL (OUTPATIENT)
Dept: PALLIATIVE MEDICINE | Facility: CLINIC | Age: 58
End: 2023-02-13
Payer: COMMERCIAL

## 2023-02-13 DIAGNOSIS — C79.51 PROSTATE CANCER METASTATIC TO BONE (H): ICD-10-CM

## 2023-02-13 DIAGNOSIS — C61 PROSTATE CANCER METASTATIC TO BONE (H): ICD-10-CM

## 2023-02-14 RX ORDER — HYDROCODONE BITARTRATE AND ACETAMINOPHEN 5; 325 MG/1; MG/1
1 TABLET ORAL 3 TIMES DAILY PRN
Qty: 30 TABLET | Refills: 0 | Status: SHIPPED | OUTPATIENT
Start: 2023-02-14 | End: 2023-03-20

## 2023-02-14 NOTE — TELEPHONE ENCOUNTER
Chart reviewed - request appears appropriate. Refilled.     Chanel Bruno DNP, APRN, AGNP-C  [facility]

## 2023-02-14 NOTE — TELEPHONE ENCOUNTER
Refills have been requested for the following medications:         HYDROcodone-acetaminophen (NORCO) 5-325 MG tablet [Chanel SHARMA]     [facility] [id number] [place] [facility] [place], [place] - [address] [facility]

## 2023-02-14 NOTE — TELEPHONE ENCOUNTER
Received call from patient requesting refill(s) of HYDROcodone-acetaminophen (NORCO) 5-325 MG tablet      Last dispensed from pharmacy on 01/18/23    Patient's last office/virtual visit by prescribing provider on 01/18/23  Next office/virtual appointment scheduled for 03/29/23    Last urine drug screen date 10/06/22  Current opioid agreement on file (completed within the last year) Yes Date of opioid agreement: 10/06/22    E-prescribe to   [facility] [id number] [place], [place] - [address] [facility] [address] [facility] [address]  Phone: [phone] Fax: [phone]    Will route to nursing [place] for review and preparation of prescription(s).       Marleen Mello MA  [facility] [place]

## 2023-02-14 NOTE — TELEPHONE ENCOUNTER
Medication refill information reviewed.     Due date:  2/17/23      Prescriptions prepped for review.     MIKAL Meehan-BSN  [facility]

## 2023-03-01 ENCOUNTER — TELEPHONE (OUTPATIENT)
Dept: PODIATRY | Facility: CLINIC | Age: 58
End: 2023-03-01
Payer: COMMERCIAL

## 2023-03-01 NOTE — TELEPHONE ENCOUNTER
[facility] Call Center    Phone Message    May a detailed message be left on voicemail: yes     Reason for Call: Other: Patient call would like new order for Orthotic foot inserts for both feet. Please call and advise.     Action Taken: Other: BE POD    Travel Screening: Not Applicable

## 2023-03-01 NOTE — TELEPHONE ENCOUNTER
LOV 11/3/21 - will need appt for new order    Patient scheduled appt for 3/21/23 with provider    Melissa SANCHEZ Specialty RN 3/1/2023 10:16 AM

## 2023-03-07 ENCOUNTER — MYC REFILL (OUTPATIENT)
Dept: PALLIATIVE MEDICINE | Facility: CLINIC | Age: 58
End: 2023-03-07
Payer: COMMERCIAL

## 2023-03-07 DIAGNOSIS — C61 MALIGNANT NEOPLASM OF PROSTATE (H): ICD-10-CM

## 2023-03-07 DIAGNOSIS — C61 PROSTATE CANCER (H): ICD-10-CM

## 2023-03-07 DIAGNOSIS — G89.3 CANCER ASSOCIATED PAIN: ICD-10-CM

## 2023-03-07 RX ORDER — APALUTAMIDE 60 MG/1
TABLET, FILM COATED ORAL
Qty: 120 TABLET | Refills: 0 | OUTPATIENT
Start: 2023-03-07

## 2023-03-07 NOTE — TELEPHONE ENCOUNTER
Last prescribing provider: Dr. Garcia    Last clinic visit date: 2/2/23 Ania REID    Any missed appointments or no-shows since last clinic visit?: none    Recommendations for requested medication (if none, N/A): Take 4 tablets (240 mg) by mouth daily for 30 days    Next clinic visit date: 5/11/23 / Jose    Any other pertinent information (if none, N/A): n/a

## 2023-03-07 NOTE — TELEPHONE ENCOUNTER
Refills have been requested for the following medications:         morphine (MS CONTIN) 15 MG CR tablet [Chanel SHARMA]     [facility] [id number] [place] [facility] [place], [place] - [address] [facility]

## 2023-03-08 ENCOUNTER — MYC MEDICAL ADVICE (OUTPATIENT)
Dept: PALLIATIVE MEDICINE | Facility: CLINIC | Age: 58
End: 2023-03-08
Payer: COMMERCIAL

## 2023-03-08 RX ORDER — MORPHINE SULFATE 15 MG/1
15 TABLET, FILM COATED, EXTENDED RELEASE ORAL EVERY 12 HOURS
Qty: 60 TABLET | Refills: 0 | Status: SHIPPED | OUTPATIENT
Start: 2023-03-08 | End: 2023-03-29

## 2023-03-08 NOTE — TELEPHONE ENCOUNTER
Received call from patient requesting refill(s) of morphine (MS CONTIN) 15 MG CR tablet      Last dispensed from pharmacy on 02/09/23    Patient's last office/virtual visit by prescribing provider on 01/18/23  Next office/virtual appointment scheduled for 03/29/23    Last urine drug screen date 10/06/22  Current opioid agreement on file (completed within the last year) Yes Date of opioid agreement: 10/06/22    E-prescribe to     [facility] [id number] [place] [facility] - [place], [place] - [address] [facility] [address] [facility] [address]  Phone: [phone] Fax: [phone]    Will route to nursing [place] for review and preparation of prescription(s).       Marleen Mello MA  [facility] [place]

## 2023-03-08 NOTE — TELEPHONE ENCOUNTER
Per patient myChart message (Brought over from the 3/8/23 encounter):  From: Jay Etienne      Created: 3/8/2023 11:20 AM      Hi Chanel  I am going to [place] on the 10th. I see my medication cannot be filled till the 13th is there anyway I can get it filled before 4 o clock Friday afternoon before I leave out of town  Thank you  Jay Etienne        Routed to provider to review.    Shefali RN-BSN  [facility]

## 2023-03-08 NOTE — TELEPHONE ENCOUNTER
Medication refill information reviewed.     MS Contin last due:  May fill 02/09/23, start 02/11/23  Due date:  3/13/23      Prescriptions prepped for review.     Shefali RN-BSN  [place] [facility]

## 2023-03-08 NOTE — TELEPHONE ENCOUNTER
Chart reviewed - request appears appropriate. Refilled.     Chanel Bruno DNP, APRN, AGNP-C  [facility]

## 2023-03-09 NOTE — TELEPHONE ENCOUNTER
Okay per Chanel Bruno, DNP, APRN, AGNP-C.    Called [facility] and gave the pharmacist the okay to release the MS Contin refill on Friday, 3/10/23.  _________________________________    Mychart sent to pt (3/8 encounter):  From: Shefali Álvarez RN      Created: 3/9/2023 9:36 AM      Hi Bill,  You bet.  I called [facility] and gave them the okay to fill your MS Contin on Friday, 3/10/23.  Due to start on 3/13/23.       Take care and have a good wkend!     MIKAL Meehan-BSN  [facility]

## 2023-03-13 DIAGNOSIS — C61 PROSTATE CANCER (H): Primary | ICD-10-CM

## 2023-03-20 ENCOUNTER — MYC REFILL (OUTPATIENT)
Dept: PALLIATIVE MEDICINE | Facility: CLINIC | Age: 58
End: 2023-03-20
Payer: COMMERCIAL

## 2023-03-20 DIAGNOSIS — C79.51 PROSTATE CANCER METASTATIC TO BONE (H): ICD-10-CM

## 2023-03-20 DIAGNOSIS — C61 PROSTATE CANCER METASTATIC TO BONE (H): ICD-10-CM

## 2023-03-20 NOTE — TELEPHONE ENCOUNTER
Refills have been requested for the following medications:         HYDROcodone-acetaminophen (NORCO) 5-325 MG tablet [Chanel SHARMA]     [id number] [place] [facility] [place], [place] - [address] [facility]

## 2023-03-20 NOTE — TELEPHONE ENCOUNTER
Received call from patient requesting refill(s) HYDROcodone-acetaminophen (NORCO) 5-325 MG tablet    Last dispensed from pharmacy on 02/24/2023    Patient's last office/virtual visit by prescribing provider on 01/18/2023  Next office/virtual appointment scheduled for 03/29/2023    Last urine drug screen date 10/06/2022  Current opioid agreement on file (completed within the last year) Yes Date of opioid agreement: 10/06/2022    E-prescribe to    [place] [id number] [place], [place] - [address] [facility] [address]      Will route to nursing [place] for review and preparation of prescription(s).     Roseline Boyd MA  [facility] [place]

## 2023-03-20 NOTE — TELEPHONE ENCOUNTER
Medication refill information reviewed.     norco last due:  Fill 2/15/23. Start 2/17/23 picked up 2/24/23  Due date:  3/26/23      Prescriptions prepped for review.     Shefali RN-BSN  [place] [facility]

## 2023-03-21 ENCOUNTER — OFFICE VISIT (OUTPATIENT)
Dept: PODIATRY | Facility: CLINIC | Age: 58
End: 2023-03-21
Payer: COMMERCIAL

## 2023-03-21 VITALS — DIASTOLIC BLOOD PRESSURE: 80 MMHG | SYSTOLIC BLOOD PRESSURE: 119 MMHG | HEART RATE: 94 BPM

## 2023-03-21 DIAGNOSIS — G62.9 PERIPHERAL POLYNEUROPATHY: ICD-10-CM

## 2023-03-21 DIAGNOSIS — M77.8 CAPSULITIS OF FOOT, UNSPECIFIED LATERALITY: Primary | ICD-10-CM

## 2023-03-21 PROCEDURE — 99213 OFFICE O/P EST LOW 20 MIN: CPT | Performed by: PODIATRIST

## 2023-03-21 RX ORDER — HYDROCODONE BITARTRATE AND ACETAMINOPHEN 5; 325 MG/1; MG/1
1 TABLET ORAL 3 TIMES DAILY PRN
Qty: 60 TABLET | Refills: 0 | Status: SHIPPED | OUTPATIENT
Start: 2023-03-21 | End: 2023-04-19

## 2023-03-21 NOTE — LETTER
3/21/2023         RE: Per Etienne  [id number] [facility] [id number]        Dear Colleague,    Thank you for referring your patient, Per Etienne, to the [facility]. Please see a copy of my visit note below.    S:  Patient seen today to get a new pair of orthotics.  He feels his are wearing out.  Having more foot pain.  Points to the ball of his foot.  Taking chemotherapy for prostate cancer.  He does get some numbness in his feet now.  No pain anywhere else on feet.  Works on his feet.  Denies erythema, edema, weakness, deformity.    ROS:  See above     No Known Allergies    Current Outpatient Medications   Medication Sig Dispense Refill     apalutamide (ERLEADA) 60 MG tablet Take 4 tablets (240 mg) by mouth daily for 30 days 120 tablet 0     aspirin (ASA) 81 MG chewable tablet Take 1 tablet (81 mg) by mouth daily       denosumab (XGEVA) 120 MG/1.7ML SOLN injection Inject 1.7 mLs (120 mg) Subcutaneous every 3 months 1.7 mL 0     HYDROcodone-acetaminophen (NORCO) 5-325 MG tablet Take 1 tablet by mouth 3 times daily as needed for breakthrough pain or severe pain (7-10) 60 tablet 0     leuprolide (ELIGARD) 45 MG kit Inject 45 mg Subcutaneous every 6 months       lisinopril (ZESTRIL) 10 MG tablet Take 1 tablet (10 mg) by mouth daily 90 tablet 3     morphine (MS CONTIN) 15 MG CR tablet Take 1 tablet (15 mg) by mouth every 12 hours May fill 3/11/23, start 3/13/23. 60 tablet 0     naloxone (NARCAN) 4 MG/0.1ML nasal spray Spray 1 spray (4 mg) into one nostril alternating nostrils as needed for opioid reversal every 2-3 minutes until assistance arrives (Patient not taking: Reported on 1/18/2023) 0.2 mL 0     omeprazole (PRILOSEC) 20 MG DR capsule TAKE 1 CAPSULE BY MOUTH TWICE A  capsule 1     predniSONE (DELTASONE) 20 MG tablet Take 3 tabs by mouth daily x 3 days, then 2 tabs daily x 3 days, then 1 tab daily x 3 days, then 1/2 tab daily x 3 days. (Patient not  taking: Reported on 2023) 20 tablet 0     SENNA-docusate sodium (SENNA S) 8.6-50 MG tablet Take 1 tablet by mouth 2 times daily 60 tablet 3     UNABLE TO FIND 1 tablet daily MEDICATION NAME: C, D, Zinc combination supplement       valACYclovir (VALTREX) 1000 mg tablet Take 1 tablet (1,000 mg) by mouth 3 times daily for 7 days 21 tablet 0       Patient Active Problem List   Diagnosis     CARDIOVASCULAR SCREENING; LDL GOAL LESS THAN 160     Left varicocele     Vitamin D deficiency     Kidney stone     Prostate cancer (H)     High risk medication use     Prostate cancer metastatic to bone (H)     Bone metastasis (H)     Chronic, continuous use of opioids     Hypertension goal BP (blood pressure) < 140/90       Past Medical History:   Diagnosis Date     Gout      Hypertension goal BP (blood pressure) < 140/90 2022     Lumbar stenosis      Prostate cancer (H) 01/10/2020       Past Surgical History:   Procedure Laterality Date     BIOPSY PROSTATE TRANSRECTAL  01/10/2020    Dr. Colmenares     COLONOSCOPY  2020     LITHOTRIPSY  age 30s     MICROSCOPIC KNEE SURGERY Right age 30s     ORTHOPEDIC SURGERY Left 1985    Alan placed in Left Femur     PROSTATECTOMY RETROPUBIC RADICAL  2020    Dr. Colmenares       Family History   Problem Relation Age of Onset     Asthma Father      Prostate Cancer Father 73     Cancer Maternal Grandmother 92        throat      Asthma Sister        Social History     Tobacco Use     Smoking status: Former     Packs/day: 1.00     Years: 30.00     Pack years: 30.00     Types: Cigarettes, Cigars     Quit date: 2012     Years since quittin.2     Smokeless tobacco: Never   Substance Use Topics     Alcohol use: Yes     Comment: 4-6 drinks per day - patient reports beer or vodka drinks         O:       Constitutional/ general:  Pt is in no apparent distress, appears well-nourished.  Cooperative with history and physical exam.     Psych:  The patient answered questions appropriately.  Normal  affect.  Seems to have reasonable expectations, in terms of treatment.     Lungs:  Non labored breathing, non labored speech. No cough.  No audible wheezing. Even, quiet breathing.       Vascular:  Pedal pulses are palpable bilaterally for both the DP and PT arteries.  CFT < 3 sec.  No edema.  Pedal hair growth noted.     Neuro:  Alert and oriented x 3. Coordinated gait.  Light touch sensation is intact     Derm: Normal texture and turgor.  No erythema, ecchymosis, or cyanosis.  No open lesions.     Musculoskeletal:    Lower extremity muscle strength is normal.  Patient is ambulatory without an assistive device or brace.  Normal arch with weightbearing.  No forefoot or rear foot deformities noted.  MS 5/5 all compartments.  Normal ROM all fore foot and rearfoot joints.  No equinus.  Pain under bilateral second, third metatarsal head plantar.  Negative Lachmans test.  Negative Mulders click.  No pain anywhere else.  No erythema, edema, ecchymosis, or subcutaneous masses noted.      Radiographic Exam:   Long second, third metatarsal, but no other bone abnormalities.     A:  Subsecond, third capsulitis right and left foot        Peripheral neuropathy    P: Prescription written for new orthotics.  His current shoes are quite malleable.  Discussed stiff shoes both inside and outside.  RTC as needed.      Abundio Pacheco DPM, FACFAS       Again, thank you for allowing me to participate in the care of your patient.        Sincerely,        Abundio Pacheco DPM

## 2023-03-21 NOTE — TELEPHONE ENCOUNTER
Chart reviewed - request appears appropriate. He may take up to 3 tabs per day. I updated the quantity to #60 tabs. May have total of #90 tabs per 30 days.     Chanel Bruno DNP, APRN, AGNP-C  [facility]

## 2023-03-21 NOTE — PATIENT INSTRUCTIONS
We wish you continued good healing. If you have any questions or concerns, please do not hesitate to contact us at  [phone]    [employer] (secure e-mail communication and access to your chart) to send a message or to make an appointment.    Please remember to call and schedule a follow up appointment if one was recommended at your earliest convenience.     PODIATRY CLINIC HOURS  TELEPHONE NUMBER    Dr. Abundio FIGUEROAPHOWARD [facility]        Clinics:  [facility]   [place]  Tuesday 1PM-6PM  [place]  Wednesday 745AM-330PM  [place]  Thursday/Friday 745AM-230PM       [place] APPOINTMENTS  [phone]    [place] APPOINTMENTS  [phone]        If you need a medication refill, please contact us you may need lab work and/or a follow up visit prior to your refill (i.e. Antifungal medications).  If MRI needed please call Imaging at [phone]   HOW DO I GET MY KNEE SCOOTER? Knee scooters can be picked up at ANY Medical Supply stores with your knee scooter Prescription.  OR  Bring your signed prescription to an [facility] showroom.

## 2023-03-21 NOTE — PROGRESS NOTES
S:  Patient seen today to get a new pair of orthotics.  He feels his are wearing out.  Having more foot pain.  Points to the ball of his foot.  Taking chemotherapy for prostate cancer.  He does get some numbness in his feet now.  No pain anywhere else on feet.  Works on his feet.  Denies erythema, edema, weakness, deformity.    ROS:  See above     No Known Allergies    Current Outpatient Medications   Medication Sig Dispense Refill     apalutamide (ERLEADA) 60 MG tablet Take 4 tablets (240 mg) by mouth daily for 30 days 120 tablet 0     aspirin (ASA) 81 MG chewable tablet Take 1 tablet (81 mg) by mouth daily       denosumab (XGEVA) 120 MG/1.7ML SOLN injection Inject 1.7 mLs (120 mg) Subcutaneous every 3 months 1.7 mL 0     HYDROcodone-acetaminophen (NORCO) 5-325 MG tablet Take 1 tablet by mouth 3 times daily as needed for breakthrough pain or severe pain (7-10) 60 tablet 0     leuprolide (ELIGARD) 45 MG kit Inject 45 mg Subcutaneous every 6 months       lisinopril (ZESTRIL) 10 MG tablet Take 1 tablet (10 mg) by mouth daily 90 tablet 3     morphine (MS CONTIN) 15 MG CR tablet Take 1 tablet (15 mg) by mouth every 12 hours May fill 3/11/23, start 3/13/23. 60 tablet 0     naloxone (NARCAN) 4 MG/0.1ML nasal spray Spray 1 spray (4 mg) into one nostril alternating nostrils as needed for opioid reversal every 2-3 minutes until assistance arrives (Patient not taking: Reported on 1/18/2023) 0.2 mL 0     omeprazole (PRILOSEC) 20 MG DR capsule TAKE 1 CAPSULE BY MOUTH TWICE A  capsule 1     predniSONE (DELTASONE) 20 MG tablet Take 3 tabs by mouth daily x 3 days, then 2 tabs daily x 3 days, then 1 tab daily x 3 days, then 1/2 tab daily x 3 days. (Patient not taking: Reported on 1/18/2023) 20 tablet 0     SENNA-docusate sodium (SENNA S) 8.6-50 MG tablet Take 1 tablet by mouth 2 times daily 60 tablet 3     UNABLE TO FIND 1 tablet daily MEDICATION NAME: C, D, Zinc combination supplement       valACYclovir (VALTREX) 1000 mg  tablet Take 1 tablet (1,000 mg) by mouth 3 times daily for 7 days 21 tablet 0       Patient Active Problem List   Diagnosis     CARDIOVASCULAR SCREENING; LDL GOAL LESS THAN 160     Left varicocele     Vitamin D deficiency     Kidney stone     Prostate cancer (H)     High risk medication use     Prostate cancer metastatic to bone (H)     Bone metastasis (H)     Chronic, continuous use of opioids     Hypertension goal BP (blood pressure) < 140/90       Past Medical History:   Diagnosis Date     Gout      Hypertension goal BP (blood pressure) < 140/90 2022     Lumbar stenosis      Prostate cancer (H) 01/10/2020       Past Surgical History:   Procedure Laterality Date     BIOPSY PROSTATE TRANSRECTAL  01/10/2020    Dr. Colmenares     COLONOSCOPY  2020     LITHOTRIPSY  age 30s     MICROSCOPIC KNEE SURGERY Right age 30s     ORTHOPEDIC SURGERY Left 1985    Alan placed in Left Femur     PROSTATECTOMY RETROPUBIC RADICAL  2020    Dr. Colmenares       Family History   Problem Relation Age of Onset     Asthma Father      Prostate Cancer Father 73     Cancer Maternal Grandmother 92        throat      Asthma Sister        Social History     Tobacco Use     Smoking status: Former     Packs/day: 1.00     Years: 30.00     Pack years: 30.00     Types: Cigarettes, Cigars     Quit date: 2012     Years since quittin.2     Smokeless tobacco: Never   Substance Use Topics     Alcohol use: Yes     Comment: 4-6 drinks per day - patient reports beer or vodka drinks         O:       Constitutional/ general:  Pt is in no apparent distress, appears well-nourished.  Cooperative with history and physical exam.     Psych:  The patient answered questions appropriately.  Normal affect.  Seems to have reasonable expectations, in terms of treatment.     Lungs:  Non labored breathing, non labored speech. No cough.  No audible wheezing. Even, quiet breathing.       Vascular:  Pedal pulses are palpable bilaterally for both the DP and PT arteries.   CFT < 3 sec.  No edema.  Pedal hair growth noted.     Neuro:  Alert and oriented x 3. Coordinated gait.  Light touch sensation is intact     Derm: Normal texture and turgor.  No erythema, ecchymosis, or cyanosis.  No open lesions.     Musculoskeletal:    Lower extremity muscle strength is normal.  Patient is ambulatory without an assistive device or brace.  Normal arch with weightbearing.  No forefoot or rear foot deformities noted.  MS 5/5 all compartments.  Normal ROM all fore foot and rearfoot joints.  No equinus.  Pain under bilateral second, third metatarsal head plantar.  Negative Lachmans test.  Negative Mulders click.  No pain anywhere else.  No erythema, edema, ecchymosis, or subcutaneous masses noted.      Radiographic Exam:   Long second, third metatarsal, but no other bone abnormalities.     A:  Subsecond, third capsulitis right and left foot        Peripheral neuropathy    P: Prescription written for new orthotics.  His current shoes are quite malleable.  Discussed stiff shoes both inside and outside.  RTC as needed.      Abundio Pacheco, REKHA, FACFAS

## 2023-03-22 ASSESSMENT — ANXIETY QUESTIONNAIRES
GAD7 TOTAL SCORE: 0
2. NOT BEING ABLE TO STOP OR CONTROL WORRYING: NOT AT ALL
GAD7 TOTAL SCORE: 0
7. FEELING AFRAID AS IF SOMETHING AWFUL MIGHT HAPPEN: NOT AT ALL
7. FEELING AFRAID AS IF SOMETHING AWFUL MIGHT HAPPEN: NOT AT ALL
6. BECOMING EASILY ANNOYED OR IRRITABLE: NOT AT ALL
IF YOU CHECKED OFF ANY PROBLEMS ON THIS QUESTIONNAIRE, HOW DIFFICULT HAVE THESE PROBLEMS MADE IT FOR YOU TO DO YOUR WORK, TAKE CARE OF THINGS AT HOME, OR GET ALONG WITH OTHER PEOPLE: NOT DIFFICULT AT ALL
4. TROUBLE RELAXING: NOT AT ALL
5. BEING SO RESTLESS THAT IT IS HARD TO SIT STILL: NOT AT ALL
8. IF YOU CHECKED OFF ANY PROBLEMS, HOW DIFFICULT HAVE THESE MADE IT FOR YOU TO DO YOUR WORK, TAKE CARE OF THINGS AT HOME, OR GET ALONG WITH OTHER PEOPLE?: NOT DIFFICULT AT ALL
1. FEELING NERVOUS, ANXIOUS, OR ON EDGE: NOT AT ALL
3. WORRYING TOO MUCH ABOUT DIFFERENT THINGS: NOT AT ALL

## 2023-03-22 ASSESSMENT — PAIN SCALES - PAIN ENJOYMENT GENERAL ACTIVITY SCALE (PEG)
AVG_PAIN_PASTWEEK: 6
INTERFERED_ENJOYMENT_LIFE: 4
AVG_PAIN_PASTWEEK: 6
INTERFERED_GENERAL_ACTIVITY: 5
PEG_TOTALSCORE: 5
PEG_TOTALSCORE: 5
INTERFERED_GENERAL_ACTIVITY: 5
INTERFERED_ENJOYMENT_LIFE: 4

## 2023-03-29 ENCOUNTER — OFFICE VISIT (OUTPATIENT)
Dept: PALLIATIVE MEDICINE | Facility: CLINIC | Age: 58
End: 2023-03-29
Payer: COMMERCIAL

## 2023-03-29 VITALS — SYSTOLIC BLOOD PRESSURE: 121 MMHG | DIASTOLIC BLOOD PRESSURE: 78 MMHG | HEART RATE: 102 BPM

## 2023-03-29 DIAGNOSIS — K59.03 THERAPEUTIC OPIOID INDUCED CONSTIPATION: ICD-10-CM

## 2023-03-29 DIAGNOSIS — G89.3 CANCER ASSOCIATED PAIN: Primary | ICD-10-CM

## 2023-03-29 DIAGNOSIS — T40.2X5A THERAPEUTIC OPIOID INDUCED CONSTIPATION: ICD-10-CM

## 2023-03-29 DIAGNOSIS — C61 PROSTATE CANCER METASTATIC TO BONE (H): ICD-10-CM

## 2023-03-29 DIAGNOSIS — C79.51 PROSTATE CANCER METASTATIC TO BONE (H): ICD-10-CM

## 2023-03-29 PROCEDURE — 99214 OFFICE O/P EST MOD 30 MIN: CPT

## 2023-03-29 RX ORDER — MORPHINE SULFATE 15 MG/1
15 TABLET, FILM COATED, EXTENDED RELEASE ORAL EVERY 12 HOURS
Qty: 60 TABLET | Refills: 0 | Status: SHIPPED | OUTPATIENT
Start: 2023-03-29 | End: 2023-05-05

## 2023-03-29 ASSESSMENT — PAIN SCALES - GENERAL: PAINLEVEL: MODERATE PAIN (5)

## 2023-03-29 NOTE — PROGRESS NOTES
[facility]     Date of visit: 3/29/2023      Assessment:   Per Etienne is a 57 year old male with a past medical history significant for prostate cancer with bone metastasis, pulmonary nodules, lumbar stenosis, HTN, gout who presents with complaints of cancer associated pain.      1. Low back pain with LLE radic, neuropathy bilat feet, pain of multiple joints - His back pain with left sided radicular pain with L5 distribution that has been present for many years. He has been referred for procedure by his PCP for repeat L4/5 LUIS (Regina/Obed), which historically have given him significant symptom relief, although he did not appreciate as much benefit from the most recent LUIS on 8/2/22. Lumbar CT from 8/2021 demonstrated severe left neural foraminal narrowing at L4-L5 and moderate to severe right at L3-4, additional multilevel spinal canal stenosis and neural foraminal stenosis noted. I suspect his low back pain is associated with multiple factors, including underlying degenerative changes of lumbar spine, bone metastasis, and likely some myofascial component. Neuropathic symptoms in feet and generalized joint pain is likely secondary effects from cancer treatment and bone metastasis.   2. Mental Health - the patient's mental health concerns, specifically prostate cancer with bone mets, affect his experience of pain and contribute to his clinically significant distress. He may benefit from more generalized health psychology to support chronic illness/cancer management and will consider discussing at future follow up      Visit Diagnoses:  1. Cancer associated pain    2. Prostate cancer metastatic to bone (H)    3. Therapeutic opioid induced constipation    4. Malignant neoplasm of prostate (H)        Plan:  Diagnosis reviewed, treatment option addressed, and risk/benifits discussed.  Self-care instructions given.  I am recommending a multidisciplinary treatment plan to help this  patient better manage their pain.                  1.  Pain Physical Therapy:  WALT Thompson is very active. Despite the pain, he continues to work full time as a , which is somewhat labor intensive (frequently using ladder).               2.  Pain Psychologist to address relaxation, behavioral change, coping style, and other factors important to improvement.  WALT Thompson may possibly benefit from support for his chronic health conditions overall, though he does seem to manage fairly well. He continues to work full time and likes to stay active.               3.  Diagnostic Studies:  None               4.  Medication Management:   1. Continue MS Contin to 15 mg every 12 hours. He continues to appreciate benefit, stable baseline pain, supports functioning, continues to work full time. Refilled today.   2. Continue hydrocodone 5-325 three times daily as needed for breakthrough pain. He does not use very frequently, usually only takes 1 tab daily as needed. He continues to appreciate benefit for breakthrough pain, no concerns for continuation.   3. Continue Senna S to 1 tab twice daily. He reports improvement with OIC since last visit, with increased frequency to BID.               5.  Potential procedures: He had LESI on 12/1/22 that was not as helpful as prior injections.               7.  Follow up with TEREZA Sanchez CNP in 10-12 weeks      Review of Electronic Chart: Today I have also reviewed available medical information in the patient's medical record at [facility] ([facility]) and Care Everywhere (if available), including relevant provider notes, laboratory work, and imaging.     Chanel Bruno DNP, TEREZA, AGNP-C  [facility]     -------------------------------------------------    Subjective:    Chief complaint:   Chief Complaint   Patient presents with     Pain       Interval history:  Per Etienne is a 57 year old male last seen on 1/18/23.  They are a patient of  mine seen in follow up.     Recommendations/plan at the last visit included:              1.  Pain Physical Therapy:  WALT Thompson is very active. Despite the pain, he continues to work full time as a , which is somewhat labor intensive (frequently using ladder).               2.  Pain Psychologist to address relaxation, behavioral change, coping style, and other factors important to improvement.  WALT Thompson may possibly benefit from support for his chronic health conditions overall, though he does seem to manage fairly well. He continues to work full time and likes to stay active.               3.  Diagnostic Studies:  None               4.  Medication Management:   4. Continue MS Contin to 15 mg every 12 hours.   5. Continue hydrocodone 5-325 three times daily as needed for breakthrough pain. He does not use very frequently, usually only takes 1 tab daily as needed.   6. Increase Senna S to 1 tab twice daily.               5.  Potential procedures: He had LESI on 12/1/22 that was not as helpful as prior injections.               7.  Follow up with TEREZA Sanchez CNP in 8-10 weeks      Since his last visit, Per Etienne reports:  -his pain is about the same as it was at last visit.   -He has good days and bad days.   -He thinks pain is consistent.   -He continues MS contin 15 mg BID, hydrocodone 5 TID PRN.   -He reports medications are working well, stable at this time.   -He was dealing with OIC, increased senna to BID at last visit, constipation improved.   -No other side effects from medications.   -He had reached out in between visits for a reasonable early refill due to travels.   -He went to [place] recently, had a great trip.   -They drove down (his wife/family).    Pain Information:   Pain rating: averages 4/10 on a 0-10 scale.      Interval history from last visit on 1/18/23:  -His pain is about the same as it was at last visit.   -He had LESI with Dr. Tapia on 12/1/22.  -He does  not think it was as helpful as prior LESI.   -He is taking MS Contin 15 mg BID, has PRN Norco for breakthrough pain (does not use often).  -He has needed to use hydrocodone a bit more since last visit.   -He thinks pain is reasonably well controlled right now.   -He is taking Senna S daily at bedtime, has BM every other day, sometimes harder stool.   -He is open to increasing to BID frequency.   -His boss verbalized concerns about driving with medications, patient does not share similar concerns, denies side effects.   -He is waiting to hear about [employer] next year, will be involved with the project if [employer] wins.   -He is going to consider retiring sometime in the next year.         Interval history from last visit on 11/3/22:  -He had shingles since last visit  -Pain has been increased  -He is also been experiencing constipation after increasing MS Contin 15mg to BID dosing.   -His grandson, Juwan Del Castillo, was born on 10/20/22.   -He will be getting shingles injection soon.  -He has been able to be more acvtive with long acting opioid  -[place] 2 miles per day, lost 8 pounds, able to do job better now that pain is under better control.           Current Pain Treatments:    Medications:                  MS Contin 15 mg BID              Hydrocodone 5-325 mg TID PRN      Other:                  LESI on 12/1/22 with Dr. Tapia     Current MME: 44     Review of [place] Prescription Monitoring Program (): No concern for abuse or misuse of controlled medications based on this report. Reviewed - appears appropriate.      Annual Controlled Substance Agreement/UDS due date: Completed on 10/6/22     Past pain treatments:  LESI - helpful         Medications:  Current Outpatient Medications   Medication Sig Dispense Refill     apalutamide (ERLEADA) 60 MG tablet Take 4 tablets (240 mg) by mouth daily for 30 days 120 tablet 0     aspirin (ASA) 81 MG chewable tablet Take 1 tablet (81 mg) by mouth daily        denosumab (XGEVA) 120 MG/1.7ML SOLN injection Inject 1.7 mLs (120 mg) Subcutaneous every 3 months 1.7 mL 0     HYDROcodone-acetaminophen (NORCO) 5-325 MG tablet Take 1 tablet by mouth 3 times daily as needed for breakthrough pain or severe pain (7-10) 60 tablet 0     leuprolide (ELIGARD) 45 MG kit Inject 45 mg Subcutaneous every 6 months       lisinopril (ZESTRIL) 10 MG tablet Take 1 tablet (10 mg) by mouth daily 90 tablet 3     morphine (MS CONTIN) 15 MG CR tablet Take 1 tablet (15 mg) by mouth every 12 hours May fill 4/10/23, start 4/12/23. 60 tablet 0     omeprazole (PRILOSEC) 20 MG DR capsule TAKE 1 CAPSULE BY MOUTH TWICE A  capsule 1     SENNA-docusate sodium (SENNA S) 8.6-50 MG tablet Take 1 tablet by mouth 2 times daily 60 tablet 3     UNABLE TO FIND 1 tablet daily MEDICATION NAME: C, D, Zinc combination supplement       naloxone (NARCAN) 4 MG/0.1ML nasal spray Spray 1 spray (4 mg) into one nostril alternating nostrils as needed for opioid reversal every 2-3 minutes until assistance arrives (Patient not taking: Reported on 1/18/2023) 0.2 mL 0     predniSONE (DELTASONE) 20 MG tablet Take 3 tabs by mouth daily x 3 days, then 2 tabs daily x 3 days, then 1 tab daily x 3 days, then 1/2 tab daily x 3 days. (Patient not taking: Reported on 1/18/2023) 20 tablet 0     valACYclovir (VALTREX) 1000 mg tablet Take 1 tablet (1,000 mg) by mouth 3 times daily for 7 days 21 tablet 0       Medical History: any changes in medical history since they were last seen? No      Objective:    Physical Exam:  Blood pressure 121/78, pulse 102.  Constitutional: Well developed, well nourished, appears stated age.  Gait: Normal  HEENT: Head atraumatic, normocephalic. Eyes without conjunctival injection or jaundice. Oropharynx clear. Neck supple. No obvious neck masses.  Skin: No rash, lesions, or petechiae of exposed skin.   Psychiatric/mental status: Alert, without lethargy or stupor. Speech fluent. Appropriate affect. Mood normal.  Able to follow commands without difficulty.     Diagnostic Tests/Imaging/Labs:  None     BILLING TIME DOCUMENTATION:   The total TIME spent on this patient on the date of the encounter/appointment was 22 minutes.      TOTAL TIME includes:   Time spent preparing to see the patient (reviewing records and tests)   Time spent face to face (or over the phone) with the patient   Time spent ordering tests, medications, procedures and referrals   Time spent Referring and communicating with other healthcare professionals   Time spent documenting clinical information in Epic

## 2023-03-29 NOTE — PATIENT INSTRUCTIONS
1.  Pain Physical Therapy:  WALT Thompson is very active. Despite the pain, he continues to work full time as a , which is somewhat labor intensive (frequently using ladder).               2.  Pain Psychologist to address relaxation, behavioral change, coping style, and other factors important to improvement.  WALT Thompson may possibly benefit from support for his chronic health conditions overall, though he does seem to manage fairly well. He continues to work full time and likes to stay active.               3.  Diagnostic Studies:  None               4.  Medication Management:   Continue MS Contin to 15 mg every 12 hours. Refilled today.   Continue hydrocodone 5-325 three times daily as needed for breakthrough pain. He does not use very frequently, usually only takes 1 tab daily as needed.   Continue Senna S to 1 tab twice daily.               5.  Potential procedures: He had LESI on 12/1/22 that was not as helpful as prior injections.               7.  Follow up with TEREZA Sanchez CNP in 10-12 weeks    ----------------------------------------------------------------  Clinic Number:  [phone]   Call with any questions about your care and for scheduling assistance.   Calls are returned Monday through Friday between 8 AM and 4:30 PM. We usually get back to you within 2 business days depending on the issue/request.    If we are prescribing your medications:  For opioid medication refills, call the clinic or send a EoeMobile message 7 days in advance.  Please include:  Name of requested medication  Name of the pharmacy.  For non-opioid medications, call your pharmacy directly to request a refill. Please allow 3-4 days to be processed.   Per [place] State Law:  All controlled substance prescriptions must be filled within 30 days of being written.    For those controlled substances allowing refills, pickup must occur within 30 days of last fill.      We believe regular attendance is key to  your success in our program!    Any time you are unable to keep your appointment we ask that you call us at least 24 hours in advance to cancel.This will allow us to offer the appointment time to another patient.   Multiple missed appointments may lead to dismissal from the clinic.

## 2023-04-02 DIAGNOSIS — C61 PROSTATE CANCER (H): ICD-10-CM

## 2023-04-03 PROBLEM — C79.51 MALIGNANT NEOPLASM METASTATIC TO BONE (H): Status: ACTIVE | Noted: 2020-06-02

## 2023-04-03 RX ORDER — APALUTAMIDE 60 MG/1
TABLET, FILM COATED ORAL
Qty: 120 TABLET | Refills: 0 | OUTPATIENT
Start: 2023-04-03

## 2023-04-03 NOTE — TELEPHONE ENCOUNTER
Last prescribing provider: Dr. Garcia    Last clinic visit date: 2/2/23 Ania REID    Any missed appointments or no-shows since last clinic visit?: none    Recommendations for requested medication (if none, N/A): Take 4 tablets (240 mg) by mouth daily for 30 days    Next clinic visit date: 5/11/23 Dr. Garcia    Any other pertinent information (if none, N/A): none

## 2023-04-05 DIAGNOSIS — C61 PROSTATE CANCER (H): ICD-10-CM

## 2023-04-05 RX ORDER — APALUTAMIDE 60 MG/1
TABLET, FILM COATED ORAL
Qty: 120 TABLET | Refills: 0 | OUTPATIENT
Start: 2023-04-05

## 2023-04-08 ENCOUNTER — MYC REFILL (OUTPATIENT)
Dept: PALLIATIVE MEDICINE | Facility: CLINIC | Age: 58
End: 2023-04-08
Payer: COMMERCIAL

## 2023-04-08 DIAGNOSIS — G89.3 CANCER ASSOCIATED PAIN: ICD-10-CM

## 2023-04-08 RX ORDER — MORPHINE SULFATE 15 MG/1
15 TABLET, FILM COATED, EXTENDED RELEASE ORAL EVERY 12 HOURS
Qty: 60 TABLET | Refills: 0 | Status: CANCELLED | OUTPATIENT
Start: 2023-04-08

## 2023-04-10 NOTE — TELEPHONE ENCOUNTER
"Per Etienne \"Jay\"  P Pain Nurse 2 days ago       Refills have been requested for the following medications:         morphine (MS CONTIN) 15 MG CR tablet [Chanel SHARMA]     Pref[facility]: [id number] [place] [facility] - [place], [place] - [address]    Will forward to MA Pool to process refill.      Trav Guerra, RN  Patient Care Supervisor   [facility]     "

## 2023-04-10 NOTE — TELEPHONE ENCOUNTER
Received call from patient requesting refill(s) of morphine (MS CONTIN) 15 MG CR tablet    Confirmed with pharmacy. Prescription is at the pharmacy and ready for .  Neomendhart sent to patient.

## 2023-04-12 DIAGNOSIS — C61 PROSTATE CANCER (H): Primary | ICD-10-CM

## 2023-04-19 ENCOUNTER — MYC REFILL (OUTPATIENT)
Dept: PALLIATIVE MEDICINE | Facility: CLINIC | Age: 58
End: 2023-04-19
Payer: COMMERCIAL

## 2023-04-19 DIAGNOSIS — C61 PROSTATE CANCER METASTATIC TO BONE (H): ICD-10-CM

## 2023-04-19 DIAGNOSIS — C79.51 PROSTATE CANCER METASTATIC TO BONE (H): ICD-10-CM

## 2023-04-20 NOTE — TELEPHONE ENCOUNTER
Refills have been requested for the following medications:         HYDROcodone-acetaminophen (NORCO) 5-325 MG tablet [Chanel SHARMA]     [id number] [place] [facility] [place], [place] - [address]

## 2023-04-20 NOTE — TELEPHONE ENCOUNTER
Medication refill information reviewed.     Due date:  anytime      Prescriptions prepped for review.     MIKAL Meehan-BSN  [facility]

## 2023-04-20 NOTE — TELEPHONE ENCOUNTER
Received call from patient requesting refill(s) of HYDROcodone-acetaminophen (NORCO) 5-325 MG tablet       Last dispensed from pharmacy on 03/21/23    Patient's last office/virtual visit by prescribing provider on 03/29/23  Next office/virtual appointment scheduled for 06/07/23    Last urine drug screen date 10/06/22  Current opioid agreement on file (completed within the last year) Yes Date of opioid agreement: 10/06/22    E-prescribe to pharmacy-[facility] [id number] [place], [place] - [address]      Will route to nursing [place] for review and preparation of prescription(s).

## 2023-04-21 DIAGNOSIS — C61 PROSTATE CANCER (H): ICD-10-CM

## 2023-04-21 RX ORDER — APALUTAMIDE 60 MG/1
TABLET, FILM COATED ORAL
Qty: 120 TABLET | Refills: 0 | OUTPATIENT
Start: 2023-04-21

## 2023-04-21 RX ORDER — HYDROCODONE BITARTRATE AND ACETAMINOPHEN 5; 325 MG/1; MG/1
1 TABLET ORAL 3 TIMES DAILY PRN
Qty: 60 TABLET | Refills: 0 | Status: SHIPPED | OUTPATIENT
Start: 2023-04-21 | End: 2023-05-17

## 2023-04-21 NOTE — TELEPHONE ENCOUNTER
Chart reviewed - request appears appropriate. Refilled.     Chanel Bruno DNP, APRN, AGNP-C  [facility]

## 2023-04-25 NOTE — TELEPHONE ENCOUNTER
Spoke to patient, notified that prescription was sent to preferred pharmacy.    Able to fill  and start 04/20/23  -----------    Patient stated that medication had been picked up.      Marleen Mello MA  [facility] [place]

## 2023-05-09 ENCOUNTER — DOCUMENTATION ONLY (OUTPATIENT)
Dept: LAB | Facility: CLINIC | Age: 58
End: 2023-05-09

## 2023-05-09 ENCOUNTER — LAB (OUTPATIENT)
Dept: LAB | Facility: CLINIC | Age: 58
End: 2023-05-09
Payer: COMMERCIAL

## 2023-05-09 DIAGNOSIS — M79.606 PAIN OF LOWER EXTREMITY, UNSPECIFIED LATERALITY: ICD-10-CM

## 2023-05-09 DIAGNOSIS — C61 PROSTATE CANCER (H): ICD-10-CM

## 2023-05-09 DIAGNOSIS — C79.51 MALIGNANT NEOPLASM METASTATIC TO BONE (H): ICD-10-CM

## 2023-05-09 LAB
BASOPHILS # BLD AUTO: 0.1 10E3/UL (ref 0–0.2)
BASOPHILS NFR BLD AUTO: 1 %
EOSINOPHIL # BLD AUTO: 0.1 10E3/UL (ref 0–0.7)
EOSINOPHIL NFR BLD AUTO: 2 %
ERYTHROCYTE [DISTWIDTH] IN BLOOD BY AUTOMATED COUNT: 11.6 % (ref 10–15)
HCT VFR BLD AUTO: 38.8 % (ref 40–53)
HGB BLD-MCNC: 13.3 G/DL (ref 13.3–17.7)
LYMPHOCYTES # BLD AUTO: 1.9 10E3/UL (ref 0.8–5.3)
LYMPHOCYTES NFR BLD AUTO: 25 %
MCH RBC QN AUTO: 31.7 PG (ref 26.5–33)
MCHC RBC AUTO-ENTMCNC: 34.3 G/DL (ref 31.5–36.5)
MCV RBC AUTO: 93 FL (ref 78–100)
MONOCYTES # BLD AUTO: 0.8 10E3/UL (ref 0–1.3)
MONOCYTES NFR BLD AUTO: 11 %
NEUTROPHILS # BLD AUTO: 4.8 10E3/UL (ref 1.6–8.3)
NEUTROPHILS NFR BLD AUTO: 62 %
PLATELET # BLD AUTO: 268 10E3/UL (ref 150–450)
RBC # BLD AUTO: 4.19 10E6/UL (ref 4.4–5.9)
WBC # BLD AUTO: 7.7 10E3/UL (ref 4–11)

## 2023-05-09 PROCEDURE — 80053 COMPREHEN METABOLIC PANEL: CPT

## 2023-05-09 PROCEDURE — 84153 ASSAY OF PSA TOTAL: CPT

## 2023-05-09 PROCEDURE — 36415 COLL VENOUS BLD VENIPUNCTURE: CPT

## 2023-05-09 PROCEDURE — 85025 COMPLETE CBC W/AUTO DIFF WBC: CPT

## 2023-05-09 PROCEDURE — 84403 ASSAY OF TOTAL TESTOSTERONE: CPT

## 2023-05-09 NOTE — PROGRESS NOTES
Jay Chavez was just here for his 3 month lab draw. We will need new standing orders. Thank you.       Gina WEBB ([facility])

## 2023-05-10 LAB
ALBUMIN SERPL-MCNC: 3.7 G/DL (ref 3.4–5)
ALP SERPL-CCNC: 78 U/L (ref 40–150)
ALT SERPL W P-5'-P-CCNC: 31 U/L (ref 0–70)
ANION GAP SERPL CALCULATED.3IONS-SCNC: 5 MMOL/L (ref 3–14)
AST SERPL W P-5'-P-CCNC: 18 U/L (ref 0–45)
BILIRUB SERPL-MCNC: 0.5 MG/DL (ref 0.2–1.3)
BUN SERPL-MCNC: 22 MG/DL (ref 7–30)
CALCIUM SERPL-MCNC: 9.5 MG/DL (ref 8.5–10.1)
CHLORIDE BLD-SCNC: 104 MMOL/L (ref 94–109)
CO2 SERPL-SCNC: 30 MMOL/L (ref 20–32)
CREAT SERPL-MCNC: 1.06 MG/DL (ref 0.66–1.25)
GFR SERPL CREATININE-BSD FRML MDRD: 81 ML/MIN/1.73M2
GLUCOSE BLD-MCNC: 121 MG/DL (ref 70–99)
POTASSIUM BLD-SCNC: 4.4 MMOL/L (ref 3.4–5.3)
PROT SERPL-MCNC: 6.9 G/DL (ref 6.8–8.8)
PSA SERPL-MCNC: <0.01 UG/L (ref 0–4)
SODIUM SERPL-SCNC: 139 MMOL/L (ref 133–144)

## 2023-05-11 ENCOUNTER — INFUSION THERAPY VISIT (OUTPATIENT)
Dept: INFUSION THERAPY | Facility: CLINIC | Age: 58
End: 2023-05-11
Payer: COMMERCIAL

## 2023-05-11 ENCOUNTER — VIRTUAL VISIT (OUTPATIENT)
Dept: ONCOLOGY | Facility: CLINIC | Age: 58
End: 2023-05-11
Attending: NURSE PRACTITIONER
Payer: COMMERCIAL

## 2023-05-11 VITALS
SYSTOLIC BLOOD PRESSURE: 128 MMHG | BODY MASS INDEX: 28.06 KG/M2 | WEIGHT: 190.04 LBS | HEART RATE: 92 BPM | RESPIRATION RATE: 16 BRPM | TEMPERATURE: 97.6 F | OXYGEN SATURATION: 96 % | DIASTOLIC BLOOD PRESSURE: 89 MMHG

## 2023-05-11 VITALS
RESPIRATION RATE: 16 BRPM | DIASTOLIC BLOOD PRESSURE: 89 MMHG | SYSTOLIC BLOOD PRESSURE: 128 MMHG | OXYGEN SATURATION: 96 % | HEART RATE: 92 BPM | BODY MASS INDEX: 28.07 KG/M2 | WEIGHT: 190.1 LBS | TEMPERATURE: 97.6 F

## 2023-05-11 DIAGNOSIS — R91.8 PULMONARY NODULES: Primary | ICD-10-CM

## 2023-05-11 DIAGNOSIS — C79.51 PROSTATE CANCER METASTATIC TO BONE (H): Primary | ICD-10-CM

## 2023-05-11 DIAGNOSIS — C61 PROSTATE CANCER METASTATIC TO BONE (H): ICD-10-CM

## 2023-05-11 DIAGNOSIS — C61 PROSTATE CANCER (H): ICD-10-CM

## 2023-05-11 DIAGNOSIS — C61 PROSTATE CANCER METASTATIC TO BONE (H): Primary | ICD-10-CM

## 2023-05-11 DIAGNOSIS — C79.51 MALIGNANT NEOPLASM METASTATIC TO BONE (H): ICD-10-CM

## 2023-05-11 DIAGNOSIS — C79.51 PROSTATE CANCER METASTATIC TO BONE (H): ICD-10-CM

## 2023-05-11 PROCEDURE — 99214 OFFICE O/P EST MOD 30 MIN: CPT | Mod: VID | Performed by: INTERNAL MEDICINE

## 2023-05-11 PROCEDURE — 96402 CHEMO HORMON ANTINEOPL SQ/IM: CPT | Performed by: INTERNAL MEDICINE

## 2023-05-11 PROCEDURE — 99207 PR NO CHARGE LOS: CPT

## 2023-05-11 PROCEDURE — 96372 THER/PROPH/DIAG INJ SC/IM: CPT | Mod: 59 | Performed by: INTERNAL MEDICINE

## 2023-05-11 NOTE — LETTER
5/11/2023         RE: Per Etienne  [id number] [address]        Dear Colleague,    Thank you for referring your patient, Per Etienne, to the [facility]. Please see a copy of my visit note below.    Oncology Follow-up:  Date on this visit: May 11, 2023    Primary care physician: Per Chen   Urologist: Dr. Angel Colmenares     Metastatic hormone sensitive prostate cancer    Oncologic History:  1. Metastatic hormone sensitive prostate cancer  He presented to his PCP in December 2019 with slow urinary stream.  PSA was checked and was found to be 124. On January 8, 2020 abdomen pelvis CT showed groundglass opacity in the left lower lobe,  dystrophic calcification of the prostate gland, and a fatty liver.  Same-day bone scan was negative. On January 10, 2010 prostate biopsy showed on the left Robyn score 4+3 involving 7 cores and on the right [place] score 3+4 involving 6 cores, with positive perineural invasion.  He proceeded to undergo RRP by Dr. Colmenares on February 24, 2020.  Pathology revealed acinar adenocarcinoma Robyn 4+3 with focal ROBERTO, bladder neck base invasion, positive SVI, positive PNI, and multiple positive margins.  There was no LVSI and 0 out of 3 lymph nodes removed were involved with tumor; pT3bN0.  He received Eligard on 4/20/2020.  MRI prostate on 5/21/2020 was concerning for local recurrence in the prostatectomy bed and pelvic lymph nodes.  He had a PET/CT F-18 fluciclovine PET/CT on 5/20/2020 which showed 2 foci of increased uptake in the left ischium, with underlying  sclerotic lesion, and symphysis pubis, additionally low-level increased uptake in 3 lymph nodes along the right external and internal iliac nodes, and prostatectomy bed was noted.    On April 21, 2020 he received an Eligard injection.    He started Apalutamide on 6/12/2020.  Bone scan 1/22/2021: no bone mets.      2. Tinnitus b/l -  By 12/2020 he presented with 2  months c/o bilateral tinnitus and was evaluated by Dr. Shields from ENT. An audiogram showed a significant down-sloping mid to high frequency sensorineural hearing loss. There was no evidence of conductive hearing loss or significant asymmetry.  He was felt to have early onset presbycusis. He was referred for tinnitus retraining therapy.  Review of literature showed no clear association of apalutamide or Xgeva with tinnitus or ototoxicity. Felt to have early onset presbycusis.    3. Chronic lower back pain, with exacerbations- He is followed by Dr. Denise and  Dr. Clay from  pain management for evaluation of lower back pain.  He had CT of thoracic and lumbar spine on August 23, 2021.  This demonstrated unchanged sclerotic foci in T6 and T11 vertebral bodies dating back to May 2020.  There were degenerative changes in the lumbar spine with severe left neural foraminal narrowing at L4-L5 and moderate to severe at L3-L4.  He has proceeded with L3-4 epidural injection on 10/22/2021.  No acute osseous abnormalities were noted. His lower back pain and b/l pelvic pain has improved following epidural injection.     History Of Present Illness:  Mr. Etienne is a 57 year old male who presents for f/up of  metastatic hormone sensitive prostate cancer diagnosed in 01/2020, s/p RRP at that time but later found to have residual/recurrent disease in the pelvis and L inferior pubic body and pubic ramus metastasis.   He has been on androgen deprivation therapy since April 2020, and continues on Lupron every 3 months.     He started Apalutamide on 6/12/2020. He has remained on apalutamide.  He has not had any side effects attributable to apalutamide.  He has been taking his medications at the same time by the clock each day.  He has not missed a single dose that he is aware of.       He gets epidural injection to lumbar spine for back pain.    Jay has been doing well and has no new complaints at this clinic visit.  He has been  tolerating his apalutamide without any side effects.    He is driving to his cabin for start of fishing season right after our visit.    Past Medical/Surgical History:  Past Medical History:   Diagnosis Date     Gout      Hypertension goal BP (blood pressure) < 140/90 08/01/2022     Lumbar stenosis      Prostate cancer (H) 01/10/2020     Past Surgical History:   Procedure Laterality Date     BIOPSY PROSTATE TRANSRECTAL  01/10/2020    Dr. Colmenares     COLONOSCOPY  5/2020     LITHOTRIPSY  age 30s     MICROSCOPIC KNEE SURGERY Right age 30s     ORTHOPEDIC SURGERY Left 1985    Alan placed in Left Femur     PROSTATECTOMY RETROPUBIC RADICAL  02/24/2020    Dr. Colmenares     Allergies:  Allergies as of 05/11/2023     (No Known Allergies)     Current Medications:   Current Outpatient Medications   Medication Sig     apalutamide (ERLEADA) 60 MG tablet Take 4 tablets (240 mg) by mouth daily for 30 days     aspirin (ASA) 81 MG chewable tablet Take 1 tablet (81 mg) by mouth daily     denosumab (XGEVA) 120 MG/1.7ML SOLN injection Inject 1.7 mLs (120 mg) Subcutaneous every 3 months     HYDROcodone-acetaminophen (NORCO) 5-325 MG tablet Take 1 tablet by mouth 3 times daily as needed for breakthrough pain or severe pain Fill now. Start 4/20/23     leuprolide (ELIGARD) 45 MG kit Inject 45 mg Subcutaneous every 6 months     lisinopril (ZESTRIL) 10 MG tablet Take 1 tablet (10 mg) by mouth daily     morphine (MS CONTIN) 15 MG CR tablet Take 1 tablet (15 mg) by mouth every 12 hours May fill 5/10/23, start 5/12/23.     omeprazole (PRILOSEC) 20 MG DR capsule TAKE 1 CAPSULE BY MOUTH TWICE A DAY     SENNA-docusate sodium (SENNA S) 8.6-50 MG tablet Take 1 tablet by mouth 2 times daily     UNABLE TO FIND 1 tablet daily MEDICATION NAME: C, D, Zinc combination supplement     naloxone (NARCAN) 4 MG/0.1ML nasal spray Spray 1 spray (4 mg) into one nostril alternating nostrils as needed for opioid reversal every 2-3 minutes until assistance arrives (Patient not  taking: Reported on 2023)     No current facility-administered medications for this visit.     Facility-Administered Medications Ordered in Other Visits   Medication     leuprolide (LUPRON DEPOT) kit 22.5 mg        Family History:    His father was diagnosed with prostate cancer, at the age of 75. Paternal GM had throat cancer at the age of 94, nonsmoker.     Social History:  Social History     Socioeconomic History     Marital status:    Occupational History     Not on file   Social Needs     Food insecurity     Transportation needs   Tobacco Use     Smoking status: Former Smoker     Packs/day: 1.00     Years: 30.00     Pack years: 30.00     Types: Cigarettes, Cigars     Last attempt to quit: 2012     Years since quittin.4     Smokeless tobacco: Never Used   Substance and Sexual Activity     Alcohol use: Yes     Comment: 4-6 drinks per day - patient reports beer or vodka drinks     Physical Exam:      Wt Readings from Last 5 Encounters:   23 86.2 kg (190 lb 0.6 oz)   23 86.2 kg (190 lb 1.6 oz)   23 85.1 kg (187 lb 11.2 oz)   22 86.6 kg (191 lb)   10/26/22 86 kg (189 lb 9.6 oz)     Constitutional: alert and in no distress  Eyes: No redness or discharge  Respiratory: No cough or labored breathing.  Musculoskeletal: Full range of motion in extremities.  Skin: no visible skin lesions or discoloration  Neurological: No tremors and denies headache.  Psychiatric: Mentation appears normal and affect is normal as well.  Alert and oriented x3.  The rest the comprehensive physical examination is deferred due to public health emergency video visit restrictions.        Laboratory/Imaging Studies  Labs reviewed.    Recent Labs   Lab Test 23  1544 23  1519 22  1432 22  1434 22  0932    143 140 141 140   POTASSIUM 4.4 4.3 4.1 4.1 4.2   CHLORIDE 104 109 104 106 107   CO2 30 30 29 29 25   ANIONGAP 5 4 7 6 8   BUN 22 17 14 17 16   CR 1.06 1.00 1.04 0.99  1.05   * 110* 99 97 130*   NATALIYA 9.5 9.2 9.3 9.2 9.3     No results for input(s): MAG, PHOS in the last 98732 hours.  Recent Labs   Lab Test 05/09/23  1544 01/31/23  1519 11/01/22  1432 07/21/22  1434 04/21/22  0932   WBC 7.7 6.3 8.9 7.5 19.3*   HGB 13.3 13.6 13.2* 13.7 14.5    246 292 234 292   MCV 93 95 94 91 90   NEUTROPHIL 62 56 71 56 86     Recent Labs   Lab Test 05/09/23  1544 01/31/23  1519 11/01/22  1432   BILITOTAL 0.5 0.3 0.2   ALKPHOS 78 67 66   ALT 31 35 42   AST 18 20 17   ALBUMIN 3.7 3.9 3.6     TSH   Date Value Ref Range Status   01/21/2022 1.03 0.40 - 4.00 mU/L Final   10/29/2021 1.91 0.40 - 4.00 mU/L Final   08/23/2021 2.49 0.40 - 4.00 mU/L Final   04/29/2021 2.57 0.40 - 4.00 mU/L Final   01/22/2021 3.46 0.40 - 4.00 mU/L Final   10/30/2020 1.97 0.40 - 4.00 mU/L Final     No results for input(s): CEA in the last 87011 hours.  Results for orders placed or performed in visit on 02/09/23   XR Bone Survey Complete    Narrative    EXAM: XR BONE SURVEY COMPLETE  LOCATION: [facility]  DATE/TIME: 2/9/2023 3:21 PM    INDICATION: Prostate cancer (H). Prostate cancer metastatic to bone (H). Pain.  COMPARISON: 3/24/2021 radiographs.      Impression    IMPRESSION:   No lytic lesions. No evidence of myeloma. Small sclerotic lesions in the left symphysis pubis and left ischial tuberosity unchanged since the prior study. Intramedullary newton in the left femur across an old healed distal femoral diaphyseal fracture.   Advanced degenerative disc disease changes at L3-L4 with a mild levoscoliotic curve.       Recent Labs   Lab Test 05/09/23  1544 01/31/23  1519 11/01/22  1432 07/21/22  1434 04/21/22  0932 01/21/22  0850   PSA <0.01 <0.01 <0.01 <0.01 <0.01 <0.01   TESTOSTTOTAL  --  5* 2* 4* 15* 11*         ASSESSMENT/PLAN:  Jay is a very pleasant 56 year old man with metastatic (to the bones) prostate cancer. He is s/p Eligard on 4/21/2020. Jay has low volume hormone sensitive  metastatic disease, with residual disease locoregionally and bone metastasis (two lesions- in left ischium and symphysis pubis), asymptomatic.  He received Eligard on 4/21/2020 and started on Apalutamide on 6/12/2020.    1.  Metastatic prostate cancer-PSA decreased to <0.01. Continue apalutamide daily and Lupron every 3 months.  We will continue to monitor the patient per treatment protocol. We will follow CBC differential, CMP, total testosterone level and PSA every 3 months.    He has responded extremely well so far. His PSA remains undetectable which is great news. We will continue with leuprolide, with apalutamide and denosumab as current.     2. Bone metastasis-continue Xgeva q 3 months for prevention of skeletal related events given low volume metastatic disease to the bones.      3.  Lung cancer screening- CT chest findings from 1/31/2022 negative. Next due in one year    4. Intermittent leucocytosis secondary to intermittent neutrophilia  Per tends to get intermittent leukocytosis with neutrophilia.  He had high total WBC count with absolute neutrophil counts of 16.5 thousand.  This has dropped to 4.2K-well within the normal range.  He has been getting intermittent cyclical neutrophilia.     5. Drug monitoring- TSH monitoring q 3-4 months. TSH normal in 01/2022.     6. Mild normochromic normocytic anemia- continue to follow on CBCd, likely secondary to chronic inflammation, androgen deprivation therapy, apalutamide.  He is hemoglobin values have been fluctuating since his diagnosis of prostate cancer but most values have been between 13 and 14 g/dL.    7. GI - on PPI for s/o GErD and cough in am with improvement of symptoms per GI (Brett Preston's note)- on Omeprazole PO BID. Apalutamide associated with the following GI symptoms: Decreased appetite (12%), diarrhea (9% to 20%), nausea (18%) but not GERD s/p. Previously referred to GI due to persistent GERD symptoms and was continued by GI on Omeprazole and  recommended to proceed with upper EGD for further evaluation. He did not follow through with upper EgD or  GI appt.      Video-Visit Details    Type of service:  Video Visit  Originating Location (pt. Location): Home  Distant Location (provider location):  [facility]  Platform used for Video Visit: 10-20 [employer]    25 minutes spent on the date of the encounter doing chart review, history and exam, documentation and further activities as noted above      Scott Garcia    Hematologist and Medical Oncologist  [facility]         Again, thank you for allowing me to participate in the care of your patient.        Sincerely,        Scott Garcia MD

## 2023-05-11 NOTE — PROGRESS NOTES
Infusion Nursing Note:  Per Etienne presents today for Lupron and Xgeva.    Patient seen by provider today: Yes: Dr. Garcia   present during visit today: Not Applicable.    Note: N/A.      Intravenous Access:  No Intravenous access/labs at this visit.    Treatment Conditions:  Lab Results   Component Value Date     05/09/2023    POTASSIUM 4.4 05/09/2023    CR 1.06 05/09/2023    NATALIYA 9.5 05/09/2023    BILITOTAL 0.5 05/09/2023    ALBUMIN 3.7 05/09/2023    ALT 31 05/09/2023    AST 18 05/09/2023     Results reviewed, labs MET treatment parameters, ok to proceed with treatment.      Post Infusion Assessment:  Patient tolerated injection without incident.  Site patent and intact, free from redness, edema or discomfort.       Discharge Plan:   Patient discharged in stable condition accompanied by: self.  Departure Mode: Ambulatory.      Brittany Brown LPN

## 2023-05-11 NOTE — LETTER
5/11/2023         RE: Per Etienne  [id number] [facility] [id number]        Dear Colleague,    Thank you for referring your patient, Per Etienne, to the [facility]. Please see a copy of my visit note below.    Oncology Follow-up:  Date on this visit: May 11, 2023    Primary care physician: Per Chen   Urologist: Dr. Angel Colmenares     Metastatic hormone sensitive prostate cancer    Oncologic History:  1. Metastatic hormone sensitive prostate cancer  He presented to his PCP in December 2019 with slow urinary stream.  PSA was checked and was found to be 124. On January 8, 2020 abdomen pelvis CT showed groundglass opacity in the left lower lobe,  dystrophic calcification of the prostate gland, and a fatty liver.  Same-day bone scan was negative. On January 10, 2010 prostate biopsy showed on the left Robyn score 4+3 involving 7 cores and on the right [place] score 3+4 involving 6 cores, with positive perineural invasion.  He proceeded to undergo RRP by Dr. Colmenares on February 24, 2020.  Pathology revealed acinar adenocarcinoma Robyn 4+3 with focal ROBERTO, bladder neck base invasion, positive SVI, positive PNI, and multiple positive margins.  There was no LVSI and 0 out of 3 lymph nodes removed were involved with tumor; pT3bN0.  He received Eligard on 4/20/2020.  MRI prostate on 5/21/2020 was concerning for local recurrence in the prostatectomy bed and pelvic lymph nodes.  He had a PET/CT F-18 fluciclovine PET/CT on 5/20/2020 which showed 2 foci of increased uptake in the left ischium, with underlying  sclerotic lesion, and symphysis pubis, additionally low-level increased uptake in 3 lymph nodes along the right external and internal iliac nodes, and prostatectomy bed was noted.    On April 21, 2020 he received an Eligard injection.    He started Apalutamide on 6/12/2020.  Bone scan 1/22/2021: no bone mets.      2. Tinnitus b/l -  By 12/2020 he presented with 2  months c/o bilateral tinnitus and was evaluated by Dr. Shields from ENT. An audiogram showed a significant down-sloping mid to high frequency sensorineural hearing loss. There was no evidence of conductive hearing loss or significant asymmetry.  He was felt to have early onset presbycusis. He was referred for tinnitus retraining therapy.  Review of literature showed no clear association of apalutamide or Xgeva with tinnitus or ototoxicity. Felt to have early onset presbycusis.    3. Chronic lower back pain, with exacerbations- He is followed by Dr. Denise and  Dr. Clay from  pain management for evaluation of lower back pain.  He had CT of thoracic and lumbar spine on August 23, 2021.  This demonstrated unchanged sclerotic foci in T6 and T11 vertebral bodies dating back to May 2020.  There were degenerative changes in the lumbar spine with severe left neural foraminal narrowing at L4-L5 and moderate to severe at L3-L4.  He has proceeded with L3-4 epidural injection on 10/22/2021.  No acute osseous abnormalities were noted. His lower back pain and b/l pelvic pain has improved following epidural injection.     History Of Present Illness:  Mr. Etienne is a 57 year old male who presents for f/up of  metastatic hormone sensitive prostate cancer diagnosed in 01/2020, s/p RRP at that time but later found to have residual/recurrent disease in the pelvis and L inferior pubic body and pubic ramus metastasis.   He has been on androgen deprivation therapy since April 2020, and continues on Lupron every 3 months.     He started Apalutamide on 6/12/2020. He has remained on apalutamide.  He has not had any side effects attributable to apalutamide.  He has been taking his medications at the same time by the clock each day.  He has not missed a single dose that he is aware of.       He gets epidural injection to lumbar spine for back pain.    Jay has been doing well and has no new complaints at this clinic visit.  He has been  tolerating his apalutamide without any side effects.    He is driving to his cabin for start of fishing season right after our visit.    Past Medical/Surgical History:  Past Medical History:   Diagnosis Date     Gout      Hypertension goal BP (blood pressure) < 140/90 08/01/2022     Lumbar stenosis      Prostate cancer (H) 01/10/2020     Past Surgical History:   Procedure Laterality Date     BIOPSY PROSTATE TRANSRECTAL  01/10/2020    Dr. Colmenares     COLONOSCOPY  5/2020     LITHOTRIPSY  age 30s     MICROSCOPIC KNEE SURGERY Right age 30s     ORTHOPEDIC SURGERY Left 1985    Alan placed in Left Femur     PROSTATECTOMY RETROPUBIC RADICAL  02/24/2020    Dr. Colmenares     Allergies:  Allergies as of 05/11/2023     (No Known Allergies)     Current Medications:   Current Outpatient Medications   Medication Sig     apalutamide (ERLEADA) 60 MG tablet Take 4 tablets (240 mg) by mouth daily for 30 days     aspirin (ASA) 81 MG chewable tablet Take 1 tablet (81 mg) by mouth daily     denosumab (XGEVA) 120 MG/1.7ML SOLN injection Inject 1.7 mLs (120 mg) Subcutaneous every 3 months     HYDROcodone-acetaminophen (NORCO) 5-325 MG tablet Take 1 tablet by mouth 3 times daily as needed for breakthrough pain or severe pain Fill now. Start 4/20/23     leuprolide (ELIGARD) 45 MG kit Inject 45 mg Subcutaneous every 6 months     lisinopril (ZESTRIL) 10 MG tablet Take 1 tablet (10 mg) by mouth daily     morphine (MS CONTIN) 15 MG CR tablet Take 1 tablet (15 mg) by mouth every 12 hours May fill 5/10/23, start 5/12/23.     omeprazole (PRILOSEC) 20 MG DR capsule TAKE 1 CAPSULE BY MOUTH TWICE A DAY     SENNA-docusate sodium (SENNA S) 8.6-50 MG tablet Take 1 tablet by mouth 2 times daily     UNABLE TO FIND 1 tablet daily MEDICATION NAME: C, D, Zinc combination supplement     naloxone (NARCAN) 4 MG/0.1ML nasal spray Spray 1 spray (4 mg) into one nostril alternating nostrils as needed for opioid reversal every 2-3 minutes until assistance arrives (Patient not  taking: Reported on 2023)     No current facility-administered medications for this visit.     Facility-Administered Medications Ordered in Other Visits   Medication     leuprolide (LUPRON DEPOT) kit 22.5 mg        Family History:    His father was diagnosed with prostate cancer, at the age of 75. Paternal GM had throat cancer at the age of 94, nonsmoker.     Social History:  Social History     Socioeconomic History     Marital status:    Occupational History     Not on file   Social Needs     Food insecurity     Transportation needs   Tobacco Use     Smoking status: Former Smoker     Packs/day: 1.00     Years: 30.00     Pack years: 30.00     Types: Cigarettes, Cigars     Last attempt to quit: 2012     Years since quittin.4     Smokeless tobacco: Never Used   Substance and Sexual Activity     Alcohol use: Yes     Comment: 4-6 drinks per day - patient reports beer or vodka drinks     Physical Exam:      Wt Readings from Last 5 Encounters:   23 86.2 kg (190 lb 0.6 oz)   23 86.2 kg (190 lb 1.6 oz)   23 85.1 kg (187 lb 11.2 oz)   22 86.6 kg (191 lb)   10/26/22 86 kg (189 lb 9.6 oz)     Constitutional: alert and in no distress  Eyes: No redness or discharge  Respiratory: No cough or labored breathing.  Musculoskeletal: Full range of motion in extremities.  Skin: no visible skin lesions or discoloration  Neurological: No tremors and denies headache.  Psychiatric: Mentation appears normal and affect is normal as well.  Alert and oriented x3.  The rest the comprehensive physical examination is deferred due to public health emergency video visit restrictions.        Laboratory/Imaging Studies  Labs reviewed.    Recent Labs   Lab Test 23  1544 23  1519 22  1432 22  1434 22  0932    143 140 141 140   POTASSIUM 4.4 4.3 4.1 4.1 4.2   CHLORIDE 104 109 104 106 107   CO2 30 30 29 29 25   ANIONGAP 5 4 7 6 8   BUN 22 17 14 17 16   CR 1.06 1.00 1.04 0.99  1.05   * 110* 99 97 130*   NATALIYA 9.5 9.2 9.3 9.2 9.3     No results for input(s): MAG, PHOS in the last 71500 hours.  Recent Labs   Lab Test 05/09/23  1544 01/31/23  1519 11/01/22  1432 07/21/22  1434 04/21/22  0932   WBC 7.7 6.3 8.9 7.5 19.3*   HGB 13.3 13.6 13.2* 13.7 14.5    246 292 234 292   MCV 93 95 94 91 90   NEUTROPHIL 62 56 71 56 86     Recent Labs   Lab Test 05/09/23  1544 01/31/23  1519 11/01/22  1432   BILITOTAL 0.5 0.3 0.2   ALKPHOS 78 67 66   ALT 31 35 42   AST 18 20 17   ALBUMIN 3.7 3.9 3.6     TSH   Date Value Ref Range Status   01/21/2022 1.03 0.40 - 4.00 mU/L Final   10/29/2021 1.91 0.40 - 4.00 mU/L Final   08/23/2021 2.49 0.40 - 4.00 mU/L Final   04/29/2021 2.57 0.40 - 4.00 mU/L Final   01/22/2021 3.46 0.40 - 4.00 mU/L Final   10/30/2020 1.97 0.40 - 4.00 mU/L Final     No results for input(s): CEA in the last 11442 hours.  Results for orders placed or performed in visit on 02/09/23   XR Bone Survey Complete    Narrative    EXAM: XR BONE SURVEY COMPLETE  LOCATION: [facility]  DATE/TIME: 2/9/2023 3:21 PM    INDICATION: Prostate cancer (H). Prostate cancer metastatic to bone (H). Pain.  COMPARISON: 3/24/2021 radiographs.      Impression    IMPRESSION:   No lytic lesions. No evidence of myeloma. Small sclerotic lesions in the left symphysis pubis and left ischial tuberosity unchanged since the prior study. Intramedullary newton in the left femur across an old healed distal femoral diaphyseal fracture.   Advanced degenerative disc disease changes at L3-L4 with a mild levoscoliotic curve.       Recent Labs   Lab Test 05/09/23  1544 01/31/23  1519 11/01/22  1432 07/21/22  1434 04/21/22  0932 01/21/22  0850   PSA <0.01 <0.01 <0.01 <0.01 <0.01 <0.01   TESTOSTTOTAL  --  5* 2* 4* 15* 11*         ASSESSMENT/PLAN:  Jay is a very pleasant 56 year old man with metastatic (to the bones) prostate cancer. He is s/p Eligard on 4/21/2020. Jay has low volume hormone sensitive  metastatic disease, with residual disease locoregionally and bone metastasis (two lesions- in left ischium and symphysis pubis), asymptomatic.  He received Eligard on 4/21/2020 and started on Apalutamide on 6/12/2020.    1.  Metastatic prostate cancer-PSA decreased to <0.01. Continue apalutamide daily and Lupron every 3 months.  We will continue to monitor the patient per treatment protocol. We will follow CBC differential, CMP, total testosterone level and PSA every 3 months.    He has responded extremely well so far. His PSA remains undetectable which is great news. We will continue with leuprolide, with apalutamide and denosumab as current.     2. Bone metastasis-continue Xgeva q 3 months for prevention of skeletal related events given low volume metastatic disease to the bones.      3.  Lung cancer screening- CT chest findings from 1/31/2022 negative. Next due in one year    4. Intermittent leucocytosis secondary to intermittent neutrophilia  Per tends to get intermittent leukocytosis with neutrophilia.  He had high total WBC count with absolute neutrophil counts of 16.5 thousand.  This has dropped to 4.2K-well within the normal range.  He has been getting intermittent cyclical neutrophilia.     5. Drug monitoring- TSH monitoring q 3-4 months. TSH normal in 01/2022.     6. Mild normochromic normocytic anemia- continue to follow on CBCd, likely secondary to chronic inflammation, androgen deprivation therapy, apalutamide.  He is hemoglobin values have been fluctuating since his diagnosis of prostate cancer but most values have been between 13 and 14 g/dL.    7. GI - on PPI for s/o GErD and cough in am with improvement of symptoms per GI (Brett Preston's note)- on Omeprazole PO BID. Apalutamide associated with the following GI symptoms: Decreased appetite (12%), diarrhea (9% to 20%), nausea (18%) but not GERD s/p. Previously referred to GI due to persistent GERD symptoms and was continued by GI on Omeprazole and  recommended to proceed with upper EGD for further evaluation. He did not follow through with upper EgD or  GI appt.      Video-Visit Details    Type of service:  Video Visit  Originating Location (pt. Location): Home  Distant Location (provider location):  [facility]  Platform used for Video Visit: [employer]    25 minutes spent on the date of the encounter doing chart review, history and exam, documentation and further activities as noted above      Scott Garcia    Hematologist and Medical Oncologist  [facility]         Again, thank you for allowing me to participate in the care of your patient.        Sincerely,        Scott Garcia MD

## 2023-05-11 NOTE — PROGRESS NOTES
Oncology Follow-up:  Date on this visit: May 11, 2023    Primary care physician: Per Chen   Urologist: Dr. Angel Colmenares     Metastatic hormone sensitive prostate cancer    Oncologic History:  1. Metastatic hormone sensitive prostate cancer  He presented to his PCP in December 2019 with slow urinary stream.  PSA was checked and was found to be 124. On January 8, 2020 abdomen pelvis CT showed groundglass opacity in the left lower lobe,  dystrophic calcification of the prostate gland, and a fatty liver.  Same-day bone scan was negative. On January 10, 2010 prostate biopsy showed on the left Robyn score 4+3 involving 7 cores and on the right Robyn score 3+4 involving 6 cores, with positive perineural invasion.  He proceeded to undergo RRP by Dr. Colmenares on February 24, 2020.  Pathology revealed acinar adenocarcinoma [place] 4+3 with focal ROBERTO, bladder neck base invasion, positive SVI, positive PNI, and multiple positive margins.  There was no LVSI and 0 out of 3 lymph nodes removed were involved with tumor; pT3bN0.  He received Eligard on 4/20/2020.  MRI prostate on 5/21/2020 was concerning for local recurrence in the prostatectomy bed and pelvic lymph nodes.  He had a PET/CT F-18 fluciclovine PET/CT on 5/20/2020 which showed 2 foci of increased uptake in the left ischium, with underlying  sclerotic lesion, and symphysis pubis, additionally low-level increased uptake in 3 lymph nodes along the right external and internal iliac nodes, and prostatectomy bed was noted.    On April 21, 2020 he received an Eligard injection.    He started Apalutamide on 6/12/2020.  Bone scan 1/22/2021: no bone mets.      2. Tinnitus b/l -  By 12/2020 he presented with 2 months c/o bilateral tinnitus and was evaluated by Dr. Shields from ENT. An audiogram showed a significant down-sloping mid to high frequency sensorineural hearing loss. There was no evidence of conductive hearing loss or significant asymmetry.  He was felt to have  early onset presbycusis. He was referred for tinnitus retraining therapy.  Review of literature showed no clear association of apalutamide or Xgeva with tinnitus or ototoxicity. Felt to have early onset presbycusis.    3. Chronic lower back pain, with exacerbations- He is followed by Dr. Denise and  Dr. Clay from  pain management for evaluation of lower back pain.  He had CT of thoracic and lumbar spine on August 23, 2021.  This demonstrated unchanged sclerotic foci in T6 and T11 vertebral bodies dating back to May 2020.  There were degenerative changes in the lumbar spine with severe left neural foraminal narrowing at L4-L5 and moderate to severe at L3-L4.  He has proceeded with L3-4 epidural injection on 10/22/2021.  No acute osseous abnormalities were noted. His lower back pain and b/l pelvic pain has improved following epidural injection.     History Of Present Illness:  Mr. Etienne is a 57 year old male who presents for f/up of  metastatic hormone sensitive prostate cancer diagnosed in 01/2020, s/p RRP at that time but later found to have residual/recurrent disease in the pelvis and L inferior pubic body and pubic ramus metastasis.   He has been on androgen deprivation therapy since April 2020, and continues on Lupron every 3 months.     He started Apalutamide on 6/12/2020. He has remained on apalutamide.  He has not had any side effects attributable to apalutamide.  He has been taking his medications at the same time by the clock each day.  He has not missed a single dose that he is aware of.       He gets epidural injection to lumbar spine for back pain.    Jay has been doing well and has no new complaints at this clinic visit.  He has been tolerating his apalutamide without any side effects.    He is driving to his cabin for start of fishing season right after our visit.    Past Medical/Surgical History:  Past Medical History:   Diagnosis Date     Gout      Hypertension goal BP (blood pressure) < 140/90  08/01/2022     Lumbar stenosis      Prostate cancer (H) 01/10/2020     Past Surgical History:   Procedure Laterality Date     BIOPSY PROSTATE TRANSRECTAL  01/10/2020    Dr. Colmenares     COLONOSCOPY  5/2020     LITHOTRIPSY  age 30s     MICROSCOPIC KNEE SURGERY Right age 30s     ORTHOPEDIC SURGERY Left 1985    Alan placed in Left Femur     PROSTATECTOMY RETROPUBIC RADICAL  02/24/2020    Dr. Colmenares     Allergies:  Allergies as of 05/11/2023     (No Known Allergies)     Current Medications:   Current Outpatient Medications   Medication Sig     apalutamide (ERLEADA) 60 MG tablet Take 4 tablets (240 mg) by mouth daily for 30 days     aspirin (ASA) 81 MG chewable tablet Take 1 tablet (81 mg) by mouth daily     denosumab (XGEVA) 120 MG/1.7ML SOLN injection Inject 1.7 mLs (120 mg) Subcutaneous every 3 months     HYDROcodone-acetaminophen (NORCO) 5-325 MG tablet Take 1 tablet by mouth 3 times daily as needed for breakthrough pain or severe pain Fill now. Start 4/20/23     leuprolide (ELIGARD) 45 MG kit Inject 45 mg Subcutaneous every 6 months     lisinopril (ZESTRIL) 10 MG tablet Take 1 tablet (10 mg) by mouth daily     morphine (MS CONTIN) 15 MG CR tablet Take 1 tablet (15 mg) by mouth every 12 hours May fill 5/10/23, start 5/12/23.     omeprazole (PRILOSEC) 20 MG DR capsule TAKE 1 CAPSULE BY MOUTH TWICE A DAY     SENNA-docusate sodium (SENNA S) 8.6-50 MG tablet Take 1 tablet by mouth 2 times daily     UNABLE TO FIND 1 tablet daily MEDICATION NAME: C, D, Zinc combination supplement     naloxone (NARCAN) 4 MG/0.1ML nasal spray Spray 1 spray (4 mg) into one nostril alternating nostrils as needed for opioid reversal every 2-3 minutes until assistance arrives (Patient not taking: Reported on 1/18/2023)     No current facility-administered medications for this visit.     Facility-Administered Medications Ordered in Other Visits   Medication     leuprolide (LUPRON DEPOT) kit 22.5 mg        Family History:    His father was diagnosed with  prostate cancer, at the age of 75. Paternal GM had throat cancer at the age of 94, nonsmoker.     Social History:  Social History     Socioeconomic History     Marital status:    Occupational History     Not on file   Social Needs     Food insecurity     Transportation needs   Tobacco Use     Smoking status: Former Smoker     Packs/day: 1.00     Years: 30.00     Pack years: 30.00     Types: Cigarettes, Cigars     Last attempt to quit: 2012     Years since quittin.4     Smokeless tobacco: Never Used   Substance and Sexual Activity     Alcohol use: Yes     Comment: 4-6 drinks per day - patient reports beer or vodka drinks     Physical Exam:      Wt Readings from Last 5 Encounters:   23 86.2 kg (190 lb 0.6 oz)   23 86.2 kg (190 lb 1.6 oz)   23 85.1 kg (187 lb 11.2 oz)   22 86.6 kg (191 lb)   10/26/22 86 kg (189 lb 9.6 oz)     Constitutional: alert and in no distress  Eyes: No redness or discharge  Respiratory: No cough or labored breathing.  Musculoskeletal: Full range of motion in extremities.  Skin: no visible skin lesions or discoloration  Neurological: No tremors and denies headache.  Psychiatric: Mentation appears normal and affect is normal as well.  Alert and oriented x3.  The rest the comprehensive physical examination is deferred due to public health emergency video visit restrictions.        Laboratory/Imaging Studies  Labs reviewed.    Recent Labs   Lab Test 23  1544 23  1519 22  1432 22  1434 22  0932    143 140 141 140   POTASSIUM 4.4 4.3 4.1 4.1 4.2   CHLORIDE 104 109 104 106 107   CO2 30 30 29 29 25   ANIONGAP 5 4 7 6 8   BUN 22 17 14 17 16   CR 1.06 1.00 1.04 0.99 1.05   * 110* 99 97 130*   NATALIYA 9.5 9.2 9.3 9.2 9.3     No results for input(s): MAG, PHOS in the last 97465 hours.  Recent Labs   Lab Test 23  1544 23  1519 22  1432 22  1434 22  0932   WBC 7.7 6.3 8.9 7.5 19.3*   HGB 13.3 13.6 13.2*  13.7 14.5    246 292 234 292   MCV 93 95 94 91 90   NEUTROPHIL 62 56 71 56 86     Recent Labs   Lab Test 05/09/23  1544 01/31/23  1519 11/01/22  1432   BILITOTAL 0.5 0.3 0.2   ALKPHOS 78 67 66   ALT 31 35 42   AST 18 20 17   ALBUMIN 3.7 3.9 3.6     TSH   Date Value Ref Range Status   01/21/2022 1.03 0.40 - 4.00 mU/L Final   10/29/2021 1.91 0.40 - 4.00 mU/L Final   08/23/2021 2.49 0.40 - 4.00 mU/L Final   04/29/2021 2.57 0.40 - 4.00 mU/L Final   01/22/2021 3.46 0.40 - 4.00 mU/L Final   10/30/2020 1.97 0.40 - 4.00 mU/L Final     No results for input(s): CEA in the last 34901 hours.  Results for orders placed or performed in visit on 02/09/23   XR Bone Survey Complete    Narrative    EXAM: XR BONE SURVEY COMPLETE  LOCATION: [facility]  DATE/TIME: 2/9/2023 3:21 PM    INDICATION: Prostate cancer (H). Prostate cancer metastatic to bone (H). Pain.  COMPARISON: 3/24/2021 radiographs.      Impression    IMPRESSION:   No lytic lesions. No evidence of myeloma. Small sclerotic lesions in the left symphysis pubis and left ischial tuberosity unchanged since the prior study. Intramedullary newton in the left femur across an old healed distal femoral diaphyseal fracture.   Advanced degenerative disc disease changes at L3-L4 with a mild levoscoliotic curve.       Recent Labs   Lab Test 05/09/23  1544 01/31/23  1519 11/01/22  1432 07/21/22  1434 04/21/22  0932 01/21/22  0850   PSA <0.01 <0.01 <0.01 <0.01 <0.01 <0.01   TESTOSTTOTAL  --  5* 2* 4* 15* 11*         ASSESSMENT/PLAN:  Jay is a very pleasant 56 year old man with metastatic (to the bones) prostate cancer. He is s/p Eligard on 4/21/2020. Jay has low volume hormone sensitive metastatic disease, with residual disease locoregionally and bone metastasis (two lesions- in left ischium and symphysis pubis), asymptomatic.  He received Eligard on 4/21/2020 and started on Apalutamide on 6/12/2020.    1.  Metastatic prostate cancer-PSA decreased to <0.01.  Continue apalutamide daily and Lupron every 3 months.  We will continue to monitor the patient per treatment protocol. We will follow CBC differential, CMP, total testosterone level and PSA every 3 months.    He has responded extremely well so far. His PSA remains undetectable which is great news. We will continue with leuprolide, with apalutamide and denosumab as current.     2. Bone metastasis-continue Xgeva q 3 months for prevention of skeletal related events given low volume metastatic disease to the bones.      3.  Lung cancer screening- CT chest findings from 1/31/2022 negative. Next due in one year    4. Intermittent leucocytosis secondary to intermittent neutrophilia  Per tends to get intermittent leukocytosis with neutrophilia.  He had high total WBC count with absolute neutrophil counts of 16.5 thousand.  This has dropped to 4.2K-well within the normal range.  He has been getting intermittent cyclical neutrophilia.     5. Drug monitoring- TSH monitoring q 3-4 months. TSH normal in 01/2022.     6. Mild normochromic normocytic anemia- continue to follow on CBCd, likely secondary to chronic inflammation, androgen deprivation therapy, apalutamide.  He is hemoglobin values have been fluctuating since his diagnosis of prostate cancer but most values have been between 13 and 14 g/dL.    7. GI - on PPI for s/o GErD and cough in am with improvement of symptoms per GI (Brett Preston's note)- on Omeprazole PO BID. Apalutamide associated with the following GI symptoms: Decreased appetite (12%), diarrhea (9% to 20%), nausea (18%) but not GERD s/p. Previously referred to GI due to persistent GERD symptoms and was continued by GI on Omeprazole and recommended to proceed with upper EGD for further evaluation. He did not follow through with upper EgD or  GI appt.      Video-Visit Details    Type of service:  Video Visit  Originating Location (pt. Location): Home  Distant Location (provider location):  [facility]  Platform used for Video Visit: Lorena    25 minutes spent on the date of the encounter doing chart review, history and exam, documentation and further activities as noted above      Scott Garcia    Hematologist and Medical Oncologist  [facility]

## 2023-05-12 LAB — TESTOST SERPL-MCNC: 8 NG/DL (ref 240–950)

## 2023-05-15 DIAGNOSIS — C61 PROSTATE CANCER (H): Primary | ICD-10-CM

## 2023-05-17 ENCOUNTER — MYC REFILL (OUTPATIENT)
Dept: PALLIATIVE MEDICINE | Facility: CLINIC | Age: 58
End: 2023-05-17
Payer: COMMERCIAL

## 2023-05-17 DIAGNOSIS — C61 PROSTATE CANCER METASTATIC TO BONE (H): ICD-10-CM

## 2023-05-17 DIAGNOSIS — C79.51 PROSTATE CANCER METASTATIC TO BONE (H): ICD-10-CM

## 2023-05-17 NOTE — TELEPHONE ENCOUNTER
Received call from patient requesting refill(s) of HYDROcodone-acetaminophen (NORCO) 5-325 MG tablet     Last dispensed from pharmacy on 04/21/23    Patient's last office/virtual visit by prescribing provider on 03/29/23  Next office/virtual appointment scheduled for 06/07/23    Last urine drug screen date 10/06/22  Current opioid agreement on file (completed within the last year) Yes Date of opioid agreement: 10/06/22    E-prescribe to     [facility] [address] [place], [place] - [address] [address]  Phone: [phone] Fax: [phone]    Will route to nursing [place] for review and preparation of prescription(s).       Marleen Mello MA  [facility] [place]

## 2023-05-17 NOTE — TELEPHONE ENCOUNTER
Medication refill information reviewed.     Due date for HYDROcodone-acetaminophen (NORCO) 5-325 MG tablet  is 5/20/2023     Prescriptions prepped for review.     Will route to provider.     Caitlin Miranda RN  [facility]  [phone]

## 2023-05-18 RX ORDER — HYDROCODONE BITARTRATE AND ACETAMINOPHEN 5; 325 MG/1; MG/1
1 TABLET ORAL 3 TIMES DAILY PRN
Qty: 60 TABLET | Refills: 0 | Status: SHIPPED | OUTPATIENT
Start: 2023-05-18 | End: 2023-06-07

## 2023-05-18 NOTE — TELEPHONE ENCOUNTER
Chart reviewed - request appears appropriate. Refilled.     Chanel Bruno DNP, APRN, AGNP-C  [facility]

## 2023-06-02 ENCOUNTER — TELEPHONE (OUTPATIENT)
Dept: ONCOLOGY | Facility: CLINIC | Age: 58
End: 2023-06-02
Payer: COMMERCIAL

## 2023-06-02 NOTE — TELEPHONE ENCOUNTER
6/2/23 [facility] for Jay to return call and confirm or reschedule appt's scheduled for labs 8/8/23 and injections at  and video return with Dr Garcia on 8/10/23.  If Jay is ok with these appt's we need to ask him if he will need a room at the  clinic for his video visit and then add this information to the infusion appointment notes at .  Thank you,     Smita Morris  Complex   [facility]

## 2023-06-03 DIAGNOSIS — C61 PROSTATE CANCER (H): ICD-10-CM

## 2023-06-05 RX ORDER — APALUTAMIDE 60 MG/1
TABLET, FILM COATED ORAL
Qty: 120 TABLET | Refills: 0 | OUTPATIENT
Start: 2023-06-05

## 2023-06-07 ENCOUNTER — OFFICE VISIT (OUTPATIENT)
Dept: PALLIATIVE MEDICINE | Facility: CLINIC | Age: 58
End: 2023-06-07
Payer: COMMERCIAL

## 2023-06-07 VITALS — DIASTOLIC BLOOD PRESSURE: 88 MMHG | HEART RATE: 96 BPM | SYSTOLIC BLOOD PRESSURE: 138 MMHG

## 2023-06-07 DIAGNOSIS — G89.3 CANCER ASSOCIATED PAIN: ICD-10-CM

## 2023-06-07 DIAGNOSIS — C79.51 PROSTATE CANCER METASTATIC TO BONE (H): ICD-10-CM

## 2023-06-07 DIAGNOSIS — M54.16 LUMBAR RADICULOPATHY: Primary | ICD-10-CM

## 2023-06-07 DIAGNOSIS — C61 PROSTATE CANCER METASTATIC TO BONE (H): ICD-10-CM

## 2023-06-07 PROCEDURE — 99214 OFFICE O/P EST MOD 30 MIN: CPT

## 2023-06-07 RX ORDER — HYDROCODONE BITARTRATE AND ACETAMINOPHEN 5; 325 MG/1; MG/1
1 TABLET ORAL 3 TIMES DAILY PRN
Qty: 90 TABLET | Refills: 0 | Status: SHIPPED | OUTPATIENT
Start: 2023-06-07 | End: 2023-07-09

## 2023-06-07 RX ORDER — MORPHINE SULFATE 15 MG/1
15 TABLET, FILM COATED, EXTENDED RELEASE ORAL EVERY 12 HOURS
Qty: 60 TABLET | Refills: 0 | Status: SHIPPED | OUTPATIENT
Start: 2023-06-07 | End: 2023-07-12

## 2023-06-07 ASSESSMENT — ANXIETY QUESTIONNAIRES
2. NOT BEING ABLE TO STOP OR CONTROL WORRYING: NOT AT ALL
6. BECOMING EASILY ANNOYED OR IRRITABLE: NOT AT ALL
1. FEELING NERVOUS, ANXIOUS, OR ON EDGE: NOT AT ALL
5. BEING SO RESTLESS THAT IT IS HARD TO SIT STILL: NOT AT ALL
4. TROUBLE RELAXING: NOT AT ALL
GAD7 TOTAL SCORE: 0
7. FEELING AFRAID AS IF SOMETHING AWFUL MIGHT HAPPEN: NOT AT ALL
3. WORRYING TOO MUCH ABOUT DIFFERENT THINGS: NOT AT ALL
GAD7 TOTAL SCORE: 0

## 2023-06-07 ASSESSMENT — PAIN SCALES - GENERAL: PAINLEVEL: SEVERE PAIN (7)

## 2023-06-07 NOTE — PATIENT INSTRUCTIONS
1.  Pain Physical Therapy:  WALT Thompson is very active. Despite the pain, he continues to work full time as a , which is somewhat labor intensive (frequently using ladder).               2.  Pain Psychologist to address relaxation, behavioral change, coping style, and other factors important to improvement.  WALT Thompson may possibly benefit from support for his chronic health conditions overall, though he does seem to manage fairly well. He continues to work full time and likes to stay active.               3.  Diagnostic Studies:  None               4.  Medication Management:   Continue MS Contin to 15 mg every 12 hours. He continues to appreciate benefit, stable baseline pain, supports functioning, continues to work full time. Refilled today.   Continue hydrocodone 5-325 three times daily as needed for breakthrough pain. He does not use very frequently, usually only takes 1 tab daily as needed. He continues to appreciate benefit for breakthrough pain, no concerns for continuation. Refilled today for #90 tabs to last 30 days.   Continue Senna S to 1 tab twice daily. He reports improvement with OIC since last visit, with increased frequency to BID.               5.  Potential procedures: He had L4-5 LUIS on 12/1/22 that was helpful for left leg radicular pain, though he reported not as helpful as prior injections. He appreciated moderate improvement in left leg for about 5 months, starting noticing increased pain about a month ago, with gradual progression. Orders placed for repeat LUIS.               7.  Follow up with TEREZA Sanchez CNP in 10-12 weeks or sooner around 4 weeks postprocedure if benefit not sufficient/similar results as prior injections.     ----------------------------------------------------------------  Clinic Number:  [phone]   Call with any questions about your care and for scheduling assistance.   Calls are returned Monday through Friday between 8 AM and 4:30  PM. We usually get back to you within 2 business days depending on the issue/request.    If we are prescribing your medications:  For opioid medication refills, call the clinic or send a [employer] message 7 days in advance.  Please include:  Name of requested medication  Name of the pharmacy.  For non-opioid medications, call your pharmacy directly to request a refill. Please allow 3-4 days to be processed.   Per [place] State Law:  All controlled substance prescriptions must be filled within 30 days of being written.    For those controlled substances allowing refills, pickup must occur within 30 days of last fill.      We believe regular attendance is key to your success in our program!    Any time you are unable to keep your appointment we ask that you call us at least 24 hours in advance to cancel.This will allow us to offer the appointment time to another patient.   Multiple missed appointments may lead to dismissal from the clinic.

## 2023-06-07 NOTE — PROGRESS NOTES
[facility]     Date of visit: 6/7/2023      Assessment:   Per Etienne is a 57 year old male with a past medical history significant for prostate cancer with bone metastasis, pulmonary nodules, lumbar stenosis, HTN, gout who presents with complaints of cancer associated pain.      1. Low back pain with LLE radic, neuropathy bilat feet, pain of multiple joints - His back pain with left sided radicular pain with L5 distribution that has been present for many years. He has been referred for procedure by his PCP for repeat L4/5 LUIS (Regina/Obed), which historically have given him significant symptom relief, although he did not appreciate as much benefit from the most recent LUIS on 8/2/22. Lumbar CT from 8/2021 demonstrated severe left neural foraminal narrowing at L4-L5 and moderate to severe right at L3-4, additional multilevel spinal canal stenosis and neural foraminal stenosis noted. I suspect his low back pain is associated with multiple factors, including underlying degenerative changes of lumbar spine, bone metastasis, and likely some myofascial component. Neuropathic symptoms in feet and generalized joint pain is likely secondary effects from cancer treatment and bone metastasis.   2. Mental Health - the patient's mental health concerns, specifically prostate cancer with bone mets, affect his experience of pain and contribute to his clinically significant distress. He may benefit from more generalized health psychology to support chronic illness/cancer management and will consider discussing at future follow up.       Visit Diagnoses:  1. Lumbar radiculopathy    2. Prostate cancer metastatic to bone (H)    3. Cancer associated pain        Plan:  Diagnosis reviewed, treatment option addressed, and risk/benifits discussed.  Self-care instructions given.  I am recommending a multidisciplinary treatment plan to help this patient better manage their pain.                  1.  Pain  Physical Therapy:  WALT Thompson is very active. Despite the pain, he continues to work full time as a , which is somewhat labor intensive (frequently using ladder).               2.  Pain Psychologist to address relaxation, behavioral change, coping style, and other factors important to improvement.  WALT Thompson may possibly benefit from support for his chronic health conditions overall, though he does seem to manage fairly well. He continues to work full time and likes to stay active.               3.  Diagnostic Studies:  None               4.  Medication Management:   1. Continue MS Contin to 15 mg every 12 hours. He continues to appreciate benefit, stable baseline pain, supports functioning, continues to work full time. Refilled today.   2. Continue hydrocodone 5-325 three times daily as needed for breakthrough pain. He does not use very frequently, usually only takes 1 tab daily as needed. He continues to appreciate benefit for breakthrough pain, no concerns for continuation. Refilled today for #90 tabs to last 30 days.   3. Continue Senna S to 1 tab twice daily. He reports improvement with OIC since last visit, with increased frequency to BID.               5.  Potential procedures: He had L4-5 LUIS on 12/1/22 that was helpful for left leg radicular pain, though he reported not as helpful as prior injections. He appreciated moderate improvement in left leg for about 5 months, starting noticing increased pain about a month ago, with gradual progression. Orders placed for repeat LUIS.               7.  Follow up with TEREZA Sanchez CNP in 10-12 weeks or sooner around 4 weeks postprocedure if benefit not sufficient/similar results as prior injections.        Review of Electronic Chart: Today I have also reviewed available medical information in the patient's medical record at [facility] and Care Everywhere (if available), including relevant provider notes, laboratory work, and  imaging.     Chanel Bruno DNP, APRN, [facility]     -------------------------------------------------    Subjective:    Chief complaint:   Chief Complaint   Patient presents with     Pain     Per pt, If she can put me in another injection       Interval history:  Per Etienne is a 57 year old male last seen on 3/29/23.  They are a patient of mine seen in follow up.     Recommendations/plan at the last visit included:              1.  Pain Physical Therapy:  WALT Thompson is very active. Despite the pain, he continues to work full time as a , which is somewhat labor intensive (frequently using ladder).               2.  Pain Psychologist to address relaxation, behavioral change, coping style, and other factors important to improvement.  WALT Thompson may possibly benefit from support for his chronic health conditions overall, though he does seem to manage fairly well. He continues to work full time and likes to stay active.               3.  Diagnostic Studies:  None               4.  Medication Management:   4. Continue MS Contin to 15 mg every 12 hours. He continues to appreciate benefit, stable baseline pain, supports functioning, continues to work full time. Refilled today.   5. Continue hydrocodone 5-325 three times daily as needed for breakthrough pain. He does not use very frequently, usually only takes 1 tab daily as needed. He continues to appreciate benefit for breakthrough pain, no concerns for continuation.   6. Continue Senna S to 1 tab twice daily. He reports improvement with OIC since last visit, with increased frequency to BID.               5.  Potential procedures: He had LESI on 12/1/22 that was not as helpful as prior injections.               7.  Follow up with TEREZA Sanchez CNP in 10-12 weeks         Since his last visit, Per Etienne reports:  -His pain is a little higher than it was at last visit.   -He states pain was worse this  morning, notes he was more active yesterday.   -Pain in LLE seems to be returning over the last month.   -He has felt some fatigue/weakness in LLE, thinks this is more so pain related.   -He's had LESI in the past that have been very helpful for leg pain, recent LUIS on 12/1/22 was not as helpful as prior injections.   -He is open to repeat LESI, will monitor for benefit after and follow up with me sooner if not as helpful.   -He continues MS Contin 15 mg BID, hydrocodone 5-325 mg TID PRN.   -He appreciates good benefit with meds, no side effects.   -He is excited to find out if [employer] gets the [employer] bid, will find out on the 21st.   -He has been fishing a lot.   -His grandson, Juwan, is 7.5 months now, standing now.       Pain Information:   Pain rating: averages 7/10 on a 0-10 scale.      Interval history from last visit on 3/29/23:  -his pain is about the same as it was at last visit.   -He has good days and bad days.   -He thinks pain is consistent.   -He continues MS contin 15 mg BID, hydrocodone 5 TID PRN.   -He reports medications are working well, stable at this time.   -He was dealing with OIC, increased senna to BID at last visit, constipation improved.   -No other side effects from medications.   -He had reached out in between visits for a reasonable early refill due to travels.   -He went to [place] recently, had a great trip.   -They drove down (his wife/family).  Pain Information:              Pain rating: averages 4/10 on a 0-10 scale.        Interval history from last visit on 1/18/23:  -His pain is about the same as it was at last visit.   -He had LESI with Dr. Tapia on 12/1/22.  -He does not think it was as helpful as prior LESI.   -He is taking MS Contin 15 mg BID, has PRN Norco for breakthrough pain (does not use often).  -He has needed to use hydrocodone a bit more since last visit.   -He thinks pain is reasonably well controlled right now.   -He is taking Senna S daily at bedtime,  has BM every other day, sometimes harder stool.   -He is open to increasing to BID frequency.   -His boss verbalized concerns about driving with medications, patient does not share similar concerns, denies side effects.   -He is waiting to hear about [employer] next year, will be involved with the project if [employer] wins.   -He is going to consider retiring sometime in the next year.         Interval history from last visit on 11/3/22:  -He had shingles since last visit  -Pain has been increased  -He is also been experiencing constipation after increasing MS Contin 15mg to BID dosing.   -His grandson, Juwan Del Castillo, was born on 10/20/22.   -He will be getting shingles injection soon.  -He has been able to be more acvtive with long acting opioid  -[place] 2 miles per day, lost 8 pounds, able to do job better now that pain is under better control.         Current Pain Treatments:    Medications:                  MS Contin 15 mg BID              Hydrocodone 5-325 mg TID PRN      Other:                  LESI on 12/1/22 with Dr. Tapia     Current MME: 45     Review of [place] Prescription Monitoring Program (): No concern for abuse or misuse of controlled medications based on this report. Reviewed - appears appropriate.      Annual Controlled Substance Agreement/UDS due date: Completed on 10/6/22     Past pain treatments:  LESI - helpful         Medications:  Current Outpatient Medications   Medication Sig Dispense Refill     HYDROcodone-acetaminophen (NORCO) 5-325 MG tablet Take 1 tablet by mouth 3 times daily as needed for breakthrough pain or severe pain 90 tablet 0     morphine (MS CONTIN) 15 MG CR tablet Take 1 tablet (15 mg) by mouth every 12 hours 60 tablet 0     apalutamide (ERLEADA) 60 MG tablet Take 4 tablets (240 mg) by mouth daily for 30 days 120 tablet 0     aspirin (ASA) 81 MG chewable tablet Take 1 tablet (81 mg) by mouth daily       denosumab (XGEVA) 120 MG/1.7ML SOLN injection Inject 1.7  mLs (120 mg) Subcutaneous every 3 months 1.7 mL 0     leuprolide (ELIGARD) 45 MG kit Inject 45 mg Subcutaneous every 6 months       lisinopril (ZESTRIL) 10 MG tablet Take 1 tablet (10 mg) by mouth daily 90 tablet 3     naloxone (NARCAN) 4 MG/0.1ML nasal spray Spray 1 spray (4 mg) into one nostril alternating nostrils as needed for opioid reversal every 2-3 minutes until assistance arrives (Patient not taking: Reported on 1/18/2023) 0.2 mL 0     omeprazole (PRILOSEC) 20 MG DR capsule TAKE 1 CAPSULE BY MOUTH TWICE A  capsule 1     SENNA-docusate sodium (SENNA S) 8.6-50 MG tablet Take 1 tablet by mouth 2 times daily 60 tablet 3     UNABLE TO FIND 1 tablet daily MEDICATION NAME: C, D, Zinc combination supplement         Medical History: any changes in medical history since they were last seen? No      Objective:    Physical Exam:  Blood pressure 138/88, pulse 96.  Constitutional: Well developed, well nourished, appears stated age.  Gait: Intact  HEENT: Head atraumatic, normocephalic. Eyes without conjunctival injection or jaundice. Oropharynx clear. Neck supple. No obvious neck masses.  Skin: No rash, lesions, or petechiae of exposed skin.   Psychiatric/mental status: Alert, without lethargy or stupor. Speech fluent. Appropriate affect. Mood normal. Able to follow commands without difficulty.     Diagnostic Tests/Imaging/Labs:  None     BILLING TIME DOCUMENTATION:   The total TIME spent on this patient on the date of the encounter/appointment was 33 minutes.      TOTAL TIME includes:   Time spent preparing to see the patient (reviewing records and tests)   Time spent face to face (or over the phone) with the patient   Time spent ordering tests, medications, procedures and referrals   Time spent Referring and communicating with other healthcare professionals   Time spent documenting clinical information in Epic

## 2023-06-08 ENCOUNTER — MYC REFILL (OUTPATIENT)
Dept: PALLIATIVE MEDICINE | Facility: CLINIC | Age: 58
End: 2023-06-08
Payer: COMMERCIAL

## 2023-06-08 ENCOUNTER — TELEPHONE (OUTPATIENT)
Dept: PALLIATIVE MEDICINE | Facility: CLINIC | Age: 58
End: 2023-06-08

## 2023-06-08 DIAGNOSIS — G89.3 CANCER ASSOCIATED PAIN: ICD-10-CM

## 2023-06-08 NOTE — TELEPHONE ENCOUNTER
Red flags:    Is patient actively being treated for cancer or immunocompromised? Yes-chemo pills every day- in remission     Routing to provider for review.    Caitlin Miranda RN  [facility]  [phone]

## 2023-06-08 NOTE — TELEPHONE ENCOUNTER
Screening Questions for Radiology Injections:    Injection to be done at which interventional clinic site? [facility] [place]    Procedure ordered by     Procedure ordered? L4-5 interlaminar epidural steroid injection     Transforaminal Cervical LUIS - [facility] - No Count includes the [facility] Site providers perform this procedure    What insurance would patient like us to bill for this procedure? [employer]     IF SCHEDULING IN [place] [facility] OR [facility]INE PLEASE SCHEDULE AT LEAST 7-10 BUSINESS DAYS OUT SO A PA CAN BE OBTAINED    Worker's comp or MVA (motor vehicle accident) -Any injection DO NOT SCHEDULE and route to Richie Hood.      [employer] insurance - For SI joint injections, DO NOT SCHEDULE and route to Mary Padilla.       ALL BCBS, [employer] and [employer] - DO NOT SCHEDULE and route to Mary Padilla    [employer]- facet joint injections, route to Mary Randy    Is patient scheduled at [facility]?    If YES, route every encounter to [facility]INE [facility] CARE NAVIGATION POOL [[phone]]    Is an  needed? No     Patient has a  home? (Review Grid) YES: Informed     Any chance of pregnancy? Not Applicable   If YES, do NOT schedule and route to RN pool  - Dr. Calderón route to Roseline Coulter and PM&R Nurse  [[id number]]      Is patient actively being treated for cancer or immunocompromised? Yes-chemo pills every day- in remission   If YES, do NOT schedule and route to RN pool/ Dr. Calderón's Team    Does the patient have a bleeding or clotting disorder? No     If YES, okay to schedule AND route to RN nurse gracia/ Dr. Calderón's Team     (For any patients with platelet count <100, RN must forward to provider)    Is patient taking any Blood Thinners OR Antiplatelet medication?  No   If hold needed, do NOT schedule, route to RN pool/ Dr. Calderón's Team    Examples:   o Blood Thinners: (Coumadin, Warfarin, Jantoven, Pradaxa, Xarelto, Eliquis, Edoxaban, Enoxaparin, Lovenox, Heparin, Arixtra,  Fondaparinux or Fragmin)  o Antiplatelet Medications: (Plavix, Brilinta or Effient)     Is patient taking any aspirin products (includes Excedrin and Fiorinal)? No     If more than 325mg/day, OK to schedule; Instruct Pt to decrease to less than 325 mg for 7 days AND route to RN pool/ Dr. Calderón's Team     For CERVICAL procedures, hold all aspirin products for 6 days.     Tell Pt that if aspirin product is not held for 6 days, the procedure WILL BE cancelled.     Any allergies to contrast dye, iodine, shellfish, or numbing and steroid medications? No    If YES, schedule and add allergy information to appointment notes AND route to the RN pool/ Dr. Calderón's Team    If LUIS and Contrast Dye / Iodine Allergy? DO NOT SCHEDULE, route to RN pool/ Dr. Calderón's Team    Allergies: Patient has no known allergies.     Does patient have an active infection or treated for one within the past week? No    Is patient currently taking any antibiotics or steroid medications?  No     For patients on chronic, preventative, or prophylactic antibiotics, procedures may be scheduled.     For patients on antibiotics for active or recent infection, schedule 4 days after completed.    For patients on steroid medications, schedule 4 days after completed.     Has the patient had a flu shot or any other vaccinations within the past 7 days? No  If yes, explain that for the vaccine to work best they need to:       wait 1 week before and 1 week after getting any Vaccine    wait 1 week before and 2 weeks after getting Covid Vaccine #2 or BOOSTER    If patient has concerns about the timing, send to RN pool/ Dr. Calderón's Team    Does patient have an MRI/CT?  YES: 2021 Include Date and Check Procedure Scheduling Grid to see if required.    Was the MRI/CT done within the last 3 years?  Yes     If no route to RN Pool/ Dr. Calderón's Team    If yes, where was the MRI/CT done? [place]      Refer to PACS Transmissions list for approved external locations  and route to RN Pool High Priority/ Dr. Calderón's Team    If MRI was not done at approved external location do NOT schedule and route to RN pool/ Dr. Calderón's Team      If patient has an imaging disc, the injection MAY be scheduled but patient must bring disc to appt or appt will be cancelled.    Is patient able to transfer to a procedure table with minimal or no assistance? Yes     If no, do NOT schedule and route to RN Pool/ Dr. Calderón's Team    Procedure Specific Instructions:    If celiac plexus block, informed patient NPO for 6 hours and that it is okay to take medications with sips of water, especially blood pressure medications Not Applicable         If this is for a cervical procedure, informed patient that aspirin needs to be held for 6 days.   Not Applicable      Sedation, If Sedation is ordered for any procedure, patient must be NPO for 6 hours prior to procedure Not Applicable      If IV needed:    Do not schedule procedures requiring IV placement in the first appointment of the day or first appointment after lunch. Do NOT schedule at 0745, 0815 or 1245.     Instructed patient to arrive 30 minutes early for IV start if required. (Check Procedure Scheduling Grid)  Not Applicable    Reminders:    If you are started on any steroids or antibiotics between now and your appointment, you must contact us because the procedure may need to be cancelled.  Yes      As a reminder, receiving steroids can decrease your body's ability to fight infection.   Would you still like to move forward with scheduling the injection?  Yes      IV Sedation is not provided for procedures. If oral anti-anxiety medication is needed, the patient should request this from their referring provider.      Instruct patient to arrive as directed prior to the scheduled appointment time:  If IV needed 30 minutes before appointment time       For patients 85 or older we recommend having an adult stay w/ them for the remainder of the day.       If  the patient is Diabetic, remind them to bring their glucometer.      Does the patient have any questions?  NO  Marilynn Nguyen  [facility] [place]

## 2023-06-09 RX ORDER — MORPHINE SULFATE 15 MG/1
15 TABLET, FILM COATED, EXTENDED RELEASE ORAL EVERY 12 HOURS
Qty: 60 TABLET | Refills: 0 | OUTPATIENT
Start: 2023-06-09

## 2023-06-09 NOTE — TELEPHONE ENCOUNTER
Received call from patient requesting refill(s) of morphine 15 mg CR tablet     Last dispensed from pharmacy on 5/12/2023, 30 days, 60# per outside meds    Patient's last office/virtual visit by prescribing provider on 6/7/2023  Next office/virtual appointment scheduled for 8/30/2023    Last urine drug screen date 10/6/2022  Current opioid agreement on file (completed within the last year) Yes Date of opioid agreement: 10/26/2022    E[facility] [place]    Will route to nursing pool for review and preparation of prescription(s).

## 2023-06-09 NOTE — TELEPHONE ENCOUNTER
Chart reviewed - I just refilled this at his visit with me this week. Please confirm receipt at pharmacy, let me know if we need to resend.     Chanel Bruno, DNP, APRN, AGNP-C  [facility]

## 2023-06-09 NOTE — TELEPHONE ENCOUNTER
Refills have been requested for the following medications:         morphine (MS CONTIN) 15 MG CR tablet [Chanel SHARMA]     [id number] [place] [facility] [place], [place] - [address]

## 2023-06-09 NOTE — TELEPHONE ENCOUNTER
Medication refill information reviewed.     Due date for morphine 15 mg CR tablet is 6/11/2023 based on 5/12 start date.      Prescriptions prepped for review.     Will route to provider.       Caitlin Miranda RN  [facility] [place]  [phone]

## 2023-06-09 NOTE — TELEPHONE ENCOUNTER
LVM for patient indicating medication was previously filled and requesting return call if further assistance is required.     Caitlin Miranda RN  [facility]  [phone]

## 2023-06-13 DIAGNOSIS — C61 PROSTATE CANCER (H): Primary | ICD-10-CM

## 2023-06-29 ENCOUNTER — OFFICE VISIT (OUTPATIENT)
Dept: FAMILY MEDICINE | Facility: CLINIC | Age: 58
End: 2023-06-29
Payer: COMMERCIAL

## 2023-06-29 ENCOUNTER — RADIOLOGY INJECTION OFFICE VISIT (OUTPATIENT)
Dept: PALLIATIVE MEDICINE | Facility: CLINIC | Age: 58
End: 2023-06-29
Payer: COMMERCIAL

## 2023-06-29 VITALS — SYSTOLIC BLOOD PRESSURE: 163 MMHG | HEART RATE: 93 BPM | OXYGEN SATURATION: 99 % | DIASTOLIC BLOOD PRESSURE: 95 MMHG

## 2023-06-29 VITALS
BODY MASS INDEX: 27.55 KG/M2 | RESPIRATION RATE: 20 BRPM | WEIGHT: 186 LBS | SYSTOLIC BLOOD PRESSURE: 150 MMHG | OXYGEN SATURATION: 98 % | HEIGHT: 69 IN | TEMPERATURE: 97.8 F | HEART RATE: 90 BPM | DIASTOLIC BLOOD PRESSURE: 90 MMHG

## 2023-06-29 DIAGNOSIS — M54.16 LUMBAR RADICULOPATHY: ICD-10-CM

## 2023-06-29 DIAGNOSIS — H60.391 INFECTIVE OTITIS EXTERNA, RIGHT: Primary | ICD-10-CM

## 2023-06-29 PROCEDURE — 62323 NJX INTERLAMINAR LMBR/SAC: CPT | Performed by: PAIN MEDICINE

## 2023-06-29 PROCEDURE — 99213 OFFICE O/P EST LOW 20 MIN: CPT | Performed by: FAMILY MEDICINE

## 2023-06-29 RX ORDER — TRIAMCINOLONE ACETONIDE 40 MG/ML
40 INJECTION, SUSPENSION INTRA-ARTICULAR; INTRAMUSCULAR ONCE
Status: COMPLETED | OUTPATIENT
Start: 2023-06-29 | End: 2023-06-29

## 2023-06-29 RX ORDER — CLOTRIMAZOLE 1 G/ML
SOLUTION TOPICAL
Qty: 15 ML | Refills: 0 | Status: SHIPPED | OUTPATIENT
Start: 2023-06-29 | End: 2023-08-31

## 2023-06-29 RX ORDER — NEOMYCIN SULFATE, POLYMYXIN B SULFATE, HYDROCORTISONE 3.5; 10000; 1 MG/ML; [USP'U]/ML; MG/ML
3 SOLUTION/ DROPS AURICULAR (OTIC) 4 TIMES DAILY
Qty: 5 ML | Refills: 0 | Status: SHIPPED | OUTPATIENT
Start: 2023-06-29 | End: 2023-07-06

## 2023-06-29 RX ADMIN — TRIAMCINOLONE ACETONIDE 40 MG: 40 INJECTION, SUSPENSION INTRA-ARTICULAR; INTRAMUSCULAR at 14:59

## 2023-06-29 ASSESSMENT — ENCOUNTER SYMPTOMS
COUGH: 1
DIARRHEA: 1
CONSTIPATION: 1
HEMATOCHEZIA: 0
NERVOUS/ANXIOUS: 0
FREQUENCY: 1
SORE THROAT: 0
ABDOMINAL PAIN: 0
DYSURIA: 0
SHORTNESS OF BREATH: 0
CHILLS: 1
HEADACHES: 0
PALPITATIONS: 0
JOINT SWELLING: 0
EYE PAIN: 0
HEARTBURN: 1
FEVER: 1
PARESTHESIAS: 0
WEAKNESS: 0
NAUSEA: 0
ARTHRALGIAS: 0
HEMATURIA: 0
MYALGIAS: 0
DIZZINESS: 0

## 2023-06-29 ASSESSMENT — PAIN SCALES - GENERAL
PAINLEVEL: SEVERE PAIN (7)
PAINLEVEL: EXTREME PAIN (8)

## 2023-06-29 NOTE — PROGRESS NOTES
"  {PROVIDER CHARTING PREFERENCE:438312}    Subjective   Bill is a 57 year old, presenting for the following health issues:  Ear Problem and Physical        6/29/2023     7:48 AM   Additional Questions   Roomed by Karthikeyan   Accompanied by SELF     Healthy Habits:     Getting at least 3 servings of Calcium per day:  NO    Bi-annual eye exam:  Yes    Dental care twice a year:  Yes    Sleep apnea or symptoms of sleep apnea:  None    Diet:  Regular (no restrictions)    Frequency of exercise:  None    Taking medications regularly:  Yes    Medication side effects:  Not applicable    Additional concerns today:  No       {SUPERLIST (Optional):507613}  {additonal problems for provider to add (Optional):651363}      Review of Systems   Constitutional: Positive for chills and fever.   HENT: Negative for congestion, ear pain, hearing loss and sore throat.    Eyes: Negative for pain and visual disturbance.   Respiratory: Positive for cough. Negative for shortness of breath.    Cardiovascular: Negative for chest pain, palpitations and peripheral edema.   Gastrointestinal: Positive for constipation, diarrhea and heartburn. Negative for abdominal pain, hematochezia and nausea.   Genitourinary: Positive for frequency and impotence. Negative for dysuria, genital sores, hematuria, penile discharge and urgency.   Musculoskeletal: Negative for arthralgias, joint swelling and myalgias.   Skin: Negative for rash.   Neurological: Negative for dizziness, weakness, headaches and paresthesias.   Psychiatric/Behavioral: Negative for mood changes. The patient is not nervous/anxious.       {ROS COMP (Optional):899933}      Objective    BP (!) 150/90   Pulse 90   Temp 97.8  F (36.6  C) (Tympanic)   Resp 20   Ht 1.746 m (5' 8.75\")   Wt 84.4 kg (186 lb)   SpO2 98%   BMI 27.67 kg/m    Body mass index is 27.67 kg/m .  Physical Exam   {Exam List (Optional):158227}    {Diagnostic Test Results (Optional):324829}    {AMBULATORY ATTESTATION " (Optional):241819}

## 2023-06-29 NOTE — PATIENT INSTRUCTIONS
[facility]   Procedure Discharge Instructions    Today you saw:    Dr. Issac Tapia,      You had an:  Epidural steroid injection       Medications used:  Lidocaine   Bupivacaine   Dexamethasone Omnipaque  Ropivicaine   Kenalog   Gadolinium  Normal saline          Be cautious when walking. Numbness and/or weakness in the lower extremities may occur for up to 6-8 hours after the procedure due to effect of the local anesthetic  Do not drive for 6 hours. The effect of the local anesthetic could slow your reflexes.   You may resume your regular activities after 24 hours  Avoid strenuous activity for the first 24 hours  You may shower, however avoid swimming, tub baths or hot tubs for 24 hours following your procedure  You may have a mild to moderate increase in pain for several days following the injection.  It may take up to 14 days for the steroid medication to start working although you may feel the effect as early as a few days after the procedure.     You may use ice packs for 10-15 minutes, 3 to 4 times a day at the injection site for comfort  Do not use heat to painful areas for 6 to 8 hours. This will give the local anesthetic time to wear off and prevent you from accidentally burning your skin.   Unless you have been directed to avoid the use of anti-inflammatory medications (NSAIDS), you may use medications such as ibuprofen, Aleve or Tylenol for pain control if needed.   Possible side effects of steroids that you may experience include flushing, elevated blood pressure, increased appetite, mild headaches and restlessness.  All of these symptoms will get better with time.  If you experience any of the following, call the Pain Clinic during work hours (Mon-Friday 8-4:30 pm) at [phone] or the Provider Line after hours at [phone]:  -Fever over 100 degree F  -Swelling, bleeding, redness, drainage, warmth at the injection site  -Progressive weakness or numbness in your  legs   -Loss of bowel or bladder function  -Unusual headache that is not relieved by Tylenol or other pain reliever  -Unusual new onset of pain that is not improving

## 2023-06-29 NOTE — PROGRESS NOTES
SUBJECTIVE:   CC: Jay is an 57 year old who presents for preventative health visit.       2023     7:48 AM   Additional Questions   Roomed by Karthikeyan   Accompanied by SELF     Healthy Habits:     Getting at least 3 servings of Calcium per day:  NO    Bi-annual eye exam:  Yes    Dental care twice a year:  Yes    Sleep apnea or symptoms of sleep apnea:  None    Diet:  Regular (no restrictions)    Frequency of exercise:  None    Taking medications regularly:  Yes    Medication side effects:  Not applicable    Additional concerns today:  No    {additional problems to add (Optional):170518}      Social History     Tobacco Use     Smoking status: Former     Packs/day: 1.00     Years: 30.00     Pack years: 30.00     Types: Cigarettes, Cigars     Quit date: 2012     Years since quittin.4     Smokeless tobacco: Never   Substance Use Topics     Alcohol use: Yes     Comment: 4-6 drinks per day - patient reports beer or vodka drinks     {Rooming staff  Click this link to complete the Prescreen if response below is not for today's visit  Alcohol Use Prescreen >3 drinks/day or > 7 drinks/week.  If the prescreen question answer is YES, complete the full AUDIT  :367590}        2023     8:20 AM   Alcohol Use   Prescreen: >3 drinks/day or >7 drinks/week? Yes   AUDIT SCORE  8         2023     8:20 AM   AUDIT - Alcohol Use Disorders Identification Test - Reproduced from the World Health Organization Audit 2001 (Second Edition)   1.  How often do you have a drink containing alcohol? 4 or more times a week   2.  How many drinks containing alcohol do you have on a typical day when you are drinking? 3 or 4   3.  How often do you have five or more drinks on one occasion? Weekly   4.  How often during the last year have you found that you were not able to stop drinking once you had started? Never   5.  How often during the last year have you failed to do what was normally expected of you because of drinking? Never  "  6.  How often during the last year have you needed a first drink in the morning to get yourself going after a heavy drinking session? Never   7.  How often during the last year have you had a feeling of guilt or remorse after drinking? Never   8.  How often during the last year have you been unable to remember what happened the night before because of your drinking? Never   9.  Have you or someone else been injured because of your drinking? No   10. Has a relative, friend, doctor or other health care worker been concerned about your drinking or suggested you cut down? No   TOTAL SCORE 8       Last PSA:   PSA   Date Value Ref Range Status   04/29/2021 0.04 0 - 4 ug/L Final     Comment:     Assay Method:  Chemiluminescence using Siemens Vista analyzer     PSA Tumor Marker   Date Value Ref Range Status   05/09/2023 <0.01 0.00 - 4.00 ug/L Final       Reviewed orders with patient. Reviewed health maintenance and updated orders accordingly - { :495862::\"Yes\"}  {Chronicprobdata (optional):924583}    Reviewed and updated as needed this visit by clinical staff   Tobacco  Allergies  Meds              Reviewed and updated as needed this visit by Provider                 {HISTORY OPTIONS (Optional):182723}    Review of Systems   Constitutional: Positive for chills and fever.   HENT: Negative for congestion, ear pain, hearing loss and sore throat.    Eyes: Negative for pain and visual disturbance.   Respiratory: Positive for cough. Negative for shortness of breath.    Cardiovascular: Negative for chest pain, palpitations and peripheral edema.   Gastrointestinal: Positive for constipation, diarrhea and heartburn. Negative for abdominal pain, hematochezia and nausea.   Genitourinary: Positive for frequency and impotence. Negative for dysuria, genital sores, hematuria, penile discharge and urgency.   Musculoskeletal: Negative for arthralgias, joint swelling and myalgias.   Skin: Negative for rash.   Neurological: Negative for " "dizziness, weakness, headaches and paresthesias.   Psychiatric/Behavioral: Negative for mood changes. The patient is not nervous/anxious.      {MALE ROS (Optional):375898::\"CONSTITUTIONAL: NEGATIVE for fever, chills, change in weight\",\"INTEGUMENTARY/SKIN: NEGATIVE for worrisome rashes, moles or lesions\",\"EYES: NEGATIVE for vision changes or irritation\",\"ENT: NEGATIVE for ear, mouth and throat problems\",\"RESP: NEGATIVE for significant cough or SOB\",\"CV: NEGATIVE for chest pain, palpitations or peripheral edema\",\"GI: NEGATIVE for nausea, abdominal pain, heartburn, or change in bowel habits\",\" male: negative for dysuria, hematuria, decreased urinary stream, erectile dysfunction, urethral discharge\",\"MUSCULOSKELETAL: NEGATIVE for significant arthralgias or myalgia\",\"NEURO: NEGATIVE for weakness, dizziness or paresthesias\",\"PSYCHIATRIC: NEGATIVE for changes in mood or affect\"}    OBJECTIVE:   BP (!) 150/90   Pulse 90   Temp 97.8  F (36.6  C) (Tympanic)   Resp 20   Ht 1.746 m (5' 8.75\")   Wt 84.4 kg (186 lb)   SpO2 98%   BMI 27.67 kg/m      Physical Exam  {Exam Choices (Optional):246606}    {Diagnostic Test Results (Optional):826743::\"Diagnostic Test Results:\",\"Labs reviewed in Epic\"}    ASSESSMENT/PLAN:   {Diag Picklist:283861}    {Patient advised of split billing (Optional):106435}      COUNSELING:   {MALE COUNSELING MESSAGES:520685::\"Reviewed preventive health counseling, as reflected in patient instructions\"}      BMI:   Estimated body mass index is 27.67 kg/m  as calculated from the following:    Height as of this encounter: 1.746 m (5' 8.75\").    Weight as of this encounter: 84.4 kg (186 lb).   {Weight Management Plan needed for ACO:837700}      He reports that he quit smoking about 11 years ago. His smoking use included cigarettes and cigars. He has a 30.00 pack-year smoking history. He has never used smokeless tobacco.      {Counseling Resources  US Preventive Services Task Force  Cholesterol " Screening  Health diet/nutrition  Pooled Cohorts Equation Calculator  [place]DA's MyPlate  ASA Prophylaxis  Lung CA Screening  Osteoporosis prevention/bone health :652470}  {Prostate Cancer Screening  Consider for men 55-69 per guidance from USPSTF :198179}    Palak Doe MD  [facility]

## 2023-06-29 NOTE — PROGRESS NOTES
SUBJECTIVE:   CC: Jay is an 57 year old who presents for preventative health visit.       2023     7:48 AM   Additional Questions   Roomed by Karthikeyan   Accompanied by SELF     Healthy Habits:    Getting at least 3 servings of Calcium per day:  NO    Bi-annual eye exam:  Yes    Dental care twice a year:  Yes    Sleep apnea or symptoms of sleep apnea:  None    Diet:  Regular (no restrictions)    Frequency of exercise:  None    Taking medications regularly:  Yes    Medication side effects:  Not applicable    PHQ-2 Total Score:    Additional concerns today:  No    {additional problems to add (Optional):997263}        Social History     Tobacco Use     Smoking status: Former     Packs/day: 1.00     Years: 30.00     Pack years: 30.00     Types: Cigarettes, Cigars     Quit date: 2012     Years since quittin.4     Smokeless tobacco: Never   Substance Use Topics     Alcohol use: Yes     Comment: 4-6 drinks per day - patient reports beer or vodka drinks     {Rooming staff  Click this link to complete the Prescreen if response below is not for today's visit  Alcohol Use Prescreen >3 drinks/day or > 7 drinks/week.  If the prescreen question answer is YES, complete the full AUDIT  :424453}        2023     8:20 AM   Alcohol Use   Prescreen: >3 drinks/day or >7 drinks/week? Yes   AUDIT SCORE  8         2023     8:20 AM   AUDIT - Alcohol Use Disorders Identification Test - Reproduced from the World Health Organization Audit 2001 (Second Edition)   1.  How often do you have a drink containing alcohol? 4 or more times a week   2.  How many drinks containing alcohol do you have on a typical day when you are drinking? 3 or 4   3.  How often do you have five or more drinks on one occasion? Weekly   4.  How often during the last year have you found that you were not able to stop drinking once you had started? Never   5.  How often during the last year have you failed to do what was normally expected of you  "because of drinking? Never   6.  How often during the last year have you needed a first drink in the morning to get yourself going after a heavy drinking session? Never   7.  How often during the last year have you had a feeling of guilt or remorse after drinking? Never   8.  How often during the last year have you been unable to remember what happened the night before because of your drinking? Never   9.  Have you or someone else been injured because of your drinking? No   10. Has a relative, friend, doctor or other health care worker been concerned about your drinking or suggested you cut down? No   TOTAL SCORE 8       Last PSA:   PSA   Date Value Ref Range Status   04/29/2021 0.04 0 - 4 ug/L Final     Comment:     Assay Method:  Chemiluminescence using Siemens Vista analyzer     PSA Tumor Marker   Date Value Ref Range Status   05/09/2023 <0.01 0.00 - 4.00 ug/L Final       Reviewed orders with patient. Reviewed health maintenance and updated orders accordingly - { :718985::\"Yes\"}  {Chronicprobdata (optional):100279}    Reviewed and updated as needed this visit by clinical staff                  Reviewed and updated as needed this visit by Provider                 {HISTORY OPTIONS (Optional):917308}    Review of Systems   Constitutional: Positive for chills and fever.   HENT: Negative for congestion, ear pain, hearing loss and sore throat.    Eyes: Negative for pain and visual disturbance.   Respiratory: Positive for cough. Negative for shortness of breath.    Cardiovascular: Negative for chest pain, palpitations and peripheral edema.   Gastrointestinal: Positive for constipation, diarrhea and heartburn. Negative for abdominal pain, hematochezia and nausea.   Genitourinary: Positive for frequency and impotence. Negative for dysuria, genital sores, hematuria, penile discharge and urgency.   Musculoskeletal: Negative for arthralgias, joint swelling and myalgias.   Skin: Negative for rash.   Neurological: Negative for " "dizziness, weakness, headaches and paresthesias.   Psychiatric/Behavioral: Negative for mood changes. The patient is not nervous/anxious.      {MALE ROS (Optional):240956::\"CONSTITUTIONAL: NEGATIVE for fever, chills, change in weight\",\"INTEGUMENTARY/SKIN: NEGATIVE for worrisome rashes, moles or lesions\",\"EYES: NEGATIVE for vision changes or irritation\",\"ENT: NEGATIVE for ear, mouth and throat problems\",\"RESP: NEGATIVE for significant cough or SOB\",\"CV: NEGATIVE for chest pain, palpitations or peripheral edema\",\"GI: NEGATIVE for nausea, abdominal pain, heartburn, or change in bowel habits\",\" male: negative for dysuria, hematuria, decreased urinary stream, erectile dysfunction, urethral discharge\",\"MUSCULOSKELETAL: NEGATIVE for significant arthralgias or myalgia\",\"NEURO: NEGATIVE for weakness, dizziness or paresthesias\",\"PSYCHIATRIC: NEGATIVE for changes in mood or affect\"}    OBJECTIVE:   There were no vitals taken for this visit.    Physical Exam  {Exam Choices (Optional):217169}    {Diagnostic Test Results (Optional):426383::\"Diagnostic Test Results:\",\"Labs reviewed in Epic\"}    ASSESSMENT/PLAN:   {Diag Picklist:920937}    {Patient advised of split billing (Optional):414628}      COUNSELING:   {MALE COUNSELING MESSAGES:223661::\"Reviewed preventive health counseling, as reflected in patient instructions\"}      BMI:   Estimated body mass index is 28.06 kg/m  as calculated from the following:    Height as of 11/3/22: 1.753 m (5' 9\").    Weight as of 5/11/23: 86.2 kg (190 lb 0.6 oz).   {Weight Management Plan needed for ACO:258321}      He reports that he quit smoking about 11 years ago. His smoking use included cigarettes and cigars. He has a 30.00 pack-year smoking history. He has never used smokeless tobacco.      {Counseling Resources  US Preventive Services Task Force  Cholesterol Screening  Health diet/nutrition  Pooled Cohorts Equation Calculator  [employer]'s MyPlate  ASA Prophylaxis  Lung CA " Screening  Osteoporosis prevention/bone health :722223}  {Prostate Cancer Screening  Consider for men 55-69 per guidance from USPSTF :178350}    Palak Doe MD  [facility]

## 2023-06-29 NOTE — PROGRESS NOTES
"  Assessment & Plan     Infective otitis externa, right  Per Etienne is a 57 year old male who presents today for concern of right ear discharge started a month and a half ago.  He has a history of infection with externa in the past and was treated with antibiotics eardrops.  He denies any ear pain or hearing loss.  No recent swimming.  Exam showed erythematous external right ear canal.  Tympanic membrane is intact in both ears, no evidence of otitis media.  Discussed with patient his concern.  We discussed diagnosis and treatment options.  We will treat with Cortisporin eardrops and fluconazole to cover yeast infection.  Patient verbalized understanding and agreed on the plan of care.  All questions answered.  Patient will return in 2 months for routine preventative physical.  - neomycin-polymyxin-hydrocortisone (CORTISPORIN) 3.5-60405-0 otic solution; Place 3 drops into the right ear 4 times daily for 7 days  - clotrimazole (LOTRIMIN) 1 % external solution; 2 ear drops in the right ear 2 times daily for 7 days             BMI:   Estimated body mass index is 27.67 kg/m  as calculated from the following:    Height as of this encounter: 1.746 m (5' 8.75\").    Weight as of this encounter: 84.4 kg (186 lb).   Weight management plan: Discussed healthy diet and exercise guidelines    Work on weight loss  Regular exercise    Palak Doe MD  [facility]    Roland Thompson is a 57 year old, presenting for the following health issues:  Ear Problem and Physical        6/29/2023     7:48 AM   Additional Questions   Roomed by Karthikeyan   Accompanied by SELF     Healthy Habits:     Getting at least 3 servings of Calcium per day:  NO    Bi-annual eye exam:  Yes    Dental care twice a year:  Yes    Sleep apnea or symptoms of sleep apnea:  None    Diet:  Regular (no restrictions)    Frequency of exercise:  None    Taking medications regularly:  Yes    Medication side effects:  Not applicable    " "Additional concerns today:  No               Review of Systems   Constitutional: Positive for chills and fever.   HENT: Negative for congestion, ear pain, hearing loss and sore throat.    Eyes: Negative for pain and visual disturbance.   Respiratory: Positive for cough. Negative for shortness of breath.    Cardiovascular: Negative for chest pain, palpitations and peripheral edema.   Gastrointestinal: Positive for constipation, diarrhea and heartburn. Negative for abdominal pain, hematochezia and nausea.   Genitourinary: Positive for frequency and impotence. Negative for dysuria, genital sores, hematuria, penile discharge and urgency.   Musculoskeletal: Negative for arthralgias, joint swelling and myalgias.   Skin: Negative for rash.   Neurological: Negative for dizziness, weakness, headaches and paresthesias.   Psychiatric/Behavioral: Negative for mood changes. The patient is not nervous/anxious.       Constitutional, HEENT, cardiovascular, pulmonary, gi and gu systems are negative, except as otherwise noted.      Objective    BP (!) 150/90   Pulse 90   Temp 97.8  F (36.6  C) (Tympanic)   Resp 20   Ht 1.746 m (5' 8.75\")   Wt 84.4 kg (186 lb)   SpO2 98%   BMI 27.67 kg/m    Body mass index is 27.67 kg/m .  Physical Exam  Vitals and nursing note reviewed.   Constitutional:       General: He is not in acute distress.     Appearance: Normal appearance. He is not ill-appearing, toxic-appearing or diaphoretic.   HENT:      Head: Normocephalic and atraumatic.      Right Ear: Tympanic membrane normal.      Left Ear: Tympanic membrane normal.      Ears:      Comments: Inflamed and  red right ear canal   Neurological:      Mental Status: He is alert.                            "

## 2023-06-29 NOTE — NURSING NOTE
Discharge Information    IV Discontiued Time:  NA    Amount of Fluid Infused:  NA    Discharge Criteria = When patient returns to baseline or as per MD order    Consciousness:  Pt is fully awake    Circulation:  BP +/- 20% of pre-procedure level    Respiration:  Patient is able to breathe deeply    O2 Sat:  Patient is able to maintain O2 Sat >92% on room air    Activity:  Moves 4 extremities on command    Ambulation:  Patient is able to stand and walk or stand and pivot into wheelchair    Dressing:  Clean/dry or No Dressing    Notes:   Discharge instructions and AVS given to patient    Patient meets criteria for discharge?  YES    Admitted to PCU?  No    Responsible adult present to accompany patient home?  Yes    Signature/Title:    Caitlin Miranda RN  RN Care Coordinator  [facility]

## 2023-06-29 NOTE — PROGRESS NOTES
Pre procedure Diagnosis: Lumbar radiculopathy  Post procedure Diagnosis: Same  Procedure performed: L4-5 interlaminar epidural steroid injection   Anesthesia: none  Complications: none  Operators: Issac Tapia MD     Indications:   Per Etienne is a 57 year old male.  The patient has a history of lbp rad to her LE.  Examination shows neg slump.  he has tried conservative treatment including meds/pt/injections    MRI reviewed  Options/alternatives, benefits and risks were discussed with the patient including but not limited to bleeding, infection, no pain relief, tissue trauma, exposure to radiation, reaction to medications, spinal cord injury, dural puncture, weakness, numbness and headache.  Questions were answered to his satisfaction and he wishes to proceed. Voluntary informed consent was obtained and signed.     Vitals were reviewed: Yes  Allergies were reviewed:  Yes   Medications were reviewed:  Yes   Pre-procedure pain score: 8/10    Procedure:  The patient's medical history, medications, and allergies were reviewed and reconciled.  After obtaining signed informed consent, the patient was brought into the procedure suite and was placed in a prone position on the procedure table.   A Pause for the Cause was performed.  Patient was prepped and draped in the usual sterile fashion.     The L4-5 interspace was identified with AP fluoroscopy.  A total of 3ml of 1% lidocaine was used to anesthetize the skin and subcutaneous tissues for a  midline approach.    A 20gauge 3.5inch Touhy needle was advanced utilizing intermittent AP and Lateral fluoroscopy and air for loss of resistance.  The epidural space was encountered on the first pass without difficulties.  Aspiration for blood and CSF was negative.  Needle position was verified by injecting 1 ml of Omnipaque utilizing real-time fluoroscopy that showed good needle placement and epidural spread without signs of intravascular or intrathecal uptake.   Omnipaque wasted:  9 ml.    Then, after repeated negative aspiration for blood or CSF, a combination of Kenalog 40 mg, Bupivicaine 0.25% 2 ml, diluted with 3 ml of normal saline to a total injectate volume of 6 ml was injected into the epidural space in a slow and incremental fashion and the needle was restyletted and withdrawn.  All injected medications were preservative free.    The injection site was cleaned and a sterile dressing was applied.    The patient tolerated the procedure well without complications and was taken to the recovery room for continued observation.    Images were saved to PACS.    Post-procedure pain score: 7/10  Follow-up includes:   -f/u with referring provider     Issac Tapia MD  [facility] [place]

## 2023-06-29 NOTE — NURSING NOTE
Pre-procedure Intake  If YES to any questions or NO to having a   Please complete laminated checklist and leave on the computer keyboard for Provider, verbally inform provider if able.    For SCS Trial, RFA's or any sedation procedure:  Have you been fasting? NA    If yes, for how long?     Are you taking any any blood thinners such as Coumadin, Warfarin, Jantoven, Pradaxa Xarelto, Eliquis, Edoxaban, Enoxaparin, Lovenox, Heparin, Arixtra, Fondaparinux, or Fragmin? OR Antiplatelet medication such as Plavix, Brilinta, or Effient?   No     If yes, when did you take your last dose?     Do you take aspirin?  No    If cervical procedure, have you held aspirin for 6 days?   NA    Do you have any allergies to contrast dye, iodine, steroid and/or numbing medications?  NO    Are you currently taking antibiotics or have an active infection?  NO    Have you had a fever/elevated temperature within the past week? NO    Are you currently taking oral steroids? NO    Do you have a ? Yes    Are you pregnant or breastfeeding?  Not Applicable    No Have you received the COVID-19 vaccine? No     If yes, was it your 1st, 2nd or only dose needed?     Date of most recent vaccine:     Notify provider and RNs if systolic BP >170, diastolic BP >100, P >100 or O2 sats < 90%    Roseline Boyd MA  [facility]

## 2023-07-03 DIAGNOSIS — C61 PROSTATE CANCER (H): ICD-10-CM

## 2023-07-05 DIAGNOSIS — C61 PROSTATE CANCER (H): Primary | ICD-10-CM

## 2023-07-05 RX ORDER — APALUTAMIDE 60 MG/1
TABLET, FILM COATED ORAL
Qty: 120 TABLET | Refills: 0 | OUTPATIENT
Start: 2023-07-05

## 2023-07-31 ENCOUNTER — PATIENT OUTREACH (OUTPATIENT)
Dept: CARE COORDINATION | Facility: CLINIC | Age: 58
End: 2023-07-31
Payer: COMMERCIAL

## 2023-07-31 DIAGNOSIS — C61 PROSTATE CANCER (H): ICD-10-CM

## 2023-08-01 RX ORDER — APALUTAMIDE 60 MG/1
TABLET, FILM COATED ORAL
Qty: 120 TABLET | Refills: 0 | OUTPATIENT
Start: 2023-08-01

## 2023-08-07 DIAGNOSIS — C79.51 PROSTATE CANCER METASTATIC TO BONE (H): Primary | ICD-10-CM

## 2023-08-07 DIAGNOSIS — C79.51 MALIGNANT NEOPLASM METASTATIC TO BONE (H): ICD-10-CM

## 2023-08-07 DIAGNOSIS — C61 PROSTATE CANCER METASTATIC TO BONE (H): Primary | ICD-10-CM

## 2023-08-09 ENCOUNTER — LAB (OUTPATIENT)
Dept: LAB | Facility: CLINIC | Age: 58
End: 2023-08-09
Payer: COMMERCIAL

## 2023-08-09 DIAGNOSIS — R91.8 PULMONARY NODULES: ICD-10-CM

## 2023-08-09 DIAGNOSIS — C61 PROSTATE CANCER (H): ICD-10-CM

## 2023-08-09 DIAGNOSIS — C79.51 MALIGNANT NEOPLASM METASTATIC TO BONE (H): ICD-10-CM

## 2023-08-09 DIAGNOSIS — C61 PROSTATE CANCER METASTATIC TO BONE (H): ICD-10-CM

## 2023-08-09 DIAGNOSIS — C79.51 PROSTATE CANCER METASTATIC TO BONE (H): ICD-10-CM

## 2023-08-09 LAB
ALBUMIN SERPL BCG-MCNC: 4.1 G/DL (ref 3.5–5.2)
ALP SERPL-CCNC: 77 U/L (ref 40–129)
ALT SERPL W P-5'-P-CCNC: 33 U/L (ref 0–70)
ANION GAP SERPL CALCULATED.3IONS-SCNC: 12 MMOL/L (ref 7–15)
AST SERPL W P-5'-P-CCNC: 22 U/L (ref 0–45)
BASOPHILS # BLD AUTO: 0.1 10E3/UL (ref 0–0.2)
BASOPHILS NFR BLD AUTO: 1 %
BILIRUB SERPL-MCNC: 0.2 MG/DL
BUN SERPL-MCNC: 14.2 MG/DL (ref 8–23)
CALCIUM SERPL-MCNC: 9.8 MG/DL (ref 8.6–10)
CHLORIDE SERPL-SCNC: 103 MMOL/L (ref 98–107)
CREAT SERPL-MCNC: 1.05 MG/DL (ref 0.67–1.17)
DEPRECATED HCO3 PLAS-SCNC: 26 MMOL/L (ref 22–29)
EOSINOPHIL # BLD AUTO: 0.1 10E3/UL (ref 0–0.7)
EOSINOPHIL NFR BLD AUTO: 1 %
ERYTHROCYTE [DISTWIDTH] IN BLOOD BY AUTOMATED COUNT: 11.4 % (ref 10–15)
GFR SERPL CREATININE-BSD FRML MDRD: 82 ML/MIN/1.73M2
GLUCOSE SERPL-MCNC: 108 MG/DL (ref 70–99)
HCT VFR BLD AUTO: 41.5 % (ref 40–53)
HGB BLD-MCNC: 14.2 G/DL (ref 13.3–17.7)
IMM GRANULOCYTES # BLD: 0 10E3/UL
IMM GRANULOCYTES NFR BLD: 0 %
LYMPHOCYTES # BLD AUTO: 1.9 10E3/UL (ref 0.8–5.3)
LYMPHOCYTES NFR BLD AUTO: 24 %
MCH RBC QN AUTO: 31.4 PG (ref 26.5–33)
MCHC RBC AUTO-ENTMCNC: 34.2 G/DL (ref 31.5–36.5)
MCV RBC AUTO: 92 FL (ref 78–100)
MONOCYTES # BLD AUTO: 0.7 10E3/UL (ref 0–1.3)
MONOCYTES NFR BLD AUTO: 9 %
NEUTROPHILS # BLD AUTO: 5.1 10E3/UL (ref 1.6–8.3)
NEUTROPHILS NFR BLD AUTO: 65 %
PLATELET # BLD AUTO: 313 10E3/UL (ref 150–450)
POTASSIUM SERPL-SCNC: 4 MMOL/L (ref 3.4–5.3)
PROT SERPL-MCNC: 6.6 G/DL (ref 6.4–8.3)
PSA SERPL DL<=0.01 NG/ML-MCNC: <0.01 NG/ML (ref 0–3.5)
RBC # BLD AUTO: 4.52 10E6/UL (ref 4.4–5.9)
SODIUM SERPL-SCNC: 141 MMOL/L (ref 136–145)
WBC # BLD AUTO: 7.8 10E3/UL (ref 4–11)

## 2023-08-09 PROCEDURE — 80053 COMPREHEN METABOLIC PANEL: CPT

## 2023-08-09 PROCEDURE — 84403 ASSAY OF TOTAL TESTOSTERONE: CPT

## 2023-08-09 PROCEDURE — 85025 COMPLETE CBC W/AUTO DIFF WBC: CPT

## 2023-08-09 PROCEDURE — 36415 COLL VENOUS BLD VENIPUNCTURE: CPT

## 2023-08-09 PROCEDURE — 84153 ASSAY OF PSA TOTAL: CPT

## 2023-08-10 ENCOUNTER — INFUSION THERAPY VISIT (OUTPATIENT)
Dept: INFUSION THERAPY | Facility: CLINIC | Age: 58
End: 2023-08-10
Payer: COMMERCIAL

## 2023-08-10 ENCOUNTER — VIRTUAL VISIT (OUTPATIENT)
Dept: ONCOLOGY | Facility: CLINIC | Age: 58
End: 2023-08-10
Attending: INTERNAL MEDICINE
Payer: COMMERCIAL

## 2023-08-10 VITALS
DIASTOLIC BLOOD PRESSURE: 94 MMHG | TEMPERATURE: 98.1 F | WEIGHT: 186.51 LBS | HEIGHT: 69 IN | RESPIRATION RATE: 16 BRPM | HEART RATE: 77 BPM | BODY MASS INDEX: 27.62 KG/M2 | SYSTOLIC BLOOD PRESSURE: 167 MMHG | OXYGEN SATURATION: 99 %

## 2023-08-10 VITALS
DIASTOLIC BLOOD PRESSURE: 94 MMHG | BODY MASS INDEX: 27.61 KG/M2 | TEMPERATURE: 98.1 F | HEART RATE: 77 BPM | SYSTOLIC BLOOD PRESSURE: 167 MMHG | OXYGEN SATURATION: 99 % | HEIGHT: 69 IN | RESPIRATION RATE: 16 BRPM | WEIGHT: 186.4 LBS

## 2023-08-10 DIAGNOSIS — C61 PROSTATE CANCER (H): Primary | ICD-10-CM

## 2023-08-10 DIAGNOSIS — C79.51 PROSTATE CANCER METASTATIC TO BONE (H): Primary | ICD-10-CM

## 2023-08-10 DIAGNOSIS — C61 PROSTATE CANCER METASTATIC TO BONE (H): Primary | ICD-10-CM

## 2023-08-10 DIAGNOSIS — C79.51 MALIGNANT NEOPLASM METASTATIC TO BONE (H): ICD-10-CM

## 2023-08-10 PROCEDURE — 99214 OFFICE O/P EST MOD 30 MIN: CPT | Mod: VID | Performed by: INTERNAL MEDICINE

## 2023-08-10 PROCEDURE — 96402 CHEMO HORMON ANTINEOPL SQ/IM: CPT | Performed by: NURSE PRACTITIONER

## 2023-08-10 PROCEDURE — 96372 THER/PROPH/DIAG INJ SC/IM: CPT | Performed by: NURSE PRACTITIONER

## 2023-08-10 ASSESSMENT — PAIN SCALES - GENERAL
PAINLEVEL: NO PAIN (0)
PAINLEVEL: NO PAIN (0)

## 2023-08-10 NOTE — PROGRESS NOTES
Oncology Follow-up:  Date on this visit: Aug 10, 2023    Primary care physician: Per Chen   Urologist: Dr. Angel Colmenares     Metastatic hormone sensitive prostate cancer    Oncologic History:  1. Metastatic hormone sensitive prostate cancer  He presented to his PCP in December 2019 with slow urinary stream.  PSA was checked and was found to be 124. On January 8, 2020 abdomen pelvis CT showed groundglass opacity in the left lower lobe,  dystrophic calcification of the prostate gland, and a fatty liver.  Same-day bone scan was negative. On January 10, 2010 prostate biopsy showed on the left Robyn score 4+3 involving 7 cores and on the right Robyn score 3+4 involving 6 cores, with positive perineural invasion.  He proceeded to undergo RRP by Dr. Colmenares on February 24, 2020.  Pathology revealed acinar adenocarcinoma [place] 4+3 with focal ROBERTO, bladder neck base invasion, positive SVI, positive PNI, and multiple positive margins.  There was no LVSI and 0 out of 3 lymph nodes removed were involved with tumor; pT3bN0.  He received Eligard on 4/20/2020.  MRI prostate on 5/21/2020 was concerning for local recurrence in the prostatectomy bed and pelvic lymph nodes.  He had a PET/CT F-18 fluciclovine PET/CT on 5/20/2020 which showed 2 foci of increased uptake in the left ischium, with underlying  sclerotic lesion, and symphysis pubis, additionally low-level increased uptake in 3 lymph nodes along the right external and internal iliac nodes, and prostatectomy bed was noted.    On April 21, 2020 he received an Eligard injection.    He started Apalutamide on 6/12/2020.  Bone scan 1/22/2021: no bone mets.      2. Tinnitus b/l -  By 12/2020 he presented with 2 months c/o bilateral tinnitus and was evaluated by Dr. Shields from ENT. An audiogram showed a significant down-sloping mid to high frequency sensorineural hearing loss. There was no evidence of conductive hearing loss or significant asymmetry.  He was felt to have  early onset presbycusis. He was referred for tinnitus retraining therapy.  Review of literature showed no clear association of apalutamide or Xgeva with tinnitus or ototoxicity. Felt to have early onset presbycusis.    3. Chronic lower back pain, with exacerbations- He is followed by Dr. Denise and  Dr. Clay from  pain management for evaluation of lower back pain.  He had CT of thoracic and lumbar spine on August 23, 2021.  This demonstrated unchanged sclerotic foci in T6 and T11 vertebral bodies dating back to May 2020.  There were degenerative changes in the lumbar spine with severe left neural foraminal narrowing at L4-L5 and moderate to severe at L3-L4.  He has proceeded with L3-4 epidural injection on 10/22/2021.  No acute osseous abnormalities were noted. His lower back pain and b/l pelvic pain has improved following epidural injection.     History Of Present Illness:  Mr. Etienne is a 58 year old male who presents for f/up of  metastatic hormone sensitive prostate cancer diagnosed in 01/2020, s/p RRP at that time but later found to have residual/recurrent disease in the pelvis and L inferior pubic body and pubic ramus metastasis.   He has been on androgen deprivation therapy since April 2020, and continues on Lupron every 3 months.     He started Apalutamide on 6/12/2020. He has remained on apalutamide.  He has not had any side effects attributable to apalutamide.  He has been taking his medications at the same time by the clock each day.  He has not missed a single dose that he is aware of.       He gets epidural injection to lumbar spine for back pain.    Jay has been doing well and has no new complaints at this clinic visit.  He has been tolerating his apalutamide without any side effects.    He is driving to his cabin for start of fishing season right after our visit.    Past Medical/Surgical History:  Past Medical History:   Diagnosis Date    Gout     Hypertension goal BP (blood pressure) < 140/90  08/01/2022    Lumbar stenosis     Prostate cancer (H) 01/10/2020     Past Surgical History:   Procedure Laterality Date    BIOPSY PROSTATE TRANSRECTAL  01/10/2020    Dr. Colmenares    COLONOSCOPY  5/2020    LITHOTRIPSY  age 30s    MICROSCOPIC KNEE SURGERY Right age 30s    ORTHOPEDIC SURGERY Left 1985    Alan placed in Left Femur    PROSTATECTOMY RETROPUBIC RADICAL  02/24/2020    Dr. Colmenares     Allergies:  Allergies as of 08/10/2023    (No Known Allergies)     Current Medications:   Current Outpatient Medications   Medication Sig    aspirin (ASA) 81 MG chewable tablet Take 1 tablet (81 mg) by mouth daily    clotrimazole (LOTRIMIN) 1 % external solution 2 ear drops in the right ear 2 times daily for 7 days    denosumab (XGEVA) 120 MG/1.7ML SOLN injection Inject 1.7 mLs (120 mg) Subcutaneous every 3 months    HYDROcodone-acetaminophen (NORCO) 5-325 MG tablet Take 1 tablet by mouth 3 times daily as needed for breakthrough pain or severe pain Fill 8/10/2023 Start 8/11/2023    leuprolide (ELIGARD) 45 MG kit Inject 45 mg Subcutaneous every 6 months    lisinopril (ZESTRIL) 10 MG tablet Take 1 tablet (10 mg) by mouth daily    morphine (MS CONTIN) 15 MG CR tablet Take 1 tablet (15 mg) by mouth every 12 hours Fill 8/10/2023 Start 8/11/2023    naloxone (NARCAN) 4 MG/0.1ML nasal spray Spray 1 spray (4 mg) into one nostril alternating nostrils as needed for opioid reversal every 2-3 minutes until assistance arrives    omeprazole (PRILOSEC) 20 MG DR capsule TAKE 1 CAPSULE BY MOUTH TWICE A DAY    SENNA-docusate sodium (SENNA S) 8.6-50 MG tablet Take 1 tablet by mouth 2 times daily    UNABLE TO FIND 1 tablet daily MEDICATION NAME: C, D, Zinc combination supplement     No current facility-administered medications for this visit.     Facility-Administered Medications Ordered in Other Visits   Medication    leuprolide (LUPRON DEPOT) kit 22.5 mg        Family History:    His father was diagnosed with prostate cancer, at the age of 75. Paternal  GM had throat cancer at the age of 94, nonsmoker.     Social History:  Social History     Socioeconomic History    Marital status:    Occupational History    Not on file   Social Needs    Food insecurity    Transportation needs   Tobacco Use    Smoking status: Former Smoker     Packs/day: 1.00     Years: 30.00     Pack years: 30.00     Types: Cigarettes, Cigars     Last attempt to quit: 2012     Years since quittin.4    Smokeless tobacco: Never Used   Substance and Sexual Activity    Alcohol use: Yes     Comment: 4-6 drinks per day - patient reports beer or vodka drinks     Physical Exam:      Wt Readings from Last 5 Encounters:   08/10/23 84.6 kg (186 lb 6.4 oz)   23 84.4 kg (186 lb)   23 86.2 kg (190 lb 0.6 oz)   23 86.2 kg (190 lb 1.6 oz)   23 85.1 kg (187 lb 11.2 oz)     Constitutional: alert and in no distress  Eyes: No redness or discharge  Respiratory: No cough or labored breathing.  Musculoskeletal: Full range of motion in extremities.  Skin: no visible skin lesions or discoloration  Neurological: No tremors and denies headache.  Psychiatric: Mentation appears normal and affect is normal as well.  Alert and oriented x3.  The rest the comprehensive physical examination is deferred due to public health emergency video visit restrictions.        Laboratory/Imaging Studies  Labs reviewed.      Recent Labs   Lab Test 23  1454 23  1544 23  1519 22  1432 22  1434    139 143 140 141   POTASSIUM 4.0 4.4 4.3 4.1 4.1   CHLORIDE 103 104 109 104 106   CO2 26 30 30 29 29   ANIONGAP 12 5 4 7 6   BUN 14.2 22 17 14 17   CR 1.05 1.06 1.00 1.04 0.99   * 121* 110* 99 97   NATALIYA 9.8 9.5 9.2 9.3 9.2     No results for input(s): MAG, PHOS in the last 83824 hours.  Recent Labs   Lab Test 23  1454 23  1544 23  1519 22  1432 22  1434   WBC 7.8 7.7 6.3 8.9 7.5   HGB 14.2 13.3 13.6 13.2* 13.7    268 246 292 234   MCV 92  93 95 94 91   NEUTROPHIL 65 62 56 71 56     Recent Labs   Lab Test 08/09/23  1454 05/09/23  1544 01/31/23  1519   BILITOTAL 0.2 0.5 0.3   ALKPHOS 77 78 67   ALT 33 31 35   AST 22 18 20   ALBUMIN 4.1 3.7 3.9     TSH   Date Value Ref Range Status   01/21/2022 1.03 0.40 - 4.00 mU/L Final   10/29/2021 1.91 0.40 - 4.00 mU/L Final   08/23/2021 2.49 0.40 - 4.00 mU/L Final   04/29/2021 2.57 0.40 - 4.00 mU/L Final   01/22/2021 3.46 0.40 - 4.00 mU/L Final   10/30/2020 1.97 0.40 - 4.00 mU/L Final     No results for input(s): CEA in the last 15209 hours.  Results for orders placed or performed in visit on 06/29/23   PAIN Interlaminar Epidural Steroid Injection Lumbar/Sacral    Narrative    Pre procedure Diagnosis: Lumbar radiculopathy  Post procedure Diagnosis: Same  Procedure performed: L4-5 interlaminar epidural steroid injection   Anesthesia: none  Complications: none  Operators: Issac Tapia MD     Indications:   Per Etienne is a 57 year old male.  The patient has a history of   lbp rad to her LE.  Examination shows neg slump.  he has tried   conservative treatment including meds/pt/injections    MRI reviewed  Options/alternatives, benefits and risks were discussed with the patient   including but not limited to bleeding, infection, no pain relief, tissue   trauma, exposure to radiation, reaction to medications, spinal cord   injury, dural puncture, weakness, numbness and headache.  Questions were   answered to his satisfaction and he wishes to proceed. Voluntary informed   consent was obtained and signed.     Vitals were reviewed: Yes  Allergies were reviewed:  Yes   Medications were reviewed:  Yes   Pre-procedure pain score: 8/10    Procedure:  The patient's medical history, medications, and allergies were reviewed   and reconciled.  After obtaining signed informed consent, the patient was   brought into the procedure suite and was placed in a prone position on the   procedure table.   A Pause for the Cause  was performed.  Patient was   prepped and draped in the usual sterile fashion.     The L4-5 interspace was identified with AP fluoroscopy.  A total of 3ml of   1% lidocaine was used to anesthetize the skin and subcutaneous tissues for   a  midline approach.    A 20gauge 3.5inch Touhy needle was advanced   utilizing intermittent AP and Lateral fluoroscopy and air for loss of   resistance.  The epidural space was encountered on the first pass without   difficulties.  Aspiration for blood and CSF was negative.  Needle position   was verified by injecting 1 ml of Omnipaque utilizing real-time   fluoroscopy that showed good needle placement and epidural spread without   signs of intravascular or intrathecal uptake.  Omnipaque wasted:  9 ml.    Then, after repeated negative aspiration for blood or CSF, a combination   of Kenalog 40 mg, Bupivicaine 0.25% 2 ml, diluted with 3 ml of normal   saline to a total injectate volume of 6 ml was injected into the epidural   space in a slow and incremental fashion and the needle was restyletted and   withdrawn.  All injected medications were preservative free.    The injection site was cleaned and a sterile dressing was applied.    The patient tolerated the procedure well without complications and was   taken to the recovery room for continued observation.    Images were saved to PACS.    Post-procedure pain score: 7/10  Follow-up includes:   -f/u with referring provider     Issac Tapia MD  [facility]       Recent Labs   Lab Test 08/09/23  1454 05/09/23  1544 01/31/23  1519 11/01/22  1432 07/21/22  1434 04/21/22  0932   PSA <0.01 <0.01 <0.01 <0.01 <0.01 <0.01   TESTOSTTOTAL  --  8* 5* 2* 4* 15*          ASSESSMENT/PLAN:  Jay is a very pleasant 56 year old man with metastatic (to the bones) prostate cancer. He is s/p Eligard on 4/21/2020. Jay has low volume hormone sensitive metastatic disease, with residual disease locoregionally and bone metastasis (two  lesions- in left ischium and symphysis pubis), asymptomatic.  He received Eligard on 4/21/2020 and started on Apalutamide on 6/12/2020.    1.  Metastatic prostate cancer-PSA decreased to <0.01. Continue apalutamide daily and Lupron every 3 months.  We will continue to monitor the patient per treatment protocol. We will follow CBC differential, CMP, total testosterone level and PSA every 3 months.    He has responded extremely well so far. His PSA remains undetectable which is great news. We will continue with leuprolide, with apalutamide and denosumab as current.     2. Bone metastasis-continue Xgeva q 3 months for prevention of skeletal related events given low volume metastatic disease to the bones.      3.  Lung cancer screening- CT chest findings from 1/31/2022 negative. Next due in one year    4. Intermittent leucocytosis secondary to intermittent neutrophilia  Per tends to get intermittent leukocytosis with neutrophilia.  He had high total WBC count with absolute neutrophil counts of 16.5k  This has dropped to 5.1K-well within the normal range.  He has been getting intermittent cyclical neutrophilia.     5. Mild normochromic normocytic anemia-   He is hemoglobin values have been fluctuating since his diagnosis of prostate cancer but most values have been between 13 and 14 g/dL.    6. GI - on PPI for s/o GErD and cough in am with improvement of symptoms per GI (Brett Preston's note)- on Omeprazole PO BID. Apalutamide associated with the following GI symptoms: Decreased appetite (12%), diarrhea (9% to 20%), nausea (18%) but not GERD s/p. Previously referred to GI due to persistent GERD symptoms and was continued by GI on Omeprazole and recommended to proceed with upper EGD for further evaluation. He did not follow through with upper EgD or  GI appt.      Video-Visit Details    Type of service:  Video Visit  Originating Location (pt. Location): Home  Distant Location (provider location):  [facility]  [facility]  Platform used for Video Visit: [facility]    30 minutes spent on the date of the encounter doing chart review, history and exam, documentation and further activities as noted above      Scott Garcia    Hematologist and Medical Oncologist  [facility]

## 2023-08-10 NOTE — Clinical Note
8/10/2023         RE: Per Etienne  [id number] [facility] [place] [address]        Dear Colleague,    Thank you for referring your patient, Per Etienne, to the [facility]. Please see a copy of my visit note below.    Oncology Follow-up:  Date on this visit: Aug 10, 2023    Primary care physician: Per Chen   Urologist: Dr. Angel Colmenares     Metastatic hormone sensitive prostate cancer    Oncologic History:  1. Metastatic hormone sensitive prostate cancer  He presented to his PCP in December 2019 with slow urinary stream.  PSA was checked and was found to be 124. On January 8, 2020 abdomen pelvis CT showed groundglass opacity in the left lower lobe,  dystrophic calcification of the prostate gland, and a fatty liver.  Same-day bone scan was negative. On January 10, 2010 prostate biopsy showed on the left Robyn score 4+3 involving 7 cores and on the right Baldwin score 3+4 involving 6 cores, with positive perineural invasion.  He proceeded to undergo RRP by Dr. Colmenares on February 24, 2020.  Pathology revealed acinar adenocarcinoma Robyn 4+3 with focal ROBERTO, bladder neck base invasion, positive SVI, positive PNI, and multiple positive margins.  There was no LVSI and 0 out of 3 lymph nodes removed were involved with tumor; pT3bN0.  He received Eligard on 4/20/2020.  MRI prostate on 5/21/2020 was concerning for local recurrence in the prostatectomy bed and pelvic lymph nodes.  He had a PET/CT F-18 fluciclovine PET/CT on 5/20/2020 which showed 2 foci of increased uptake in the left ischium, with underlying  sclerotic lesion, and symphysis pubis, additionally low-level increased uptake in 3 lymph nodes along the right external and internal iliac nodes, and prostatectomy bed was noted.    On April 21, 2020 he received an Eligard injection.    He started Apalutamide on 6/12/2020.  Bone scan 1/22/2021: no bone mets.      2. Tinnitus b/l -  By 12/2020 he presented with 2  months c/o bilateral tinnitus and was evaluated by Dr. Shields from ENT. An audiogram showed a significant down-sloping mid to high frequency sensorineural hearing loss. There was no evidence of conductive hearing loss or significant asymmetry.  He was felt to have early onset presbycusis. He was referred for tinnitus retraining therapy.  Review of literature showed no clear association of apalutamide or Xgeva with tinnitus or ototoxicity. Felt to have early onset presbycusis.    3. Chronic lower back pain, with exacerbations- He is followed by Dr. Denise and  Dr. Clay from  pain management for evaluation of lower back pain.  He had CT of thoracic and lumbar spine on August 23, 2021.  This demonstrated unchanged sclerotic foci in T6 and T11 vertebral bodies dating back to May 2020.  There were degenerative changes in the lumbar spine with severe left neural foraminal narrowing at L4-L5 and moderate to severe at L3-L4.  He has proceeded with L3-4 epidural injection on 10/22/2021.  No acute osseous abnormalities were noted. His lower back pain and b/l pelvic pain has improved following epidural injection.     History Of Present Illness:  Mr. Etienne is a 58 year old male who presents for f/up of  metastatic hormone sensitive prostate cancer diagnosed in 01/2020, s/p RRP at that time but later found to have residual/recurrent disease in the pelvis and L inferior pubic body and pubic ramus metastasis.   He has been on androgen deprivation therapy since April 2020, and continues on Lupron every 3 months.     He started Apalutamide on 6/12/2020. He has remained on apalutamide.  He has not had any side effects attributable to apalutamide.  He has been taking his medications at the same time by the clock each day.  He has not missed a single dose that he is aware of.       He gets epidural injection to lumbar spine for back pain.    Jay has been doing well and has no new complaints at this clinic visit.  He has been  tolerating his apalutamide without any side effects.    He is driving to his cabin for start of fishing season right after our visit.    Past Medical/Surgical History:  Past Medical History:   Diagnosis Date     Gout      Hypertension goal BP (blood pressure) < 140/90 08/01/2022     Lumbar stenosis      Prostate cancer (H) 01/10/2020     Past Surgical History:   Procedure Laterality Date     BIOPSY PROSTATE TRANSRECTAL  01/10/2020    Dr. Colmenares     COLONOSCOPY  5/2020     LITHOTRIPSY  age 30s     MICROSCOPIC KNEE SURGERY Right age 30s     ORTHOPEDIC SURGERY Left 1985    Alan placed in Left Femur     PROSTATECTOMY RETROPUBIC RADICAL  02/24/2020    Dr. Colmenares     Allergies:  Allergies as of 08/10/2023     (No Known Allergies)     Current Medications:   Current Outpatient Medications   Medication Sig     aspirin (ASA) 81 MG chewable tablet Take 1 tablet (81 mg) by mouth daily     clotrimazole (LOTRIMIN) 1 % external solution 2 ear drops in the right ear 2 times daily for 7 days     denosumab (XGEVA) 120 MG/1.7ML SOLN injection Inject 1.7 mLs (120 mg) Subcutaneous every 3 months     HYDROcodone-acetaminophen (NORCO) 5-325 MG tablet Take 1 tablet by mouth 3 times daily as needed for breakthrough pain or severe pain Fill 8/10/2023 Start 8/11/2023     leuprolide (ELIGARD) 45 MG kit Inject 45 mg Subcutaneous every 6 months     lisinopril (ZESTRIL) 10 MG tablet Take 1 tablet (10 mg) by mouth daily     morphine (MS CONTIN) 15 MG CR tablet Take 1 tablet (15 mg) by mouth every 12 hours Fill 8/10/2023 Start 8/11/2023     naloxone (NARCAN) 4 MG/0.1ML nasal spray Spray 1 spray (4 mg) into one nostril alternating nostrils as needed for opioid reversal every 2-3 minutes until assistance arrives     omeprazole (PRILOSEC) 20 MG DR capsule TAKE 1 CAPSULE BY MOUTH TWICE A DAY     SENNA-docusate sodium (SENNA S) 8.6-50 MG tablet Take 1 tablet by mouth 2 times daily     UNABLE TO FIND 1 tablet daily MEDICATION NAME: C, D, Zinc combination  supplement     No current facility-administered medications for this visit.     Facility-Administered Medications Ordered in Other Visits   Medication     leuprolide (LUPRON DEPOT) kit 22.5 mg        Family History:    His father was diagnosed with prostate cancer, at the age of 75. Paternal GM had throat cancer at the age of 94, nonsmoker.     Social History:  Social History     Socioeconomic History     Marital status:    Occupational History     Not on file   Social Needs     Food insecurity     Transportation needs   Tobacco Use     Smoking status: Former Smoker     Packs/day: 1.00     Years: 30.00     Pack years: 30.00     Types: Cigarettes, Cigars     Last attempt to quit: 2012     Years since quittin.4     Smokeless tobacco: Never Used   Substance and Sexual Activity     Alcohol use: Yes     Comment: 4-6 drinks per day - patient reports beer or vodka drinks     Physical Exam:      Wt Readings from Last 5 Encounters:   08/10/23 84.6 kg (186 lb 6.4 oz)   23 84.4 kg (186 lb)   23 86.2 kg (190 lb 0.6 oz)   23 86.2 kg (190 lb 1.6 oz)   23 85.1 kg (187 lb 11.2 oz)     Constitutional: alert and in no distress  Eyes: No redness or discharge  Respiratory: No cough or labored breathing.  Musculoskeletal: Full range of motion in extremities.  Skin: no visible skin lesions or discoloration  Neurological: No tremors and denies headache.  Psychiatric: Mentation appears normal and affect is normal as well.  Alert and oriented x3.  The rest the comprehensive physical examination is deferred due to public health emergency video visit restrictions.        Laboratory/Imaging Studies  Labs reviewed.      Recent Labs   Lab Test 23  1454 23  1544 23  1519 22  1432 22  1434    139 143 140 141   POTASSIUM 4.0 4.4 4.3 4.1 4.1   CHLORIDE 103 104 109 104 106   CO2 26 30 30 29 29   ANIONGAP 12 5 4 7 6   BUN 14.2 22 17 14 17   CR 1.05 1.06 1.00 1.04 0.99   GLC  108* 121* 110* 99 97   NATALIYA 9.8 9.5 9.2 9.3 9.2     No results for input(s): MAG, PHOS in the last 46399 hours.  Recent Labs   Lab Test 08/09/23  1454 05/09/23  1544 01/31/23  1519 11/01/22  1432 07/21/22  1434   WBC 7.8 7.7 6.3 8.9 7.5   HGB 14.2 13.3 13.6 13.2* 13.7    268 246 292 234   MCV 92 93 95 94 91   NEUTROPHIL 65 62 56 71 56     Recent Labs   Lab Test 08/09/23  1454 05/09/23  1544 01/31/23  1519   BILITOTAL 0.2 0.5 0.3   ALKPHOS 77 78 67   ALT 33 31 35   AST 22 18 20   ALBUMIN 4.1 3.7 3.9     TSH   Date Value Ref Range Status   01/21/2022 1.03 0.40 - 4.00 mU/L Final   10/29/2021 1.91 0.40 - 4.00 mU/L Final   08/23/2021 2.49 0.40 - 4.00 mU/L Final   04/29/2021 2.57 0.40 - 4.00 mU/L Final   01/22/2021 3.46 0.40 - 4.00 mU/L Final   10/30/2020 1.97 0.40 - 4.00 mU/L Final     No results for input(s): CEA in the last 20709 hours.  Results for orders placed or performed in visit on 06/29/23   PAIN Interlaminar Epidural Steroid Injection Lumbar/Sacral    Narrative    Pre procedure Diagnosis: Lumbar radiculopathy  Post procedure Diagnosis: Same  Procedure performed: L4-5 interlaminar epidural steroid injection   Anesthesia: none  Complications: none  Operators: Issac Tapia MD     Indications:   Per Etienne is a 57 year old male.  The patient has a history of   lbp rad to her LE.  Examination shows neg slump.  he has tried   conservative treatment including meds/pt/injections    MRI reviewed  Options/alternatives, benefits and risks were discussed with the patient   including but not limited to bleeding, infection, no pain relief, tissue   trauma, exposure to radiation, reaction to medications, spinal cord   injury, dural puncture, weakness, numbness and headache.  Questions were   answered to his satisfaction and he wishes to proceed. Voluntary informed   consent was obtained and signed.     Vitals were reviewed: Yes  Allergies were reviewed:  Yes   Medications were reviewed:  Yes   Pre-procedure  pain score: 8/10    Procedure:  The patient's medical history, medications, and allergies were reviewed   and reconciled.  After obtaining signed informed consent, the patient was   brought into the procedure suite and was placed in a prone position on the   procedure table.   A Pause for the Cause was performed.  Patient was   prepped and draped in the usual sterile fashion.     The L4-5 interspace was identified with AP fluoroscopy.  A total of 3ml of   1% lidocaine was used to anesthetize the skin and subcutaneous tissues for   a  midline approach.    A 20gauge 3.5inch Touhy needle was advanced   utilizing intermittent AP and Lateral fluoroscopy and air for loss of   resistance.  The epidural space was encountered on the first pass without   difficulties.  Aspiration for blood and CSF was negative.  Needle position   was verified by injecting 1 ml of Omnipaque utilizing real-time   fluoroscopy that showed good needle placement and epidural spread without   signs of intravascular or intrathecal uptake.  Omnipaque wasted:  9 ml.    Then, after repeated negative aspiration for blood or CSF, a combination   of Kenalog 40 mg, Bupivicaine 0.25% 2 ml, diluted with 3 ml of normal   saline to a total injectate volume of 6 ml was injected into the epidural   space in a slow and incremental fashion and the needle was restyletted and   withdrawn.  All injected medications were preservative free.    The injection site was cleaned and a sterile dressing was applied.    The patient tolerated the procedure well without complications and was   taken to the recovery room for continued observation.    Images were saved to PACS.    Post-procedure pain score: 7/10  Follow-up includes:   -f/u with referring provider     Issac Tapia MD  [facility] [place]       Recent Labs   Lab Test 08/09/23  1454 05/09/23  1544 01/31/23  1519 11/01/22  1432 07/21/22  1434 04/21/22  0932   PSA <0.01 <0.01 <0.01 <0.01 <0.01 <0.01    TESTOSTTOTAL  --  8* 5* 2* 4* 15*          ASSESSMENT/PLAN:  Jay is a very pleasant 56 year old man with metastatic (to the bones) prostate cancer. He is s/p Eligard on 4/21/2020. Jay has low volume hormone sensitive metastatic disease, with residual disease locoregionally and bone metastasis (two lesions- in left ischium and symphysis pubis), asymptomatic.  He received Eligard on 4/21/2020 and started on Apalutamide on 6/12/2020.    1.  Metastatic prostate cancer-PSA decreased to <0.01. Continue apalutamide daily and Lupron every 3 months.  We will continue to monitor the patient per treatment protocol. We will follow CBC differential, CMP, total testosterone level and PSA every 3 months.    He has responded extremely well so far. His PSA remains undetectable which is great news. We will continue with leuprolide, with apalutamide and denosumab as current.     2. Bone metastasis-continue Xgeva q 3 months for prevention of skeletal related events given low volume metastatic disease to the bones.      3.  Lung cancer screening- CT chest findings from 1/31/2022 negative. Next due in one year    4. Intermittent leucocytosis secondary to intermittent neutrophilia  Per tends to get intermittent leukocytosis with neutrophilia.  He had high total WBC count with absolute neutrophil counts of 16.5 thousand.  This has dropped to 4.2K-well within the normal range.  He has been getting intermittent cyclical neutrophilia.     5. Drug monitoring- TSH monitoring q 3-4 months. TSH normal in 01/2022.     6. Mild normochromic normocytic anemia- continue to follow on CBCd, likely secondary to chronic inflammation, androgen deprivation therapy, apalutamide.  He is hemoglobin values have been fluctuating since his diagnosis of prostate cancer but most values have been between 13 and 14 g/dL.    7. GI - on PPI for s/o GErD and cough in am with improvement of symptoms per GI (Brett Preston's note)- on Omeprazole PO BID. Apalutamide  associated with the following GI symptoms: Decreased appetite (12%), diarrhea (9% to 20%), nausea (18%) but not GERD s/p. Previously referred to GI due to persistent GERD symptoms and was continued by GI on Omeprazole and recommended to proceed with upper EGD for further evaluation. He did not follow through with upper EgD or  GI appt.      Video-Visit Details    Type of service:  Video Visit  Originating Location (pt. Location): Home  Distant Location (provider location):  [facility]  Platform used for Video Visit: [employer]    25 minutes spent on the date of the encounter doing chart review, history and exam, documentation and further activities as noted above      Scott Garcia    Hematologist and Medical Oncologist  [facility]       Again, thank you for allowing me to participate in the care of your patient.        Sincerely,        Scott Garcia MD

## 2023-08-10 NOTE — PROGRESS NOTES
Infusion Nursing Note:  Per Etienne presents today for Xgeva and Lupron.    Patient seen by provider today: Yes: Dr. Garcia   present during visit today: Not Applicable.    Note: N/A.      Intravenous Access:  No Intravenous access/labs at this visit.    Treatment Conditions:  Lab Results   Component Value Date     08/09/2023    POTASSIUM 4.0 08/09/2023    CR 1.05 08/09/2023    NATALIYA 9.8 08/09/2023    BILITOTAL 0.2 08/09/2023    ALBUMIN 4.1 08/09/2023    ALT 33 08/09/2023    AST 22 08/09/2023       Results reviewed, labs MET treatment parameters, ok to proceed with treatment.      Post Infusion Assessment:  Patient tolerated injection without incident.  Site patent and intact, free from redness, edema or discomfort.       Discharge Plan:   Patient discharged in stable condition accompanied by: wife.  Departure Mode: Ambulatory.      Brittany Brown LPN

## 2023-08-11 LAB — TESTOST SERPL-MCNC: 11 NG/DL (ref 240–950)

## 2023-08-24 ASSESSMENT — ENCOUNTER SYMPTOMS
DIZZINESS: 0
MYALGIAS: 0
WEAKNESS: 0
DYSURIA: 1
COUGH: 0
SORE THROAT: 0
HEARTBURN: 0
HEMATOCHEZIA: 0
HEADACHES: 0
CHILLS: 1
JOINT SWELLING: 0
CONSTIPATION: 1
PALPITATIONS: 0
HEMATURIA: 0
SHORTNESS OF BREATH: 0
NAUSEA: 0
NERVOUS/ANXIOUS: 0
ARTHRALGIAS: 0
PARESTHESIAS: 0
EYE PAIN: 0
FREQUENCY: 1
FEVER: 1
ABDOMINAL PAIN: 1
DIARRHEA: 1

## 2023-08-30 ENCOUNTER — OFFICE VISIT (OUTPATIENT)
Dept: PALLIATIVE MEDICINE | Facility: CLINIC | Age: 58
End: 2023-08-30
Payer: COMMERCIAL

## 2023-08-30 VITALS — HEART RATE: 68 BPM | SYSTOLIC BLOOD PRESSURE: 154 MMHG | DIASTOLIC BLOOD PRESSURE: 92 MMHG

## 2023-08-30 DIAGNOSIS — C79.51 PROSTATE CANCER METASTATIC TO BONE (H): ICD-10-CM

## 2023-08-30 DIAGNOSIS — C61 PROSTATE CANCER METASTATIC TO BONE (H): ICD-10-CM

## 2023-08-30 DIAGNOSIS — T40.2X5A THERAPEUTIC OPIOID INDUCED CONSTIPATION: Primary | ICD-10-CM

## 2023-08-30 DIAGNOSIS — M54.16 LUMBAR RADICULOPATHY: ICD-10-CM

## 2023-08-30 DIAGNOSIS — K59.03 THERAPEUTIC OPIOID INDUCED CONSTIPATION: Primary | ICD-10-CM

## 2023-08-30 DIAGNOSIS — G89.3 CANCER ASSOCIATED PAIN: ICD-10-CM

## 2023-08-30 PROCEDURE — 99214 OFFICE O/P EST MOD 30 MIN: CPT

## 2023-08-30 RX ORDER — MORPHINE SULFATE 15 MG/1
15 TABLET, FILM COATED, EXTENDED RELEASE ORAL EVERY 12 HOURS
Qty: 60 TABLET | Refills: 0 | Status: SHIPPED | OUTPATIENT
Start: 2023-08-30 | End: 2023-10-11

## 2023-08-30 RX ORDER — HYDROCODONE BITARTRATE AND ACETAMINOPHEN 5; 325 MG/1; MG/1
1 TABLET ORAL 3 TIMES DAILY PRN
Qty: 90 TABLET | Refills: 0 | Status: SHIPPED | OUTPATIENT
Start: 2023-08-30 | End: 2023-10-11

## 2023-08-30 ASSESSMENT — PAIN SCALES - GENERAL: PAINLEVEL: MODERATE PAIN (5)

## 2023-08-30 NOTE — PATIENT INSTRUCTIONS
1.  Pain Physical Therapy:  WALT Thompson is very active. Despite the pain, he continues to work full time as a , which is somewhat labor intensive (frequently using ladder).               2.  Pain Psychologist to address relaxation, behavioral change, coping style, and other factors important to improvement.  WALT Thompson may possibly benefit from support for his chronic health conditions overall, though he does seem to manage fairly well. He continues to work full time and likes to stay active.               3.  Diagnostic Studies:  None               4.  Medication Management:   Continue MS Contin to 15 mg every 12 hours. He continues to appreciate benefit, stable baseline pain, supports functioning, continues to work full time. Refilled today.   Continue hydrocodone 5-325 three times daily as needed for breakthrough pain. He does not use very frequently, usually only takes 1 tab daily as needed. He continues to appreciate benefit for breakthrough pain, no concerns for continuation. Refilled today for #90 tabs to last 30 days.   Continue Senna S to 1 tab twice daily. He reports improvement with OIC since last visit, with increased frequency to BID.   Will plan to renew CSA/UDS at follow up.               5.  Potential procedures: He had repeat L4-5 LUIS on 6-29-23 and has appreciated improvement in pain. Continue to monitor for benefit. May repeat every 3+ months.               7.  Follow up with TEREZA Sanchez CNP in 3 months.     ----------------------------------------------------------------  Clinic Number:  [phone]   Call with any questions about your care and for scheduling assistance.   Calls are returned Monday through Friday between 8 AM and 4:30 PM. We usually get back to you within 2 business days depending on the issue/request.    If we are prescribing your medications:  For opioid medication refills, call the clinic or send a [place] message 7 days in advance.  Please  include:  Name of requested medication  Name of the pharmacy.  For non-opioid medications, call your pharmacy directly to request a refill. Please allow 3-4 days to be processed.   Per [place] State Law:  All controlled substance prescriptions must be filled within 30 days of being written.    For those controlled substances allowing refills, pickup must occur within 30 days of last fill.      We believe regular attendance is key to your success in our program!    Any time you are unable to keep your appointment we ask that you call us at least 24 hours in advance to cancel.This will allow us to offer the appointment time to another patient.   Multiple missed appointments may lead to dismissal from the clinic.

## 2023-08-30 NOTE — PROGRESS NOTES
[facility]     Date of visit: 8/30/2023      Assessment:   Per Etienne is a 58 year old male with a past medical history significant for prostate cancer with bone metastasis, pulmonary nodules, lumbar stenosis, HTN, gout who presents with complaints of cancer associated pain.      1. Low back pain with LLE radic, neuropathy bilat feet, pain of multiple joints - His back pain with left sided radicular pain with L5 distribution that has been present for many years. He has been referred for procedure by his PCP for repeat L4/5 LUIS (Regina/Obed), which historically have given him significant symptom relief, although he did not appreciate as much benefit from the most recent LUIS on 8/2/22. Lumbar CT from 8/2021 demonstrated severe left neural foraminal narrowing at L4-L5 and moderate to severe right at L3-4, additional multilevel spinal canal stenosis and neural foraminal stenosis noted. I suspect his low back pain is associated with multiple factors, including underlying degenerative changes of lumbar spine, bone metastasis, and likely some myofascial component. Neuropathic symptoms in feet and generalized joint pain is likely secondary effects from cancer treatment and bone metastasis.   2. Mental Health - the patient's mental health concerns, specifically prostate cancer with bone mets, affect his experience of pain and contribute to his clinically significant distress. He may benefit from more generalized health psychology to support chronic illness/cancer management and will consider discussing at future follow up.     Visit Diagnoses:  1. Therapeutic opioid induced constipation    2. Prostate cancer metastatic to bone (H)    3. Cancer associated pain    4. Lumbar radiculopathy        Plan:  Diagnosis reviewed, treatment option addressed, and risk/benifits discussed.  Self-care instructions given.  I am recommending a multidisciplinary treatment plan to help this patient better  manage their pain.                  1.  Pain Physical Therapy:  WALT Thompson is very active. Despite the pain, he continues to work full time as a , which is somewhat labor intensive (frequently using ladder).               2.  Pain Psychologist to address relaxation, behavioral change, coping style, and other factors important to improvement.  WALT Thompson may possibly benefit from support for his chronic health conditions overall, though he does seem to manage fairly well. He continues to work full time and likes to stay active.               3.  Diagnostic Studies:  None               4.  Medication Management:   Continue MS Contin to 15 mg every 12 hours. He continues to appreciate benefit, stable baseline pain, supports functioning, continues to work full time. Refilled today.   Continue hydrocodone 5-325 three times daily as needed for breakthrough pain. He does not use very frequently, usually only takes 1 tab daily as needed. He continues to appreciate benefit for breakthrough pain, no concerns for continuation. Refilled today for #90 tabs to last 30 days.   Continue Senna S to 1 tab twice daily. He reports improvement with OIC since last visit, with increased frequency to BID.   Will plan to renew CSA/UDS at follow up.               5.  Potential procedures: He had repeat L4-5 LIUS on 6-29-23 and has appreciated improvement in pain. Continue to monitor for benefit. May repeat every 3+ months.               7.  Follow up with TEREZA Sanchez CNP in 3 months.       Review of Electronic Chart: Today I have also reviewed available medical information in the patient's medical record at [facility] ([facility]) and Care Everywhere (if available), including relevant provider notes, laboratory work, and imaging.     Chanel Bruno DNP, APRN, AGNP-C  [facility]     -------------------------------------------------    Subjective:    Chief complaint:   Chief Complaint   Patient  presents with    Pain       Interval history:  Per Etienne is a 58 year old male last seen on 6/7/23.  They are a patient of mine seen in follow up.     Recommendations/plan at the last visit included:              1.  Pain Physical Therapy:  WALT Thompson is very active. Despite the pain, he continues to work full time as a , which is somewhat labor intensive (frequently using ladder).               2.  Pain Psychologist to address relaxation, behavioral change, coping style, and other factors important to improvement.  WALT Thompson may possibly benefit from support for his chronic health conditions overall, though he does seem to manage fairly well. He continues to work full time and likes to stay active.               3.  Diagnostic Studies:  None               4.  Medication Management:   Continue MS Contin to 15 mg every 12 hours. He continues to appreciate benefit, stable baseline pain, supports functioning, continues to work full time. Refilled today.   Continue hydrocodone 5-325 three times daily as needed for breakthrough pain. He does not use very frequently, usually only takes 1 tab daily as needed. He continues to appreciate benefit for breakthrough pain, no concerns for continuation. Refilled today for #90 tabs to last 30 days.   Continue Senna S to 1 tab twice daily. He reports improvement with OIC since last visit, with increased frequency to BID.               5.  Potential procedures: He had L4-5 LUIS on 12/1/22 that was helpful for left leg radicular pain, though he reported not as helpful as prior injections. He appreciated moderate improvement in left leg for about 5 months, starting noticing increased pain about a month ago, with gradual progression. Orders placed for repeat LUIS.               7.  Follow up with TEREZA Sanchez CNP in 10-12 weeks or sooner around 4 weeks postprocedure if benefit not sufficient/similar results as prior injections.     Since his last visit,  Per Etienne reports:  -He had repeat L4-5 LUIS on 6/29/23, notes this has been helpful for leg pain.   -He continues to appreciate good benefit with MS contin and hydrocodone.  -He uses 2-3 tabs of Norco per day, most days 3 tabs.   -He is able to continue working and being active.   -His OIC has improved, continues Senna S 1 BID.   -No side effects otherwise.   -He notes increased bruising, has follow up with PCP tomorrow, will plan to discuss with him tomorrow.   -He and wife are celebrating 30th anniversary on 10/8.  -He is excited for upcoming trip to [place] to see [place], he and wife are going overnight.     Pain Information:   Pain rating: averages 7/10 on a 0-10 scale.      Interval history from last visit on 6/7/23:  -His pain is a little higher than it was at last visit.   -He states pain was worse this morning, notes he was more active yesterday.   -Pain in LLE seems to be returning over the last month.   -He has felt some fatigue/weakness in LLE, thinks this is more so pain related.   -He's had LESI in the past that have been very helpful for leg pain, recent LUIS on 12/1/22 was not as helpful as prior injections.   -He is open to repeat LESI, will monitor for benefit after and follow up with me sooner if not as helpful.   -He continues MS Contin 15 mg BID, hydrocodone 5-325 mg TID PRN.   -He appreciates good benefit with meds, no side effects.   -He is excited to find out if [place] gets the [email or web address] bid, will find out on the 21st.   -He has been fishing a lot.   -His grandson, Juwan, is 7.5 months now, standing now.   Pain Information:              Pain rating: averages 7/10 on a 0-10 scale.        Interval history from last visit on 3/29/23:  -his pain is about the same as it was at last visit.   -He has good days and bad days.   -He thinks pain is consistent.   -He continues MS contin 15 mg BID, hydrocodone 5 TID PRN.   -He reports medications are working well, stable at this time.    -He was dealing with OIC, increased senna to BID at last visit, constipation improved.   -No other side effects from medications.   -He had reached out in between visits for a reasonable early refill due to travels.   -He went to [place] recently, had a great trip.   -They drove down (his wife/family).  Pain Information:              Pain rating: averages 4/10 on a 0-10 scale.        Interval history from last visit on 1/18/23:  -His pain is about the same as it was at last visit.   -He had LESI with Dr. Tapia on 12/1/22.  -He does not think it was as helpful as prior LESI.   -He is taking MS Contin 15 mg BID, has PRN Norco for breakthrough pain (does not use often).  -He has needed to use hydrocodone a bit more since last visit.   -He thinks pain is reasonably well controlled right now.   -He is taking Senna S daily at bedtime, has BM every other day, sometimes harder stool.   -He is open to increasing to BID frequency.   -His boss verbalized concerns about driving with medications, patient does not share similar concerns, denies side effects.   -He is waiting to hear about [employer] next year, will be involved with the project if [employer] wins.   -He is going to consider retiring sometime in the next year.         Interval history from last visit on 11/3/22:  -He had shingles since last visit  -Pain has been increased  -He is also been experiencing constipation after increasing MS Contin 15mg to BID dosing.   -His grandson, Juwan Del Castillo, was born on 10/20/22.   -He will be getting shingles injection soon.  -He has been able to be more acvtive with long acting opioid  -[place] 2 miles per day, lost 8 pounds, able to do job better now that pain is under better control.         Current Pain Treatments:    Medications:                  MS Contin 15 mg BID              Hydrocodone 5-325 mg TID PRN      Other:                  LESI on 12/1/22 with Dr. Tapia     Current MME: 44     Review of [place]  Prescription Monitoring Program (): No concern for abuse or misuse of controlled medications based on this report. Reviewed - appears appropriate.      Annual Controlled Substance Agreement/UDS due date: Completed on 10/6/22     Past pain treatments:  LESI - helpful       Medications:  Current Outpatient Medications   Medication Sig Dispense Refill    apalutamide (ERLEADA) 60 MG tablet Take 4 tablets (240 mg) by mouth daily for 30 days 120 tablet 0    aspirin (ASA) 81 MG chewable tablet Take 1 tablet (81 mg) by mouth daily      denosumab (XGEVA) 120 MG/1.7ML SOLN injection Inject 1.7 mLs (120 mg) Subcutaneous every 3 months 1.7 mL 0    HYDROcodone-acetaminophen (NORCO) 5-325 MG tablet Take 1 tablet by mouth 3 times daily as needed for breakthrough pain or severe pain Fill 9/8/2023 Start 9/10/2023 90 tablet 0    leuprolide (ELIGARD) 45 MG kit Inject 45 mg Subcutaneous every 6 months      lisinopril (ZESTRIL) 10 MG tablet Take 1 tablet (10 mg) by mouth daily 90 tablet 3    morphine (MS CONTIN) 15 MG CR tablet Take 1 tablet (15 mg) by mouth every 12 hours Fill 9/8/2023 Start 9/10/2023 60 tablet 0    naloxone (NARCAN) 4 MG/0.1ML nasal spray Spray 1 spray (4 mg) into one nostril alternating nostrils as needed for opioid reversal every 2-3 minutes until assistance arrives 0.2 mL 0    omeprazole (PRILOSEC) 20 MG DR capsule TAKE 1 CAPSULE BY MOUTH TWICE A  capsule 1    SENNA-docusate sodium (SENNA S) 8.6-50 MG tablet Take 1 tablet by mouth 2 times daily 60 tablet 3    UNABLE TO FIND 1 tablet daily MEDICATION NAME: C, D, Zinc combination supplement      clotrimazole (LOTRIMIN) 1 % external solution 2 ear drops in the right ear 2 times daily for 7 days 15 mL 0       Medical History: any changes in medical history since they were last seen? No      Objective:    Physical Exam:  Blood pressure (!) 154/92, pulse 68.  Constitutional: Well developed, well nourished, appears stated age.  Gait: Intact   HEENT: Head  atraumatic, normocephalic. Eyes without conjunctival injection or jaundice. Oropharynx clear. Neck supple. No obvious neck masses.  Skin: No rash, lesions, or petechiae of exposed skin.   Psychiatric/mental status: Alert, without lethargy or stupor. Speech fluent. Appropriate affect. Mood normal. Able to follow commands without difficulty.     Diagnostic Tests/Imaging/Labs:  None     BILLING TIME DOCUMENTATION:   The total TIME spent on this patient on the date of the encounter/appointment was 18 minutes.      TOTAL TIME includes:   Time spent preparing to see the patient (reviewing records and tests)   Time spent face to face (or over the phone) with the patient   Time spent ordering tests, medications, procedures and referrals   Time spent Referring and communicating with other healthcare professionals   Time spent documenting clinical information in Epic

## 2023-08-31 ENCOUNTER — OFFICE VISIT (OUTPATIENT)
Dept: FAMILY MEDICINE | Facility: CLINIC | Age: 58
End: 2023-08-31
Payer: COMMERCIAL

## 2023-08-31 VITALS
SYSTOLIC BLOOD PRESSURE: 132 MMHG | DIASTOLIC BLOOD PRESSURE: 88 MMHG | TEMPERATURE: 97.6 F | RESPIRATION RATE: 24 BRPM | WEIGHT: 187 LBS | BODY MASS INDEX: 28.34 KG/M2 | HEIGHT: 68 IN | OXYGEN SATURATION: 98 % | HEART RATE: 94 BPM

## 2023-08-31 DIAGNOSIS — Z00.00 ROUTINE GENERAL MEDICAL EXAMINATION AT A HEALTH CARE FACILITY: Primary | ICD-10-CM

## 2023-08-31 DIAGNOSIS — D84.9 IMMUNOCOMPROMISED (H): ICD-10-CM

## 2023-08-31 PROCEDURE — 99396 PREV VISIT EST AGE 40-64: CPT | Performed by: FAMILY MEDICINE

## 2023-08-31 ASSESSMENT — ENCOUNTER SYMPTOMS
CONSTIPATION: 1
NAUSEA: 0
DIZZINESS: 0
MYALGIAS: 0
HEMATURIA: 0
FEVER: 1
WEAKNESS: 0
ABDOMINAL PAIN: 1
HEMATOCHEZIA: 0
NERVOUS/ANXIOUS: 0
EYE PAIN: 0
HEARTBURN: 0
SHORTNESS OF BREATH: 0
HEADACHES: 0
SORE THROAT: 0
JOINT SWELLING: 0
DYSURIA: 1
DIARRHEA: 1
CHILLS: 1
FREQUENCY: 1
PALPITATIONS: 0
COUGH: 0
PARESTHESIAS: 0
ARTHRALGIAS: 0

## 2023-08-31 ASSESSMENT — PAIN SCALES - GENERAL: PAINLEVEL: NO PAIN (0)

## 2023-08-31 ASSESSMENT — ANXIETY QUESTIONNAIRES
3. WORRYING TOO MUCH ABOUT DIFFERENT THINGS: NOT AT ALL
8. IF YOU CHECKED OFF ANY PROBLEMS, HOW DIFFICULT HAVE THESE MADE IT FOR YOU TO DO YOUR WORK, TAKE CARE OF THINGS AT HOME, OR GET ALONG WITH OTHER PEOPLE?: NOT DIFFICULT AT ALL
2. NOT BEING ABLE TO STOP OR CONTROL WORRYING: NOT AT ALL
IF YOU CHECKED OFF ANY PROBLEMS ON THIS QUESTIONNAIRE, HOW DIFFICULT HAVE THESE PROBLEMS MADE IT FOR YOU TO DO YOUR WORK, TAKE CARE OF THINGS AT HOME, OR GET ALONG WITH OTHER PEOPLE: NOT DIFFICULT AT ALL
GAD7 TOTAL SCORE: 0
1. FEELING NERVOUS, ANXIOUS, OR ON EDGE: NOT AT ALL
6. BECOMING EASILY ANNOYED OR IRRITABLE: NOT AT ALL
GAD7 TOTAL SCORE: 0
7. FEELING AFRAID AS IF SOMETHING AWFUL MIGHT HAPPEN: NOT AT ALL
4. TROUBLE RELAXING: NOT AT ALL
GAD7 TOTAL SCORE: 0
7. FEELING AFRAID AS IF SOMETHING AWFUL MIGHT HAPPEN: NOT AT ALL
5. BEING SO RESTLESS THAT IT IS HARD TO SIT STILL: NOT AT ALL

## 2023-08-31 ASSESSMENT — PATIENT HEALTH QUESTIONNAIRE - PHQ9
SUM OF ALL RESPONSES TO PHQ QUESTIONS 1-9: 4
10. IF YOU CHECKED OFF ANY PROBLEMS, HOW DIFFICULT HAVE THESE PROBLEMS MADE IT FOR YOU TO DO YOUR WORK, TAKE CARE OF THINGS AT HOME, OR GET ALONG WITH OTHER PEOPLE: NOT DIFFICULT AT ALL
SUM OF ALL RESPONSES TO PHQ QUESTIONS 1-9: 4

## 2023-08-31 NOTE — PROGRESS NOTES
SUBJECTIVE:   CC: Jay is an 58 year old who presents for preventative health visit.       8/31/2023     3:08 PM   Additional Questions   Roomed by ian   Accompanied by self         8/31/2023     3:08 PM   Patient Reported Additional Medications   Patient reports taking the following new medications see chart       Healthy Habits:     Getting at least 3 servings of Calcium per day:  Yes    Bi-annual eye exam:  Yes    Dental care twice a year:  Yes    Sleep apnea or symptoms of sleep apnea:  None    Diet:  Regular (no restrictions)    Frequency of exercise:  None    Taking medications regularly:  Yes    Medication side effects:  Not applicable    Additional concerns today:  No      Today's PHQ-9 Score:       8/31/2023     3:04 PM   PHQ-9 SCORE   PHQ-9 Total Score MyChart 4 (Minimal depression)   PHQ-9 Total Score 4         Preventive - advised to get Shingrix from our pharmacy.     Immunization History   Administered Date(s) Administered    Influenza Vaccine 18-64 (Flublok) 10/14/2020    Pneumococcal 20 valent Conjugate (Prevnar 20) 08/29/2022    TDAP (Adacel,Boostrix) 08/29/2022    TDAP Vaccine (Adacel) 09/05/2012         - Colon CA screen: Colonoscopy, age 45-75 every 10 years or FIT every year or Cologuard every 3 years   Had colonoscopy 2020 - 5 years - next colonoscopy 2025     - Prostate CA screen: Discussed controversy about screening.   PSA   Date Value Ref Range Status   04/29/2021 0.04 0 - 4 ug/L Final     Comment:     Assay Method:  Chemiluminescence using Siemens Vista analyzer     PSA Tumor Marker   Date Value Ref Range Status   08/09/2023 <0.01 0.00 - 3.50 ng/mL Final   05/09/2023 <0.01 0.00 - 4.00 ug/L Final         - Lung cancer screen: IMG 2290  Asymptomatic, age 55 - 79 years, Current or Former Smoker; if former, must have quit in past 15 years, 30 + pack-year smoking history. Beta carotene use in smokers has been associated with higher risk of lung CA.  Former smoker - quit 12 years    IMPRESSION:   1. New small areas of peribronchovascular groundglass opacities in the  lower lobes bilaterally, as well as 2 new foci of centrilobular part  solid nodules in the right lower lobe, suspicious for  infectious/inflammatory etiology.  2. The previously described pulmonary nodules are unchanged, and have  been stable dating back to 2021. No enlarging or new suspicious  pulmonary nodules.    Had lung cancer screen in    Next          Social History     Tobacco Use    Smoking status: Former     Packs/day: 1.00     Years: 30.00     Pack years: 30.00     Types: Cigarettes, Cigars     Quit date: 2012     Years since quittin.6    Smokeless tobacco: Never   Substance Use Topics    Alcohol use: Yes     Comment: 4-6 drinks per day - patient reports beer or vodka drinks             2023     7:59 AM   Alcohol Use   Prescreen: >3 drinks/day or >7 drinks/week? Yes   AUDIT SCORE  8       Last PSA:   PSA   Date Value Ref Range Status   2021 0.04 0 - 4 ug/L Final     Comment:     Assay Method:  Chemiluminescence using Siemens Vista analyzer     PSA Tumor Marker   Date Value Ref Range Status   2023 <0.01 0.00 - 3.50 ng/mL Final   2023 <0.01 0.00 - 4.00 ug/L Final       Reviewed orders with patient. Reviewed health maintenance and updated orders accordingly - Yes  Labs reviewed in EPIC  BP Readings from Last 3 Encounters:   23 132/88   23 (!) 154/92   08/10/23 (!) 167/94    Wt Readings from Last 3 Encounters:   23 84.8 kg (187 lb)   08/10/23 84.6 kg (186 lb 6.4 oz)   08/10/23 84.6 kg (186 lb 8.2 oz)                  Patient Active Problem List   Diagnosis    CARDIOVASCULAR SCREENING; LDL GOAL LESS THAN 160    Left varicocele    Vitamin D deficiency    Kidney stone    Prostate cancer (H)    High risk medication use    Prostate cancer metastatic to bone (H)    Bone metastasis    Chronic, continuous use of opioids    Hypertension goal BP (blood pressure) <  140/90    Immunocompromised (H)     Past Surgical History:   Procedure Laterality Date    BIOPSY PROSTATE TRANSRECTAL  01/10/2020    Dr. Colmenares    COLONOSCOPY  2020    LITHOTRIPSY  age 30s    MICROSCOPIC KNEE SURGERY Right age 30s    ORTHOPEDIC SURGERY Left 1985    Alan placed in Left Femur    PROSTATECTOMY RETROPUBIC RADICAL  2020    Dr. Colmenares       Social History     Tobacco Use    Smoking status: Former     Packs/day: 1.00     Years: 30.00     Pack years: 30.00     Types: Cigarettes, Cigars     Quit date: 2012     Years since quittin.6    Smokeless tobacco: Never   Substance Use Topics    Alcohol use: Yes     Comment: 4-6 drinks per day - patient reports beer or vodka drinks     Family History   Problem Relation Age of Onset    Asthma Father     Prostate Cancer Father 73    Cancer Maternal Grandmother 92        throat     Asthma Sister          Current Outpatient Medications   Medication Sig Dispense Refill    apalutamide (ERLEADA) 60 MG tablet Take 4 tablets (240 mg) by mouth daily for 30 days 120 tablet 0    denosumab (XGEVA) 120 MG/1.7ML SOLN injection Inject 1.7 mLs (120 mg) Subcutaneous every 3 months 1.7 mL 0    HYDROcodone-acetaminophen (NORCO) 5-325 MG tablet Take 1 tablet by mouth 3 times daily as needed for breakthrough pain or severe pain Fill 2023 Start 9/10/2023 90 tablet 0    leuprolide (ELIGARD) 45 MG kit Inject 45 mg Subcutaneous every 6 months      morphine (MS CONTIN) 15 MG CR tablet Take 1 tablet (15 mg) by mouth every 12 hours Fill 2023 Start 9/10/2023 60 tablet 0    naloxone (NARCAN) 4 MG/0.1ML nasal spray Spray 1 spray (4 mg) into one nostril alternating nostrils as needed for opioid reversal every 2-3 minutes until assistance arrives 0.2 mL 0    omeprazole (PRILOSEC) 20 MG DR capsule TAKE 1 CAPSULE BY MOUTH TWICE A  capsule 1    SENNA-docusate sodium (SENNA S) 8.6-50 MG tablet Take 1 tablet by mouth 2 times daily 60 tablet 3    UNABLE TO FIND 1 tablet daily  MEDICATION NAME: C, D, Zinc combination supplement      aspirin (ASA) 81 MG chewable tablet Take 1 tablet (81 mg) by mouth daily (Patient not taking: Reported on 8/31/2023)       No Known Allergies  Recent Labs   Lab Test 08/09/23  1454 05/09/23  1544 01/31/23  1519 04/21/22  0932 01/21/22  0850 10/29/21  0903 08/23/21  0843 07/22/21  1201 07/22/21  1042 04/29/21  1408 01/22/21  0831 07/17/20  0941 06/03/20  1149 02/26/20  0000 10/10/19  1717 05/10/18  1106 05/05/18  0949   A1C  --   --   --   --   --   --   --  5.5  --   --   --   --   --   --  5.4 5.7*  --    LDL  --   --   --   --   --   --   --   --   --   --  129*  --  109*  --   --   --  110*   HDL  --   --   --   --   --   --   --   --   --   --  85  --  78  --   --   --  52   TRIG  --   --   --   --   --   --   --   --   --   --  209*  --  67  --   --   --  62   ALT 33 31 35   < > 42 33   < >  --    < > 22 27   < > 29  --   --   --  30   CR 1.05 1.06 1.00   < > 0.89 1.04   < >  --    < > 0.95 0.96   < > 0.89   < > 1.12  --  1.05   GFRESTIMATED 82 81 87   < > >90 79   < >  --    < > 88 87   < > >90   < > 73  --  74   GFRESTBLACK  --   --   --   --   --   --   --   --   --  >90 >90   < > >90   < > 85  --  89   POTASSIUM 4.0 4.4 4.3   < > 4.7 3.9   < >  --    < > 3.9 4.4   < > 4.5   < > 4.2  --  4.6   TSH  --   --   --   --  1.03 1.91   < >  --    < > 2.57 3.46   < > 0.97  --   --   --  1.75    < > = values in this interval not displayed.        Reviewed and updated as needed this visit by clinical staff   Tobacco  Allergies  Meds              Reviewed and updated as needed this visit by Provider                 Past Medical History:   Diagnosis Date    Gout     Hypertension goal BP (blood pressure) < 140/90 08/01/2022    Lumbar stenosis     Prostate cancer (H) 01/10/2020      Past Surgical History:   Procedure Laterality Date    BIOPSY PROSTATE TRANSRECTAL  01/10/2020    Dr. Colmenares    COLONOSCOPY  5/2020    LITHOTRIPSY  age 30s    MICROSCOPIC KNEE SURGERY  "Right age 30s    ORTHOPEDIC SURGERY Left 1985    Alan placed in Left Femur    PROSTATECTOMY RETROPUBIC RADICAL  02/24/2020    Dr. Colmenares       Review of Systems   Constitutional:  Positive for chills and fever.   HENT:  Negative for congestion, ear pain, hearing loss and sore throat.    Eyes:  Negative for pain and visual disturbance.   Respiratory:  Negative for cough and shortness of breath.    Cardiovascular:  Negative for chest pain, palpitations and peripheral edema.   Gastrointestinal:  Positive for abdominal pain, constipation and diarrhea. Negative for heartburn, hematochezia and nausea.   Genitourinary:  Positive for dysuria, frequency and impotence. Negative for genital sores, hematuria, penile discharge and urgency.   Musculoskeletal:  Negative for arthralgias, joint swelling and myalgias.   Skin:  Positive for rash.   Neurological:  Negative for dizziness, weakness, headaches and paresthesias.   Psychiatric/Behavioral:  Negative for mood changes. The patient is not nervous/anxious.          OBJECTIVE:   /88   Pulse 94   Temp 97.6  F (36.4  C) (Oral)   Resp 24   Ht 1.727 m (5' 8\")   Wt 84.8 kg (187 lb)   SpO2 98%   BMI 28.43 kg/m      Physical Exam  GENERAL: healthy, alert and no distress  EYES: Eyes grossly normal to inspection, PERRL and conjunctivae and sclerae normal  HENT: ear canals and TM's normal, nose and mouth without ulcers or lesions  NECK: no adenopathy, no asymmetry, masses, or scars and thyroid normal to palpation  RESP: lungs clear to auscultation - no rales, rhonchi or wheezes  CV: regular rate and rhythm, normal S1 S2, no S3 or S4, no murmur, click or rub, no peripheral edema and peripheral pulses strong  ABDOMEN: soft, nontender, no hepatosplenomegaly, no masses and bowel sounds normal  MS: no gross musculoskeletal defects noted, no edema  SKIN: no suspicious lesions or rashes  NEURO: Normal strength and tone, mentation intact and speech normal  PSYCH: mentation appears " normal, affect normal/bright        ASSESSMENT/PLAN:   (Z00.00) Routine general medical examination at a health care facility  (primary encounter diagnosis)  Comment: Preventive care reviewed and updated.  Stable     (D84.9) Immunocompromised (H)  Patient with history of prostate cancer, on chemotherapy Erleada   Follows with oncology  No current concern for infection  Continue to monitor  Patient has been advised of split billing requirements and indicates understanding: Yes      COUNSELING:   Reviewed preventive health counseling, as reflected in patient instructions       Regular exercise       Healthy diet/nutrition        He reports that he quit smoking about 11 years ago. His smoking use included cigarettes and cigars. He has a 30.00 pack-year smoking history. He has never used smokeless tobacco.            Palak Doe MD  [facility] ANDOVERAnswers submitted by the patient for this visit:  Patient Health Questionnaire (Submitted on 8/31/2023)  If you checked off any problems, how difficult have these problems made it for you to do your work, take care of things at home, or get along with other people?: Not difficult at all  PHQ9 TOTAL SCORE: 4  ROB-7 (Submitted on 8/31/2023)  ROB 7 TOTAL SCORE: 0

## 2023-09-01 DIAGNOSIS — C61 PROSTATE CANCER (H): ICD-10-CM

## 2023-09-01 RX ORDER — APALUTAMIDE 60 MG/1
TABLET, FILM COATED ORAL
Qty: 120 TABLET | Refills: 0 | OUTPATIENT
Start: 2023-09-01

## 2023-09-08 DIAGNOSIS — C61 PROSTATE CANCER (H): Primary | ICD-10-CM

## 2023-09-12 ENCOUNTER — MYC REFILL (OUTPATIENT)
Dept: PALLIATIVE MEDICINE | Facility: CLINIC | Age: 58
End: 2023-09-12
Payer: COMMERCIAL

## 2023-09-12 DIAGNOSIS — C61 PROSTATE CANCER METASTATIC TO BONE (H): ICD-10-CM

## 2023-09-12 DIAGNOSIS — C79.51 PROSTATE CANCER METASTATIC TO BONE (H): ICD-10-CM

## 2023-09-12 DIAGNOSIS — G89.3 CANCER ASSOCIATED PAIN: ICD-10-CM

## 2023-09-12 RX ORDER — HYDROCODONE BITARTRATE AND ACETAMINOPHEN 5; 325 MG/1; MG/1
1 TABLET ORAL 3 TIMES DAILY PRN
Qty: 90 TABLET | Refills: 0 | Status: CANCELLED | OUTPATIENT
Start: 2023-09-12

## 2023-09-12 RX ORDER — MORPHINE SULFATE 15 MG/1
15 TABLET, FILM COATED, EXTENDED RELEASE ORAL EVERY 12 HOURS
Qty: 60 TABLET | Refills: 0 | Status: CANCELLED | OUTPATIENT
Start: 2023-09-12

## 2023-09-12 NOTE — TELEPHONE ENCOUNTER
Patient requesting refill(s) of HYDROcodone-acetaminophen (NORCO) 5-325 MG tablet   Last dispensed from pharmacy on 8/10/23    morphine (MS CONTIN) 15 MG CR tablet   Last dispensed from pharmacy on 8/11/23    Patient's last office/virtual visit by prescribing provider on 8/30/23  Next office/virtual appointment scheduled for 11/30/23    Last urine drug screen date 10/06/22  Current opioid agreement on file (completed within the last year) Yes Date of opioid agreement: 10/06/22    E-prescribe to [facility] [id number] [facility] [place], [place]     Will route to nursing pool for review and preparation of prescription(s).

## 2023-09-12 NOTE — TELEPHONE ENCOUNTER
Chart review indicates patient does have existing refill available at pharmacy.     Contacted pharmacy who identify that this is on file and will be filled but morphine will not be able to be filled until tomorrow due to stocking.     Caitlin Miranda RN  [facility] [place]  [phone]

## 2023-10-02 DIAGNOSIS — C61 PROSTATE CANCER (H): ICD-10-CM

## 2023-10-02 NOTE — CONFIDENTIAL NOTE
Last prescribing provider: Dr. Garcia    Last clinic visit date: 8/10    Any missed appointments or no-shows since last clinic visit?: none    Recommendations for requested medication (if none, N/A): Take 4 tablets (240 mg) by mouth daily for 30 days     Next clinic visit date: 11/30    Any other pertinent information (if none, N/A): N/A

## 2023-10-05 RX ORDER — APALUTAMIDE 60 MG/1
TABLET, FILM COATED ORAL
Qty: 120 TABLET | Refills: 0 | Status: SHIPPED | OUTPATIENT
Start: 2023-10-05 | End: 2023-12-13

## 2023-10-06 DIAGNOSIS — C61 PROSTATE CANCER (H): ICD-10-CM

## 2023-10-06 RX ORDER — APALUTAMIDE 60 MG/1
TABLET, FILM COATED ORAL
Qty: 120 TABLET | Refills: 0 | OUTPATIENT
Start: 2023-10-06

## 2023-10-09 DIAGNOSIS — C61 PROSTATE CANCER (H): Primary | ICD-10-CM

## 2023-10-11 ENCOUNTER — MYC REFILL (OUTPATIENT)
Dept: PALLIATIVE MEDICINE | Facility: CLINIC | Age: 58
End: 2023-10-11
Payer: COMMERCIAL

## 2023-10-11 DIAGNOSIS — G89.3 CANCER ASSOCIATED PAIN: ICD-10-CM

## 2023-10-11 DIAGNOSIS — C79.51 PROSTATE CANCER METASTATIC TO BONE (H): ICD-10-CM

## 2023-10-11 DIAGNOSIS — C61 PROSTATE CANCER METASTATIC TO BONE (H): ICD-10-CM

## 2023-10-11 NOTE — TELEPHONE ENCOUNTER
Patient requesting refill(s) of  HYDROcodone-acetaminophen (NORCO) 5-325 MG tablet   Last dispensed from pharmacy on 9/12/23      morphine (MS CONTIN) 15 MG CR tablet   Last dispensed from pharmacy on 9/13/23    Patient's last office/virtual visit by prescribing provider on 8/30/23  Next office/virtual appointment scheduled for 11/30/23    Last urine drug screen date 10/06/22  Current opioid agreement on file (completed within the last year) Yes Date of opioid agreement: 10/06/22    E-prescribe to   [facility] [id number] [facility] - [place], [place]     Will route to nursing pool for review and preparation of prescription(s).

## 2023-10-11 NOTE — TELEPHONE ENCOUNTER
Medication refill information reviewed.     Both meds last due:  Fill 9/8/2023 Start 9/10/2023 . MS Contin picked up 9/13. Norco 9/12/23  Due date:    MS Contin:  10/13/23  Norco: 10/12/23      Prescriptions prepped for review.     Shefali RN-BSN  [facility]

## 2023-10-11 NOTE — TELEPHONE ENCOUNTER
Refills have been requested for the following medications:         HYDROcodone-acetaminophen (NORCO) 5-325 MG tablet [Chanel Bruno]         morphine (MS CONTIN) 15 MG CR tablet [Chanel Bruno]     Preferred pharmacy: [facility] [id number] [place], [place] - [address] [facility]

## 2023-10-13 RX ORDER — HYDROCODONE BITARTRATE AND ACETAMINOPHEN 5; 325 MG/1; MG/1
1 TABLET ORAL 3 TIMES DAILY PRN
Qty: 90 TABLET | Refills: 0 | Status: SHIPPED | OUTPATIENT
Start: 2023-10-13 | End: 2023-11-09

## 2023-10-13 RX ORDER — MORPHINE SULFATE 15 MG/1
15 TABLET, FILM COATED, EXTENDED RELEASE ORAL EVERY 12 HOURS
Qty: 60 TABLET | Refills: 0 | Status: SHIPPED | OUTPATIENT
Start: 2023-10-13 | End: 2023-11-09

## 2023-10-13 NOTE — TELEPHONE ENCOUNTER
Chart reviewed - Request appears appropriate. Refilled for 30 day supply.     Chanel Bruno DNP, APRN, AGNP-C  [facility]

## 2023-10-25 DIAGNOSIS — C61 PROSTATE CANCER (H): Primary | ICD-10-CM

## 2023-11-07 DIAGNOSIS — C61 PROSTATE CANCER (H): ICD-10-CM

## 2023-11-07 RX ORDER — APALUTAMIDE 60 MG/1
TABLET, FILM COATED ORAL
Qty: 120 TABLET | Refills: 0 | OUTPATIENT
Start: 2023-11-07

## 2023-11-09 ENCOUNTER — MYC REFILL (OUTPATIENT)
Dept: PALLIATIVE MEDICINE | Facility: CLINIC | Age: 58
End: 2023-11-09
Payer: COMMERCIAL

## 2023-11-09 DIAGNOSIS — C61 PROSTATE CANCER (H): Primary | ICD-10-CM

## 2023-11-09 DIAGNOSIS — C61 PROSTATE CANCER METASTATIC TO BONE (H): ICD-10-CM

## 2023-11-09 DIAGNOSIS — C79.51 PROSTATE CANCER METASTATIC TO BONE (H): ICD-10-CM

## 2023-11-09 DIAGNOSIS — K59.03 THERAPEUTIC OPIOID INDUCED CONSTIPATION: ICD-10-CM

## 2023-11-09 DIAGNOSIS — G89.3 CANCER ASSOCIATED PAIN: ICD-10-CM

## 2023-11-09 DIAGNOSIS — T40.2X5A THERAPEUTIC OPIOID INDUCED CONSTIPATION: ICD-10-CM

## 2023-11-09 RX ORDER — SENNA AND DOCUSATE SODIUM 50; 8.6 MG/1; MG/1
1 TABLET, FILM COATED ORAL 2 TIMES DAILY
Qty: 60 TABLET | Refills: 3 | Status: SHIPPED | OUTPATIENT
Start: 2023-11-09 | End: 2024-02-12

## 2023-11-09 RX ORDER — MORPHINE SULFATE 15 MG/1
15 TABLET, FILM COATED, EXTENDED RELEASE ORAL EVERY 12 HOURS
Qty: 60 TABLET | Refills: 0 | Status: SHIPPED | OUTPATIENT
Start: 2023-11-09 | End: 2023-11-30

## 2023-11-09 RX ORDER — HYDROCODONE BITARTRATE AND ACETAMINOPHEN 5; 325 MG/1; MG/1
1 TABLET ORAL 3 TIMES DAILY PRN
Qty: 90 TABLET | Refills: 0 | Status: SHIPPED | OUTPATIENT
Start: 2023-11-09 | End: 2023-11-30

## 2023-11-09 NOTE — TELEPHONE ENCOUNTER
Received call from patient requesting refill(s) SENNA-docusate sodium (SENNA S) 8.6-50 MG tablet   Last dispensed from pharmacy on 09/28/2023      Received call from patient requesting refill(s) HYDROcodone-acetaminophen (NORCO) 5-325 MG tablet   Last dispensed from pharmacy on 10/13/2023    Received call from patient requesting refill(s)  morphine (MS CONTIN) 15 MG CR tablet   Last dispensed from pharmacy on 10/13/2023        Patient's last office/virtual visit by prescribing provider on 08/30/2023  Next office/virtual appointment scheduled for 11/[id number]    Last urine drug screen date 10/06/2022  Current opioid agreement on file (completed within the last year) Yes Date of opioid agreement: 10/06/2023    E-prescribe to      [facility] [id number] [place], [place] - [address] [facility] [address]      Will route to nursing [place] for review and preparation of prescription(s).     Roseline Boyd MA  [facility] [place]

## 2023-11-09 NOTE — TELEPHONE ENCOUNTER
Medication refill information reviewed.      last due:    MS Contin: Fill now. Start 10/13/2023  Norco: Fill now. Start 10/12/2023  picked up on 10/13/23    Due date:  11/12/23 for both      Prescriptions prepped for review.     Shefali RN-BSN  [facility]

## 2023-11-26 ASSESSMENT — PAIN SCALES - PAIN ENJOYMENT GENERAL ACTIVITY SCALE (PEG)
INTERFERED_ENJOYMENT_LIFE: 8
PEG_TOTALSCORE: 7.67
INTERFERED_GENERAL_ACTIVITY: 8
AVG_PAIN_PASTWEEK: 7

## 2023-11-29 ENCOUNTER — LAB (OUTPATIENT)
Dept: LAB | Facility: CLINIC | Age: 58
End: 2023-11-29
Payer: COMMERCIAL

## 2023-11-29 DIAGNOSIS — C79.51 MALIGNANT NEOPLASM METASTATIC TO BONE (H): ICD-10-CM

## 2023-11-29 DIAGNOSIS — C61 PROSTATE CANCER (H): ICD-10-CM

## 2023-11-29 DIAGNOSIS — R91.8 PULMONARY NODULES: ICD-10-CM

## 2023-11-29 DIAGNOSIS — C79.51 PROSTATE CANCER METASTATIC TO BONE (H): ICD-10-CM

## 2023-11-29 DIAGNOSIS — C61 PROSTATE CANCER METASTATIC TO BONE (H): ICD-10-CM

## 2023-11-29 LAB
BASOPHILS # BLD AUTO: 0 10E3/UL (ref 0–0.2)
BASOPHILS NFR BLD AUTO: 0 %
EOSINOPHIL # BLD AUTO: 0.1 10E3/UL (ref 0–0.7)
EOSINOPHIL NFR BLD AUTO: 2 %
ERYTHROCYTE [DISTWIDTH] IN BLOOD BY AUTOMATED COUNT: 11.1 % (ref 10–15)
HCT VFR BLD AUTO: 40 % (ref 40–53)
HGB BLD-MCNC: 13.7 G/DL (ref 13.3–17.7)
IMM GRANULOCYTES # BLD: 0 10E3/UL
IMM GRANULOCYTES NFR BLD: 0 %
LYMPHOCYTES # BLD AUTO: 2 10E3/UL (ref 0.8–5.3)
LYMPHOCYTES NFR BLD AUTO: 25 %
MCH RBC QN AUTO: 31.4 PG (ref 26.5–33)
MCHC RBC AUTO-ENTMCNC: 34.3 G/DL (ref 31.5–36.5)
MCV RBC AUTO: 92 FL (ref 78–100)
MONOCYTES # BLD AUTO: 0.6 10E3/UL (ref 0–1.3)
MONOCYTES NFR BLD AUTO: 8 %
NEUTROPHILS # BLD AUTO: 5.2 10E3/UL (ref 1.6–8.3)
NEUTROPHILS NFR BLD AUTO: 65 %
PLATELET # BLD AUTO: 225 10E3/UL (ref 150–450)
RBC # BLD AUTO: 4.37 10E6/UL (ref 4.4–5.9)
WBC # BLD AUTO: 8 10E3/UL (ref 4–11)

## 2023-11-29 PROCEDURE — 85025 COMPLETE CBC W/AUTO DIFF WBC: CPT

## 2023-11-29 PROCEDURE — 84153 ASSAY OF PSA TOTAL: CPT

## 2023-11-29 PROCEDURE — 36415 COLL VENOUS BLD VENIPUNCTURE: CPT

## 2023-11-29 PROCEDURE — 84403 ASSAY OF TOTAL TESTOSTERONE: CPT

## 2023-11-29 PROCEDURE — 80053 COMPREHEN METABOLIC PANEL: CPT

## 2023-11-30 ENCOUNTER — LAB (OUTPATIENT)
Dept: LAB | Facility: CLINIC | Age: 58
End: 2023-11-30
Payer: COMMERCIAL

## 2023-11-30 ENCOUNTER — INFUSION THERAPY VISIT (OUTPATIENT)
Dept: INFUSION THERAPY | Facility: CLINIC | Age: 58
End: 2023-11-30
Payer: COMMERCIAL

## 2023-11-30 ENCOUNTER — ONCOLOGY VISIT (OUTPATIENT)
Dept: ONCOLOGY | Facility: CLINIC | Age: 58
End: 2023-11-30
Payer: COMMERCIAL

## 2023-11-30 ENCOUNTER — OFFICE VISIT (OUTPATIENT)
Dept: PALLIATIVE MEDICINE | Facility: CLINIC | Age: 58
End: 2023-11-30
Payer: COMMERCIAL

## 2023-11-30 VITALS
RESPIRATION RATE: 17 BRPM | TEMPERATURE: 98 F | HEART RATE: 86 BPM | HEIGHT: 68 IN | DIASTOLIC BLOOD PRESSURE: 84 MMHG | BODY MASS INDEX: 28.23 KG/M2 | OXYGEN SATURATION: 100 % | SYSTOLIC BLOOD PRESSURE: 133 MMHG | WEIGHT: 186.29 LBS

## 2023-11-30 VITALS
RESPIRATION RATE: 17 BRPM | OXYGEN SATURATION: 100 % | SYSTOLIC BLOOD PRESSURE: 133 MMHG | HEART RATE: 86 BPM | DIASTOLIC BLOOD PRESSURE: 84 MMHG | WEIGHT: 186.2 LBS | TEMPERATURE: 98 F | BODY MASS INDEX: 28.31 KG/M2

## 2023-11-30 VITALS — HEART RATE: 78 BPM | SYSTOLIC BLOOD PRESSURE: 147 MMHG | DIASTOLIC BLOOD PRESSURE: 99 MMHG

## 2023-11-30 DIAGNOSIS — Z51.81 ENCOUNTER FOR THERAPEUTIC DRUG MONITORING: ICD-10-CM

## 2023-11-30 DIAGNOSIS — C79.51 MALIGNANT NEOPLASM METASTATIC TO BONE (H): ICD-10-CM

## 2023-11-30 DIAGNOSIS — F11.90 CHRONIC, CONTINUOUS USE OF OPIOIDS: ICD-10-CM

## 2023-11-30 DIAGNOSIS — K21.9 GASTROESOPHAGEAL REFLUX DISEASE, UNSPECIFIED WHETHER ESOPHAGITIS PRESENT: ICD-10-CM

## 2023-11-30 DIAGNOSIS — C79.51 PROSTATE CANCER METASTATIC TO BONE (H): Primary | ICD-10-CM

## 2023-11-30 DIAGNOSIS — T40.2X5A THERAPEUTIC OPIOID INDUCED CONSTIPATION: ICD-10-CM

## 2023-11-30 DIAGNOSIS — C61 PROSTATE CANCER METASTATIC TO BONE (H): Primary | ICD-10-CM

## 2023-11-30 DIAGNOSIS — K59.03 THERAPEUTIC OPIOID INDUCED CONSTIPATION: ICD-10-CM

## 2023-11-30 DIAGNOSIS — G89.3 CANCER ASSOCIATED PAIN: Primary | ICD-10-CM

## 2023-11-30 DIAGNOSIS — M54.16 LUMBAR RADICULOPATHY: ICD-10-CM

## 2023-11-30 DIAGNOSIS — M54.10 RADICULAR PAIN OF RIGHT LOWER EXTREMITY: ICD-10-CM

## 2023-11-30 DIAGNOSIS — Z79.899 CONTROLLED SUBSTANCE AGREEMENT SIGNED: ICD-10-CM

## 2023-11-30 DIAGNOSIS — C61 PROSTATE CANCER METASTATIC TO BONE (H): ICD-10-CM

## 2023-11-30 DIAGNOSIS — C79.51 PROSTATE CANCER METASTATIC TO BONE (H): ICD-10-CM

## 2023-11-30 LAB
ALBUMIN SERPL BCG-MCNC: 4.1 G/DL (ref 3.5–5.2)
ALP SERPL-CCNC: 70 U/L (ref 40–150)
ALT SERPL W P-5'-P-CCNC: 26 U/L (ref 0–70)
ANION GAP SERPL CALCULATED.3IONS-SCNC: 10 MMOL/L (ref 7–15)
AST SERPL W P-5'-P-CCNC: 20 U/L (ref 0–45)
BILIRUB SERPL-MCNC: 0.2 MG/DL
BUN SERPL-MCNC: 13.8 MG/DL (ref 8–23)
CALCIUM SERPL-MCNC: 9.5 MG/DL (ref 8.6–10)
CHLORIDE SERPL-SCNC: 104 MMOL/L (ref 98–107)
CREAT SERPL-MCNC: 1 MG/DL (ref 0.67–1.17)
CREAT UR-MCNC: 135 MG/DL
DEPRECATED HCO3 PLAS-SCNC: 26 MMOL/L (ref 22–29)
EGFRCR SERPLBLD CKD-EPI 2021: 87 ML/MIN/1.73M2
GLUCOSE SERPL-MCNC: 109 MG/DL (ref 70–99)
POTASSIUM SERPL-SCNC: 4.1 MMOL/L (ref 3.4–5.3)
PROT SERPL-MCNC: 6.6 G/DL (ref 6.4–8.3)
PSA SERPL DL<=0.01 NG/ML-MCNC: <0.01 NG/ML (ref 0–3.5)
SODIUM SERPL-SCNC: 140 MMOL/L (ref 135–145)

## 2023-11-30 PROCEDURE — 99214 OFFICE O/P EST MOD 30 MIN: CPT | Mod: 25 | Performed by: INTERNAL MEDICINE

## 2023-11-30 PROCEDURE — 99214 OFFICE O/P EST MOD 30 MIN: CPT

## 2023-11-30 PROCEDURE — 96402 CHEMO HORMON ANTINEOPL SQ/IM: CPT | Performed by: INTERNAL MEDICINE

## 2023-11-30 PROCEDURE — 96372 THER/PROPH/DIAG INJ SC/IM: CPT | Mod: 59 | Performed by: INTERNAL MEDICINE

## 2023-11-30 PROCEDURE — G0480 DRUG TEST DEF 1-7 CLASSES: HCPCS

## 2023-11-30 RX ORDER — MORPHINE SULFATE 15 MG/1
15 TABLET, FILM COATED, EXTENDED RELEASE ORAL EVERY 12 HOURS
Qty: 60 TABLET | Refills: 0 | Status: SHIPPED | OUTPATIENT
Start: 2023-11-30 | End: 2024-01-10

## 2023-11-30 RX ORDER — HYDROCODONE BITARTRATE AND ACETAMINOPHEN 5; 325 MG/1; MG/1
1 TABLET ORAL 3 TIMES DAILY PRN
Qty: 90 TABLET | Refills: 0 | Status: SHIPPED | OUTPATIENT
Start: 2023-11-30 | End: 2024-01-10

## 2023-11-30 RX ORDER — OMEPRAZOLE 40 MG/1
40 CAPSULE, DELAYED RELEASE ORAL DAILY
Qty: 90 CAPSULE | Refills: 3 | Status: SHIPPED | OUTPATIENT
Start: 2023-11-30 | End: 2024-02-10

## 2023-11-30 ASSESSMENT — PAIN SCALES - GENERAL
PAINLEVEL: SEVERE PAIN (7)

## 2023-11-30 NOTE — PROGRESS NOTES
[facility]     Date of visit: 11/30/2023      Assessment:   Per Etienne is a 58 year old male with a past medical history significant for prostate cancer with bone metastasis, pulmonary nodules, lumbar stenosis, HTN, gout who presents with complaints of cancer associated pain.      1. Low back pain with LLE radic, neuropathy bilat feet, pain of multiple joints - His back pain with left sided radicular pain with L5 distribution that has been present for many years. He has been referred for procedure by his PCP for repeat L4/5 LUIS (Regina/Obed), which historically have given him significant symptom relief, although he did not appreciate as much benefit from the most recent LUIS on 8/2/22. Lumbar CT from 8/2021 demonstrated severe left neural foraminal narrowing at L4-L5 and moderate to severe right at L3-4, additional multilevel spinal canal stenosis and neural foraminal stenosis noted. I suspect his low back pain is associated with multiple factors, including underlying degenerative changes of lumbar spine, bone metastasis, and likely some myofascial component. Neuropathic symptoms in feet and generalized joint pain is likely secondary effects from cancer treatment and bone metastasis.   2. Mental Health - the patient's mental health concerns, specifically prostate cancer with bone mets, affect his experience of pain and contribute to his clinically significant distress. He may benefit from more generalized health psychology to support chronic illness/cancer management and will consider discussing at future follow up.     Assigned to [facility] nursing team.     Visit Diagnoses:  1. Cancer associated pain    2. Lumbar radiculopathy    3. Therapeutic opioid induced constipation    4. Prostate cancer metastatic to bone (H)    5. Controlled substance agreement signed    6. Encounter for therapeutic drug monitoring    7. Chronic, continuous use of opioids    8. Radicular pain of right lower  extremity        Plan:  Diagnosis reviewed, treatment option addressed, and risk/benifits discussed.  Self-care instructions given.  I am recommending a multidisciplinary treatment plan to help this patient better manage their pain.                  1.  Pain Physical Therapy:  WALT Thompson is very active. Despite the pain, he continues to work full time as a , which is somewhat labor intensive (frequently using ladder).               2.  Pain Psychologist to address relaxation, behavioral change, coping style, and other factors important to improvement.  WALT Thompson may possibly benefit from support for his chronic health conditions overall, though he does seem to manage fairly well. He continues to work full time and likes to stay active.               3.  Diagnostic Studies:  None               4.  Medication Management:   Continue MS Contin to 15 mg every 12 hours. He continues to appreciate benefit, stable baseline pain, supports functioning, continues to work full time. Refilled today for #60 tabs.   Continue hydrocodone 5-325 three times daily as needed for breakthrough pain. He does not use very frequently, usually only takes 1 tab daily as needed. He continues to appreciate benefit for breakthrough pain, no concerns for continuation. Refilled today for #90 tabs to last 30 days.   Continue Senna S to 1 tab twice daily. He reports improvement with OIC, with increased frequency to twice daily. He reports intermittent constipation every 2-3 weeks that lasts for about 2 days. He usually increases fiber intake (salads) and this helps. Recommend using Miralax 1-2 times daily as needed, if current regimen not effective, advised he can purchase OTC.   CSA/UDS - completed today. Naloxone refill sent into pharmacy today. Advised to  new supply and dispose of old supply at home.               5.  Potential procedures: He had repeat L4-5 LUIS on 6-29-23 and has appreciated improvement in pain,  reports at least 75% pain relief that lasts around 3 months. He reports recurrence of intermittent radicular leg symptoms about 2 months ago, primarily affecting knee and ankle at night. Recommend repeat LESI. Orders placed to schedule. Advised we can discuss planning strategies for future repeat injections in future. Will consider imaging/further workup, should intermittent leg symptoms fail to improve with repeating L4-5 LUIS.               7.  Follow up with TEREZA Sanchez CNP in 3 months.       Review of Electronic Chart: Today I have also reviewed available medical information in the patient's medical record at [facility] and Care Everywhere (if available), including relevant provider notes, laboratory work, and imaging.     Chanel Bruno, JANET, APRN, AGNP-C  [facility]     -------------------------------------------------    Subjective:    Chief complaint:   Chief Complaint   Patient presents with    Pain       Interval history:  Per Etienne is a 58 year old male last seen on 8/30/23.  They are a patient of mine seen in follow up.     Recommendations/plan at the last visit included:              1.  Pain Physical Therapy:  WALT Thompson is very active. Despite the pain, he continues to work full time as a , which is somewhat labor intensive (frequently using ladder).               2.  Pain Psychologist to address relaxation, behavioral change, coping style, and other factors important to improvement.  WALT Thompson may possibly benefit from support for his chronic health conditions overall, though he does seem to manage fairly well. He continues to work full time and likes to stay active.               3.  Diagnostic Studies:  None               4.  Medication Management:   Continue MS Contin to 15 mg every 12 hours. He continues to appreciate benefit, stable baseline pain, supports functioning, continues to work full time. Refilled today.   Continue  hydrocodone 5-325 three times daily as needed for breakthrough pain. He does not use very frequently, usually only takes 1 tab daily as needed. He continues to appreciate benefit for breakthrough pain, no concerns for continuation. Refilled today for #90 tabs to last 30 days.   Continue Senna S to 1 tab twice daily. He reports improvement with OIC since last visit, with increased frequency to BID.   Will plan to renew CSA/UDS at follow up.               5.  Potential procedures: He had repeat L4-5 LUIS on 6-29-23 and has appreciated improvement in pain. Continue to monitor for benefit. May repeat every 3+ months.               7.  Follow up with TEREZA Sanchez CNP in 3 months.     Since his last visit, Per Etienne reports:  -His pain overall is about the same as last visit.   -He reports new onset of intermittent left knee and ankle pain overnight, describes pain as sharp.   -He has to get up and walk around his house, then pain will subside and he goes back to bad.   -He is planning to retire in the next 2 years.   -He continues to take MS contin and hydrocodone PRN. He continues to appreciate benefit with medications. Allow him to remain active and working.   -He continues to struggle with some OIC, states he goes through cycles where he is good for a couple of weeks but then gets constipated for a couple of days. He takes Senna S BID.  -He will speak with pharmacist if he needs to get miralax if senna s is not enough to help with OIC.    Pain Information:   Pain rating: averages 4-5/10 on a 0-10 scale.      Interval history from last visit on 8/30/23:  -He had repeat L4-5 LUIS on 6/29/23, notes this has been helpful for leg pain.   -He continues to appreciate good benefit with MS contin and hydrocodone.  -He uses 2-3 tabs of Norco per day, most days 3 tabs.   -He is able to continue working and being active.   -His OIC has improved, continues Senna S 1 BID.   -No side effects otherwise.   -He notes  increased bruising, has follow up with PCP tomorrow, will plan to discuss with him tomorrow.   -He and wife are celebrating 30th anniversary on 10/8.  -He is excited for upcoming trip to [place] to see [place], he and wife are going overnight.   Pain Information:              Pain rating: averages 7/10 on a 0-10 scale.        Interval history from last visit on 6/7/23:  -His pain is a little higher than it was at last visit.   -He states pain was worse this morning, notes he was more active yesterday.   -Pain in LLE seems to be returning over the last month.   -He has felt some fatigue/weakness in LLE, thinks this is more so pain related.   -He's had LESI in the past that have been very helpful for leg pain, recent LUIS on 12/1/22 was not as helpful as prior injections.   -He is open to repeat LESI, will monitor for benefit after and follow up with me sooner if not as helpful.   -He continues MS Contin 15 mg BID, hydrocodone 5-325 mg TID PRN.   -He appreciates good benefit with meds, no side effects.   -He is excited to find out if [employer] gets the [employer] bid, will find out on the 21st.   -He has been fishing a lot.   -His grandson, Juwan, is 7.5 months now, standing now.   Pain Information:              Pain rating: averages 7/10 on a 0-10 scale.        Interval history from last visit on 3/29/23:  -his pain is about the same as it was at last visit.   -He has good days and bad days.   -He thinks pain is consistent.   -He continues MS contin 15 mg BID, hydrocodone 5 TID PRN.   -He reports medications are working well, stable at this time.   -He was dealing with OIC, increased senna to BID at last visit, constipation improved.   -No other side effects from medications.   -He had reached out in between visits for a reasonable early refill due to travels.   -He went to [place] recently, had a great trip.   -They drove down (his wife/family).  Pain Information:              Pain rating: averages 4/10 on a  0-10 scale.        Interval history from last visit on 1/18/23:  -His pain is about the same as it was at last visit.   -He had LESI with Dr. Tapia on 12/1/22.  -He does not think it was as helpful as prior LESI.   -He is taking MS Contin 15 mg BID, has PRN Norco for breakthrough pain (does not use often).  -He has needed to use hydrocodone a bit more since last visit.   -He thinks pain is reasonably well controlled right now.   -He is taking Senna S daily at bedtime, has BM every other day, sometimes harder stool.   -He is open to increasing to BID frequency.   -His boss verbalized concerns about driving with medications, patient does not share similar concerns, denies side effects.   -He is waiting to hear about [employer] next year, will be involved with the project if [employer] wins.   -He is going to consider retiring sometime in the next year.         Interval history from last visit on 11/3/22:  -He had shingles since last visit  -Pain has been increased  -He is also been experiencing constipation after increasing MS Contin 15mg to BID dosing.   -His grandson, Juwan Del Castillo, was born on 10/20/22.   -He will be getting shingles injection soon.  -He has been able to be more acvtive with long acting opioid  -[place] 2 miles per day, lost 8 pounds, able to do job better now that pain is under better control.            Current Pain Treatments:     Medications:                  MS Contin 15 mg BID              Hydrocodone 5-325 mg TID PRN      Other:                  LESI on 12/1/22 with Dr. Tapia     Current MME: 44     Review of [place] Prescription Monitoring Program (): No concern for abuse or misuse of controlled medications based on this report. Reviewed - appears appropriate.      Annual Controlled Substance Agreement/UDS due date: Completed on 11/30/23.      Past pain treatments:  LESI - helpful     Medications:  Current Outpatient Medications   Medication Sig Dispense Refill    apalutamide  (ERLEADA) 60 MG tablet Take 4 tablets (240 mg) by mouth daily for 30 days 120 tablet 0    denosumab (XGEVA) 120 MG/1.7ML SOLN injection Inject 1.7 mLs (120 mg) Subcutaneous every 3 months 1.7 mL 0    ERLEADA 60 MG tablet TAKE 4 TABLETS (240 MG) DAILY 120 tablet 0    HYDROcodone-acetaminophen (NORCO) 5-325 MG tablet Take 1 tablet by mouth 3 times daily as needed for breakthrough pain or severe pain Fill 12/10/23. Start 12/12/2023 90 tablet 0    leuprolide (ELIGARD) 45 MG kit Inject 45 mg Subcutaneous every 6 months      morphine (MS CONTIN) 15 MG CR tablet Take 1 tablet (15 mg) by mouth every 12 hours Fill 12/10/23. Start 12/12/2023 60 tablet 0    naloxone (NARCAN) 4 MG/0.1ML nasal spray Spray 1 spray (4 mg) into one nostril alternating nostrils as needed for opioid reversal every 2-3 minutes until assistance arrives 0.2 mL 0    omeprazole (PRILOSEC) 20 MG DR capsule TAKE 1 CAPSULE BY MOUTH TWICE A  capsule 1    SENNA-docusate sodium (SENNA S) 8.6-50 MG tablet Take 1 tablet by mouth 2 times daily 60 tablet 3    UNABLE TO FIND 1 tablet daily MEDICATION NAME: C, D, Zinc combination supplement      aspirin (ASA) 81 MG chewable tablet Take 1 tablet (81 mg) by mouth daily (Patient not taking: Reported on 8/31/2023)         Medical History: any changes in medical history since they were last seen? No      Objective:    Physical Exam:  Blood pressure (!) 147/99, pulse 78.  Constitutional: Well developed, well nourished, appears stated age.  Gait: Intact  HEENT: Head atraumatic, normocephalic. Eyes without conjunctival injection or jaundice. Oropharynx clear. Neck supple. No obvious neck masses.  Skin: No rash, lesions, or petechiae of exposed skin.   Psychiatric/mental status: Alert, without lethargy or stupor. Speech fluent. Appropriate affect. Mood normal. Able to follow commands without difficulty.     Diagnostic Tests/Imaging/Labs:  CMP 11/29/23 - renal and hepatic function intact.     BILLING TIME DOCUMENTATION:    The total TIME spent on this patient on the date of the encounter/appointment was 32 minutes.      TOTAL TIME includes:   Time spent preparing to see the patient (reviewing records and tests)   Time spent face to face (or over the phone) with the patient   Time spent ordering tests, medications, procedures and referrals   Time spent Referring and communicating with other healthcare professionals   Time spent documenting clinical information in Epic

## 2023-11-30 NOTE — PROGRESS NOTES
Infusion Nursing Note:  Per Etienne presents today for Lupron and Xgeva.    Patient seen by provider today: Yes: seeing Dr. Garcia today   present during visit today: Not Applicable.    Note: Patient expressed no new medical concerns.    Intravenous Access:  No Intravenous access/labs at this visit.    Treatment Conditions:  Lab Results   Component Value Date    HGB 13.7 11/29/2023    WBC 8.0 11/29/2023    ANEU 11.6 (H) 04/29/2021    ANEUTAUTO 5.2 11/29/2023     11/29/2023        Lab Results   Component Value Date     11/29/2023    POTASSIUM 4.1 11/29/2023    CR 1.00 11/29/2023    NATALIYA 9.5 11/29/2023    BILITOTAL 0.2 11/29/2023    ALBUMIN 4.1 11/29/2023    ALT 26 11/29/2023    AST 20 11/29/2023       Results reviewed, labs MET treatment parameters, ok to proceed with treatment.    Post Infusion Assessment:  Patient tolerated injection without incident.  Site patent and intact, free from redness, edema or discomfort.     Discharge Plan:   Discharge instructions reviewed with: Patient.  Patient and/or family verbalized understanding of discharge instructions and all questions answered.  Patient discharged in stable condition accompanied by: self.  Departure Mode: Ambulatory.  Future appts have been reviewed and crosschecked with appt note and plan.    Sol Morales RN

## 2023-11-30 NOTE — PROGRESS NOTES
Oncology Follow-up:  Date on this visit: Nov 30, 2023    Primary care physician: Per Chen   Urologist: Dr. Angel Colmenares     Metastatic hormone sensitive prostate cancer    Oncologic History:  1. Metastatic hormone sensitive prostate cancer  He presented to his PCP in December 2019 with slow urinary stream.  PSA was checked and was found to be 124. On January 8, 2020 abdomen pelvis CT showed groundglass opacity in the left lower lobe,  dystrophic calcification of the prostate gland, and a fatty liver.  Same-day bone scan was negative. On January 10, 2010 prostate biopsy showed on the left Robyn score 4+3 involving 7 cores and on the right Robyn score 3+4 involving 6 cores, with positive perineural invasion.  He proceeded to undergo RRP by Dr. Colmenares on February 24, 2020.  Pathology revealed acinar adenocarcinoma [place] 4+3 with focal ROBERTO, bladder neck base invasion, positive SVI, positive PNI, and multiple positive margins.  There was no LVSI and 0 out of 3 lymph nodes removed were involved with tumor; pT3bN0.  He received Eligard on 4/20/2020.  MRI prostate on 5/21/2020 was concerning for local recurrence in the prostatectomy bed and pelvic lymph nodes.  He had a PET/CT F-18 fluciclovine PET/CT on 5/20/2020 which showed 2 foci of increased uptake in the left ischium, with underlying  sclerotic lesion, and symphysis pubis, additionally low-level increased uptake in 3 lymph nodes along the right external and internal iliac nodes, and prostatectomy bed was noted.    On April 21, 2020 he received an Eligard injection.    He started Apalutamide on 6/12/2020.  Bone scan 1/22/2021: no bone mets.      2. Tinnitus b/l -  By 12/2020 he presented with 2 months c/o bilateral tinnitus and was evaluated by Dr. Shields from ENT. An audiogram showed a significant down-sloping mid to high frequency sensorineural hearing loss. There was no evidence of conductive hearing loss or significant asymmetry.  He was felt to have  early onset presbycusis. He was referred for tinnitus retraining therapy.  Review of literature showed no clear association of apalutamide or Xgeva with tinnitus or ototoxicity. Felt to have early onset presbycusis.    3. Chronic lower back pain, with exacerbations- He is followed by Dr. Denise and  Dr. Clay from  pain management for evaluation of lower back pain.  He had CT of thoracic and lumbar spine on August 23, 2021.  This demonstrated unchanged sclerotic foci in T6 and T11 vertebral bodies dating back to May 2020.  There were degenerative changes in the lumbar spine with severe left neural foraminal narrowing at L4-L5 and moderate to severe at L3-L4.  He has proceeded with L3-4 epidural injection on 10/22/2021.  No acute osseous abnormalities were noted. His lower back pain and b/l pelvic pain has improved following epidural injection.     History Of Present Illness:  Mr. Etienne is a 58 year old male who presents for f/up of  metastatic hormone sensitive prostate cancer diagnosed in 01/2020, s/p RRP at that time but later found to have residual/recurrent disease in the pelvis and L inferior pubic body and pubic ramus metastasis.   He has been on androgen deprivation therapy since April 2020, and continues on Lupron every 3 months.     He started Apalutamide on 6/12/2020. He has remained on apalutamide.  He has not had any side effects attributable to apalutamide.  He has been taking his medications at the same time by the clock each day.  He has not missed a single dose that he is aware of.       He gets epidural injection to lumbar spine for back pain.    Jay has been doing well and has no new complaints at this clinic visit.  He has been tolerating his apalutamide without any side effects.    He is driving to his cabin for start of fishing season right after our visit.    Past Medical/Surgical History:  Past Medical History:   Diagnosis Date    Gout     Hypertension goal BP (blood pressure) < 140/90  08/01/2022    Lumbar stenosis     Prostate cancer (H) 01/10/2020     Past Surgical History:   Procedure Laterality Date    BIOPSY PROSTATE TRANSRECTAL  01/10/2020    Dr. Colmenares    COLONOSCOPY  5/2020    LITHOTRIPSY  age 30s    MICROSCOPIC KNEE SURGERY Right age 30s    ORTHOPEDIC SURGERY Left 1985    Alan placed in Left Femur    PROSTATECTOMY RETROPUBIC RADICAL  02/24/2020    Dr. Colmenares     Allergies:  Allergies as of 11/30/2023    (No Known Allergies)     Current Medications:   Current Outpatient Medications   Medication Sig    omeprazole (PRILOSEC) 40 MG DR capsule Take 1 capsule (40 mg) by mouth daily at least 30 min before breakfast    apalutamide (ERLEADA) 60 MG tablet Take 4 tablets (240 mg) by mouth daily for 30 days    aspirin (ASA) 81 MG chewable tablet Take 1 tablet (81 mg) by mouth daily (Patient not taking: Reported on 8/31/2023)    denosumab (XGEVA) 120 MG/1.7ML SOLN injection Inject 1.7 mLs (120 mg) Subcutaneous every 3 months    ERLEADA 60 MG tablet TAKE 4 TABLETS (240 MG) DAILY    HYDROcodone-acetaminophen (NORCO) 5-325 MG tablet Take 1 tablet by mouth 3 times daily as needed for breakthrough pain or severe pain Fill 12/10/23. Start 12/12/2023    leuprolide (ELIGARD) 45 MG kit Inject 45 mg Subcutaneous every 6 months    morphine (MS CONTIN) 15 MG CR tablet Take 1 tablet (15 mg) by mouth every 12 hours Fill 12/10/23. Start 12/12/2023    naloxone (NARCAN) 4 MG/0.1ML nasal spray Spray 1 spray (4 mg) into one nostril alternating nostrils as needed for opioid reversal every 2-3 minutes until assistance arrives    SENNA-docusate sodium (SENNA S) 8.6-50 MG tablet Take 1 tablet by mouth 2 times daily    UNABLE TO FIND 1 tablet daily MEDICATION NAME: C, D, Zinc combination supplement     No current facility-administered medications for this visit.     Facility-Administered Medications Ordered in Other Visits   Medication    leuprolide (LUPRON DEPOT) kit 22.5 mg        Family History:    His father was diagnosed  "with prostate cancer, at the age of 75. Paternal GM had throat cancer at the age of 94, nonsmoker.     Social History:  Social History     Socioeconomic History    Marital status:    Occupational History    Not on file   Social Needs    Food insecurity    Transportation needs   Tobacco Use    Smoking status: Former Smoker     Packs/day: 1.00     Years: 30.00     Pack years: 30.00     Types: Cigarettes, Cigars     Last attempt to quit: 2012     Years since quittin.4    Smokeless tobacco: Never Used   Substance and Sexual Activity    Alcohol use: Yes     Comment: 4-6 drinks per day - patient reports beer or vodka drinks     Physical Exam:      Wt Readings from Last 5 Encounters:   23 84.5 kg (186 lb 4.6 oz)   23 84.5 kg (186 lb 3.2 oz)   23 84.8 kg (187 lb)   08/10/23 84.6 kg (186 lb 6.4 oz)   08/10/23 84.6 kg (186 lb 8.2 oz)     Constitutional: alert and in no distress  Eyes: No redness or discharge  Respiratory: No cough or labored breathing.  Musculoskeletal: Full range of motion in extremities.  Skin: no visible skin lesions or discoloration  Neurological: No tremors and denies headache.  Psychiatric: Mentation appears normal and affect is normal as well.  Alert and oriented x3.  The rest the comprehensive physical examination is deferred due to public health emergency video visit restrictions.        Laboratory/Imaging Studies  Labs reviewed.    Recent Labs   Lab Test 23  1457 23  1454 23  1544 23  1519 22  1432    141 139 143 140   POTASSIUM 4.1 4.0 4.4 4.3 4.1   CHLORIDE 104 103 104 109 104   CO2 26 26 30 30 29   ANIONGAP 10 12 5 4 7   BUN 13.8 14.2 22 17 14   CR 1.00 1.05 1.06 1.00 1.04   * 108* 121* 110* 99   NATALIYA 9.5 9.8 9.5 9.2 9.3     No results for input(s): \"MAG\", \"PHOS\" in the last 91239 hours.  Recent Labs   Lab Test 23  1457 23  1454 23  1544 23  1519 22  1432   WBC 8.0 7.8 7.7 6.3 8.9   HGB 13.7 " "14.2 13.3 13.6 13.2*    313 268 246 292   MCV 92 92 93 95 94   NEUTROPHIL 65 65 62 56 71     Recent Labs   Lab Test 11/29/23  1457 08/09/23  1454 05/09/23  1544   BILITOTAL 0.2 0.2 0.5   ALKPHOS 70 77 78   ALT 26 33 31   AST 20 22 18   ALBUMIN 4.1 4.1 3.7     TSH   Date Value Ref Range Status   01/21/2022 1.03 0.40 - 4.00 mU/L Final   10/29/2021 1.91 0.40 - 4.00 mU/L Final   08/23/2021 2.49 0.40 - 4.00 mU/L Final   04/29/2021 2.57 0.40 - 4.00 mU/L Final   01/22/2021 3.46 0.40 - 4.00 mU/L Final   10/30/2020 1.97 0.40 - 4.00 mU/L Final     No results for input(s): \"CEA\" in the last 91115 hours.  Results for orders placed or performed in visit on 06/29/23   PAIN Interlaminar Epidural Steroid Injection Lumbar/Sacral    Narrative    Pre procedure Diagnosis: Lumbar radiculopathy  Post procedure Diagnosis: Same  Procedure performed: L4-5 interlaminar epidural steroid injection   Anesthesia: none  Complications: none  Operators: Issac Tapia MD     Indications:   Per Etienne is a 57 year old male.  The patient has a history of   lbp rad to her LE.  Examination shows neg slump.  he has tried   conservative treatment including meds/pt/injections    MRI reviewed  Options/alternatives, benefits and risks were discussed with the patient   including but not limited to bleeding, infection, no pain relief, tissue   trauma, exposure to radiation, reaction to medications, spinal cord   injury, dural puncture, weakness, numbness and headache.  Questions were   answered to his satisfaction and he wishes to proceed. Voluntary informed   consent was obtained and signed.     Vitals were reviewed: Yes  Allergies were reviewed:  Yes   Medications were reviewed:  Yes   Pre-procedure pain score: 8/10    Procedure:  The patient's medical history, medications, and allergies were reviewed   and reconciled.  After obtaining signed informed consent, the patient was   brought into the procedure suite and was placed in a prone " position on the   procedure table.   A Pause for the Cause was performed.  Patient was   prepped and draped in the usual sterile fashion.     The L4-5 interspace was identified with AP fluoroscopy.  A total of 3ml of   1% lidocaine was used to anesthetize the skin and subcutaneous tissues for   a  midline approach.    A 20gauge 3.5inch Touhy needle was advanced   utilizing intermittent AP and Lateral fluoroscopy and air for loss of   resistance.  The epidural space was encountered on the first pass without   difficulties.  Aspiration for blood and CSF was negative.  Needle position   was verified by injecting 1 ml of Omnipaque utilizing real-time   fluoroscopy that showed good needle placement and epidural spread without   signs of intravascular or intrathecal uptake.  Omnipaque wasted:  9 ml.    Then, after repeated negative aspiration for blood or CSF, a combination   of Kenalog 40 mg, Bupivicaine 0.25% 2 ml, diluted with 3 ml of normal   saline to a total injectate volume of 6 ml was injected into the epidural   space in a slow and incremental fashion and the needle was restyletted and   withdrawn.  All injected medications were preservative free.    The injection site was cleaned and a sterile dressing was applied.    The patient tolerated the procedure well without complications and was   taken to the recovery room for continued observation.    Images were saved to PACS.    Post-procedure pain score: 7/10  Follow-up includes:   -f/u with referring provider     Issac Tapia MD  [facility]       Recent Labs   Lab Test 11/29/23  1457 08/09/23  1454 05/09/23  1544 01/31/23  1519 11/01/22  1432 07/21/22  1434   PSA <0.01 <0.01 <0.01 <0.01 <0.01 <0.01   TESTOSTTOTAL  --  11* 8* 5* 2* 4*       ASSESSMENT/PLAN:  Jay is a very pleasant 58 year old man with metastatic (to the bones) prostate cancer. He is s/p Eligard on 4/21/2020. Jay has low volume hormone sensitive metastatic disease, with  residual disease locoregionally and bone metastasis (two lesions- in left ischium and symphysis pubis), asymptomatic.  He received Eligard on 4/21/2020 and started on Apalutamide on 6/12/2020.    1.  Metastatic prostate cancer-PSA decreased to <0.01. Continue apalutamide daily and Lupron every 3 months.  We will continue to monitor the patient per treatment protocol. We will follow CBC differential, CMP, total testosterone level and PSA every 3 months.    He has responded extremely well so far. His PSA remains undetectable which is great news. We will continue with leuprolide, with apalutamide and denosumab as current.     2. Bone metastasis-continue Xgeva q 3 months for prevention of skeletal related events given low volume metastatic disease to the bones.      3.  Lung cancer screening- CT chest findings from 1/31/2022 negative. Next due in one year    4. Intermittent leucocytosis secondary to intermittent neutrophilia  Per tends to get intermittent leukocytosis with neutrophilia.  He had high total WBC count with absolute neutrophil counts of 16.5k  This has dropped to 5.1K-well within the normal range.  He has been getting intermittent cyclical neutrophilia.     5. Mild normochromic normocytic anemia-   He is hemoglobin values have been fluctuating since his diagnosis of prostate cancer but most values have been between 13 and 14 g/dL.    6. GI - on PPI for s/o GErD and cough in am with improvement of symptoms     30 minutes spent on the date of the encounter doing chart review, history and exam, documentation and further activities as noted above      Scott Garcia    Hematologist and Medical Oncologist  [facility]

## 2023-11-30 NOTE — LETTER
Opioid / Opioid Plus Controlled Substance Agreement    This is an agreement between you and your provider about the safe and appropriate use of controlled substance/opioids prescribed by your care team. Controlled substances are medicines that can cause physical and mental dependence (abuse).    There are strict laws about having and using these medicines. We here at [facility] are committing to working with you in your efforts to get better. To support you in this work, we ll help you schedule regular office appointments for medicine refills. If we must cancel or change your appointment for any reason, we ll make sure you have enough medicine to last until your next appointment.     As a Provider, I will:  Listen carefully to your concerns and treat you with respect.   Recommend a treatment plan that I believe is in your best interest. This plan may involve therapies other than opioid pain medication.   Talk with you often about the possible benefits, and the risk of harm of any medicine that we prescribe for you.   Provide a plan on how to taper (discontinue or go off) using this medicine if the decision is made to stop its use.    As a Patient, I understand that opioid(s):   Are a controlled substance prescribed by my care team to help me function or work and manage my condition(s).   Are strong medicines and can cause serious side effects such as:  Drowsiness, which can seriously affect my driving ability  A lower breathing rate, enough to cause death  Harm to my thinking ability   Depression   Abuse of and addiction to this medicine  Need to be taken exactly as prescribed. Combining opioids with certain medicines or chemicals (such as illegal drugs, sedatives, sleeping pills, and benzodiazepines) can be dangerous or even fatal. If I stop opioids suddenly, I may have severe withdrawal symptoms.  Do not work for all types of pain nor for all patients. If they re not helpful, I may be asked to stop  them.        The risks, benefits and side effects of these medicine(s) were explained to me. I agree that:  I will take part in other treatments as advised by my care team. This may be psychiatry or counseling, physical therapy, behavioral therapy, group treatment or a referral to a specialist.     I will keep all my appointments. I understand that this is part of the monitoring of opioids. My care team may require an office visit for EVERY opioid/controlled substance refill. If I miss appointments or don t follow instructions, my care team may stop my medicine.    I will take my medicines as prescribed. I will not change the dose or schedule unless my care team tells me to. There will be no refills if I run out early.     I may be asked to come to the clinic and complete a urine drug test or complete a pill count at any time. If I don t give a urine sample or participate in a pill count, the care team may stop my medicine.    I will only receive prescriptions from this clinic for chronic pain. If I am treated by another provider for acute pain issues, I will tell them that I am taking opioid pain medication for chronic pain and that I have a treatment agreement with this provider. I will inform my [facility] care team within one business day if I am given a prescription for any pain medication by another healthcare provider. My [facility] care team can contact other providers and pharmacists about my use of any medicines.    It is up to me to make sure that I don t run out of my medicines on weekends or holidays. If my care team is willing to refill my opioid prescription without a visit, I must request refills only during office hours. Refills may take up to 3 business days to process. I will use one pharmacy to fill all my opioid and other controlled substance prescriptions. I will notify the clinic about any changes to my insurance or medication availability.    I am responsible for my  prescriptions. If the medicine/prescription is lost, stolen or destroyed, it will not be replaced. I also agree not to share controlled substance medicines with anyone.    I am aware I should not use any illegal or recreational drugs. I agree not to drink alcohol unless my care team says I can.       If I enroll in the [place] [facility] program, I will tell my care team prior to my next refill.     I will tell my care team right away if I become pregnant, have a new medical problem treated outside of my regular clinic, or have a change in my medications.    I understand that this medicine can affect my thinking, judgment and reaction time. Alcohol and drugs affect the brain and body, which can affect the safety of my driving. Being under the influence of alcohol or drugs can affect my decision-making, behaviors, personal safety, and the safety of others. Driving while impaired (DWI) can occur if a person is driving, operating, or in physical control of a car, motorcycle, boat, snowmobile, ATV, motorbike, off-road vehicle, or any other motor vehicle ([place] Statute 169A.20). I understand the risk if I choose to drive or operate any vehicle or machinery.    I understand that if I do not follow any of the conditions above, my prescriptions or treatment may be stopped or changed.          Opioids  What You Need to Know    What are opioids?   Opioids are pain medicines that must be prescribed by a doctor. They are also known as narcotics.     Examples are:   morphine (MS Contin, Cherie)  oxycodone (Oxycontin)  oxycodone and acetaminophen (Percocet)  hydrocodone and acetaminophen (Vicodin, Norco)   fentanyl patch (Duragesic)   hydromorphone (Dilaudid)   methadone  codeine (Tylenol #3)     What do opioids do well?   Opioids are best for severe short-term pain such as after a surgery or injury. They may work well for cancer pain. They may help some people with long-lasting (chronic) pain.     What do opioids NOT do  well?   Opioids never get rid of pain entirely, and they don t work well for most patients with chronic pain. Opioids don t reduce swelling, one of the causes of pain.                                    Other ways to manage chronic pain and improve function include:     Treat the health problem that may be causing pain  Anti-inflammation medicines, which reduce swelling and tenderness, such as ibuprofen (Advil, Motrin) or naproxen (Aleve)  Acetaminophen (Tylenol)  Antidepressants and anti-seizure medicines, especially for nerve pain  Topical treatments such as patches or creams  Injections or nerve blocks  Chiropractic or osteopathic treatment  Acupuncture, massage, deep breathing, meditation, visual imagery, aromatherapy  Use heat or ice at the pain site  Physical therapy   Exercise  Stop smoking  Take part in therapy       Risks and side effects     Talk to your doctor before you start or decide to keep taking opioids. Possible side effects include:    Lowering your breathing rate enough to cause death  Overdose, including death, especially if taking higher than prescribed doses  Worse depression symptoms; less pleasure in things you usually enjoy  Feeling tired or sluggish  Slower thoughts or cloudy thinking  Being more sensitive to pain over time; pain is harder to control  Trouble sleeping or restless sleep  Changes in hormone levels (for example, less testosterone)  Changes in sex drive or ability to have sex  Constipation  Unsafe driving  Itching and sweating  Dizziness  Nausea, throwing up and dry mouth    What else should I know about opioids?    Opioids may lead to dependence, tolerance, or addiction.    Dependence means that if you stop or reduce the medicine too quickly, you will have withdrawal symptoms. These include loose poop (diarrhea), jitters, flu-like symptoms, nervousness and tremors. Dependence is not the same as addiction.                     Tolerance means needing higher doses over time to  get the same effect. This may increase the chance of serious side effects.    Addiction is when people improperly use a substance that harms their body, their mind or their relations with others. Use of opiates can cause a relapse of addiction if you have a history of drug or alcohol abuse.    People who have used opioids for a long time may have a lower quality of life, worse depression, higher levels of pain and more visits to doctors.    You can overdose on opioids. Take these steps to lower your risk of overdose:    Recognize the signs:  Signs of overdose include decrease or loss of consciousness (blackout), slowed breathing, trouble waking up and blue lips. If someone is worried about overdose, they should call 911.    Talk to your doctor about Narcan (naloxone).   If you are at risk for overdose, you may be given a prescription for Narcan. This medicine very quickly reverses the effects of opioids.   If you overdose, a friend or family member can give you Narcan while waiting for the ambulance. They need to know the signs of overdose and how to give Narcan.     Don't use alcohol or street drugs.   Taking them with opioids can cause death.    Do not take any of these medicines unless your doctor says it s OK. Taking these with opioids can cause death:  Benzodiazepines, such as lorazepam (Ativan), alprazolam (Xanax) or diazepam (Valium)  Muscle relaxers, such as cyclobenzaprine (Flexeril)  Sleeping pills like zolpidem (Ambien)   Other opioids      How to keep you and other people safe while taking opioids:    Never share your opioids with others.  Opioid medicines are regulated by the Drug Enforcement Agency ([place]). Selling or sharing medications is a criminal act.    2. Be sure to store opioids in a secure place, locked up if possible. Young children can easily swallow them and overdose.    3. When you are traveling with your medicines, keep them in the original bottles. If you use a pill box, be sure you also  carry a copy of your medicine list from your clinic or pharmacy.    4. Safe disposal of opioids    Most pharmacies have places to get rid of medicine, called disposal kiosks. Medicine disposal options are also available in every [facility]. Search your county and  medication disposal  to find more options. You can find more details at:  https://[email or web address]-green/managing-unwanted-medications     I agree that my provider, clinic care team, and pharmacy may work with any city, state or federal law enforcement agency that investigates the misuse, sale, or other diversion of my controlled medicine. I will allow my provider to discuss my care with, or share a copy of, this agreement with any other treating provider, pharmacy or emergency room where I receive care.    I have read this agreement and have asked questions about anything I did not understand.    _______________________________________________________  Patient Signature - Per Etienne _____________________                   Date     _______________________________________________________  Provider Signature - TEREZA Sanchez CNP   _____________________                   Date     _______________________________________________________  Witness Signature (required if provider not present while patient signing)   _____________________                   Date

## 2023-11-30 NOTE — PROGRESS NOTES
"Oncology Rooming Note    November 30, 2023 2:55 PM   Per Etienne is a 58 year old male who presents for:    Chief Complaint   Patient presents with    Oncology Clinic Visit     Follow up      Initial Vitals: There were no vitals taken for this visit. Estimated body mass index is 28.31 kg/m  as calculated from the following:    Height as of 8/31/23: 1.727 m (5' 8\").    Weight as of an earlier encounter on 11/30/23: 84.5 kg (186 lb 3.2 oz). There is no height or weight on file to calculate BSA.  Data Unavailable Comment: Data Unavailable   No LMP for male patient.  Allergies reviewed: Yes  Medications reviewed: Yes    Medications: MEDICATION REFILLS NEEDED TODAY. Provider was notified.  Pharmacy name entered into makemoji:    [id number] [place], [place] - [address], [place] - [address] [facility]    Clinical concerns: Wondering if Dr. Garcia can fill omeprazole prescription?    Sol Morales RN             "

## 2023-11-30 NOTE — LETTER
"    11/30/2023         RE: Per Etienne  [address] [facility] [id number]        Dear Colleague,    Thank you for referring your patient, Per Etienne, to the [facility]. Please see a copy of my visit note below.    Oncology Rooming Note    November 30, 2023 2:55 PM   Per Etienne is a 58 year old male who presents for:    Chief Complaint   Patient presents with     Oncology Clinic Visit     Follow up      Initial Vitals: There were no vitals taken for this visit. Estimated body mass index is 28.31 kg/m  as calculated from the following:    Height as of 8/31/23: 1.727 m (5' 8\").    Weight as of an earlier encounter on 11/30/23: 84.5 kg (186 lb 3.2 oz). There is no height or weight on file to calculate BSA.  Data Unavailable Comment: Data Unavailable   No LMP for male patient.  Allergies reviewed: Yes  Medications reviewed: Yes    Medications: MEDICATION REFILLS NEEDED TODAY. Provider was notified.  Pharmacy name entered into SGB:    [id number] [place], [place] - [address] [place], [place] - [address] [place] [address]    Clinical concerns: Wondering if Dr. Garcia can fill omeprazole prescription?    Sol Morales RN               Oncology Follow-up:  Date on this visit: Nov 30, 2023    Primary care physician: Per Chen   Urologist: Dr. Angel Colmenares     Metastatic hormone sensitive prostate cancer    Oncologic History:  1. Metastatic hormone sensitive prostate cancer  He presented to his PCP in December 2019 with slow urinary stream.  PSA was checked and was found to be 124. On January 8, 2020 abdomen pelvis CT showed groundglass opacity in the left lower lobe,  dystrophic calcification of the prostate gland, and a fatty liver.  Same-day bone scan was negative. On January 10, 2010 prostate biopsy showed on the left Robyn score 4+3 involving 7 cores and on the right Robyn score 3+4 involving 6 cores, with positive " perineural invasion.  He proceeded to undergo RRP by Dr. Colmenares on February 24, 2020.  Pathology revealed acinar adenocarcinoma Lizella 4+3 with focal ROBERTO, bladder neck base invasion, positive SVI, positive PNI, and multiple positive margins.  There was no LVSI and 0 out of 3 lymph nodes removed were involved with tumor; pT3bN0.  He received Eligard on 4/20/2020.  MRI prostate on 5/21/2020 was concerning for local recurrence in the prostatectomy bed and pelvic lymph nodes.  He had a PET/CT F-18 fluciclovine PET/CT on 5/20/2020 which showed 2 foci of increased uptake in the left ischium, with underlying  sclerotic lesion, and symphysis pubis, additionally low-level increased uptake in 3 lymph nodes along the right external and internal iliac nodes, and prostatectomy bed was noted.    On April 21, 2020 he received an Eligard injection.    He started Apalutamide on 6/12/2020.  Bone scan 1/22/2021: no bone mets.      2. Tinnitus b/l -  By 12/2020 he presented with 2 months c/o bilateral tinnitus and was evaluated by Dr. Shields from ENT. An audiogram showed a significant down-sloping mid to high frequency sensorineural hearing loss. There was no evidence of conductive hearing loss or significant asymmetry.  He was felt to have early onset presbycusis. He was referred for tinnitus retraining therapy.  Review of literature showed no clear association of apalutamide or Xgeva with tinnitus or ototoxicity. Felt to have early onset presbycusis.    3. Chronic lower back pain, with exacerbations- He is followed by Dr. Denise and  Dr. Clay from  pain management for evaluation of lower back pain.  He had CT of thoracic and lumbar spine on August 23, 2021.  This demonstrated unchanged sclerotic foci in T6 and T11 vertebral bodies dating back to May 2020.  There were degenerative changes in the lumbar spine with severe left neural foraminal narrowing at L4-L5 and moderate to severe at L3-L4.  He has proceeded with L3-4 epidural  injection on 10/22/2021.  No acute osseous abnormalities were noted. His lower back pain and b/l pelvic pain has improved following epidural injection.     History Of Present Illness:  Mr. Etienne is a 58 year old male who presents for f/up of  metastatic hormone sensitive prostate cancer diagnosed in 01/2020, s/p RRP at that time but later found to have residual/recurrent disease in the pelvis and L inferior pubic body and pubic ramus metastasis.   He has been on androgen deprivation therapy since April 2020, and continues on Lupron every 3 months.     He started Apalutamide on 6/12/2020. He has remained on apalutamide.  He has not had any side effects attributable to apalutamide.  He has been taking his medications at the same time by the clock each day.  He has not missed a single dose that he is aware of.       He gets epidural injection to lumbar spine for back pain.    Jay has been doing well and has no new complaints at this clinic visit.  He has been tolerating his apalutamide without any side effects.    He is driving to his cabin for start of fishing season right after our visit.    Past Medical/Surgical History:  Past Medical History:   Diagnosis Date     Gout      Hypertension goal BP (blood pressure) < 140/90 08/01/2022     Lumbar stenosis      Prostate cancer (H) 01/10/2020     Past Surgical History:   Procedure Laterality Date     BIOPSY PROSTATE TRANSRECTAL  01/10/2020    Dr. Colmenares     COLONOSCOPY  5/2020     LITHOTRIPSY  age 30s     MICROSCOPIC KNEE SURGERY Right age 30s     ORTHOPEDIC SURGERY Left 1985    Alan placed in Left Femur     PROSTATECTOMY RETROPUBIC RADICAL  02/24/2020    Dr. Colmenares     Allergies:  Allergies as of 11/30/2023     (No Known Allergies)     Current Medications:   Current Outpatient Medications   Medication Sig     omeprazole (PRILOSEC) 40 MG DR capsule Take 1 capsule (40 mg) by mouth daily at least 30 min before breakfast     apalutamide (ERLEADA) 60 MG tablet Take 4 tablets (240  mg) by mouth daily for 30 days     aspirin (ASA) 81 MG chewable tablet Take 1 tablet (81 mg) by mouth daily (Patient not taking: Reported on 2023)     denosumab (XGEVA) 120 MG/1.7ML SOLN injection Inject 1.7 mLs (120 mg) Subcutaneous every 3 months     ERLEADA 60 MG tablet TAKE 4 TABLETS (240 MG) DAILY     HYDROcodone-acetaminophen (NORCO) 5-325 MG tablet Take 1 tablet by mouth 3 times daily as needed for breakthrough pain or severe pain Fill 12/10/23. Start 2023     leuprolide (ELIGARD) 45 MG kit Inject 45 mg Subcutaneous every 6 months     morphine (MS CONTIN) 15 MG CR tablet Take 1 tablet (15 mg) by mouth every 12 hours Fill 12/10/23. Start 2023     naloxone (NARCAN) 4 MG/0.1ML nasal spray Spray 1 spray (4 mg) into one nostril alternating nostrils as needed for opioid reversal every 2-3 minutes until assistance arrives     SENNA-docusate sodium (SENNA S) 8.6-50 MG tablet Take 1 tablet by mouth 2 times daily     UNABLE TO FIND 1 tablet daily MEDICATION NAME: C, D, Zinc combination supplement     No current facility-administered medications for this visit.     Facility-Administered Medications Ordered in Other Visits   Medication     leuprolide (LUPRON DEPOT) kit 22.5 mg        Family History:    His father was diagnosed with prostate cancer, at the age of 75. Paternal GM had throat cancer at the age of 94, nonsmoker.     Social History:  Social History     Socioeconomic History     Marital status:    Occupational History     Not on file   Social Needs     Food insecurity     Transportation needs   Tobacco Use     Smoking status: Former Smoker     Packs/day: 1.00     Years: 30.00     Pack years: 30.00     Types: Cigarettes, Cigars     Last attempt to quit: 2012     Years since quittin.4     Smokeless tobacco: Never Used   Substance and Sexual Activity     Alcohol use: Yes     Comment: 4-6 drinks per day - patient reports beer or vodka drinks     Physical Exam:      Wt Readings from  "Last 5 Encounters:   11/30/23 84.5 kg (186 lb 4.6 oz)   11/30/23 84.5 kg (186 lb 3.2 oz)   08/31/23 84.8 kg (187 lb)   08/10/23 84.6 kg (186 lb 6.4 oz)   08/10/23 84.6 kg (186 lb 8.2 oz)     Constitutional: alert and in no distress  Eyes: No redness or discharge  Respiratory: No cough or labored breathing.  Musculoskeletal: Full range of motion in extremities.  Skin: no visible skin lesions or discoloration  Neurological: No tremors and denies headache.  Psychiatric: Mentation appears normal and affect is normal as well.  Alert and oriented x3.  The rest the comprehensive physical examination is deferred due to public health emergency video visit restrictions.        Laboratory/Imaging Studies  Labs reviewed.    Recent Labs   Lab Test 11/29/23 1457 08/09/23  1454 05/09/23  1544 01/31/23  1519 11/01/22  1432    141 139 143 140   POTASSIUM 4.1 4.0 4.4 4.3 4.1   CHLORIDE 104 103 104 109 104   CO2 26 26 30 30 29   ANIONGAP 10 12 5 4 7   BUN 13.8 14.2 22 17 14   CR 1.00 1.05 1.06 1.00 1.04   * 108* 121* 110* 99   NATALIYA 9.5 9.8 9.5 9.2 9.3     No results for input(s): \"MAG\", \"PHOS\" in the last 09325 hours.  Recent Labs   Lab Test 11/29/23 1457 08/09/23  1454 05/09/23  1544 01/31/23  1519 11/01/22  1432   WBC 8.0 7.8 7.7 6.3 8.9   HGB 13.7 14.2 13.3 13.6 13.2*    313 268 246 292   MCV 92 92 93 95 94   NEUTROPHIL 65 65 62 56 71     Recent Labs   Lab Test 11/29/23 1457 08/09/23  1454 05/09/23  1544   BILITOTAL 0.2 0.2 0.5   ALKPHOS 70 77 78   ALT 26 33 31   AST 20 22 18   ALBUMIN 4.1 4.1 3.7     TSH   Date Value Ref Range Status   01/21/2022 1.03 0.40 - 4.00 mU/L Final   10/29/2021 1.91 0.40 - 4.00 mU/L Final   08/23/2021 2.49 0.40 - 4.00 mU/L Final   04/29/2021 2.57 0.40 - 4.00 mU/L Final   01/22/2021 3.46 0.40 - 4.00 mU/L Final   10/30/2020 1.97 0.40 - 4.00 mU/L Final     No results for input(s): \"CEA\" in the last 65399 hours.  Results for orders placed or performed in visit on 06/29/23   PAIN " Interlaminar Epidural Steroid Injection Lumbar/Sacral    Narrative    Pre procedure Diagnosis: Lumbar radiculopathy  Post procedure Diagnosis: Same  Procedure performed: L4-5 interlaminar epidural steroid injection   Anesthesia: none  Complications: none  Operators: Issac Tapia MD     Indications:   Per Etienne is a 57 year old male.  The patient has a history of   lbp rad to her LE.  Examination shows neg slump.  he has tried   conservative treatment including meds/pt/injections    MRI reviewed  Options/alternatives, benefits and risks were discussed with the patient   including but not limited to bleeding, infection, no pain relief, tissue   trauma, exposure to radiation, reaction to medications, spinal cord   injury, dural puncture, weakness, numbness and headache.  Questions were   answered to his satisfaction and he wishes to proceed. Voluntary informed   consent was obtained and signed.     Vitals were reviewed: Yes  Allergies were reviewed:  Yes   Medications were reviewed:  Yes   Pre-procedure pain score: 8/10    Procedure:  The patient's medical history, medications, and allergies were reviewed   and reconciled.  After obtaining signed informed consent, the patient was   brought into the procedure suite and was placed in a prone position on the   procedure table.   A Pause for the Cause was performed.  Patient was   prepped and draped in the usual sterile fashion.     The L4-5 interspace was identified with AP fluoroscopy.  A total of 3ml of   1% lidocaine was used to anesthetize the skin and subcutaneous tissues for   a  midline approach.    A 20gauge 3.5inch Touhy needle was advanced   utilizing intermittent AP and Lateral fluoroscopy and air for loss of   resistance.  The epidural space was encountered on the first pass without   difficulties.  Aspiration for blood and CSF was negative.  Needle position   was verified by injecting 1 ml of Omnipaque utilizing real-time   fluoroscopy that  showed good needle placement and epidural spread without   signs of intravascular or intrathecal uptake.  Omnipaque wasted:  9 ml.    Then, after repeated negative aspiration for blood or CSF, a combination   of Kenalog 40 mg, Bupivicaine 0.25% 2 ml, diluted with 3 ml of normal   saline to a total injectate volume of 6 ml was injected into the epidural   space in a slow and incremental fashion and the needle was restyletted and   withdrawn.  All injected medications were preservative free.    The injection site was cleaned and a sterile dressing was applied.    The patient tolerated the procedure well without complications and was   taken to the recovery room for continued observation.    Images were saved to PACS.    Post-procedure pain score: 7/10  Follow-up includes:   -f/u with referring provider     Issac Tapia MD  [facility] [place]       Recent Labs   Lab Test 11/29/23  1457 08/09/23  1454 05/09/23  1544 01/31/23  1519 11/01/22  1432 07/21/22  1434   PSA <0.01 <0.01 <0.01 <0.01 <0.01 <0.01   TESTOSTTOTAL  --  11* 8* 5* 2* 4*       ASSESSMENT/PLAN:  Jay is a very pleasant 58 year old man with metastatic (to the bones) prostate cancer. He is s/p Eligard on 4/21/2020. Jay has low volume hormone sensitive metastatic disease, with residual disease locoregionally and bone metastasis (two lesions- in left ischium and symphysis pubis), asymptomatic.  He received Eligard on 4/21/2020 and started on Apalutamide on 6/12/2020.    1.  Metastatic prostate cancer-PSA decreased to <0.01. Continue apalutamide daily and Lupron every 3 months.  We will continue to monitor the patient per treatment protocol. We will follow CBC differential, CMP, total testosterone level and PSA every 3 months.    He has responded extremely well so far. His PSA remains undetectable which is great news. We will continue with leuprolide, with apalutamide and denosumab as current.     2. Bone metastasis-continue Xgeva q 3 months  for prevention of skeletal related events given low volume metastatic disease to the bones.      3.  Lung cancer screening- CT chest findings from 1/31/2022 negative. Next due in one year    4. Intermittent leucocytosis secondary to intermittent neutrophilia  Per tends to get intermittent leukocytosis with neutrophilia.  He had high total WBC count with absolute neutrophil counts of 16.5k  This has dropped to 5.1K-well within the normal range.  He has been getting intermittent cyclical neutrophilia.     5. Mild normochromic normocytic anemia-   He is hemoglobin values have been fluctuating since his diagnosis of prostate cancer but most values have been between 13 and 14 g/dL.    6. GI - on PPI for s/o GErD and cough in am with improvement of symptoms     30 minutes spent on the date of the encounter doing chart review, history and exam, documentation and further activities as noted above      cSott Garcia    Hematologist and Medical Oncologist  [facility]       Again, thank you for allowing me to participate in the care of your patient.        Sincerely,        Scott Garcia MD

## 2023-11-30 NOTE — PATIENT INSTRUCTIONS
1.  Pain Physical Therapy:  WALT Thompson is very active. Despite the pain, he continues to work full time as a , which is somewhat labor intensive (frequently using ladder).               2.  Pain Psychologist to address relaxation, behavioral change, coping style, and other factors important to improvement.  WALT Thompson may possibly benefit from support for his chronic health conditions overall, though he does seem to manage fairly well. He continues to work full time and likes to stay active.               3.  Diagnostic Studies:  None               4.  Medication Management:   Continue MS Contin to 15 mg every 12 hours. He continues to appreciate benefit, stable baseline pain, supports functioning, continues to work full time. Refilled today for #60 tabs.   Continue hydrocodone 5-325 three times daily as needed for breakthrough pain. He does not use very frequently, usually only takes 1 tab daily as needed. He continues to appreciate benefit for breakthrough pain, no concerns for continuation. Refilled today for #90 tabs to last 30 days.   Continue Senna S to 1 tab twice daily. He reports improvement with OIC, with increased frequency to twice daily. He reports intermittent constipation every 2-3 weeks that lasts for about 2 days. He usually increases fiber intake (salads) and this helps. Recommend using Miralax 1-2 times daily as needed, if current regimen not effective, advised he can purchase OTC.   CSA/UDS - completed today. Naloxone refill sent into pharmacy today. Advised to  new supply and dispose of old supply at home.               5.  Potential procedures: He had repeat L4-5 LUIS on 6-29-23 and has appreciated improvement in pain, reports at least 75% pain relief that lasts around 3 months. He reports recurrence of intermittent radicular leg symptoms about 2 months ago, primarily affecting knee and ankle at night. Recommend repeat LESI. Orders placed to schedule. Advised  we can discuss planning strategies for future repeat injections in future. Will consider imaging/further workup, should intermittent leg symptoms fail to improve with repeating L4-5 LUIS.               7.  Follow up with TEREZA Sanchez CNP in 3 months.     ----------------------------------------------------------------  Clinic Number:  [phone]   Call with any questions about your care and for scheduling assistance.   Calls are returned Monday through Friday between 8 AM and 4:30 PM. We usually get back to you within 2 business days depending on the issue/request.    If we are prescribing your medications:  For opioid medication refills, call the clinic or send a [employer] message 7 days in advance.  Please include:  Name of requested medication  Name of the pharmacy.  For non-opioid medications, call your pharmacy directly to request a refill. Please allow 3-4 days to be processed.   Per [place] State Law:  All controlled substance prescriptions must be filled within 30 days of being written.    For those controlled substances allowing refills, pickup must occur within 30 days of last fill.      We believe regular attendance is key to your success in our program!    Any time you are unable to keep your appointment we ask that you call us at least 24 hours in advance to cancel.This will allow us to offer the appointment time to another patient.   Multiple missed appointments may lead to dismissal from the clinic.

## 2023-12-02 LAB — TESTOST SERPL-MCNC: 3 NG/DL (ref 240–950)

## 2023-12-06 LAB
DHC UR CFM-MCNC: 53 NG/ML
DHC/CREAT UR: 39 NG/MG {CREAT}
HYDROCODONE UR CFM-MCNC: 340 NG/ML
HYDROCODONE/CREAT UR: 252 NG/MG {CREAT}
HYDROMORPHONE UR CFM-MCNC: 273 NG/ML
HYDROMORPHONE/CREAT UR: 202 NG/MG {CREAT}
MORPHINE UR CFM-MCNC: >7000 NG/ML
MORPHINE/CREAT UR: ABNORMAL

## 2023-12-08 DIAGNOSIS — C61 PROSTATE CANCER (H): ICD-10-CM

## 2023-12-08 RX ORDER — APALUTAMIDE 60 MG/1
TABLET, FILM COATED ORAL
Qty: 120 TABLET | Refills: 0 | OUTPATIENT
Start: 2023-12-08

## 2023-12-12 ENCOUNTER — MYC REFILL (OUTPATIENT)
Dept: PALLIATIVE MEDICINE | Facility: CLINIC | Age: 58
End: 2023-12-12
Payer: COMMERCIAL

## 2023-12-12 DIAGNOSIS — G89.3 CANCER ASSOCIATED PAIN: ICD-10-CM

## 2023-12-12 DIAGNOSIS — C61 PROSTATE CANCER METASTATIC TO BONE (H): ICD-10-CM

## 2023-12-12 DIAGNOSIS — C79.51 PROSTATE CANCER METASTATIC TO BONE (H): ICD-10-CM

## 2023-12-12 RX ORDER — HYDROCODONE BITARTRATE AND ACETAMINOPHEN 5; 325 MG/1; MG/1
1 TABLET ORAL 3 TIMES DAILY PRN
Qty: 90 TABLET | Refills: 0 | Status: CANCELLED | OUTPATIENT
Start: 2023-12-12

## 2023-12-12 RX ORDER — MORPHINE SULFATE 15 MG/1
15 TABLET, FILM COATED, EXTENDED RELEASE ORAL EVERY 12 HOURS
Qty: 60 TABLET | Refills: 0 | Status: CANCELLED | OUTPATIENT
Start: 2023-12-12

## 2023-12-12 NOTE — TELEPHONE ENCOUNTER
Received call from patient requesting refill(s) of morphine (MS CONTIN) 15 MG CR tablet    And  HYDROcodone-acetaminophen (NORCO) 5-325 MG tablet     Medications are already at the pharmacy. [employer] message sent to patient.

## 2023-12-13 DIAGNOSIS — C61 PROSTATE CANCER (H): Primary | ICD-10-CM

## 2024-01-02 DIAGNOSIS — C61 PROSTATE CANCER (H): ICD-10-CM

## 2024-01-02 RX ORDER — APALUTAMIDE 60 MG/1
TABLET, FILM COATED ORAL
Qty: 120 TABLET | Refills: 0 | OUTPATIENT
Start: 2024-01-02

## 2024-01-03 ENCOUNTER — TELEPHONE (OUTPATIENT)
Dept: ONCOLOGY | Facility: CLINIC | Age: 59
End: 2024-01-03
Payer: COMMERCIAL

## 2024-01-03 NOTE — TELEPHONE ENCOUNTER
COPAY CARD OBTAINED    Medication: ERLEADA 60 MG PO TABS  Sponsor: [facility] path  Member ID: [id number]  BIN: [id number]  PCN:   Group: [id number]  Expected Copay: $  0  Copay card Monthly Max Amount: $    Copay card Annual Amount: $ 15,000

## 2024-01-10 ENCOUNTER — MYC REFILL (OUTPATIENT)
Dept: PALLIATIVE MEDICINE | Facility: CLINIC | Age: 59
End: 2024-01-10
Payer: COMMERCIAL

## 2024-01-10 DIAGNOSIS — G89.3 CANCER ASSOCIATED PAIN: ICD-10-CM

## 2024-01-10 DIAGNOSIS — C61 PROSTATE CANCER METASTATIC TO BONE (H): ICD-10-CM

## 2024-01-10 DIAGNOSIS — C79.51 PROSTATE CANCER METASTATIC TO BONE (H): ICD-10-CM

## 2024-01-11 ENCOUNTER — RADIOLOGY INJECTION OFFICE VISIT (OUTPATIENT)
Dept: PALLIATIVE MEDICINE | Facility: CLINIC | Age: 59
End: 2024-01-11
Payer: COMMERCIAL

## 2024-01-11 VITALS — HEART RATE: 71 BPM | OXYGEN SATURATION: 97 % | DIASTOLIC BLOOD PRESSURE: 86 MMHG | SYSTOLIC BLOOD PRESSURE: 144 MMHG

## 2024-01-11 DIAGNOSIS — M54.16 LUMBAR RADICULOPATHY: ICD-10-CM

## 2024-01-11 DIAGNOSIS — M54.10 RADICULAR PAIN OF RIGHT LOWER EXTREMITY: ICD-10-CM

## 2024-01-11 DIAGNOSIS — C61 PROSTATE CANCER (H): Primary | ICD-10-CM

## 2024-01-11 PROCEDURE — 62323 NJX INTERLAMINAR LMBR/SAC: CPT | Performed by: PAIN MEDICINE

## 2024-01-11 RX ORDER — TRIAMCINOLONE ACETONIDE 40 MG/ML
40 INJECTION, SUSPENSION INTRA-ARTICULAR; INTRAMUSCULAR ONCE
Status: COMPLETED | OUTPATIENT
Start: 2024-01-11 | End: 2024-01-11

## 2024-01-11 RX ORDER — MORPHINE SULFATE 15 MG/1
15 TABLET, FILM COATED, EXTENDED RELEASE ORAL EVERY 12 HOURS
Qty: 60 TABLET | Refills: 0 | Status: SHIPPED | OUTPATIENT
Start: 2024-01-11 | End: 2024-02-10

## 2024-01-11 RX ORDER — HYDROCODONE BITARTRATE AND ACETAMINOPHEN 5; 325 MG/1; MG/1
1 TABLET ORAL 3 TIMES DAILY PRN
Qty: 90 TABLET | Refills: 0 | Status: SHIPPED | OUTPATIENT
Start: 2024-01-11 | End: 2024-02-10

## 2024-01-11 RX ADMIN — TRIAMCINOLONE ACETONIDE 40 MG: 40 INJECTION, SUSPENSION INTRA-ARTICULAR; INTRAMUSCULAR at 15:44

## 2024-01-11 ASSESSMENT — PAIN SCALES - GENERAL
PAINLEVEL: SEVERE PAIN (7)
PAINLEVEL: SEVERE PAIN (7)

## 2024-01-11 NOTE — NURSING NOTE
Pre-procedure Intake  If YES to any questions or NO to having a   Please complete laminated checklist and leave on the computer keyboard for Provider, verbally inform provider if able.    For SCS Trial, RFA's or any sedation procedure:  Have you been fasting? NA  If yes, for how long?     Are you taking any any blood thinners such as Coumadin, Warfarin, Jantoven, Pradaxa Xarelto, Eliquis, Edoxaban, Enoxaparin, Lovenox, Heparin, Arixtra, Fondaparinux, or Fragmin? OR Antiplatelet medication such as Plavix, Brilinta, or Effient?   No   If yes, when did you take your last dose?     Do you take aspirin?  No  If cervical procedure, have you held aspirin for 6 days?       Do you have any allergies to contrast dye, iodine, steroid and/or numbing medications?  NO    Are you currently taking antibiotics or have an active infection?  NO    Have you had a fever/elevated temperature within the past week? NO    Are you currently taking oral steroids? NO    Do you have a ? Yes    Are you pregnant or breastfeeding?  Not Applicable    Have you received the COVID-19 vaccine? No   If yes, was it your 1st, 2nd or only dose needed?   Date of most recent vaccine:     Notify provider and RNs if systolic BP >170, diastolic BP >100, P >100 or O2 sats < 90%

## 2024-01-11 NOTE — NURSING NOTE
Discharge Information    IV Discontiued Time:  NA    Amount of Fluid Infused:  NA    Discharge Criteria = When patient returns to baseline or as per MD order    Consciousness:  Pt is fully awake    Circulation:  BP +/- 20% of pre-procedure level    Respiration:  Patient is able to breathe deeply    O2 Sat:  Patient is able to maintain O2 Sat >92% on room air    Activity:  Moves 4 extremities on command    Ambulation:  Patient is able to stand and walk or stand and pivot into wheelchair    Dressing:  Clean/dry or No Dressing    Notes:   Discharge instructions and AVS given to patient    Patient meets criteria for discharge?  YES    Admitted to PCU?  No    Responsible adult present to accompany patient home?  Yes    Signature/Title:    faustino joiner RN  RN Care Coordinator  [facility] [place]

## 2024-01-11 NOTE — PATIENT INSTRUCTIONS
[facility]   Procedure Discharge Instructions    Today you saw:  Dr. Issac Dexter       You had an:  Epidural steroid injection   -lumbar    Be cautious when walking. Numbness and/or weakness in the lower extremities may occur for up to 6-8 hours after the procedure due to effect of the local anesthetic  Do not drive for 6 hours. The effect of the local anesthetic could slow your reflexes.   You may resume your regular activities after 24 hours  Avoid strenuous activity for the first 24 hours  You may shower, however avoid swimming, tub baths or hot tubs for 24 hours following your procedure  You may have a mild to moderate increase in pain for several days following the injection.  It may take up to 14 days for the steroid medication to start working although you may feel the effect as early as a few days after the procedure.     You may use ice packs for 10-15 minutes, 3 to 4 times a day at the injection site for comfort  Do not use heat to painful areas for 6 to 8 hours. This will give the local anesthetic time to wear off and prevent you from accidentally burning your skin.   Unless you have been directed to avoid the use of anti-inflammatory medications (NSAIDS), you may use medications such as ibuprofen, Aleve or Tylenol for pain control if needed.   If you were fasting, you may resume your normal diet and medications after the procedure  If you have diabetes, check your blood sugar more frequently than usual as your blood sugar may be higher than normal for 10-14 days following a steroid injection. Contact your doctor who manages your diabetes if your blood sugar is higher than usual  Possible side effects of steroids that you may experience include flushing, elevated blood pressure, increased appetite, mild headaches and restlessness.  All of these symptoms will get better with time.  If you experience any of the following, call the Pain Clinic during work hours (Mon-Friday  8-4:30 pm) at [phone] or the Provider Line after hours at [phone]:  -Fever over 100 degree F  -Swelling, bleeding, redness, drainage, warmth at the injection site  -Progressive weakness or numbness in your legs or arms  -Loss of bowel or bladder function  -Unusual headache that is not relieved by Tylenol or other pain reliever  -Unusual new onset of pain that is not improving

## 2024-01-11 NOTE — TELEPHONE ENCOUNTER
Medication refill information reviewed.     Ms contin and norco last due:  Fill 12/10/23. Start 12/12/2023   Due date:  today      Prescriptions prepped for review.     Shefali RN-BSN  [facility]

## 2024-01-11 NOTE — TELEPHONE ENCOUNTER
Received call from patient requesting refill(s) of HYDROcodone-acetaminophen (NORCO) 5-325 MG tablet   And  morphine (MS CONTIN) 15 MG CR tablet      Last dispensed from pharmacy on 12/12/23 for both    Patient's last office/virtual visit by prescribing provider on 11/30/23  Next office/virtual appointment scheduled for 02/09/24    Last urine drug screen date 11/30/23  Current opioid agreement on file (completed within the last year) Yes Date of opioid agreement: 11/30/23    E-prescribe to pharmacy-[facility] [id number] [address] [place], [place] - [address]     Will route to nursing [place] for review and preparation of prescription(s).

## 2024-01-12 NOTE — TELEPHONE ENCOUNTER
Chart reviewed - Request appears appropriate. Refilled for 30 day supply.     Chanel Bruno DNP, APRN, AGNP-C  [facility]

## 2024-01-22 NOTE — PROGRESS NOTES
Pre procedure Diagnosis: Lumbar radiculopathy  Post procedure Diagnosis: Same  Procedure performed: L4-5 interlaminar epidural steroid injection   Anesthesia: none  Complications: nonw  Operators: Issac Tapia MD     Indications:   Per Etienne is a 58 year old male.  The patient has a history of lbp rad to his le.  Examination shows + slump.  he has tried conservative treatment including meds/pt.    MRI reviewed  Options/alternatives, benefits and risks were discussed with the patient including but not limited to bleeding, infection, no pain relief, tissue trauma, exposure to radiation, reaction to medications, spinal cord injury, dural puncture, weakness, numbness and headache.  Questions were answered to his satisfaction and he wishes to proceed. Voluntary informed consent was obtained and signed.     Vitals were reviewed: Yes  Allergies were reviewed:  Yes   Medications were reviewed:  Yes   Pre-procedure pain score: 7/10    Procedure:  The patient's medical history, medications, and allergies were reviewed and reconciled.  After obtaining signed informed consent, the patient was brought into the procedure suite and was placed in a prone position on the procedure table.   A Pause for the Cause was performed.  Patient was prepped and draped in the usual sterile fashion.     The L4-5 interspace was identified with AP fluoroscopy.  A total of 4ml of 1% lidocaine was used to anesthetize the skin and subcutaneous tissues for a  midline approach.    A 20gauge 3.5inch Touhy needle was advanced utilizing intermittent AP and Lateral fluoroscopy and air for loss of resistance.  The epidural space was encountered on the first pass without difficulties.  Aspiration for blood and CSF was negative.  Needle position was verified by injecting 1 ml of Omnipaque utilizing real-time fluoroscopy that showed good needle placement and epidural spread without signs of intravascular or intrathecal uptake.  Omnipaque wasted:   9 ml.    Then, after repeated negative aspiration for blood or CSF, a combination of Kenalog 40 mg, Bupivicaine 0.25% 2 ml, diluted with 3 ml of normal saline to a total injectate volume of 6 ml was injected into the epidural space in a slow and incremental fashion and the needle was restyletted and withdrawn.  All injected medications were preservative free.    The injection site was cleaned and a sterile dressing was applied.    The patient tolerated the procedure well without complications and was taken to the recovery room for continued observation.    Images were saved to PACS.    Post-procedure pain score: 7/10  Follow-up includes:   -f/u with referring provider     Issac Tapia MD  [facility] [place]

## 2024-02-01 ENCOUNTER — PATIENT OUTREACH (OUTPATIENT)
Dept: ONCOLOGY | Facility: CLINIC | Age: 59
End: 2024-02-01
Payer: COMMERCIAL

## 2024-02-01 NOTE — PROGRESS NOTES
[facility]: Cancer Care Note                                    Situation/Background: Patient's chart reviewed by care coordinator related to [employer].    Assessment: Patient continues to be followed by Oncology Clinic Care Coordination, with a status of enrolled.    Plan: Patient's chart updated to align with [employer] program for ongoing patient management.    Patient's upcoming appointment(s):    Future Appointments   Date Time Provider Department [place]   2/21/2024  3:30 PM AN LAB [facility]   2/29/2024 10:30 AM Chanel Bruno APRN CNP [facility]   2/29/2024  2:45 PM Ania Jensen APRN CNP [facility] [place]   2/29/2024  3:30 PM MG [address] INFUSION [facility] [place]     See Ordaz, RN, BSN, OCN  RN Care Coordinator - Oncology  [facility]

## 2024-02-10 ENCOUNTER — MYC REFILL (OUTPATIENT)
Dept: PALLIATIVE MEDICINE | Facility: CLINIC | Age: 59
End: 2024-02-10
Payer: COMMERCIAL

## 2024-02-10 ENCOUNTER — MYC REFILL (OUTPATIENT)
Dept: ONCOLOGY | Facility: CLINIC | Age: 59
End: 2024-02-10
Payer: COMMERCIAL

## 2024-02-10 DIAGNOSIS — C61 PROSTATE CANCER METASTATIC TO BONE (H): ICD-10-CM

## 2024-02-10 DIAGNOSIS — K21.9 GASTROESOPHAGEAL REFLUX DISEASE, UNSPECIFIED WHETHER ESOPHAGITIS PRESENT: ICD-10-CM

## 2024-02-10 DIAGNOSIS — G89.3 CANCER ASSOCIATED PAIN: ICD-10-CM

## 2024-02-10 DIAGNOSIS — C79.51 PROSTATE CANCER METASTATIC TO BONE (H): ICD-10-CM

## 2024-02-12 DIAGNOSIS — K59.03 THERAPEUTIC OPIOID INDUCED CONSTIPATION: ICD-10-CM

## 2024-02-12 DIAGNOSIS — C61 PROSTATE CANCER (H): Primary | ICD-10-CM

## 2024-02-12 DIAGNOSIS — T40.2X5A THERAPEUTIC OPIOID INDUCED CONSTIPATION: ICD-10-CM

## 2024-02-12 RX ORDER — OMEPRAZOLE 40 MG/1
40 CAPSULE, DELAYED RELEASE ORAL DAILY
Qty: 90 CAPSULE | Refills: 3 | Status: SHIPPED | OUTPATIENT
Start: 2024-02-12 | End: 2024-04-11

## 2024-02-12 RX ORDER — HYDROCODONE BITARTRATE AND ACETAMINOPHEN 5; 325 MG/1; MG/1
1 TABLET ORAL 3 TIMES DAILY PRN
Qty: 90 TABLET | Refills: 0 | Status: SHIPPED | OUTPATIENT
Start: 2024-02-12 | End: 2024-02-29

## 2024-02-12 RX ORDER — MORPHINE SULFATE 15 MG/1
15 TABLET, FILM COATED, EXTENDED RELEASE ORAL EVERY 12 HOURS
Qty: 60 TABLET | Refills: 0 | Status: SHIPPED | OUTPATIENT
Start: 2024-02-12 | End: 2024-02-29

## 2024-02-12 RX ORDER — SENNA AND DOCUSATE SODIUM 50; 8.6 MG/1; MG/1
1 TABLET, FILM COATED ORAL 2 TIMES DAILY
Qty: 60 TABLET | Refills: 3 | Status: SHIPPED | OUTPATIENT
Start: 2024-02-12 | End: 2024-04-11

## 2024-02-12 NOTE — TELEPHONE ENCOUNTER
Medication refill information reviewed.     Due date for morphine (MS CONTIN) 15 MG CR tablet,      HYDROcodone-acetaminophen (NORCO) 5-325 MG tablet is 2/12/2024     Prescriptions prepped for review.     Will route to provider.     Caitlin Miranda RN  [facility]  [phone]

## 2024-02-12 NOTE — TELEPHONE ENCOUNTER
[facility] - Refill Request    Situation/Background:                                                      Refill request received for: omeprazole (PRILOSEC) 40 MG DR capsule      Date of last refill, if known: Unknown    Assessment:                                                      Last office visit: 11/30/23    Future appointment scheduled? Yes: 2/29/24     Recent Lab Results and Vital Signs:    CBC RESULTS:   Recent Labs   Lab Test 11/29/23  1457   WBC 8.0   RBC 4.37*   HGB 13.7   HCT 40.0   MCV 92   MCH 31.4   MCHC 34.3   RDW 11.1         BP Readings from Last 4 Encounters:   01/11/24 (!) 144/86   11/30/23 133/84   11/30/23 133/84   11/30/23 (!) 147/99     Plan:                                                      Refill request sent to provider for review.    See Ordaz, RN, BSN, OCN  RN Care Coordinator - Oncology  [facility]

## 2024-02-12 NOTE — TELEPHONE ENCOUNTER
Received call from patient requesting refill(s)  morphine (MS CONTIN) 15 MG CR tablet,   Last dispensed from pharmacy on 01/12/2024    HYDROcodone-acetaminophen (NORCO) 5-325 MG tablet,  Last dispensed from pharmacy on 01/12/2024    Patient's last office/virtual visit by prescribing provider on 11/30/2023  Next office/virtual appointment scheduled for 02/29/2024    Last urine drug screen date 11/30/2023  Current opioid agreement on file (completed within the last year) Yes Date of opioid agreement: 11/30/2023    E-prescribe to      [facility] [id number] [place], [place] - [address]    Will route to nursing [place] for review and preparation of prescription(s).     Roseline Boyd MA  [facility] [place]

## 2024-02-12 NOTE — TELEPHONE ENCOUNTER
Refills have been requested for the following medications:         morphine (MS CONTIN) 15 MG CR tablet [Chanel Bruno]         HYDROcodone-acetaminophen (NORCO) 5-325 MG tablet [Chanel Bruno]     Preferred pharmacy: [facility] [id number] [place], [place] - [address]        Medication renewals requested in this message routed separately:

## 2024-02-12 NOTE — TELEPHONE ENCOUNTER
Received fax from pharmacy requesting refill(s) for     SENNA-docusate sodium (SENNA S) 8.6-50 MG tablet    Date last filled 01/08/2024  Patient's last office/virtual visit by prescribing provider on 11/30/2023  Next office/virtual appointment scheduled for 02/29/2024      Pharmacy:      [facility] [id number] [place] [address]    Will route for processing    Roseline Boyd MA  [facility] [place]

## 2024-02-13 NOTE — TELEPHONE ENCOUNTER
Chart reviewed - Request appears appropriate. Refilled for 30 day supply.     Chanel Bruno DNP, APRN, AGNP-C  [facility]

## 2024-02-19 DIAGNOSIS — C61 PROSTATE CANCER (H): Primary | ICD-10-CM

## 2024-02-21 ENCOUNTER — LAB (OUTPATIENT)
Dept: LAB | Facility: CLINIC | Age: 59
End: 2024-02-21
Payer: COMMERCIAL

## 2024-02-21 DIAGNOSIS — C61 PROSTATE CANCER (H): ICD-10-CM

## 2024-02-21 DIAGNOSIS — C79.51 PROSTATE CANCER METASTATIC TO BONE (H): ICD-10-CM

## 2024-02-21 DIAGNOSIS — C79.51 MALIGNANT NEOPLASM METASTATIC TO BONE (H): ICD-10-CM

## 2024-02-21 DIAGNOSIS — C61 PROSTATE CANCER METASTATIC TO BONE (H): ICD-10-CM

## 2024-02-21 DIAGNOSIS — R91.8 PULMONARY NODULES: ICD-10-CM

## 2024-02-21 LAB
BASOPHILS # BLD AUTO: 0 10E3/UL (ref 0–0.2)
BASOPHILS NFR BLD AUTO: 0 %
EOSINOPHIL # BLD AUTO: 0.1 10E3/UL (ref 0–0.7)
EOSINOPHIL NFR BLD AUTO: 2 %
ERYTHROCYTE [DISTWIDTH] IN BLOOD BY AUTOMATED COUNT: 11.5 % (ref 10–15)
HCT VFR BLD AUTO: 40.2 % (ref 40–53)
HGB BLD-MCNC: 13.6 G/DL (ref 13.3–17.7)
IMM GRANULOCYTES # BLD: 0 10E3/UL
IMM GRANULOCYTES NFR BLD: 0 %
LYMPHOCYTES # BLD AUTO: 2.3 10E3/UL (ref 0.8–5.3)
LYMPHOCYTES NFR BLD AUTO: 29 %
MCH RBC QN AUTO: 31.3 PG (ref 26.5–33)
MCHC RBC AUTO-ENTMCNC: 33.8 G/DL (ref 31.5–36.5)
MCV RBC AUTO: 93 FL (ref 78–100)
MONOCYTES # BLD AUTO: 0.7 10E3/UL (ref 0–1.3)
MONOCYTES NFR BLD AUTO: 8 %
NEUTROPHILS # BLD AUTO: 4.8 10E3/UL (ref 1.6–8.3)
NEUTROPHILS NFR BLD AUTO: 60 %
PLATELET # BLD AUTO: 240 10E3/UL (ref 150–450)
RBC # BLD AUTO: 4.34 10E6/UL (ref 4.4–5.9)
WBC # BLD AUTO: 8 10E3/UL (ref 4–11)

## 2024-02-21 PROCEDURE — 85025 COMPLETE CBC W/AUTO DIFF WBC: CPT

## 2024-02-21 PROCEDURE — 84153 ASSAY OF PSA TOTAL: CPT

## 2024-02-21 PROCEDURE — 80053 COMPREHEN METABOLIC PANEL: CPT

## 2024-02-21 PROCEDURE — 36415 COLL VENOUS BLD VENIPUNCTURE: CPT

## 2024-02-21 PROCEDURE — 84403 ASSAY OF TOTAL TESTOSTERONE: CPT

## 2024-02-22 LAB
ALBUMIN SERPL BCG-MCNC: 4.2 G/DL (ref 3.5–5.2)
ALP SERPL-CCNC: 84 U/L (ref 40–150)
ALT SERPL W P-5'-P-CCNC: 31 U/L (ref 0–70)
ANION GAP SERPL CALCULATED.3IONS-SCNC: 11 MMOL/L (ref 7–15)
AST SERPL W P-5'-P-CCNC: 23 U/L (ref 0–45)
BILIRUB SERPL-MCNC: 0.2 MG/DL
BUN SERPL-MCNC: 16.5 MG/DL (ref 8–23)
CALCIUM SERPL-MCNC: 9.5 MG/DL (ref 8.6–10)
CHLORIDE SERPL-SCNC: 106 MMOL/L (ref 98–107)
CREAT SERPL-MCNC: 1.05 MG/DL (ref 0.67–1.17)
DEPRECATED HCO3 PLAS-SCNC: 28 MMOL/L (ref 22–29)
EGFRCR SERPLBLD CKD-EPI 2021: 82 ML/MIN/1.73M2
GLUCOSE SERPL-MCNC: 112 MG/DL (ref 70–99)
POTASSIUM SERPL-SCNC: 3.8 MMOL/L (ref 3.4–5.3)
PROT SERPL-MCNC: 6.6 G/DL (ref 6.4–8.3)
PSA SERPL DL<=0.01 NG/ML-MCNC: <0.01 NG/ML (ref 0–3.5)
SODIUM SERPL-SCNC: 145 MMOL/L (ref 135–145)

## 2024-02-23 LAB — TESTOST SERPL-MCNC: 3 NG/DL (ref 240–950)

## 2024-02-23 ASSESSMENT — PAIN SCALES - PAIN ENJOYMENT GENERAL ACTIVITY SCALE (PEG)
PEG_TOTALSCORE: 6.33
INTERFERED_ENJOYMENT_LIFE: 6
AVG_PAIN_PASTWEEK: 7
INTERFERED_GENERAL_ACTIVITY: 6

## 2024-02-29 ENCOUNTER — INFUSION THERAPY VISIT (OUTPATIENT)
Dept: INFUSION THERAPY | Facility: CLINIC | Age: 59
End: 2024-02-29
Attending: NURSE PRACTITIONER
Payer: COMMERCIAL

## 2024-02-29 ENCOUNTER — OFFICE VISIT (OUTPATIENT)
Dept: PALLIATIVE MEDICINE | Facility: CLINIC | Age: 59
End: 2024-02-29
Payer: COMMERCIAL

## 2024-02-29 ENCOUNTER — ONCOLOGY VISIT (OUTPATIENT)
Dept: ONCOLOGY | Facility: CLINIC | Age: 59
End: 2024-02-29
Attending: NURSE PRACTITIONER
Payer: COMMERCIAL

## 2024-02-29 VITALS
HEART RATE: 73 BPM | SYSTOLIC BLOOD PRESSURE: 151 MMHG | BODY MASS INDEX: 30.29 KG/M2 | WEIGHT: 193 LBS | DIASTOLIC BLOOD PRESSURE: 83 MMHG | OXYGEN SATURATION: 97 % | HEIGHT: 67 IN | RESPIRATION RATE: 18 BRPM

## 2024-02-29 VITALS — HEART RATE: 74 BPM | SYSTOLIC BLOOD PRESSURE: 156 MMHG | DIASTOLIC BLOOD PRESSURE: 92 MMHG

## 2024-02-29 DIAGNOSIS — C61 PROSTATE CANCER METASTATIC TO BONE (H): Primary | ICD-10-CM

## 2024-02-29 DIAGNOSIS — C61 PROSTATE CANCER (H): ICD-10-CM

## 2024-02-29 DIAGNOSIS — C79.51 PROSTATE CANCER METASTATIC TO BONE (H): ICD-10-CM

## 2024-02-29 DIAGNOSIS — C61 PROSTATE CANCER METASTATIC TO BONE (H): ICD-10-CM

## 2024-02-29 DIAGNOSIS — F11.90 CHRONIC, CONTINUOUS USE OF OPIOIDS: ICD-10-CM

## 2024-02-29 DIAGNOSIS — G89.3 CANCER ASSOCIATED PAIN: ICD-10-CM

## 2024-02-29 DIAGNOSIS — M54.16 LUMBAR RADICULOPATHY: Primary | ICD-10-CM

## 2024-02-29 DIAGNOSIS — T40.2X5A THERAPEUTIC OPIOID INDUCED CONSTIPATION: ICD-10-CM

## 2024-02-29 DIAGNOSIS — C79.51 PROSTATE CANCER METASTATIC TO BONE (H): Primary | ICD-10-CM

## 2024-02-29 DIAGNOSIS — R10.31 RLQ ABDOMINAL PAIN: Primary | ICD-10-CM

## 2024-02-29 DIAGNOSIS — K59.03 THERAPEUTIC OPIOID INDUCED CONSTIPATION: ICD-10-CM

## 2024-02-29 DIAGNOSIS — C79.51 MALIGNANT NEOPLASM METASTATIC TO BONE (H): ICD-10-CM

## 2024-02-29 PROCEDURE — 99207 PR NO CHARGE LOS: CPT

## 2024-02-29 PROCEDURE — 99214 OFFICE O/P EST MOD 30 MIN: CPT | Mod: 25 | Performed by: NURSE PRACTITIONER

## 2024-02-29 PROCEDURE — 96372 THER/PROPH/DIAG INJ SC/IM: CPT | Performed by: INTERNAL MEDICINE

## 2024-02-29 PROCEDURE — 250N000011 HC RX IP 250 OP 636: Mod: JZ | Performed by: INTERNAL MEDICINE

## 2024-02-29 PROCEDURE — 99214 OFFICE O/P EST MOD 30 MIN: CPT

## 2024-02-29 PROCEDURE — 96402 CHEMO HORMON ANTINEOPL SQ/IM: CPT

## 2024-02-29 PROCEDURE — 99215 OFFICE O/P EST HI 40 MIN: CPT | Performed by: NURSE PRACTITIONER

## 2024-02-29 RX ORDER — HYDROCODONE BITARTRATE AND ACETAMINOPHEN 5; 325 MG/1; MG/1
1 TABLET ORAL 3 TIMES DAILY PRN
Qty: 90 TABLET | Refills: 0 | Status: SHIPPED | OUTPATIENT
Start: 2024-02-29 | End: 2024-04-11

## 2024-02-29 RX ORDER — MORPHINE SULFATE 15 MG/1
15 TABLET, FILM COATED, EXTENDED RELEASE ORAL EVERY 12 HOURS
Qty: 60 TABLET | Refills: 0 | Status: SHIPPED | OUTPATIENT
Start: 2024-02-29 | End: 2024-04-11

## 2024-02-29 RX ORDER — POLYETHYLENE GLYCOL 3350 17 G/17G
1 POWDER, FOR SOLUTION ORAL 2 TIMES DAILY PRN
Qty: 578 G | Refills: 1 | Status: SHIPPED | OUTPATIENT
Start: 2024-02-29

## 2024-02-29 RX ADMIN — DENOSUMAB 120 MG: 120 INJECTION SUBCUTANEOUS at 15:31

## 2024-02-29 RX ADMIN — LEUPROLIDE ACETATE 22.5 MG: KIT at 15:30

## 2024-02-29 NOTE — PROGRESS NOTES
Infusion Nursing Note:  Per Etienne presents today for Lupron and Xgeva.    Patient seen by provider today: Yes: Aina TREVIZO    present during visit today: Not Applicable.    Note: N/A.      Intravenous Access:  No Intravenous access/labs at this visit.    Treatment Conditions:  Not Applicable.      Post Infusion Assessment:  Patient tolerated injection without incident.  Site patent and intact, free from redness, edema or discomfort.       Discharge Plan:   Departure Mode: Ambulatory.      Lesia Segundo MA

## 2024-02-29 NOTE — PROGRESS NOTES
[facility]     Date of visit: 2/29/2024      Assessment:   Per Etienne is a 58 year old male with a past medical history significant for prostate cancer with bone metastasis, pulmonary nodules, lumbar stenosis, HTN, gout who presents with complaints of cancer associated pain.      1. Low back pain with LLE radic, neuropathy bilat feet, pain of multiple joints - His back pain with left sided radicular pain with L5 distribution that has been present for many years. He has been referred for procedure by his PCP for repeat L4/5 LUIS (Regina/Obed), which historically have given him significant symptom relief, although he did not appreciate as much benefit from the most recent LUIS on 8/2/22. Lumbar CT from 8/2021 demonstrated severe left neural foraminal narrowing at L4-L5 and moderate to severe right at L3-4, additional multilevel spinal canal stenosis and neural foraminal stenosis noted. I suspect his low back pain is associated with multiple factors, including underlying degenerative changes of lumbar spine, bone metastasis, and likely some myofascial component. Neuropathic symptoms in feet and generalized joint pain is likely secondary effects from cancer treatment and bone metastasis.   2. Mental Health - the patient's mental health concerns, specifically prostate cancer with bone mets, affect his experience of pain and contribute to his clinically significant distress. He may benefit from more generalized health psychology to support chronic illness/cancer management and will consider discussing at future follow up.      Assigned to [place] nursing team.     Visit Diagnoses:  1. Lumbar radiculopathy    2. Cancer associated pain    3. Prostate cancer metastatic to bone (H)    4. Therapeutic opioid induced constipation    5. Chronic, continuous use of opioids        Plan:  Diagnosis reviewed, treatment option addressed, and risk/benifits discussed.  Self-care instructions given.  I am  recommending a multidisciplinary treatment plan to help this patient better manage their pain.                  1.  Pain Physical Therapy:  WALT Thompson is very active. Despite the pain, he continues to work full time as a , which is somewhat labor intensive (frequently using ladder).               2.  Pain Psychologist to address relaxation, behavioral change, coping style, and other factors important to improvement.  WALT Thompson may possibly benefit from support for his chronic health conditions overall, though he does seem to manage fairly well. He continues to work full time and likes to stay active.               3.  Diagnostic Studies:  None               4.  Medication Management:   Continue MS Contin to 15 mg every 12 hours. He continues to appreciate benefit, stable baseline pain, supports functioning, continues to work full time. Refilled today for #60 tabs.   Continue hydrocodone 5-325 three times daily as needed for breakthrough pain. He does not use very frequently, usually only takes 1 tab daily as needed. He continues to appreciate benefit for breakthrough pain, no concerns for continuation. Refilled today for #90 tabs to last 30 days.   Continue Senna S to 1 tab twice daily. He reports improvement with OIC, with increased frequency to twice daily. He reports intermittent constipation every 2-3 weeks that lasts for about 2 days. He usually increases fiber intake (salads) and this helps. Recommend using Miralax 1-2 times daily as needed, if current regimen not effective. Prescription sent into pharmacy today, advised he may purchase over the counter if insurance does not cover medication.   CSA/UDS - completed today. Naloxone refill sent into pharmacy today. Advised to  new supply and dispose of old supply at home.               5.  Potential procedures: He had repeat L4-5 LUIS on 1/11/24. With prior injections, has appreciated improvement in pain around 75% pain relief that lasts  around 3 months.               7.  Follow up with TEREZA Sanchez CNP in 3 months before 5/29/24      Review of Electronic Chart: Today I have also reviewed available medical information in the patient's medical record at [facility] ([facility]) and Care Everywhere (if available), including relevant provider notes, laboratory work, and imaging.     Chanel Bruno DNP, TEREZA, AGNP-C  [facility]     -------------------------------------------------    Subjective:    Chief complaint:   Chief Complaint   Patient presents with    Pain       Interval history:  Per Etienne is a 58 year old male last seen on 11/30/23.  They are a patient of mine seen in follow up.     Recommendations/plan at the last visit included:              1.  Pain Physical Therapy:  WALT Thompson is very active. Despite the pain, he continues to work full time as a , which is somewhat labor intensive (frequently using ladder).               2.  Pain Psychologist to address relaxation, behavioral change, coping style, and other factors important to improvement.  WALT Thompson may possibly benefit from support for his chronic health conditions overall, though he does seem to manage fairly well. He continues to work full time and likes to stay active.               3.  Diagnostic Studies:  None               4.  Medication Management:   Continue MS Contin to 15 mg every 12 hours. He continues to appreciate benefit, stable baseline pain, supports functioning, continues to work full time. Refilled today for #60 tabs.   Continue hydrocodone 5-325 three times daily as needed for breakthrough pain. He does not use very frequently, usually only takes 1 tab daily as needed. He continues to appreciate benefit for breakthrough pain, no concerns for continuation. Refilled today for #90 tabs to last 30 days.   Continue Senna S to 1 tab twice daily. He reports improvement with OIC, with increased frequency to twice daily.  He reports intermittent constipation every 2-3 weeks that lasts for about 2 days. He usually increases fiber intake (salads) and this helps. Recommend using Miralax 1-2 times daily as needed, if current regimen not effective, advised he can purchase OTC.   CSA/UDS - completed today. Naloxone refill sent into pharmacy today. Advised to  new supply and dispose of old supply at home.               5.  Potential procedures: He had repeat L4-5 LUIS on 6-29-23 and has appreciated improvement in pain, reports at least 75% pain relief that lasts around 3 months. He reports recurrence of intermittent radicular leg symptoms about 2 months ago, primarily affecting knee and ankle at night. Recommend repeat LESI. Orders placed to schedule. Advised we can discuss planning strategies for future repeat injections in future. Will consider imaging/further workup, should intermittent leg symptoms fail to improve with repeating L4-5 LUIS.               7.  Follow up with TEREZA Sanchez CNP in 3 months.     Since his last visit, Per Etienne reports:  -His pain is the same as last visit, overall stable.   -He has not been able to go ice fishing this season due to unseasonable winter.   -He has oncology follow up today, overall stable.   -He continues to take MS Contin and hydrocodone PRN, appreciates benefit and no side effects.   -Previously reported concerns for OIC, takes Senna BID and miralax 1-2 BID.   -He is planning to retire in 2 years.     Pain Information:   Pain rating: averages 7/10 on a 0-10 scale.      Interval history from last visit on 11/30/23:  -His pain overall is about the same as last visit.   -He reports new onset of intermittent left knee and ankle pain overnight, describes pain as sharp.   -He has to get up and walk around his house, then pain will subside and he goes back to bad.   -He is planning to retire in the next 2 years.   -He continues to take MS contin and hydrocodone PRN. He continues to  appreciate benefit with medications. Allow him to remain active and working.   -He continues to struggle with some OIC, states he goes through cycles where he is good for a couple of weeks but then gets constipated for a couple of days. He takes Senna S BID.  -He will speak with pharmacist if he needs to get miralax if senna s is not enough to help with OIC.  Pain Information:              Pain rating: averages 4-5/10 on a 0-10 scale.        Interval history from last visit on 8/30/23:  -He had repeat L4-5 LUIS on 6/29/23, notes this has been helpful for leg pain.   -He continues to appreciate good benefit with MS contin and hydrocodone.  -He uses 2-3 tabs of Norco per day, most days 3 tabs.   -He is able to continue working and being active.   -His OIC has improved, continues Senna S 1 BID.   -No side effects otherwise.   -He notes increased bruising, has follow up with PCP tomorrow, will plan to discuss with him tomorrow.   -He and wife are celebrating 30th anniversary on 10/8.  -He is excited for upcoming trip to [place] to see [place], he and wife are going overnight.   Pain Information:              Pain rating: averages 7/10 on a 0-10 scale.        Interval history from last visit on 6/7/23:  -His pain is a little higher than it was at last visit.   -He states pain was worse this morning, notes he was more active yesterday.   -Pain in LLE seems to be returning over the last month.   -He has felt some fatigue/weakness in LLE, thinks this is more so pain related.   -He's had LESI in the past that have been very helpful for leg pain, recent LUIS on 12/1/22 was not as helpful as prior injections.   -He is open to repeat LESI, will monitor for benefit after and follow up with me sooner if not as helpful.   -He continues MS Contin 15 mg BID, hydrocodone 5-325 mg TID PRN.   -He appreciates good benefit with meds, no side effects.   -He is excited to find out if [employer] gets the [employer] bid, will find out on  the 21st.   -He has been fishing a lot.   -His grandson, Juwan, is 7.5 months now, standing now.   Pain Information:              Pain rating: averages 7/10 on a 0-10 scale.        Interval history from last visit on 3/29/23:  -his pain is about the same as it was at last visit.   -He has good days and bad days.   -He thinks pain is consistent.   -He continues MS contin 15 mg BID, hydrocodone 5 TID PRN.   -He reports medications are working well, stable at this time.   -He was dealing with OIC, increased senna to BID at last visit, constipation improved.   -No other side effects from medications.   -He had reached out in between visits for a reasonable early refill due to travels.   -He went to [place] recently, had a great trip.   -They drove down (his wife/family).  Pain Information:              Pain rating: averages 4/10 on a 0-10 scale.        Interval history from last visit on 1/18/23:  -His pain is about the same as it was at last visit.   -He had LESI with Dr. Tapia on 12/1/22.  -He does not think it was as helpful as prior LESI.   -He is taking MS Contin 15 mg BID, has PRN Norco for breakthrough pain (does not use often).  -He has needed to use hydrocodone a bit more since last visit.   -He thinks pain is reasonably well controlled right now.   -He is taking Senna S daily at bedtime, has BM every other day, sometimes harder stool.   -He is open to increasing to BID frequency.   -His boss verbalized concerns about driving with medications, patient does not share similar concerns, denies side effects.   -He is waiting to hear about [employer] next year, will be involved with the project if [employer] wins.   -He is going to consider retiring sometime in the next year.         Interval history from last visit on 11/3/22:  -He had shingles since last visit  -Pain has been increased  -He is also been experiencing constipation after increasing MS Contin 15mg to BID dosing.   -His grandson, Juwan Del Castillo,  was born on 10/20/22.   -He will be getting shingles injection soon.  -He has been able to be more acvtive with long acting opioid  -[place] 2 miles per day, lost 8 pounds, able to do job better now that pain is under better control.            Current Pain Treatments:     Medications:                  MS Contin 15 mg BID              Hydrocodone 5-325 mg TID PRN      Other:                  LESI on 12/1/22 with Dr. Tapia     Current MME: 44     Review of [place] Prescription Monitoring Program (): No concern for abuse or misuse of controlled medications based on this report. Reviewed - appears appropriate.      Annual Controlled Substance Agreement/UDS due date: Completed on 11/30/23.      Past pain treatments:  LESI - helpful       Medications:  Current Outpatient Medications   Medication Sig Dispense Refill    apalutamide (ERLEADA) 60 MG tablet Take 4 tablets (240 mg) by mouth daily for 30 days 120 tablet 0    denosumab (XGEVA) 120 MG/1.7ML SOLN injection Inject 1.7 mLs (120 mg) Subcutaneous every 3 months 1.7 mL 0    HYDROcodone-acetaminophen (NORCO) 5-325 MG tablet Take 1 tablet by mouth 3 times daily as needed for breakthrough pain or severe pain Fill 3/13/24, start 3/15/24 90 tablet 0    leuprolide (ELIGARD) 45 MG kit Inject 45 mg Subcutaneous every 6 months      morphine (MS CONTIN) 15 MG CR tablet Take 1 tablet (15 mg) by mouth every 12 hours Fill 3/13/24, start 3/15/24 60 tablet 0    naloxone (NARCAN) 4 MG/0.1ML nasal spray Spray 1 spray (4 mg) into one nostril alternating nostrils as needed for opioid reversal every 2-3 minutes until assistance arrives 0.2 mL 0    omeprazole (PRILOSEC) 40 MG DR capsule Take 1 capsule (40 mg) by mouth daily at least 30 min before breakfast 90 capsule 3    polyethylene glycol (MIRALAX) 17 GM/Dose powder Take 17 g (1 Capful) by mouth 2 times daily as needed for constipation 578 g 1    SENNA-docusate sodium (SENNA S) 8.6-50 MG tablet Take 1 tablet by mouth 2 times  daily 60 tablet 3    UNABLE TO FIND 1 tablet daily MEDICATION NAME: C, D, Zinc combination supplement      aspirin (ASA) 81 MG chewable tablet Take 1 tablet (81 mg) by mouth daily         Medical History: any changes in medical history since they were last seen? No      Objective:    Physical Exam:  Blood pressure (!) 156/92, pulse 74.  Constitutional: Well developed, well nourished, appears stated age.  Gait: Intact   HEENT: Head atraumatic, normocephalic. Eyes without conjunctival injection or jaundice. Oropharynx clear. Neck supple. No obvious neck masses.  Skin: No rash, lesions, or petechiae of exposed skin.   Psychiatric/mental status: Alert, without lethargy or stupor. Speech fluent. Appropriate affect. Mood normal. Able to follow commands without difficulty.     Diagnostic Tests/Imaging/Labs:  CMP 2/21/24 - hepatic and renal function intact, GFR 82    BILLING TIME DOCUMENTATION:   The total TIME spent on this patient on the date of the encounter/appointment was 31 minutes.      TOTAL TIME includes:   Time spent preparing to see the patient (reviewing records and tests)   Time spent face to face (or over the phone) with the patient   Time spent ordering tests, medications, procedures and referrals   Time spent Referring and communicating with other healthcare professionals   Time spent documenting clinical information in Epic

## 2024-02-29 NOTE — PATIENT INSTRUCTIONS
1.  Pain Physical Therapy:  WALT Thompson is very active. Despite the pain, he continues to work full time as a , which is somewhat labor intensive (frequently using ladder).               2.  Pain Psychologist to address relaxation, behavioral change, coping style, and other factors important to improvement.  WALT Thompson may possibly benefit from support for his chronic health conditions overall, though he does seem to manage fairly well. He continues to work full time and likes to stay active.               3.  Diagnostic Studies:  None               4.  Medication Management:   Continue MS Contin to 15 mg every 12 hours. He continues to appreciate benefit, stable baseline pain, supports functioning, continues to work full time. Refilled today for #60 tabs.   Continue hydrocodone 5-325 three times daily as needed for breakthrough pain. He does not use very frequently, usually only takes 1 tab daily as needed. He continues to appreciate benefit for breakthrough pain, no concerns for continuation. Refilled today for #90 tabs to last 30 days.   Continue Senna S to 1 tab twice daily. He reports improvement with OIC, with increased frequency to twice daily. He reports intermittent constipation every 2-3 weeks that lasts for about 2 days. He usually increases fiber intake (salads) and this helps. Recommend using Miralax 1-2 times daily as needed, if current regimen not effective. Prescription sent into pharmacy today, advised he may purchase over the counter if insurance does not cover medication.   CSA/UDS - completed today. Naloxone refill sent into pharmacy today. Advised to  new supply and dispose of old supply at home.               5.  Potential procedures: He had repeat L4-5 LUIS on 1/11/24. With prior injections, has appreciated improvement in pain around 75% pain relief that lasts around 3 months.               7.  Follow up with TEREZA Sanchez CNP in 3 months before  5/29/24    ----------------------------------------------------------------  Clinic Number:  [phone]   Call with any questions about your care and for scheduling assistance.   Calls are returned Monday through Friday between 8 AM and 4:30 PM. We usually get back to you within 2 business days depending on the issue/request.    If we are prescribing your medications:  For opioid medication refills, call the clinic or send a [employer] message 7 days in advance.  Please include:  Name of requested medication  Name of the pharmacy.  For non-opioid medications, call your pharmacy directly to request a refill. Please allow 3-4 days to be processed.   Per [place] State Law:  All controlled substance prescriptions must be filled within 30 days of being written.    For those controlled substances allowing refills, pickup must occur within 30 days of last fill.      We believe regular attendance is key to your success in our program!    Any time you are unable to keep your appointment we ask that you call us at least 24 hours in advance to cancel.This will allow us to offer the appointment time to another patient.   Multiple missed appointments may lead to dismissal from the clinic.

## 2024-02-29 NOTE — LETTER
2/29/2024         RE: Per Etienne  [address] [facility] [id number]        Dear Colleague,    Thank you for referring your patient, Per Etienne, to the [facility]. Please see a copy of my visit note below.    Oncology Follow Up Visit: February 29, 2024     Oncologist: Dr Scott Garcia  PCP: Per Chen    Diagnosis: Metastatic hormone sensitive prostate cancer  Per Etienne is a 57 yo male who presented to his PCP in December 2019 with slow urinary stream.  PSA = 124. On 1/8/2020 CT abdomen pelvis showed groundglass opacity in the left lower lobe,  dystrophic calcification of the prostate gland, and a fatty liver.  Same-day bone scan was negative. On January 10, 2010 prostate biopsy showed on the left Robyn score 4+3 involving 7 cores and on the right Robyn score 3+4 involving 6 cores, with positive perineural invasion.  He proceeded to undergo RRP by Dr. Colmenares on 2/24/2020.  Pathology revealed acinar adenocarcinoma [place] 4+3 with focal ROBERTO, bladder neck base invasion, positive SVI, positive PNI, and multiple positive margins.  There was no LVSI and 0 out of 3 lymph nodes removed were involved with tumor; pT3bN0.  Treatment:   4/20/2020 received Eligard.     5/21/2020 MRI of the Prostate= 14 x 9 mm T2 intermediate structure in the prostatectomy bed at the left aspect of urinary bladder neck with restricted diffusion in the DWI images and early enhancement in the contrast  enhanced images. This was concerning for locally recurrent/residual prostatic cancer.  There was a T2 hypointense structure in the in the area of removed right seminal vesicle measuring 13 x 11 mm  demonstrating restricted  diffusion in the DWI images. This may represent a recurrent/residual tumor. There were 2 bone metastases in the left pubic inferior ramus and inferior aspect of left pubic body.There is a 7 mm suspicious lymph nodes in the right obturator  area  corresponding to to FACBC avid lymph node on the same date PET/CT  5/20/2020 PET/CT showed:  1.  2 foci of increased uptake in the left ischium, with underlying  sclerotic lesion, and symphysis pubis, these changes are indicative of  active prostate cancer metastasis.  2. Low-level increased uptake in 3 lymphnodes along the right external  and internal iliac nodes, the most suspicious of which is the deep  obturator.  3. Low-level increased uptake in the prostatectomy bed slightly to the  left side of the urethra, if clinically necessary, endoscopy would  would be necessary for confirmation of this being metastasis.  PSA was down to 27.7 on 4/21/2020.   4/20/2020 he received an Eligard injection.    6/12/2020 started Apalutamide(Erleada)  Bone scan 1/22/2021: no bone mets.    Interval History: Mr. Etienne is seen alone today with for review for continuation of Apalutamide / Leuprolide/ Xgeva. Admits to lower right abdominal pain intermittently over last month- this is new - no bowel changes, no blood in stools. Uses 2 stools softeners daily since he continues on Oxycontin for chronic back pain( chipped lumbar)- pain is well controlled with help of pain team. He continues work as  and is able to continue with work with the RLQ pain.   Had colonoscopy in 5/2020 with suggested return in 5 years due to polyps found.   Eating normal diet. Passing gas well. No new bone pains. Bladder without changes.   Rest of comprehensive and complete ROS is reviewed and is negative.   Past Medical History:   Diagnosis Date     Gout      Hypertension goal BP (blood pressure) < 140/90 08/01/2022     Lumbar stenosis      Prostate cancer (H) 01/10/2020     Current Outpatient Medications   Medication     apalutamide (ERLEADA) 60 MG tablet     denosumab (XGEVA) 120 MG/1.7ML SOLN injection     HYDROcodone-acetaminophen (NORCO) 5-325 MG tablet     leuprolide (ELIGARD) 45 MG kit     morphine (MS CONTIN) 15 MG CR tablet      "naloxone (NARCAN) 4 MG/0.1ML nasal spray     omeprazole (PRILOSEC) 40 MG DR capsule     polyethylene glycol (MIRALAX) 17 GM/Dose powder     SENNA-docusate sodium (SENNA S) 8.6-50 MG tablet     UNABLE TO FIND     aspirin (ASA) 81 MG chewable tablet     No current facility-administered medications for this visit.     Facility-Administered Medications Ordered in Other Visits   Medication     denosumab (XGEVA) injection 120 mg     leuprolide (LUPRON DEPOT) kit 22.5 mg     No Known Allergies    Physical Exam:BP (!) 151/83   Pulse 73   Resp 18   Ht 1.702 m (5' 7\")   Wt 87.5 kg (193 lb)   SpO2 97%   BMI 30.23 kg/m    Constitutional: Alert, cooperative and in no distress.   ENT: Eyes bright, No mouth sores  Neck: Supple, No adenopathy.  Cardiac: Heart rate and rhythm is regular with normal S1 and S2 without murmur  Respiratory: Breathing easy. Lung sounds clear to auscultation  Abdomen: Soft, Tender to palpation to RLQ- no masses noted. BS normal. No masses or organomegaly  MS: Muscle tone normal- moving well on own without signs of pain, extremities normal with no edema.   Skin: No suspicious lesions or rashes  Neuro: Sensory grossly WNL, gait normal.   Lymph: Normal ant/post cervical, axillary, supraclavicular nodes  Psych: Mentation appears normal and affect normal/bright with good conversation.    Laboratory Results:    Latest Reference Range & Units 01/31/23 15:19   Sodium 133 - 144 mmol/L 143   Potassium 3.4 - 5.3 mmol/L 4.3   Chloride 94 - 109 mmol/L 109   Carbon Dioxide 20 - 32 mmol/L 30   Urea Nitrogen 7 - 30 mg/dL 17   Creatinine 0.66 - 1.25 mg/dL 1.00   GFR Estimate >60 mL/min/1.73m2 87   Calcium 8.5 - 10.1 mg/dL 9.2   Anion Gap 3 - 14 mmol/L 4   Albumin 3.4 - 5.0 g/dL 3.9   Protein Total 6.8 - 8.8 g/dL 7.0   Alkaline Phosphatase 40 - 150 U/L 67   ALT 0 - 70 U/L 35   AST 0 - 45 U/L 20   Bilirubin Total 0.2 - 1.3 mg/dL 0.3   PSA 0.00 - 4.00 ug/L <0.01   Testosterone Total 240 - 950 ng/dL 5 (L)   Glucose 70 - " 99 mg/dL 110 (H)   WBC 4.0 - 11.0 10e3/uL 6.3   Hemoglobin 13.3 - 17.7 g/dL 13.6   Hematocrit 40.0 - 53.0 % 40.3   Platelet Count 150 - 450 10e3/uL 246   RBC Count 4.40 - 5.90 10e6/uL 4.23 (L)   MCV 78 - 100 fL 95   MCH 26.5 - 33.0 pg 32.2   MCHC 31.5 - 36.5 g/dL 33.7   RDW 10.0 - 15.0 % 11.7   % Neutrophils % 56   % Lymphocytes % 30   % Monocytes % 11   % Eosinophils % 1   % Basophils % 1   Absolute Basophils 0.0 - 0.2 10e3/uL 0.0   Absolute Eosinophils 0.0 - 0.7 10e3/uL 0.1   Absolute Lymphocytes 0.8 - 5.3 10e3/uL 1.9   Absolute Monocytes 0.0 - 1.3 10e3/uL 0.7   Absolute Neutrophils 1.6 - 8.3 10e3/uL 3.5   (L): Data is abnormally low  (H): Data is abnormally high    Assessment and Plan:   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   Review, labs and exam showing good control of cancer.  PSA is 0.01 and testosterone equals 3  We will continue with apalutamide 240 mg daily p.o. and should receive Lupron and Xgeva will be given today-To be repeated every 3 months.   He will return in 3 months with labs(CBC differential, CMP, total testosterone level and PSA ), visit and Lupron/Xgeva administration  New RLQ pain-patient is already on a pain plan with opioids through palliative care for lumbar pain so concerning that he says he has new right lower quadrant pain that is intermittent and reproducible on exam.  He did get a colonoscopy with polyps noted in 2020-was to repeat in 5 years.  Will suggest a CT of the abdomen and pelvis and follow-up with results.  Lung cancer screening/ Pulmonary nodule- has 30 pack year smoking history-  quit in 2012.  Last imaging was 2/9/2023 with low-dose CT of the chest which was negative.  The total time of this encounter amounted to 40 minutes. This time included face to face time spent with the patient and wife, prep work, ordering tests, and performing post visit  documentation.  Ania Jensen Cnp        Again, thank you for allowing me to participate in the care of your patient.        Sincerely,        Ania Jensen, NP, APRN CNP

## 2024-02-29 NOTE — PROGRESS NOTES
Oncology Follow Up Visit: February 29, 2024     Oncologist: Dr Scott Garcia  PCP: Per Chen    Diagnosis: Metastatic hormone sensitive prostate cancer  Per Etienne is a 59 yo male who presented to his PCP in December 2019 with slow urinary stream.  PSA = 124. On 1/8/2020 CT abdomen pelvis showed groundglass opacity in the left lower lobe,  dystrophic calcification of the prostate gland, and a fatty liver.  Same-day bone scan was negative. On January 10, 2010 prostate biopsy showed on the left Robyn score 4+3 involving 7 cores and on the right [place] score 3+4 involving 6 cores, with positive perineural invasion.  He proceeded to undergo RRP by Dr. Colmenares on 2/24/2020.  Pathology revealed acinar adenocarcinoma [place] 4+3 with focal ROBERTO, bladder neck base invasion, positive SVI, positive PNI, and multiple positive margins.  There was no LVSI and 0 out of 3 lymph nodes removed were involved with tumor; pT3bN0.  Treatment:   4/20/2020 received Eligard.     5/21/2020 MRI of the Prostate= 14 x 9 mm T2 intermediate structure in the prostatectomy bed at the left aspect of urinary bladder neck with restricted diffusion in the DWI images and early enhancement in the contrast  enhanced images. This was concerning for locally recurrent/residual prostatic cancer.  There was a T2 hypointense structure in the in the area of removed right seminal vesicle measuring 13 x 11 mm  demonstrating restricted  diffusion in the DWI images. This may represent a recurrent/residual tumor. There were 2 bone metastases in the left pubic inferior ramus and inferior aspect of left pubic body.There is a 7 mm suspicious lymph nodes in the right obturator area  corresponding to to FACBC avid lymph node on the same date PET/CT  5/20/2020 PET/CT showed:  1.  2 foci of increased uptake in the left ischium, with underlying  sclerotic lesion, and symphysis pubis, these changes are indicative of  active prostate cancer metastasis.  2.  Low-level increased uptake in 3 lymphnodes along the right external  and internal iliac nodes, the most suspicious of which is the deep  obturator.  3. Low-level increased uptake in the prostatectomy bed slightly to the  left side of the urethra, if clinically necessary, endoscopy would  would be necessary for confirmation of this being metastasis.  PSA was down to 27.7 on 4/21/2020.   4/20/2020 he received an Eligard injection.    6/12/2020 started Apalutamide(Erleada)  Bone scan 1/22/2021: no bone mets.    Interval History: Mr. Etienne is seen alone today with for review for continuation of Apalutamide / Leuprolide/ Xgeva. Admits to lower right abdominal pain intermittently over last month- this is new - no bowel changes, no blood in stools. Uses 2 stools softeners daily since he continues on Oxycontin for chronic back pain( chipped lumbar)- pain is well controlled with help of pain team. He continues work as  and is able to continue with work with the RLQ pain.   Had colonoscopy in 5/2020 with suggested return in 5 years due to polyps found.   Eating normal diet. Passing gas well. No new bone pains. Bladder without changes.   Rest of comprehensive and complete ROS is reviewed and is negative.   Past Medical History:   Diagnosis Date    Gout     Hypertension goal BP (blood pressure) < 140/90 08/01/2022    Lumbar stenosis     Prostate cancer (H) 01/10/2020     Current Outpatient Medications   Medication    apalutamide (ERLEADA) 60 MG tablet    denosumab (XGEVA) 120 MG/1.7ML SOLN injection    HYDROcodone-acetaminophen (NORCO) 5-325 MG tablet    leuprolide (ELIGARD) 45 MG kit    morphine (MS CONTIN) 15 MG CR tablet    naloxone (NARCAN) 4 MG/0.1ML nasal spray    omeprazole (PRILOSEC) 40 MG DR capsule    polyethylene glycol (MIRALAX) 17 GM/Dose powder    SENNA-docusate sodium (SENNA S) 8.6-50 MG tablet    UNABLE TO FIND    aspirin (ASA) 81 MG chewable tablet     No current facility-administered medications  "for this visit.     Facility-Administered Medications Ordered in Other Visits   Medication    denosumab (XGEVA) injection 120 mg    leuprolide (LUPRON DEPOT) kit 22.5 mg     No Known Allergies    Physical Exam:BP (!) 151/83   Pulse 73   Resp 18   Ht 1.702 m (5' 7\")   Wt 87.5 kg (193 lb)   SpO2 97%   BMI 30.23 kg/m    Constitutional: Alert, cooperative and in no distress.   ENT: Eyes bright, No mouth sores  Neck: Supple, No adenopathy.  Cardiac: Heart rate and rhythm is regular with normal S1 and S2 without murmur  Respiratory: Breathing easy. Lung sounds clear to auscultation  Abdomen: Soft, Tender to palpation to RLQ- no masses noted. BS normal. No masses or organomegaly  MS: Muscle tone normal- moving well on own without signs of pain, extremities normal with no edema.   Skin: No suspicious lesions or rashes  Neuro: Sensory grossly WNL, gait normal.   Lymph: Normal ant/post cervical, axillary, supraclavicular nodes  Psych: Mentation appears normal and affect normal/bright with good conversation.    Laboratory Results:    Latest Reference Range & Units 01/31/23 15:19   Sodium 133 - 144 mmol/L 143   Potassium 3.4 - 5.3 mmol/L 4.3   Chloride 94 - 109 mmol/L 109   Carbon Dioxide 20 - 32 mmol/L 30   Urea Nitrogen 7 - 30 mg/dL 17   Creatinine 0.66 - 1.25 mg/dL 1.00   GFR Estimate >60 mL/min/1.73m2 87   Calcium 8.5 - 10.1 mg/dL 9.2   Anion Gap 3 - 14 mmol/L 4   Albumin 3.4 - 5.0 g/dL 3.9   Protein Total 6.8 - 8.8 g/dL 7.0   Alkaline Phosphatase 40 - 150 U/L 67   ALT 0 - 70 U/L 35   AST 0 - 45 U/L 20   Bilirubin Total 0.2 - 1.3 mg/dL 0.3   PSA 0.00 - 4.00 ug/L <0.01   Testosterone Total 240 - 950 ng/dL 5 (L)   Glucose 70 - 99 mg/dL 110 (H)   WBC 4.0 - 11.0 10e3/uL 6.3   Hemoglobin 13.3 - 17.7 g/dL 13.6   Hematocrit 40.0 - 53.0 % 40.3   Platelet Count 150 - 450 10e3/uL 246   RBC Count 4.40 - 5.90 10e6/uL 4.23 (L)   MCV 78 - 100 fL 95   MCH 26.5 - 33.0 pg 32.2   MCHC 31.5 - 36.5 g/dL 33.7   RDW 10.0 - 15.0 % 11.7   % " Neutrophils % 56   % Lymphocytes % 30   % Monocytes % 11   % Eosinophils % 1   % Basophils % 1   Absolute Basophils 0.0 - 0.2 10e3/uL 0.0   Absolute Eosinophils 0.0 - 0.7 10e3/uL 0.1   Absolute Lymphocytes 0.8 - 5.3 10e3/uL 1.9   Absolute Monocytes 0.0 - 1.3 10e3/uL 0.7   Absolute Neutrophils 1.6 - 8.3 10e3/uL 3.5   (L): Data is abnormally low  (H): Data is abnormally high    Assessment and Plan:   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   Review, labs and exam showing good control of cancer.  PSA is 0.01 and testosterone equals 3  We will continue with apalutamide 240 mg daily p.o. and should receive Lupron and Xgeva will be given today-To be repeated every 3 months.   He will return in 3 months with labs(CBC differential, CMP, total testosterone level and PSA ), visit and Lupron/Xgeva administration  New RLQ pain-patient is already on a pain plan with opioids through palliative care for lumbar pain so concerning that he says he has new right lower quadrant pain that is intermittent and reproducible on exam.  He did get a colonoscopy with polyps noted in 2020-was to repeat in 5 years.  Will suggest a CT of the abdomen and pelvis and follow-up with results.  Lung cancer screening/ Pulmonary nodule- has 30 pack year smoking history-  quit in 2012.  Last imaging was 2/9/2023 with low-dose CT of the chest which was negative.  The total time of this encounter amounted to 40 minutes. This time included face to face time spent with the patient and wife, prep work, ordering tests, and performing post visit documentation.  Ania Jensen,Cnp

## 2024-02-29 NOTE — NURSING NOTE
"Oncology Rooming Note    February 29, 2024 2:38 PM   Per Etienne is a 58 year old male who presents for:    Chief Complaint   Patient presents with    Oncology Clinic Visit     Follow up     Initial Vitals: BP (!) 151/83   Pulse 73   Resp 18   Ht 1.702 m (5' 7\")   Wt 87.5 kg (193 lb)   SpO2 97%   BMI 30.23 kg/m   Estimated body mass index is 30.23 kg/m  as calculated from the following:    Height as of this encounter: 1.702 m (5' 7\").    Weight as of this encounter: 87.5 kg (193 lb). Body surface area is 2.03 meters squared.  Moderate Pain (5) Comment: Data Unavailable   No LMP for male patient.  Allergies reviewed: Yes  Medications reviewed: Yes    Medications: Medication refills not needed today.  Pharmacy name entered into [facility]:    [id number] [place], [place] [address] [place], [place] - [address] [place] [address]    Frailty Screening:   Is the patient here for a new oncology consult visit in cancer care? 2. No      Clinical concerns: No Concerns        Lesia Segundo MA                  "

## 2024-03-12 ENCOUNTER — TELEPHONE (OUTPATIENT)
Dept: ONCOLOGY | Facility: CLINIC | Age: 59
End: 2024-03-12

## 2024-03-12 ENCOUNTER — ANCILLARY PROCEDURE (OUTPATIENT)
Dept: CT IMAGING | Facility: CLINIC | Age: 59
End: 2024-03-12
Attending: NURSE PRACTITIONER
Payer: COMMERCIAL

## 2024-03-12 DIAGNOSIS — C61 PROSTATE CANCER (H): Primary | ICD-10-CM

## 2024-03-12 DIAGNOSIS — R10.31 RLQ ABDOMINAL PAIN: ICD-10-CM

## 2024-03-12 PROCEDURE — 74177 CT ABD & PELVIS W/CONTRAST: CPT | Mod: GC | Performed by: RADIOLOGY

## 2024-03-12 RX ORDER — IOPAMIDOL 755 MG/ML
107 INJECTION, SOLUTION INTRAVASCULAR ONCE
Status: COMPLETED | OUTPATIENT
Start: 2024-03-12 | End: 2024-03-12

## 2024-03-12 RX ADMIN — IOPAMIDOL 107 ML: 755 INJECTION, SOLUTION INTRAVASCULAR at 14:03

## 2024-03-12 NOTE — TELEPHONE ENCOUNTER
TC with Pt to relay results of CT from today.   - no sign of cancer causing the intermittent pain to the right lower quadrant.  -But also noted was some mild groundglass findings suggestive of mild infection or mild aspiration to the lower lungs.  Patient was very happy to hear that there was no sign of cancer and then relates that he had 2 days worth of bowel irritability and full nausea vomiting and diarrhea a couple days after receiving both the Lupron and Zometa.  He states this is recurrent with each time he receives these medications.  He currently has no pain to the lower right quadrant suggesting that his intermittent pain may be related to constipation.  As far as the potential for an infection or aspiration since he was having nausea and vomiting it is a possibility that he had some residual aspiration although he currently has no symptoms of infection though he does have a cough at night the last 2 nights but overall feels he is getting better from the weekend.  Since he has no fevers or other signs of shortness of breath or congestion I left that he will call or contact if he does start any symptoms of a potential pneumonia and will be treated with antibiotics.  Patient appreciated call- Ania Jensen,Cnp

## 2024-03-13 ENCOUNTER — MYC MEDICAL ADVICE (OUTPATIENT)
Dept: ONCOLOGY | Facility: CLINIC | Age: 59
End: 2024-03-13
Payer: COMMERCIAL

## 2024-03-13 DIAGNOSIS — R91.8 PULMONARY NODULES: ICD-10-CM

## 2024-03-13 DIAGNOSIS — J69.0 ASPIRATION PNEUMONIA OF BOTH LUNGS DUE TO VOMIT, UNSPECIFIED PART OF LUNG (H): Primary | ICD-10-CM

## 2024-03-13 RX ORDER — CLINDAMYCIN HCL 300 MG
300 CAPSULE ORAL 4 TIMES DAILY
Qty: 28 CAPSULE | Refills: 0 | Status: SHIPPED | OUTPATIENT
Start: 2024-03-13 | End: 2024-03-20

## 2024-03-14 NOTE — TELEPHONE ENCOUNTER
Saw Pt message about fevers and cough after hours and expect this to be a possible aspiration pneumonia from previous vomiting shared after abnormalities noted on CT that were discussed with pt yesterday.   TC to pt at 7:50pm to share that clindamycin was order for pt at his local pharmacy for treatment for the pneumonia.   He had no allergies and he is aware to notify us if symptoms do not improve with antibiotics.   Ania Jensen, CNP

## 2024-04-11 ENCOUNTER — MYC REFILL (OUTPATIENT)
Dept: PALLIATIVE MEDICINE | Facility: CLINIC | Age: 59
End: 2024-04-11
Payer: COMMERCIAL

## 2024-04-11 ENCOUNTER — MYC REFILL (OUTPATIENT)
Dept: ONCOLOGY | Facility: CLINIC | Age: 59
End: 2024-04-11
Payer: COMMERCIAL

## 2024-04-11 DIAGNOSIS — C61 PROSTATE CANCER METASTATIC TO BONE (H): ICD-10-CM

## 2024-04-11 DIAGNOSIS — C61 PROSTATE CANCER (H): Primary | ICD-10-CM

## 2024-04-11 DIAGNOSIS — K21.9 GASTROESOPHAGEAL REFLUX DISEASE, UNSPECIFIED WHETHER ESOPHAGITIS PRESENT: ICD-10-CM

## 2024-04-11 DIAGNOSIS — K59.03 THERAPEUTIC OPIOID INDUCED CONSTIPATION: ICD-10-CM

## 2024-04-11 DIAGNOSIS — T40.2X5A THERAPEUTIC OPIOID INDUCED CONSTIPATION: ICD-10-CM

## 2024-04-11 DIAGNOSIS — G89.3 CANCER ASSOCIATED PAIN: ICD-10-CM

## 2024-04-11 DIAGNOSIS — C79.51 PROSTATE CANCER METASTATIC TO BONE (H): ICD-10-CM

## 2024-04-11 RX ORDER — SENNA AND DOCUSATE SODIUM 50; 8.6 MG/1; MG/1
1 TABLET, FILM COATED ORAL 2 TIMES DAILY
Qty: 60 TABLET | Refills: 3 | Status: SHIPPED | OUTPATIENT
Start: 2024-04-11 | End: 2024-07-10

## 2024-04-11 RX ORDER — OMEPRAZOLE 40 MG/1
40 CAPSULE, DELAYED RELEASE ORAL DAILY
Qty: 90 CAPSULE | Refills: 3 | Status: SHIPPED | OUTPATIENT
Start: 2024-04-11

## 2024-04-11 RX ORDER — MORPHINE SULFATE 15 MG/1
15 TABLET, FILM COATED, EXTENDED RELEASE ORAL EVERY 12 HOURS
Qty: 60 TABLET | Refills: 0 | Status: SHIPPED | OUTPATIENT
Start: 2024-04-11 | End: 2024-05-09

## 2024-04-11 RX ORDER — HYDROCODONE BITARTRATE AND ACETAMINOPHEN 5; 325 MG/1; MG/1
1 TABLET ORAL 3 TIMES DAILY PRN
Qty: 90 TABLET | Refills: 0 | Status: SHIPPED | OUTPATIENT
Start: 2024-04-11 | End: 2024-05-09

## 2024-04-11 NOTE — TELEPHONE ENCOUNTER
Patient requesting refill(s) of HYDROcodone-acetaminophen (NORCO) 5-325 MG tablet   Last dispensed from pharmacy on 3/13/24    morphine (MS CONTIN) 15 MG CR tablet   Last dispensed from pharmacy on 3/15/24    Patient's last office/virtual visit by prescribing provider on 2/29/24  Next office/virtual appointment scheduled for 5/30/24    Last urine drug screen date 11/30/23  Current opioid agreement on file (completed within the last year) Yes Date of opioid agreement: 11/30/23    E-prescribe to [facility] [address] [facility] - [place], [place]     Will route to nursing pool for review and preparation of prescription(s).

## 2024-04-11 NOTE — TELEPHONE ENCOUNTER
Chart reviewed - Request appears appropriate. Refilled for 30 day supply.     Chanel Bruno DNP, APRN, AGNP-C  [facility]

## 2024-04-11 NOTE — TELEPHONE ENCOUNTER
Received request from patient requesting refill(s) for SENNA-docusate sodium (SENNA S) 8.6-50 MG tablet     Last refilled on 02/12/24    Pt last seen on 02/29/24  Next appt scheduled for 05/30/24    Will facilitate refill.

## 2024-04-11 NOTE — TELEPHONE ENCOUNTER
Medication refill information reviewed.     For both meds last due:  Fill 3/13/24, start 3/15/24   Due date:  4/14/24      Prescriptions prepped for review.     Shefali RN-BSN  [facility] Center[facility]

## 2024-04-11 NOTE — TELEPHONE ENCOUNTER
SUBJECTIVE/OBJECTIVE:                                                    Refill request received for:  omeprazole (PRILOSEC) 40 MG DR capsule     Last refill per fax: Unknown  Date of LOV r/t Medication: 2/29/24  Future appt scheduled? Yes: 5/30/24   Date faxed to clinic: 4/11/24    RECENT LABS/VITALS                                                      Last Comprehensive Metabolic Panel:  Lab Results   Component Value Date     02/21/2024    POTASSIUM 3.8 02/21/2024    CHLORIDE 106 02/21/2024    CO2 28 02/21/2024    ANIONGAP 11 02/21/2024     (H) 02/21/2024    BUN 16.5 02/21/2024    CR 1.05 02/21/2024    GFRESTIMATED 82 02/21/2024    NATALIYA 9.5 02/21/2024     CBC RESULTS:   Recent Labs   Lab Test 02/21/24  1502   WBC 8.0   RBC 4.34*   HGB 13.6   HCT 40.2   MCV 93   MCH 31.3   MCHC 33.8   RDW 11.5        BP Readings from Last 4 Encounters:   02/29/24 (!) 151/83   02/29/24 (!) 156/92   01/11/24 (!) 144/86   11/30/23 133/84     PLAN:                                                      Sent to provider to review and advise.    See Ordaz RN on 4/11/2024 at 9:41 AM

## 2024-04-11 NOTE — TELEPHONE ENCOUNTER
Chart reviewed - Request appears appropriate. Refilled for 30 day supply.     Chanel Bruno DNP, APRN, AGNP-C  [facility]

## 2024-05-09 ENCOUNTER — MYC REFILL (OUTPATIENT)
Dept: PALLIATIVE MEDICINE | Facility: CLINIC | Age: 59
End: 2024-05-09
Payer: COMMERCIAL

## 2024-05-09 DIAGNOSIS — C61 PROSTATE CANCER METASTATIC TO BONE (H): ICD-10-CM

## 2024-05-09 DIAGNOSIS — C79.51 PROSTATE CANCER METASTATIC TO BONE (H): ICD-10-CM

## 2024-05-09 DIAGNOSIS — G89.3 CANCER ASSOCIATED PAIN: ICD-10-CM

## 2024-05-09 NOTE — TELEPHONE ENCOUNTER
Medication refill information reviewed.     Due date for HYDROcodone-acetaminophen (NORCO) 5-325 MG tablet   And  morphine (MS CONTIN) 15 MG CR tablet  is 5/14/2024 for both     Prescriptions prepped for review.     Will route to provider.       Caitlin Miranda RN  [facility]  [phone]

## 2024-05-09 NOTE — TELEPHONE ENCOUNTER
Received call from patient requesting refill(s) of HYDROcodone-acetaminophen (NORCO) 5-325 MG tablet   And  morphine (MS CONTIN) 15 MG CR tablet     Last dispensed from pharmacy on 04/13/24 for both    Patient's last office/virtual visit by prescribing provider on 02/29/24  Next office/virtual appointment scheduled for 05/30/24    Last urine drug screen date 11/30/23  Current opioid agreement on file (completed within the last year) Yes Date of opioid agreement: 11/30/23    E-prescribe to pharmacy-[facility] [id number] [place], [place] - [address]     Will route to nursing McCoy for review and preparation of prescription(s).

## 2024-05-10 DIAGNOSIS — C61 PROSTATE CANCER (H): Primary | ICD-10-CM

## 2024-05-10 RX ORDER — MORPHINE SULFATE 15 MG/1
15 TABLET, FILM COATED, EXTENDED RELEASE ORAL EVERY 12 HOURS
Qty: 60 TABLET | Refills: 0 | Status: SHIPPED | OUTPATIENT
Start: 2024-05-10 | End: 2024-05-30

## 2024-05-10 RX ORDER — HYDROCODONE BITARTRATE AND ACETAMINOPHEN 5; 325 MG/1; MG/1
1 TABLET ORAL 3 TIMES DAILY PRN
Qty: 90 TABLET | Refills: 0 | Status: SHIPPED | OUTPATIENT
Start: 2024-05-10 | End: 2024-05-30

## 2024-05-10 NOTE — TELEPHONE ENCOUNTER
Chart reviewed - Request appears appropriate. Refilled for 30 day supply.     Chanel Bruno DNP, APRN, AGNP-C  [facility]

## 2024-05-14 ENCOUNTER — MYC MEDICAL ADVICE (OUTPATIENT)
Dept: PALLIATIVE MEDICINE | Facility: CLINIC | Age: 59
End: 2024-05-14
Payer: COMMERCIAL

## 2024-05-14 DIAGNOSIS — M54.16 LUMBAR RADICULOPATHY: Primary | ICD-10-CM

## 2024-05-15 NOTE — TELEPHONE ENCOUNTER
Per patient myChart message:  Jay SANCHEZ Pain Nurse (supporting TEREZA Sanchez CNP)13 hours ago (7:49 PM)   Kathy Chanel,  My back/leg is flaring up. I would like to get scheduled for another injection. My appointment with you isn't until the 30th but I would like to get things started before that. Thanks,  Jay  _______________    Per Chanel Bruno, JANET, APRN, AGNP-C's 2/29/2024 visit plan:                Potential procedures: He had repeat L4-5 LUIS on 1/11/24. With prior injections, has appreciated improvement in pain around 75% pain relief that lasts around 3 months.    _________________________________    Mychart sent to pt:  You  Jay Schofield now (9:31 AM)   Patrick Thompson,  Are you referring to a repeat lumbar epidural steroid injection?  Last done 1/11/24?     Shefali, RN-BSN  [facility]   [phone]

## 2024-05-20 ENCOUNTER — TELEPHONE (OUTPATIENT)
Dept: PALLIATIVE MEDICINE | Facility: CLINIC | Age: 59
End: 2024-05-20
Payer: COMMERCIAL

## 2024-05-20 DIAGNOSIS — M54.16 LUMBAR RADICULOPATHY: Primary | ICD-10-CM

## 2024-05-20 NOTE — TELEPHONE ENCOUNTER
"Screening Questions for Radiology Injections:    Injection to be done at which interventional clinic site? [facility]    If choosing [facility] for location, please inform patient:  \"[facility] is a Hospital based clinic. Before your visit, you should check with your insurance about how it covers the charges for facility services in a hospital-based clinic.     Procedure ordered by Damion    Procedure ordered? Repeat L4-5 interlaminar epidural steroid injection   Transforaminal Cervical LUIS - Send to [facility]) - No [facility] providers perform this procedure    What insurance would patient like us to bill for this procedure? [employer]  IF SCHEDULING IN [place] [facility] OR SPINE PLEASE SCHEDULE AT LEAST 7-10 BUSINESS DAYS OUT SO A PA CAN BE OBTAINED  Worker's comp or MVA (motor vehicle accident) -Any injection DO NOT SCHEDULE and route to Richie Hood.    [employer] insurance - For SI joint injections, DO NOT SCHEDULE and route to Mary Padilla.     ALL BCBS, [employer] and [employer] - DO NOT SCHEDULE and route to Mary Padilla  [employer]- ALL INJECTIONS- route to Mary Padilla    Is patient scheduled at [facility]? no   If YES, route every encounter to [facility] CARE NAVIGATION POOL [[phone]]    Is an  needed? No     Patient has a  home? (Review Grid) YES: ok    Any chance of pregnancy? NO   If YES, do NOT schedule and route to RN pool  - Dr. Calderón route to Roseline Coulter and PM&R Nurse  [[id number]]      Is patient actively being treated for cancer or immunocompromised? Yes Prostate/Pelvic Bone and Lymph nodes  If YES, do NOT schedule and route to RN pool/ Dr. Calderón's Team    Does the patient have a bleeding or clotting disorder? No   If YES, okay to schedule AND route to RN nurse / Dr. Calderón's Team   (For any patients with platelet count <100, RN must forward to provider)    Is patient taking any Blood Thinners OR Antiplatelet " medication?  No   If hold needed, do NOT schedule, route to RN pool/ Dr. Lerners Team  Examples:   Blood Thinners: (Coumadin, Warfarin, Jantoven, Pradaxa, Xarelto, Eliquis, Edoxaban, Enoxaparin, Lovenox, Heparin, Arixtra, Fondaparinux or Fragmin)  Antiplatelet Medications: (Plavix, Brilinta or Effient)     Is patient taking any aspirin products (includes Excedrin and Fiorinal)? No   If yes route to RN pool/ Dr. Bennett Team - Do not schedule      Any allergies to contrast dye, iodine, shellfish, or numbing and steroid medications? No  If YES, schedule and add allergy information to appointment notes AND route to the RN pool/ Dr. Lerners Team  If LUIS and Contrast Dye / Iodine Allergy? DO NOT SCHEDULE, route to RN pool/ Dr. Bennett Team  Allergies: Patient has no known allergies.     Does patient have an active infection or treated for one within the past week? No  Is patient currently taking any antibiotics or steroid medications?  No   For patients on chronic, preventative, or prophylactic antibiotics, procedures may be scheduled.   For patients on antibiotics for active or recent infection, schedule 4 days after completed.  For patients on steroid medications, schedule 4 days after completed.     Has the patient had a flu shot or any other vaccinations within the past 7 days? No  If yes, explain that for the vaccine to work best they need to:     wait 1 week before and 1 week after getting any Vaccine  wait 1 week before and 2 weeks after getting any Covid Vaccine   If patient has concerns about the timing, send to RN pool/ Dr. Lerners Team    Does patient have an MRI/CT?  YES: MRI Include Date and Check Procedure Scheduling Grid to see if required.  Was the MRI/CT done within the last 3 years?  Yes   If no route to RN Pool/ Dr. Bennett Team  If yes, where was the MRI/CT done? [facility]   Refer to PACS Transmissions list for approved external locations and route to RN Pool High Priority/ Dr. Lerners  Team  If MRI was not done at approved external location do NOT schedule and route to RN pool/ Dr. Calderón's Team    If patient has an imaging disc, the injection MAY be scheduled but patient must bring disc to appt or appt will be cancelled.    Is patient able to transfer to a procedure table with minimal or no assistance? Yes   If no, do NOT schedule and route to RN Pool/ Dr. Calderón's Team    Procedure Specific Instructions:  If celiac plexus block, informed patient NPO for 6 hours and that it is okay to take medications with sips of water, especially blood pressure medications Not Applicable       If this is for a cervical procedure, informed patient that aspirin needs to be held for 6 days.   Not Applicable    Sedation, If Sedation is ordered for any procedure, patient must be NPO for 6 hours prior to procedure Not Applicable    If IV needed:  Do not schedule procedures requiring IV placement in the first appointment of the day or first appointment after lunch. Do NOT schedule at 0745, 0815 or 1245. ok  Instructed patient to arrive 30 minutes early for IV start if required. (Check Procedure Scheduling Grid)  Not Applicable    Reminders:  If you are started on any steroids or antibiotics between now and your appointment, you must contact us because the procedure may need to be cancelled.  Yes    As a reminder, receiving steroids can decrease your body's ability to fight infection.   Would you still like to move forward with scheduling the injection?  Yes    IV Sedation is not provided for procedures. If oral anti-anxiety medication is needed, the patient should request this from their referring provider.    Instruct patient to arrive as directed prior to the scheduled appointment time:  If IV needed 30 minutes before appointment time     For patients 85 or older we recommend having an adult stay w/ them for the remainder of the day.     If the patient is Diabetic, remind them to bring their glucometer.      Does  the patient have any questions?  NO  Radha Hood  [facility] [place]

## 2024-05-21 NOTE — TELEPHONE ENCOUNTER
LVM requesting return call to nursing.     Intent to discuss relief from most recent Procedure performed: L4-5 interlaminar epidural steroid injection on 1/11/2024.    Routing to provider to review and advise on other insurance criteria noted in CY Padilla's 5/21/2024 note.     Caitlin Miranda, MIKAL  [facility]  [phone]

## 2024-05-21 NOTE — TELEPHONE ENCOUNTER
[employer] only gives 2 week authorization.     Please schedule patient and route back with the date to initiate PA.                    Also for a repeat, nursing please see below and advise:        Repeat Therapeutic Epidural Steroid Injection  An injection is considered a repeat injection if the last injection was performed within the previous 12 months. If 12 months or more have elapsed, it is considered a new (initial) injection.  Repeat therapeutic epidural steroid injection may be considered medically necessary when ALL of the following criteria are met:  Significant radicular pain, radiculopathy (cervical, thoracic, or lumbar), or neurogenic claudication (lumbar) with associated functional impairment  The prior injection produced at least a 50% reduction in pain with functional improvement of at least 3 months' duration as documented in a follow-up evaluation  Confirmed evidence demonstrated on advanced imaging (MRI or CT) which correlates with the clinical findings. For herniated nucleus pulposus (HNP), advanced imaging should be performed within the previous 18 months of current request. This imaging requirement is waived for repeat injection if previously satisfied for the initial injection of EITHER of the following:  Nerve root compression secondary to herniated disc  Spinal stenosis (central, lateral recess, foraminal, or extraforaminal)  The patient continues to receive conservative management between injections                Mary SHEA  Complex   [facility]          Mary Wallace   [facility]

## 2024-05-21 NOTE — TELEPHONE ENCOUNTER
[facility] Call Center    Phone Message    May a detailed message be left on voicemail: yes     Reason for Call: Other: Patient returned call to clinic to speak with nursing. Please review.     Action Taken: Message routed to:  Other: BG pain    Travel Screening: Not Applicable

## 2024-05-21 NOTE — TELEPHONE ENCOUNTER
Patient identifies relief from injection was moderate (50-80%) and relief lasted 4 months following Procedure performed: L4-5 interlaminar epidural steroid injection on 1/11/2024.     Routing to Mary for review.   Provider has also been sent encounter to comment on other insurance criteria noted in original response.     Caitlin Miranda RN  [facility]  [phone]

## 2024-05-22 NOTE — TELEPHONE ENCOUNTER
Based on the information you have provided, your request does not meet medical necessity criteria due to the following:  An advanced imaging study (MRI or CT) is required within the previous 18 months.        Request was canceled so that it does not get denied. Routing to review- patient is scheduled for 6/13/24      Mary Wallace   [facility]       ,

## 2024-05-25 NOTE — TELEPHONE ENCOUNTER
Chart reviewed - I have pended lumbar MRI ordered. Please reach out to patient to update on imaging requirement and ask screening questions on order.     Chanel Bruno DNP, APRN, AGNP-C  [facility]

## 2024-05-27 ASSESSMENT — PAIN SCALES - PAIN ENJOYMENT GENERAL ACTIVITY SCALE (PEG)
PEG_TOTALSCORE: 7
INTERFERED_ENJOYMENT_LIFE: 7
AVG_PAIN_PASTWEEK: 7
INTERFERED_GENERAL_ACTIVITY: 7

## 2024-05-28 NOTE — TELEPHONE ENCOUNTER
DANTE for patient requesting return call to clinic.     Caitlin Miranda RN  [facility]  [phone]

## 2024-05-28 NOTE — TELEPHONE ENCOUNTER
[facility] Call Center    Phone Message    May a detailed message be left on voicemail: yes     Reason for Call: Other: Patient is returning a call.  [place] If he does not answer try calling again.      Action Taken: Other: Pain    Travel Screening: Not Applicable

## 2024-05-28 NOTE — TELEPHONE ENCOUNTER
Called pt and left message asking for a call back.     Shefali RN-BSN  [facility] [place]   [phone]

## 2024-05-28 NOTE — TELEPHONE ENCOUNTER
[facility] Call Center    Phone Message    May a detailed message be left on voicemail: yes     Reason for Call: Other: Patient was returning a call regarding his procedure. Please call back when available.      Action Taken: Other: Pain    Travel Screening: Not Applicable

## 2024-05-29 NOTE — PROGRESS NOTES
Oncology Follow Up Visit: May 30, 2024      Oncologist: Dr Garcia  PCP: Per Chen    Diagnosis: Metastatic hormone sensitive prostate cancer  Per Etienne is a 59 yo male who presented to his PCP in December 2019 with slow urinary stream.  PSA = 124. On 1/8/2020 CT abdomen pelvis showed groundglass opacity in the left lower lobe,  dystrophic calcification of the prostate gland, and a fatty liver.  Same-day bone scan was negative. On January 10, 2010 prostate biopsy showed on the left Robyn score 4+3 involving 7 cores and on the right Robyn score 3+4 involving 6 cores, with positive perineural invasion.  He proceeded to undergo RRP by Dr. Colmenares on 2/24/2020.  Pathology revealed acinar adenocarcinoma [place] 4+3 with focal ROBERTO, bladder neck base invasion, positive SVI, positive PNI, and multiple positive margins.  There was no LVSI and 0 out of 3 lymph nodes removed were involved with tumor; pT3bN0.  Treatment:   4/20/2020 received Eligard.     5/21/2020 MRI of the Prostate= 14 x 9 mm T2 intermediate structure in the prostatectomy bed at the left aspect of urinary bladder neck with restricted diffusion in the DWI images and early enhancement in the contrast  enhanced images. This was concerning for locally recurrent/residual prostatic cancer.  There was a T2 hypointense structure in the in the area of removed right seminal vesicle measuring 13 x 11 mm  demonstrating restricted  diffusion in the DWI images. This may represent a recurrent/residual tumor. There were 2 bone metastases in the left pubic inferior ramus and inferior aspect of left pubic body.There is a 7 mm suspicious lymph nodes in the right obturator area  corresponding to to FACBC avid lymph node on the same date PET/CT  5/20/2020 PET/CT showed:  1.  2 foci of increased uptake in the left ischium, with underlying  sclerotic lesion, and symphysis pubis, these changes are indicative of  active prostate cancer metastasis.  2. Low-level  increased uptake in 3 lymphnodes along the right external  and internal iliac nodes, the most suspicious of which is the deep  obturator.  3. Low-level increased uptake in the prostatectomy bed slightly to the  left side of the urethra, if clinically necessary, endoscopy would  would be necessary for confirmation of this being metastasis.  PSA was down to 27.7 on 4/21/2020.   4/20/2020 he received an Eligard injection.    6/12/2020 started Apalutamide(Erleada)  Bone scan 1/22/2021: no bone mets.    Interval History: Mr. Etienne is seen alone today in clinic with for review for continuation of Apalutamide / Leuprolide/ Xgeva.     Nausea and loose stools hit very occasionally - starts in the night and hangs on the day- more so after Xgeva and Lupron. Comes with fevers for the day with sweats. Has been happening for 4 years intermittently.  Pt reports he is feeling well today with no new issues.    Work with pain clinic for pain in pelvis at site of metastasis in pelvis- reports he has had some difficulty with change in meds but is now on MS Contin 15mg bid with Norco as needed and this is working well.Gets Lumabr injections for back pain. Uses marijuana 1-12 x daily.   Stays active with job as .   Rest of comprehensive and complete ROS is reviewed and is negative.   Past Medical History:   Diagnosis Date    Gout     Hypertension goal BP (blood pressure) < 140/90 08/01/2022    Lumbar stenosis     Prostate cancer (H) 01/10/2020     Current Outpatient Medications   Medication Sig Dispense Refill    apalutamide (ERLEADA) 60 MG tablet Take 4 tablets (240 mg) by mouth daily for 30 days 120 tablet 0    aspirin (ASA) 81 MG chewable tablet Take 1 tablet (81 mg) by mouth daily      denosumab (XGEVA) 120 MG/1.7ML SOLN injection Inject 1.7 mLs (120 mg) Subcutaneous every 3 months 1.7 mL 0    HYDROcodone-acetaminophen (NORCO) 5-325 MG tablet Take 1 tablet by mouth 3 times daily as needed for breakthrough pain or severe  "pain Fill 6/11/24, start 6/13/24 90 tablet 0    leuprolide (ELIGARD) 45 MG kit Inject 45 mg Subcutaneous every 6 months      morphine (MS CONTIN) 15 MG CR tablet Take 1 tablet (15 mg) by mouth every 12 hours Fill 6/11/24, start 6/13/24 60 tablet 0    naloxone (NARCAN) 4 MG/0.1ML nasal spray Spray 1 spray (4 mg) into one nostril alternating nostrils as needed for opioid reversal every 2-3 minutes until assistance arrives 0.2 mL 0    omeprazole (PRILOSEC) 40 MG DR capsule Take 1 capsule (40 mg) by mouth daily at least 30 min before breakfast 90 capsule 3    polyethylene glycol (MIRALAX) 17 GM/Dose powder Take 17 g (1 Capful) by mouth 2 times daily as needed for constipation 578 g 1    SENNA-docusate sodium (SENNA S) 8.6-50 MG tablet Take 1 tablet by mouth 2 times daily 60 tablet 3    UNABLE TO FIND 1 tablet daily MEDICATION NAME: C, D, Zinc combination supplement       No current facility-administered medications for this visit.     No Known Allergies    Physical Exam:/80 (BP Location: Right arm)   Pulse 77   Temp 98.3  F (36.8  C) (Oral)   Resp 16   Ht 1.702 m (5' 7.01\")   Wt 83.2 kg (183 lb 6.4 oz)   SpO2 97%   BMI 28.72 kg/m    Constitutional: Alert, healthy, and in no distress.   ENT: Eyes bright, No mouth sores  Neck: Supple, No adenopathy.  Cardiac: Heart rate and rhythm is regular with normal S1 and S2 without murmur  Respiratory: Breathing easy. Lung sounds clear to auscultation  Abdomen: Soft, non-tender, BS normal. No masses or organomegaly  MS: Muscle tone normal- moving well on own without signs of pain, extremities normal with no edema.   Skin: No suspicious lesions or rashes  Neuro: Sensory grossly WNL, gait normal.   Lymph: Normal ant/post cervical, axillary, supraclavicular nodes  Psych: Mentation appears normal and affect normal/bright with good conversation.    Laboratory Results:   Results for orders placed or performed in visit on 05/30/24   Comprehensive metabolic panel     Status: " Abnormal   Result Value Ref Range    Sodium 141 135 - 145 mmol/L    Potassium 4.5 3.4 - 5.3 mmol/L    Carbon Dioxide (CO2) 27 22 - 29 mmol/L    Anion Gap 10 7 - 15 mmol/L    Urea Nitrogen 14.6 8.0 - 23.0 mg/dL    Creatinine 0.83 0.67 - 1.17 mg/dL    GFR Estimate >90 >60 mL/min/1.73m2    Calcium 9.7 8.6 - 10.0 mg/dL    Chloride 104 98 - 107 mmol/L    Glucose 148 (H) 70 - 99 mg/dL    Alkaline Phosphatase 82 40 - 150 U/L    AST 19 0 - 45 U/L    ALT 26 0 - 70 U/L    Protein Total 6.9 6.4 - 8.3 g/dL    Albumin 4.0 3.5 - 5.2 g/dL    Bilirubin Total 0.4 <=1.2 mg/dL   PSA tumor marker     Status: Normal   Result Value Ref Range    PSA Tumor Marker <0.01 0.00 - 3.50 ng/mL    Narrative    This result is obtained using the Roche Elecsys total PSA method on the esteban e601 immunoassay analyzer. Results obtained with different assay methods or kits cannot be used interchangeably.  This result is obtained using the Roche Elecsys total PSA method on the esteban e402 Pure immunoassay analyzer. Results obtained with different assay methods or kits cannot be used interchangeably   CBC with platelets and differential     Status: Abnormal   Result Value Ref Range    WBC Count 8.8 4.0 - 11.0 10e3/uL    RBC Count 4.30 (L) 4.40 - 5.90 10e6/uL    Hemoglobin 13.4 13.3 - 17.7 g/dL    Hematocrit 39.3 (L) 40.0 - 53.0 %    MCV 91 78 - 100 fL    MCH 31.2 26.5 - 33.0 pg    MCHC 34.1 31.5 - 36.5 g/dL    RDW 11.5 10.0 - 15.0 %    Platelet Count 299 150 - 450 10e3/uL    % Neutrophils 68 %    % Lymphocytes 25 %    % Monocytes 5 %    % Eosinophils 1 %    % Basophils 1 %    % Immature Granulocytes 1 %    NRBCs per 100 WBC 0 <1 /100    Absolute Neutrophils 6.0 1.6 - 8.3 10e3/uL    Absolute Lymphocytes 2.2 0.8 - 5.3 10e3/uL    Absolute Monocytes 0.5 0.0 - 1.3 10e3/uL    Absolute Eosinophils 0.1 0.0 - 0.7 10e3/uL    Absolute Basophils 0.0 0.0 - 0.2 10e3/uL    Absolute Immature Granulocytes 0.0 <=0.4 10e3/uL    Absolute NRBCs 0.0 10e3/uL   CBC with Platelets  & Differential     Status: Abnormal    Narrative    The following orders were created for panel order CBC with Platelets & Differential.  Procedure                               Abnormality         Status                     ---------                               -----------         ------                     CBC with platelets and d...[[id number]]  Abnormal            Final result                 Please view results for these tests on the individual orders.   EXAM: CT ABDOMEN PELVIS W CONTRAST 3/12/2024 2:10 PM     HISTORY: 58 years Male new RLQ pain with known history of prostate  cancer.     COMPARISON: CT 5/10/2021.     TECHNIQUE: Axial CT images from the lung bases through the lower  abdomen were obtained with IV contrast. Coronal and sagittal reformats  provided. Contrast dose: 107 mL Isovue 370.     FINDINGS:     HEPATIC: Hepatic steatosis. Stable subcentimeter hypoattenuating focus  within the right hepatic lobe (series 3 image 25), too small to  completely characterize on CT. No suspicious hepatic mass.     BILIARY: No calcified gallstones. No intra or extrahepatic biliary  dilatation.     SPLEEN: Normal size. No focal lesions.     PANCREAS: Fatty atrophic changes of the pancreas No mass or  peripancreatic fluid.     ADRENALS: Unremarkable.     RENAL: Multiple simple appearing cysts in the lower pole of the right  kidney, largest measuring up to 1.8 cm. Additional bilateral  hypoattenuating foci within the right and left kidney, too small to  completely characterize on CT. No suspicious renal mass. No  hydronephrosis. No renal calculi.     Reproductive: Postoperative changes of prostatectomy. The  prostatectomy bed is unremarkable.     GASTROINTESTINAL: Fat-containing hiatal hernia. The distal esophagus,  stomach, and duodenum appear unremarkable. Small and large bowel are  normal in caliber and without abnormal wall thickening. No portal  venous gas or pneumatosis. The appendix is unremarkable.  Colonic  diverticulosis without diverticulitis.     MESENTERY/PERITONEUM: No ascites or pneumoperitoneum.     LYMPH NODES: No lymphadenopathy. Numerous small paraesophageal and  retroperitoneal lymph nodes are unchanged.     VASCULAR: Visualized major abdominal vessels appear patent. Mild  atherosclerotic vascular calcifications.     LUNG BASES: Stable 4 mm solid pulmonary nodule within the right middle  lobe (series 3 image 3). Mild groundglass centrilobular nodularity in  the lung bases.     MUSCULOSKELETAL: Open reduction and internal fixation of the left  femur. No substantial change in degree of scattered ill-defined  sclerotic foci within the visualized skeleton, for example within the  proximal left femur and the left hemisacrum. No new sclerotic foci.  Multilevel degenerative changes of the spine. No acute osseous  abnormality. Fat-containing inguinal hernias.                                                                    IMPRESSION:   1.  No acute abnormality of the abdomen or pelvis.  2.  Stable postsurgical changes of prior prostatectomy. No new  suspicious findings within the abdomen or pelvis to suggest new  metastatic disease.  3.  Increased groundglass centrilobular nodularity in the lung bases  may represent mild aspiration or infection.     I have personally reviewed the examination and initial interpretation  and I agree with the findings.     IRON WAGNER MD       Assessment and Plan:   Metastatic hormone sensitive prostate cancer with bone metastasis-patient continues plan with Erleada 240 mg po daily  as well as Xgeva and Lupron every 3 months and states he is tolerating well. PSA showing good control = <0.01.   - Is reporting intermittent day of nausea and sweats - not sure if related to Pain plan or prostates treatment but states they improve quickly .   Review, labs and exam showing no sign of progression of the cancer.   He will receive Lupron and Xgeva today.  He will return in 3  months to see Dr Garcia with labs(CBC differential, CMP, total testosterone level and PSA ), also for next Lupron/Xgeva administration    Bone metastasis with pain - Has pelvic bone mets - seeing palliative care team and using MS contin 15 mg po bid with Norco as needed and smoking Marijuana as needed up to 2 x daily. Has MRI of the back set for 6/19/2024 with delays back injection to July    Lung cancer screening/ Pulmonary nodule- has 30 pack year smoking history-  quit in 2012.  Initially imaging was 1/2022 - last imaging 3/12/2024. Reviewed results.     The total time of this encounter amounted to 40  minutes. This time included face to face time spent with the patient and wife, prep work, ordering tests, and performing post visit documentation.  Ania Jensen,Cnp

## 2024-05-29 NOTE — TELEPHONE ENCOUNTER
[facility] Call Center    Phone Message    May a detailed message be left on voicemail: yes     Reason for Call: Other: Patient is returning call from Nurse Shefali. Please call patient back.      Action Taken: Message routed to:  Other: [facility]    Travel Screening: Not Applicable

## 2024-05-29 NOTE — TELEPHONE ENCOUNTER
Called pt.   He would like to proceed w/ the imaging.    Routed to Chanel Bruno, JANET, APRN, AGNP-C to sign off on the imaging order.  Screening questions were reviewed.      Shefali RN-BSN  [facility]   [phone]

## 2024-05-30 ENCOUNTER — ONCOLOGY VISIT (OUTPATIENT)
Dept: ONCOLOGY | Facility: CLINIC | Age: 59
End: 2024-05-30
Attending: NURSE PRACTITIONER
Payer: COMMERCIAL

## 2024-05-30 ENCOUNTER — OFFICE VISIT (OUTPATIENT)
Dept: PALLIATIVE MEDICINE | Facility: CLINIC | Age: 59
End: 2024-05-30
Payer: COMMERCIAL

## 2024-05-30 ENCOUNTER — LAB (OUTPATIENT)
Dept: INFUSION THERAPY | Facility: CLINIC | Age: 59
End: 2024-05-30
Attending: NURSE PRACTITIONER
Payer: COMMERCIAL

## 2024-05-30 VITALS
DIASTOLIC BLOOD PRESSURE: 80 MMHG | HEART RATE: 77 BPM | OXYGEN SATURATION: 97 % | RESPIRATION RATE: 16 BRPM | SYSTOLIC BLOOD PRESSURE: 137 MMHG | BODY MASS INDEX: 28.79 KG/M2 | HEIGHT: 67 IN | TEMPERATURE: 98.3 F | WEIGHT: 183.4 LBS

## 2024-05-30 VITALS — DIASTOLIC BLOOD PRESSURE: 84 MMHG | SYSTOLIC BLOOD PRESSURE: 161 MMHG | HEART RATE: 70 BPM

## 2024-05-30 DIAGNOSIS — C79.51 MALIGNANT NEOPLASM METASTATIC TO BONE (H): Primary | ICD-10-CM

## 2024-05-30 DIAGNOSIS — C79.51 MALIGNANT NEOPLASM METASTATIC TO BONE (H): ICD-10-CM

## 2024-05-30 DIAGNOSIS — M54.10 RADICULAR PAIN OF RIGHT LOWER EXTREMITY: ICD-10-CM

## 2024-05-30 DIAGNOSIS — C61 PROSTATE CANCER METASTATIC TO BONE (H): ICD-10-CM

## 2024-05-30 DIAGNOSIS — T40.2X5A THERAPEUTIC OPIOID INDUCED CONSTIPATION: ICD-10-CM

## 2024-05-30 DIAGNOSIS — G89.3 CANCER ASSOCIATED PAIN: ICD-10-CM

## 2024-05-30 DIAGNOSIS — C61 PROSTATE CANCER (H): ICD-10-CM

## 2024-05-30 DIAGNOSIS — C61 PROSTATE CANCER METASTATIC TO BONE (H): Primary | ICD-10-CM

## 2024-05-30 DIAGNOSIS — R91.8 PULMONARY NODULES: ICD-10-CM

## 2024-05-30 DIAGNOSIS — C79.51 PROSTATE CANCER METASTATIC TO BONE (H): ICD-10-CM

## 2024-05-30 DIAGNOSIS — C79.51 PROSTATE CANCER METASTATIC TO BONE (H): Primary | ICD-10-CM

## 2024-05-30 DIAGNOSIS — R10.31 RLQ ABDOMINAL PAIN: ICD-10-CM

## 2024-05-30 DIAGNOSIS — M54.16 LUMBAR RADICULOPATHY: Primary | ICD-10-CM

## 2024-05-30 DIAGNOSIS — F11.90 CHRONIC, CONTINUOUS USE OF OPIOIDS: ICD-10-CM

## 2024-05-30 DIAGNOSIS — R10.2 PELVIC PAIN IN MALE: ICD-10-CM

## 2024-05-30 DIAGNOSIS — K59.03 THERAPEUTIC OPIOID INDUCED CONSTIPATION: ICD-10-CM

## 2024-05-30 LAB
ALBUMIN SERPL BCG-MCNC: 4 G/DL (ref 3.5–5.2)
ALP SERPL-CCNC: 82 U/L (ref 40–150)
ALT SERPL W P-5'-P-CCNC: 26 U/L (ref 0–70)
ANION GAP SERPL CALCULATED.3IONS-SCNC: 10 MMOL/L (ref 7–15)
AST SERPL W P-5'-P-CCNC: 19 U/L (ref 0–45)
BASOPHILS # BLD AUTO: 0 10E3/UL (ref 0–0.2)
BASOPHILS NFR BLD AUTO: 1 %
BILIRUB SERPL-MCNC: 0.4 MG/DL
BUN SERPL-MCNC: 14.6 MG/DL (ref 8–23)
CALCIUM SERPL-MCNC: 9.7 MG/DL (ref 8.6–10)
CHLORIDE SERPL-SCNC: 104 MMOL/L (ref 98–107)
CREAT SERPL-MCNC: 0.83 MG/DL (ref 0.67–1.17)
DEPRECATED HCO3 PLAS-SCNC: 27 MMOL/L (ref 22–29)
EGFRCR SERPLBLD CKD-EPI 2021: >90 ML/MIN/1.73M2
EOSINOPHIL # BLD AUTO: 0.1 10E3/UL (ref 0–0.7)
EOSINOPHIL NFR BLD AUTO: 1 %
ERYTHROCYTE [DISTWIDTH] IN BLOOD BY AUTOMATED COUNT: 11.5 % (ref 10–15)
GLUCOSE SERPL-MCNC: 148 MG/DL (ref 70–99)
HCT VFR BLD AUTO: 39.3 % (ref 40–53)
HGB BLD-MCNC: 13.4 G/DL (ref 13.3–17.7)
IMM GRANULOCYTES # BLD: 0 10E3/UL
IMM GRANULOCYTES NFR BLD: 1 %
LYMPHOCYTES # BLD AUTO: 2.2 10E3/UL (ref 0.8–5.3)
LYMPHOCYTES NFR BLD AUTO: 25 %
MCH RBC QN AUTO: 31.2 PG (ref 26.5–33)
MCHC RBC AUTO-ENTMCNC: 34.1 G/DL (ref 31.5–36.5)
MCV RBC AUTO: 91 FL (ref 78–100)
MONOCYTES # BLD AUTO: 0.5 10E3/UL (ref 0–1.3)
MONOCYTES NFR BLD AUTO: 5 %
NEUTROPHILS # BLD AUTO: 6 10E3/UL (ref 1.6–8.3)
NEUTROPHILS NFR BLD AUTO: 68 %
NRBC # BLD AUTO: 0 10E3/UL
NRBC BLD AUTO-RTO: 0 /100
PLATELET # BLD AUTO: 299 10E3/UL (ref 150–450)
POTASSIUM SERPL-SCNC: 4.5 MMOL/L (ref 3.4–5.3)
PROT SERPL-MCNC: 6.9 G/DL (ref 6.4–8.3)
PSA SERPL DL<=0.01 NG/ML-MCNC: <0.01 NG/ML (ref 0–3.5)
RBC # BLD AUTO: 4.3 10E6/UL (ref 4.4–5.9)
SODIUM SERPL-SCNC: 141 MMOL/L (ref 135–145)
WBC # BLD AUTO: 8.8 10E3/UL (ref 4–11)

## 2024-05-30 PROCEDURE — 84403 ASSAY OF TOTAL TESTOSTERONE: CPT

## 2024-05-30 PROCEDURE — 99214 OFFICE O/P EST MOD 30 MIN: CPT

## 2024-05-30 PROCEDURE — 99215 OFFICE O/P EST HI 40 MIN: CPT | Performed by: NURSE PRACTITIONER

## 2024-05-30 PROCEDURE — 85025 COMPLETE CBC W/AUTO DIFF WBC: CPT

## 2024-05-30 PROCEDURE — 84153 ASSAY OF PSA TOTAL: CPT

## 2024-05-30 PROCEDURE — 36415 COLL VENOUS BLD VENIPUNCTURE: CPT

## 2024-05-30 PROCEDURE — 84155 ASSAY OF PROTEIN SERUM: CPT

## 2024-05-30 PROCEDURE — 250N000011 HC RX IP 250 OP 636: Mod: JZ | Performed by: NURSE PRACTITIONER

## 2024-05-30 PROCEDURE — 96402 CHEMO HORMON ANTINEOPL SQ/IM: CPT

## 2024-05-30 PROCEDURE — 82040 ASSAY OF SERUM ALBUMIN: CPT

## 2024-05-30 PROCEDURE — 99213 OFFICE O/P EST LOW 20 MIN: CPT | Performed by: NURSE PRACTITIONER

## 2024-05-30 PROCEDURE — 96372 THER/PROPH/DIAG INJ SC/IM: CPT | Performed by: NURSE PRACTITIONER

## 2024-05-30 PROCEDURE — 99207 PR NO CHARGE LOS: CPT

## 2024-05-30 RX ORDER — HYDROCODONE BITARTRATE AND ACETAMINOPHEN 5; 325 MG/1; MG/1
1 TABLET ORAL 3 TIMES DAILY PRN
Qty: 90 TABLET | Refills: 0 | Status: SHIPPED | OUTPATIENT
Start: 2024-05-30 | End: 2024-07-10

## 2024-05-30 RX ORDER — MORPHINE SULFATE 15 MG/1
15 TABLET, FILM COATED, EXTENDED RELEASE ORAL EVERY 12 HOURS
Qty: 60 TABLET | Refills: 0 | Status: SHIPPED | OUTPATIENT
Start: 2024-05-30 | End: 2024-07-10

## 2024-05-30 RX ADMIN — DENOSUMAB 120 MG: 120 INJECTION SUBCUTANEOUS at 15:22

## 2024-05-30 RX ADMIN — LEUPROLIDE ACETATE 22.5 MG: KIT at 15:22

## 2024-05-30 ASSESSMENT — PAIN SCALES - GENERAL
PAINLEVEL: MODERATE PAIN (5)
PAINLEVEL: SEVERE PAIN (6)

## 2024-05-30 NOTE — PROGRESS NOTES
Infusion Nursing Note:  Per Etienne presents today for Xegeva And Lupron.    Patient seen by provider today: Yes, Ania SHEA   present during visit today: Not Applicable.    Note: N/A.      Intravenous Access:  No Intravenous access/labs at this visit.    Treatment Conditions:  Not Applicable.      Post Infusion Assessment:  Patient tolerated injection without incident.  Site patent and intact, free from redness, edema or discomfort.       Discharge Plan:   Departure Mode: Ambulatory.      Lesia Segundo MA

## 2024-05-30 NOTE — NURSING NOTE
"Oncology Rooming Note    May 30, 2024 2:41 PM   Per Etienne is a 58 year old male who presents for:    Chief Complaint   Patient presents with    Oncology Clinic Visit     3 month follow up     Initial Vitals: /80 (BP Location: Right arm)   Pulse 77   Temp 98.3  F (36.8  C) (Oral)   Resp 16   Ht 1.702 m (5' 7.01\")   Wt 83.2 kg (183 lb 6.4 oz)   SpO2 97%   BMI 28.72 kg/m   Estimated body mass index is 28.72 kg/m  as calculated from the following:    Height as of this encounter: 1.702 m (5' 7.01\").    Weight as of this encounter: 83.2 kg (183 lb 6.4 oz). Body surface area is 1.98 meters squared.  Moderate Pain (5) Comment: Data Unavailable   No LMP for male patient.  Allergies reviewed: Yes  Medications reviewed: Yes    Medications: Medication refills not needed today.  Pharmacy name entered into [facility]:    [id number] [place], [place] - [address] [place], [place] - [address] [place] [address]    Frailty Screening:   Is the patient here for a new oncology consult visit in cancer care? 2. No      Clinical concerns: No new concerns         Brittany Brown LPN              "

## 2024-05-30 NOTE — PROGRESS NOTES
[facility]     Date of visit: 5/30/2024      Assessment:   Per Etienne is a 58 year old male with a past medical history significant for prostate cancer with bone metastasis, pulmonary nodules, lumbar stenosis, HTN, gout who presents with complaints of cancer associated pain.      1. Low back pain with LLE radic, neuropathy bilat feet, pain of multiple joints - His back pain with left sided radicular pain with L5 distribution that has been present for many years. He has been referred for procedure by his PCP for repeat L4/5 LUIS (Regina/Obed), which historically have given him significant symptom relief, although he did not appreciate as much benefit from the most recent LUIS on 8/2/22. Lumbar CT from 8/2021 demonstrated severe left neural foraminal narrowing at L4-L5 and moderate to severe right at L3-4, additional multilevel spinal canal stenosis and neural foraminal stenosis noted. I suspect his low back pain is associated with multiple factors, including underlying degenerative changes of lumbar spine, bone metastasis, and likely some myofascial component. Neuropathic symptoms in feet and generalized joint pain is likely secondary effects from cancer treatment and bone metastasis.   2. Mental Health - the patient's mental health concerns, specifically prostate cancer with bone mets, affect his experience of pain and contribute to his clinically significant distress. He may benefit from more generalized health psychology to support chronic illness/cancer management and will consider discussing at future follow up.      Assigned to [facility] nursing team.     Visit Diagnoses:  1. Lumbar radiculopathy    2. Cancer associated pain    3. Prostate cancer metastatic to bone (H)    4. Chronic, continuous use of opioids    5. Therapeutic opioid induced constipation    6. Radicular pain of right lower extremity        Plan:  Diagnosis reviewed, treatment option addressed, and risk/benifits  discussed.  Self-care instructions given.  I am recommending a multidisciplinary treatment plan to help this patient better manage their pain.                  1.  Pain Physical Therapy:  WALT Thompson is very active. Despite the pain, he continues to work full time as a , which is somewhat labor intensive (frequently using ladder).               2.  Pain Psychologist to address relaxation, behavioral change, coping style, and other factors important to improvement.  WALT Thompson may possibly benefit from support for his chronic health conditions overall, though he does seem to manage fairly well. He continues to work full time and likes to stay active.               3.  Diagnostic Studies:  Lumbar MRI ordered in between visits due to insurance requirement for repeat L4-5 LUIS. Scheduled for 6/19/24.               4.  Medication Management:   Continue MS Contin to 15 mg every 12 hours. He continues to appreciate benefit, stable baseline pain, supports functioning, continues to work full time. Refilled today for #60 tabs.   Continue hydrocodone 5-325 three times daily as needed for breakthrough pain. He does not use very frequently, usually only takes 1 tab daily as needed. He continues to appreciate benefit for breakthrough pain, no concerns for continuation. Refilled today for #90 tabs to last 30 days.   Continue Senna S to 1 tab twice daily. He reports improvement with OIC, with increased frequency to twice daily. He reports intermittent constipation every 2-3 weeks that lasts for about 2 days. He usually increases fiber intake (salads) and this helps. Recommend using Miralax 1-2 times daily as needed, if current regimen not effective. Prescription sent into pharmacy today, advised he may purchase over the counter if insurance does not cover medication.   CSA/UDS - completed on 11/30/23. Naloxone refill sent into pharmacy on 11/30/23. Advised to  new supply and dispose of old supply at home.                5.  Potential procedures: Repeat L4-5 LUIS ordered in between visits, delayed scheduling due to insurance requiring updated lumbar MRI, now scheduled on 7/2/24. Last completed on on 1/11/24. Appreciates improvement in pain around 75% pain relief that lasts around 3 months.               7.  Follow up with TEREZA Sanchez CNP in around 3-4 months by 9/30/24.       Review of Electronic Chart: Today I have also reviewed available medical information in the patient's medical record at [facility] ([facility]) and Care Everywhere (if available), including relevant provider notes, laboratory work, and imaging.     Chanel Bruno, JANET, TEREZA, AGNP-C  [facility]     -------------------------------------------------    Subjective:    Chief complaint:   Chief Complaint   Patient presents with    Pain       Interval history:  Per Etienne is a 58 year old male last seen on 2/29/24.  They are a patient of mine seen in follow up.     Recommendations/plan at the last visit included:              1.  Pain Physical Therapy:  WALT Thompson is very active. Despite the pain, he continues to work full time as a , which is somewhat labor intensive (frequently using ladder).               2.  Pain Psychologist to address relaxation, behavioral change, coping style, and other factors important to improvement.  WALT Thompson may possibly benefit from support for his chronic health conditions overall, though he does seem to manage fairly well. He continues to work full time and likes to stay active.               3.  Diagnostic Studies:  None               4.  Medication Management:   Continue MS Contin to 15 mg every 12 hours. He continues to appreciate benefit, stable baseline pain, supports functioning, continues to work full time. Refilled today for #60 tabs.   Continue hydrocodone 5-325 three times daily as needed for breakthrough pain. He does not use very frequently, usually only takes  1 tab daily as needed. He continues to appreciate benefit for breakthrough pain, no concerns for continuation. Refilled today for #90 tabs to last 30 days.   Continue Senna S to 1 tab twice daily. He reports improvement with OIC, with increased frequency to twice daily. He reports intermittent constipation every 2-3 weeks that lasts for about 2 days. He usually increases fiber intake (salads) and this helps. Recommend using Miralax 1-2 times daily as needed, if current regimen not effective. Prescription sent into pharmacy today, advised he may purchase over the counter if insurance does not cover medication.   CSA/UDS - completed today. Naloxone refill sent into pharmacy today. Advised to  new supply and dispose of old supply at home.               5.  Potential procedures: He had repeat L4-5 LUIS on 1/11/24. With prior injections, has appreciated improvement in pain around 75% pain relief that lasts around 3 months.               7.  Follow up with TEREZA Sanchez CNP in 3 months before 5/29/24    Since his last visit, Per Etienne reports:  -His pain is about the same/stable overall.   -He continues to take MS Contin and hydrocodone.   -Appreciates benefit, no side effects.   -He is scheduled for repeat LESI on 6/16 with Dr. Tapia.   -Insurance is requiring updated lumbar MRI, which I ordered today.   -He is aware he needs to complete MRI prior to procedure appt.   -His daughter and son are both expecting babies.   -His daughter is getting  in August in [place]. They moved up the wedding due to pregnancy.   -Both babies are due within 1 day of each other.     Pain Information:   Pain rating: averages 7/10 on a 0-10 scale.      Interval history from last visit on 2/29/24:  -His pain is the same as last visit, overall stable.   -He has not been able to go ice fishing this season due to unseasonable winter.   -He has oncology follow up today, overall stable.   -He continues to take MS Contin  and hydrocodone PRN, appreciates benefit and no side effects.   -Previously reported concerns for OIC, takes Senna BID and miralax 1-2 BID.   -He is planning to retire in 2 years.   Pain Information:              Pain rating: averages 7/10 on a 0-10 scale.        Interval history from last visit on 11/30/23:  -His pain overall is about the same as last visit.   -He reports new onset of intermittent left knee and ankle pain overnight, describes pain as sharp.   -He has to get up and walk around his house, then pain will subside and he goes back to bad.   -He is planning to retire in the next 2 years.   -He continues to take MS contin and hydrocodone PRN. He continues to appreciate benefit with medications. Allow him to remain active and working.   -He continues to struggle with some OIC, states he goes through cycles where he is good for a couple of weeks but then gets constipated for a couple of days. He takes Senna S BID.  -He will speak with pharmacist if he needs to get miralax if senna s is not enough to help with OIC.  Pain Information:              Pain rating: averages 4-5/10 on a 0-10 scale.        Interval history from last visit on 8/30/23:  -He had repeat L4-5 LUIS on 6/29/23, notes this has been helpful for leg pain.   -He continues to appreciate good benefit with MS contin and hydrocodone.  -He uses 2-3 tabs of Norco per day, most days 3 tabs.   -He is able to continue working and being active.   -His OIC has improved, continues Senna S 1 BID.   -No side effects otherwise.   -He notes increased bruising, has follow up with PCP tomorrow, will plan to discuss with him tomorrow.   -He and wife are celebrating 30th anniversary on 10/8.  -He is excited for upcoming trip to [place] to see [place], he and wife are going overnight.   Pain Information:              Pain rating: averages 7/10 on a 0-10 scale.        Interval history from last visit on 6/7/23:  -His pain is a little higher than it was at last  visit.   -He states pain was worse this morning, notes he was more active yesterday.   -Pain in LLE seems to be returning over the last month.   -He has felt some fatigue/weakness in LLE, thinks this is more so pain related.   -He's had LESI in the past that have been very helpful for leg pain, recent LUIS on 12/1/22 was not as helpful as prior injections.   -He is open to repeat LESI, will monitor for benefit after and follow up with me sooner if not as helpful.   -He continues MS Contin 15 mg BID, hydrocodone 5-325 mg TID PRN.   -He appreciates good benefit with meds, no side effects.   -He is excited to find out if [employer] gets the [employer] bid, will find out on the 21st.   -He has been fishing a lot.   -His grandson, Juwan, is 7.5 months now, standing now.   Pain Information:              Pain rating: averages 7/10 on a 0-10 scale.        Interval history from last visit on 3/29/23:  -his pain is about the same as it was at last visit.   -He has good days and bad days.   -He thinks pain is consistent.   -He continues MS contin 15 mg BID, hydrocodone 5 TID PRN.   -He reports medications are working well, stable at this time.   -He was dealing with OIC, increased senna to BID at last visit, constipation improved.   -No other side effects from medications.   -He had reached out in between visits for a reasonable early refill due to travels.   -He went to [place] recently, had a great trip.   -They drove down (his wife/family).  Pain Information:              Pain rating: averages 4/10 on a 0-10 scale.        Interval history from last visit on 1/18/23:  -His pain is about the same as it was at last visit.   -He had LESI with Dr. Tapia on 12/1/22.  -He does not think it was as helpful as prior LESI.   -He is taking MS Contin 15 mg BID, has PRN Norco for breakthrough pain (does not use often).  -He has needed to use hydrocodone a bit more since last visit.   -He thinks pain is reasonably well controlled right  now.   -He is taking Senna S daily at bedtime, has BM every other day, sometimes harder stool.   -He is open to increasing to BID frequency.   -His boss verbalized concerns about driving with medications, patient does not share similar concerns, denies side effects.   -He is waiting to hear about [employer] bid next year, will be involved with the project if [employer] wins.   -He is going to consider retiring sometime in the next year.         Interval history from last visit on 11/3/22:  -He had shingles since last visit  -Pain has been increased  -He is also been experiencing constipation after increasing MS Contin 15mg to BID dosing.   -His grandson, Juwan Del Castillo, was born on 10/20/22.   -He will be getting shingles injection soon.  -He has been able to be more acvtive with long acting opioid  -[place] 2 miles per day, lost 8 pounds, able to do job better now that pain is under better control.            Current Pain Treatments:     Medications:                  MS Contin 15 mg BID              Hydrocodone 5-325 mg TID PRN      Other:                  L4-5 LUIS on repeat scheduled for 7/2/24 Dr. Tapia     Current MME: 45     Review of [place] Prescription Monitoring Program (): No concern for abuse or misuse of controlled medications based on this report. Reviewed - appears appropriate.      Annual Controlled Substance Agreement/UDS due date: Completed on 11/30/23.      Past pain treatments:  LESI - helpful       Medications:  Current Outpatient Medications   Medication Sig Dispense Refill    apalutamide (ERLEADA) 60 MG tablet Take 4 tablets (240 mg) by mouth daily for 30 days 120 tablet 0    aspirin (ASA) 81 MG chewable tablet Take 1 tablet (81 mg) by mouth daily      denosumab (XGEVA) 120 MG/1.7ML SOLN injection Inject 1.7 mLs (120 mg) Subcutaneous every 3 months 1.7 mL 0    HYDROcodone-acetaminophen (NORCO) 5-325 MG tablet Take 1 tablet by mouth 3 times daily as needed for breakthrough pain or severe  pain Fill 6/11/24, start 6/13/24 90 tablet 0    leuprolide (ELIGARD) 45 MG kit Inject 45 mg Subcutaneous every 6 months      morphine (MS CONTIN) 15 MG CR tablet Take 1 tablet (15 mg) by mouth every 12 hours Fill 6/11/24, start 6/13/24 60 tablet 0    naloxone (NARCAN) 4 MG/0.1ML nasal spray Spray 1 spray (4 mg) into one nostril alternating nostrils as needed for opioid reversal every 2-3 minutes until assistance arrives 0.2 mL 0    omeprazole (PRILOSEC) 40 MG DR capsule Take 1 capsule (40 mg) by mouth daily at least 30 min before breakfast 90 capsule 3    polyethylene glycol (MIRALAX) 17 GM/Dose powder Take 17 g (1 Capful) by mouth 2 times daily as needed for constipation 578 g 1    SENNA-docusate sodium (SENNA S) 8.6-50 MG tablet Take 1 tablet by mouth 2 times daily 60 tablet 3    UNABLE TO FIND 1 tablet daily MEDICATION NAME: C, D, Zinc combination supplement         Medical History: any changes in medical history since they were last seen? No      Objective:    Physical Exam:  Blood pressure (!) 161/84, pulse 70.  Constitutional: Well developed, well nourished, appears stated age.  Gait: intact   HEENT: Head atraumatic, normocephalic. Eyes without conjunctival injection or jaundice. Oropharynx clear. Neck supple. No obvious neck masses.  Skin: No rash, lesions, or petechiae of exposed skin.   Psychiatric/mental status: Alert, without lethargy or stupor. Speech fluent. Appropriate affect. Mood normal. Able to follow commands without difficulty.     Diagnostic Tests/Imaging/Labs:  CMP on 2/21/24 - hepatic and renal function intact, GFR 82    BILLING TIME DOCUMENTATION:   The total TIME spent on this patient on the date of the encounter/appointment was 23 minutes.      TOTAL TIME includes:   Time spent preparing to see the patient (reviewing records and tests)   Time spent face to face (or over the phone) with the patient   Time spent ordering tests, medications, procedures and referrals   Time spent Referring and  communicating with other healthcare professionals   Time spent documenting clinical information in Epic

## 2024-05-30 NOTE — LETTER
5/30/2024         RE: Per Etienne  [id number] [facility] [address]        Dear Colleague,    Thank you for referring your patient, Per Etienne, to the [facility]. Please see a copy of my visit note below.    Oncology Follow Up Visit: May 30, 2024      Oncologist: Dr Garcia  PCP: Per Chen    Diagnosis: Metastatic hormone sensitive prostate cancer  Per Etienne is a 57 yo male who presented to his PCP in December 2019 with slow urinary stream.  PSA = 124. On 1/8/2020 CT abdomen pelvis showed groundglass opacity in the left lower lobe,  dystrophic calcification of the prostate gland, and a fatty liver.  Same-day bone scan was negative. On January 10, 2010 prostate biopsy showed on the left [place] score 4+3 involving 7 cores and on the right Robyn score 3+4 involving 6 cores, with positive perineural invasion.  He proceeded to undergo RRP by Dr. Colmenares on 2/24/2020.  Pathology revealed acinar adenocarcinoma Robyn 4+3 with focal ROBERTO, bladder neck base invasion, positive SVI, positive PNI, and multiple positive margins.  There was no LVSI and 0 out of 3 lymph nodes removed were involved with tumor; pT3bN0.  Treatment:   4/20/2020 received Eligard.     5/21/2020 MRI of the Prostate= 14 x 9 mm T2 intermediate structure in the prostatectomy bed at the left aspect of urinary bladder neck with restricted diffusion in the DWI images and early enhancement in the contrast  enhanced images. This was concerning for locally recurrent/residual prostatic cancer.  There was a T2 hypointense structure in the in the area of removed right seminal vesicle measuring 13 x 11 mm  demonstrating restricted  diffusion in the DWI images. This may represent a recurrent/residual tumor. There were 2 bone metastases in the left pubic inferior ramus and inferior aspect of left pubic body.There is a 7 mm suspicious lymph nodes in the right obturator area  corresponding to to  FACBC avid lymph node on the same date PET/CT  5/20/2020 PET/CT showed:  1.  2 foci of increased uptake in the left ischium, with underlying  sclerotic lesion, and symphysis pubis, these changes are indicative of  active prostate cancer metastasis.  2. Low-level increased uptake in 3 lymphnodes along the right external  and internal iliac nodes, the most suspicious of which is the deep  obturator.  3. Low-level increased uptake in the prostatectomy bed slightly to the  left side of the urethra, if clinically necessary, endoscopy would  would be necessary for confirmation of this being metastasis.  PSA was down to 27.7 on 4/21/2020.   4/20/2020 he received an Eligard injection.    6/12/2020 started Apalutamide(Erleada)  Bone scan 1/22/2021: no bone mets.    Interval History: Mr. Etienne is seen alone today in clinic with for review for continuation of Apalutamide / Leuprolide/ Xgeva.     Nausea and loose stools hit very occasionally - starts in the night and hangs on the day- more so after Xgeva and Lupron. Comes with fevers for the day with sweats. Has been happening for 4 years intermittently.  Pt reports he is feeling well today with no new issues.    Work with pain clinic for pain in pelvis at site of metastasis in pelvis- reports he has had some difficulty with change in meds but is now on MS Contin 15mg bid with Norco as needed and this is working well.Gets Lumabr injections for back pain. Uses marijuana 1-12 x daily.   Stays active with job as .   Rest of comprehensive and complete ROS is reviewed and is negative.   Past Medical History:   Diagnosis Date     Gout      Hypertension goal BP (blood pressure) < 140/90 08/01/2022     Lumbar stenosis      Prostate cancer (H) 01/10/2020     Current Outpatient Medications   Medication Sig Dispense Refill     apalutamide (ERLEADA) 60 MG tablet Take 4 tablets (240 mg) by mouth daily for 30 days 120 tablet 0     aspirin (ASA) 81 MG chewable tablet Take 1  "tablet (81 mg) by mouth daily       denosumab (XGEVA) 120 MG/1.7ML SOLN injection Inject 1.7 mLs (120 mg) Subcutaneous every 3 months 1.7 mL 0     HYDROcodone-acetaminophen (NORCO) 5-325 MG tablet Take 1 tablet by mouth 3 times daily as needed for breakthrough pain or severe pain Fill 6/11/24, start 6/13/24 90 tablet 0     leuprolide (ELIGARD) 45 MG kit Inject 45 mg Subcutaneous every 6 months       morphine (MS CONTIN) 15 MG CR tablet Take 1 tablet (15 mg) by mouth every 12 hours Fill 6/11/24, start 6/13/24 60 tablet 0     naloxone (NARCAN) 4 MG/0.1ML nasal spray Spray 1 spray (4 mg) into one nostril alternating nostrils as needed for opioid reversal every 2-3 minutes until assistance arrives 0.2 mL 0     omeprazole (PRILOSEC) 40 MG DR capsule Take 1 capsule (40 mg) by mouth daily at least 30 min before breakfast 90 capsule 3     polyethylene glycol (MIRALAX) 17 GM/Dose powder Take 17 g (1 Capful) by mouth 2 times daily as needed for constipation 578 g 1     SENNA-docusate sodium (SENNA S) 8.6-50 MG tablet Take 1 tablet by mouth 2 times daily 60 tablet 3     UNABLE TO FIND 1 tablet daily MEDICATION NAME: C, D, Zinc combination supplement       No current facility-administered medications for this visit.     No Known Allergies    Physical Exam:/80 (BP Location: Right arm)   Pulse 77   Temp 98.3  F (36.8  C) (Oral)   Resp 16   Ht 1.702 m (5' 7.01\")   Wt 83.2 kg (183 lb 6.4 oz)   SpO2 97%   BMI 28.72 kg/m    Constitutional: Alert, healthy, and in no distress.   ENT: Eyes bright, No mouth sores  Neck: Supple, No adenopathy.  Cardiac: Heart rate and rhythm is regular with normal S1 and S2 without murmur  Respiratory: Breathing easy. Lung sounds clear to auscultation  Abdomen: Soft, non-tender, BS normal. No masses or organomegaly  MS: Muscle tone normal- moving well on own without signs of pain, extremities normal with no edema.   Skin: No suspicious lesions or rashes  Neuro: Sensory grossly WNL, gait " normal.   Lymph: Normal ant/post cervical, axillary, supraclavicular nodes  Psych: Mentation appears normal and affect normal/bright with good conversation.    Laboratory Results:   Results for orders placed or performed in visit on 05/30/24   Comprehensive metabolic panel     Status: Abnormal   Result Value Ref Range    Sodium 141 135 - 145 mmol/L    Potassium 4.5 3.4 - 5.3 mmol/L    Carbon Dioxide (CO2) 27 22 - 29 mmol/L    Anion Gap 10 7 - 15 mmol/L    Urea Nitrogen 14.6 8.0 - 23.0 mg/dL    Creatinine 0.83 0.67 - 1.17 mg/dL    GFR Estimate >90 >60 mL/min/1.73m2    Calcium 9.7 8.6 - 10.0 mg/dL    Chloride 104 98 - 107 mmol/L    Glucose 148 (H) 70 - 99 mg/dL    Alkaline Phosphatase 82 40 - 150 U/L    AST 19 0 - 45 U/L    ALT 26 0 - 70 U/L    Protein Total 6.9 6.4 - 8.3 g/dL    Albumin 4.0 3.5 - 5.2 g/dL    Bilirubin Total 0.4 <=1.2 mg/dL   PSA tumor marker     Status: Normal   Result Value Ref Range    PSA Tumor Marker <0.01 0.00 - 3.50 ng/mL    Narrative    This result is obtained using the Roche Elecsys total PSA method on the esteban e601 immunoassay analyzer. Results obtained with different assay methods or kits cannot be used interchangeably.  This result is obtained using the Roche Elecsys total PSA method on the esteban e402 Pure immunoassay analyzer. Results obtained with different assay methods or kits cannot be used interchangeably   CBC with platelets and differential     Status: Abnormal   Result Value Ref Range    WBC Count 8.8 4.0 - 11.0 10e3/uL    RBC Count 4.30 (L) 4.40 - 5.90 10e6/uL    Hemoglobin 13.4 13.3 - 17.7 g/dL    Hematocrit 39.3 (L) 40.0 - 53.0 %    MCV 91 78 - 100 fL    MCH 31.2 26.5 - 33.0 pg    MCHC 34.1 31.5 - 36.5 g/dL    RDW 11.5 10.0 - 15.0 %    Platelet Count 299 150 - 450 10e3/uL    % Neutrophils 68 %    % Lymphocytes 25 %    % Monocytes 5 %    % Eosinophils 1 %    % Basophils 1 %    % Immature Granulocytes 1 %    NRBCs per 100 WBC 0 <1 /100    Absolute Neutrophils 6.0 1.6 - 8.3  10e3/uL    Absolute Lymphocytes 2.2 0.8 - 5.3 10e3/uL    Absolute Monocytes 0.5 0.0 - 1.3 10e3/uL    Absolute Eosinophils 0.1 0.0 - 0.7 10e3/uL    Absolute Basophils 0.0 0.0 - 0.2 10e3/uL    Absolute Immature Granulocytes 0.0 <=0.4 10e3/uL    Absolute NRBCs 0.0 10e3/uL   CBC with Platelets & Differential     Status: Abnormal    Narrative    The following orders were created for panel order CBC with Platelets & Differential.  Procedure                               Abnormality         Status                     ---------                               -----------         ------                     CBC with platelets and d...[[id number]]  Abnormal            Final result                 Please view results for these tests on the individual orders.   EXAM: CT ABDOMEN PELVIS W CONTRAST 3/12/2024 2:10 PM     HISTORY: 58 years Male new RLQ pain with known history of prostate  cancer.     COMPARISON: CT 5/10/2021.     TECHNIQUE: Axial CT images from the lung bases through the lower  abdomen were obtained with IV contrast. Coronal and sagittal reformats  provided. Contrast dose: 107 mL Isovue 370.     FINDINGS:     HEPATIC: Hepatic steatosis. Stable subcentimeter hypoattenuating focus  within the right hepatic lobe (series 3 image 25), too small to  completely characterize on CT. No suspicious hepatic mass.     BILIARY: No calcified gallstones. No intra or extrahepatic biliary  dilatation.     SPLEEN: Normal size. No focal lesions.     PANCREAS: Fatty atrophic changes of the pancreas No mass or  peripancreatic fluid.     ADRENALS: Unremarkable.     RENAL: Multiple simple appearing cysts in the lower pole of the right  kidney, largest measuring up to 1.8 cm. Additional bilateral  hypoattenuating foci within the right and left kidney, too small to  completely characterize on CT. No suspicious renal mass. No  hydronephrosis. No renal calculi.     Reproductive: Postoperative changes of prostatectomy. The  prostatectomy bed is  unremarkable.     GASTROINTESTINAL: Fat-containing hiatal hernia. The distal esophagus,  stomach, and duodenum appear unremarkable. Small and large bowel are  normal in caliber and without abnormal wall thickening. No portal  venous gas or pneumatosis. The appendix is unremarkable. Colonic  diverticulosis without diverticulitis.     MESENTERY/PERITONEUM: No ascites or pneumoperitoneum.     LYMPH NODES: No lymphadenopathy. Numerous small paraesophageal and  retroperitoneal lymph nodes are unchanged.     VASCULAR: Visualized major abdominal vessels appear patent. Mild  atherosclerotic vascular calcifications.     LUNG BASES: Stable 4 mm solid pulmonary nodule within the right middle  lobe (series 3 image 3). Mild groundglass centrilobular nodularity in  the lung bases.     MUSCULOSKELETAL: Open reduction and internal fixation of the left  femur. No substantial change in degree of scattered ill-defined  sclerotic foci within the visualized skeleton, for example within the  proximal left femur and the left hemisacrum. No new sclerotic foci.  Multilevel degenerative changes of the spine. No acute osseous  abnormality. Fat-containing inguinal hernias.                                                                    IMPRESSION:   1.  No acute abnormality of the abdomen or pelvis.  2.  Stable postsurgical changes of prior prostatectomy. No new  suspicious findings within the abdomen or pelvis to suggest new  metastatic disease.  3.  Increased groundglass centrilobular nodularity in the lung bases  may represent mild aspiration or infection.     I have personally reviewed the examination and initial interpretation  and I agree with the findings.     IRON WAGNER MD       Assessment and Plan:   Metastatic hormone sensitive prostate cancer with bone metastasis-patient continues plan with Erleada 240 mg po daily  as well as Xgeva and Lupron every 3 months and states he is tolerating well. PSA showing good control = <0.01.    - Is reporting intermittent day of nausea and sweats - not sure if related to Pain plan or prostates treatment but states they improve quickly .   Review, labs and exam showing no sign of progression of the cancer.   He will receive Lupron and Xgeva today.  He will return in 3 months to see Dr Garcia with labs(CBC differential, CMP, total testosterone level and PSA ), also for next Lupron/Xgeva administration    Bone metastasis with pain - Has pelvic bone mets - seeing palliative care team and using MS contin 15 mg po bid with Norco as needed and smoking Marijuana as needed up to 2 x daily. Has MRI of the back set for 6/19/2024 with delays back injection to July    Lung cancer screening/ Pulmonary nodule- has 30 pack year smoking history-  quit in 2012.  Initially imaging was 1/2022 - last imaging 3/12/2024. Reviewed results.     The total time of this encounter amounted to 40  minutes. This time included face to face time spent with the patient and wife, prep work, ordering tests, and performing post visit documentation.  Ania Jensen Cnp        Again, thank you for allowing me to participate in the care of your patient.        Sincerely,        Ania Jensen, FRAN, APRN CNP

## 2024-05-30 NOTE — PATIENT INSTRUCTIONS
1.  Pain Physical Therapy:  WALT Thompson is very active. Despite the pain, he continues to work full time as a , which is somewhat labor intensive (frequently using ladder).               2.  Pain Psychologist to address relaxation, behavioral change, coping style, and other factors important to improvement.  WALT Thompson may possibly benefit from support for his chronic health conditions overall, though he does seem to manage fairly well. He continues to work full time and likes to stay active.               3.  Diagnostic Studies:  Lumbar MRI ordered in between visits due to insurance requirement for repeat L4-5 LUIS. Scheduled for 6/19/24.               4.  Medication Management:   Continue MS Contin to 15 mg every 12 hours. He continues to appreciate benefit, stable baseline pain, supports functioning, continues to work full time. Refilled today for #60 tabs.   Continue hydrocodone 5-325 three times daily as needed for breakthrough pain. He does not use very frequently, usually only takes 1 tab daily as needed. He continues to appreciate benefit for breakthrough pain, no concerns for continuation. Refilled today for #90 tabs to last 30 days.   Continue Senna S to 1 tab twice daily. He reports improvement with OIC, with increased frequency to twice daily. He reports intermittent constipation every 2-3 weeks that lasts for about 2 days. He usually increases fiber intake (salads) and this helps. Recommend using Miralax 1-2 times daily as needed, if current regimen not effective. Prescription sent into pharmacy today, advised he may purchase over the counter if insurance does not cover medication.   CSA/UDS - completed on 11/30/23. Naloxone refill sent into pharmacy on 11/30/23. Advised to  new supply and dispose of old supply at home.               5.  Potential procedures: Repeat L4-5 LUIS ordered in between visits, delayed scheduling due to insurance requiring updated lumbar MRI,  now scheduled on 7/2/24. Last completed on on 1/11/24. Appreciates improvement in pain around 75% pain relief that lasts around 3 months.               7.  Follow up with TEREZA Sanchez CNP in around 3-4 months by 9/30/24.     ----------------------------------------------------------------  Clinic Number:  [phone]   Call with any questions about your care and for scheduling assistance.   Calls are returned Monday through Friday between 8 AM and 4:30 PM. We usually get back to you within 2 business days depending on the issue/request.    If we are prescribing your medications:  For opioid medication refills, call the clinic or send a [employer] message 7 days in advance.  Please include:  Name of requested medication  Name of the pharmacy.  For non-opioid medications, call your pharmacy directly to request a refill. Please allow 3-4 days to be processed.   Per [place] State Law:  All controlled substance prescriptions must be filled within 30 days of being written.    For those controlled substances allowing refills, pickup must occur within 30 days of last fill.      We believe regular attendance is key to your success in our program!    Any time you are unable to keep your appointment we ask that you call us at least 24 hours in advance to cancel.This will allow us to offer the appointment time to another patient.   Multiple missed appointments may lead to dismissal from the clinic.

## 2024-06-01 LAB — TESTOST SERPL-MCNC: 5 NG/DL (ref 240–950)

## 2024-06-10 ENCOUNTER — MYC REFILL (OUTPATIENT)
Dept: PALLIATIVE MEDICINE | Facility: CLINIC | Age: 59
End: 2024-06-10
Payer: COMMERCIAL

## 2024-06-10 DIAGNOSIS — G89.3 CANCER ASSOCIATED PAIN: ICD-10-CM

## 2024-06-10 DIAGNOSIS — C79.51 PROSTATE CANCER METASTATIC TO BONE (H): ICD-10-CM

## 2024-06-10 DIAGNOSIS — K59.03 THERAPEUTIC OPIOID INDUCED CONSTIPATION: ICD-10-CM

## 2024-06-10 DIAGNOSIS — T40.2X5A THERAPEUTIC OPIOID INDUCED CONSTIPATION: ICD-10-CM

## 2024-06-10 DIAGNOSIS — C61 PROSTATE CANCER METASTATIC TO BONE (H): ICD-10-CM

## 2024-06-10 RX ORDER — HYDROCODONE BITARTRATE AND ACETAMINOPHEN 5; 325 MG/1; MG/1
1 TABLET ORAL 3 TIMES DAILY PRN
Qty: 90 TABLET | Refills: 0 | Status: CANCELLED | OUTPATIENT
Start: 2024-06-10

## 2024-06-10 RX ORDER — MORPHINE SULFATE 15 MG/1
15 TABLET, FILM COATED, EXTENDED RELEASE ORAL EVERY 12 HOURS
Qty: 60 TABLET | Refills: 0 | Status: CANCELLED | OUTPATIENT
Start: 2024-06-10

## 2024-06-10 RX ORDER — SENNA AND DOCUSATE SODIUM 50; 8.6 MG/1; MG/1
1 TABLET, FILM COATED ORAL 2 TIMES DAILY
Qty: 60 TABLET | Refills: 3 | Status: CANCELLED | OUTPATIENT
Start: 2024-06-10

## 2024-06-10 NOTE — TELEPHONE ENCOUNTER
Received call from patient requesting refill(s) of     SENNA-DOCUSATE SODIUM 8.6-50 MG PO TABS  Dispense: Apr. 11, 2024       MORPHINE SULFATE ER 15 MG PO TBCR  Dispense: May. 12, 2024       HYDROCODONE-ACETAMINOPHEN 5-325 MG PO TABS  Dispense: May. 12, 2024               Patient's last office/virtual visit by prescribing provider on: May. 30, 2024   Next office/virtual appointment scheduled for: none on file       UDT: Nov. 30, 2023   CSA: Nov. 30, 2023     E-prescribe to    [facility] [id number] [place] [address]    Will route to nursing [place] for review and preparation of prescription(s).

## 2024-06-11 DIAGNOSIS — C61 PROSTATE CANCER (H): Primary | ICD-10-CM

## 2024-06-11 NOTE — TELEPHONE ENCOUNTER
Refills for:   MORPHINE SULFATE ER 15 MG PO TBCR    HYDROCODONE-ACETAMINOPHEN 5-325 MG PO TABS      Already signed and sent to pharmacy on 5/30/2024.     It also appears that patient should have a refill of Senna S available at pharmacy at this time.     Will contact pharmacy once they open on this date to confirm.     Caitlin Miranda RN  [facility]  [phone]

## 2024-06-11 NOTE — TELEPHONE ENCOUNTER
Contacted pharmacy who confirm that patient does have current orders for all requested medications at pharmacy at this time.     Caitlin Miranda RN

## 2024-06-19 ENCOUNTER — ANCILLARY PROCEDURE (OUTPATIENT)
Dept: MRI IMAGING | Facility: CLINIC | Age: 59
End: 2024-06-19
Payer: COMMERCIAL

## 2024-06-19 DIAGNOSIS — M54.16 LUMBAR RADICULOPATHY: ICD-10-CM

## 2024-06-19 PROCEDURE — 72148 MRI LUMBAR SPINE W/O DYE: CPT | Mod: TC | Performed by: RADIOLOGY

## 2024-07-02 ENCOUNTER — RADIOLOGY INJECTION OFFICE VISIT (OUTPATIENT)
Dept: PALLIATIVE MEDICINE | Facility: CLINIC | Age: 59
End: 2024-07-02
Payer: COMMERCIAL

## 2024-07-02 VITALS — DIASTOLIC BLOOD PRESSURE: 91 MMHG | HEART RATE: 90 BPM | OXYGEN SATURATION: 99 % | SYSTOLIC BLOOD PRESSURE: 155 MMHG

## 2024-07-02 DIAGNOSIS — M54.16 LUMBAR RADICULOPATHY: ICD-10-CM

## 2024-07-02 PROCEDURE — 62323 NJX INTERLAMINAR LMBR/SAC: CPT | Performed by: PAIN MEDICINE

## 2024-07-02 RX ORDER — TRIAMCINOLONE ACETONIDE 40 MG/ML
40 INJECTION, SUSPENSION INTRA-ARTICULAR; INTRAMUSCULAR ONCE
Status: COMPLETED | OUTPATIENT
Start: 2024-07-02 | End: 2024-07-02

## 2024-07-02 RX ADMIN — TRIAMCINOLONE ACETONIDE 40 MG: 40 INJECTION, SUSPENSION INTRA-ARTICULAR; INTRAMUSCULAR at 21:41

## 2024-07-02 ASSESSMENT — PAIN SCALES - GENERAL
PAINLEVEL: SEVERE PAIN (6)
PAINLEVEL: SEVERE PAIN (6)

## 2024-07-02 NOTE — NURSING NOTE
Discharge Information    IV Discontiued Time:  NA    Amount of Fluid Infused:  NA    Discharge Criteria = When patient returns to baseline or as per MD order    Consciousness:  Pt is fully awake    Circulation:  BP +/- 20% of pre-procedure level    Respiration:  Patient is able to breathe deeply    O2 Sat:  Patient is able to maintain O2 Sat >92% on room air    Activity:  Moves 4 extremities on command    Ambulation:  Patient is able to stand and walk or stand and pivot into wheelchair    Dressing:  Clean/dry or No Dressing    Notes:   Discharge instructions and AVS given to patient    Patient meets criteria for discharge?  YES    Admitted to PCU?  No    Responsible adult present to accompany patient home?  Yes    Signature/Title:    Caitlin Miranda RN  RN Care Coordinator  [facility]

## 2024-07-02 NOTE — PATIENT INSTRUCTIONS
[facility]   Procedure Discharge Instructions    Today you saw:    Dr. Issac Tapia,         You had a(n):  Lumbar epidural injection    Medications used for interlaminar LUIS: Lidocaine, Omnipaque, Kenalog, Bupivicaine, normal saline        Be cautious with walking. Numbness and/or weakness in the lower extremities may occur for up to 6-8 hours after the procedure due to effect of the local anesthetic  Do not drive for 6 hours. The effect of the local anesthetic could slow your reflexes.   You may resume your regular activities after 24 hours  Avoid strenuous activity for the first 24 hours  You may shower, however avoid swimming, tub baths or hot tubs for 24 hours following your procedure  You may have a mild to moderate increase in pain for several days following the injection.  It may take up to 14 days for the steroid medication to start working although you may feel the effect as early as a few days after the procedure.     You may use ice packs for 10-15 minutes, 3 to 4 times a day at the injection site for comfort  Do not use heat to painful areas for 6 to 8 hours. This will give the local anesthetic time to wear off and prevent you from accidentally burning your skin.   Unless you have been directed to avoid the use of anti-inflammatory medications (NSAIDS), you may use medications such as ibuprofen, Aleve or Tylenol for pain control if needed.   If you have diabetes, check your blood sugar more frequently than usual as your blood sugar may be higher than normal for 10-14 days following a steroid injection. Contact your doctor who manages your diabetes if your blood sugar is higher than usual  Possible side effects of steroids that you may experience include flushing, elevated blood pressure, increased appetite, mild headaches and restlessness.  All of these symptoms will get better with time.  If you experience any of the following, call the Pain Clinic during work hours  (Mon-Friday 8-4:30 pm) at [phone] or the Provider Line after hours at [phone]:  -Fever over 100 degree F  -Swelling, bleeding, redness, drainage, warmth at the injection site  -Progressive weakness or numbness in your legs  -Loss of bowel or bladder function  -Unusual headache not relieved by Tylenol or other pain reliever  -Unusual new onset of pain that is not improving

## 2024-07-02 NOTE — NURSING NOTE
Pre-procedure Intake  If YES to any questions or NO to having a   Please complete laminated checklist and leave on the computer keyboard for Provider, verbally inform provider if able.    For SCS Trial, RFA's or any sedation procedure:  Have you been fasting? NA  If yes, for how long?     Are you taking any any blood thinners such as Coumadin, Warfarin, Jantoven, Pradaxa Xarelto, Eliquis, Edoxaban, Enoxaparin, Lovenox, Heparin, Arixtra, Fondaparinux, or Fragmin? OR Antiplatelet medication such as Plavix, Brilinta, or Effient?   No   If yes, when did you take your last dose?     Do you take aspirin?  No  If cervical procedure, have you held aspirin for 6 days?   NA    Is the Pt taking any GLP-1 Antagonist (hold needed for sedation patients only)  (semaglutide (Ozempic, Wegovy), dulaglutide (Trulicity), exenatide ER (Bydureon), tirzepatide (Mounjaro), Liraglutide (Saxenda, Victoza), semaglutide (Rybelsus)     NA  If yes, when did you take your last dose?     Do you have any allergies to contrast dye, iodine, steroid and/or numbing medications?  NO    Are you currently taking antibiotics or have an active infection?  NO    Have you had a fever/elevated temperature within the past week? NO    Are you currently taking oral steroids? NO    Do you have a ? Yes    Are you pregnant or breastfeeding?  Not Applicable    Have you received any vaccinations in the last week? NO    Notify provider and RNs if systolic BP >170, diastolic BP >100, P >100 or O2 sats < 90%     Roseline Boyd MA  [facility]

## 2024-07-03 NOTE — PROGRESS NOTES
Pre procedure Diagnosis: Lumbar radiculopathy  Post procedure Diagnosis: Same  Procedure performed: L4-5 interlaminar epidural steroid injection   Anesthesia: none  Complications: nonw  Operators: Issac Tapia MD     Indications:   Per Etienne is a 58 year old male.  The patient has a history of lbp rad to his le.  Examination shows + slump.  he has tried conservative treatment including meds/pt.    MRI reviewed  Options/alternatives, benefits and risks were discussed with the patient including but not limited to bleeding, infection, no pain relief, tissue trauma, exposure to radiation, reaction to medications, spinal cord injury, dural puncture, weakness, numbness and headache.  Questions were answered to his satisfaction and he wishes to proceed. Voluntary informed consent was obtained and signed.     Vitals were reviewed: Yes  Allergies were reviewed:  Yes   Medications were reviewed:  Yes   Pre-procedure pain score: 6/10    Procedure:  The patient's medical history, medications, and allergies were reviewed and reconciled.  After obtaining signed informed consent, the patient was brought into the procedure suite and was placed in a prone position on the procedure table.   A Pause for the Cause was performed.  Patient was prepped and draped in the usual sterile fashion.     The L4-5 interspace was identified with AP fluoroscopy.  A total of 4ml of 1% lidocaine was used to anesthetize the skin and subcutaneous tissues for a  midline approach.    A 20gauge 3.5inch Touhy needle was advanced utilizing intermittent AP and Lateral fluoroscopy and air for loss of resistance.  The epidural space was encountered on the first pass without difficulties.  Aspiration for blood and CSF was negative.  Needle position was verified by injecting 1 ml of Omnipaque utilizing real-time fluoroscopy that showed good needle placement and epidural spread without signs of intravascular or intrathecal uptake.  Omnipaque wasted:   9 ml.    Then, after repeated negative aspiration for blood or CSF, a combination of Kenalog 40 mg, Bupivicaine 0.25% 2 ml, diluted with 3 ml of normal saline to a total injectate volume of 6 ml was injected into the epidural space in a slow and incremental fashion and the needle was restyletted and withdrawn.  All injected medications were preservative free.    The injection site was cleaned and a sterile dressing was applied.    The patient tolerated the procedure well without complications and was taken to the recovery room for continued observation.    Images were saved to PACS.    Post-procedure pain score: 6/10  Follow-up includes:   -f/u with referring provider     Issac Tapia MD  [facility]

## 2024-07-09 DIAGNOSIS — C61 PROSTATE CANCER (H): Primary | ICD-10-CM

## 2024-07-10 ENCOUNTER — MYC REFILL (OUTPATIENT)
Dept: PALLIATIVE MEDICINE | Facility: CLINIC | Age: 59
End: 2024-07-10
Payer: COMMERCIAL

## 2024-07-10 DIAGNOSIS — K59.03 THERAPEUTIC OPIOID INDUCED CONSTIPATION: ICD-10-CM

## 2024-07-10 DIAGNOSIS — C79.51 PROSTATE CANCER METASTATIC TO BONE (H): ICD-10-CM

## 2024-07-10 DIAGNOSIS — C61 PROSTATE CANCER METASTATIC TO BONE (H): ICD-10-CM

## 2024-07-10 DIAGNOSIS — G89.3 CANCER ASSOCIATED PAIN: ICD-10-CM

## 2024-07-10 DIAGNOSIS — T40.2X5A THERAPEUTIC OPIOID INDUCED CONSTIPATION: ICD-10-CM

## 2024-07-10 NOTE — TELEPHONE ENCOUNTER
Refills have been requested for the following medications:         SENNA-docusate sodium (SENNA S) 8.6-50 MG tablet [Chanel Bruno]         morphine (MS CONTIN) 15 MG CR tablet [Chanel Bruno]         HYDROcodone-acetaminophen (NORCO) 5-325 MG tablet [Chanel Bruno]     Preferred pharmacy: [id number] [place], [place] - [address] [facility]

## 2024-07-10 NOTE — TELEPHONE ENCOUNTER
Received call from patient requesting refill(s) of     SENNA-DOCUSATE SODIUM 8.6-50 MG PO TABS  Dispense: June. 11, 2024       MORPHINE SULFATE ER 15 MG PO TBCR  Dispense: June. 11, 2024       HYDROCODONE-ACETAMINOPHEN 5-325 MG PO TABS  Dispense: June. 11, 2024             Patient's last office/virtual visit by prescribing provider on: May. 30, 2024   Next office/virtual appointment scheduled for: Sep. 5, 2024       UDT: Nov. 30, 2023   CSA: Nov. 30, 2023         E-prescribe to    [facility] [id number] [place] [address]      Will route to nursing [place] for review and preparation of prescription(s).

## 2024-07-11 RX ORDER — SENNA AND DOCUSATE SODIUM 50; 8.6 MG/1; MG/1
1 TABLET, FILM COATED ORAL 2 TIMES DAILY
Qty: 60 TABLET | Refills: 3 | Status: SHIPPED | OUTPATIENT
Start: 2024-07-11

## 2024-07-11 RX ORDER — HYDROCODONE BITARTRATE AND ACETAMINOPHEN 5; 325 MG/1; MG/1
1 TABLET ORAL 3 TIMES DAILY PRN
Qty: 90 TABLET | Refills: 0 | Status: SHIPPED | OUTPATIENT
Start: 2024-07-11 | End: 2024-08-08

## 2024-07-11 RX ORDER — MORPHINE SULFATE 15 MG/1
15 TABLET, FILM COATED, EXTENDED RELEASE ORAL EVERY 12 HOURS
Qty: 60 TABLET | Refills: 0 | Status: SHIPPED | OUTPATIENT
Start: 2024-07-11 | End: 2024-08-08

## 2024-07-11 NOTE — TELEPHONE ENCOUNTER
Medication refill information reviewed.     Both meds last due:  Fill 6/11/24, start 6/13/24,   Due date:  7/13/24      Prescriptions prepped for review.     Shefali RN-BSN  [facility]

## 2024-07-11 NOTE — TELEPHONE ENCOUNTER
Chart reviewed - Request appears appropriate. Refilled for 30 day supply.     Chanel Bruno DNP, APRN, AGNP-C  [facility]

## 2024-08-01 ENCOUNTER — PATIENT OUTREACH (OUTPATIENT)
Dept: CARE COORDINATION | Facility: CLINIC | Age: 59
End: 2024-08-01
Payer: COMMERCIAL

## 2024-08-07 DIAGNOSIS — C61 PROSTATE CANCER (H): Primary | ICD-10-CM

## 2024-08-08 ENCOUNTER — MYC REFILL (OUTPATIENT)
Dept: PALLIATIVE MEDICINE | Facility: CLINIC | Age: 59
End: 2024-08-08
Payer: COMMERCIAL

## 2024-08-08 DIAGNOSIS — C79.51 PROSTATE CANCER METASTATIC TO BONE (H): ICD-10-CM

## 2024-08-08 DIAGNOSIS — C61 PROSTATE CANCER METASTATIC TO BONE (H): ICD-10-CM

## 2024-08-08 DIAGNOSIS — G89.3 CANCER ASSOCIATED PAIN: ICD-10-CM

## 2024-08-08 RX ORDER — HYDROCODONE BITARTRATE AND ACETAMINOPHEN 5; 325 MG/1; MG/1
1 TABLET ORAL 3 TIMES DAILY PRN
Qty: 90 TABLET | Refills: 0 | Status: SHIPPED | OUTPATIENT
Start: 2024-08-08 | End: 2024-09-05

## 2024-08-08 RX ORDER — MORPHINE SULFATE 15 MG/1
15 TABLET, FILM COATED, EXTENDED RELEASE ORAL EVERY 12 HOURS
Qty: 60 TABLET | Refills: 0 | Status: SHIPPED | OUTPATIENT
Start: 2024-08-08 | End: 2024-09-05

## 2024-08-08 NOTE — TELEPHONE ENCOUNTER
Received call from patient requesting refill(s) of   MORPHINE SULFATE ER 15 MG PO TBCR  Dispense: July. 11, 2024       HYDROCODONE-ACETAMINOPHEN 5-325 MG PO TABS  Dispense: July, 6. 2024           Patient's last office/virtual visit by prescribing provider on: May. 30, 2024   Next office/virtual appointment scheduled for: Sep. 5, 2024         UDT: Nov. 30, 2023   CSA: Nov. 30, 2023     E-prescribe to    [place] [address] [place] [address]    Will route to nursing [place] for review and preparation of prescription(s).

## 2024-08-08 NOTE — TELEPHONE ENCOUNTER
Medication refill information reviewed.     Due date for MORPHINE SULFATE ER 15 MG PO TBCR    HYDROCODONE-ACETAMINOPHEN 5-325 MG PO TABS is 8/12/2024     Prescriptions prepped for review.     Will route to provider.

## 2024-08-08 NOTE — TELEPHONE ENCOUNTER
Chart reviewed - Request appears appropriate. Refilled for 30 day supply.     Chanel Bruno DNP, APRN, AGNP-C  [facility]

## 2024-08-15 ENCOUNTER — PATIENT OUTREACH (OUTPATIENT)
Dept: CARE COORDINATION | Facility: CLINIC | Age: 59
End: 2024-08-15
Payer: COMMERCIAL

## 2024-08-31 ASSESSMENT — PAIN SCALES - PAIN ENJOYMENT GENERAL ACTIVITY SCALE (PEG)
PEG_TOTALSCORE: 6.33
AVG_PAIN_PASTWEEK: 7
INTERFERED_ENJOYMENT_LIFE: 6
INTERFERED_GENERAL_ACTIVITY: 6

## 2024-09-04 NOTE — PROGRESS NOTES
[facility]     Date of visit: 9/5/2024      Assessment:   Per Etienne is a 59 year old male with a past medical history significant for prostate cancer with bone metastasis, pulmonary nodules, lumbar stenosis, HTN, gout who presents with complaints of cancer associated pain.      1. Low back pain with LLE radic, neuropathy bilat feet, pain of multiple joints - His back pain with left sided radicular pain with L5 distribution that has been present for many years. He has been referred for procedure by his PCP for repeat L4/5 LUIS (Regina/Obed), which historically have given him significant symptom relief, although he did not appreciate as much benefit from the most recent LUIS on 8/2/22. Lumbar CT from 8/2021 demonstrated severe left neural foraminal narrowing at L4-L5 and moderate to severe right at L3-4, additional multilevel spinal canal stenosis and neural foraminal stenosis noted. I suspect his low back pain is associated with multiple factors, including underlying degenerative changes of lumbar spine, bone metastasis, and likely some myofascial component. Neuropathic symptoms in feet and generalized joint pain is likely secondary effects from cancer treatment and bone metastasis.   2. Mental Health - the patient's mental health concerns, specifically prostate cancer with bone mets, affect his experience of pain and contribute to his clinically significant distress. He may benefit from more generalized health psychology to support chronic illness/cancer management and will consider discussing at future follow up.      Assigned to [facility] nursing team.     Visit Diagnoses:  1. Chronic pain syndrome    2. Cancer associated pain    3. Encounter for monitoring opioid maintenance therapy    4. Prostate cancer metastatic to bone (H)    5. Therapeutic opioid induced constipation    6. Chronic, continuous use of opioids    7. Lumbar radiculopathy    8. Malignant neoplasm of prostate (H)         Plan:  Diagnosis reviewed, treatment option addressed, and risk/benifits discussed.  Self-care instructions given.  I am recommending a multidisciplinary treatment plan to help this patient better manage their pain.                  1.  Pain Physical Therapy:  WALT Thompson is very active. Despite the pain, he continues to work full time as a , which is somewhat labor intensive (frequently using ladder).               2.  Pain Psychologist to address relaxation, behavioral change, coping style, and other factors important to improvement.  N/A              3.  Diagnostic Studies:  No new orders today.               4.  Medication Management:   Continue MS Contin to 15 mg every 12 hours. He continues to appreciate benefit, stable baseline pain, supports functioning, continues to work full time. Refilled today for #60 tabs.   Continue hydrocodone 5-325 three times daily as needed for breakthrough pain. He does not use very frequently, usually only takes 1 tab daily as needed. He continues to appreciate benefit for breakthrough pain, no concerns for continuation. Refilled today for #90 tabs to last 30 days.   Continue Senna S to 1 tab twice daily. He reports improvement with OIC, with increased frequency to twice daily. He reports intermittent constipation every 2-3 weeks that lasts for about 2 days. He usually increases fiber intake (salads) and this helps. Recommend using Miralax 1-2 times daily as needed, if current regimen not effective.  CSA/UDS - completed on 11/30/23. Naloxone refill sent into pharmacy on 11/30/23. Advised to  new supply and dispose of old supply at home.               5.  Potential procedures: Repeat L4-5 LUIS on 7/2/24. Appreciates improvement in pain around 75% pain relief that lasts 3+ months.               7.  Follow up with TEREZA Sanchez CNP in around 3-4 months by end of December.     Review of Electronic Chart: Today I have also reviewed available medical  information in the patient's medical record at [facility] ([facility]) and Care Everywhere (if available), including relevant provider notes, laboratory work, and imaging.     Chanel Bruno, JANET, APRN, AGNP-C  [facility]     -------------------------------------------------    Subjective:    Chief complaint:   Chief Complaint   Patient presents with    Pain       Interval history:  Per Etienne is a 59 year old male last seen on 5/30/24.  They are a patient of mine seen in follow up.     Recommendations/plan at the last visit included:              1.  Pain Physical Therapy:  WALT Thompson is very active. Despite the pain, he continues to work full time as a , which is somewhat labor intensive (frequently using ladder).               2.  Pain Psychologist to address relaxation, behavioral change, coping style, and other factors important to improvement.  WALT Thompson may possibly benefit from support for his chronic health conditions overall, though he does seem to manage fairly well. He continues to work full time and likes to stay active.               3.  Diagnostic Studies:  Lumbar MRI ordered in between visits due to insurance requirement for repeat L4-5 LUIS. Scheduled for 6/19/24.               4.  Medication Management:   Continue MS Contin to 15 mg every 12 hours. He continues to appreciate benefit, stable baseline pain, supports functioning, continues to work full time. Refilled today for #60 tabs.   Continue hydrocodone 5-325 three times daily as needed for breakthrough pain. He does not use very frequently, usually only takes 1 tab daily as needed. He continues to appreciate benefit for breakthrough pain, no concerns for continuation. Refilled today for #90 tabs to last 30 days.   Continue Senna S to 1 tab twice daily. He reports improvement with OIC, with increased frequency to twice daily. He reports intermittent constipation every 2-3 weeks that lasts for about  2 days. He usually increases fiber intake (salads) and this helps. Recommend using Miralax 1-2 times daily as needed, if current regimen not effective. Prescription sent into pharmacy today, advised he may purchase over the counter if insurance does not cover medication.   CSA/UDS - completed on 11/30/23. Naloxone refill sent into pharmacy on 11/30/23. Advised to  new supply and dispose of old supply at home.               5.  Potential procedures: Repeat L4-5 LUIS ordered in between visits, delayed scheduling due to insurance requiring updated lumbar MRI, now scheduled on 7/2/24. Last completed on on 1/11/24. Appreciates improvement in pain around 75% pain relief that lasts around 3 months.               7.  Follow up with TEREZA Sanchez CNP in around 3-4 months by 9/30/24.     Since his last visit, Per Etienne reports:  --He reports pain is somewhat increased today, was cutting down trees in his yard on Monday.   -He pushes himself sometimes with activity, especially with yard work.   -Current meds MS Contin 15 mg BID and hydrocodone 5-325 up to 3 tabs/day.   -Last L4-5 interlaminar epidural steroid injection on 7/2/24 with Dr. Tapia.   -He reports repeat LESI is very helpful. He walked a lot when he was in [place] for his daughter's wedding. He thinks the injection helped support him with increased activity.   -He plans to retired in July 2026. He states they offered him to go part time at age 61 for 5 years, like a detention phase out.   -He is considering options for detention and also thinking about spending time with his family.   -No concerns with medication regimen. Some days he has very little to no pain. He usually takes 2 tabs/day of hydrocodone. Will use 3 tabs/day when he is more active.         PEG: A Three-Item Scale Assessing Pain Intensity and Interference    What number best describes your PAIN ON AVERAGE in the past week? 7    What number best describes how, during the past  week, pain has interfered with your ENJOYMENT OF LIFE? 6    What number best describes how, during the past week, pain has interfered with your GENERAL ACTIVITY? 6    PEG Total Score: 6.33    Marielena EE, Mc KA, Clover MJ, Zack TA, Mike J, Hilda JM, Asa SM, Rui K. Development and initial validation of the PEG, a 3-item scale assessing pain intensity and interference. Journal of General Internal Medicine. 2009 [place];24:733-738.        Interval history from last visit on 5/30/24:  -His pain is about the same/stable overall.   -He continues to take MS Contin and hydrocodone.   -Appreciates benefit, no side effects.   -He is scheduled for repeat LESI on 6/16 with Dr. Tapia.   -Insurance is requiring updated lumbar MRI, which I ordered today.   -He is aware he needs to complete MRI prior to procedure appt.   -His daughter and son are both expecting babies.   -His daughter is getting  in August in [place]. They moved up the wedding due to pregnancy.   -Both babies are due within 1 day of each other.   Pain Information:              Pain rating: averages 7/10 on a 0-10 scale.        Current Pain Treatments:                   MS Contin 15 mg BID              Hydrocodone 5-325 mg TID PRN                     L4-5 LUIS last on 7/2/24 Dr. Taipa       Current MME: 45     Review of [place] Prescription Monitoring Program (): No concern for abuse or misuse of controlled medications based on this report. Reviewed - appears appropriate.      Annual Controlled Substance Agreement/UDS due date: Completed on 11/30/23.      Past pain treatments:  LESI - helpful       Medications:  Current Outpatient Medications   Medication Sig Dispense Refill    apalutamide (ERLEADA) 60 MG tablet Take 4 tablets (240 mg) by mouth daily for 30 days 120 tablet 0    denosumab (XGEVA) 120 MG/1.7ML SOLN injection Inject 1.7 mLs (120 mg) Subcutaneous every 3 months 1.7 mL 0    HYDROcodone-acetaminophen (NORCO) 5-325 MG tablet Take 1  tablet by mouth 3 times daily as needed for breakthrough pain or severe pain. Fill 9/9/24, start 9/11/24 90 tablet 0    leuprolide (ELIGARD) 45 MG kit Inject 45 mg Subcutaneous every 6 months      morphine (MS CONTIN) 15 MG CR tablet Take 1 tablet (15 mg) by mouth every 12 hours. #60 tabs to last 30 days. Fill 9/9/24, start 9/11/24. 60 tablet 0    naloxone (NARCAN) 4 MG/0.1ML nasal spray Spray 1 spray (4 mg) into one nostril alternating nostrils as needed for opioid reversal every 2-3 minutes until assistance arrives 0.2 mL 0    omeprazole (PRILOSEC) 40 MG DR capsule Take 1 capsule (40 mg) by mouth daily at least 30 min before breakfast 90 capsule 3    polyethylene glycol (MIRALAX) 17 GM/Dose powder Take 17 g (1 Capful) by mouth 2 times daily as needed for constipation 578 g 1    SENNA-docusate sodium (SENNA S) 8.6-50 MG tablet Take 1 tablet by mouth 2 times daily 60 tablet 3    UNABLE TO FIND 1 tablet daily MEDICATION NAME: C, D, Zinc combination supplement      aspirin (ASA) 81 MG chewable tablet Take 1 tablet (81 mg) by mouth daily (Patient not taking: Reported on 7/2/2024)         Medical History: any changes in medical history since they were last seen? No      Objective:    Physical Exam:  Blood pressure (!) 144/92, pulse 89.  Constitutional: Well developed, well nourished, appears stated age.  Gait: Intact   HEENT: Head atraumatic, normocephalic. Eyes without conjunctival injection or jaundice. Oropharynx clear. Neck supple. No obvious neck masses.  Skin: No rash, lesions, or petechiae of exposed skin.   Psychiatric/mental status: Alert, without lethargy or stupor. Speech fluent. Appropriate affect. Mood normal. Able to follow commands without difficulty.     Diagnostic Tests/Imaging/Labs:  CMP on 5/30/24 - hepatic and renal function intact, GFR >90    BILLING TIME DOCUMENTATION:   The total TIME spent on this patient on the date of the encounter/appointment was 35 minutes.      TOTAL TIME includes:   Time  spent preparing to see the patient (reviewing records and tests)   Time spent face to face (or over the phone) with the patient   Time spent ordering tests, medications, procedures and referrals   Time spent Referring and communicating with other healthcare professionals   Time spent documenting clinical information in Epic

## 2024-09-05 ENCOUNTER — INFUSION THERAPY VISIT (OUTPATIENT)
Dept: INFUSION THERAPY | Facility: CLINIC | Age: 59
End: 2024-09-05
Attending: NURSE PRACTITIONER
Payer: COMMERCIAL

## 2024-09-05 ENCOUNTER — OFFICE VISIT (OUTPATIENT)
Dept: PALLIATIVE MEDICINE | Facility: CLINIC | Age: 59
End: 2024-09-05
Payer: COMMERCIAL

## 2024-09-05 ENCOUNTER — ONCOLOGY VISIT (OUTPATIENT)
Dept: ONCOLOGY | Facility: CLINIC | Age: 59
End: 2024-09-05
Attending: NURSE PRACTITIONER
Payer: COMMERCIAL

## 2024-09-05 VITALS
TEMPERATURE: 97.9 F | HEART RATE: 86 BPM | SYSTOLIC BLOOD PRESSURE: 145 MMHG | RESPIRATION RATE: 16 BRPM | HEIGHT: 67 IN | BODY MASS INDEX: 28.72 KG/M2 | DIASTOLIC BLOOD PRESSURE: 90 MMHG | OXYGEN SATURATION: 97 %

## 2024-09-05 VITALS — SYSTOLIC BLOOD PRESSURE: 144 MMHG | DIASTOLIC BLOOD PRESSURE: 92 MMHG | HEART RATE: 89 BPM

## 2024-09-05 DIAGNOSIS — C79.51 MALIGNANT NEOPLASM METASTATIC TO BONE (H): ICD-10-CM

## 2024-09-05 DIAGNOSIS — R10.2 PELVIC PAIN IN MALE: ICD-10-CM

## 2024-09-05 DIAGNOSIS — Z51.81 ENCOUNTER FOR MONITORING OPIOID MAINTENANCE THERAPY: ICD-10-CM

## 2024-09-05 DIAGNOSIS — Z13.220 SCREENING CHOLESTEROL LEVEL: Primary | ICD-10-CM

## 2024-09-05 DIAGNOSIS — C79.51 PROSTATE CANCER METASTATIC TO BONE (H): ICD-10-CM

## 2024-09-05 DIAGNOSIS — Z13.220 SCREENING CHOLESTEROL LEVEL: ICD-10-CM

## 2024-09-05 DIAGNOSIS — M54.16 LUMBAR RADICULOPATHY: ICD-10-CM

## 2024-09-05 DIAGNOSIS — K59.03 THERAPEUTIC OPIOID INDUCED CONSTIPATION: ICD-10-CM

## 2024-09-05 DIAGNOSIS — C61 PROSTATE CANCER (H): ICD-10-CM

## 2024-09-05 DIAGNOSIS — G89.4 CHRONIC PAIN SYNDROME: Primary | ICD-10-CM

## 2024-09-05 DIAGNOSIS — C61 PROSTATE CANCER METASTATIC TO BONE (H): ICD-10-CM

## 2024-09-05 DIAGNOSIS — C61 MALIGNANT NEOPLASM OF PROSTATE (H): ICD-10-CM

## 2024-09-05 DIAGNOSIS — C79.51 PROSTATE CANCER METASTATIC TO BONE (H): Primary | ICD-10-CM

## 2024-09-05 DIAGNOSIS — F11.90 CHRONIC, CONTINUOUS USE OF OPIOIDS: ICD-10-CM

## 2024-09-05 DIAGNOSIS — R91.8 PULMONARY NODULES: ICD-10-CM

## 2024-09-05 DIAGNOSIS — C61 PROSTATE CANCER METASTATIC TO BONE (H): Primary | ICD-10-CM

## 2024-09-05 DIAGNOSIS — G89.3 CANCER ASSOCIATED PAIN: ICD-10-CM

## 2024-09-05 DIAGNOSIS — T40.2X5A THERAPEUTIC OPIOID INDUCED CONSTIPATION: ICD-10-CM

## 2024-09-05 DIAGNOSIS — Z79.891 ENCOUNTER FOR MONITORING OPIOID MAINTENANCE THERAPY: ICD-10-CM

## 2024-09-05 LAB
ALBUMIN SERPL BCG-MCNC: 4.4 G/DL (ref 3.5–5.2)
ALP SERPL-CCNC: 89 U/L (ref 40–150)
ALT SERPL W P-5'-P-CCNC: 22 U/L (ref 0–70)
ANION GAP SERPL CALCULATED.3IONS-SCNC: 10 MMOL/L (ref 7–15)
AST SERPL W P-5'-P-CCNC: 24 U/L (ref 0–45)
BASOPHILS # BLD AUTO: 0.1 10E3/UL (ref 0–0.2)
BASOPHILS NFR BLD AUTO: 1 %
BILIRUB SERPL-MCNC: 0.5 MG/DL
BUN SERPL-MCNC: 18.5 MG/DL (ref 8–23)
CALCIUM SERPL-MCNC: 10.3 MG/DL (ref 8.8–10.4)
CHLORIDE SERPL-SCNC: 105 MMOL/L (ref 98–107)
CHOLEST SERPL-MCNC: 249 MG/DL
CREAT SERPL-MCNC: 1.06 MG/DL (ref 0.67–1.17)
EGFRCR SERPLBLD CKD-EPI 2021: 80 ML/MIN/1.73M2
EOSINOPHIL # BLD AUTO: 0 10E3/UL (ref 0–0.7)
EOSINOPHIL NFR BLD AUTO: 0 %
ERYTHROCYTE [DISTWIDTH] IN BLOOD BY AUTOMATED COUNT: 12.1 % (ref 10–15)
GLUCOSE SERPL-MCNC: 129 MG/DL (ref 70–99)
HCO3 SERPL-SCNC: 27 MMOL/L (ref 22–29)
HCT VFR BLD AUTO: 40.4 % (ref 40–53)
HDLC SERPL-MCNC: 78 MG/DL
HGB BLD-MCNC: 13.6 G/DL (ref 13.3–17.7)
IMM GRANULOCYTES # BLD: 0.1 10E3/UL
IMM GRANULOCYTES NFR BLD: 0 %
LDLC SERPL CALC-MCNC: 133 MG/DL
LYMPHOCYTES # BLD AUTO: 2.1 10E3/UL (ref 0.8–5.3)
LYMPHOCYTES NFR BLD AUTO: 14 %
MCH RBC QN AUTO: 31.2 PG (ref 26.5–33)
MCHC RBC AUTO-ENTMCNC: 33.7 G/DL (ref 31.5–36.5)
MCV RBC AUTO: 93 FL (ref 78–100)
MONOCYTES # BLD AUTO: 0.9 10E3/UL (ref 0–1.3)
MONOCYTES NFR BLD AUTO: 6 %
NEUTROPHILS # BLD AUTO: 11.9 10E3/UL (ref 1.6–8.3)
NEUTROPHILS NFR BLD AUTO: 79 %
NONHDLC SERPL-MCNC: 171 MG/DL
NRBC # BLD AUTO: 0 10E3/UL
NRBC BLD AUTO-RTO: 0 /100
PLATELET # BLD AUTO: 225 10E3/UL (ref 150–450)
POTASSIUM SERPL-SCNC: 4.3 MMOL/L (ref 3.4–5.3)
PROT SERPL-MCNC: 7.1 G/DL (ref 6.4–8.3)
PSA SERPL DL<=0.01 NG/ML-MCNC: <0.01 NG/ML (ref 0–4.5)
RBC # BLD AUTO: 4.36 10E6/UL (ref 4.4–5.9)
SODIUM SERPL-SCNC: 142 MMOL/L (ref 135–145)
TRIGL SERPL-MCNC: 189 MG/DL
WBC # BLD AUTO: 15.1 10E3/UL (ref 4–11)

## 2024-09-05 PROCEDURE — 99214 OFFICE O/P EST MOD 30 MIN: CPT

## 2024-09-05 PROCEDURE — 85049 AUTOMATED PLATELET COUNT: CPT

## 2024-09-05 PROCEDURE — 96402 CHEMO HORMON ANTINEOPL SQ/IM: CPT

## 2024-09-05 PROCEDURE — 250N000011 HC RX IP 250 OP 636: Performed by: NURSE PRACTITIONER

## 2024-09-05 PROCEDURE — 99213 OFFICE O/P EST LOW 20 MIN: CPT | Performed by: NURSE PRACTITIONER

## 2024-09-05 PROCEDURE — 36415 COLL VENOUS BLD VENIPUNCTURE: CPT

## 2024-09-05 PROCEDURE — 84403 ASSAY OF TOTAL TESTOSTERONE: CPT

## 2024-09-05 PROCEDURE — 96372 THER/PROPH/DIAG INJ SC/IM: CPT | Performed by: NURSE PRACTITIONER

## 2024-09-05 PROCEDURE — 99215 OFFICE O/P EST HI 40 MIN: CPT | Performed by: NURSE PRACTITIONER

## 2024-09-05 PROCEDURE — 80061 LIPID PANEL: CPT

## 2024-09-05 PROCEDURE — 99207 PR NO CHARGE LOS: CPT

## 2024-09-05 PROCEDURE — 80053 COMPREHEN METABOLIC PANEL: CPT

## 2024-09-05 PROCEDURE — 84153 ASSAY OF PSA TOTAL: CPT

## 2024-09-05 RX ORDER — HYDROCODONE BITARTRATE AND ACETAMINOPHEN 5; 325 MG/1; MG/1
1 TABLET ORAL 3 TIMES DAILY PRN
Qty: 90 TABLET | Refills: 0 | Status: SHIPPED | OUTPATIENT
Start: 2024-09-05

## 2024-09-05 RX ORDER — MORPHINE SULFATE 15 MG/1
15 TABLET, FILM COATED, EXTENDED RELEASE ORAL EVERY 12 HOURS
Qty: 60 TABLET | Refills: 0 | Status: SHIPPED | OUTPATIENT
Start: 2024-09-05

## 2024-09-05 RX ADMIN — LEUPROLIDE ACETATE 22.5 MG: KIT at 15:22

## 2024-09-05 RX ADMIN — DENOSUMAB 120 MG: 120 INJECTION SUBCUTANEOUS at 15:22

## 2024-09-05 ASSESSMENT — PAIN SCALES - GENERAL
PAINLEVEL: SEVERE PAIN (7)
PAINLEVEL: EXTREME PAIN (8)

## 2024-09-05 NOTE — PROGRESS NOTES
Oncology Follow Up Visit: September 5, 2024     Oncologist: Dr Garcia  PCP: Per Chen    Diagnosis: Metastatic hormone sensitive prostate cancer  Per Etienne is a 58 yo male who presented to his PCP in December 2019 with slow urinary stream.  PSA = 124. On 1/8/2020 CT abdomen pelvis showed groundglass opacity in the left lower lobe,  dystrophic calcification of the prostate gland, and a fatty liver.  Same-day bone scan was negative. On January 10, 2010 prostate biopsy showed on the left Boulder score 4+3 involving 7 cores and on the right Boulder score 3+4 involving 6 cores, with positive perineural invasion.  He proceeded to undergo RRP by Dr. Colmenares on 2/24/2020.  Pathology revealed acinar adenocarcinoma Robyn 4+3 with focal ROBERTO, bladder neck base invasion, positive SVI, positive PNI, and multiple positive margins.  There was no LVSI and 0 out of 3 lymph nodes removed were involved with tumor; pT3bN0.  Treatment:   4/20/2020 received Eligard.     5/21/2020 MRI of the Prostate= 14 x 9 mm T2 intermediate structure in the prostatectomy bed at the left aspect of urinary bladder neck with restricted diffusion in the DWI images and early enhancement in the contrast  enhanced images. This was concerning for locally recurrent/residual prostatic cancer.  There was a T2 hypointense structure in the in the area of removed right seminal vesicle measuring 13 x 11 mm  demonstrating restricted  diffusion in the DWI images. This may represent a recurrent/residual tumor. There were 2 bone metastases in the left pubic inferior ramus and inferior aspect of left pubic body.There is a 7 mm suspicious lymph nodes in the right obturator area  corresponding to to FACBC avid lymph node on the same date PET/CT  5/20/2020 PET/CT showed:  1.  2 foci of increased uptake in the left ischium, with underlying  sclerotic lesion, and symphysis pubis, these changes are indicative of  active prostate cancer metastasis.  2. Low-level  increased uptake in 3 lymphnodes along the right external  and internal iliac nodes, the most suspicious of which is the deep  obturator.  3. Low-level increased uptake in the prostatectomy bed slightly to the  left side of the urethra, if clinically necessary, endoscopy would  would be necessary for confirmation of this being metastasis.  PSA was down to 27.7 on 4/21/2020.   4/20/2020 he received an Eligard injection.    6/12/2020 started Apalutamide(Erleada)  Bone scan 1/22/2021: no bone mets.    Interval History: Mr. Etienne is seen alone today in clinic with for review for continuation of Apalutamide / Leuprolide/ Xgeva.  Pt feeling well without new symptoms- feels all is stable for now. Missed one dose of medication recently.   Neuropathy to the feet worse with time.   Pain to pelvis and  low back- seeing pain and palliative care- on MS Contin 15mg bid with Norco up to 3 x daily as needed and this is working well.Gets Lumabr injections for back pain. Uses marijuana prn   Stays active with job as  and feels he is able to continue with job for now  Bowel and bladder are normal.  Denies fevers, illness, headaches, chest pain, SOB.   Rest of comprehensive and complete ROS is reviewed and is negative.   Past Medical History:   Diagnosis Date    Gout     Hypertension goal BP (blood pressure) < 140/90 08/01/2022    Lumbar stenosis     Prostate cancer (H) 01/10/2020     Current Outpatient Medications   Medication Sig Dispense Refill    apalutamide (ERLEADA) 60 MG tablet Take 4 tablets (240 mg) by mouth daily for 30 days 120 tablet 0    denosumab (XGEVA) 120 MG/1.7ML SOLN injection Inject 1.7 mLs (120 mg) Subcutaneous every 3 months 1.7 mL 0    HYDROcodone-acetaminophen (NORCO) 5-325 MG tablet Take 1 tablet by mouth 3 times daily as needed for breakthrough pain or severe pain. Fill 9/9/24, start 9/11/24 90 tablet 0    leuprolide (ELIGARD) 45 MG kit Inject 45 mg Subcutaneous every 6 months      morphine (MS  "CONTIN) 15 MG CR tablet Take 1 tablet (15 mg) by mouth every 12 hours. #60 tabs to last 30 days. Fill 9/9/24, start 9/11/24. 60 tablet 0    naloxone (NARCAN) 4 MG/0.1ML nasal spray Spray 1 spray (4 mg) into one nostril alternating nostrils as needed for opioid reversal every 2-3 minutes until assistance arrives 0.2 mL 0    omeprazole (PRILOSEC) 40 MG DR capsule Take 1 capsule (40 mg) by mouth daily at least 30 min before breakfast 90 capsule 3    polyethylene glycol (MIRALAX) 17 GM/Dose powder Take 17 g (1 Capful) by mouth 2 times daily as needed for constipation 578 g 1    SENNA-docusate sodium (SENNA S) 8.6-50 MG tablet Take 1 tablet by mouth 2 times daily 60 tablet 3    UNABLE TO FIND 1 tablet daily MEDICATION NAME: C, D, Zinc combination supplement       No current facility-administered medications for this visit.     Facility-Administered Medications Ordered in Other Visits   Medication Dose Route Frequency Provider Last Rate Last Admin    leuprolide (LUPRON DEPOT) kit 22.5 mg  22.5 mg Intramuscular Q90 Days Ania Jensen, APRN CNP   22.5 mg at 09/05/24 1522     No Known Allergies    Physical Exam:BP (!) 145/90 (BP Location: Left arm)   Pulse 86   Temp 97.9  F (36.6  C) (Oral)   Resp 16   Ht 1.702 m (5' 7.01\")   SpO2 97%   BMI 28.72 kg/m    Constitutional: Alert, healthy, and in no distress with good movement on own.   ENT: Eyes bright, No mouth sores  Neck: Supple, No adenopathy.  Cardiac: Heart rate and rhythm is regular with normal S1 and S2 without murmur  Respiratory: Breathing easy. Lung sounds clear to auscultation  Abdomen: Soft, non-tender, BS normal. No masses or organomegaly  MS: Muscle tone normal- moving well on own without signs of pain, extremities normal with no edema.   Skin: No suspicious lesions or rashes  Neuro: Sensory grossly WNL, gait normal.   Lymph: Normal ant/post cervical, axillary, supraclavicular nodes  Psych: Mentation appears normal and affect normal/bright with good " conversation.    Laboratory Results:   Results for orders placed or performed in visit on 09/05/24   Comprehensive metabolic panel     Status: Abnormal   Result Value Ref Range    Sodium 142 135 - 145 mmol/L    Potassium 4.3 3.4 - 5.3 mmol/L    Carbon Dioxide (CO2) 27 22 - 29 mmol/L    Anion Gap 10 7 - 15 mmol/L    Urea Nitrogen 18.5 8.0 - 23.0 mg/dL    Creatinine 1.06 0.67 - 1.17 mg/dL    GFR Estimate 80 >60 mL/min/1.73m2    Calcium 10.3 8.8 - 10.4 mg/dL    Chloride 105 98 - 107 mmol/L    Glucose 129 (H) 70 - 99 mg/dL    Alkaline Phosphatase 89 40 - 150 U/L    AST 24 0 - 45 U/L    ALT 22 0 - 70 U/L    Protein Total 7.1 6.4 - 8.3 g/dL    Albumin 4.4 3.5 - 5.2 g/dL    Bilirubin Total 0.5 <=1.2 mg/dL   PSA tumor marker     Status: Normal   Result Value Ref Range    PSA Tumor Marker <0.01 0.00 - 4.50 ng/mL    Narrative    This result is obtained using the Roche Elecsys total PSA method on the esteban e601 immunoassay analyzer. Results obtained with different assay methods or kits cannot be used interchangeably.  This result is obtained using the Roche Elecsys total PSA method on the esteban e402 Pure immunoassay analyzer. Results obtained with different assay methods or kits cannot be used interchangeably   CBC with platelets and differential     Status: Abnormal   Result Value Ref Range    WBC Count 15.1 (H) 4.0 - 11.0 10e3/uL    RBC Count 4.36 (L) 4.40 - 5.90 10e6/uL    Hemoglobin 13.6 13.3 - 17.7 g/dL    Hematocrit 40.4 40.0 - 53.0 %    MCV 93 78 - 100 fL    MCH 31.2 26.5 - 33.0 pg    MCHC 33.7 31.5 - 36.5 g/dL    RDW 12.1 10.0 - 15.0 %    Platelet Count 225 150 - 450 10e3/uL    % Neutrophils 79 %    % Lymphocytes 14 %    % Monocytes 6 %    % Eosinophils 0 %    % Basophils 1 %    % Immature Granulocytes 0 %    NRBCs per 100 WBC 0 <1 /100    Absolute Neutrophils 11.9 (H) 1.6 - 8.3 10e3/uL    Absolute Lymphocytes 2.1 0.8 - 5.3 10e3/uL    Absolute Monocytes 0.9 0.0 - 1.3 10e3/uL    Absolute Eosinophils 0.0 0.0 - 0.7  10e3/uL    Absolute Basophils 0.1 0.0 - 0.2 10e3/uL    Absolute Immature Granulocytes 0.1 <=0.4 10e3/uL    Absolute NRBCs 0.0 10e3/uL   Lipid panel reflex to direct LDL Non-fasting     Status: Abnormal   Result Value Ref Range    Cholesterol 249 (H) <200 mg/dL    Triglycerides 189 (H) <150 mg/dL    Direct Measure HDL 78 >=40 mg/dL    LDL Cholesterol Calculated 133 (H) <=100 mg/dL    Non HDL Cholesterol 171 (H) <130 mg/dL    Narrative    Cholesterol  Desirable:  <200 mg/dL    Triglycerides  Normal:  Less than 150 mg/dL  Borderline High:  150-199 mg/dL  High:  200-499 mg/dL  Very High:  Greater than or equal to 500 mg/dL    Direct Measure HDL  Female:  Greater than or equal to 50 mg/dL   Male:  Greater than or equal to 40 mg/dL    LDL Cholesterol  Desirable:  <100mg/dL  Above Desirable:  100-129 mg/dL   Borderline High:  130-159 mg/dL   High:  160-189 mg/dL   Very High:  >= 190 mg/dL    Non HDL Cholesterol  Desirable:  130 mg/dL  Above Desirable:  130-159 mg/dL  Borderline High:  160-189 mg/dL  High:  190-219 mg/dL  Very High:  Greater than or equal to 220 mg/dL   CBC with Platelets & Differential     Status: Abnormal    Narrative    The following orders were created for panel order CBC with Platelets & Differential.  Procedure                               Abnormality         Status                     ---------                               -----------         ------                     CBC with platelets and d...[[id number]]  Abnormal            Final result                 Please view results for these tests on the individual orders.       Assessment and Plan:   Metastatic hormone sensitive prostate cancer with bone metastasis-patient continues plan with Erleada 240 mg po daily  as well as Xgeva and Lupron every 3 months and states he is tolerating well.   - Is reporting intermittent day of nausea and sweats - not sure if related to Pain plan or prostates treatment but states they improve quickly .   Review, labs  and exam showing no sign of progression of the cancer.   -PSA showing good control = <0.01.   He will receive Lupron and Xgeva with visit today.  He will return in 3 months to see Dr Garcia with labs(CBC differential, CMP, total testosterone level and PSA ), also for next Lupron/Xgeva administration    Bone metastasis with pain -Known pelvic bone mets - seeing palliative care team and using MS contin 15 mg po bid with Norco up to 3 x daily prn and smoking Marijuana as needed up to 2 x daily.   - also having symptoms of neuropathy of the feet but good shoes helping with this- not know to be caused by any current medications.      Lung cancer screening/ Pulmonary nodule- has 30 pack year smoking history-  quit in 2012.  Initially imaging was 1/2022 - last imaging 3/12/2024. Reviewed results.     Completed Lipid panel testing and health sheet for pt HSA refund for pt.   Encouraged annual Flu vaccination this fall.     The total time of this encounter amounted to 40  minutes. This time included face to face time spent with the patient and wife, prep work, ordering tests, and performing post visit documentation.  Ania Jensen,Cnp

## 2024-09-05 NOTE — NURSING NOTE
"Oncology Rooming Note    September 5, 2024 2:38 PM   Per Etienne is a 59 year old male who presents for:    Chief Complaint   Patient presents with    Oncology Clinic Visit     Initial Vitals: BP (!) 145/90 (BP Location: Left arm)   Pulse 86   Temp 97.9  F (36.6  C) (Oral)   Resp 16   Ht 1.702 m (5' 7.01\")   SpO2 97%   BMI 28.72 kg/m   Estimated body mass index is 28.72 kg/m  as calculated from the following:    Height as of this encounter: 1.702 m (5' 7.01\").    Weight as of 5/30/24: 83.2 kg (183 lb 6.4 oz). Body surface area is 1.98 meters squared.  Extreme Pain (8) Comment: Data Unavailable   No LMP for male patient.  Allergies reviewed: Yes  Medications reviewed: Yes    Medications: Medication refills not needed today.  Pharmacy name entered into nxtControl:    [id number] [place], [place] - [address] [place], [place] - [address] [place] [address]    Frailty Screening:   Is the patient here for a new oncology consult visit in cancer care? 2. No      Clinical concerns: No Concerns        Lesia Segundo MA            "

## 2024-09-05 NOTE — PATIENT INSTRUCTIONS
1.  Pain Physical Therapy:  WALT Thompson is very active. Despite the pain, he continues to work full time as a , which is somewhat labor intensive (frequently using ladder).               2.  Pain Psychologist to address relaxation, behavioral change, coping style, and other factors important to improvement.  N/A              3.  Diagnostic Studies:  No new orders today.               4.  Medication Management:   Continue MS Contin to 15 mg every 12 hours. He continues to appreciate benefit, stable baseline pain, supports functioning, continues to work full time. Refilled today for #60 tabs.   Continue hydrocodone 5-325 three times daily as needed for breakthrough pain. He does not use very frequently, usually only takes 1 tab daily as needed. He continues to appreciate benefit for breakthrough pain, no concerns for continuation. Refilled today for #90 tabs to last 30 days.   Continue Senna S to 1 tab twice daily. He reports improvement with OIC, with increased frequency to twice daily. He reports intermittent constipation every 2-3 weeks that lasts for about 2 days. He usually increases fiber intake (salads) and this helps. Recommend using Miralax 1-2 times daily as needed, if current regimen not effective.  CSA/UDS - completed on 11/30/23. Naloxone refill sent into pharmacy on 11/30/23. Advised to  new supply and dispose of old supply at home.               5.  Potential procedures: Repeat L4-5 LUIS on 7/2/24. Appreciates improvement in pain around 75% pain relief that lasts 3+ months.               7.  Follow up with TEREZA Sanchez CNP in around 3-4 months by end of December.     ----------------------------------------------------------------  Clinic Number:  [phone]   Call with any questions about your care and for scheduling assistance.   Calls are returned Monday through Friday between 8 AM and 4:30 PM. We usually get back to you within 2 business days depending on  the issue/request.    If we are prescribing your medications:  For opioid medication refills, call the clinic or send a [employer] message 7 days in advance.  Please include:  Name of requested medication  Name of the pharmacy.  For non-opioid medications, call your pharmacy directly to request a refill. Please allow 3-4 days to be processed.   Per [place] State Law:  All controlled substance prescriptions must be filled within 30 days of being written.    For those controlled substances allowing refills, pickup must occur within 30 days of last fill.      We believe regular attendance is key to your success in our program!    Any time you are unable to keep your appointment we ask that you call us at least 24 hours in advance to cancel.This will allow us to offer the appointment time to another patient.   Multiple missed appointments may lead to dismissal from the clinic.

## 2024-09-05 NOTE — LETTER
9/5/2024      Per Etienne  [address] [facility] [address]      Dear Colleague,    Thank you for referring your patient, Per Etienne, to the [facility]. Please see a copy of my visit note below.    Oncology Follow Up Visit: September 5, 2024     Oncologist: Dr Garcia  PCP: Per Chen    Diagnosis: Metastatic hormone sensitive prostate cancer  Per Etienne is a 60 yo male who presented to his PCP in December 2019 with slow urinary stream.  PSA = 124. On 1/8/2020 CT abdomen pelvis showed groundglass opacity in the left lower lobe,  dystrophic calcification of the prostate gland, and a fatty liver.  Same-day bone scan was negative. On January 10, 2010 prostate biopsy showed on the left Robyn score 4+3 involving 7 cores and on the right Robyn score 3+4 involving 6 cores, with positive perineural invasion.  He proceeded to undergo RRP by Dr. Colmenares on 2/24/2020.  Pathology revealed acinar adenocarcinoma Austin 4+3 with focal ROBERTO, bladder neck base invasion, positive SVI, positive PNI, and multiple positive margins.  There was no LVSI and 0 out of 3 lymph nodes removed were involved with tumor; pT3bN0.  Treatment:   4/20/2020 received Eligard.     5/21/2020 MRI of the Prostate= 14 x 9 mm T2 intermediate structure in the prostatectomy bed at the left aspect of urinary bladder neck with restricted diffusion in the DWI images and early enhancement in the contrast  enhanced images. This was concerning for locally recurrent/residual prostatic cancer.  There was a T2 hypointense structure in the in the area of removed right seminal vesicle measuring 13 x 11 mm  demonstrating restricted  diffusion in the DWI images. This may represent a recurrent/residual tumor. There were 2 bone metastases in the left pubic inferior ramus and inferior aspect of left pubic body.There is a 7 mm suspicious lymph nodes in the right obturator area  corresponding to to FACBC avid  lymph node on the same date PET/CT  5/20/2020 PET/CT showed:  1.  2 foci of increased uptake in the left ischium, with underlying  sclerotic lesion, and symphysis pubis, these changes are indicative of  active prostate cancer metastasis.  2. Low-level increased uptake in 3 lymphnodes along the right external  and internal iliac nodes, the most suspicious of which is the deep  obturator.  3. Low-level increased uptake in the prostatectomy bed slightly to the  left side of the urethra, if clinically necessary, endoscopy would  would be necessary for confirmation of this being metastasis.  PSA was down to 27.7 on 4/21/2020.   4/20/2020 he received an Eligard injection.    6/12/2020 started Apalutamide(Erleada)  Bone scan 1/22/2021: no bone mets.    Interval History: Mr. Etienne is seen alone today in clinic with for review for continuation of Apalutamide / Leuprolide/ Xgeva.  Pt feeling well without new symptoms- feels all is stable for now. Missed one dose of medication recently.   Neuropathy to the feet worse with time.   Pain to pelvis and  low back- seeing pain and palliative care- on MS Contin 15mg bid with Norco up to 3 x daily as needed and this is working well.Gets Lumabr injections for back pain. Uses marijuana prn   Stays active with job as  and feels he is able to continue with job for now  Bowel and bladder are normal.  Denies fevers, illness, headaches, chest pain, SOB.   Rest of comprehensive and complete ROS is reviewed and is negative.   Past Medical History:   Diagnosis Date     Gout      Hypertension goal BP (blood pressure) < 140/90 08/01/2022     Lumbar stenosis      Prostate cancer (H) 01/10/2020     Current Outpatient Medications   Medication Sig Dispense Refill     apalutamide (ERLEADA) 60 MG tablet Take 4 tablets (240 mg) by mouth daily for 30 days 120 tablet 0     denosumab (XGEVA) 120 MG/1.7ML SOLN injection Inject 1.7 mLs (120 mg) Subcutaneous every 3 months 1.7 mL 0      "HYDROcodone-acetaminophen (NORCO) 5-325 MG tablet Take 1 tablet by mouth 3 times daily as needed for breakthrough pain or severe pain. Fill 9/9/24, start 9/11/24 90 tablet 0     leuprolide (ELIGARD) 45 MG kit Inject 45 mg Subcutaneous every 6 months       morphine (MS CONTIN) 15 MG CR tablet Take 1 tablet (15 mg) by mouth every 12 hours. #60 tabs to last 30 days. Fill 9/9/24, start 9/11/24. 60 tablet 0     naloxone (NARCAN) 4 MG/0.1ML nasal spray Spray 1 spray (4 mg) into one nostril alternating nostrils as needed for opioid reversal every 2-3 minutes until assistance arrives 0.2 mL 0     omeprazole (PRILOSEC) 40 MG DR capsule Take 1 capsule (40 mg) by mouth daily at least 30 min before breakfast 90 capsule 3     polyethylene glycol (MIRALAX) 17 GM/Dose powder Take 17 g (1 Capful) by mouth 2 times daily as needed for constipation 578 g 1     SENNA-docusate sodium (SENNA S) 8.6-50 MG tablet Take 1 tablet by mouth 2 times daily 60 tablet 3     UNABLE TO FIND 1 tablet daily MEDICATION NAME: C, D, Zinc combination supplement       No current facility-administered medications for this visit.     Facility-Administered Medications Ordered in Other Visits   Medication Dose Route Frequency Provider Last Rate Last Admin     leuprolide (LUPRON DEPOT) kit 22.5 mg  22.5 mg Intramuscular Q90 Days Ania Jensen, TEREZA CNP   22.5 mg at 09/05/24 1522     No Known Allergies    Physical Exam:BP (!) 145/90 (BP Location: Left arm)   Pulse 86   Temp 97.9  F (36.6  C) (Oral)   Resp 16   Ht 1.702 m (5' 7.01\")   SpO2 97%   BMI 28.72 kg/m    Constitutional: Alert, healthy, and in no distress with good movement on own.   ENT: Eyes bright, No mouth sores  Neck: Supple, No adenopathy.  Cardiac: Heart rate and rhythm is regular with normal S1 and S2 without murmur  Respiratory: Breathing easy. Lung sounds clear to auscultation  Abdomen: Soft, non-tender, BS normal. No masses or organomegaly  MS: Muscle tone normal- moving well on own " without signs of pain, extremities normal with no edema.   Skin: No suspicious lesions or rashes  Neuro: Sensory grossly WNL, gait normal.   Lymph: Normal ant/post cervical, axillary, supraclavicular nodes  Psych: Mentation appears normal and affect normal/bright with good conversation.    Laboratory Results:   Results for orders placed or performed in visit on 09/05/24   Comprehensive metabolic panel     Status: Abnormal   Result Value Ref Range    Sodium 142 135 - 145 mmol/L    Potassium 4.3 3.4 - 5.3 mmol/L    Carbon Dioxide (CO2) 27 22 - 29 mmol/L    Anion Gap 10 7 - 15 mmol/L    Urea Nitrogen 18.5 8.0 - 23.0 mg/dL    Creatinine 1.06 0.67 - 1.17 mg/dL    GFR Estimate 80 >60 mL/min/1.73m2    Calcium 10.3 8.8 - 10.4 mg/dL    Chloride 105 98 - 107 mmol/L    Glucose 129 (H) 70 - 99 mg/dL    Alkaline Phosphatase 89 40 - 150 U/L    AST 24 0 - 45 U/L    ALT 22 0 - 70 U/L    Protein Total 7.1 6.4 - 8.3 g/dL    Albumin 4.4 3.5 - 5.2 g/dL    Bilirubin Total 0.5 <=1.2 mg/dL   PSA tumor marker     Status: Normal   Result Value Ref Range    PSA Tumor Marker <0.01 0.00 - 4.50 ng/mL    Narrative    This result is obtained using the Roche Elecsys total PSA method on the esteban e601 immunoassay analyzer. Results obtained with different assay methods or kits cannot be used interchangeably.  This result is obtained using the Roche Elecsys total PSA method on the esteban e402 Pure immunoassay analyzer. Results obtained with different assay methods or kits cannot be used interchangeably   CBC with platelets and differential     Status: Abnormal   Result Value Ref Range    WBC Count 15.1 (H) 4.0 - 11.0 10e3/uL    RBC Count 4.36 (L) 4.40 - 5.90 10e6/uL    Hemoglobin 13.6 13.3 - 17.7 g/dL    Hematocrit 40.4 40.0 - 53.0 %    MCV 93 78 - 100 fL    MCH 31.2 26.5 - 33.0 pg    MCHC 33.7 31.5 - 36.5 g/dL    RDW 12.1 10.0 - 15.0 %    Platelet Count 225 150 - 450 10e3/uL    % Neutrophils 79 %    % Lymphocytes 14 %    % Monocytes 6 %    %  Eosinophils 0 %    % Basophils 1 %    % Immature Granulocytes 0 %    NRBCs per 100 WBC 0 <1 /100    Absolute Neutrophils 11.9 (H) 1.6 - 8.3 10e3/uL    Absolute Lymphocytes 2.1 0.8 - 5.3 10e3/uL    Absolute Monocytes 0.9 0.0 - 1.3 10e3/uL    Absolute Eosinophils 0.0 0.0 - 0.7 10e3/uL    Absolute Basophils 0.1 0.0 - 0.2 10e3/uL    Absolute Immature Granulocytes 0.1 <=0.4 10e3/uL    Absolute NRBCs 0.0 10e3/uL   Lipid panel reflex to direct LDL Non-fasting     Status: Abnormal   Result Value Ref Range    Cholesterol 249 (H) <200 mg/dL    Triglycerides 189 (H) <150 mg/dL    Direct Measure HDL 78 >=40 mg/dL    LDL Cholesterol Calculated 133 (H) <=100 mg/dL    Non HDL Cholesterol 171 (H) <130 mg/dL    Narrative    Cholesterol  Desirable:  <200 mg/dL    Triglycerides  Normal:  Less than 150 mg/dL  Borderline High:  150-199 mg/dL  High:  200-499 mg/dL  Very High:  Greater than or equal to 500 mg/dL    Direct Measure HDL  Female:  Greater than or equal to 50 mg/dL   Male:  Greater than or equal to 40 mg/dL    LDL Cholesterol  Desirable:  <100mg/dL  Above Desirable:  100-129 mg/dL   Borderline High:  130-159 mg/dL   High:  160-189 mg/dL   Very High:  >= 190 mg/dL    Non HDL Cholesterol  Desirable:  130 mg/dL  Above Desirable:  130-159 mg/dL  Borderline High:  160-189 mg/dL  High:  190-219 mg/dL  Very High:  Greater than or equal to 220 mg/dL   CBC with Platelets & Differential     Status: Abnormal    Narrative    The following orders were created for panel order CBC with Platelets & Differential.  Procedure                               Abnormality         Status                     ---------                               -----------         ------                     CBC with platelets and d...[[id number]]  Abnormal            Final result                 Please view results for these tests on the individual orders.       Assessment and Plan:   Metastatic hormone sensitive prostate cancer with bone metastasis-patient continues  plan with Erleada 240 mg po daily  as well as Xgeva and Lupron every 3 months and states he is tolerating well.   - Is reporting intermittent day of nausea and sweats - not sure if related to Pain plan or prostates treatment but states they improve quickly .   Review, labs and exam showing no sign of progression of the cancer.   -PSA showing good control = <0.01.   He will receive Lupron and Xgeva with visit today.  He will return in 3 months to see Dr Garcia with labs(CBC differential, CMP, total testosterone level and PSA ), also for next Lupron/Xgeva administration    Bone metastasis with pain -Known pelvic bone mets - seeing palliative care team and using MS contin 15 mg po bid with Norco up to 3 x daily prn and smoking Marijuana as needed up to 2 x daily.   - also having symptoms of neuropathy of the feet but good shoes helping with this- not know to be caused by any current medications.      Lung cancer screening/ Pulmonary nodule- has 30 pack year smoking history-  quit in 2012.  Initially imaging was 1/2022 - last imaging 3/12/2024. Reviewed results.     Completed Lipid panel testing and health sheet for pt HSA refund for pt.   Encouraged annual Flu vaccination this fall.     The total time of this encounter amounted to 40  minutes. This time included face to face time spent with the patient and wife, prep work, ordering tests, and performing post visit documentation.  Ania Jensen Cnp        Again, thank you for allowing me to participate in the care of your patient.        Sincerely,        Ania Jensen, FRAN, APRN CNP

## 2024-09-05 NOTE — PROGRESS NOTES
Infusion Nursing Note:  Per Etienne presents today for Lupron and Xgeva.    Patient seen by provider today: Yes: Ania SHEA   present during visit today: Not Applicable.    Note: N/A.      Intravenous Access:  No Intravenous access/labs at this visit.    Treatment Conditions:  Not Applicable.      Post Infusion Assessment:  Patient tolerated injection without incident.  Site patent and intact, free from redness, edema or discomfort.       Discharge Plan:   Patient discharged in stable condition accompanied by: self.  Departure Mode: Ambulatory.      Lesia Segundo MA

## 2024-09-06 DIAGNOSIS — C61 PROSTATE CANCER (H): Primary | ICD-10-CM

## 2024-09-07 LAB — TESTOST SERPL-MCNC: 5 NG/DL (ref 240–950)

## 2024-10-03 DIAGNOSIS — C61 PROSTATE CANCER (H): Primary | ICD-10-CM

## 2024-10-10 ENCOUNTER — MYC REFILL (OUTPATIENT)
Dept: PALLIATIVE MEDICINE | Facility: CLINIC | Age: 59
End: 2024-10-10
Payer: COMMERCIAL

## 2024-10-10 DIAGNOSIS — G89.3 CANCER ASSOCIATED PAIN: ICD-10-CM

## 2024-10-10 DIAGNOSIS — C61 PROSTATE CANCER METASTATIC TO BONE (H): ICD-10-CM

## 2024-10-10 DIAGNOSIS — C79.51 PROSTATE CANCER METASTATIC TO BONE (H): ICD-10-CM

## 2024-10-10 RX ORDER — MORPHINE SULFATE 15 MG/1
15 TABLET, FILM COATED, EXTENDED RELEASE ORAL EVERY 12 HOURS
Qty: 60 TABLET | Refills: 0 | Status: SHIPPED | OUTPATIENT
Start: 2024-10-10 | End: 2024-11-08

## 2024-10-10 RX ORDER — HYDROCODONE BITARTRATE AND ACETAMINOPHEN 5; 325 MG/1; MG/1
1 TABLET ORAL 3 TIMES DAILY PRN
Qty: 90 TABLET | Refills: 0 | Status: SHIPPED | OUTPATIENT
Start: 2024-10-10 | End: 2024-11-08

## 2024-10-10 NOTE — TELEPHONE ENCOUNTER
Medication refill information reviewed.     Due date for   HYDROcodone-acetaminophen (NORCO) 5-325 MG tablet      morphine (MS CONTIN) 15 MG CR tablet     is 10/11/2024     Prescriptions prepped for review.     Will route to provider.

## 2024-10-10 NOTE — TELEPHONE ENCOUNTER
Received request for a refill(s) of     HYDROcodone-acetaminophen (NORCO) 5-325 MG tablet    Last dispensed from pharmacy on 09/09/24  morphine (MS CONTIN) 15 MG CR tablet   Last dispensed from pharmacy on 09/09/24    Patient's last office/virtual visit by prescribing provider on 09/05/24  Next office/virtual appointment scheduled for 12/05/24    Last urine drug screen date 11/30/23  Current opioid agreement on file (completed within the last year) Yes Date of opioid agreement: 11/30/23    E-prescribe to     [facility] [address] [place], [place] - [address]  [address] [address]  Phone: [phone] Fax: [phone]    Will route to nursing [place] for review and preparation of prescription(s).       Marleen Mello MA  [facility] [place]

## 2024-10-10 NOTE — TELEPHONE ENCOUNTER
Chart reviewed - Request appears appropriate. Refilled for 30 day supply.     Chanel Bruno DNP, APRN, AGNP-C  [facility]

## 2024-10-12 ENCOUNTER — HEALTH MAINTENANCE LETTER (OUTPATIENT)
Age: 59
End: 2024-10-12

## 2024-10-24 SDOH — HEALTH STABILITY: PHYSICAL HEALTH: ON AVERAGE, HOW MANY DAYS PER WEEK DO YOU ENGAGE IN MODERATE TO STRENUOUS EXERCISE (LIKE A BRISK WALK)?: 0 DAYS

## 2024-10-24 SDOH — HEALTH STABILITY: PHYSICAL HEALTH: ON AVERAGE, HOW MANY MINUTES DO YOU ENGAGE IN EXERCISE AT THIS LEVEL?: 0 MIN

## 2024-10-24 ASSESSMENT — SOCIAL DETERMINANTS OF HEALTH (SDOH): HOW OFTEN DO YOU GET TOGETHER WITH FRIENDS OR RELATIVES?: ONCE A WEEK

## 2024-10-29 ENCOUNTER — OFFICE VISIT (OUTPATIENT)
Dept: FAMILY MEDICINE | Facility: CLINIC | Age: 59
End: 2024-10-29
Payer: COMMERCIAL

## 2024-10-29 VITALS
OXYGEN SATURATION: 95 % | BODY MASS INDEX: 28.72 KG/M2 | SYSTOLIC BLOOD PRESSURE: 126 MMHG | RESPIRATION RATE: 16 BRPM | TEMPERATURE: 97 F | DIASTOLIC BLOOD PRESSURE: 76 MMHG | WEIGHT: 183 LBS | HEIGHT: 67 IN | HEART RATE: 95 BPM

## 2024-10-29 DIAGNOSIS — D84.9 IMMUNOCOMPROMISED (H): ICD-10-CM

## 2024-10-29 DIAGNOSIS — Z00.00 ROUTINE GENERAL MEDICAL EXAMINATION AT A HEALTH CARE FACILITY: Primary | ICD-10-CM

## 2024-10-29 DIAGNOSIS — Z12.11 COLON CANCER SCREENING: ICD-10-CM

## 2024-10-29 PROCEDURE — 90750 HZV VACC RECOMBINANT IM: CPT | Performed by: FAMILY MEDICINE

## 2024-10-29 PROCEDURE — 90471 IMMUNIZATION ADMIN: CPT | Performed by: FAMILY MEDICINE

## 2024-10-29 PROCEDURE — 99396 PREV VISIT EST AGE 40-64: CPT | Mod: 25 | Performed by: FAMILY MEDICINE

## 2024-10-29 ASSESSMENT — ANXIETY QUESTIONNAIRES
7. FEELING AFRAID AS IF SOMETHING AWFUL MIGHT HAPPEN: NOT AT ALL
4. TROUBLE RELAXING: NOT AT ALL
2. NOT BEING ABLE TO STOP OR CONTROL WORRYING: NOT AT ALL
1. FEELING NERVOUS, ANXIOUS, OR ON EDGE: NOT AT ALL
GAD7 TOTAL SCORE: 0
8. IF YOU CHECKED OFF ANY PROBLEMS, HOW DIFFICULT HAVE THESE MADE IT FOR YOU TO DO YOUR WORK, TAKE CARE OF THINGS AT HOME, OR GET ALONG WITH OTHER PEOPLE?: NOT DIFFICULT AT ALL
6. BECOMING EASILY ANNOYED OR IRRITABLE: NOT AT ALL
IF YOU CHECKED OFF ANY PROBLEMS ON THIS QUESTIONNAIRE, HOW DIFFICULT HAVE THESE PROBLEMS MADE IT FOR YOU TO DO YOUR WORK, TAKE CARE OF THINGS AT HOME, OR GET ALONG WITH OTHER PEOPLE: NOT DIFFICULT AT ALL
GAD7 TOTAL SCORE: 0
5. BEING SO RESTLESS THAT IT IS HARD TO SIT STILL: NOT AT ALL
GAD7 TOTAL SCORE: 0
3. WORRYING TOO MUCH ABOUT DIFFERENT THINGS: NOT AT ALL
7. FEELING AFRAID AS IF SOMETHING AWFUL MIGHT HAPPEN: NOT AT ALL

## 2024-10-29 ASSESSMENT — PATIENT HEALTH QUESTIONNAIRE - PHQ9
SUM OF ALL RESPONSES TO PHQ QUESTIONS 1-9: 2
10. IF YOU CHECKED OFF ANY PROBLEMS, HOW DIFFICULT HAVE THESE PROBLEMS MADE IT FOR YOU TO DO YOUR WORK, TAKE CARE OF THINGS AT HOME, OR GET ALONG WITH OTHER PEOPLE: NOT DIFFICULT AT ALL
SUM OF ALL RESPONSES TO PHQ QUESTIONS 1-9: 2

## 2024-10-29 ASSESSMENT — PAIN SCALES - GENERAL: PAINLEVEL_OUTOF10: NO PAIN (0)

## 2024-10-29 NOTE — PROGRESS NOTES
"Preventive Care Visit  [facility]  Palak Doe MD, Family Medicine  Oct 29, 2024      Assessment & Plan     Routine general medical examination at a health care facility    Health maintenance screening and immunizations reviewed with the patient.    We discussed healthy lifestyle, nutrition, cardiovascular risk reduction.  - Screen Labs ordered   - Continue great active lifestyle  - Follow up yearly for the annual physical and preventive care.    - Hemoglobin A1c; Future  - Lipid panel reflex to direct LDL Fasting; Future    Immunocompromised (H)   on chemotherapy Cheryl   diagnosed with prostate cancer in 12/2019,, s/p RRP at that time but later found to have residual/recurrent disease in the pelvis and L inferior pubic body and pubic ramus metastasis.   He has been on androgen deprivation therapy since April 2020, and continues on Lupron every 3 months  No current concern for infection  Plan: follow with oncology and urology   Colon cancer screening  Due on 05/2025   - Colonoscopy Screening  Referral; Future      BMI  Estimated body mass index is 28.66 kg/m  as calculated from the following:    Height as of this encounter: 1.702 m (5' 7\").    Weight as of this encounter: 83 kg (183 lb).       Counseling  Appropriate preventive services were addressed with this patient via screening, questionnaire, or discussion as appropriate for fall prevention, nutrition, physical activity, Tobacco-use cessation, social engagement, weight loss and cognition.  Checklist reviewing preventive services available has been given to the patient.  Reviewed patient's diet, addressing concerns and/or questions.   He is at risk for psychosocial distress and has been provided with information to reduce risk.   The patient reports drinking more than 3 alcoholic drinks per day and/or more than 7 drhnks per week. The patient was counseled and given information about possible harmful effects of excessive " alcohol intake.    Regular exercise    Subjective   Jay is a 59 year old, presenting for the following:  Physical        10/29/2024     2:05 PM   Additional Questions   Roomed by Roseline MOTA   Accompanied by Self                  HPI  Background history    Chronic pain due to neoplasm   follows with pain clinic   Pain is controlled.  Continuing MS Contin, and Norco     Prostate cancer (H)   Bone metastasis (H)  diagnosed in 12/2019,, s/p RRP at that time but later found to have residual/recurrent disease in the pelvis and L inferior pubic body and pubic ramus metastasis.   He has been on androgen deprivation therapy since April 2020, and continues on Lupron every 3 months  Plan: follow with oncology and urology      Preventive -     Immunization History   Administered Date(s) Administered    Influenza Vaccine 18-64 (Flublok) 10/14/2020    Pneumococcal 20 valent Conjugate (Prevnar 20) 08/29/2022    TDAP (Adacel,Boostrix) 08/29/2022    TDAP Vaccine (Adacel) 09/05/2012    Zoster recombinant adjuvanted (SHINGRIX) 10/29/2024       - Colon CA screen: Colonoscopy, age 45-75 every 10 years or FIT every year or Cologuard every 3 years     Had colonoscopy in 2020   Return 5 years   Next 2025       - Prostate CA screen: Discussed controversy about screening.   PSA   Date Value Ref Range Status   04/29/2021 0.04 0 - 4 ug/L Final     Comment:     Assay Method:  Chemiluminescence using Siemens Vista analyzer     PSA Tumor Marker   Date Value Ref Range Status   09/05/2024 <0.01 0.00 - 4.50 ng/mL Final   05/09/2023 <0.01 0.00 - 4.00 ug/L Final       -lipids screen: ordered     Diabetes screen: ordered       SH:     Marital status:    Kids: 2  Employment:  [employer]    Exercise: at work   Tobacco: no , quit 11 years ago   Etoh: daily 2-3 drinks   Recreational drugs: Marijuana       Health Care Directive  Patient does not have a Health Care Directive: Discussed advance care planning with patient;  however, patient declined at this time.      10/24/2024   General Health   How would you rate your overall physical health? Good   Feel stress (tense, anxious, or unable to sleep) Only a little      (!) STRESS CONCERN      10/24/2024   Nutrition   Three or more servings of calcium each day? (!) NO   Diet: Regular (no restrictions)   How many servings of fruit and vegetables per day? (!) 0-1   How many sweetened beverages each day? (!) 3            10/24/2024   Exercise   Days per week of moderate/strenous exercise 0 days   Average minutes spent exercising at this level 0 min      (!) EXERCISE CONCERN      10/24/2024   Social Factors   Frequency of gathering with friends or relatives Once a week   Worry food won't last until get money to buy more No   Food not last or not have enough money for food? No   Do you have housing? (Housing is defined as stable permanent housing and does not include staying ouside in a car, in a tent, in an abandoned building, in an overnight shelter, or couch-surfing.) No   Are you worried about losing your housing? No   Lack of transportation? No   Unable to get utilities (heat,electricity)? No   Want help with housing or utility concern? No      (!) HOUSING CONCERN PRESENT      10/24/2024   Fall Risk   Fallen 2 or more times in the past year? No    Trouble with walking or balance? No        Patient-reported          10/24/2024   Dental   Dentist two times every year? Yes            10/24/2024   TB Screening   Were you born outside of the [place]? No          Today's PHQ-9 Score:       10/29/2024     2:01 PM   PHQ-9 SCORE   PHQ-9 Total Score MyChart 2 (Minimal depression)   PHQ-9 Total Score 2        Patient-reported         10/24/2024   Substance Use   Alcohol more than 3/day or more than 7/wk Yes   How often do you have a drink containing alcohol 4 or more times a week   How many alcohol drinks on typical day 3 or 4   How often do you have 5+ drinks at one occasion Weekly   Audit 2/3 Score 4    How often not able to stop drinking once started Never   How often failed to do what normally expected Never   How often needed first drink in am after a heavy drinking session Never   How often feeling of guilt or remorse after drinking Never   How often unable to remember what happened the night before Weekly   Have you or someone else been injured because of your drinking No   Has anyone been concerned or suggested you cut down on drinking No   TOTAL SCORE - AUDIT 11   Do you use any other substances recreationally? (!) CANNABIS PRODUCTS        Social History     Tobacco Use    Smoking status: Former     Current packs/day: 0.00     Average packs/day: 1 pack/day for 30.0 years (30.0 ttl pk-yrs)     Types: Cigarettes, Cigars     Start date: 1982     Quit date: 2012     Years since quittin.8    Smokeless tobacco: Never   Vaping Use    Vaping status: Never Used   Substance Use Topics    Alcohol use: Yes     Comment: 4-6 drinks per day - patient reports beer or vodka drinks    Drug use: No           10/24/2024   STI Screening   New sexual partner(s) since last STI/HIV test? No      Last PSA:   PSA   Date Value Ref Range Status   2021 0.04 0 - 4 ug/L Final     Comment:     Assay Method:  Chemiluminescence using Siemens Vista analyzer     PSA Tumor Marker   Date Value Ref Range Status   2024 <0.01 0.00 - 4.50 ng/mL Final   2023 <0.01 0.00 - 4.00 ug/L Final     ASCVD Risk   The 10-year ASCVD risk score (Eduardo LIZARRAGA, et al., 2019) is: 7.4%    Values used to calculate the score:      Age: 59 years      Sex: Male      Is Non- : No      Diabetic: No      Tobacco smoker: No      Systolic Blood Pressure: 126 mmHg      Is BP treated: No      HDL Cholesterol: 78 mg/dL      Total Cholesterol: 249 mg/dL           Reviewed and updated as needed this visit by Provider                    Past Medical History:   Diagnosis Date    Gout     Hypertension goal BP (blood  pressure) < 140/90 2022    Lumbar stenosis     Prostate cancer (H) 01/10/2020     Past Surgical History:   Procedure Laterality Date    BIOPSY PROSTATE TRANSRECTAL  01/10/2020    Dr. Colmenares    COLONOSCOPY  2020    LITHOTRIPSY  age 30s    MICROSCOPIC KNEE SURGERY Right age 30s    ORTHOPEDIC SURGERY Left 1985    Alan placed in Left Femur    PROSTATECTOMY RETROPUBIC RADICAL  2020    Dr. Colmenares     Lab work is in process  Labs reviewed in EPIC  BP Readings from Last 3 Encounters:   10/29/24 126/76   24 (!) 145/90   24 (!) 144/92    Wt Readings from Last 3 Encounters:   10/29/24 83 kg (183 lb)   24 83.2 kg (183 lb 6.4 oz)   24 87.5 kg (193 lb)                  Patient Active Problem List   Diagnosis    CARDIOVASCULAR SCREENING; LDL GOAL LESS THAN 160    Left varicocele    Vitamin D deficiency    Kidney stone    Prostate cancer (H)    High risk medication use    Prostate cancer metastatic to bone (H)    Bone metastasis    Chronic, continuous use of opioids    Hypertension goal BP (blood pressure) < 140/90    Immunocompromised (H)    Cancer associated pain     Past Surgical History:   Procedure Laterality Date    BIOPSY PROSTATE TRANSRECTAL  01/10/2020    Dr. Colmenares    COLONOSCOPY  2020    LITHOTRIPSY  age 30s    MICROSCOPIC KNEE SURGERY Right age 30s    ORTHOPEDIC SURGERY Left 1985    Alan placed in Left Femur    PROSTATECTOMY RETROPUBIC RADICAL  2020    Dr. Colmenares       Social History     Tobacco Use    Smoking status: Former     Current packs/day: 0.00     Average packs/day: 1 pack/day for 30.0 years (30.0 ttl pk-yrs)     Types: Cigarettes, Cigars     Start date: 1982     Quit date: 2012     Years since quittin.8    Smokeless tobacco: Never   Substance Use Topics    Alcohol use: Yes     Comment: 4-6 drinks per day - patient reports beer or vodka drinks     Family History   Problem Relation Age of Onset    Asthma Father     Prostate Cancer Father 73    Cancer Maternal  Grandmother 92        throat     Asthma Sister          Current Outpatient Medications   Medication Sig Dispense Refill    apalutamide (ERLEADA) 60 MG tablet Take 4 tablets (240 mg) by mouth daily. 120 tablet 0    denosumab (XGEVA) 120 MG/1.7ML SOLN injection Inject 1.7 mLs (120 mg) Subcutaneous every 3 months 1.7 mL 0    HYDROcodone-acetaminophen (NORCO) 5-325 MG tablet Take 1 tablet by mouth 3 times daily as needed for breakthrough pain or severe pain. Fill 10/10/24, start 10/11/24 90 tablet 0    leuprolide (ELIGARD) 45 MG kit Inject 45 mg Subcutaneous every 6 months      morphine (MS CONTIN) 15 MG CR tablet Take 1 tablet (15 mg) by mouth every 12 hours. #60 tabs to last 30 days. Fill 10/10/24, start 10/11/24. 60 tablet 0    naloxone (NARCAN) 4 MG/0.1ML nasal spray Spray 1 spray (4 mg) into one nostril alternating nostrils as needed for opioid reversal every 2-3 minutes until assistance arrives 0.2 mL 0    omeprazole (PRILOSEC) 40 MG DR capsule Take 1 capsule (40 mg) by mouth daily at least 30 min before breakfast 90 capsule 3    polyethylene glycol (MIRALAX) 17 GM/Dose powder Take 17 g (1 Capful) by mouth 2 times daily as needed for constipation 578 g 1    SENNA-docusate sodium (SENNA S) 8.6-50 MG tablet Take 1 tablet by mouth 2 times daily 60 tablet 3    UNABLE TO FIND 1 tablet daily MEDICATION NAME: C, D, Zinc combination supplement       No Known Allergies  Recent Labs   Lab Test 09/05/24  1348 05/30/24  1403 02/21/24  1502 04/21/22  0932 01/21/22  0850 10/29/21  0903 08/23/21  0843 07/22/21  1201 07/22/21  1042 04/29/21  1408 01/22/21  0831 07/17/20  0941 06/03/20  1149 02/26/20  0000 10/10/19  1717 05/10/18  1106   A1C  --   --   --   --   --   --   --  5.5  --   --   --   --   --   --  5.4 5.7*   *  --   --   --   --   --   --   --   --   --  129*  --  109*  --   --   --    HDL 78  --   --   --   --   --   --   --   --   --  85  --  78  --   --   --    TRIG 189*  --   --   --   --   --   --   --   --  "  --  209*  --  67  --   --   --    ALT 22 26 31   < > 42 33   < >  --    < > 22 27   < > 29  --   --   --    CR 1.06 0.83 1.05   < > 0.89 1.04   < >  --    < > 0.95 0.96   < > 0.89   < > 1.12  --    GFRESTIMATED 80 >90 82   < > >90 79   < >  --    < > 88 87   < > >90   < > 73  --    GFRESTBLACK  --   --   --   --   --   --   --   --   --  >90 >90   < > >90   < > 85  --    POTASSIUM 4.3 4.5 3.8   < > 4.7 3.9   < >  --    < > 3.9 4.4   < > 4.5   < > 4.2  --    TSH  --   --   --   --  1.03 1.91   < >  --    < > 2.57 3.46   < > 0.97  --   --   --     < > = values in this interval not displayed.          Review of Systems  Constitutional, HEENT, cardiovascular, pulmonary, gi and gu systems are negative, except as otherwise noted.     Objective    Exam  /76   Pulse 95   Temp 97  F (36.1  C) (Tympanic)   Resp 16   Ht 1.702 m (5' 7\")   Wt 83 kg (183 lb)   SpO2 95%   BMI 28.66 kg/m     Estimated body mass index is 28.66 kg/m  as calculated from the following:    Height as of this encounter: 1.702 m (5' 7\").    Weight as of this encounter: 83 kg (183 lb).    Physical Exam  GENERAL: alert and no distress  NECK: no adenopathy, no asymmetry, masses, or scars  RESP: lungs clear to auscultation - no rales, rhonchi or wheezes  CV: regular rate and rhythm, normal S1 S2, no S3 or S4, no murmur, click or rub, no peripheral edema  ABDOMEN: soft, nontender, no hepatosplenomegaly, no masses and bowel sounds normal  MS: no gross musculoskeletal defects noted, no edema        Signed Electronically by: Palak Doe MD    "

## 2024-10-29 NOTE — PATIENT INSTRUCTIONS
Patient Education   Preventive Care Advice   This is general advice given by our system to help you stay healthy. However, your care team may have specific advice just for you. Please talk to your care team about your preventive care needs.  Nutrition  Eat 5 or more servings of fruits and vegetables each day.  Try wheat bread, brown rice and whole grain pasta (instead of white bread, rice, and pasta).  Get enough calcium and vitamin D. Check the label on foods and aim for 100% of the RDA (recommended daily allowance).  Lifestyle  Exercise at least 150 minutes each week  (30 minutes a day, 5 days a week).  Do muscle strengthening activities 2 days a week. These help control your weight and prevent disease.  No smoking.  Wear sunscreen to prevent skin cancer.  Have a dental exam and cleaning every 6 months.  Yearly exams  See your health care team every year to talk about:  Any changes in your health.  Any medicines your care team has prescribed.  Preventive care, family planning, and ways to prevent chronic diseases.  Shots (vaccines)   HPV shots (up to age 26), if you've never had them before.  Hepatitis B shots (up to age 59), if you've never had them before.  COVID-19 shot: Get this shot when it's due.  Flu shot: Get a flu shot every year.  Tetanus shot: Get a tetanus shot every 10 years.  Pneumococcal, hepatitis A, and RSV shots: Ask your care team if you need these based on your risk.  Shingles shot (for age 50 and up)  General health tests  Diabetes screening:  Starting at age 35, Get screened for diabetes at least every 3 years.  If you are younger than age 35, ask your care team if you should be screened for diabetes.  Cholesterol test: At age 39, start having a cholesterol test every 5 years, or more often if advised.  Bone density scan (DEXA): At age 50, ask your care team if you should have this scan for osteoporosis (brittle bones).  Hepatitis C: Get tested at least once in your life.  STIs (sexually  transmitted infections)  Before age 24: Ask your care team if you should be screened for STIs.  After age 24: Get screened for STIs if you're at risk. You are at risk for STIs (including HIV) if:  You are sexually active with more than one person.  You don't use condoms every time.  You or a partner was diagnosed with a sexually transmitted infection.  If you are at risk for HIV, ask about PrEP medicine to prevent HIV.  Get tested for HIV at least once in your life, whether you are at risk for HIV or not.  Cancer screening tests  Cervical cancer screening: If you have a cervix, begin getting regular cervical cancer screening tests starting at age 21.  Breast cancer scan (mammogram): If you've ever had breasts, begin having regular mammograms starting at age 40. This is a scan to check for breast cancer.  Colon cancer screening: It is important to start screening for colon cancer at age 45.  Have a colonoscopy test every 10 years (or more often if you're at risk) Or, ask your provider about stool tests like a FIT test every year or Cologuard test every 3 years.  To learn more about your testing options, visit:   .  For help making a decision, visit:   https://bit.ly/mx52435.  Prostate cancer screening test: If you have a prostate, ask your care team if a prostate cancer screening test (PSA) at age 55 is right for you.  Lung cancer screening: If you are a current or former smoker ages 50 to 80, ask your care team if ongoing lung cancer screenings are right for you.  For informational purposes only. Not to replace the advice of your health care provider. Copyright   2023 [facility]. All rights reserved. Clinically reviewed by the [facility]. Sevence 309361 - REV 01/24.  9 Ways to Cut Back on Drinking  Maybe you've found yourself drinking more alcohol than you'd prefer. If you want to cut back, here are some ideas to try.    Think before you drink.  Do you really want a drink,  "or is it just a habit? If you're used to having a drink at a certain time, try doing something else then.     Look for substitutes.  Find some no-alcohol drinks that you enjoy, like flavored seltzer water, tea with honey, or tonic with a slice of lime. Or try alcohol-free beer or \"virgin\" cocktails (without the alcohol).     Drink more water.  Use water to quench your thirst. Drink a glass of water before you have any alcohol. Have another glass along with every drink or between drinks.     Shrink your drink.  For example, have a bottle of beer instead of a pint. Use a smaller glass for wine. Choose drinks with lower alcohol content (ABV%). Or use less liquor and more mixer in cocktails.     Slow down.  It's easy to drink quickly and without thinking about it. Pay attention, and make each drink last longer.     Do the math.  Total up how much you spend on alcohol each month. How much is that a year? If you cut back, what could you do with the money you save?     Take a break.  Choose a day or two each week when you won't drink at all. Notice how you feel on those days, physically and emotionally. How did you sleep? Do you feel better? Over time, add more break days.     Count calories.  Would you like to lose some weight? For some people that's a good motivator for cutting back. Figure out how many calories are in each drink. How many does that add up to in a day? In a week? In a month?     Practice saying no.  Be ready when someone offers you a drink. Try: \"Thanks, I've had enough.\" Or \"Thanks, but I'm cutting back.\" Or \"No, thanks. I feel better when I drink less.\"   Current as of: November 15, 2023  Content Version: 14.2 2024 [employer].   Care instructions adapted under license by your healthcare professional. If you have questions about a medical condition or this instruction, always ask your healthcare professional. [employer] disclaims any warranty or liability for your use of " this information.  Substance Use Disorder: Care Instructions  Overview     You can improve your life and health by stopping your use of alcohol or drugs. When you don't drink or use drugs, you may feel and sleep better. You may get along better with your family, friends, and coworkers. There are medicines and programs that can help with substance use disorder.  How can you care for yourself at home?  Here are some ways to help you stay sober and prevent relapse.  If you have been given medicine to help keep you sober or reduce your cravings, be sure to take it exactly as prescribed.  Talk to your doctor about programs that can help you stop using drugs or drinking alcohol.  Do not keep alcohol or drugs in your home.  Plan ahead. Think about what you'll say if other people ask you to drink or use drugs. Try not to spend time with people who drink or use drugs.  Use the time and money spent on drinking or drugs to do something that's important to you.  Preventing a relapse  Have a plan to deal with relapse. Learn to recognize changes in your thinking that lead you to drink or use drugs. Get help before you start to drink or use drugs again.  Try to stay away from situations, friends, or places that may lead you to drink or use drugs.  If you feel the need to drink alcohol or use drugs again, seek help right away. Call a trusted friend or family member. Some people get support from organizations such as Narcotics Anonymous or [employer] or from treatment facilities.  If you relapse, get help as soon as you can. Some people make a plan with another person that outlines what they want that person to do for them if they relapse. The plan usually includes how to handle the relapse and who to notify in case of relapse.  Don't give up. Remember that a relapse doesn't mean that you have failed. Use the experience to learn the triggers that lead you to drink or use drugs. Then quit again. Recovery is a lifelong process.  "Many people have several relapses before they are able to quit for good.  Follow-up care is a key part of your treatment and safety. Be sure to make and go to all appointments, and call your doctor if you are having problems. It's also a good idea to know your test results and keep a list of the medicines you take.  When should you call for help?   Call 911  anytime you think you may need emergency care. For example, call if you or someone else:    Has overdosed or has withdrawal signs. Be sure to tell the emergency workers that you are or someone else is using or trying to quit using drugs. Overdose or withdrawal signs may include:  Losing consciousness.  Seizure.  Seeing or hearing things that aren't there (hallucinations).     Is thinking or talking about suicide or harming others.   Where to get help 24 hours a day, 7 days a week   If you or someone you know talks about suicide, self-harm, a mental health crisis, a substance use crisis, or any other kind of emotional distress, get help right away. You can:    Call the Suicide and Crisis Lifeline at 988.     Call 7-312-077-IGOB (1-846.570.9190).     Text HOME to 942199 to access the Crisis Text Line.   Consider saving these numbers in your phone.  Go to [employer] for more information or to chat online.  Call your doctor now or seek immediate medical care if:    You are having withdrawal symptoms. These may include nausea or vomiting, sweating, shakiness, and anxiety.   Watch closely for changes in your health, and be sure to contact your doctor if:    You have a relapse.     You need more help or support to stop.   Where can you learn more?  Go to https://www.health[employer] [place] [employer].net/patiented  Enter H573 in the search box to learn more about \"Substance Use Disorder: Care Instructions.\"  Current as of: November 15, 2023  Content Version: 14.2 2024 [employer].   Care instructions adapted under license by your healthcare professional. If you have " questions about a medical condition or this instruction, always ask your healthcare professional. [employer] disclaims any warranty or liability for your use of this information.

## 2024-11-04 DIAGNOSIS — C61 PROSTATE CANCER (H): Primary | ICD-10-CM

## 2024-11-08 ENCOUNTER — MYC REFILL (OUTPATIENT)
Dept: PALLIATIVE MEDICINE | Facility: CLINIC | Age: 59
End: 2024-11-08
Payer: COMMERCIAL

## 2024-11-08 ENCOUNTER — MYC REFILL (OUTPATIENT)
Dept: ONCOLOGY | Facility: CLINIC | Age: 59
End: 2024-11-08
Payer: COMMERCIAL

## 2024-11-08 DIAGNOSIS — G89.3 CANCER ASSOCIATED PAIN: ICD-10-CM

## 2024-11-08 DIAGNOSIS — C79.51 PROSTATE CANCER METASTATIC TO BONE (H): ICD-10-CM

## 2024-11-08 DIAGNOSIS — C61 PROSTATE CANCER METASTATIC TO BONE (H): ICD-10-CM

## 2024-11-08 DIAGNOSIS — K21.9 GASTROESOPHAGEAL REFLUX DISEASE, UNSPECIFIED WHETHER ESOPHAGITIS PRESENT: ICD-10-CM

## 2024-11-08 RX ORDER — HYDROCODONE BITARTRATE AND ACETAMINOPHEN 5; 325 MG/1; MG/1
1 TABLET ORAL 3 TIMES DAILY PRN
Qty: 90 TABLET | Refills: 0 | Status: SHIPPED | OUTPATIENT
Start: 2024-11-08

## 2024-11-08 RX ORDER — MORPHINE SULFATE 15 MG/1
15 TABLET, FILM COATED, EXTENDED RELEASE ORAL EVERY 12 HOURS
Qty: 60 TABLET | Refills: 0 | Status: SHIPPED | OUTPATIENT
Start: 2024-11-08

## 2024-11-08 NOTE — TELEPHONE ENCOUNTER
Medication refill information reviewed.     Due date for HYDROcodone-acetaminophen (NORCO) 5-325 MG tablet  morphine (MS CONTIN) 15 MG CR tablet is 11/10/2024 for both     Prescriptions prepped for review.     Will route to provider.

## 2024-11-08 NOTE — TELEPHONE ENCOUNTER
Refills have been requested for the following medications:         HYDROcodone-acetaminophen (NORCO) 5-325 MG tablet [Chanel Bruno]         morphine (MS CONTIN) 15 MG CR tablet [Chanel Bruno]     Preferred pharmacy: [facility] [id number] [place], [place] - [address] [facility]

## 2024-11-08 NOTE — TELEPHONE ENCOUNTER
Received call from patient requesting refill(s) of HYDROcodone-acetaminophen (NORCO) 5-325 MG tablet  morphine (MS CONTIN) 15 MG CR tablet    Last dispensed from pharmacy on 10/10/2024 (both)     Patient's last office/virtual visit by prescribing provider on 9/5/2024.  Next office/virtual appointment scheduled for 12/5/2024.    Last urine drug screen date 11/30/2023.  Current opioid agreement on file (completed within the last year) Yes Date of opioid agreement: 11/30/2023.    E-prescribe to:    [id number] [place], [place] - [address]    Will route to nursing [place] for review and preparation of prescription(s).

## 2024-11-08 NOTE — TELEPHONE ENCOUNTER
Chart reviewed - Request appears appropriate. Refilled for 30 day supply.     Chanel Bruno DNP, APRN, AGNP-C  [facility]

## 2024-11-08 NOTE — TELEPHONE ENCOUNTER
SUBJECTIVE/OBJECTIVE:                                                    Refill request received for:   omeprazole (PRILOSEC) 40 MG DR capsule    Date of last refill: Unknown  Date of LOV r/t Medication: 9/5/24  Future appt scheduled? Yes: 12/5/24   Date refill request was received: 11/8/24    RECENT LABS / VITALS                                                      Last Comprehensive Metabolic Panel:  Lab Results   Component Value Date     09/05/2024    POTASSIUM 4.3 09/05/2024    CHLORIDE 105 09/05/2024    CO2 27 09/05/2024    ANIONGAP 10 09/05/2024     (H) 09/05/2024    BUN 18.5 09/05/2024    CR 1.06 09/05/2024    GFRESTIMATED 80 09/05/2024    NATALIYA 10.3 09/05/2024     BP Readings from Last 4 Encounters:   10/29/24 126/76   09/05/24 (!) 145/90   09/05/24 (!) 144/92   07/02/24 (!) 155/91     PLAN:                                                      Refill request sent to provider to review and advise.    See Ordaz RN on 11/8/2024 at 9:04 AM

## 2024-11-11 RX ORDER — OMEPRAZOLE 40 MG/1
40 CAPSULE, DELAYED RELEASE ORAL DAILY
Qty: 90 CAPSULE | Refills: 3 | Status: SHIPPED | OUTPATIENT
Start: 2024-11-11

## 2024-12-01 ASSESSMENT — PAIN SCALES - PAIN ENJOYMENT GENERAL ACTIVITY SCALE (PEG)
INTERFERED_ENJOYMENT_LIFE: 6
INTERFERED_GENERAL_ACTIVITY: 7
AVG_PAIN_PASTWEEK: 7
PEG_TOTALSCORE: 6.67

## 2024-12-05 ENCOUNTER — LAB (OUTPATIENT)
Dept: LAB | Facility: CLINIC | Age: 59
End: 2024-12-05
Payer: COMMERCIAL

## 2024-12-05 ENCOUNTER — INFUSION THERAPY VISIT (OUTPATIENT)
Dept: INFUSION THERAPY | Facility: CLINIC | Age: 59
End: 2024-12-05
Attending: INTERNAL MEDICINE
Payer: COMMERCIAL

## 2024-12-05 ENCOUNTER — OFFICE VISIT (OUTPATIENT)
Dept: PALLIATIVE MEDICINE | Facility: CLINIC | Age: 59
End: 2024-12-05
Payer: COMMERCIAL

## 2024-12-05 ENCOUNTER — ONCOLOGY VISIT (OUTPATIENT)
Dept: ONCOLOGY | Facility: CLINIC | Age: 59
End: 2024-12-05
Attending: NURSE PRACTITIONER
Payer: COMMERCIAL

## 2024-12-05 VITALS
SYSTOLIC BLOOD PRESSURE: 143 MMHG | DIASTOLIC BLOOD PRESSURE: 90 MMHG | BODY MASS INDEX: 28.41 KG/M2 | HEART RATE: 95 BPM | OXYGEN SATURATION: 96 % | HEIGHT: 67 IN | WEIGHT: 181 LBS

## 2024-12-05 VITALS — HEART RATE: 71 BPM | SYSTOLIC BLOOD PRESSURE: 153 MMHG | DIASTOLIC BLOOD PRESSURE: 94 MMHG

## 2024-12-05 DIAGNOSIS — C61 PROSTATE CANCER METASTATIC TO BONE (H): ICD-10-CM

## 2024-12-05 DIAGNOSIS — C79.51 PROSTATE CANCER METASTATIC TO BONE (H): ICD-10-CM

## 2024-12-05 DIAGNOSIS — Z79.891 ENCOUNTER FOR MONITORING OPIOID MAINTENANCE THERAPY: ICD-10-CM

## 2024-12-05 DIAGNOSIS — Z51.81 ENCOUNTER FOR MONITORING OPIOID MAINTENANCE THERAPY: ICD-10-CM

## 2024-12-05 DIAGNOSIS — K59.03 THERAPEUTIC OPIOID INDUCED CONSTIPATION: ICD-10-CM

## 2024-12-05 DIAGNOSIS — R91.8 PULMONARY NODULES: ICD-10-CM

## 2024-12-05 DIAGNOSIS — M54.16 LUMBAR RADICULOPATHY: ICD-10-CM

## 2024-12-05 DIAGNOSIS — Z13.220 SCREENING CHOLESTEROL LEVEL: ICD-10-CM

## 2024-12-05 DIAGNOSIS — Z79.899 CONTROLLED SUBSTANCE AGREEMENT SIGNED: ICD-10-CM

## 2024-12-05 DIAGNOSIS — C61 PROSTATE CANCER (H): Primary | ICD-10-CM

## 2024-12-05 DIAGNOSIS — R10.2 PELVIC PAIN IN MALE: ICD-10-CM

## 2024-12-05 DIAGNOSIS — C79.51 MALIGNANT NEOPLASM METASTATIC TO BONE (H): Primary | ICD-10-CM

## 2024-12-05 DIAGNOSIS — T40.2X5A THERAPEUTIC OPIOID INDUCED CONSTIPATION: ICD-10-CM

## 2024-12-05 DIAGNOSIS — G89.3 CANCER ASSOCIATED PAIN: ICD-10-CM

## 2024-12-05 DIAGNOSIS — G89.4 CHRONIC PAIN SYNDROME: Primary | ICD-10-CM

## 2024-12-05 PROCEDURE — 99213 OFFICE O/P EST LOW 20 MIN: CPT | Mod: 25 | Performed by: INTERNAL MEDICINE

## 2024-12-05 PROCEDURE — 99214 OFFICE O/P EST MOD 30 MIN: CPT | Performed by: INTERNAL MEDICINE

## 2024-12-05 PROCEDURE — G2211 COMPLEX E/M VISIT ADD ON: HCPCS | Performed by: INTERNAL MEDICINE

## 2024-12-05 PROCEDURE — 96372 THER/PROPH/DIAG INJ SC/IM: CPT | Performed by: INTERNAL MEDICINE

## 2024-12-05 RX ORDER — MORPHINE SULFATE 15 MG/1
15 TABLET, FILM COATED, EXTENDED RELEASE ORAL EVERY 12 HOURS
Qty: 60 TABLET | Refills: 0 | Status: SHIPPED | OUTPATIENT
Start: 2024-12-05

## 2024-12-05 RX ORDER — HYDROCODONE BITARTRATE AND ACETAMINOPHEN 5; 325 MG/1; MG/1
1 TABLET ORAL 3 TIMES DAILY PRN
Qty: 90 TABLET | Refills: 0 | Status: SHIPPED | OUTPATIENT
Start: 2024-12-05

## 2024-12-05 ASSESSMENT — PAIN SCALES - GENERAL
PAINLEVEL_OUTOF10: NO PAIN (0)
PAINLEVEL_OUTOF10: SEVERE PAIN (6)

## 2024-12-05 NOTE — LETTER
Opioid / Opioid Plus Controlled Substance Agreement    This is an agreement between you and your provider about the safe and appropriate use of controlled substance/opioids prescribed by your care team. Controlled substances are medicines that can cause physical and mental dependence (abuse).    There are strict laws about having and using these medicines. We here at [facility] are committing to working with you in your efforts to get better. To support you in this work, we ll help you schedule regular office appointments for medicine refills. If we must cancel or change your appointment for any reason, we ll make sure you have enough medicine to last until your next appointment.     As a Provider, I will:  Listen carefully to your concerns and treat you with respect.   Recommend a treatment plan that I believe is in your best interest. This plan may involve therapies other than opioid pain medication.   Talk with you often about the possible benefits, and the risk of harm of any medicine that we prescribe for you.   Provide a plan on how to taper (discontinue or go off) using this medicine if the decision is made to stop its use.    As a Patient, I understand that opioid(s):   Are a controlled substance prescribed by my care team to help me function or work and manage my condition(s).   Are strong medicines and can cause serious side effects such as:  Drowsiness, which can seriously affect my driving ability  A lower breathing rate, enough to cause death  Harm to my thinking ability   Depression   Abuse of and addiction to this medicine  Need to be taken exactly as prescribed. Combining opioids with certain medicines or chemicals (such as illegal drugs, sedatives, sleeping pills, and benzodiazepines) can be dangerous or even fatal. If I stop opioids suddenly, I may have severe withdrawal symptoms.  Do not work for all types of pain nor for all patients. If they re not helpful, I may be asked to stop  them.      The risks, benefits and side effects of these medicine(s) were explained to me. I agree that:  I will take part in other treatments as advised by my care team. This may be psychiatry or counseling, physical therapy, behavioral therapy, group treatment or a referral to a specialist.     I will keep all my appointments. I understand that this is part of the monitoring of opioids. My care team may require an office visit for EVERY opioid/controlled substance refill. If I miss appointments or don t follow instructions, my care team may stop my medicine.    I will take my medicines as prescribed. I will not change the dose or schedule unless my care team tells me to. There will be no refills if I run out early.     I may be asked to come to the clinic and complete a urine drug test or complete a pill count at any time. If I don t give a urine sample or participate in a pill count, the care team may stop my medicine.    I will only receive prescriptions from this clinic for chronic pain. If I am treated by another provider for acute pain issues, I will tell them that I am taking opioid pain medication for chronic pain and that I have a treatment agreement with this provider. I will inform my [facility] care team within one business day if I am given a prescription for any pain medication by another healthcare provider. My [facility] care team can contact other providers and pharmacists about my use of any medicines.    It is up to me to make sure that I don t run out of my medicines on weekends or holidays. If my care team is willing to refill my opioid prescription without a visit, I must request refills only during office hours. Refills may take up to 3 business days to process. I will use one pharmacy to fill all my opioid and other controlled substance prescriptions. I will notify the clinic about any changes to my insurance or medication availability.    I am responsible for my prescriptions.  If the medicine/prescription is lost, stolen or destroyed, it will not be replaced. I also agree not to share controlled substance medicines with anyone.    I am aware I should not use any illegal or recreational drugs. I agree not to drink alcohol unless my care team says I can.       If I enroll in the [place] Medical Cannabis program, I will tell my care team prior to my next refill.     I will tell my care team right away if I become pregnant, have a new medical problem treated outside of my regular clinic, or have a change in my medications.    I understand that this medicine can affect my thinking, judgment and reaction time. Alcohol and drugs affect the brain and body, which can affect the safety of my driving. Being under the influence of alcohol or drugs can affect my decision-making, behaviors, personal safety, and the safety of others. Driving while impaired (DWI) can occur if a person is driving, operating, or in physical control of a car, motorcycle, boat, snowmobile, ATV, motorbike, off-road vehicle, or any other motor vehicle ([place] Statute 169A.20). I understand the risk if I choose to drive or operate any vehicle or machinery.    I understand that if I do not follow any of the conditions above, my prescriptions or treatment may be stopped or changed.          Opioids  What You Need to Know    What are opioids?   Opioids are pain medicines that must be prescribed by a doctor. They are also known as narcotics.     Examples are:   morphine (MS Contin, Cherie)  oxycodone (Oxycontin)  oxycodone and acetaminophen (Percocet)  hydrocodone and acetaminophen (Vicodin, Norco)   fentanyl patch (Duragesic)   hydromorphone (Dilaudid)   methadone  codeine (Tylenol #3)     What do opioids do well?   Opioids are best for severe short-term pain such as after a surgery or injury. They may work well for cancer pain. They may help some people with long-lasting (chronic) pain.     What do opioids NOT do well?   Opioids  never get rid of pain entirely, and they don t work well for most patients with chronic pain. Opioids don t reduce swelling, one of the causes of pain.                                    Other ways to manage chronic pain and improve function include:     Treat the health problem that may be causing pain  Anti-inflammation medicines, which reduce swelling and tenderness, such as ibuprofen (Advil, Motrin) or naproxen (Aleve)  Acetaminophen (Tylenol)  Antidepressants and anti-seizure medicines, especially for nerve pain  Topical treatments such as patches or creams  Injections or nerve blocks  Chiropractic or osteopathic treatment  Acupuncture, massage, deep breathing, meditation, visual imagery, aromatherapy  Use heat or ice at the pain site  Physical therapy   Exercise  Stop smoking  Take part in therapy       Risks and side effects     Talk to your doctor before you start or decide to keep taking opioids. Possible side effects include:    Lowering your breathing rate enough to cause death  Overdose, including death, especially if taking higher than prescribed doses  Worse depression symptoms; less pleasure in things you usually enjoy  Feeling tired or sluggish  Slower thoughts or cloudy thinking  Being more sensitive to pain over time; pain is harder to control  Trouble sleeping or restless sleep  Changes in hormone levels (for example, less testosterone)  Changes in sex drive or ability to have sex  Constipation  Unsafe driving  Itching and sweating  Dizziness  Nausea, throwing up and dry mouth    What else should I know about opioids?    Opioids may lead to dependence, tolerance, or addiction.    Dependence means that if you stop or reduce the medicine too quickly, you will have withdrawal symptoms. These include loose poop (diarrhea), jitters, flu-like symptoms, nervousness and tremors. Dependence is not the same as addiction.                     Tolerance means needing higher doses over time to get the same  effect. This may increase the chance of serious side effects.    Addiction is when people improperly use a substance that harms their body, their mind or their relations with others. Use of opiates can cause a relapse of addiction if you have a history of drug or alcohol abuse.    People who have used opioids for a long time may have a lower quality of life, worse depression, higher levels of pain and more visits to doctors.    You can overdose on opioids. Take these steps to lower your risk of overdose:    Recognize the signs:  Signs of overdose include decrease or loss of consciousness (blackout), slowed breathing, trouble waking up and blue lips. If someone is worried about overdose, they should call 911.    Talk to your doctor about Narcan (naloxone).   If you are at risk for overdose, you may be given a prescription for Narcan. This medicine very quickly reverses the effects of opioids.   If you overdose, a friend or family member can give you Narcan while waiting for the ambulance. They need to know the signs of overdose and how to give Narcan.     Don't use alcohol or street drugs.   Taking them with opioids can cause death.    Do not take any of these medicines unless your doctor says it s OK. Taking these with opioids can cause death:  Benzodiazepines, such as lorazepam (Ativan), alprazolam (Xanax) or diazepam (Valium)  Muscle relaxers, such as cyclobenzaprine (Flexeril)  Sleeping pills like zolpidem (Ambien)   Other opioids      How to keep you and other people safe while taking opioids:    Never share your opioids with others.  Opioid medicines are regulated by the Drug Enforcement Agency ([place]). Selling or sharing medications is a criminal act.    2. Be sure to store opioids in a secure place, locked up if possible. Young children can easily swallow them and overdose.    3. When you are traveling with your medicines, keep them in the original bottles. If you use a pill box, be sure you also carry a copy  of your medicine list from your clinic or pharmacy.    4. Safe disposal of opioids    Most pharmacies have places to get rid of medicine, called disposal kiosks. Medicine disposal options are also available in every [facility]. Search your county and  medication disposal  to find more options. You can find more details at:  [email or web address]://[email or web address]-green/managing-[email or web address]-medications     I agree that my provider, clinic care team, and pharmacy may work with any city, state or federal law enforcement agency that investigates the misuse, sale, or other diversion of my controlled medicine. I will allow my provider to discuss my care with, or share a copy of, this agreement with any other treating provider, pharmacy or emergency room where I receive care.    I have read this agreement and have asked questions about anything I did not understand.    _______________________________________________________  Patient Signature - Per Etienne _____________________                   Date     _______________________________________________________  Provider Signature - TEREZA Sanchez CNP   _____________________                   Date     _______________________________________________________  Witness Signature (required if provider not present while patient signing)   _____________________                   Date

## 2024-12-05 NOTE — PROGRESS NOTES
[facility]     Date of visit: 12/5/2024      Assessment:   Per Etienne is a 59 year old male with a past medical history significant for prostate cancer with bone metastasis, pulmonary nodules, lumbar stenosis, HTN, gout who presents in follow up for ongoing pain management.      1. Low back pain with LLE radic, neuropathy bilat feet, pain of multiple joints - His back pain with left sided radicular pain with L5 distribution that has been present for many years. He has been referred for procedure by his PCP for repeat L4/5 LUIS (Regina/Obed), which historically have given him significant symptom relief, although he did not appreciate as much benefit from the most recent LUIS on 8/2/22. Lumbar CT from 8/2021 demonstrated severe left neural foraminal narrowing at L4-L5 and moderate to severe right at L3-4, additional multilevel spinal canal stenosis and neural foraminal stenosis noted. I suspect his low back pain is associated with multiple factors, including underlying degenerative changes of lumbar spine, bone metastasis, and likely some myofascial component. Neuropathic symptoms in feet and generalized joint pain is likely secondary effects from cancer treatment and bone metastasis.      Assigned to [place] nursing team.     Visit Diagnoses:  1. Chronic pain syndrome    2. Encounter for monitoring opioid maintenance therapy    3. Controlled substance agreement signed    4. Cancer associated pain    5. Prostate cancer metastatic to bone (H)    6. Lumbar radiculopathy    7. Therapeutic opioid induced constipation        Plan:  Diagnosis reviewed, treatment option addressed, and risk/benifits discussed.  Self-care instructions given.  I am recommending a multidisciplinary treatment plan to help this patient better manage their pain.                  1.  Pain Physical Therapy:  WALT Thompson is very active. Despite the pain, he continues to work full time as a , which is  somewhat labor intensive (frequently using ladder).               2.  Pain Psychologist to address relaxation, behavioral change, coping style, and other factors important to improvement.  N/A              3.  Diagnostic Studies:  No new orders today.               4.  Medication Management:   Continue MS Contin to 15 mg every 12 hours. He continues to appreciate benefit, stable baseline pain, supports functioning, continues to work full time. Refilled today for #60 tabs.   Continue hydrocodone 5-325 three times daily as needed for breakthrough pain. He does not use very frequently, usually only takes 1 tab daily as needed. He continues to appreciate benefit for breakthrough pain, no concerns for continuation. Refilled today for #90 tabs to last 30 days.   Continue Senna S to 1 tab twice daily, along with Miralax 1-2 times daily as needed. OIC stable.   CSA/UDS - completed on 12/5/24. Naloxone refill sent into pharmacy on 12/5/24. Advised to check expiration date on currently home supply, fill new prescription when needed.               5.  Potential procedures: Repeat L4-5 LUIS on 7/2/24. Appreciates improvement in pain around 75% pain relief that lasts 3+ months. Orders for repeat placed today.               7.  Follow up with TEREZA Sanchez CNP in 3-4 months       Review of Electronic Chart: Today I have also reviewed available medical information in the patient's medical record at [facility] ([facility]) and Care Everywhere (if available), including relevant provider notes, laboratory work, and imaging.     Chanel Bruno DNP, TEREZA, AGNP-C  [facility]     -------------------------------------------------    Subjective:    Chief complaint:   Chief Complaint   Patient presents with    Pain       Interval history:  Per Etienne is a 59 year old male last seen on 9/5/24.      Recommendations/plan at the last visit included:              1.  Pain Physical Therapy:  NO   - Bill is very  active. Despite the pain, he continues to work full time as a , which is somewhat labor intensive (frequently using ladder).               2.  Pain Psychologist to address relaxation, behavioral change, coping style, and other factors important to improvement.  N/A              3.  Diagnostic Studies:  No new orders today.               4.  Medication Management:   Continue MS Contin to 15 mg every 12 hours. He continues to appreciate benefit, stable baseline pain, supports functioning, continues to work full time. Refilled today for #60 tabs.   Continue hydrocodone 5-325 three times daily as needed for breakthrough pain. He does not use very frequently, usually only takes 1 tab daily as needed. He continues to appreciate benefit for breakthrough pain, no concerns for continuation. Refilled today for #90 tabs to last 30 days.   Continue Senna S to 1 tab twice daily. He reports improvement with OIC, with increased frequency to twice daily. He reports intermittent constipation every 2-3 weeks that lasts for about 2 days. He usually increases fiber intake (salads) and this helps. Recommend using Miralax 1-2 times daily as needed, if current regimen not effective.  CSA/UDS - completed on 23. Naloxone refill sent into pharmacy on 23. Advised to  new supply and dispose of old supply at home.               5.  Potential procedures: Repeat L4-5 LUIS on 24. Appreciates improvement in pain around 75% pain relief that lasts 3+ months.               7.  Follow up with TEREZA Sanchez CNP in around 3-4 months by end of December.     Since his last visit, Per Etienne reports:  -He reports brother  right before Thanksgiving unexpectedly.   -His daughter is pregnant, has first [place] coming up right before [place].   -He reports MS Contin and hydrocodone still effective. No side effects.   -Due for CSA.   -He reports new onset symptoms overnight 1 x week, pain and cramping in legs.  He has to get up overnight.   -He reports this has been going on for about 2 months. He notes occurrence after days he is more active.   -He would like to repeat L4-5 LUIS. Reports 75% relief for 3+ months.     PEG: A Three-Item Scale Assessing Pain Intensity and Interference    What number best describes your PAIN ON AVERAGE in the past week? (Patient-Rptd) 7    What number best describes how, during the past week, pain has interfered with your ENJOYMENT OF LIFE? (Patient-Rptd) 6    What number best describes how, during the past week, pain has interfered with your GENERAL ACTIVITY? (Patient-Rptd) 7    PEG Total Score: (Patient-Rptd) 6.67    Marielena EE, Mc KA, Clover MJ, Zack TA, Mike J, Hilda JM, Asa SM, Rui K. Development and initial validation of the PEG, a 3-item scale assessing pain intensity and interference. Journal of General Internal Medicine. 2009 [place]ur;24:733-738.        HPI/Interval Hx from last visit:  --He reports pain is somewhat increased today, was cutting down trees in his yard on Monday.   -He pushes himself sometimes with activity, especially with yard work.   -Current meds MS Contin 15 mg BID and hydrocodone 5-325 up to 3 tabs/day.   -Last L4-5 interlaminar epidural steroid injection on 7/2/24 with Dr. Tapia.   -He reports repeat LESI is very helpful. He walked a lot when he was in [place] for his daughter's wedding. He thinks the injection helped support him with increased activity.   -He plans to retired in July 2026. He states they offered him to go part time at age 61 for 5 years, like a assisted phase out.   -He is considering options for assisted and also thinking about spending time with his family.   -No concerns with medication regimen. Some days he has very little to no pain. He usually takes 2 tabs/day of hydrocodone. Will use 3 tabs/day when he is more active.       Current Pain Treatments:                MS Contin 15 mg BID              Hydrocodone 5-325 mg TID PRN                      L4-5 LUIS - repeat ordered today         Current MME: 44     Review of [place] Prescription Monitoring Program (): YES    Annual Controlled Substance Agreement/UDS due date: Completed on 12/5/24.      Past pain treatments:  LESI - helpful      Medications:  Current Outpatient Medications   Medication Sig Dispense Refill    apalutamide (ERLEADA) 60 MG tablet Take 4 tablets (240 mg) by mouth daily. 120 tablet 0    denosumab (XGEVA) 120 MG/1.7ML SOLN injection Inject 1.7 mLs (120 mg) Subcutaneous every 3 months 1.7 mL 0    HYDROcodone-acetaminophen (NORCO) 5-325 MG tablet Take 1 tablet by mouth 3 times daily as needed for breakthrough pain or severe pain. Fill 12/8/24, start 12/10/24 90 tablet 0    leuprolide (ELIGARD) 45 MG kit Inject 45 mg Subcutaneous every 6 months      morphine (MS CONTIN) 15 MG CR tablet Take 1 tablet (15 mg) by mouth every 12 hours. #60 tabs to last 30 days. Fill 12/8/24, start 12/10/24. 60 tablet 0    naloxone (NARCAN) 4 MG/0.1ML nasal spray Spray 1 spray (4 mg) into one nostril alternating nostrils as needed for opioid reversal. every 2-3 minutes until assistance arrives 0.2 mL 0    omeprazole (PRILOSEC) 40 MG DR capsule Take 1 capsule (40 mg) by mouth daily. at least 30 min before breakfast 90 capsule 3    polyethylene glycol (MIRALAX) 17 GM/Dose powder Take 17 g (1 Capful) by mouth 2 times daily as needed for constipation 578 g 1    SENNA-docusate sodium (SENNA S) 8.6-50 MG tablet Take 1 tablet by mouth 2 times daily 60 tablet 3    UNABLE TO FIND 1 tablet daily MEDICATION NAME: C, D, Zinc combination supplement         Medical History: any changes in medical history since they were last seen? No      Objective:    Physical Exam:  Blood pressure (!) 153/94, pulse 71.  Constitutional: Well developed, well nourished, appears stated age.  Gait: Intact   HEENT: Head atraumatic, normocephalic. Eyes without conjunctival injection or jaundice. Oropharynx clear. Neck supple.  No obvious neck masses.  Skin: No rash, lesions, or petechiae of exposed skin.   Psychiatric/mental status: Alert, without lethargy or stupor. Speech fluent. Appropriate affect. Mood normal. Able to follow commands without difficulty.     Diagnostic Tests/Imaging/Labs:  Reviewed last CMP from 11/29/24 - hepatic and renal WNL    BILLING TIME DOCUMENTATION:   The total TIME spent on this patient on the date of the encounter/appointment was 30 minutes.      TOTAL TIME includes:   Time spent preparing to see the patient (reviewing records and tests)   Time spent face to face (or over the phone) with the patient   Time spent ordering tests, medications, procedures and referrals   Time spent Referring and communicating with other healthcare professionals   Time spent documenting clinical information in Epic

## 2024-12-05 NOTE — PROGRESS NOTES
Infusion Nursing Note:  Per Etienne presents today for   Chief Complaint   Patient presents with    Allied Health Visit     Lupron/Xgeva     Patient seen by provider today: Yes: Dr. Garcia   present during visit today: Not Applicable.    Note: N/A.      Intravenous Access:  No Intravenous access/labs at this visit.    Treatment Conditions:  Not Applicable.      Post Infusion Assessment:  Patient tolerated injection without incident.  Site patent and intact, free from redness, edema or discomfort.       Discharge Plan:   Patient discharged in stable condition accompanied by: self.  Departure Mode: Ambulatory.      Viky Miller CMA'

## 2024-12-05 NOTE — NURSING NOTE
"Oncology Rooming Note    December 5, 2024 3:20 PM   Per Etienne is a 59 year old male who presents for:    Chief Complaint   Patient presents with    Oncology Clinic Visit     Follow up     Initial Vitals: BP (!) 143/90 (BP Location: Right arm, Patient Position: Chair, Cuff Size: Adult Regular)   Pulse 95   Ht 1.702 m (5' 7\")   Wt 82.1 kg (181 lb)   SpO2 96%   BMI 28.35 kg/m   Estimated body mass index is 28.35 kg/m  as calculated from the following:    Height as of this encounter: 1.702 m (5' 7\").    Weight as of this encounter: 82.1 kg (181 lb). Body surface area is 1.97 meters squared.  No Pain (0) Comment: Data Unavailable   No LMP for male patient.  Allergies reviewed: Yes  Medications reviewed: Yes    Medications: Medication refills not needed today.  Pharmacy name entered into [facility]:    [id number] [place], [place] - [address], [place] - [address] [place] [address]    Frailty Screening:   Is the patient here for a new oncology consult visit in cancer care? 2. No      Clinical concerns: NO       Viky Miller CMA              "

## 2024-12-05 NOTE — PROGRESS NOTES
Oncology Follow Up Visit: September 5, 2024     Oncologist: Dr Garcia  PCP: Per Chen    Diagnosis: Metastatic hormone sensitive prostate cancer  Per Etienne is a 60 yo male who presented to his PCP in December 2019 with slow urinary stream.  PSA = 124. On 1/8/2020 CT abdomen pelvis showed groundglass opacity in the left lower lobe,  dystrophic calcification of the prostate gland, and a fatty liver.  Same-day bone scan was negative. On January 10, 2010 prostate biopsy showed on the left Courtland score 4+3 involving 7 cores and on the right Courtland score 3+4 involving 6 cores, with positive perineural invasion.  He proceeded to undergo RRP by Dr. Colmenares on 2/24/2020.  Pathology revealed acinar adenocarcinoma Robyn 4+3 with focal ROBERTO, bladder neck base invasion, positive SVI, positive PNI, and multiple positive margins.  There was no LVSI and 0 out of 3 lymph nodes removed were involved with tumor; pT3bN0.  Treatment:   4/20/2020 received Eligard.     5/21/2020 MRI of the Prostate= 14 x 9 mm T2 intermediate structure in the prostatectomy bed at the left aspect of urinary bladder neck with restricted diffusion in the DWI images and early enhancement in the contrast  enhanced images. This was concerning for locally recurrent/residual prostatic cancer.  There was a T2 hypointense structure in the in the area of removed right seminal vesicle measuring 13 x 11 mm  demonstrating restricted  diffusion in the DWI images. This may represent a recurrent/residual tumor. There were 2 bone metastases in the left pubic inferior ramus and inferior aspect of left pubic body.There is a 7 mm suspicious lymph nodes in the right obturator area  corresponding to to FACBC avid lymph node on the same date PET/CT  5/20/2020 PET/CT showed:  1.  2 foci of increased uptake in the left ischium, with underlying  sclerotic lesion, and symphysis pubis, these changes are indicative of  active prostate cancer metastasis.  2. Low-level  increased uptake in 3 lymphnodes along the right external  and internal iliac nodes, the most suspicious of which is the deep  obturator.  3. Low-level increased uptake in the prostatectomy bed slightly to the  left side of the urethra, if clinically necessary, endoscopy would  would be necessary for confirmation of this being metastasis.  PSA was down to 27.7 on 4/21/2020.   4/20/2020 he received an Eligard injection.    6/12/2020 started Apalutamide(Erleada)  Bone scan 1/22/2021: no bone mets.    Interval History: Mr. Etienne is seen alone today in clinic with for review for continuation of Apalutamide / Leuprolide/ Xgeva.  Pt feeling well without new symptoms- feels all is stable for now. Missed one dose of medication recently.   Neuropathy to the feet worse with time.   Pain to pelvis and  low back- seeing pain and palliative care- on MS Contin 15mg bid with Norco up to 3 x daily as needed and this is working well.Gets Lumabr injections for back pain. Uses marijuana prn   Stays active with job as  and feels he is able to continue with job for now  Bowel and bladder are normal.  Denies fevers, illness, headaches, chest pain, SOB.   Rest of comprehensive and complete ROS is reviewed and is negative.   Past Medical History:   Diagnosis Date    Gout     Hypertension goal BP (blood pressure) < 140/90 08/01/2022    Lumbar stenosis     Prostate cancer (H) 01/10/2020     Current Outpatient Medications   Medication Sig Dispense Refill    apalutamide (ERLEADA) 60 MG tablet Take 4 tablets (240 mg) by mouth daily. 120 tablet 0    denosumab (XGEVA) 120 MG/1.7ML SOLN injection Inject 1.7 mLs (120 mg) Subcutaneous every 3 months 1.7 mL 0    HYDROcodone-acetaminophen (NORCO) 5-325 MG tablet Take 1 tablet by mouth 3 times daily as needed for breakthrough pain or severe pain. Fill 12/8/24, start 12/10/24 90 tablet 0    leuprolide (ELIGARD) 45 MG kit Inject 45 mg Subcutaneous every 6 months      morphine (MS CONTIN) 15  "MG CR tablet Take 1 tablet (15 mg) by mouth every 12 hours. #60 tabs to last 30 days. Fill 12/8/24, start 12/10/24. 60 tablet 0    naloxone (NARCAN) 4 MG/0.1ML nasal spray Spray 1 spray (4 mg) into one nostril alternating nostrils as needed for opioid reversal. every 2-3 minutes until assistance arrives 0.2 mL 0    omeprazole (PRILOSEC) 40 MG DR capsule Take 1 capsule (40 mg) by mouth daily. at least 30 min before breakfast 90 capsule 3    polyethylene glycol (MIRALAX) 17 GM/Dose powder Take 17 g (1 Capful) by mouth 2 times daily as needed for constipation 578 g 1    SENNA-docusate sodium (SENNA S) 8.6-50 MG tablet Take 1 tablet by mouth 2 times daily 60 tablet 3    UNABLE TO FIND 1 tablet daily MEDICATION NAME: C, D, Zinc combination supplement       No current facility-administered medications for this visit.     No Known Allergies    Physical Exam:BP (!) 143/90 (BP Location: Right arm, Patient Position: Chair, Cuff Size: Adult Regular)   Pulse 95   Ht 1.702 m (5' 7\")   Wt 82.1 kg (181 lb)   SpO2 96%   BMI 28.35 kg/m    Constitutional: Alert, healthy, and in no distress with good movement on own.   ENT: Eyes bright, No mouth sores  Neck: Supple, No adenopathy.  Cardiac: Heart rate and rhythm is regular with normal S1 and S2 without murmur  Respiratory: Breathing easy. Lung sounds clear to auscultation  Abdomen: Soft, non-tender, BS normal. No masses or organomegaly  MS: Muscle tone normal- moving well on own without signs of pain, extremities normal with no edema.   Skin: No suspicious lesions or rashes  Neuro: Sensory grossly WNL, gait normal.   Lymph: Normal ant/post cervical, axillary, supraclavicular nodes  Psych: Mentation appears normal and affect normal/bright with good conversation.    Laboratory Results:   Results for orders placed or performed in visit on 12/05/24   Drug Confirmation Panel Urine with Creat     Status: None (In process)    Narrative    The following orders were created for panel " "order Drug Confirmation Panel Urine with Creat.  Procedure                               Abnormality         Status                     ---------                               -----------         ------                     Urine Drug Confirmation ...[[id number]]                      In process                 Urine Creatinine for David...[[id number]]                      In process                   Please view results for these tests on the individual orders.     Recent Labs   Lab Test 11/29/24  0849 09/05/24  1348 05/30/24  1403 02/21/24  1502 11/29/23  1457    142 141 145 140   POTASSIUM 4.3 4.3 4.5 3.8 4.1   CHLORIDE 102 105 104 106 104   CO2 26 27 27 28 26   ANIONGAP 11 10 10 11 10   BUN 13.6 18.5 14.6 16.5 13.8   CR 0.96 1.06 0.83 1.05 1.00   * 129* 148* 112* 109*   NATALIYA 9.3 10.3 9.7 9.5 9.5     No results for input(s): \"MAG\", \"PHOS\" in the last 39721 hours.  Recent Labs   Lab Test 11/29/24  0849 09/05/24  1348 05/30/24  1403 02/21/24  1502 11/29/23  1457   WBC 11.9* 15.1* 8.8 8.0 8.0   HGB 14.2 13.6 13.4 13.6 13.7    225 299 240 225   MCV 91 93 91 93 92   NEUTROPHIL 72 79 68 60 65     Recent Labs   Lab Test 11/29/24  0849 09/05/24  1348 05/30/24  1403   BILITOTAL 0.3 0.5 0.4   ALKPHOS 98 89 82   ALT 22 22 26   AST 22 24 19   ALBUMIN 4.1 4.4 4.0     TSH   Date Value Ref Range Status   01/21/2022 1.03 0.40 - 4.00 mU/L Final   10/29/2021 1.91 0.40 - 4.00 mU/L Final   08/23/2021 2.49 0.40 - 4.00 mU/L Final   04/29/2021 2.57 0.40 - 4.00 mU/L Final   01/22/2021 3.46 0.40 - 4.00 mU/L Final   10/30/2020 1.97 0.40 - 4.00 mU/L Final     No results for input(s): \"CEA\" in the last 33000 hours.  Results for orders placed or performed in visit on 07/02/24   PAIN Interlaminar Epidural Steroid Injection Lumbar/Sacral    Narrative    Pre procedure Diagnosis: Lumbar radiculopathy  Post procedure Diagnosis: Same  Procedure performed: L4-5 interlaminar epidural steroid injection   Anesthesia: none  Complications: " nonw  Operators: Issac Tapia MD     Indications:   Per Etienne is a 58 year old male.  The patient has a history of   lbp rad to his le.  Examination shows + slump.  he has tried conservative   treatment including meds/pt.    MRI reviewed  Options/alternatives, benefits and risks were discussed with the patient   including but not limited to bleeding, infection, no pain relief, tissue   trauma, exposure to radiation, reaction to medications, spinal cord   injury, dural puncture, weakness, numbness and headache.  Questions were   answered to his satisfaction and he wishes to proceed. Voluntary informed   consent was obtained and signed.     Vitals were reviewed: Yes  Allergies were reviewed:  Yes   Medications were reviewed:  Yes   Pre-procedure pain score: 6/10    Procedure:  The patient's medical history, medications, and allergies were reviewed   and reconciled.  After obtaining signed informed consent, the patient was   brought into the procedure suite and was placed in a prone position on the   procedure table.   A Pause for the Cause was performed.  Patient was   prepped and draped in the usual sterile fashion.     The L4-5 interspace was identified with AP fluoroscopy.  A total of 4ml of   1% lidocaine was used to anesthetize the skin and subcutaneous tissues for   a  midline approach.    A 20gauge 3.5inch Touhy needle was advanced   utilizing intermittent AP and Lateral fluoroscopy and air for loss of   resistance.  The epidural space was encountered on the first pass without   difficulties.  Aspiration for blood and CSF was negative.  Needle position   was verified by injecting 1 ml of Omnipaque utilizing real-time   fluoroscopy that showed good needle placement and epidural spread without   signs of intravascular or intrathecal uptake.  Omnipaque wasted:  9 ml.    Then, after repeated negative aspiration for blood or CSF, a combination   of Kenalog 40 mg, Bupivicaine 0.25% 2 ml, diluted with 3  ml of normal   saline to a total injectate volume of 6 ml was injected into the epidural   space in a slow and incremental fashion and the needle was restyletted and   withdrawn.  All injected medications were preservative free.    The injection site was cleaned and a sterile dressing was applied.    The patient tolerated the procedure well without complications and was   taken to the recovery room for continued observation.    Images were saved to PACS.    Post-procedure pain score: 6/10  Follow-up includes:   -f/u with referring provider     Issac Tapia MD  [facility]     Recent Labs   Lab Test 11/29/24  0849 09/05/24  1348 05/30/24  1403 02/21/24  1502 11/29/23  1457   PSA <0.01 <0.01 <0.01 <0.01 <0.01   TESTOSTTOTAL 11* 5* 5* 3* 3*     Assessment and Plan:   Metastatic hormone sensitive prostate cancer with bone metastasis-patient continues plan with Erleada 240 mg po daily  as well as Xgeva and Lupron every 3 months and states he is tolerating well.   - Is reporting intermittent day of nausea and sweats - not sure if related to Pain plan or prostates treatment but states they improve quickly .   Review, labs and exam showing no sign of progression of the cancer.   -PSA showing good control = <0.01.   He will receive Lupron and Xgeva with visit today.  He will return in 3 months to see me with labs(CBC differential, CMP, total testosterone level and PSA ), also for next Lupron/Xgeva administration    Bone metastasis with pain -Known pelvic bone mets - seeing palliative care team and using MS contin 15 mg po bid with Norco up to 3 x daily prn and smoking Marijuana as needed up to 2 x daily.   - also having symptoms of neuropathy of the feet but good shoes helping with this- not know to be caused by any current medications.      Lung cancer screening/ Pulmonary nodule- has 30 pack year smoking history-  quit in 2012.  Initially imaging was 1/2022 - last imaging 3/12/2024. Reviewed  results.     Completed Lipid panel testing and health sheet for pt HSA refund for pt.   Encouraged annual Flu vaccination this fall.      The longitudinal plan of care for the diagnosis(es)/condition(s) as documented were addressed during this visit. Due to the added complexity in care, I will continue to support Bill in the subsequent management and with ongoing continuity of care.     30 minutes spent on the date of the encounter doing chart review, history and exam, documentation and further activities as noted above

## 2024-12-05 NOTE — PATIENT INSTRUCTIONS
Continue MS Contin and hydrocodone as prescribed.   Orders placed for repeat L4-5 LUIS. You will be contacted to schedule with Dr. Tapia.   Follow up 3-4 months or sooner if needed.     ----------------------------------------------------------------  Clinic Number:  [phone]   Call with any questions about your care and for scheduling assistance.   Calls are returned Monday through Friday between 8 AM and 4:30 PM. We usually get back to you within 2 business days depending on the issue/request.    If we are prescribing your medications:  For opioid medication refills, call the clinic or send a [facility] message 7 days in advance.  Please include:  Name of requested medication  Name of the pharmacy.  For non-opioid medications, call your pharmacy directly to request a refill. Please allow 3-4 days to be processed.   Per [place] State Law:  All controlled substance prescriptions must be filled within 30 days of being written.    For those controlled substances allowing refills, pickup must occur within 30 days of last fill.      We believe regular attendance is key to your success in our program!    Any time you are unable to keep your appointment we ask that you call us at least 24 hours in advance to cancel.This will allow us to offer the appointment time to another patient.   Multiple missed appointments may lead to dismissal from the clinic.

## 2024-12-05 NOTE — LETTER
12/5/2024      Per Etienne  [id number] [facility] [address]      Dear Colleague,    Thank you for referring your patient, Per Etienne, to the [facility]. Please see a copy of my visit note below.    Oncology Follow Up Visit: September 5, 2024     Oncologist: Dr Garcia  PCP: Per Chen    Diagnosis: Metastatic hormone sensitive prostate cancer  Per Etienne is a 60 yo male who presented to his PCP in December 2019 with slow urinary stream.  PSA = 124. On 1/8/2020 CT abdomen pelvis showed groundglass opacity in the left lower lobe,  dystrophic calcification of the prostate gland, and a fatty liver.  Same-day bone scan was negative. On January 10, 2010 prostate biopsy showed on the left Robyn score 4+3 involving 7 cores and on the right Shasta score 3+4 involving 6 cores, with positive perineural invasion.  He proceeded to undergo RRP by Dr. Colmenares on 2/24/2020.  Pathology revealed acinar adenocarcinoma Shasta 4+3 with focal ROBERTO, bladder neck base invasion, positive SVI, positive PNI, and multiple positive margins.  There was no LVSI and 0 out of 3 lymph nodes removed were involved with tumor; pT3bN0.  Treatment:   4/20/2020 received Eligard.     5/21/2020 MRI of the Prostate= 14 x 9 mm T2 intermediate structure in the prostatectomy bed at the left aspect of urinary bladder neck with restricted diffusion in the DWI images and early enhancement in the contrast  enhanced images. This was concerning for locally recurrent/residual prostatic cancer.  There was a T2 hypointense structure in the in the area of removed right seminal vesicle measuring 13 x 11 mm  demonstrating restricted  diffusion in the DWI images. This may represent a recurrent/residual tumor. There were 2 bone metastases in the left pubic inferior ramus and inferior aspect of left pubic body.There is a 7 mm suspicious lymph nodes in the right obturator area  corresponding to to FACBC avid  lymph node on the same date PET/CT  5/20/2020 PET/CT showed:  1.  2 foci of increased uptake in the left ischium, with underlying  sclerotic lesion, and symphysis pubis, these changes are indicative of  active prostate cancer metastasis.  2. Low-level increased uptake in 3 lymphnodes along the right external  and internal iliac nodes, the most suspicious of which is the deep  obturator.  3. Low-level increased uptake in the prostatectomy bed slightly to the  left side of the urethra, if clinically necessary, endoscopy would  would be necessary for confirmation of this being metastasis.  PSA was down to 27.7 on 4/21/2020.   4/20/2020 he received an Eligard injection.    6/12/2020 started Apalutamide(Erleada)  Bone scan 1/22/2021: no bone mets.    Interval History: Mr. Etienne is seen alone today in clinic with for review for continuation of Apalutamide / Leuprolide/ Xgeva.  Pt feeling well without new symptoms- feels all is stable for now. Missed one dose of medication recently.   Neuropathy to the feet worse with time.   Pain to pelvis and  low back- seeing pain and palliative care- on MS Contin 15mg bid with Norco up to 3 x daily as needed and this is working well.Gets Lumabr injections for back pain. Uses marijuana prn   Stays active with job as  and feels he is able to continue with job for now  Bowel and bladder are normal.  Denies fevers, illness, headaches, chest pain, SOB.   Rest of comprehensive and complete ROS is reviewed and is negative.   Past Medical History:   Diagnosis Date     Gout      Hypertension goal BP (blood pressure) < 140/90 08/01/2022     Lumbar stenosis      Prostate cancer (H) 01/10/2020     Current Outpatient Medications   Medication Sig Dispense Refill     apalutamide (ERLEADA) 60 MG tablet Take 4 tablets (240 mg) by mouth daily. 120 tablet 0     denosumab (XGEVA) 120 MG/1.7ML SOLN injection Inject 1.7 mLs (120 mg) Subcutaneous every 3 months 1.7 mL 0      "HYDROcodone-acetaminophen (NORCO) 5-325 MG tablet Take 1 tablet by mouth 3 times daily as needed for breakthrough pain or severe pain. Fill 12/8/24, start 12/10/24 90 tablet 0     leuprolide (ELIGARD) 45 MG kit Inject 45 mg Subcutaneous every 6 months       morphine (MS CONTIN) 15 MG CR tablet Take 1 tablet (15 mg) by mouth every 12 hours. #60 tabs to last 30 days. Fill 12/8/24, start 12/10/24. 60 tablet 0     naloxone (NARCAN) 4 MG/0.1ML nasal spray Spray 1 spray (4 mg) into one nostril alternating nostrils as needed for opioid reversal. every 2-3 minutes until assistance arrives 0.2 mL 0     omeprazole (PRILOSEC) 40 MG DR capsule Take 1 capsule (40 mg) by mouth daily. at least 30 min before breakfast 90 capsule 3     polyethylene glycol (MIRALAX) 17 GM/Dose powder Take 17 g (1 Capful) by mouth 2 times daily as needed for constipation 578 g 1     SENNA-docusate sodium (SENNA S) 8.6-50 MG tablet Take 1 tablet by mouth 2 times daily 60 tablet 3     UNABLE TO FIND 1 tablet daily MEDICATION NAME: C, D, Zinc combination supplement       No current facility-administered medications for this visit.     No Known Allergies    Physical Exam:BP (!) 143/90 (BP Location: Right arm, Patient Position: Chair, Cuff Size: Adult Regular)   Pulse 95   Ht 1.702 m (5' 7\")   Wt 82.1 kg (181 lb)   SpO2 96%   BMI 28.35 kg/m    Constitutional: Alert, healthy, and in no distress with good movement on own.   ENT: Eyes bright, No mouth sores  Neck: Supple, No adenopathy.  Cardiac: Heart rate and rhythm is regular with normal S1 and S2 without murmur  Respiratory: Breathing easy. Lung sounds clear to auscultation  Abdomen: Soft, non-tender, BS normal. No masses or organomegaly  MS: Muscle tone normal- moving well on own without signs of pain, extremities normal with no edema.   Skin: No suspicious lesions or rashes  Neuro: Sensory grossly WNL, gait normal.   Lymph: Normal ant/post cervical, axillary, supraclavicular nodes  Psych: Mentation " "appears normal and affect normal/bright with good conversation.    Laboratory Results:   Results for orders placed or performed in visit on 12/05/24   Drug Confirmation Panel Urine with Creat     Status: None (In process)    Narrative    The following orders were created for panel order Drug Confirmation Panel Urine with Creat.  Procedure                               Abnormality         Status                     ---------                               -----------         ------                     Urine Drug Confirmation ...[[id number]]                      In process                 Urine Creatinine for David...[[id number]]                      In process                   Please view results for these tests on the individual orders.     Recent Labs   Lab Test 11/29/24  0849 09/05/24  1348 05/30/24  1403 02/21/24  1502 11/29/23  1457    142 141 145 140   POTASSIUM 4.3 4.3 4.5 3.8 4.1   CHLORIDE 102 105 104 106 104   CO2 26 27 27 28 26   ANIONGAP 11 10 10 11 10   BUN 13.6 18.5 14.6 16.5 13.8   CR 0.96 1.06 0.83 1.05 1.00   * 129* 148* 112* 109*   NATALIYA 9.3 10.3 9.7 9.5 9.5     No results for input(s): \"MAG\", \"PHOS\" in the last 99430 hours.  Recent Labs   Lab Test 11/29/24  0849 09/05/24  1348 05/30/24  1403 02/21/24  1502 11/29/23  1457   WBC 11.9* 15.1* 8.8 8.0 8.0   HGB 14.2 13.6 13.4 13.6 13.7    225 299 240 225   MCV 91 93 91 93 92   NEUTROPHIL 72 79 68 60 65     Recent Labs   Lab Test 11/29/24  0849 09/05/24  1348 05/30/24  1403   BILITOTAL 0.3 0.5 0.4   ALKPHOS 98 89 82   ALT 22 22 26   AST 22 24 19   ALBUMIN 4.1 4.4 4.0     TSH   Date Value Ref Range Status   01/21/2022 1.03 0.40 - 4.00 mU/L Final   10/29/2021 1.91 0.40 - 4.00 mU/L Final   08/23/2021 2.49 0.40 - 4.00 mU/L Final   04/29/2021 2.57 0.40 - 4.00 mU/L Final   01/22/2021 3.46 0.40 - 4.00 mU/L Final   10/30/2020 1.97 0.40 - 4.00 mU/L Final     No results for input(s): \"CEA\" in the last 51169 hours.  Results for orders placed or " performed in visit on 07/02/24   PAIN Interlaminar Epidural Steroid Injection Lumbar/Sacral    Narrative    Pre procedure Diagnosis: Lumbar radiculopathy  Post procedure Diagnosis: Same  Procedure performed: L4-5 interlaminar epidural steroid injection   Anesthesia: none  Complications: nonw  Operators: Issac Tapia MD     Indications:   Per Etienne is a 58 year old male.  The patient has a history of   lbp rad to his le.  Examination shows + slump.  he has tried conservative   treatment including meds/pt.    MRI reviewed  Options/alternatives, benefits and risks were discussed with the patient   including but not limited to bleeding, infection, no pain relief, tissue   trauma, exposure to radiation, reaction to medications, spinal cord   injury, dural puncture, weakness, numbness and headache.  Questions were   answered to his satisfaction and he wishes to proceed. Voluntary informed   consent was obtained and signed.     Vitals were reviewed: Yes  Allergies were reviewed:  Yes   Medications were reviewed:  Yes   Pre-procedure pain score: 6/10    Procedure:  The patient's medical history, medications, and allergies were reviewed   and reconciled.  After obtaining signed informed consent, the patient was   brought into the procedure suite and was placed in a prone position on the   procedure table.   A Pause for the Cause was performed.  Patient was   prepped and draped in the usual sterile fashion.     The L4-5 interspace was identified with AP fluoroscopy.  A total of 4ml of   1% lidocaine was used to anesthetize the skin and subcutaneous tissues for   a  midline approach.    A 20gauge 3.5inch Touhy needle was advanced   utilizing intermittent AP and Lateral fluoroscopy and air for loss of   resistance.  The epidural space was encountered on the first pass without   difficulties.  Aspiration for blood and CSF was negative.  Needle position   was verified by injecting 1 ml of Omnipaque utilizing  real-time   fluoroscopy that showed good needle placement and epidural spread without   signs of intravascular or intrathecal uptake.  Omnipaque wasted:  9 ml.    Then, after repeated negative aspiration for blood or CSF, a combination   of Kenalog 40 mg, Bupivicaine 0.25% 2 ml, diluted with 3 ml of normal   saline to a total injectate volume of 6 ml was injected into the epidural   space in a slow and incremental fashion and the needle was restyletted and   withdrawn.  All injected medications were preservative free.    The injection site was cleaned and a sterile dressing was applied.    The patient tolerated the procedure well without complications and was   taken to the recovery room for continued observation.    Images were saved to PACS.    Post-procedure pain score: 6/10  Follow-up includes:   -f/u with referring provider     Issac Tapia MD  [facility]     Recent Labs   Lab Test 11/29/24  0849 09/05/24  1348 05/30/24  1403 02/21/24  1502 11/29/23  1457   PSA <0.01 <0.01 <0.01 <0.01 <0.01   TESTOSTTOTAL 11* 5* 5* 3* 3*     Assessment and Plan:   Metastatic hormone sensitive prostate cancer with bone metastasis-patient continues plan with Erleada 240 mg po daily  as well as Xgeva and Lupron every 3 months and states he is tolerating well.   - Is reporting intermittent day of nausea and sweats - not sure if related to Pain plan or prostates treatment but states they improve quickly .   Review, labs and exam showing no sign of progression of the cancer.   -PSA showing good control = <0.01.   He will receive Lupron and Xgeva with visit today.  He will return in 3 months to see me with labs(CBC differential, CMP, total testosterone level and PSA ), also for next Lupron/Xgeva administration    Bone metastasis with pain -Known pelvic bone mets - seeing palliative care team and using MS contin 15 mg po bid with Norco up to 3 x daily prn and smoking Marijuana as needed up to 2 x daily.   -  also having symptoms of neuropathy of the feet but good shoes helping with this- not know to be caused by any current medications.      Lung cancer screening/ Pulmonary nodule- has 30 pack year smoking history-  quit in 2012.  Initially imaging was 1/2022 - last imaging 3/12/2024. Reviewed results.     Completed Lipid panel testing and health sheet for pt HSA refund for pt.   Encouraged annual Flu vaccination this fall.      The longitudinal plan of care for the diagnosis(es)/condition(s) as documented were addressed during this visit. Due to the added complexity in care, I will continue to support Bill in the subsequent management and with ongoing continuity of care.     30 minutes spent on the date of the encounter doing chart review, history and exam, documentation and further activities as noted above       Again, thank you for allowing me to participate in the care of your patient.        Sincerely,        Scott Garcia MD

## 2024-12-06 ENCOUNTER — TELEPHONE (OUTPATIENT)
Dept: PALLIATIVE MEDICINE | Facility: CLINIC | Age: 59
End: 2024-12-06

## 2024-12-06 DIAGNOSIS — M54.16 LUMBAR RADICULOPATHY: Primary | ICD-10-CM

## 2024-12-06 NOTE — TELEPHONE ENCOUNTER
Dr. Garcia,   Is there any contraindication to pt have a lumbar epidural steroid injection?    Shefali, RN-BSN  [facility]   [phone]

## 2024-12-06 NOTE — TELEPHONE ENCOUNTER
"Screening Questions for Radiology Injections:    Injection to be done at which interventional clinic site? [facility] - [place]    If choosing [facility] for location, please inform patient:  \"[facility] is a Hospital based clinic. Before your visit, you should check with your insurance about how it covers the charges for facility services in a hospital-based clinic.     Procedure ordered by Chanel Bruno    Procedure ordered? L4-5 interlaminar epidural steroid injection   Transforaminal Cervical LUIS - Send to [facility] ([facility]) - No [facility] providers perform this procedure    What insurance would patient like us to bill for this procedure? MEDICA  IF SCHEDULING IN [place] PAIN OR SPINE PLEASE SCHEDULE AT LEAST 7-10 BUSINESS DAYS OUT SO A PA CAN BE OBTAINED  Worker's comp or MVA (motor vehicle accident) -Any injection DO NOT SCHEDULE and route to Richie Hood.    [employer] insurance - For ALL INJECTIONS DO NOT SCHEDULE and route to Mary Padilla.     ALL BCBS, [employer] and [employer] CIGNA - DO NOT SCHEDULE and route to Mary Randy  MEDICA- ALL INJECTIONS- route to Mary Girardy    Is patient scheduled at [facility]? NO    If YES, route every encounter to [facility]INE CENTER CARE NAVIGATION POOL [[id number]]    Is an  needed? No     Patient has a  home? (Review Grid) YES: Informed     Any chance of pregnancy? Not Applicable   If YES, do NOT schedule and route to RN pool  - Dr. Calderón route to PM&R Nurse  [[id number]]      Is patient actively being treated for cancer or immunocompromised? Yes - Pt is currently being treated for Cancer his oncologist id Dr. Garcia  If YES, do NOT schedule and route to RN pool/ Dr. Calderón's Team    Does the patient have a bleeding or clotting disorder? No   If YES, okay to schedule AND route to RN nurse / Dr. Calderón's Team   (For any patients with platelet count <100, RN must forward to provider)    Is patient taking any Blood " Thinners OR Antiplatelet medication?  No   If hold needed, do NOT schedule, route to RN pool/ Dr. Calderón's Team  Examples:   Blood Thinners: (Coumadin, Warfarin, Jantoven, Pradaxa, Xarelto, Eliquis, Edoxaban, Enoxaparin, Lovenox, Heparin, Arixtra, Fondaparinux or Fragmin)  Antiplatelet Medications: (Plavix, Brilinta or Effient)     Is patient taking any aspirin products (includes Excedrin and Fiorinal)? No   If yes route to RN gracia/ Dr. Calderón's Team - Do not schedule    Is patient taking any GLP-1 Antagonist (hold needed for sedation patients only) No   (semaglutide (Ozempic, Wegovy), dulaglutide (Trulicity), exenatide ER (Bydureon), tirzepatide (Mounjaro), Liraglutide (Saxenda, Victoza), semaglutide (Rybelsus), Terzepatide (Zepbound)  If YES, okay to schedule AND route to RN nurse / Dr. Calderón's Team      Any allergies to contrast dye, iodine, shellfish, or numbing and steroid medications? No  If YES, schedule and add allergy information to appointment notes AND route to the RN gracia/ Dr. Calderón's Team  If LUIS and Contrast Dye / Iodine Allergy? DO NOT SCHEDULE, route to RN pool/ Dr. Calderón's Team  Allergies: Patient has no known allergies.     Does patient have an active infection or treated for one within the past week? No  Is patient currently taking any antibiotics or steroid medications?  No   For patients on chronic, preventative, or prophylactic antibiotics, procedures may be scheduled.   For patients on antibiotics for active or recent infection, schedule 4 days after completed.  For patients on steroid medications, schedule 4 days after completed.     Has the patient had a flu shot or any other vaccinations within the past 7 days? No  If yes, explain that for the vaccine to work best they need to:     wait 1 week before and 1 week after getting any Vaccine  wait 1 week before and 2 weeks after getting any Covid Vaccine   If patient has concerns about the timing, send to RN pool/ Dr. Calderón's Team    Does  patient have an MRI/CT?  YES:06/19/24  Include Date and Check Procedure Scheduling Grid to see if required.  Was the MRI/CT done within the last 3 years?  Yes   If no route to RN Pool/ Dr. Calderón's Team  If yes, where was the MRI/CT done? [facility]    Refer to PACS Transmissions list for approved external locations and route to RN Pool High Priority/ Dr. Lerners Team  If MRI was not done at approved external location do NOT schedule and route to RN pool/ Dr. Lerners Team    If patient has an imaging disc, the injection MAY be scheduled but patient must bring disc to appt or appt will be cancelled.    Is patient able to transfer to a procedure table with minimal or no assistance? Yes   If no, do NOT schedule and route to RN Pool/ Dr. Calderón's Team    Procedure Specific Instructions:  If celiac plexus block, informed patient NPO for 6 hours and that it is okay to take medications with sips of water, especially blood pressure medications Not Applicable       If this is for a cervical procedure, informed patient that aspirin needs to be held for 6 days.   Not Applicable    Sedation, If Sedation is ordered for any procedure, patient must be NPO for 6 hours prior to procedure Not Applicable    If IV needed:  Do not schedule procedures requiring IV placement in the first appointment of the day or first appointment after lunch. Do NOT schedule at 0745, 0815 or 1245.     Instructed patient to arrive 30 minutes early for IV start if required. (Check Procedure Scheduling Grid)  Not Applicable    Reminders:  If you are started on any steroids or antibiotics between now and your appointment, you must contact us because the procedure may need to be cancelled.  Yes    As a reminder, receiving steroids can decrease your body's ability to fight infection.   Would you still like to move forward with scheduling the injection?  Yes    IV Sedation is not provided for procedures. If oral anti-anxiety medication is needed, the patient  should request this from their referring provider.    Instruct patient to arrive as directed prior to the scheduled appointment time:  If IV needed 30 minutes before appointment time     For patients 85 or older we recommend having an adult stay w/ them for the remainder of the day.     If the patient is Diabetic, remind them to bring their glucometer.      Does the patient have any questions?  NO  Serena Woo  [facility]

## 2024-12-09 LAB
HYDROCODONE UR CFM-MCNC: 500 NG/ML
HYDROCODONE/CREAT UR: 291 NG/MG {CREAT}
HYDROMORPHONE UR CFM-MCNC: 286 NG/ML
HYDROMORPHONE/CREAT UR: 166 NG/MG {CREAT}
MORPHINE UR CFM-MCNC: >7000 NG/ML
MORPHINE/CREAT UR: ABNORMAL

## 2024-12-09 NOTE — TELEPHONE ENCOUNTER
[employer] only gives 2 week authorization.     Please schedule patient and route back with the date to initiate PA.    ** please make sure its at least 2 weeks out**    Mary Wallace   [facility]

## 2024-12-10 NOTE — TELEPHONE ENCOUNTER
I have attempted to contact this patient by phone with the following results: left message to return my call on answering machine, I will continue to try later.  Please schedule patient and route back with the date to initiate PA.   ** please make sure its at least 2 weeks out**    Serena Woo      [facility]  Pain Management

## 2024-12-23 NOTE — TELEPHONE ENCOUNTER
Pt has been scheduled with Dr. Tapia in [place] on 01/16/25. Routing to Mary for review and HAZEL Woo      [facility]  Pain [facility]

## 2024-12-30 NOTE — TELEPHONE ENCOUNTER
Order ID: [id number]  Authorized      Approval Valid Through:01/06/2025 - 01/17/2025    PATIENT SCHEDULED       Mary Wallace   [facility]

## 2025-01-02 DIAGNOSIS — C61 PROSTATE CANCER (H): Primary | ICD-10-CM

## 2025-01-03 NOTE — TELEPHONE ENCOUNTER
Pt was originally scheduled on 01/16 with Dr. Tapia but had to rescheduled and now his appt is on 01/21. Routing to Mary for review.     Serena Woo      [facility]  Pain [facility]

## 2025-01-06 NOTE — TELEPHONE ENCOUNTER
Robert extension approved      Order ID: [id number]  Authorized      Approval Valid Through:01/13/2025 - 01/24/2025

## 2025-01-07 NOTE — TELEPHONE ENCOUNTER
Routing to Dr Garcia/care team-    Are there any concerns with plan of care from oncology perspective with proceeding with epidural steroid injection? Thank you.     Note: Pt scheduled 01/21/25    Gardenia STACK, RN Care Coordinator  [facility]  Pain [facility]

## 2025-01-08 ENCOUNTER — MYC REFILL (OUTPATIENT)
Dept: ONCOLOGY | Facility: CLINIC | Age: 60
End: 2025-01-08
Payer: COMMERCIAL

## 2025-01-08 ENCOUNTER — MYC REFILL (OUTPATIENT)
Dept: PALLIATIVE MEDICINE | Facility: CLINIC | Age: 60
End: 2025-01-08
Payer: COMMERCIAL

## 2025-01-08 DIAGNOSIS — T40.2X5A THERAPEUTIC OPIOID INDUCED CONSTIPATION: ICD-10-CM

## 2025-01-08 DIAGNOSIS — C61 PROSTATE CANCER METASTATIC TO BONE (H): ICD-10-CM

## 2025-01-08 DIAGNOSIS — K59.03 THERAPEUTIC OPIOID INDUCED CONSTIPATION: ICD-10-CM

## 2025-01-08 DIAGNOSIS — G89.3 CANCER ASSOCIATED PAIN: ICD-10-CM

## 2025-01-08 DIAGNOSIS — C79.51 PROSTATE CANCER METASTATIC TO BONE (H): ICD-10-CM

## 2025-01-08 DIAGNOSIS — K21.9 GASTROESOPHAGEAL REFLUX DISEASE, UNSPECIFIED WHETHER ESOPHAGITIS PRESENT: ICD-10-CM

## 2025-01-09 RX ORDER — MORPHINE SULFATE 15 MG/1
15 TABLET, FILM COATED, EXTENDED RELEASE ORAL EVERY 12 HOURS
Qty: 60 TABLET | Refills: 0 | Status: SHIPPED | OUTPATIENT
Start: 2025-01-09

## 2025-01-09 RX ORDER — SENNA AND DOCUSATE SODIUM 50; 8.6 MG/1; MG/1
1 TABLET, FILM COATED ORAL 2 TIMES DAILY
Qty: 60 TABLET | Refills: 3 | Status: SHIPPED | OUTPATIENT
Start: 2025-01-09

## 2025-01-09 RX ORDER — OMEPRAZOLE 40 MG/1
40 CAPSULE, DELAYED RELEASE ORAL DAILY
Qty: 90 CAPSULE | Refills: 3 | OUTPATIENT
Start: 2025-01-09

## 2025-01-09 RX ORDER — HYDROCODONE BITARTRATE AND ACETAMINOPHEN 5; 325 MG/1; MG/1
1 TABLET ORAL 3 TIMES DAILY PRN
Qty: 90 TABLET | Refills: 0 | Status: SHIPPED | OUTPATIENT
Start: 2025-01-09

## 2025-01-09 NOTE — TELEPHONE ENCOUNTER
Received request for a refill(s) of SENNA-docusate sodium (SENNA S) 8.6-50 MG tablet     morphine (MS CONTIN) 15 MG CR tablet      HYDROcodone-acetaminophen (NORCO) 5-325 MG tablet     Last dispensed from pharmacy on Senna 11/12/24  Opioids 12/08/24    Patient's last office/virtual visit by prescribing provider on 12/05/24  Next office/virtual appointment scheduled for 03/06/25    Last urine drug screen date 12/05/24  Current opioid agreement on file (completed within the last year) Yes Date of opioid agreement: 12/05/24    E-prescribe to pharmacy-    Will route to nursing pool for review and preparation of prescription(s).

## 2025-01-09 NOTE — TELEPHONE ENCOUNTER
SUBJECTIVE/OBJECTIVE:                                                      Refill request received for:  omeprazole (PRILOSEC) 40 MG DR capsule    Date of last refill: Unknown (last prescribed on 11/11/24, for quantity of 90 capsules with 3 additional refills).  Patient should still have refills on file with the pharmacy.    Date of LOV r/t Medication: 12/5/24  Future appt scheduled? Yes: 3/6/25   Date refill request was received: 1/9/25    PLAN:                                                      RN replied to patient's [employer] message, instructing him to contact his pharmacy to request the omeprazole refill, as he should still have several refills remaining on file with them.      Provided patient with the pharmacy phone number in case he needs it.      See Ordaz RN on 1/9/2025 at 10:09 AM

## 2025-01-09 NOTE — TELEPHONE ENCOUNTER
Medication refill information reviewed.     Due date for morphine (MS CONTIN) 15 MG CR tablet      HYDROcodone-acetaminophen (NORCO) 5-325 MG tablet  is 1/9/2025     Prescriptions prepped for review.     Will route to provider.

## 2025-01-09 NOTE — TELEPHONE ENCOUNTER
Refills have been requested for the following medications:         SENNA-docusate sodium (SENNA S) 8.6-50 MG tablet [Chanel Bruno]         morphine (MS CONTIN) 15 MG CR tablet [Chanel Bruno]         HYDROcodone-acetaminophen (NORCO) 5-325 MG tablet [Chanel Bruno]     Preferred pharmacy: [facility] [place], [place] - [address]

## 2025-01-09 NOTE — TELEPHONE ENCOUNTER
Request appears appropriate, refill approved for 30 day supply.    Chanel Bruno DNP, APRN, AGNP-C  [facility] Pain Management

## 2025-01-21 ENCOUNTER — RADIOLOGY INJECTION OFFICE VISIT (OUTPATIENT)
Dept: PALLIATIVE MEDICINE | Facility: CLINIC | Age: 60
End: 2025-01-21
Payer: COMMERCIAL

## 2025-01-21 VITALS — OXYGEN SATURATION: 96 % | HEART RATE: 76 BPM | SYSTOLIC BLOOD PRESSURE: 167 MMHG | DIASTOLIC BLOOD PRESSURE: 99 MMHG

## 2025-01-21 DIAGNOSIS — M54.16 LUMBAR RADICULOPATHY: ICD-10-CM

## 2025-01-21 PROCEDURE — 62323 NJX INTERLAMINAR LMBR/SAC: CPT | Performed by: PAIN MEDICINE

## 2025-01-21 RX ORDER — TRIAMCINOLONE ACETONIDE 40 MG/ML
40 INJECTION, SUSPENSION INTRA-ARTICULAR; INTRAMUSCULAR ONCE
Status: COMPLETED | OUTPATIENT
Start: 2025-01-21 | End: 2025-01-21

## 2025-01-21 RX ADMIN — TRIAMCINOLONE ACETONIDE 40 MG: 40 INJECTION, SUSPENSION INTRA-ARTICULAR; INTRAMUSCULAR at 20:17

## 2025-01-21 ASSESSMENT — PAIN SCALES - GENERAL
PAINLEVEL_OUTOF10: MODERATE PAIN (6)
PAINLEVEL_OUTOF10: MODERATE PAIN (6)

## 2025-01-21 NOTE — PATIENT INSTRUCTIONS
[facility]   Procedure Discharge Instructions    Today you saw:    Dr. Issac Tapia,         You had a(n):  Lumbar epidural steroid injection Interlaminar     Medications used for interlaminar LUIS: Lidocaine, Omnipaque, Kenalog, Bupivicaine, normal saline        Be cautious with walking. Numbness and/or weakness in the lower extremities may occur for up to 6-8 hours after the procedure due to effect of the local anesthetic  Do not drive for 6 hours. The effect of the local anesthetic could slow your reflexes.   You may resume your regular activities after 24 hours  Avoid strenuous activity for the first 24 hours  You may shower, however avoid swimming, tub baths or hot tubs for 24 hours following your procedure  You may have a mild to moderate increase in pain for several days following the injection.  It may take up to 14 days for the steroid medication to start working although you may feel the effect as early as a few days after the procedure.     You may use ice packs for 10-15 minutes, 3 to 4 times a day at the injection site for comfort  Do not use heat to painful areas for 6 to 8 hours. This will give the local anesthetic time to wear off and prevent you from accidentally burning your skin.   Unless you have been directed to avoid the use of anti-inflammatory medications (NSAIDS), you may use medications such as ibuprofen, Aleve or Tylenol for pain control if needed.   If you have diabetes, check your blood sugar more frequently than usual as your blood sugar may be higher than normal for 10-14 days following a steroid injection. Contact your doctor who manages your diabetes if your blood sugar is higher than usual  Possible side effects of steroids that you may experience include flushing, elevated blood pressure, increased appetite, mild headaches and restlessness.  All of these symptoms will get better with time.  If you experience any of the following, call the Pain  Clinic during work hours (Mon-Friday 8-4:30 pm) at [phone] or the Provider Line after hours at [phone]:  -Fever over 100 degree F  -Swelling, bleeding, redness, drainage, warmth at the injection site  -Progressive weakness or numbness in your legs  -Loss of bowel or bladder function  -Unusual headache not relieved by Tylenol or other pain reliever  -Unusual new onset of pain that is not improving

## 2025-01-21 NOTE — TELEPHONE ENCOUNTER
Call placed to move up injection appt, pt agrees to switch to 1445 for injection on this day 1/21/25    Viky Cisse RN

## 2025-01-21 NOTE — NURSING NOTE
Pre-procedure Intake  If YES to any questions or NO to having a   Please complete laminated checklist and leave on the computer keyboard for Provider, verbally inform provider if able.    For SCS Trial, RFA's or any sedation procedure:  Have you been fasting? NA  If yes, for how long?     Are you taking any any blood thinners such as Coumadin, Warfarin, Jantoven, Pradaxa Xarelto, Eliquis, Edoxaban, Enoxaparin, Lovenox, Heparin, Arixtra, Fondaparinux, or Fragmin? OR Antiplatelet medication such as Plavix, Brilinta, or Effient?   No   If yes, when did you take your last dose?     Do you take aspirin?  No  If cervical procedure, have you held aspirin for 6 days?   NA    Is the Pt taking any GLP-1 Antagonist (hold needed for sedation patients only)  (semaglutide (Ozempic, Wegovy), dulaglutide (Trulicity), exenatide ER (Bydureon), tirzepatide (Mounjaro), Liraglutide (Saxenda, Victoza), semaglutide (Rybelsus)     NA  If yes, when did you take your last dose?     Do you have any allergies to contrast dye, iodine, steroid and/or numbing medications?  NO    Are you currently taking antibiotics or have an active infection?  NO    Have you had a fever/elevated temperature within the past week? NO    Are you currently taking oral steroids? NO    Do you have a ? Yes    Are you pregnant or breastfeeding?  NO    Have you received any vaccinations in the last week? NO    Notify provider and RNs if systolic BP >170, diastolic BP >100, P >100 or O2 sats < 90%

## 2025-01-22 NOTE — PROGRESS NOTES
Pre procedure Diagnosis: Lumbar radiculopathy  Post procedure Diagnosis: Same  Procedure performed: L4-5 interlaminar epidural steroid injection   Anesthesia: none  Complications: nonw  Operators: Issac Tapia MD     Indications:   Per Etienne is 59 year old male.  The patient has a history of lbp rad to his le.  Examination shows + slump.  he has tried conservative treatment including meds/pt.    MRI reviewed  Options/alternatives, benefits and risks were discussed with the patient including but not limited to bleeding, infection, no pain relief, tissue trauma, exposure to radiation, reaction to medications, spinal cord injury, dural puncture, weakness, numbness and headache.  Questions were answered to his satisfaction and he wishes to proceed. Voluntary informed consent was obtained and signed.     Vitals were reviewed: Yes  Allergies were reviewed:  Yes   Medications were reviewed:  Yes   Pre-procedure pain score: 6/10    Procedure:  The patient's medical history, medications, and allergies were reviewed and reconciled.  After obtaining signed informed consent, the patient was brought into the procedure suite and was placed in a prone position on the procedure table.   A Pause for the Cause was performed.  Patient was prepped and draped in the usual sterile fashion.     The L4-5 interspace was identified with AP fluoroscopy.  A total of 4ml of 1% lidocaine was used to anesthetize the skin and subcutaneous tissues for a  midline approach.    A 20gauge 3.5inch Touhy needle was advanced utilizing intermittent AP and Lateral fluoroscopy and air for loss of resistance.  The epidural space was encountered on the first pass without difficulties.  Aspiration for blood and CSF was negative.  Needle position was verified by injecting 1 ml of Omnipaque utilizing real-time fluoroscopy that showed good needle placement and epidural spread without signs of intravascular or intrathecal uptake.  Omnipaque wasted:  9  ml.    Then, after repeated negative aspiration for blood or CSF, a combination of Kenalog 40 mg, Bupivicaine 0.25% 2 ml, diluted with 3 ml of normal saline to a total injectate volume of 6 ml was injected into the epidural space in a slow and incremental fashion and the needle was restyletted and withdrawn.  All injected medications were preservative free.    The injection site was cleaned and a sterile dressing was applied.    The patient tolerated the procedure well without complications and was taken to the recovery room for continued observation.    Images were saved to PACS.    Post-procedure pain score: 6/10  Follow-up includes:   -f/u with referring provider     Issac Tapia MD  [facility] [place]

## 2025-03-03 ENCOUNTER — LAB (OUTPATIENT)
Dept: LAB | Facility: CLINIC | Age: 60
End: 2025-03-03
Payer: COMMERCIAL

## 2025-03-03 DIAGNOSIS — C61 PROSTATE CANCER METASTATIC TO BONE (H): ICD-10-CM

## 2025-03-03 DIAGNOSIS — C79.51 PROSTATE CANCER METASTATIC TO BONE (H): ICD-10-CM

## 2025-03-03 DIAGNOSIS — C61 PROSTATE CANCER (H): ICD-10-CM

## 2025-03-03 DIAGNOSIS — R91.8 PULMONARY NODULES: ICD-10-CM

## 2025-03-03 DIAGNOSIS — C79.51 MALIGNANT NEOPLASM METASTATIC TO BONE (H): ICD-10-CM

## 2025-03-03 LAB
BASOPHILS # BLD AUTO: 0.1 10E3/UL (ref 0–0.2)
BASOPHILS NFR BLD AUTO: 0 %
EOSINOPHIL # BLD AUTO: 0 10E3/UL (ref 0–0.7)
EOSINOPHIL NFR BLD AUTO: 0 %
ERYTHROCYTE [DISTWIDTH] IN BLOOD BY AUTOMATED COUNT: 11.7 % (ref 10–15)
HCT VFR BLD AUTO: 39.6 % (ref 40–53)
HGB BLD-MCNC: 13.5 G/DL (ref 13.3–17.7)
IMM GRANULOCYTES # BLD: 0 10E3/UL
IMM GRANULOCYTES NFR BLD: 0 %
LYMPHOCYTES # BLD AUTO: 1.8 10E3/UL (ref 0.8–5.3)
LYMPHOCYTES NFR BLD AUTO: 10 %
MCH RBC QN AUTO: 31.2 PG (ref 26.5–33)
MCHC RBC AUTO-ENTMCNC: 34.1 G/DL (ref 31.5–36.5)
MCV RBC AUTO: 92 FL (ref 78–100)
MONOCYTES # BLD AUTO: 1.1 10E3/UL (ref 0–1.3)
MONOCYTES NFR BLD AUTO: 6 %
NEUTROPHILS # BLD AUTO: 15.8 10E3/UL (ref 1.6–8.3)
NEUTROPHILS NFR BLD AUTO: 84 %
PLATELET # BLD AUTO: 226 10E3/UL (ref 150–450)
RBC # BLD AUTO: 4.33 10E6/UL (ref 4.4–5.9)
WBC # BLD AUTO: 18.8 10E3/UL (ref 4–11)

## 2025-03-03 PROCEDURE — 80053 COMPREHEN METABOLIC PANEL: CPT

## 2025-03-03 PROCEDURE — 36415 COLL VENOUS BLD VENIPUNCTURE: CPT

## 2025-03-03 PROCEDURE — 84153 ASSAY OF PSA TOTAL: CPT

## 2025-03-03 PROCEDURE — 85025 COMPLETE CBC W/AUTO DIFF WBC: CPT

## 2025-03-03 PROCEDURE — 84403 ASSAY OF TOTAL TESTOSTERONE: CPT

## 2025-03-03 ASSESSMENT — PAIN SCALES - PAIN ENJOYMENT GENERAL ACTIVITY SCALE (PEG)
INTERFERED_ENJOYMENT_LIFE: 6
PEG_TOTALSCORE: 6
AVG_PAIN_PASTWEEK: 7
INTERFERED_GENERAL_ACTIVITY: 5

## 2025-03-04 DIAGNOSIS — C61 PROSTATE CANCER (H): Primary | ICD-10-CM

## 2025-03-04 LAB
ALBUMIN SERPL BCG-MCNC: 4.2 G/DL (ref 3.5–5.2)
ALP SERPL-CCNC: 81 U/L (ref 40–150)
ALT SERPL W P-5'-P-CCNC: 29 U/L (ref 0–70)
ANION GAP SERPL CALCULATED.3IONS-SCNC: 12 MMOL/L (ref 7–15)
AST SERPL W P-5'-P-CCNC: 20 U/L (ref 0–45)
BILIRUB SERPL-MCNC: 0.5 MG/DL
BUN SERPL-MCNC: 20.2 MG/DL (ref 8–23)
CALCIUM SERPL-MCNC: 10.1 MG/DL (ref 8.8–10.4)
CHLORIDE SERPL-SCNC: 100 MMOL/L (ref 98–107)
CREAT SERPL-MCNC: 0.98 MG/DL (ref 0.67–1.17)
EGFRCR SERPLBLD CKD-EPI 2021: 88 ML/MIN/1.73M2
GLUCOSE SERPL-MCNC: 102 MG/DL (ref 70–99)
HCO3 SERPL-SCNC: 28 MMOL/L (ref 22–29)
POTASSIUM SERPL-SCNC: 3.9 MMOL/L (ref 3.4–5.3)
PROT SERPL-MCNC: 7 G/DL (ref 6.4–8.3)
PSA SERPL DL<=0.01 NG/ML-MCNC: <0.01 NG/ML (ref 0–4.5)
SODIUM SERPL-SCNC: 140 MMOL/L (ref 135–145)

## 2025-03-05 NOTE — PROGRESS NOTES
Oncology Follow Up Visit: March 6, 2025     Oncologist: Dr Garcia  PCP: Per Chen    Diagnosis: Metastatic hormone sensitive prostate cancer  Per Etienne is a 60 yo male who presented to his PCP in December 2019 with slow urinary stream.  PSA = 124. On 1/8/2020 CT abdomen pelvis showed groundglass opacity in the left lower lobe,  dystrophic calcification of the prostate gland, and a fatty liver.  Same-day bone scan was negative. On January 10, 2010 prostate biopsy showed on the left Robyn score 4+3 involving 7 cores and on the right Robyn score 3+4 involving 6 cores, with positive perineural invasion.  He proceeded to undergo RRP by Dr. Colmenares on 2/24/2020.  Pathology revealed acinar adenocarcinoma [place] 4+3 with focal ROBERTO, bladder neck base invasion, positive SVI, positive PNI, and multiple positive margins.  There was no LVSI and 0 out of 3 lymph nodes removed were involved with tumor; pT3bN0.  Treatment:   4/20/2020 received Eligard.     5/21/2020 MRI of the Prostate= 14 x 9 mm T2 intermediate structure in the prostatectomy bed at the left aspect of urinary bladder neck with restricted diffusion in the DWI images and early enhancement in the contrast  enhanced images. This was concerning for locally recurrent/residual prostatic cancer.  There was a T2 hypointense structure in the in the area of removed right seminal vesicle measuring 13 x 11 mm  demonstrating restricted  diffusion in the DWI images. This may represent a recurrent/residual tumor. There were 2 bone metastases in the left pubic inferior ramus and inferior aspect of left pubic body.There is a 7 mm suspicious lymph nodes in the right obturator area  corresponding to to FACBC avid lymph node on the same date PET/CT  5/20/2020 PET/CT showed:  1.  2 foci of increased uptake in the left ischium, with underlying  sclerotic lesion, and symphysis pubis, these changes are indicative of  active prostate cancer metastasis.  2. Low-level  increased uptake in 3 lymphnodes along the right external  and internal iliac nodes, the most suspicious of which is the deep  obturator.  3. Low-level increased uptake in the prostatectomy bed slightly to the  left side of the urethra, if clinically necessary, endoscopy would  would be necessary for confirmation of this being metastasis.  PSA was down to 27.7 on 4/21/2020.   4/20/2020 he received an Eligard injection.    6/12/2020 started Apalutamide(Erleada)  Bone scan 1/22/2021: no bone mets.    Interval History: Mr. Etienne is seen alone today in clinic with for review for continuation of Apalutamide / Leuprolide/ Xgeva. Pt denies new symptoms. 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    Pt feeling well without new symptoms- feels all is stable for now.    Neuropathy to the feet still noted  Pain to pelvis and  low back- seeing pain and palliative care- on MS Contin 15mg bid with Norco up to 3 x daily as needed and this is working well.Gets Lumabr injections for back pain. Uses marijuana prn   Bowel and bladder are normal.  Denies fevers, illness, headaches, chest pain, SOB.   Rest of comprehensive and complete ROS is reviewed and is negative.   Past Medical History:   Diagnosis Date    Gout     Hypertension goal BP (blood pressure) < 140/90 08/01/2022    Lumbar stenosis     Prostate cancer (H) 01/10/2020     Current Outpatient Medications   Medication Sig Dispense Refill    [START ON 3/17/2025] apalutamide (ERLEADA) 60 MG tablet Take 4 tablets (240 mg) by mouth daily. 120 tablet 0    denosumab (XGEVA) 120 MG/1.7ML SOLN injection Inject 1.7 mLs (120 mg) Subcutaneous every 3 months 1.7 mL 0    HYDROcodone-acetaminophen (NORCO) 5-325 MG tablet Take 1 tablet by mouth 3 times daily as needed for breakthrough pain or severe pain. Fill 3/8/2025, start 3/10/2025 90 tablet 0    leuprolide (ELIGARD) 45 MG kit Inject 45 mg Subcutaneous every 6 months      morphine (MS  "CONTIN) 15 MG CR tablet Take 1 tablet (15 mg) by mouth every 12 hours. #60 tabs to last 30 days. Fill 3/8/2025, start 3/10/2025. 60 tablet 0    naloxone (NARCAN) 4 MG/0.1ML nasal spray Spray 1 spray (4 mg) into one nostril alternating nostrils as needed for opioid reversal. every 2-3 minutes until assistance arrives 0.2 mL 0    omeprazole (PRILOSEC) 40 MG DR capsule Take 1 capsule (40 mg) by mouth daily. at least 30 min before breakfast 90 capsule 3    polyethylene glycol (MIRALAX) 17 GM/Dose powder Take 17 g (1 Capful) by mouth 2 times daily as needed for constipation 578 g 1    SENNA-docusate sodium (SENNA S) 8.6-50 MG tablet Take 1 tablet by mouth 2 times daily. 60 tablet 3    UNABLE TO FIND 1 tablet daily MEDICATION NAME: C, D, Zinc combination supplement       No current facility-administered medications for this visit.     No Known Allergies    Physical Exam:/84 (BP Location: Right arm)   Pulse 99   Ht 1.702 m (5' 7\")   Wt 84.5 kg (186 lb 3.2 oz)   SpO2 95%   BMI 29.16 kg/m    Constitutional: Alert, cooperative, and in no distress with good movement on own.   ENT: Eyes bright, No mouth sores  Neck: Supple, No adenopathy.  Cardiac: Heart rate and rhythm is regular with normal S1 and S2 without murmur  Respiratory: Breathing easy. Lung sounds clear to auscultation  Abdomen: Soft, non-tender, BS normal. No masses or organomegaly  MS: Muscle tone normal- moving well on own without signs of pain, extremities normal with no edema.   Skin: No suspicious lesions or rashes  Neuro: Sensory grossly WNL, gait normal.   Lymph: Normal ant/post cervical, axillary, supraclavicular nodes  Psych: Mentation appears normal and affect normal/bright with good conversation.    Laboratory Results:   No results found for any visits on 03/06/25.   Latest Reference Range & Units 03/03/25 16:46   Sodium 135 - 145 mmol/L 140   Potassium 3.4 - 5.3 mmol/L 3.9   Chloride 98 - 107 mmol/L 100   Carbon Dioxide (CO2) 22 - 29 mmol/L 28 "   Urea Nitrogen 8.0 - 23.0 mg/dL 20.2   Creatinine 0.67 - 1.17 mg/dL 0.98   GFR Estimate >60 mL/min/1.73m2 88   Calcium 8.8 - 10.4 mg/dL 10.1   Anion Gap 7 - 15 mmol/L 12   Albumin 3.5 - 5.2 g/dL 4.2   Protein Total 6.4 - 8.3 g/dL 7.0   Alkaline Phosphatase 40 - 150 U/L 81   ALT 0 - 70 U/L 29   AST 0 - 45 U/L 20   Bilirubin Total <=1.2 mg/dL 0.5   Glucose 70 - 99 mg/dL 102 (H)   Testosterone Total 240 - 950 ng/dL 5 (L)   WBC 4.0 - 11.0 10e3/uL 18.8 (H)   Hemoglobin 13.3 - 17.7 g/dL 13.5   Hematocrit 40.0 - 53.0 % 39.6 (L)   Platelet Count 150 - 450 10e3/uL 226   RBC Count 4.40 - 5.90 10e6/uL 4.33 (L)   MCV 78 - 100 fL 92   MCH 26.5 - 33.0 pg 31.2   MCHC 31.5 - 36.5 g/dL 34.1   RDW 10.0 - 15.0 % 11.7   % Neutrophils % 84   % Lymphocytes % 10   % Monocytes % 6   % Eosinophils % 0   % Basophils % 0   Absolute Basophils 0.0 - 0.2 10e3/uL 0.1   Absolute Eosinophils 0.0 - 0.7 10e3/uL 0.0   Absolute Immature Granulocytes <=0.4 10e3/uL 0.0   Absolute Lymphocytes 0.8 - 5.3 10e3/uL 1.8   Absolute Monocytes 0.0 - 1.3 10e3/uL 1.1   % Immature Granulocytes % 0   Absolute Neutrophils 1.6 - 8.3 10e3/uL 15.8 (H)   PSA Tumor Marker 0.00 - 4.50 ng/mL <0.01   (H): Data is abnormally high  (L): Data is abnormally low    Assessment and Plan:   Metastatic hormone sensitive prostate cancer with bone metastasis-patient continues plan with Erleada 240 mg po daily as well as Xgeva and Lupron every 3 months.   Review and exam and labs showing he is tolerating well.    - testosterone level is stable at 5 and PSA is undetectable.   -sweating noted after Lupron and Xgeva - not sure if related to Pain plan or prostates treatment but states they are tolerable. .   He will receive Lupron and Xgeva with visit today.  He will return in 3 months to see Dr Garcia with labs(CBC differential, CMP, total testosterone level and PSA ), also for next Lupron/Xgeva administration    Leukocytosis- WBCs=18.8 -was sick 2 days previous after Erleada but elevated WBCs  not new but has progressed from previous.No weight loss, masses or recurrent illness but does have sweats but feels related to the Lupron.    Bone metastasis with pain -Known pelvic bone mets - seeing palliative care team and using MS contin 15 mg po bid with Norco up to 3 x daily prn and using Marijuana gummies as needed up to 2 x daily.   - also having symptoms of neuropathy of the feet but good shoes helping with this.     The total time of this encounter amounted to 30 minutes. This time included face to face time spent with the patient and wife, prep work, ordering tests, and performing post visit documentation.  Ania Jensen,Cnp

## 2025-03-05 NOTE — PROGRESS NOTES
[facility] Pain Management     Date of visit: 3/6/2025      Assessment:   Per Etienne is a 59 year old male with a past medical history significant for prostate cancer with bone metastasis, pulmonary nodules, lumbar stenosis, HTN, gout who presents in follow up for ongoing pain management.      1. Low back pain with LLE radic, neuropathy bilat feet, pain of multiple joints - His back pain with left sided radicular pain with L5 distribution that has been present for many years. He has been referred for procedure by his PCP for repeat L4/5 LUIS (Regina/Linorf), which historically have given him significant symptom relief, although he did not appreciate as much benefit from the most recent LUIS on 8/2/22. Lumbar CT from 8/2021 demonstrated severe left neural foraminal narrowing at L4-L5 and moderate to severe right at L3-4, additional multilevel spinal canal stenosis and neural foraminal stenosis noted. I suspect his low back pain is associated with multiple factors, including underlying degenerative changes of lumbar spine, bone metastasis, and likely some myofascial component. Neuropathic symptoms in feet and generalized joint pain is likely secondary effects from cancer treatment and bone metastasis.      Assigned to [facility] nursing team.     Visit Diagnoses:  1. Encounter for monitoring opioid maintenance therapy    2. Cancer associated pain    3. Prostate cancer metastatic to bone (H)    4. Lumbar radiculopathy        Plan:  Diagnosis reviewed, treatment option addressed, and risk/benifits discussed.  Self-care instructions given.  I am recommending a multidisciplinary treatment plan to help this patient better manage their pain.                  1.  Pain Physical Therapy:  WALT Thompson is very active. Despite the pain, he continues to work full time as a , which is somewhat labor intensive (frequently using ladder).               2.  Pain Psychologist to address relaxation,  behavioral change, coping style, and other factors important to improvement.  N/A              3.  Diagnostic Studies:  No new orders today.               4.  Medication Management:   Continue MS Contin to 15 mg every 12 hours. He continues to appreciate benefit, stable baseline pain, supports functioning, continues to work full time. Refilled today for #60 tabs.   Continue hydrocodone 5-325 three times daily as needed for breakthrough pain. He does not use very frequently, usually only takes 1 tab daily as needed. He continues to appreciate benefit for breakthrough pain, no concerns for continuation. Refilled today for #90 tabs to last 30 days.   Continue Senna S to 1 tab twice daily, along with Miralax 1-2 times daily as needed. OIC stable.   CSA/UDS - completed on 12/5/24. Naloxone refill sent into pharmacy on 12/5/24. Advised to check expiration date on currently home supply, fill new prescription when needed.               5.  Potential procedures: Repeat L4-5 LUIS on 1/21/25. Typically appreciates improvement in pain around 75% pain relief that lasts 3+ months.               7.  Follow up with TEREZA Sanchez CNP in 3-4 months     Additional visit details: May rotate to Oxycontin if supply issues continue with MS Contin.     Review of Electronic Chart: Today I have also reviewed available medical information in the patient's medical record at [facility] and Care Everywhere (if available), including relevant provider notes, laboratory work, and imaging.     Chanel Bruno DNP, APRN, AGNP-C  [facility] Pain Management     -------------------------------------------------    Subjective:    Chief complaint:   Chief Complaint   Patient presents with    Pain       Interval history:  Per Etienne is a 59 year old male last seen on 12/5/24.      Recommendations/plan at the last visit included:              1.  Pain Physical Therapy:  NO   - Bill is very active. Despite the pain, he continues  to work full time as a , which is somewhat labor intensive (frequently using ladder).               2.  Pain Psychologist to address relaxation, behavioral change, coping style, and other factors important to improvement.  N/A              3.  Diagnostic Studies:  No new orders today.               4.  Medication Management:   Continue MS Contin to 15 mg every 12 hours. He continues to appreciate benefit, stable baseline pain, supports functioning, continues to work full time. Refilled today for #60 tabs.   Continue hydrocodone 5-325 three times daily as needed for breakthrough pain. He does not use very frequently, usually only takes 1 tab daily as needed. He continues to appreciate benefit for breakthrough pain, no concerns for continuation. Refilled today for #90 tabs to last 30 days.   Continue Senna S to 1 tab twice daily, along with Miralax 1-2 times daily as needed. OIC stable.   CSA/UDS - completed on 12/5/24. Naloxone refill sent into pharmacy on 12/5/24. Advised to check expiration date on currently home supply, fill new prescription when needed.               5.  Potential procedures: Repeat L4-5 LUIS on 7/2/24. Appreciates improvement in pain around 75% pain relief that lasts 3+ months. Orders for repeat placed today.               7.  Follow up with TEREZA Sanchez CNP in 3-4 months     Since his last visit, Per Etienne reports:  -He is a bit more sore lately, notes more activities recently.   -He has had to  more responsibilities at work.   -Overall pain is stable and manageable.   -Medications stable. No side effects.   -He reports issue with MS Contin supply at last visit.     PEG: A Three-Item Scale Assessing Pain Intensity and Interference    What number best describes your PAIN ON AVERAGE in the past week? (Patient-Rptd) 7    What number best describes how, during the past week, pain has interfered with your ENJOYMENT OF LIFE? (Patient-Rptd) 6    What number best  describes how, during the past week, pain has interfered with your GENERAL ACTIVITY? (Patient-Rptd) 5    PEG Total Score: (Patient-Rptd) 6    Marielena EE, Mc KA, Clover MJ, Zack TA, Mike J, Hilda JM, Asa SM, Rui K. Development and initial validation of the PEG, a 3-item scale assessing pain intensity and interference. Journal of General Internal Medicine. 2009 [place]ur;24:733-738.        HPI/Interval Hx from last visit:  -He reports brother  right before Thanksgiving unexpectedly.   -His daughter is pregnant, has first US coming up right before [place].   -He reports MS Contin and hydrocodone still effective. No side effects.   -Due for CSA.   -He reports new onset symptoms overnight 1 x week, pain and cramping in legs. He has to get up overnight.   -He reports this has been going on for about 2 months. He notes occurrence after days he is more active.   -He would like to repeat L4-5 LUIS. Reports 75% relief for 3+ months.       Current Pain Treatments:    MS Contin 15 mg BID  Hydrocodone 5-325 mg TID PRN         L4-5 LUIS - last on 25.         Current MME: 45     Review of [place] Prescription Monitoring Program (): YES     Annual Controlled Substance Agreement/UDS due date: Completed on 24.      Past pain treatments:  LESI - helpful      Medications:  Current Outpatient Medications   Medication Sig Dispense Refill    HYDROcodone-acetaminophen (NORCO) 5-325 MG tablet Take 1 tablet by mouth 3 times daily as needed for breakthrough pain or severe pain. Fill 3/8/2025, start 3/10/2025 90 tablet 0    morphine (MS CONTIN) 15 MG CR tablet Take 1 tablet (15 mg) by mouth every 12 hours. #60 tabs to last 30 days. Fill 3/8/2025, start 3/10/2025. 60 tablet 0    [START ON 3/17/2025] apalutamide (ERLEADA) 60 MG tablet Take 4 tablets (240 mg) by mouth daily. 120 tablet 0    denosumab (XGEVA) 120 MG/1.7ML SOLN injection Inject 1.7 mLs (120 mg) Subcutaneous every 3 months 1.7 mL 0    leuprolide (ELIGARD) 45  MG kit Inject 45 mg Subcutaneous every 6 months      naloxone (NARCAN) 4 MG/0.1ML nasal spray Spray 1 spray (4 mg) into one nostril alternating nostrils as needed for opioid reversal. every 2-3 minutes until assistance arrives 0.2 mL 0    omeprazole (PRILOSEC) 40 MG DR capsule Take 1 capsule (40 mg) by mouth daily. at least 30 min before breakfast 90 capsule 3    polyethylene glycol (MIRALAX) 17 GM/Dose powder Take 17 g (1 Capful) by mouth 2 times daily as needed for constipation 578 g 1    SENNA-docusate sodium (SENNA S) 8.6-50 MG tablet Take 1 tablet by mouth 2 times daily. 60 tablet 3    UNABLE TO FIND 1 tablet daily MEDICATION NAME: C, D, Zinc combination supplement         Medical History: any changes in medical history since they were last seen? No      Objective:    Physical Exam:  Blood pressure (!) 150/86, pulse 92.  Constitutional: Well developed, well nourished, appears stated age.  Gait: Intact   HEENT: Head atraumatic, normocephalic. Eyes without conjunctival injection or jaundice. Oropharynx clear. Neck supple. No obvious neck masses.  Skin: No rash, lesions, or petechiae of exposed skin.   Psychiatric/mental status: Alert, without lethargy or stupor. Speech fluent. Appropriate affect. Mood normal. Able to follow commands without difficulty.     Diagnostic Tests/Imaging/Labs:  Reviewed last [facility]    BILLING TIME DOCUMENTATION:   The total TIME spent on this patient on the date of the encounter/appointment was 27 minutes.      TOTAL TIME includes:   Time spent preparing to see the patient (reviewing records and tests)   Time spent face to face (or over the phone) with the patient   Time spent ordering tests, medications, procedures and referrals   Time spent Referring and communicating with other healthcare professionals   Time spent documenting clinical information in Epic

## 2025-03-06 ENCOUNTER — INFUSION THERAPY VISIT (OUTPATIENT)
Dept: INFUSION THERAPY | Facility: CLINIC | Age: 60
End: 2025-03-06
Attending: NURSE PRACTITIONER
Payer: COMMERCIAL

## 2025-03-06 ENCOUNTER — OFFICE VISIT (OUTPATIENT)
Dept: PALLIATIVE MEDICINE | Facility: CLINIC | Age: 60
End: 2025-03-06
Payer: COMMERCIAL

## 2025-03-06 ENCOUNTER — ONCOLOGY VISIT (OUTPATIENT)
Dept: ONCOLOGY | Facility: CLINIC | Age: 60
End: 2025-03-06
Attending: NURSE PRACTITIONER
Payer: COMMERCIAL

## 2025-03-06 VITALS
SYSTOLIC BLOOD PRESSURE: 132 MMHG | WEIGHT: 186.2 LBS | HEIGHT: 67 IN | OXYGEN SATURATION: 95 % | HEART RATE: 99 BPM | DIASTOLIC BLOOD PRESSURE: 84 MMHG | BODY MASS INDEX: 29.22 KG/M2

## 2025-03-06 VITALS — SYSTOLIC BLOOD PRESSURE: 150 MMHG | HEART RATE: 92 BPM | DIASTOLIC BLOOD PRESSURE: 86 MMHG

## 2025-03-06 DIAGNOSIS — R10.2 PELVIC PAIN IN MALE: ICD-10-CM

## 2025-03-06 DIAGNOSIS — Z79.891 ENCOUNTER FOR MONITORING OPIOID MAINTENANCE THERAPY: Primary | ICD-10-CM

## 2025-03-06 DIAGNOSIS — D72.820 LYMPHOCYTOSIS: ICD-10-CM

## 2025-03-06 DIAGNOSIS — C79.51 PROSTATE CANCER METASTATIC TO BONE (H): ICD-10-CM

## 2025-03-06 DIAGNOSIS — C61 PROSTATE CANCER (H): Primary | ICD-10-CM

## 2025-03-06 DIAGNOSIS — M54.16 LUMBAR RADICULOPATHY: ICD-10-CM

## 2025-03-06 DIAGNOSIS — Z51.81 ENCOUNTER FOR MONITORING OPIOID MAINTENANCE THERAPY: Primary | ICD-10-CM

## 2025-03-06 DIAGNOSIS — C61 PROSTATE CANCER METASTATIC TO BONE (H): ICD-10-CM

## 2025-03-06 DIAGNOSIS — C79.51 MALIGNANT NEOPLASM METASTATIC TO BONE (H): Primary | ICD-10-CM

## 2025-03-06 DIAGNOSIS — G89.3 CANCER ASSOCIATED PAIN: ICD-10-CM

## 2025-03-06 LAB — TESTOST SERPL-MCNC: 5 NG/DL (ref 240–950)

## 2025-03-06 PROCEDURE — 3077F SYST BP >= 140 MM HG: CPT

## 2025-03-06 PROCEDURE — 99213 OFFICE O/P EST LOW 20 MIN: CPT

## 2025-03-06 PROCEDURE — 96372 THER/PROPH/DIAG INJ SC/IM: CPT | Performed by: INTERNAL MEDICINE

## 2025-03-06 PROCEDURE — 3079F DIAST BP 80-89 MM HG: CPT

## 2025-03-06 PROCEDURE — 250N000011 HC RX IP 250 OP 636: Mod: JZ | Performed by: INTERNAL MEDICINE

## 2025-03-06 PROCEDURE — 99213 OFFICE O/P EST LOW 20 MIN: CPT | Performed by: NURSE PRACTITIONER

## 2025-03-06 PROCEDURE — 1125F AMNT PAIN NOTED PAIN PRSNT: CPT

## 2025-03-06 RX ORDER — HYDROCODONE BITARTRATE AND ACETAMINOPHEN 5; 325 MG/1; MG/1
1 TABLET ORAL 3 TIMES DAILY PRN
Qty: 90 TABLET | Refills: 0 | Status: SHIPPED | OUTPATIENT
Start: 2025-03-06

## 2025-03-06 RX ORDER — MORPHINE SULFATE 15 MG/1
15 TABLET, FILM COATED, EXTENDED RELEASE ORAL EVERY 12 HOURS
Qty: 60 TABLET | Refills: 0 | Status: SHIPPED | OUTPATIENT
Start: 2025-03-06

## 2025-03-06 RX ADMIN — DENOSUMAB 120 MG: 120 INJECTION SUBCUTANEOUS at 14:25

## 2025-03-06 ASSESSMENT — PAIN SCALES - GENERAL
PAINLEVEL_OUTOF10: SEVERE PAIN (7)
PAINLEVEL_OUTOF10: SEVERE PAIN (7)

## 2025-03-06 NOTE — LETTER
3/6/2025      Per Etienne  [id number] [address] [id number]      Dear Colleague,    Thank you for referring your patient, Per Etienne, to the [facility]. Please see a copy of my visit note below.    Oncology Follow Up Visit: March 6, 2025     Oncologist: Dr Garcia  PCP: Per Chen    Diagnosis: Metastatic hormone sensitive prostate cancer  Per Etienne is a 58 yo male who presented to his PCP in December 2019 with slow urinary stream.  PSA = 124. On 1/8/2020 CT abdomen pelvis showed groundglass opacity in the left lower lobe,  dystrophic calcification of the prostate gland, and a fatty liver.  Same-day bone scan was negative. On January 10, 2010 prostate biopsy showed on the left Robyn score 4+3 involving 7 cores and on the right Midfield score 3+4 involving 6 cores, with positive perineural invasion.  He proceeded to undergo RRP by Dr. Colmenares on 2/24/2020.  Pathology revealed acinar adenocarcinoma Robyn 4+3 with focal ROBERTO, bladder neck base invasion, positive SVI, positive PNI, and multiple positive margins.  There was no LVSI and 0 out of 3 lymph nodes removed were involved with tumor; pT3bN0.  Treatment:   4/20/2020 received Eligard.     5/21/2020 MRI of the Prostate= 14 x 9 mm T2 intermediate structure in the prostatectomy bed at the left aspect of urinary bladder neck with restricted diffusion in the DWI images and early enhancement in the contrast  enhanced images. This was concerning for locally recurrent/residual prostatic cancer.  There was a T2 hypointense structure in the in the area of removed right seminal vesicle measuring 13 x 11 mm  demonstrating restricted  diffusion in the DWI images. This may represent a recurrent/residual tumor. There were 2 bone metastases in the left pubic inferior ramus and inferior aspect of left pubic body.There is a 7 mm suspicious lymph nodes in the right obturator area  corresponding to to FACBC avid lymph  node on the same date PET/CT  5/20/2020 PET/CT showed:  1.  2 foci of increased uptake in the left ischium, with underlying  sclerotic lesion, and symphysis pubis, these changes are indicative of  active prostate cancer metastasis.  2. Low-level increased uptake in 3 lymphnodes along the right external  and internal iliac nodes, the most suspicious of which is the deep  obturator.  3. Low-level increased uptake in the prostatectomy bed slightly to the  left side of the urethra, if clinically necessary, endoscopy would  would be necessary for confirmation of this being metastasis.  PSA was down to 27.7 on 4/21/2020.   4/20/2020 he received an Eligard injection.    6/12/2020 started Apalutamide(Erleada)  Bone scan 1/22/2021: no bone mets.    Interval History: Mr. Etienne is seen alone today in clinic with for review for continuation of Apalutamide / Leuprolide/ Xgeva. Pt denies new symptoms. Staying active with building inspection and [place]    Pt feeling well without new symptoms- feels all is stable for now.    Neuropathy to the feet still noted  Pain to pelvis and  low back- seeing pain and palliative care- on MS Contin 15mg bid with Norco up to 3 x daily as needed and this is working well.Gets Lumabr injections for back pain. Uses marijuana prn   Bowel and bladder are normal.  Denies fevers, illness, headaches, chest pain, SOB.   Rest of comprehensive and complete ROS is reviewed and is negative.   Past Medical History:   Diagnosis Date     Gout      Hypertension goal BP (blood pressure) < 140/90 08/01/2022     Lumbar stenosis      Prostate cancer (H) 01/10/2020     Current Outpatient Medications   Medication Sig Dispense Refill     [START ON 3/17/2025] apalutamide (ERLEADA) 60 MG tablet Take 4 tablets (240 mg) by mouth daily. 120 tablet 0     denosumab (XGEVA) 120 MG/1.7ML SOLN injection Inject 1.7 mLs (120 mg) Subcutaneous every 3 months 1.7 mL 0      "HYDROcodone-acetaminophen (NORCO) 5-325 MG tablet Take 1 tablet by mouth 3 times daily as needed for breakthrough pain or severe pain. Fill 3/8/2025, start 3/10/2025 90 tablet 0     leuprolide (ELIGARD) 45 MG kit Inject 45 mg Subcutaneous every 6 months       morphine (MS CONTIN) 15 MG CR tablet Take 1 tablet (15 mg) by mouth every 12 hours. #60 tabs to last 30 days. Fill 3/8/2025, start 3/10/2025. 60 tablet 0     naloxone (NARCAN) 4 MG/0.1ML nasal spray Spray 1 spray (4 mg) into one nostril alternating nostrils as needed for opioid reversal. every 2-3 minutes until assistance arrives 0.2 mL 0     omeprazole (PRILOSEC) 40 MG DR capsule Take 1 capsule (40 mg) by mouth daily. at least 30 min before breakfast 90 capsule 3     polyethylene glycol (MIRALAX) 17 GM/Dose powder Take 17 g (1 Capful) by mouth 2 times daily as needed for constipation 578 g 1     SENNA-docusate sodium (SENNA S) 8.6-50 MG tablet Take 1 tablet by mouth 2 times daily. 60 tablet 3     UNABLE TO FIND 1 tablet daily MEDICATION NAME: C, D, Zinc combination supplement       No current facility-administered medications for this visit.     No Known Allergies    Physical Exam:/84 (BP Location: Right arm)   Pulse 99   Ht 1.702 m (5' 7\")   Wt 84.5 kg (186 lb 3.2 oz)   SpO2 95%   BMI 29.16 kg/m    Constitutional: Alert, cooperative, and in no distress with good movement on own.   ENT: Eyes bright, No mouth sores  Neck: Supple, No adenopathy.  Cardiac: Heart rate and rhythm is regular with normal S1 and S2 without murmur  Respiratory: Breathing easy. Lung sounds clear to auscultation  Abdomen: Soft, non-tender, BS normal. No masses or organomegaly  MS: Muscle tone normal- moving well on own without signs of pain, extremities normal with no edema.   Skin: No suspicious lesions or rashes  Neuro: Sensory grossly WNL, gait normal.   Lymph: Normal ant/post cervical, axillary, supraclavicular nodes  Psych: Mentation appears normal and affect " normal/bright with good conversation.    Laboratory Results:   No results found for any visits on 03/06/25.   Latest Reference Range & Units 03/03/25 16:46   Sodium 135 - 145 mmol/L 140   Potassium 3.4 - 5.3 mmol/L 3.9   Chloride 98 - 107 mmol/L 100   Carbon Dioxide (CO2) 22 - 29 mmol/L 28   Urea Nitrogen 8.0 - 23.0 mg/dL 20.2   Creatinine 0.67 - 1.17 mg/dL 0.98   GFR Estimate >60 mL/min/1.73m2 88   Calcium 8.8 - 10.4 mg/dL 10.1   Anion Gap 7 - 15 mmol/L 12   Albumin 3.5 - 5.2 g/dL 4.2   Protein Total 6.4 - 8.3 g/dL 7.0   Alkaline Phosphatase 40 - 150 U/L 81   ALT 0 - 70 U/L 29   AST 0 - 45 U/L 20   Bilirubin Total <=1.2 mg/dL 0.5   Glucose 70 - 99 mg/dL 102 (H)   Testosterone Total 240 - 950 ng/dL 5 (L)   WBC 4.0 - 11.0 10e3/uL 18.8 (H)   Hemoglobin 13.3 - 17.7 g/dL 13.5   Hematocrit 40.0 - 53.0 % 39.6 (L)   Platelet Count 150 - 450 10e3/uL 226   RBC Count 4.40 - 5.90 10e6/uL 4.33 (L)   MCV 78 - 100 fL 92   MCH 26.5 - 33.0 pg 31.2   MCHC 31.5 - 36.5 g/dL 34.1   RDW 10.0 - 15.0 % 11.7   % Neutrophils % 84   % Lymphocytes % 10   % Monocytes % 6   % Eosinophils % 0   % Basophils % 0   Absolute Basophils 0.0 - 0.2 10e3/uL 0.1   Absolute Eosinophils 0.0 - 0.7 10e3/uL 0.0   Absolute Immature Granulocytes <=0.4 10e3/uL 0.0   Absolute Lymphocytes 0.8 - 5.3 10e3/uL 1.8   Absolute Monocytes 0.0 - 1.3 10e3/uL 1.1   % Immature Granulocytes % 0   Absolute Neutrophils 1.6 - 8.3 10e3/uL 15.8 (H)   PSA Tumor Marker 0.00 - 4.50 ng/mL <0.01   (H): Data is abnormally high  (L): Data is abnormally low    Assessment and Plan:   Metastatic hormone sensitive prostate cancer with bone metastasis-patient continues plan with Erleada 240 mg po daily as well as Xgeva and Lupron every 3 months.   Review and exam and labs showing he is tolerating well.    - testosterone level is stable at 5 and PSA is undetectable.   -sweating noted after Lupron and Xgeva - not sure if related to Pain plan or prostates treatment but states they are tolerable. .    He will receive Lupron and Xgeva with visit today.  He will return in 3 months to see Dr Garcia with labs(CBC differential, CMP, total testosterone level and PSA ), also for next Lupron/Xgeva administration    Leukocytosis- WBCs=18.8 -was sick 2 days previous after Erleada but elevated WBCs not new but has progressed from previous.No weight loss, masses or recurrent illness but does have sweats but feels related to the Lupron.    Bone metastasis with pain -Known pelvic bone mets - seeing palliative care team and using MS contin 15 mg po bid with Norco up to 3 x daily prn and using Marijuana gummies as needed up to 2 x daily.   - also having symptoms of neuropathy of the feet but good shoes helping with this.     The total time of this encounter amounted to 30 minutes. This time included face to face time spent with the patient and wife, prep work, ordering tests, and performing post visit documentation.  Ania Jensen Cnp        Again, thank you for allowing me to participate in the care of your patient.        Sincerely,        Ania Jensen, FRAN, APRN CNP    Electronically signed

## 2025-03-06 NOTE — NURSING NOTE
"Oncology Rooming Note    March 6, 2025 1:38 PM   Per Etienne is a 59 year old male who presents for:    Chief Complaint   Patient presents with    Oncology Clinic Visit     Initial Vitals: /84 (BP Location: Right arm)   Pulse 99   Ht 1.702 m (5' 7\")   Wt 84.5 kg (186 lb 3.2 oz)   SpO2 95%   BMI 29.16 kg/m   Estimated body mass index is 29.16 kg/m  as calculated from the following:    Height as of this encounter: 1.702 m (5' 7\").    Weight as of this encounter: 84.5 kg (186 lb 3.2 oz). Body surface area is 2 meters squared.  Severe Pain (7) Comment: Data Unavailable   No LMP for male patient.  Allergies reviewed: Yes  Medications reviewed: Yes    Medications: Medication refills not needed today.  Pharmacy name entered into [employer]:    [id number] [place], [place] - [address], [place] - [address] [place] [address]    Frailty Screening:   Is the patient here for a new oncology consult visit in cancer care? 2. No    PHQ9:  Did this patient require a PHQ9?: No      Clinical concerns: No Concerns        Lesia Segundo MA            "

## 2025-03-06 NOTE — PATIENT INSTRUCTIONS
No medication changes.   Follow up within 3-4 months.     ----------------------------------------------------------------  Clinic Number:  [phone]   Call with any questions about your care and for scheduling assistance.   Calls are returned Monday through Friday between 8 AM and 4:30 PM. We usually get back to you within 2 business days depending on the issue/request.    If we are prescribing your medications:  For opioid medication refills, call the clinic or send a Watchsend message 7 days in advance.  Please include:  Name of requested medication  Name of the pharmacy.  For non-opioid medications, call your pharmacy directly to request a refill. Please allow 3-4 days to be processed.   Per [place] State Law:  All controlled substance prescriptions must be filled within 30 days of being written.    For those controlled substances allowing refills, pickup must occur within 30 days of last fill.      We believe regular attendance is key to your success in our program!    Any time you are unable to keep your appointment we ask that you call us at least 24 hours in advance to cancel.This will allow us to offer the appointment time to another patient.   Multiple missed appointments may lead to dismissal from the clinic.

## 2025-03-06 NOTE — PROGRESS NOTES
Infusion Nursing Note:  Per Etienne presents today for Lupron and xgeva.    Patient seen by provider today: Yes Ania   present during visit today: Not Applicable.    Note: N/A.      Intravenous Access:  No Intravenous access/labs at this visit.    Treatment Conditions:  Not Applicable.      Post Infusion Assessment:  Patient tolerated injection without incident.  Site patent and intact, free from redness, edema or discomfort.       Discharge Plan:   Departure Mode: Ambulatory.      Lesia Segundo MA

## 2025-03-13 DIAGNOSIS — C61 MALIGNANT NEOPLASM OF PROSTATE (H): Primary | ICD-10-CM

## 2025-03-13 DIAGNOSIS — C79.51 PROSTATE CANCER METASTATIC TO BONE (H): ICD-10-CM

## 2025-03-13 DIAGNOSIS — M54.16 LUMBAR RADICULOPATHY: ICD-10-CM

## 2025-03-13 DIAGNOSIS — C61 PROSTATE CANCER METASTATIC TO BONE (H): ICD-10-CM

## 2025-03-13 RX ORDER — OXYCODONE HCL 10 MG/1
10 TABLET, FILM COATED, EXTENDED RELEASE ORAL EVERY 12 HOURS
Qty: 60 TABLET | Refills: 0 | Status: SHIPPED | OUTPATIENT
Start: 2025-03-13 | End: 2025-04-12

## 2025-03-13 NOTE — TELEPHONE ENCOUNTER
Oxycontin 10 mg BID prescribed as alternative to MS Contin 15 mg BID due to supply shortage.     Chanel Bruno, JANET, APRN, AGNP-C  [facility] Pain Management

## 2025-03-13 NOTE — TELEPHONE ENCOUNTER
[facility] Call Center    Phone Message    May a detailed message be left on voicemail: yes     Reason for Call: Other: Patient calling regarding his Morphine and not being able to get it in stock.  Stated they talked about putting him on a different medication.  Please call him back to advise.      Action Taken: Message routed to:  Other: Abhay pain     Travel Screening: Not Applicable     Date of Service:

## 2025-03-13 NOTE — TELEPHONE ENCOUNTER
Current script:  morphine (MS CONTIN) 15 MG CR tablet 60 tablet 0 3/6/2025 -- No   Sig - Route: Take 1 tablet (15 mg) by mouth every 12 hours. #60 tabs to last 30 days. Fill 3/8/2025, start 3/10/2025. - Oral   Sent to pharmacy as: Morphine Sulfate ER 15 MG Oral Tablet Extended Release (MS CONTIN)   E-Prescribing Status: Receipt confirmed by pharmacy (3/6/2025 10:10 AM CST)       Per Chanel Bruno DNP, TEREZA, AGNP-C's 3/6/2 visit plan:    Additional visit details: May rotate to Oxycontin if supply issues continue with MS Contin    Called pt.   He says he was able to  a 10 day supply of MS Contin on 3/10.  His pharmacy continues to have issues w/ stocking MS Contin so he would like to change to the plan made on 3/6 which is oxycontin.   Pharmacy: [facility] [employer]  [address].    Per [facility]MP: MS Contin 15mg:  #20 tabs filled on 3/10/25.  Next refill due:  3/20/25      Oxycontin script prepped for Chanel Bruno DNP, TEREZA, AGNP-C to review and sign.    Shefali RN-BSN  [facility]   [phone]

## 2025-04-03 DIAGNOSIS — C61 PROSTATE CANCER (H): Primary | ICD-10-CM

## 2025-04-10 ENCOUNTER — MYC REFILL (OUTPATIENT)
Dept: PALLIATIVE MEDICINE | Facility: CLINIC | Age: 60
End: 2025-04-10
Payer: COMMERCIAL

## 2025-04-10 DIAGNOSIS — M54.16 LUMBAR RADICULOPATHY: ICD-10-CM

## 2025-04-10 DIAGNOSIS — C61 PROSTATE CANCER METASTATIC TO BONE (H): ICD-10-CM

## 2025-04-10 DIAGNOSIS — C79.51 PROSTATE CANCER METASTATIC TO BONE (H): ICD-10-CM

## 2025-04-10 DIAGNOSIS — K59.03 THERAPEUTIC OPIOID INDUCED CONSTIPATION: ICD-10-CM

## 2025-04-10 DIAGNOSIS — C61 MALIGNANT NEOPLASM OF PROSTATE (H): ICD-10-CM

## 2025-04-10 DIAGNOSIS — T40.2X5A THERAPEUTIC OPIOID INDUCED CONSTIPATION: ICD-10-CM

## 2025-04-10 RX ORDER — SENNA AND DOCUSATE SODIUM 50; 8.6 MG/1; MG/1
1 TABLET, FILM COATED ORAL 2 TIMES DAILY
Qty: 60 TABLET | Refills: 3 | Status: CANCELLED | OUTPATIENT
Start: 2025-04-10

## 2025-04-10 NOTE — TELEPHONE ENCOUNTER
Refills have been requested for the following medications:         SENNA-docusate sodium (SENNA S) 8.6-50 MG tablet [Chanel Bruno]         HYDROcodone-acetaminophen (NORCO) 5-325 MG tablet [Chanel Bruno]         oxyCODONE (OXYCONTIN) 10 MG 12 hr tablet [Chanel Bruno]     Preferred pharmacy: [address] - [address] [facility]

## 2025-04-11 NOTE — TELEPHONE ENCOUNTER
Medication refill information reviewed.     Due date for HYDROcodone-acetaminophen (NORCO) 5-325 MG tablet is 4/11/25 oxyCODONE (OXYCONTIN) 10 MG 12 hr tablet 4/19/25    Prescriptions prepped for review.     Will route to provider.

## 2025-04-11 NOTE — TELEPHONE ENCOUNTER
Received request for a refill(s) of HYDROcodone-acetaminophen (NORCO) 5-325 MG tablet   And  oxyCODONE (OXYCONTIN) 10 MG 12 hr tablet     Last dispensed from pharmacy on 03/10/25-Norco  03/18/25-Oxycodone    Patient's last office/virtual visit by prescribing provider on 03/06/25  Next office/virtual appointment scheduled for 06/19/25    Last urine drug screen date 12/05/24  Current opioid agreement on file (completed within the last year) Yes Date of opioid agreement: 12/05/24    E-prescribe to pharmacy-[facility] [id number] [place], [place] - [address] [facility] [address]     Will route to nursing [place] for review and preparation of prescription(s).

## 2025-04-14 RX ORDER — OXYCODONE HCL 10 MG/1
10 TABLET, FILM COATED, EXTENDED RELEASE ORAL EVERY 12 HOURS
Qty: 60 TABLET | Refills: 0 | Status: SHIPPED | OUTPATIENT
Start: 2025-04-14

## 2025-04-14 RX ORDER — HYDROCODONE BITARTRATE AND ACETAMINOPHEN 5; 325 MG/1; MG/1
1 TABLET ORAL 3 TIMES DAILY PRN
Qty: 90 TABLET | Refills: 0 | Status: SHIPPED | OUTPATIENT
Start: 2025-04-14

## 2025-04-14 NOTE — TELEPHONE ENCOUNTER
Request appears appropriate, refill approved for 30 day supply +0 additional fill(s).       Med change to oxycodone ER due to morphine ER shortage. See LOV note.     Chanel Bruno DNP, APRN, AGNP-C  [facility]

## 2025-05-17 ENCOUNTER — MYC REFILL (OUTPATIENT)
Dept: PALLIATIVE MEDICINE | Facility: CLINIC | Age: 60
End: 2025-05-17
Payer: COMMERCIAL

## 2025-05-17 DIAGNOSIS — C61 MALIGNANT NEOPLASM OF PROSTATE (H): ICD-10-CM

## 2025-05-17 DIAGNOSIS — C79.51 PROSTATE CANCER METASTATIC TO BONE (H): ICD-10-CM

## 2025-05-17 DIAGNOSIS — M54.16 LUMBAR RADICULOPATHY: ICD-10-CM

## 2025-05-17 DIAGNOSIS — C61 PROSTATE CANCER METASTATIC TO BONE (H): ICD-10-CM

## 2025-05-19 RX ORDER — OXYCODONE HCL 10 MG/1
10 TABLET, FILM COATED, EXTENDED RELEASE ORAL EVERY 12 HOURS
Qty: 60 TABLET | Refills: 0 | Status: SHIPPED | OUTPATIENT
Start: 2025-05-19

## 2025-05-19 NOTE — TELEPHONE ENCOUNTER
Medication refill information reviewed.     Due date for oxyCODONE (OXYCONTIN) 10 MG 12 hr tablet   is 5/19/2025     Prescriptions prepped for review.     Will route to provider.          normal...

## 2025-05-19 NOTE — TELEPHONE ENCOUNTER
Request appears appropriate, refill approved for 30 day supply +0 additional fill(s).     Chanel Bruno DNP, APRN, AGNP-C  [facility]

## 2025-05-19 NOTE — TELEPHONE ENCOUNTER
Refills have been requested for the following medications:         oxyCODONE (OXYCONTIN) 10 MG 12 hr tablet [Chanel Bruno]     Preferred pharmacy[facility] [id number] [place], [place] - [address] [facility]

## 2025-05-19 NOTE — TELEPHONE ENCOUNTER
Received request for a refill(s) of   oxyCODONE (OXYCONTIN) 10 MG 12 hr tablet      Last dispensed from pharmacy on 4/17/2025    Patient's last office/virtual visit by prescribing provider on 3/6/2025  Next office/virtual appointment scheduled for 6/19/2025    Last urine drug screen date 12/5/2024  Current opioid agreement on file (completed within the last year) YesDate of opioid agreement: 12/5/2024    E-prescribe to [place] [id number] [place], [place] - [address] [facility]  pharmacy    Will route to nursing [place] for review and preparation of prescription(s).

## 2025-05-30 ENCOUNTER — TELEPHONE (OUTPATIENT)
Dept: PALLIATIVE MEDICINE | Facility: CLINIC | Age: 60
End: 2025-05-30
Payer: COMMERCIAL

## 2025-05-30 DIAGNOSIS — M54.16 LUMBAR RADICULOPATHY: Primary | ICD-10-CM

## 2025-05-30 NOTE — TELEPHONE ENCOUNTER
[facility] Call Center    Phone Message    May a detailed message be left on voicemail: yes     Reason for Call: Other: Patient is calling requesting another injection. Please call back to discuss further.     Action Taken: Message routed to:  Other: [facility]    Travel Screening: Not Applicable

## 2025-06-02 NOTE — TELEPHONE ENCOUNTER
"Patient requesting injection orders.   Per LOV 3/6/2025:               \" 5.  Potential procedures: Repeat L4-5 LUIS on 1/21/25. Typically appreciates improvement in pain around 75% pain relief that lasts 3+ months.               7.  Follow up with TEREZA Sanchez CNP in 3-4 months \"    Prepped orders for provider review.     Caitlin Miranda RN     "

## 2025-06-03 DIAGNOSIS — C61 PROSTATE CANCER (H): Primary | ICD-10-CM

## 2025-06-03 NOTE — TELEPHONE ENCOUNTER
Ordered reviewed and signed.     Chanel Bruno DNP, APRN, AGNP-C  [facility]

## 2025-06-09 ENCOUNTER — MYC REFILL (OUTPATIENT)
Dept: ONCOLOGY | Facility: CLINIC | Age: 60
End: 2025-06-09
Payer: COMMERCIAL

## 2025-06-09 ENCOUNTER — MYC REFILL (OUTPATIENT)
Dept: PALLIATIVE MEDICINE | Facility: CLINIC | Age: 60
End: 2025-06-09
Payer: COMMERCIAL

## 2025-06-09 ENCOUNTER — TELEPHONE (OUTPATIENT)
Dept: PALLIATIVE MEDICINE | Facility: CLINIC | Age: 60
End: 2025-06-09
Payer: COMMERCIAL

## 2025-06-09 DIAGNOSIS — C79.51 PROSTATE CANCER METASTATIC TO BONE (H): ICD-10-CM

## 2025-06-09 DIAGNOSIS — C61 PROSTATE CANCER METASTATIC TO BONE (H): ICD-10-CM

## 2025-06-09 DIAGNOSIS — K21.9 GASTROESOPHAGEAL REFLUX DISEASE, UNSPECIFIED WHETHER ESOPHAGITIS PRESENT: ICD-10-CM

## 2025-06-09 DIAGNOSIS — M54.16 LUMBAR RADICULOPATHY: Primary | ICD-10-CM

## 2025-06-09 NOTE — TELEPHONE ENCOUNTER
Medication refill information reviewed.     Due date for HYDROcodone-acetaminophen (NORCO) 5-325 MG tablet  is 6/10/2025     Prescriptions prepped for review.     Will route to provider.

## 2025-06-09 NOTE — TELEPHONE ENCOUNTER
Patient request for a refill(s) of HYDROcodone-acetaminophen (NORCO) 5-325 MG tablet     Last dispensed from pharmacy on 5/11/25    Patient's last office/virtual visit by prescribing provider on 3/6/25  Next office/virtual appointment scheduled for 06/19/25    Last urine drug screen date 12/05/24  Current opioid agreement on file (completed within the last year) Yes Date of opioid agreement: 12/05/24    E-prescribe to [place] [id number] [place], [place] - [address] [facility]     Will route to [facility] for review and preparation of prescription(s).

## 2025-06-09 NOTE — TELEPHONE ENCOUNTER
6/9/25  9:14 AM  Refills have been requested for the following medications:         HYDROcodone-acetaminophen (NORCO) 5-325 MG tablet [Chanel Bruno]     [facility] [id number] [place], [place] - [address] [facility]

## 2025-06-09 NOTE — TELEPHONE ENCOUNTER
"Screening Questions for Radiology Injections:    Injection to be done at which interventional clinic site? [facility] [place]    If choosing [facility] for location, please inform patient:  \"[facility] is a Hospital based clinic. Before your visit, you should check with your insurance about how it covers the charges for facility services in a hospital-based clinic.     Procedure ordered by Damion    Procedure ordered? Repeat L4-5 LUIS   Transforaminal Cervical LUIS - Send to [facility] - No [facility] providers perform this procedure    What insurance would patient like us to bill for this procedure? [employer]  IF SCHEDULING IN [place] PAIN OR SPINE PLEASE SCHEDULE AT LEAST 7-10 BUSINESS DAYS OUT SO A PA CAN BE OBTAINED  Worker's comp or MVA (motor vehicle accident) -Any injection DO NOT SCHEDULE and route to Richie Hood.    [employer] insurance - For ALL INJECTIONS DO NOT SCHEDULE and route to Mary Padilla.     ALL BCBS, [employer] and [employer] CIGNA - DO NOT SCHEDULE and route to Mary Randy  MEDICA- ALL INJECTIONS- route to Mary Padilla    Is patient scheduled at [facility]? no   If YES, route every encounter to [facility] SPINE CENTER CARE NAVIGATION POOL [[phone]]    Is an  needed? No     Patient has a  home? (Review Grid) YES: ok    Any chance of pregnancy? NO   If YES, do NOT schedule and route to RN pool  - Dr. Calderón route to PM&R Nurse  [[id number]]      Is patient actively being treated for cancer or immunocompromised? Yes -   If YES, do NOT schedule and route to RN pool/ Dr. Calderón's Team    Does the patient have a bleeding or clotting disorder? No   If YES, okay to schedule AND route to RN nurse / Dr. Calderón's Team   (For any patients with platelet count <100, RN must forward to provider)    Is patient taking any Blood Thinners OR Antiplatelet medication?  No   If hold needed, do NOT schedule, route to RN pool/ Dr. Calderón's Team  Examples: "   Blood Thinners: (Coumadin, Warfarin, Jantoven, Pradaxa, Xarelto, Eliquis, Edoxaban, Enoxaparin, Lovenox, Heparin, Arixtra, Fondaparinux or Fragmin)  Antiplatelet Medications: (Plavix, Brilinta or Effient)     Is patient taking any aspirin products (includes: Aspirin 81 mg, Excedrin, and Fiorinal)? No.    If yes route to RN pool/ Dr. Calderón's Team - Do not schedule    Is patient taking any GLP-1 Antagonist (hold needed for sedation patients only) No   (semaglutide (Ozempic, Wegovy), dulaglutide (Trulicity), exenatide ER (Bydureon), tirzepatide (Mounjaro), Liraglutide (Saxenda, Victoza), semaglutide (Rybelsus), Terzepatide (Zepbound)  If YES, okay to schedule AND route to RN nurse / Dr. Calderón's Team      Any allergies to contrast dye, iodine, shellfish, or numbing and steroid medications? No  If YES, schedule and add allergy information to appointment notes AND route to the RN pool/ Dr. Calderón's Team  If LUIS and Contrast Dye / Iodine Allergy? DO NOT SCHEDULE, route to RN pool/ Dr. Calderón's Team  Allergies: Patient has no known allergies.     Does patient have an active infection or treated for one within the past week? No  Is patient currently taking any antibiotics or steroid medications?  No   For patients on chronic, preventative, or prophylactic antibiotics, procedures may be scheduled.   For patients on antibiotics for active or recent infection, schedule 4 days after completed.  For patients on steroid medications, schedule 4 days after completed.     Has the patient had a flu shot or any other vaccinations within the past 7 days? No  If yes, explain that for the vaccine to work best they need to:     wait 1 week before and 1 week after getting any Vaccine  wait 1 week before and 2 weeks after getting any Covid Vaccine   If patient has concerns about the timing, send to RN pool/ Dr. Calderón's Team    Does patient have an MRI/CT?  YES: mri Include Date and Check Procedure Scheduling Grid to see if  required.  Was the MRI/CT done within the last 3 years?  Yes   If no route to RN Pool/ Dr. Lerners Team  If yes, where was the MRI/CT done? [facility]   Refer to PACS Transmissions list for approved external locations and route to RN Pool [place]/ Dr. Bennett Team  If MRI was not done at approved external location do NOT schedule and route to RN pool/ Dr. Lerners Team    If patient has an imaging disc, the injection MAY be scheduled but patient must bring disc to appt or appt will be cancelled.    Is patient able to transfer to a procedure table with minimal or no assistance? Yes   If no, do NOT schedule and route to RN Pool/ Dr. Lerners Team    Procedure Specific Instructions:  If celiac plexus block, informed patient NPO for 6 hours and that it is okay to take medications with sips of water, especially blood pressure medications Not Applicable       If this is for a cervical procedure, informed patient that aspirin needs to be held for 6 days.   Not Applicable    Sedation, If Sedation is ordered for any procedure, patient must be NPO for 6 hours prior to procedure Not Applicable    If IV needed:  Do not schedule procedures requiring IV placement in the first appointment of the day or first appointment after lunch. Do NOT schedule at 0745, 0815 or 1245.   Instructed patient to arrive 30 minutes early for IV start if required. (Check Procedure Scheduling Grid)  Not Applicable    Reminders:  If you are started on any steroids or antibiotics between now and your appointment, you must contact us because the procedure may need to be cancelled.  Yes    As a reminder, receiving steroids can decrease your body's ability to fight infection.   Would you still like to move forward with scheduling the injection?  Yes    IV Sedation is not provided for procedures. If oral anti-anxiety medication is needed, the patient should request this from their referring provider.    Instruct patient to arrive as directed prior  to the scheduled appointment time:  If IV needed 30 minutes before appointment time     For patients 85 or older we recommend having an adult stay w/ them for the remainder of the day.     If the patient is Diabetic, remind them to bring their glucometer.    Dr. Diamond Pt's - Imaging Orders Needed   Please send all injections to RN Pool NO   Red Flags? NO    Does the patient have any questions?  NO  Radha Hood  [facility]

## 2025-06-09 NOTE — TELEPHONE ENCOUNTER
SUBJECTIVE/OBJECTIVE:                                                      Refill request received for:  omeprazole (PRILOSEC) 40 MG DR capsule     Date of last refill: 5/23/25  Date of LOV r/t Medication: 3/6/25  Future appt scheduled? Yes: 6/19/25   Date refill request was received: 6/9/25    RECENT LABS / VITALS                                                      CBC RESULTS:   Recent Labs   Lab Test 03/03/25  1646   WBC 18.8*   RBC 4.33*   HGB 13.5   HCT 39.6*   MCV 92   MCH 31.2   MCHC 34.1   RDW 11.7        and Last Comprehensive Metabolic Panel:  Lab Results   Component Value Date     03/03/2025    POTASSIUM 3.9 03/03/2025    CHLORIDE 100 03/03/2025    CO2 28 03/03/2025    ANIONGAP 12 03/03/2025     (H) 03/03/2025    BUN 20.2 03/03/2025    CR 0.98 03/03/2025    GFRESTIMATED 88 03/03/2025    NATALIYA 10.1 03/03/2025     BP Readings from Last 4 Encounters:   03/06/25 132/84   03/06/25 (!) 150/86   01/21/25 (!) 167/99   12/05/24 (!) 143/90     PLAN:                                                      Refill request sent to provider to review and advise.    See Ordaz RN on 6/9/2025 at 9:39 AM

## 2025-06-09 NOTE — TELEPHONE ENCOUNTER
Patient is requesting to schedule a steroid injection with the [facility].   Injection requested: Repeat L4-5 LUIS     We are requesting your approval to go forward with scheduling injection.    Please keep call open and route back to the PAIN NURSE [[id number]] pool, Pt will be scheduled by Pain clinic after approved.    If approved, we will contact the patient for scheduling.    Thank you.    Routed to Oncology team

## 2025-06-10 RX ORDER — HYDROCODONE BITARTRATE AND ACETAMINOPHEN 5; 325 MG/1; MG/1
1 TABLET ORAL 3 TIMES DAILY PRN
Qty: 90 TABLET | Refills: 0 | Status: SHIPPED | OUTPATIENT
Start: 2025-06-10

## 2025-06-10 NOTE — TELEPHONE ENCOUNTER
6/10/2025 1:38 PM     REFILL REQUEST:  I am signing this on behalf of my colleague TEREZA Sanchez DNP who is out of the office. Please see their previous documentation regarding the appropriateness of these prescriptions. Chart reviewed - Request appears appropriate. PDMP reviewed for all controlled substance refills. If any concerns are found, it will be noted.     Refilled for 30 day supply.  Script e-Prescribed to pharmacy    TEREZA Joe, JANET, FNP-C

## 2025-06-12 RX ORDER — OMEPRAZOLE 40 MG/1
40 CAPSULE, DELAYED RELEASE ORAL DAILY
Qty: 90 CAPSULE | Refills: 3 | Status: SHIPPED | OUTPATIENT
Start: 2025-06-12

## 2025-06-14 ASSESSMENT — PAIN SCALES - PAIN ENJOYMENT GENERAL ACTIVITY SCALE (PEG)
PEG_TOTALSCORE: 7.33
INTERFERED_GENERAL_ACTIVITY: 7
AVG_PAIN_PASTWEEK: 8
INTERFERED_ENJOYMENT_LIFE: 7

## 2025-06-17 ENCOUNTER — DOCUMENTATION ONLY (OUTPATIENT)
Dept: ONCOLOGY | Facility: CLINIC | Age: 60
End: 2025-06-17

## 2025-06-17 ENCOUNTER — LAB (OUTPATIENT)
Dept: LAB | Facility: CLINIC | Age: 60
End: 2025-06-17
Payer: COMMERCIAL

## 2025-06-17 DIAGNOSIS — C61 PROSTATE CANCER (H): Primary | ICD-10-CM

## 2025-06-17 LAB
HOLD SPECIMEN: NORMAL

## 2025-06-17 PROCEDURE — 36415 COLL VENOUS BLD VENIPUNCTURE: CPT

## 2025-06-17 PROCEDURE — 85025 COMPLETE CBC W/AUTO DIFF WBC: CPT

## 2025-06-17 PROCEDURE — 84153 ASSAY OF PSA TOTAL: CPT

## 2025-06-17 PROCEDURE — 80053 COMPREHEN METABOLIC PANEL: CPT

## 2025-06-17 NOTE — PROGRESS NOTES
Patient arrived for standing lab orders today for Dr. Garcia, no current orders were found.  Specimens were collected and held, per patient request.  Standing lab orders, ordered on 2023, have .  Please review pended standing orders, complete required items, and sign if approved.  Please advise patient of any changes to care plan.  Please contact lab staff with any questions, [phone].      Thank you,  Dayami Brooks CMA(AAMA)

## 2025-06-18 LAB
ALBUMIN SERPL BCG-MCNC: 4.1 G/DL (ref 3.5–5.2)
ALP SERPL-CCNC: 79 U/L (ref 40–150)
ALT SERPL W P-5'-P-CCNC: 42 U/L (ref 0–70)
ANION GAP SERPL CALCULATED.3IONS-SCNC: 13 MMOL/L (ref 7–15)
AST SERPL W P-5'-P-CCNC: 25 U/L (ref 0–45)
BASOPHILS # BLD AUTO: 0.1 10E3/UL (ref 0–0.2)
BASOPHILS NFR BLD AUTO: 1 %
BILIRUB SERPL-MCNC: <0.2 MG/DL
BUN SERPL-MCNC: 18.1 MG/DL (ref 8–23)
CALCIUM SERPL-MCNC: 9.8 MG/DL (ref 8.8–10.4)
CHLORIDE SERPL-SCNC: 102 MMOL/L (ref 98–107)
CREAT SERPL-MCNC: 0.99 MG/DL (ref 0.67–1.17)
EGFRCR SERPLBLD CKD-EPI 2021: 87 ML/MIN/1.73M2
EOSINOPHIL # BLD AUTO: 0.1 10E3/UL (ref 0–0.7)
EOSINOPHIL NFR BLD AUTO: 2 %
ERYTHROCYTE [DISTWIDTH] IN BLOOD BY AUTOMATED COUNT: 11.7 % (ref 10–15)
GLUCOSE SERPL-MCNC: 95 MG/DL (ref 70–99)
HCO3 SERPL-SCNC: 25 MMOL/L (ref 22–29)
HCT VFR BLD AUTO: 37.4 % (ref 40–53)
HGB BLD-MCNC: 12.9 G/DL (ref 13.3–17.7)
IMM GRANULOCYTES # BLD: 0 10E3/UL
IMM GRANULOCYTES NFR BLD: 0 %
LYMPHOCYTES # BLD AUTO: 2 10E3/UL (ref 0.8–5.3)
LYMPHOCYTES NFR BLD AUTO: 30 %
MCH RBC QN AUTO: 31.8 PG (ref 26.5–33)
MCHC RBC AUTO-ENTMCNC: 34.5 G/DL (ref 31.5–36.5)
MCV RBC AUTO: 92 FL (ref 78–100)
MONOCYTES # BLD AUTO: 0.7 10E3/UL (ref 0–1.3)
MONOCYTES NFR BLD AUTO: 10 %
NEUTROPHILS # BLD AUTO: 3.7 10E3/UL (ref 1.6–8.3)
NEUTROPHILS NFR BLD AUTO: 57 %
PLATELET # BLD AUTO: 247 10E3/UL (ref 150–450)
POTASSIUM SERPL-SCNC: 4.1 MMOL/L (ref 3.4–5.3)
PROT SERPL-MCNC: 6.6 G/DL (ref 6.4–8.3)
PSA SERPL DL<=0.01 NG/ML-MCNC: <0.01 NG/ML (ref 0–4.5)
RBC # BLD AUTO: 4.06 10E6/UL (ref 4.4–5.9)
SODIUM SERPL-SCNC: 140 MMOL/L (ref 135–145)
WBC # BLD AUTO: 6.5 10E3/UL (ref 4–11)

## 2025-06-18 NOTE — PROGRESS NOTES
[facility]     Date of visit: 6/19/2025      Assessment/Plan:   Per Etienne is a 59 year old male with a past medical history significant for prostate cancer with bone metastasis, pulmonary nodules, lumbar stenosis, HTN, gout who presents in follow up for:     1. Low back pain with LLE radic, neuropathy bilat feet, pain of multiple joints - His back pain with left sided radicular pain with L5 distribution that has been present for many years. I suspect his low back pain is associated with multiple factors, including underlying degenerative changes of lumbar spine, bone metastasis, and likely some myofascial component. Neuropathic symptoms in feet and generalized joint pain is likely secondary effects from cancer treatment and bone metastasis.     Today, Jay is seen in clinic. He was transitioned to Oxycontin 10 mg BID in between visits due to supply issues with MS Contin. He would like to resume MS Contin if possible or a different long acting opioid due to concerns for s/e and reduced efficacy with Oxycontin. His wife has noticed significant irritability since starting Oxycontin. Fortunately, [facility] pharmacy has MS Contin in stock and able to fill #60 tabs today. We discussed plan for fentanyl patch as alternative in the future, should we encounter MS Contin supply shortages again.      Assigned to [facility] nursing team.     Visit Diagnoses:  1. Encounter for monitoring opioid maintenance therapy    2. Cancer associated pain    3. Lumbar radiculopathy    4. Prostate cancer metastatic to bone (H)    5. Therapeutic opioid induced constipation    6. Radicular pain of right lower extremity          Diagnosis reviewed, treatment option addressed, and risk/benifits discussed.  Self-care instructions given.  I am recommending a multidisciplinary treatment plan to help this patient better manage their pain.                  1.  Pain Physical Therapy:  [facility]   - Jay is very active.  Despite the pain, he continues to work full time as a , which is somewhat labor intensive (frequently using ladder).               2.  Pain Psychology: N/A              3.  Diagnostic Studies:  No new orders today.               4.  Medication Management:   Stop Oxycontin 10 mg BID (prescribed in between visits due to supply issue with MS Contin). Restart MS Contin to 15 mg every 12 hours. Notable benefit with use, stable baseline pain, supports functioning, continues to work full time. Refilled today for #60 tabs.   Continue hydrocodone 5-325 three times daily as needed for breakthrough pain. He does not use very frequently, usually only takes 1 tab daily as needed. He continues to appreciate benefit for breakthrough pain, no concerns for continuation.   Continue Senna S to 1 tab twice daily, along with Miralax 1-2 times daily as needed. OIC stable.   CSA/UDS - completed on 12/5/24. Naloxone refill sent into pharmacy on 12/5/24. Advised to check expiration date on currently home supply, fill new prescription when needed.               5.  Potential procedures: Repeat L4-5 LUIS on 1/21/25. Typically appreciates improvement in pain around 75% pain relief that lasts 3+ months.               7.  Follow up with TEREZA Sanchez CNP in 3-4 months     Additional visit details: If we run into supply issues with MS Contin again, he will try Fentanyl 12.5 mcg/hr TD patch instead of Oxycontin (had s/e, more irritable and also not as effective for pain control).     Review of Electronic Chart: Today I have also reviewed available medical information in the patient's medical record at [facility] and Care Everywhere (if available), including relevant provider notes, laboratory work, and imaging.     Chanel Bruno, JANET, APRN, AGNP-C  [facility]     -------------------------------------------------    Subjective:    Chief complaint:   Chief Complaint   Patient presents with     Pain       Interval history:  Pre Etienne is a 59 year old male last seen on 3/6/25.      Recommendations/plan at the last visit included:              1.  Pain Physical Therapy:  [facility]   - Jay is very active. Despite the pain, he continues to work full time as a , which is somewhat labor intensive (frequently using ladder).               2.  Pain Psychologist to address relaxation, behavioral change, coping style, and other factors important to improvement.  N/A              3.  Diagnostic Studies:  No new orders today.               4.  Medication Management:   Continue MS Contin to 15 mg every 12 hours. He continues to appreciate benefit, stable baseline pain, supports functioning, continues to work full time. Refilled today for #60 tabs.   Continue hydrocodone 5-325 three times daily as needed for breakthrough pain. He does not use very frequently, usually only takes 1 tab daily as needed. He continues to appreciate benefit for breakthrough pain, no concerns for continuation. Refilled today for #90 tabs to last 30 days.   Continue Senna S to 1 tab twice daily, along with Miralax 1-2 times daily as needed. OIC stable.   CSA/UDS - completed on 12/5/24. Naloxone refill sent into pharmacy on 12/5/24. Advised to check expiration date on currently home supply, fill new prescription when needed.               5.  Potential procedures: Repeat L4-5 LUIS on 1/21/25. Typically appreciates improvement in pain around 75% pain relief that lasts 3+ months.               7.  Follow up with TEREZA Sanchez CNP in 3-4 months      Additional visit details: May rotate to Oxycontin if supply issues continue with MS Contin.    Since his last visit, Per Etienne reports:  -His pain is increased since last visit, waiting to get in for repeat LESI.   -He had follow up with Dr. Garcia as well for cancer surveillance.   -He was switched to Oxycontin since last visit due to supply shortage of MS Contin. States  his wife told him he is crabbier since change in medications and needs to change back to MS Contin or something else.   -    PEG: A Three-Item Scale Assessing Pain Intensity and Interference    What number best describes your PAIN ON AVERAGE in the past week? (Patient-Rptd) 8    What number best describes how, during the past week, pain has interfered with your ENJOYMENT OF LIFE? (Patient-Rptd) 7    What number best describes how, during the past week, pain has interfered with your GENERAL ACTIVITY? (Patient-Rptd) 7    PEG Total Score: (Patient-Rptd) 7.33    Marielena EE, Mc KA, Clover MJ, Zack TA, Mike J, Hilda JM, Asa SM, Rui K. Development and initial validation of the PEG, a 3-item scale assessing pain intensity and interference. Journal of General Internal Medicine. 2009 Ankur;24:733-738.        HPI/Interval Hx from last visit:  -He is a bit more sore lately, notes more activities recently.   -He has had to  more responsibilities at work.   -Overall pain is stable and manageable.   -Medications stable. No side effects.   -He reports issue with MS Contin supply at last visit.       Current Pain Treatments:      MS Contin 15 mg BID  Hydrocodone 5-325 mg TID PRN         L4-5 LUIS - scheduled for repeat on 7/15/25        Current MME: 45     Review of [place] Prescription Monitoring Program (): YES      Annual Controlled Substance Agreement/UDS due date: Completed on 12/5/24.      Past pain treatments:  LESI - helpful  Oxycontin     Medications:  Current Outpatient Medications   Medication Sig Dispense Refill    apalutamide (ERLEADA) 60 MG tablet Take 4 tablets (240 mg) by mouth daily. 120 tablet 0    denosumab (XGEVA) 120 MG/1.7ML SOLN injection Inject 1.7 mLs (120 mg) Subcutaneous every 3 months 1.7 mL 0    HYDROcodone-acetaminophen (NORCO) 5-325 MG tablet Take 1 tablet by mouth 3 times daily as needed for breakthrough pain or severe pain. Fill 6/9/2025 start 6/10/25 (30 day supply) 90 tablet 0     leuprolide (ELIGARD) 45 MG kit Inject 45 mg Subcutaneous every 6 months      morphine (MS CONTIN) 15 MG CR tablet Take 1 tablet (15 mg) by mouth every 12 hours. Stop Oxycontin. #60 tabs to last 30 days. Fill/start 6/19 60 tablet 0    naloxone (NARCAN) 4 MG/0.1ML nasal spray Spray 1 spray (4 mg) into one nostril alternating nostrils as needed for opioid reversal. every 2-3 minutes until assistance arrives 0.2 mL 0    omeprazole (PRILOSEC) 40 MG DR capsule Take 1 capsule (40 mg) by mouth daily. at least 30 min before breakfast 90 capsule 3    oxyCODONE (OXYCONTIN) 10 MG 12 hr tablet Take 1 tablet (10 mg) by mouth every 12 hours. Fill / Start 5/19/25. 60 tablet 0    polyethylene glycol (MIRALAX) 17 GM/Dose powder Take 17 g (1 Capful) by mouth 2 times daily as needed for constipation 578 g 1    SENNA-docusate sodium (SENNA S) 8.6-50 MG tablet Take 1 tablet by mouth 2 times daily. 60 tablet 3    UNABLE TO FIND 1 tablet daily MEDICATION NAME: C, D, Zinc combination supplement         Medical History: any changes in medical history since they were last seen? No      Objective:    Physical Exam:  Blood pressure (!) 149/89, pulse 89.  Constitutional: Alert, no signs of acute distress.   Gait: Intact   HEENT: Head atraumatic, normocephalic. Eyes without conjunctival injection or jaundice.   Skin: No rash, lesions, or petechiae of exposed skin.   Psychiatric/mental status: Appropriate affect. Able to follow commands without difficulty. Appropriately engaged throughout visit.     Diagnostic Tests/Imaging/Labs:  Reviewed CMP from 6/17/25 - WNL. GFR 87    BILLING TIME DOCUMENTATION:   The total TIME spent on this patient on the date of the encounter/appointment was 33 minutes.      TOTAL TIME includes:   Time spent preparing to see the patient (reviewing records and tests)   Time spent face to face (or over the phone) with the patient   Time spent ordering tests, medications, procedures and referrals   Time spent Referring and  communicating with other healthcare professionals   Time spent documenting clinical information in Epic

## 2025-06-19 ENCOUNTER — ONCOLOGY VISIT (OUTPATIENT)
Dept: ONCOLOGY | Facility: CLINIC | Age: 60
End: 2025-06-19
Attending: INTERNAL MEDICINE
Payer: COMMERCIAL

## 2025-06-19 ENCOUNTER — OFFICE VISIT (OUTPATIENT)
Dept: PALLIATIVE MEDICINE | Facility: CLINIC | Age: 60
End: 2025-06-19
Payer: COMMERCIAL

## 2025-06-19 ENCOUNTER — INFUSION THERAPY VISIT (OUTPATIENT)
Dept: INFUSION THERAPY | Facility: CLINIC | Age: 60
End: 2025-06-19
Attending: INTERNAL MEDICINE
Payer: COMMERCIAL

## 2025-06-19 VITALS
RESPIRATION RATE: 18 BRPM | DIASTOLIC BLOOD PRESSURE: 84 MMHG | WEIGHT: 187.8 LBS | HEIGHT: 67 IN | SYSTOLIC BLOOD PRESSURE: 150 MMHG | HEART RATE: 69 BPM | OXYGEN SATURATION: 98 % | BODY MASS INDEX: 29.47 KG/M2

## 2025-06-19 VITALS
OXYGEN SATURATION: 98 % | HEIGHT: 67 IN | WEIGHT: 187.83 LBS | SYSTOLIC BLOOD PRESSURE: 150 MMHG | DIASTOLIC BLOOD PRESSURE: 84 MMHG | HEART RATE: 69 BPM | BODY MASS INDEX: 29.48 KG/M2 | RESPIRATION RATE: 18 BRPM

## 2025-06-19 VITALS — HEART RATE: 89 BPM | DIASTOLIC BLOOD PRESSURE: 89 MMHG | SYSTOLIC BLOOD PRESSURE: 149 MMHG

## 2025-06-19 DIAGNOSIS — C79.51 PROSTATE CANCER METASTATIC TO BONE (H): Primary | ICD-10-CM

## 2025-06-19 DIAGNOSIS — C61 PROSTATE CANCER METASTATIC TO BONE (H): Primary | ICD-10-CM

## 2025-06-19 DIAGNOSIS — K59.03 THERAPEUTIC OPIOID INDUCED CONSTIPATION: ICD-10-CM

## 2025-06-19 DIAGNOSIS — T40.2X5A THERAPEUTIC OPIOID INDUCED CONSTIPATION: ICD-10-CM

## 2025-06-19 DIAGNOSIS — C61 PROSTATE CANCER (H): Primary | ICD-10-CM

## 2025-06-19 DIAGNOSIS — C79.51 PROSTATE CANCER METASTATIC TO BONE (H): ICD-10-CM

## 2025-06-19 DIAGNOSIS — Z51.81 ENCOUNTER FOR MONITORING OPIOID MAINTENANCE THERAPY: Primary | ICD-10-CM

## 2025-06-19 DIAGNOSIS — Z79.891 ENCOUNTER FOR MONITORING OPIOID MAINTENANCE THERAPY: Primary | ICD-10-CM

## 2025-06-19 DIAGNOSIS — C61 PROSTATE CANCER METASTATIC TO BONE (H): ICD-10-CM

## 2025-06-19 DIAGNOSIS — C79.51 MALIGNANT NEOPLASM METASTATIC TO BONE (H): ICD-10-CM

## 2025-06-19 DIAGNOSIS — M54.10 RADICULAR PAIN OF RIGHT LOWER EXTREMITY: ICD-10-CM

## 2025-06-19 DIAGNOSIS — Z87.891 PERSONAL HISTORY OF TOBACCO USE: ICD-10-CM

## 2025-06-19 DIAGNOSIS — M54.16 LUMBAR RADICULOPATHY: ICD-10-CM

## 2025-06-19 DIAGNOSIS — Z79.899 HIGH RISK MEDICATION USE: ICD-10-CM

## 2025-06-19 DIAGNOSIS — G89.3 CANCER ASSOCIATED PAIN: ICD-10-CM

## 2025-06-19 DIAGNOSIS — D84.9 IMMUNOCOMPROMISED: ICD-10-CM

## 2025-06-19 PROCEDURE — 3079F DIAST BP 80-89 MM HG: CPT

## 2025-06-19 PROCEDURE — 3077F SYST BP >= 140 MM HG: CPT

## 2025-06-19 PROCEDURE — 1125F AMNT PAIN NOTED PAIN PRSNT: CPT

## 2025-06-19 PROCEDURE — 96372 THER/PROPH/DIAG INJ SC/IM: CPT | Performed by: INTERNAL MEDICINE

## 2025-06-19 PROCEDURE — 99214 OFFICE O/P EST MOD 30 MIN: CPT

## 2025-06-19 PROCEDURE — 250N000011 HC RX IP 250 OP 636: Mod: JZ | Performed by: INTERNAL MEDICINE

## 2025-06-19 PROCEDURE — 99213 OFFICE O/P EST LOW 20 MIN: CPT | Performed by: INTERNAL MEDICINE

## 2025-06-19 RX ORDER — MORPHINE SULFATE 15 MG/1
15 TABLET, FILM COATED, EXTENDED RELEASE ORAL EVERY 12 HOURS
Qty: 60 TABLET | Refills: 0 | Status: SHIPPED | OUTPATIENT
Start: 2025-06-19

## 2025-06-19 RX ADMIN — DENOSUMAB 120 MG: 120 INJECTION SUBCUTANEOUS at 08:56

## 2025-06-19 ASSESSMENT — PAIN SCALES - GENERAL
PAINLEVEL_OUTOF10: SEVERE PAIN (9)
PAINLEVEL_OUTOF10: SEVERE PAIN (8)

## 2025-06-19 NOTE — PROGRESS NOTES
Infusion Nursing Note:  Per Etienne presents today for Lupron and Xgeva.    Patient seen by provider today: Yes: Dr. Garcia   present during visit today: Not Applicable.    Note: N/A.      Intravenous Access:  No Intravenous access/labs at this visit.    Treatment Conditions:  Lab Results   Component Value Date     06/17/2025    POTASSIUM 4.1 06/17/2025    CR 0.99 06/17/2025    NATALIYA 9.8 06/17/2025    BILITOTAL <0.2 06/17/2025    ALBUMIN 4.1 06/17/2025    ALT 42 06/17/2025    AST 25 06/17/2025       Results reviewed, labs MET treatment parameters, ok to proceed with treatment.      Post Infusion Assessment:  Patient tolerated injection without incident.  Site patent and intact, free from redness, edema or discomfort.       Discharge Plan:   Patient discharged in stable condition accompanied by: self.  Departure Mode: Ambulatory.      Brittany Brown LPN

## 2025-06-19 NOTE — NURSING NOTE
"Oncology Rooming Note    June 19, 2025 8:16 AM   Per Etienne is a 59 year old male who presents for:    Chief Complaint   Patient presents with    Oncology Clinic Visit     3 month follow up       Initial Vitals: BP (!) 150/84   Pulse 69   Resp 18   Ht 1.702 m (5' 7.01\")   Wt 85.2 kg (187 lb 12.8 oz)   SpO2 98%   BMI 29.41 kg/m   Estimated body mass index is 29.41 kg/m  as calculated from the following:    Height as of this encounter: 1.702 m (5' 7.01\").    Weight as of this encounter: 85.2 kg (187 lb 12.8 oz). Body surface area is 2.01 meters squared.  Severe Pain (9) Comment: Data Unavailable   No LMP for male patient.  Allergies reviewed: Yes  Medications reviewed: Yes    Medications: Medication refills not needed today.  Pharmacy name entered into PAK:    [id number] [place], [place] [address] [place], [place] - [address] [place] [address]    Frailty Screening:   Is the patient here for a new oncology consult visit in cancer care? 2. No    PHQ9:  Did this patient require a PHQ9?: No      Clinical concerns: No new concerns         Brittany Brown LPN              "

## 2025-06-19 NOTE — PATIENT INSTRUCTIONS
Stop Oxycontin. Restart MS contin 15 mg twice daily. Notify clinic about any insurance coverage or drug supply issues.   Follow up within 3-4 months.     ----------------------------------------------------------------  Clinic Number:  [phone]   Call with any questions about your care and for scheduling assistance.   Calls are returned Monday through Friday between 8 AM and 4:30 PM. We usually get back to you within 2 business days depending on the issue/request.    If we are prescribing your medications:  For opioid medication refills, call the clinic or send a UpDown message 7 days in advance.  Please include:  Name of requested medication  Name of the pharmacy.  For non-opioid medications, call your pharmacy directly to request a refill. Please allow 3-4 days to be processed.   Per [place] State Law:  All controlled substance prescriptions must be filled within 30 days of being written.    For those controlled substances allowing refills, pickup must occur within 30 days of last fill.      We believe regular attendance is key to your success in our program!    Any time you are unable to keep your appointment we ask that you call us at least 24 hours in advance to cancel.This will allow us to offer the appointment time to another patient.   Multiple missed appointments may lead to dismissal from the clinic.

## 2025-06-19 NOTE — PROGRESS NOTES
History of present illness  - PSA levels have been undetectable.  - Testosterone levels are down to close to 5.  - Scheduled labs every 3 months, typically done a couple of days before appointments.  - History of back pain due to previous work as a  for over 30 years.  - Quit smoking in January 2012 at age 46.  - Cancer is present in pelvic bones.  - Receiving Lupron and Xgeva injections.  - Previous CT scans for lung cancer screening were stopped after the senior living of the overseeing physician.  - Father had prostate cancer, diagnosed approximately 6.5 years ago.    Results  - PSA: Undetectable  - Testosterone: Close to 5    Assessment & Plan  Prostate cancer:  - PSA levels undetectable, testosterone levels low. Patient is on Lupron and Xgeva injections.  - Continue Lupron and Xgeva injections every three months. Orders for opalutamide signed. Follow-up in three months.    Back pain:  - Chronic back pain due to previous occupation as a .  - Continue current pain management regimen.    Lung cancer screening:  - Previous CT scans for lung cancer screening stopped after Muriel retired.  - Consider restarting lung cancer screening program at next meeting.    Bone density monitoring:  - Bone density monitoring considered due to cancer in pelvic bones.  - Decided to skip bone density scan as patient is already on Xgeva.    Visit diagnoses suggestions (2)  - Malignant neoplasm of prostate [C61]  - Dorsalgia, unspecified [M54.9]

## 2025-06-19 NOTE — PROGRESS NOTES
Oncology Follow Up Visit: Jun 19, 2025      Oncologist: Dr Garcia  PCP: Per Chen    Diagnosis: Metastatic hormone sensitive prostate cancer  Per Etienne is a 58 yo male who presented to his PCP in December 2019 with slow urinary stream.  PSA = 124. On 1/8/2020 CT abdomen pelvis showed groundglass opacity in the left lower lobe,  dystrophic calcification of the prostate gland, and a fatty liver.  Same-day bone scan was negative. On January 10, 2010 prostate biopsy showed on the left Robyn score 4+3 involving 7 cores and on the right Robyn score 3+4 involving 6 cores, with positive perineural invasion.  He proceeded to undergo RRP by Dr. Colmenares on 2/24/2020.  Pathology revealed acinar adenocarcinoma [place] 4+3 with focal ROBERTO, bladder neck base invasion, positive SVI, positive PNI, and multiple positive margins.  There was no LVSI and 0 out of 3 lymph nodes removed were involved with tumor; pT3bN0.    Treatment:   4/20/2020 received Eligard.     5/21/2020 MRI of the Prostate= 14 x 9 mm T2 intermediate structure in the prostatectomy bed at the left aspect of urinary bladder neck with restricted diffusion in the DWI images and early enhancement in the contrast  enhanced images. This was concerning for locally recurrent/residual prostatic cancer.  There was a T2 hypointense structure in the in the area of removed right seminal vesicle measuring 13 x 11 mm  demonstrating restricted  diffusion in the DWI images. This may represent a recurrent/residual tumor. There were 2 bone metastases in the left pubic inferior ramus and inferior aspect of left pubic body.There is a 7 mm suspicious lymph nodes in the right obturator area  corresponding to to FACBC avid lymph node on the same date PET/CT  5/20/2020 PET/CT showed:  1.  2 foci of increased uptake in the left ischium, with underlying  sclerotic lesion, and symphysis pubis, these changes are indicative of  active prostate cancer metastasis.  2. Low-level  increased uptake in 3 lymphnodes along the right external  and internal iliac nodes, the most suspicious of which is the deep  obturator.  3. Low-level increased uptake in the prostatectomy bed slightly to the  left side of the urethra, if clinically necessary, endoscopy would  would be necessary for confirmation of this being metastasis.  PSA was down to 27.7 on 4/21/2020.   4/20/2020 he received an Eligard injection.    6/12/2020 started Apalutamide(Erleada)  Bone scan 1/22/2021: no bone mets.    Interval History: Mr. Etienne is seen alone today in clinic with for review for continuation of Apalutamide / Leuprolide/ Xgeva.      - Pt feeling well without new symptoms- feels all is stable for now.   - Denies missing one dose of medication     - Neuropathy to the feet present  - Pain to pelvis and  low back- seeing pain and palliative care- on MS Contin 15mg bid with Norco up to 3 x daily as needed and this is working well.Gets Lumabr injections for back pain. Uses marijuana prn   - Stays active with job as  and feels he is able to continue with job for now  - Bowel and bladder are normal.  - Denies fevers, illness, headaches, chest pain, SOB.   - Scheduled labs every 3 months, typically done a couple of days before appointments.  - History of back pain due to previous work as a  for over 30 years.  - Quit smoking in January 2012 at age 46.  - Cancer is present in pelvic bones.  - Receiving Lupron and Xgeva injections. Has pain in butt after his injections  - Previous CT scans for lung cancer screening were stopped after the snf of the overseeing physician.  - Father had prostate cancer, diagnosed approximately 6.5 years ago.     Rest of comprehensive and complete ROS is reviewed and is negative.   Past Medical History:   Diagnosis Date    Gout     Hypertension goal BP (blood pressure) < 140/90 08/01/2022    Lumbar stenosis     Prostate cancer (H) 01/10/2020     Current Outpatient  "Medications   Medication Sig Dispense Refill    apalutamide (ERLEADA) 60 MG tablet Take 4 tablets (240 mg) by mouth daily. 120 tablet 0    denosumab (XGEVA) 120 MG/1.7ML SOLN injection Inject 1.7 mLs (120 mg) Subcutaneous every 3 months 1.7 mL 0    HYDROcodone-acetaminophen (NORCO) 5-325 MG tablet Take 1 tablet by mouth 3 times daily as needed for breakthrough pain or severe pain. Fill 6/9/2025 start 6/10/25 (30 day supply) 90 tablet 0    leuprolide (ELIGARD) 45 MG kit Inject 45 mg Subcutaneous every 6 months      morphine (MS CONTIN) 15 MG CR tablet Take 1 tablet (15 mg) by mouth every 12 hours. #60 tabs to last 30 days. Fill 3/8/2025, start 3/10/2025. 60 tablet 0    naloxone (NARCAN) 4 MG/0.1ML nasal spray Spray 1 spray (4 mg) into one nostril alternating nostrils as needed for opioid reversal. every 2-3 minutes until assistance arrives 0.2 mL 0    omeprazole (PRILOSEC) 40 MG DR capsule Take 1 capsule (40 mg) by mouth daily. at least 30 min before breakfast 90 capsule 3    oxyCODONE (OXYCONTIN) 10 MG 12 hr tablet Take 1 tablet (10 mg) by mouth every 12 hours. Fill / Start 5/19/25. 60 tablet 0    polyethylene glycol (MIRALAX) 17 GM/Dose powder Take 17 g (1 Capful) by mouth 2 times daily as needed for constipation 578 g 1    SENNA-docusate sodium (SENNA S) 8.6-50 MG tablet Take 1 tablet by mouth 2 times daily. 60 tablet 3    UNABLE TO FIND 1 tablet daily MEDICATION NAME: C, D, Zinc combination supplement       Current Facility-Administered Medications   Medication Dose Route Frequency Provider Last Rate Last Admin    denosumab (XGEVA) injection 120 mg  120 mg Subcutaneous Once Scott Garcia MD        leuprolide (LUPRON DEPOT) kit 22.5 mg  22.5 mg Intramuscular Q90 Days Scott Garcia MD         No Known Allergies    Physical Exam:BP (!) 150/84   Pulse 69   Resp 18   Ht 1.702 m (5' 7.01\")   Wt 85.2 kg (187 lb 12.8 oz)   SpO2 98%   BMI 29.41 kg/m    Constitutional: Alert, healthy, and in no distress " "with good movement on own.   ENT: Eyes bright, No mouth sores  Neck: Supple, No adenopathy.  Cardiac: Heart rate and rhythm is regular with normal S1 and S2 without murmur  Respiratory: Breathing easy. Lung sounds clear to auscultation  Abdomen: Soft, non-tender, BS normal. No masses or organomegaly  MS: Muscle tone normal- moving well on own without signs of pain, extremities normal with no edema.   Skin: No suspicious lesions or rashes  Neuro: Sensory grossly WNL, gait normal.   Lymph: Normal ant/post cervical, axillary, supraclavicular nodes  Psych: Mentation appears normal and affect normal/bright with good conversation.    Laboratory Results:   Recent Labs   Lab Test 06/17/25 1700 03/03/25  1646 11/29/24  0849 09/05/24  1348 05/30/24  1403    140 139 142 141   POTASSIUM 4.1 3.9 4.3 4.3 4.5   CHLORIDE 102 100 102 105 104   CO2 25 28 26 27 27   ANIONGAP 13 12 11 10 10   BUN 18.1 20.2 13.6 18.5 14.6   CR 0.99 0.98 0.96 1.06 0.83   GLC 95 102* 139* 129* 148*   NATALIYA 9.8 10.1 9.3 10.3 9.7     No results for input(s): \"MAG\", \"PHOS\" in the last 34813 hours.  Recent Labs   Lab Test 06/17/25 1700 03/03/25 1646 11/29/24  0849 09/05/24  1348 05/30/24  1403   WBC 6.5 18.8* 11.9* 15.1* 8.8   HGB 12.9* 13.5 14.2 13.6 13.4    226 224 225 299   MCV 92 92 91 93 91   NEUTROPHIL 57 84 72 79 68     Recent Labs   Lab Test 06/17/25 1700 03/03/25  1646 11/29/24  0849   BILITOTAL <0.2 0.5 0.3   ALKPHOS 79 81 98   ALT 42 29 22   AST 25 20 22   ALBUMIN 4.1 4.2 4.1     TSH   Date Value Ref Range Status   01/21/2022 1.03 0.40 - 4.00 mU/L Final   10/29/2021 1.91 0.40 - 4.00 mU/L Final   08/23/2021 2.49 0.40 - 4.00 mU/L Final   04/29/2021 2.57 0.40 - 4.00 mU/L Final   01/22/2021 3.46 0.40 - 4.00 mU/L Final   10/30/2020 1.97 0.40 - 4.00 mU/L Final     No results for input(s): \"CEA\" in the last 34300 hours.  Results for orders placed or performed in visit on 01/21/25   PAIN Interlaminar Epidural Steroid Injection Lumbar/Sacral "    Narrative    Pre procedure Diagnosis: Lumbar radiculopathy  Post procedure Diagnosis: Same  Procedure performed: L4-5 interlaminar epidural steroid injection   Anesthesia: none  Complications: nonw  Operators: Issac Tapia MD     Indications:   Per Etienne is 59 year old male.  The patient has a history of lbp   rad to his le.  Examination shows + slump.  he has tried conservative   treatment including meds/pt.    MRI reviewed  Options/alternatives, benefits and risks were discussed with the patient   including but not limited to bleeding, infection, no pain relief, tissue   trauma, exposure to radiation, reaction to medications, spinal cord   injury, dural puncture, weakness, numbness and headache.  Questions were   answered to his satisfaction and he wishes to proceed. Voluntary informed   consent was obtained and signed.     Vitals were reviewed: Yes  Allergies were reviewed:  Yes   Medications were reviewed:  Yes   Pre-procedure pain score: 6/10    Procedure:  The patient's medical history, medications, and allergies were reviewed   and reconciled.  After obtaining signed informed consent, the patient was   brought into the procedure suite and was placed in a prone position on the   procedure table.   A Pause for the Cause was performed.  Patient was   prepped and draped in the usual sterile fashion.     The L4-5 interspace was identified with AP fluoroscopy.  A total of 4ml of   1% lidocaine was used to anesthetize the skin and subcutaneous tissues for   a  midline approach.    A 20gauge 3.5inch Touhy needle was advanced   utilizing intermittent AP and Lateral fluoroscopy and air for loss of   resistance.  The epidural space was encountered on the first pass without   difficulties.  Aspiration for blood and CSF was negative.  Needle position   was verified by injecting 1 ml of Omnipaque utilizing real-time   fluoroscopy that showed good needle placement and epidural spread without   signs of  intravascular or intrathecal uptake.  Omnipaque wasted:  9 ml.    Then, after repeated negative aspiration for blood or CSF, a combination   of Kenalog 40 mg, Bupivicaine 0.25% 2 ml, diluted with 3 ml of normal   saline to a total injectate volume of 6 ml was injected into the epidural   space in a slow and incremental fashion and the needle was restyletted and   withdrawn.  All injected medications were preservative free.    The injection site was cleaned and a sterile dressing was applied.    The patient tolerated the procedure well without complications and was   taken to the recovery room for continued observation.    Images were saved to PACS.    Post-procedure pain score: 6/10  Follow-up includes:   -f/u with referring provider     Issac Tapia MD  [facility]     Recent Labs   Lab Test 06/17/25  1700 03/03/25  1646 11/29/24  0849 09/05/24  1348 05/30/24  1403 02/21/24  1502   PSA <0.01 <0.01 <0.01 <0.01 <0.01 <0.01   TESTOSTTOTAL  --  5* 11* 5* 5* 3*     Assessment and Plan:   Metastatic hormone sensitive prostate cancer with bone metastasis-patient continues plan with Erleada 240 mg po daily  as well as Xgeva and Lupron every 3 months and states he is tolerating well.   - Is reporting intermittent day of nausea and sweats - not sure if related to Pain plan or prostates treatment but states they improve quickly .   Review, labs and exam showing no sign of progression of the cancer.   - PSA levels undetectable, testosterone levels low. Patient is on Lupron and Xgeva injections.  - Continue Lupron and Xgeva injections every three months. Orders for oral apalutamide signed.    - He will receive Lupron and Xgeva with visit today.  - He will return in 3 months to see me with labs(CBC differential, CMP, total testosterone level and PSA ), also for next Lupron/Xgeva administration    Bone metastasis with pain -Known pelvic bone mets - seeing palliative care team and using MS contin 15 mg po  bid with Norco up to 3 x daily prn and smoking Marijuana as needed up to 2 x daily.   - Chronic back pain due to previous occupation as a .  - Continue current pain management regimen.   - also having symptoms of neuropathy of the feet but good shoes helping with this- not know to be caused by any current medications.    - I will not get DEXA scan as he is already on Xgeva    Lung cancer screening/ Pulmonary nodule- has 30 pack year smoking history-  quit in 2012.  Initially imaging was 1/2022 - last imaging 3/12/2024. Reviewed results.     - Previous CT scans for lung cancer screening stopped after Muriel retired.  - Reviewed restarting lung cancer screening program at next meeting which he is eager to do     Lung Cancer Screening Shared Decision Making Visit     Per Etienne, a 59 year old male, is eligible for lung cancer screening    History   Smoking Status    Former    Types: Cigarettes, Cigars   Smokeless Tobacco    Never       I have discussed with patient the risks and benefits of screening for lung cancer with low-dose CT.     The risks include:    radiation exposure: one low dose chest CT has as much ionizing radiation as about 15 chest x-rays, or 6 months of background radiation living in [place]      false positives: most findings/nodules are NOT cancer, but some might still require additional diagnostic evaluation, including biopsy    over-diagnosis: some slow growing cancers that might never have been clinically significant will be detected and treated unnecessarily     The benefit of early detection of lung cancer is contingent upon adherence to annual screening or more frequent follow up if indicated.     Furthermore, to benefit from screening, Per must be willing and able to undergo diagnostic procedures, if indicated. Although no specific guide is available for determining severity of comorbidities, it is reasonable to withhold screening in patients who have greater mortality  risk from other diseases.     We did discuss that the best way to prevent lung cancer is to not smoke. He quit 13-14 yrs ago. He notes he smokes the occasional cigars and does not inhale. Recommended to quit completely.    Some patients may value a numeric estimation of lung cancer risk when evaluating if lung cancer screening is right for them, here is one calculator:    ShouldIScreen      The longitudinal plan of care for the diagnosis(es)/condition(s) as documented were addressed during this visit. Due to the added complexity in care, I will continue to support Bill in the subsequent management and with ongoing continuity of care.     30 minutes spent on the date of the encounter doing chart review, history and exam, documentation and further activities as noted above

## 2025-06-19 NOTE — PATIENT INSTRUCTIONS
Lung Cancer Screening   Frequently Asked Questions  If you are at high-risk for lung cancer, getting screened with low-dose computed tomography (LDCT) every year can help save your life. This handout offers answers to some of the most common questions about lung cancer screening. If you have other questions, please call [phone] ([phone]).     What is it?  Lung cancer screening uses special X-ray technology to create an image of your lung tissue. The exam is quick and easy and takes less than 10 seconds. We don t give you any medicine or use any needles. You can eat before and after the exam. You don t need to change your clothes as long as the clothing on your chest doesn t contain metal. But, you do need to be able to hold your breath for at least 6 seconds during the exam.    What is the goal of lung cancer screening?  The goal of lung cancer screening is to save lives. Many times, lung cancer is not found until a person starts having physical symptoms. Lung cancer screening can help detect lung cancer in the earliest stages when it may be easier to treat.    Who should be screened for lung cancer?  We suggest lung cancer screening for anyone who is at high-risk for lung cancer. You are in the high-risk group if you:      are between the ages of 55 and 79, and    have smoked at least 1 pack of cigarettes a day for 20 or more years, and    still smoke or have quit within the past 15 years.    However, if you have a new cough or shortness of breath, you should talk to your doctor before being screened.    Why does it matter if I have symptoms?  Certain symptoms can be a sign that you have a condition in your lungs that should be checked and treated by your doctor. These symptoms include fever, chest pain, a new or changing cough, shortness of breath that you have never felt before, coughing up blood or unexplained weight loss. Having any of these symptoms can greatly affect the results of lung  cancer screening.       Should all smokers get an LDCT lung cancer screening exam?  It depends. Lung cancer screening is for a very specific group of men and women who have a history of heavy smoking over a long period of time (see  Who should be screened for lung cancer  above).  I am in the high-risk group, but have been diagnosed with cancer in the past. Is LDCT lung cancer screening right for me?  In some cases, you should not have LDCT lung screening, such as when your doctor is already following your cancer with CT scan studies. Your doctor will help you decide if LDCT lung screening is right for you.  Do I need to have a screening exam every year?  Yes. If you are in the high-risk group described earlier, you should get an LDCT lung cancer screening exam every year until you are 79, or are no longer willing or able to undergo screening and possible procedures to diagnose and treat lung cancer.  How effective is LDCT at preventing death from lung cancer?  Studies have shown that LDCT lung cancer screening can lower the risk of death from lung cancer by 20 percent in people who are at high-risk.  What are the risks?  There are some risks and limitations of LDCT lung cancer screening. We want to make sure you understand the risks and benefits, so please let us know if you have any questions. Your doctor may want to talk with you more about these risks.    Radiation exposure: As with any exam that uses radiation, there is a very small increased risk of cancer. The amount of radiation in LDCT is small--about the same amount a person would get from a mammogram. Your doctor orders the exam when he or she feels the potential benefits outweigh the risks.    False negatives: No test is perfect, including LDCT. It is possible that you may have a medical condition, including lung cancer, that is not found during your exam. This is called a false negative result.    False positives and more testing: LDCT very often finds  something in the lung that could be cancer, but in fact is not. This is called a false positive result. False positive tests often cause anxiety. To make sure these findings are not cancer, you may need to have more tests. These tests will be done only if you give us permission. Sometimes patients need a treatment that can have side effects, such as a biopsy. For more information on false positives, see  What can I expect from the results?     Findings not related to lung cancer: Your LDCT exam also takes pictures of areas of your body next to your lungs. In a very small number of cases, the CT scan will show an abnormal finding in one of these areas, such as your kidneys, adrenal glands, liver or thyroid. This finding may not be serious, but you may need more tests. Your doctor can help you decide what other tests you may need, if any.  What can I expect from the results?  About 1 out of 4 LDCT exams will find something that may need more tests. Most of the time, these findings are lung nodules. Lung nodules are very small collections of tissue in the lung. These nodules are very common, and the vast majority--more than 97 percent--are not cancer (benign). Most are normal lymph nodes or small areas of scarring from past infections.  But, if a small lung nodule is found to be cancer, the cancer can be cured more than 90 percent of the time. To know if the nodule is cancer, we may need to get more images before your next yearly screening exam. If the nodule has suspicious features (for example, it is large, has an odd shape or grows over time), we will refer you to a specialist for further testing.  Will my doctor also get the results?  Yes. Your doctor will get a copy of your results.  Is it okay to keep smoking now that there s a cancer screening exam?  No. Tobacco is one of the strongest cancer-causing agents. It causes not only lung cancer, but other cancers and cardiovascular (heart) diseases as well. The damage  caused by smoking builds over time. This means that the longer you smoke, the higher your risk of disease. While it is never too late to quit, the sooner you quit, the better.  Where can I find help to quit smoking?  The best way to prevent lung cancer is to stop smoking. If you have already quit smoking, congratulations and keep it up! For help on quitting smoking, please call [employer] at 6-540-QUITNOW (1-618.732.9648) or the American [employer] Society at 1-711.149.9406 to find local resources near you.  One-on-one health coaching:  If you d prefer to work individually with a health care provider on tobacco cessation, we offer:      Medication Therapy Management:  Our specially trained pharmacists work closely with you and your doctor to help you quit smoking.  Call [phone] or [phone] (toll free).

## 2025-06-24 NOTE — TELEPHONE ENCOUNTER
Patient requesting to be scheduled for:Lumbar epidural steroid injection Interlaminar     The following red flags noted on patient screening:   To be routed to Mary for insurance approval following scheduling    Injection imaging orders Placed    Scheduled:  7/15/2025    Routing:  Mary Padilla to initiate PA    Caitlin Miranda RN

## 2025-06-24 NOTE — TELEPHONE ENCOUNTER
Pt calling to check the status of this. Pt is scheduled 7/15 w/Reg[facility] Hood  Complex   [facility]  Pain Management

## 2025-06-24 NOTE — TELEPHONE ENCOUNTER
Order ID: [id number]  Authorized      Approval Valid Through: 07/11/2025 - 07/24/2025      PATIENT SCHEDULED    Mary Wallace   [facility]

## 2025-06-27 DIAGNOSIS — C79.51 PROSTATE CANCER METASTATIC TO BONE (H): ICD-10-CM

## 2025-06-27 DIAGNOSIS — C61 PROSTATE CANCER METASTATIC TO BONE (H): ICD-10-CM

## 2025-06-27 NOTE — TELEPHONE ENCOUNTER
Patient request for a refill(s) of HYDROcodone-acetaminophen (NORCO) 5-325 MG tablet     Last dispensed from pharmacy on 6/10/25    Patient's last office/virtual visit by prescribing provider on 6/19/25  Next office/virtual appointment scheduled for 9/18/25    Last urine drug screen date 12/05/24  Current opioid agreement on file (completed within the last year) Yes Date of opioid agreement: 12/05/24    E-prescribe to   [facility] [id number] [facility] - [place], [place]    Will route to nursing [place] for review and preparation of prescription(s).

## 2025-06-30 NOTE — TELEPHONE ENCOUNTER
This is Chanel Bruno patient. Please forward for her review.    Thank you,   TEREZA Joe, JANET, FNP-C   [facility] - Pain Management

## 2025-06-30 NOTE — TELEPHONE ENCOUNTER
Medication refill information reviewed.     Due date for HYDROcodone-acetaminophen (NORCO) 5-325 MG tablet  is 7/10/2025     Prescriptions prepped for review.     Will route to provider.

## 2025-07-01 RX ORDER — HYDROCODONE BITARTRATE AND ACETAMINOPHEN 5; 325 MG/1; MG/1
1 TABLET ORAL 3 TIMES DAILY PRN
Qty: 90 TABLET | Refills: 0 | Status: SHIPPED | OUTPATIENT
Start: 2025-07-01

## 2025-07-02 DIAGNOSIS — C61 PROSTATE CANCER (H): Primary | ICD-10-CM

## 2025-07-02 NOTE — TELEPHONE ENCOUNTER
Request appears appropriate, refill approved for 30 day supply +0 additional fill(s).     Chanel Bruno DNP, APRN, AGNP-C  [facility]

## 2025-07-07 ENCOUNTER — MYC REFILL (OUTPATIENT)
Dept: PALLIATIVE MEDICINE | Facility: CLINIC | Age: 60
End: 2025-07-07
Payer: COMMERCIAL

## 2025-07-07 DIAGNOSIS — C61 PROSTATE CANCER METASTATIC TO BONE (H): ICD-10-CM

## 2025-07-07 DIAGNOSIS — C79.51 PROSTATE CANCER METASTATIC TO BONE (H): ICD-10-CM

## 2025-07-07 RX ORDER — HYDROCODONE BITARTRATE AND ACETAMINOPHEN 5; 325 MG/1; MG/1
1 TABLET ORAL 3 TIMES DAILY PRN
Qty: 90 TABLET | Refills: 0 | Status: CANCELLED | OUTPATIENT
Start: 2025-07-07

## 2025-07-07 NOTE — TELEPHONE ENCOUNTER
Received request for a refill(s) of     HYDROcodone-acetaminophen (NORCO) 5-325 MG tablet        Last dispensed from pharmacy on 6/10/2025    Patient's last office/virtual visit by prescribing provider on 6/19/2025  Next office/virtual appointment scheduled for 9/18/2025    Last urine drug screen date 12/5/2024  Current opioid agreement on file (completed within the last year) YesDate of opioid agreement: 12/5/2024    E-prescribe to [place] [id number] [place], [place] - [address] [facility]  pharmacy    Will route to nursing [place] for review and preparation of prescription(s).

## 2025-07-07 NOTE — TELEPHONE ENCOUNTER
Refills have been requested for the following medications:         HYDROcodone-acetaminophen (NORCO) 5-325 MG tablet [Chanel Bruno]     Preferred pharmacy: [facility] [id number] [place] [facility] [place], [place] - [address] [facility]

## 2025-07-07 NOTE — TELEPHONE ENCOUNTER
Appears to be duplicate request.   Prescription with start date of 7/10/2025 signed by provider on 7/1/2025.     MyChart sent to patient.     Caitlin Miranda RN

## 2025-07-15 ENCOUNTER — RADIOLOGY INJECTION OFFICE VISIT (OUTPATIENT)
Dept: PALLIATIVE MEDICINE | Facility: CLINIC | Age: 60
End: 2025-07-15
Payer: COMMERCIAL

## 2025-07-15 VITALS — SYSTOLIC BLOOD PRESSURE: 156 MMHG | DIASTOLIC BLOOD PRESSURE: 83 MMHG | OXYGEN SATURATION: 97 % | HEART RATE: 79 BPM

## 2025-07-15 DIAGNOSIS — M54.16 LUMBAR RADICULOPATHY: ICD-10-CM

## 2025-07-15 PROCEDURE — 62323 NJX INTERLAMINAR LMBR/SAC: CPT | Performed by: PAIN MEDICINE

## 2025-07-15 RX ORDER — TRIAMCINOLONE ACETONIDE 40 MG/ML
40 INJECTION, SUSPENSION INTRA-ARTICULAR; INTRAMUSCULAR ONCE
Status: COMPLETED | OUTPATIENT
Start: 2025-07-15 | End: 2025-07-15

## 2025-07-15 RX ADMIN — TRIAMCINOLONE ACETONIDE 40 MG: 40 INJECTION, SUSPENSION INTRA-ARTICULAR; INTRAMUSCULAR at 20:25

## 2025-07-15 ASSESSMENT — PAIN SCALES - GENERAL
PAINLEVEL_OUTOF10: SEVERE PAIN (7)
PAINLEVEL_OUTOF10: SEVERE PAIN (7)

## 2025-07-15 NOTE — PATIENT INSTRUCTIONS
[facility]   Procedure Discharge Instructions    Today you saw:    Dr. Issac Tapia,         You had a(n):  Lumbar epidural steroid injection Interlaminar     Medications used for interlaminar LUIS: Lidocaine, Omnipaque, Kenalog, Bupivicaine, normal saline        Be cautious with activities. Numbness and/or weakness in the upper extremities may occur for up to 6-8 hours after the procedure due to effect of the local anesthetic  Do not drive for 6 hours. The effect of the local anesthetic could slow your reflexes.   You may resume your regular activities after 24 hours  Avoid strenuous activity for the first 24 hours  You may shower, however avoid swimming, tub baths or hot tubs for 24 hours following your procedure  You may have a mild to moderate increase in pain for several days following the injection.  It may take up to 14 days for the steroid medication to start working although you may feel the effect as early as a few days after the procedure.     You may use ice packs for 10-15 minutes, 3 to 4 times a day at the injection site for comfort  Do not use heat to painful areas for 6 to 8 hours. This will give the local anesthetic time to wear off and prevent you from accidentally burning your skin.   Unless you have been directed to avoid the use of anti-inflammatory medications (NSAIDS), you may use medications such as ibuprofen, Aleve or Tylenol for pain control if needed.   If you have diabetes, check your blood sugar more frequently than usual as your blood sugar may be higher than normal for 10-14 days following a steroid injection. Contact your doctor who manages your diabetes if your blood sugar is higher than usual  Possible side effects of steroids that you may experience include flushing, elevated blood pressure, increased appetite, mild headaches and restlessness.  All of these symptoms will get better with time.  If you experience any of the following, call the Pain  Clinic during work hours (Mon-Friday 8-4:30 pm) at [phone] or the Provider Line after hours at [phone]:  -Fever over 100 degree F  -Swelling, bleeding, redness, drainage, warmth at the injection site  -Progressive weakness or numbness in your legs  -Loss of bowel or bladder function  -Unusual headache not relieved by Tylenol or other pain reliever  -Unusual new onset of pain that is not improving

## 2025-07-15 NOTE — NURSING NOTE
Discharge Information    IV Discontiued Time:  NA    Amount of Fluid Infused:  NA    Discharge Criteria = When patient returns to baseline or as per MD order    Consciousness:  Pt is fully awake    Circulation:  BP +/- 20% of pre-procedure level    Respiration:  Patient is able to breathe deeply    O2 Sat:  Patient is able to maintain O2 Sat >92% on room air    Activity:  Moves 4 extremities on command    Ambulation:  Patient is able to stand and walk or stand and pivot into wheelchair    Dressing:  Clean/dry or No Dressing    Notes:   Discharge instructions and AVS given to patient    Patient meets criteria for discharge?  YES    Admitted to PCU?  No    Responsible adult present to accompany patient home?  Yes    Signature/Title:    Caitlin Miranda RN  RN Care Coordinator  [facility] [place]

## 2025-07-16 NOTE — PROGRESS NOTES
Pre procedure Diagnosis: Lumbar radiculopathy  Post procedure Diagnosis: Same  Procedure performed: L4-5 interlaminar epidural steroid injection   Anesthesia: none  Complications: nonw  Operators: Issac Tapia MD     Indications:   Per Etienne is 59 year old male.  The patient has a history of lbp rad to his le.  Examination shows + slump.  he has tried conservative treatment including meds/pt.    MRI reviewed  Options/alternatives, benefits and risks were discussed with the patient including but not limited to bleeding, infection, no pain relief, tissue trauma, exposure to radiation, reaction to medications, spinal cord injury, dural puncture, weakness, numbness and headache.  Questions were answered to his satisfaction and he wishes to proceed. Voluntary informed consent was obtained and signed.     Vitals were reviewed: Yes  Allergies were reviewed:  Yes   Medications were reviewed:  Yes   Pre-procedure pain score: 7/10    Procedure:  The patient's medical history, medications, and allergies were reviewed and reconciled.  After obtaining signed informed consent, the patient was brought into the procedure suite and was placed in a prone position on the procedure table.   A Pause for the Cause was performed.  Patient was prepped and draped in the usual sterile fashion.     The L4-5 interspace was identified with AP fluoroscopy.  A total of 4ml of 1% lidocaine was used to anesthetize the skin and subcutaneous tissues for a  midline approach.    A 20gauge 3.5inch Touhy needle was advanced utilizing intermittent AP and Lateral fluoroscopy and air for loss of resistance.  The epidural space was encountered on the first pass without difficulties.  Aspiration for blood and CSF was negative.  Needle position was verified by injecting 1 ml of Omnipaque utilizing real-time fluoroscopy that showed good needle placement and epidural spread without signs of intravascular or intrathecal uptake.  Omnipaque wasted:  9  ml.    Then, after repeated negative aspiration for blood or CSF, a combination of Kenalog 40 mg, Bupivicaine 0.25% 2 ml, diluted with 3 ml of normal saline to a total injectate volume of 6 ml was injected into the epidural space in a slow and incremental fashion and the needle was restyletted and withdrawn.  All injected medications were preservative free.    The injection site was cleaned and a sterile dressing was applied.    The patient tolerated the procedure well without complications and was taken to the recovery room for continued observation.    Images were saved to PACS.    Post-procedure pain score: 7/10  Follow-up includes:   -f/u with referring provider     Issac Tapia MD  [facility] [place]

## 2025-07-18 ENCOUNTER — MYC REFILL (OUTPATIENT)
Dept: PALLIATIVE MEDICINE | Facility: CLINIC | Age: 60
End: 2025-07-18
Payer: COMMERCIAL

## 2025-07-18 DIAGNOSIS — G89.3 CANCER ASSOCIATED PAIN: ICD-10-CM

## 2025-07-21 DIAGNOSIS — Z79.899 CONTROLLED SUBSTANCE AGREEMENT SIGNED: ICD-10-CM

## 2025-07-21 DIAGNOSIS — T40.2X5A THERAPEUTIC OPIOID INDUCED CONSTIPATION: ICD-10-CM

## 2025-07-21 DIAGNOSIS — G89.3 CANCER ASSOCIATED PAIN: ICD-10-CM

## 2025-07-21 DIAGNOSIS — K59.03 THERAPEUTIC OPIOID INDUCED CONSTIPATION: ICD-10-CM

## 2025-07-21 RX ORDER — MORPHINE SULFATE 15 MG/1
15 TABLET, FILM COATED, EXTENDED RELEASE ORAL EVERY 12 HOURS
Qty: 60 TABLET | Refills: 0 | Status: SHIPPED | OUTPATIENT
Start: 2025-07-21

## 2025-07-21 NOTE — TELEPHONE ENCOUNTER
Patient request for a refill(s) of morphine (MS CONTIN) 15 MG CR tablet     Last dispensed from pharmacy on 6/19/25    Patient's last office/virtual visit by prescribing provider on 7/15/25  Next office/virtual appointment scheduled for 9/18/25    Last urine drug screen date 12/05/24  Current opioid agreement on file (completed within the last year) Yes Date of opioid agreement: 12/05/24    E-prescribe to   [facility] [id number] [address] [facility] - [place], [place]      Will route to nursing pool for review and preparation of prescription(s).       Overweight

## 2025-07-21 NOTE — TELEPHONE ENCOUNTER
Medication refill information reviewed.     Due date for morphine (MS CONTIN) 15 MG CR tablet  is 7/19/2025     Prescriptions prepped for review.     Will route to provider.

## 2025-07-21 NOTE — TELEPHONE ENCOUNTER
Request appears appropriate, refill approved for 30 day supply +0 additional fill(s).     Chanel Bruno DNP, APRN, AGNP-C  [facility]

## 2025-07-22 RX ORDER — DOCUSATE SODIUM 50MG AND SENNOSIDES 8.6MG 8.6; 5 MG/1; MG/1
1 TABLET, FILM COATED ORAL 2 TIMES DAILY
Qty: 60 TABLET | Refills: 3 | Status: SHIPPED | OUTPATIENT
Start: 2025-07-22

## 2025-07-22 NOTE — TELEPHONE ENCOUNTER
Received fax request from [facility] pharmacy requesting refill(s) for SENNA-docusate sodium (SENNA S) 8.6-50 MG tablet   And  naloxone (NARCAN) 4 MG/0.1ML nasal spray     Last refilled on 06/29/25-Senna  12/05/24-Narcan    Pt last seen on 06/19/25  Next appt scheduled for 09/18/25    Will facilitate refill.

## 2025-07-22 NOTE — TELEPHONE ENCOUNTER
Request appears appropriate, refill approved.     Chanel Bruno DNP, APRN, AGNP-C  [facility]

## 2025-07-31 DIAGNOSIS — C61 PROSTATE CANCER (H): Primary | ICD-10-CM

## 2025-08-10 ENCOUNTER — MYC REFILL (OUTPATIENT)
Dept: PALLIATIVE MEDICINE | Facility: CLINIC | Age: 60
End: 2025-08-10
Payer: COMMERCIAL

## 2025-08-10 DIAGNOSIS — C61 PROSTATE CANCER METASTATIC TO BONE (H): ICD-10-CM

## 2025-08-10 DIAGNOSIS — C79.51 PROSTATE CANCER METASTATIC TO BONE (H): ICD-10-CM

## 2025-08-11 RX ORDER — HYDROCODONE BITARTRATE AND ACETAMINOPHEN 5; 325 MG/1; MG/1
1 TABLET ORAL 3 TIMES DAILY PRN
Qty: 90 TABLET | Refills: 0 | Status: SHIPPED | OUTPATIENT
Start: 2025-08-11

## 2025-08-17 ENCOUNTER — MYC REFILL (OUTPATIENT)
Dept: PALLIATIVE MEDICINE | Facility: CLINIC | Age: 60
End: 2025-08-17
Payer: COMMERCIAL

## 2025-08-17 DIAGNOSIS — G89.3 CANCER ASSOCIATED PAIN: ICD-10-CM

## 2025-08-18 RX ORDER — MORPHINE SULFATE 15 MG/1
15 TABLET, FILM COATED, EXTENDED RELEASE ORAL EVERY 12 HOURS
Qty: 60 TABLET | Refills: 0 | Status: SHIPPED | OUTPATIENT
Start: 2025-08-18